# Patient Record
Sex: FEMALE | Race: WHITE | NOT HISPANIC OR LATINO | Employment: OTHER | URBAN - METROPOLITAN AREA
[De-identification: names, ages, dates, MRNs, and addresses within clinical notes are randomized per-mention and may not be internally consistent; named-entity substitution may affect disease eponyms.]

---

## 2017-01-04 ENCOUNTER — GENERIC CONVERSION - ENCOUNTER (OUTPATIENT)
Dept: OTHER | Facility: OTHER | Age: 77
End: 2017-01-04

## 2017-01-12 ENCOUNTER — ALLSCRIPTS OFFICE VISIT (OUTPATIENT)
Dept: OTHER | Facility: OTHER | Age: 77
End: 2017-01-12

## 2017-04-17 ENCOUNTER — ALLSCRIPTS OFFICE VISIT (OUTPATIENT)
Dept: OTHER | Facility: OTHER | Age: 77
End: 2017-04-17

## 2017-04-17 DIAGNOSIS — E11.9 TYPE 2 DIABETES MELLITUS WITHOUT COMPLICATIONS (HCC): ICD-10-CM

## 2017-05-12 ENCOUNTER — GENERIC CONVERSION - ENCOUNTER (OUTPATIENT)
Dept: OTHER | Facility: OTHER | Age: 77
End: 2017-05-12

## 2017-05-19 ENCOUNTER — GENERIC CONVERSION - ENCOUNTER (OUTPATIENT)
Dept: OTHER | Facility: OTHER | Age: 77
End: 2017-05-19

## 2017-05-30 ENCOUNTER — ALLSCRIPTS OFFICE VISIT (OUTPATIENT)
Dept: OTHER | Facility: OTHER | Age: 77
End: 2017-05-30

## 2017-05-30 DIAGNOSIS — Z13.820 ENCOUNTER FOR SCREENING FOR OSTEOPOROSIS: ICD-10-CM

## 2017-07-13 ENCOUNTER — ALLSCRIPTS OFFICE VISIT (OUTPATIENT)
Dept: OTHER | Facility: OTHER | Age: 77
End: 2017-07-13

## 2017-08-18 ENCOUNTER — ALLSCRIPTS OFFICE VISIT (OUTPATIENT)
Dept: OTHER | Facility: OTHER | Age: 77
End: 2017-08-18

## 2017-08-18 ENCOUNTER — HOSPITAL ENCOUNTER (OUTPATIENT)
Dept: RADIOLOGY | Facility: HOSPITAL | Age: 77
Discharge: HOME | End: 2017-08-18
Payer: COMMERCIAL

## 2017-08-18 ENCOUNTER — HOSPITAL ENCOUNTER (OUTPATIENT)
Dept: RADIOLOGY | Facility: HOSPITAL | Age: 77
Discharge: HOME | End: 2017-08-18
Attending: FAMILY MEDICINE
Payer: COMMERCIAL

## 2017-08-18 ENCOUNTER — TRANSCRIBE ORDERS (OUTPATIENT)
Dept: ADMINISTRATIVE | Facility: HOSPITAL | Age: 77
End: 2017-08-18

## 2017-08-18 DIAGNOSIS — S69.92XA UNSPECIFIED INJURY OF LEFT WRIST, HAND AND FINGER(S), INITIAL ENCOUNTER: ICD-10-CM

## 2017-08-18 PROCEDURE — 73130 X-RAY EXAM OF HAND: CPT

## 2017-10-05 ENCOUNTER — ALLSCRIPTS OFFICE VISIT (OUTPATIENT)
Dept: OTHER | Facility: OTHER | Age: 77
End: 2017-10-05

## 2017-10-05 DIAGNOSIS — E11.9 TYPE 2 DIABETES MELLITUS WITHOUT COMPLICATIONS (HCC): ICD-10-CM

## 2017-10-05 DIAGNOSIS — E78.5 HYPERLIPIDEMIA: ICD-10-CM

## 2017-10-05 DIAGNOSIS — M79.10 MYALGIA: ICD-10-CM

## 2017-10-05 DIAGNOSIS — I10 ESSENTIAL (PRIMARY) HYPERTENSION: ICD-10-CM

## 2017-10-10 ENCOUNTER — GENERIC CONVERSION - ENCOUNTER (OUTPATIENT)
Dept: OTHER | Facility: OTHER | Age: 77
End: 2017-10-10

## 2017-10-17 ENCOUNTER — HOSPITAL ENCOUNTER (OUTPATIENT)
Dept: RADIOLOGY | Facility: HOSPITAL | Age: 77
Discharge: HOME | End: 2017-10-17
Attending: FAMILY MEDICINE
Payer: MEDICARE

## 2017-10-17 DIAGNOSIS — Z13.820 ENCOUNTER FOR SCREENING FOR OSTEOPOROSIS: ICD-10-CM

## 2017-10-17 PROCEDURE — 77080 DXA BONE DENSITY AXIAL: CPT

## 2017-11-13 ENCOUNTER — APPOINTMENT (OUTPATIENT)
Dept: LAB | Facility: HOSPITAL | Age: 77
End: 2017-11-13
Attending: FAMILY MEDICINE
Payer: MEDICARE

## 2017-11-13 ENCOUNTER — TRANSCRIBE ORDERS (OUTPATIENT)
Dept: ADMINISTRATIVE | Facility: HOSPITAL | Age: 77
End: 2017-11-13

## 2017-11-13 DIAGNOSIS — E11.9 TYPE 2 DIABETES MELLITUS WITHOUT COMPLICATIONS (HCC): ICD-10-CM

## 2017-11-13 DIAGNOSIS — I10 ESSENTIAL (PRIMARY) HYPERTENSION: ICD-10-CM

## 2017-11-13 DIAGNOSIS — E78.5 HYPERLIPIDEMIA: ICD-10-CM

## 2017-11-13 DIAGNOSIS — M79.10 MYALGIA: ICD-10-CM

## 2017-11-13 LAB
ALBUMIN SERPL BCP-MCNC: 3.9 G/DL (ref 3.5–5)
ALP SERPL-CCNC: 85 U/L (ref 46–116)
ALT SERPL W P-5'-P-CCNC: 9 U/L (ref 12–78)
ANION GAP SERPL CALCULATED.3IONS-SCNC: 8 MMOL/L (ref 4–13)
AST SERPL W P-5'-P-CCNC: 12 U/L (ref 5–45)
BILIRUB SERPL-MCNC: 0.7 MG/DL (ref 0.2–1)
BUN SERPL-MCNC: 18 MG/DL (ref 5–25)
CALCIUM SERPL-MCNC: 8.8 MG/DL (ref 8.3–10.1)
CHLORIDE SERPL-SCNC: 101 MMOL/L (ref 100–108)
CHOLEST SERPL-MCNC: 191 MG/DL (ref 50–200)
CK SERPL-CCNC: 71 U/L (ref 26–192)
CO2 SERPL-SCNC: 30 MMOL/L (ref 21–32)
CREAT SERPL-MCNC: 0.8 MG/DL (ref 0.6–1.3)
EST. AVERAGE GLUCOSE BLD GHB EST-MCNC: 151 MG/DL
GFR SERPL CREATININE-BSD FRML MDRD: 72 ML/MIN/1.73SQ M
GLUCOSE P FAST SERPL-MCNC: 121 MG/DL (ref 65–99)
HBA1C MFR BLD: 6.9 % (ref 4.2–6.3)
HDLC SERPL-MCNC: 77 MG/DL (ref 40–60)
HGB BLD-MCNC: 14 G/DL (ref 12–16)
LDLC SERPL CALC-MCNC: 93 MG/DL (ref 0–100)
POTASSIUM SERPL-SCNC: 3.7 MMOL/L (ref 3.5–5.3)
PROT SERPL-MCNC: 7.4 G/DL (ref 6.4–8.2)
SODIUM SERPL-SCNC: 139 MMOL/L (ref 136–145)
TRIGL SERPL-MCNC: 107 MG/DL

## 2017-11-13 PROCEDURE — 82550 ASSAY OF CK (CPK): CPT

## 2017-11-13 PROCEDURE — 83036 HEMOGLOBIN GLYCOSYLATED A1C: CPT

## 2017-11-13 PROCEDURE — 80053 COMPREHEN METABOLIC PANEL: CPT

## 2017-11-13 PROCEDURE — 36415 COLL VENOUS BLD VENIPUNCTURE: CPT

## 2017-11-13 PROCEDURE — 80061 LIPID PANEL: CPT

## 2017-11-13 PROCEDURE — 85018 HEMOGLOBIN: CPT

## 2017-11-21 ENCOUNTER — GENERIC CONVERSION - ENCOUNTER (OUTPATIENT)
Dept: OTHER | Facility: OTHER | Age: 77
End: 2017-11-21

## 2017-11-21 DIAGNOSIS — M79.10 MYALGIA: ICD-10-CM

## 2017-11-22 ENCOUNTER — APPOINTMENT (OUTPATIENT)
Dept: LAB | Facility: HOSPITAL | Age: 77
End: 2017-11-22
Attending: FAMILY MEDICINE
Payer: MEDICARE

## 2017-11-22 DIAGNOSIS — M79.10 MYALGIA: ICD-10-CM

## 2017-11-22 LAB — CRP SERPL QL: <3 MG/L

## 2017-11-22 PROCEDURE — 86140 C-REACTIVE PROTEIN: CPT

## 2017-11-22 PROCEDURE — 36415 COLL VENOUS BLD VENIPUNCTURE: CPT

## 2017-11-22 PROCEDURE — 82085 ASSAY OF ALDOLASE: CPT

## 2017-11-27 LAB — ALDOLASE SERPL-CCNC: 7.7 U/L (ref 3.3–10.3)

## 2017-12-04 ENCOUNTER — GENERIC CONVERSION - ENCOUNTER (OUTPATIENT)
Dept: OTHER | Facility: OTHER | Age: 77
End: 2017-12-04

## 2017-12-15 ENCOUNTER — GENERIC CONVERSION - ENCOUNTER (OUTPATIENT)
Dept: OTHER | Facility: OTHER | Age: 77
End: 2017-12-15

## 2017-12-21 ENCOUNTER — GENERIC CONVERSION - ENCOUNTER (OUTPATIENT)
Dept: OTHER | Facility: OTHER | Age: 77
End: 2017-12-21

## 2017-12-21 ENCOUNTER — TRANSCRIBE ORDERS (OUTPATIENT)
Dept: ADMINISTRATIVE | Facility: HOSPITAL | Age: 77
End: 2017-12-21

## 2017-12-21 ENCOUNTER — HOSPITAL ENCOUNTER (OUTPATIENT)
Dept: RADIOLOGY | Facility: HOSPITAL | Age: 77
Discharge: HOME | End: 2017-12-21
Payer: MEDICARE

## 2017-12-21 DIAGNOSIS — M17.0 PRIMARY OSTEOARTHRITIS OF BOTH KNEES: Primary | ICD-10-CM

## 2017-12-21 DIAGNOSIS — M17.0 PRIMARY OSTEOARTHRITIS OF BOTH KNEES: ICD-10-CM

## 2017-12-21 PROCEDURE — 73562 X-RAY EXAM OF KNEE 3: CPT

## 2018-01-11 NOTE — MISCELLANEOUS
Message  hga1c 6 5 patient contacted wt stable cont metformin for now      Signatures   Electronically signed by : WELLINGTON Walters ; May 13 2016  4:43PM EST                       (Author)

## 2018-01-13 VITALS
OXYGEN SATURATION: 95 % | DIASTOLIC BLOOD PRESSURE: 60 MMHG | RESPIRATION RATE: 16 BRPM | TEMPERATURE: 97.4 F | BODY MASS INDEX: 21.37 KG/M2 | HEIGHT: 59 IN | SYSTOLIC BLOOD PRESSURE: 90 MMHG | HEART RATE: 82 BPM | WEIGHT: 106 LBS

## 2018-01-13 VITALS
DIASTOLIC BLOOD PRESSURE: 52 MMHG | TEMPERATURE: 97.1 F | HEIGHT: 59 IN | WEIGHT: 108 LBS | HEART RATE: 74 BPM | SYSTOLIC BLOOD PRESSURE: 116 MMHG | OXYGEN SATURATION: 98 % | RESPIRATION RATE: 18 BRPM | BODY MASS INDEX: 21.77 KG/M2

## 2018-01-13 VITALS
OXYGEN SATURATION: 98 % | BODY MASS INDEX: 21.17 KG/M2 | HEART RATE: 90 BPM | SYSTOLIC BLOOD PRESSURE: 116 MMHG | TEMPERATURE: 97.6 F | WEIGHT: 105 LBS | RESPIRATION RATE: 18 BRPM | HEIGHT: 59 IN | DIASTOLIC BLOOD PRESSURE: 60 MMHG

## 2018-01-13 VITALS
OXYGEN SATURATION: 97 % | BODY MASS INDEX: 20.61 KG/M2 | HEART RATE: 80 BPM | WEIGHT: 102.25 LBS | DIASTOLIC BLOOD PRESSURE: 60 MMHG | HEIGHT: 59 IN | RESPIRATION RATE: 18 BRPM | SYSTOLIC BLOOD PRESSURE: 110 MMHG

## 2018-01-13 NOTE — MISCELLANEOUS
Message  I contacted  Patient doing well post procedure and last hga1c 6 5      Signatures   Electronically signed by : WELLINGTON Manuel ; May 19 2017 10:22AM EST                       (Author)

## 2018-01-13 NOTE — MISCELLANEOUS
Message  I will talk to patient on friday re skin bx result which was squamous cell with clear margins      Signatures   Electronically signed by : WELLINGTON Camejo ; Nov 21 2016 10:29AM EST                       (Author)

## 2018-01-14 VITALS
DIASTOLIC BLOOD PRESSURE: 46 MMHG | HEIGHT: 59 IN | RESPIRATION RATE: 18 BRPM | TEMPERATURE: 95.1 F | BODY MASS INDEX: 20.96 KG/M2 | OXYGEN SATURATION: 97 % | WEIGHT: 104 LBS | SYSTOLIC BLOOD PRESSURE: 102 MMHG | HEART RATE: 70 BPM

## 2018-01-14 VITALS
OXYGEN SATURATION: 96 % | DIASTOLIC BLOOD PRESSURE: 66 MMHG | SYSTOLIC BLOOD PRESSURE: 102 MMHG | HEART RATE: 78 BPM | HEIGHT: 59 IN | BODY MASS INDEX: 20.56 KG/M2 | WEIGHT: 102 LBS | TEMPERATURE: 97.2 F | RESPIRATION RATE: 16 BRPM

## 2018-01-16 NOTE — MISCELLANEOUS
Message   Recorded as Task   Date: 11/21/2017 02:59 PM, Created By: Denise Kenyon   Task Name: Follow Up   Assigned To: Santy Tucker   Regarding Patient: Tatyana Kaur, Status: Active   Comment:    Lilli Kendrick - 21 Nov 2017 2:59 PM     TASK CREATED  PT WOULD LIKE A CALL ABOUT TEST RESULTS AND HAS ANOTHER QUESTION   I spoke to patient dexa is stable cont pres rx labs are good still having muscle pain will stay off pravastatin and corina will ck aldolase and crp      Plan  Muscle pain    · (1) ALDOLASE; Status:Active; Requested for:21Nov2017;    · (1) C-REACTIVE PROTEIN; Status:Active;  Requested CSA:[id number];     Signatures   Electronically signed by : WELLINGTON Montero ; Nov 21 2017  5:06PM EST                       (Author)

## 2018-01-16 NOTE — MISCELLANEOUS
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

## 2018-01-23 NOTE — MISCELLANEOUS
Provider Comments  Provider Comments:   l/m about missed appt      Signatures   Electronically signed by : WELLINGTON Hinkle ; Dec 15 2017  2:43PM EST                       (Author)

## 2018-01-24 VITALS
HEIGHT: 59 IN | BODY MASS INDEX: 21.97 KG/M2 | DIASTOLIC BLOOD PRESSURE: 70 MMHG | OXYGEN SATURATION: 99 % | HEART RATE: 84 BPM | RESPIRATION RATE: 18 BRPM | SYSTOLIC BLOOD PRESSURE: 116 MMHG | TEMPERATURE: 97.8 F | WEIGHT: 109 LBS

## 2018-01-26 ENCOUNTER — APPOINTMENT (EMERGENCY)
Dept: RADIOLOGY | Facility: HOSPITAL | Age: 78
End: 2018-01-26
Payer: MEDICARE

## 2018-01-26 ENCOUNTER — TELEPHONE (OUTPATIENT)
Dept: FAMILY MEDICINE CLINIC | Facility: CLINIC | Age: 78
End: 2018-01-26

## 2018-01-26 ENCOUNTER — HOSPITAL ENCOUNTER (EMERGENCY)
Facility: HOSPITAL | Age: 78
Discharge: HOME | End: 2018-01-26
Attending: EMERGENCY MEDICINE | Admitting: EMERGENCY MEDICINE
Payer: MEDICARE

## 2018-01-26 VITALS
BODY MASS INDEX: 21.17 KG/M2 | HEART RATE: 92 BPM | SYSTOLIC BLOOD PRESSURE: 138 MMHG | TEMPERATURE: 98.3 F | WEIGHT: 105 LBS | HEIGHT: 59 IN | OXYGEN SATURATION: 94 % | DIASTOLIC BLOOD PRESSURE: 80 MMHG | RESPIRATION RATE: 20 BRPM

## 2018-01-26 DIAGNOSIS — S22.39XA RIB FRACTURE: Primary | ICD-10-CM

## 2018-01-26 PROCEDURE — 99283 EMERGENCY DEPT VISIT LOW MDM: CPT

## 2018-01-26 PROCEDURE — 71100 X-RAY EXAM RIBS UNI 2 VIEWS: CPT

## 2018-01-26 PROCEDURE — 72100 X-RAY EXAM L-S SPINE 2/3 VWS: CPT

## 2018-01-26 PROCEDURE — 72070 X-RAY EXAM THORAC SPINE 2VWS: CPT

## 2018-01-26 RX ORDER — LANCETS
EACH MISCELLANEOUS
Refills: 0 | COMMUNITY
Start: 2017-12-15 | End: 2019-02-08 | Stop reason: SDUPTHER

## 2018-01-26 RX ORDER — AMANTADINE HYDROCHLORIDE 100 MG/1
TABLET ORAL
COMMUNITY
Start: 2017-06-22 | End: 2018-11-28

## 2018-01-26 RX ORDER — LEVODOPA AND CARBIDOPA 195; 48.75 MG/1; MG/1
CAPSULE, EXTENDED RELEASE ORAL
Refills: 0 | COMMUNITY
Start: 2018-01-09 | End: 2019-03-08

## 2018-01-26 RX ORDER — AMLODIPINE BESYLATE 10 MG/1
10 TABLET ORAL EVERY MORNING
COMMUNITY
Start: 2014-12-24 | End: 2018-06-26 | Stop reason: SDUPTHER

## 2018-01-26 RX ORDER — RASAGILINE 1 MG/1
TABLET ORAL
COMMUNITY
Start: 2018-01-17 | End: 2019-03-08

## 2018-01-26 RX ORDER — ACETAMINOPHEN 325 MG/1
650 TABLET ORAL ONCE
Status: DISCONTINUED | OUTPATIENT
Start: 2018-01-26 | End: 2018-01-26 | Stop reason: HOSPADM

## 2018-01-26 RX ORDER — ROPINIROLE 8 MG/1
8 TABLET, FILM COATED, EXTENDED RELEASE ORAL
COMMUNITY
Start: 2017-06-22 | End: 2019-03-08

## 2018-01-26 RX ORDER — MIRTAZAPINE 7.5 MG/1
7.5 TABLET, FILM COATED ORAL
COMMUNITY
Start: 2017-12-14 | End: 2018-03-19

## 2018-01-26 RX ORDER — PRAVASTATIN SODIUM 10 MG
1 TABLET ORAL DAILY
Status: ON HOLD | COMMUNITY
Start: 2014-05-07 | End: 2018-03-20

## 2018-01-26 RX ORDER — ROPINIROLE 2 MG/1
2 TABLET, FILM COATED, EXTENDED RELEASE ORAL
COMMUNITY
Start: 2017-03-15 | End: 2018-07-31 | Stop reason: ALTCHOICE

## 2018-01-26 NOTE — TELEPHONE ENCOUNTER
Spoke to patient who reported that she fell at home the other day onto her left side and is having a lot of pain on the left side of her abd  She is unable to roll onto that side or turn, it hurts when she is laying down and trying to sit up; She denied that there is any bruising  She tried over the counter pain relief cream but it did not help  She is driving home from [facility] at this time as they went for the night  Patient wants to see Dr Leydi Gonzalez this afternoon  Spoke to Dr Leydi Gonzalez who advised that he will not be in the office this afternoon and if she is having that much pain, she will need to go to the ER to be evaluated  Phone call back to the patient and advised her of this  Patient will go to the ER to be evaluated  Advised her that Dr Leydi Gonzalez will be here Monday and she should call if she needs an appointment  Patient verbalized understanding

## 2018-01-26 NOTE — DISCHARGE INSTRUCTIONS
Rib Fracture   WHAT YOU NEED TO KNOW:   A rib fracture is a crack or break in a rib bone  Rib fractures usually heal within 6 weeks  You should be able to return to normal activities before that time  Do not wrap anything around your body to try to splint your ribs  This can prevent you from taking deep breaths and increases your risk for pneumonia  DISCHARGE INSTRUCTIONS:   Call 911 for any of the following:   · You have trouble breathing  · You have new or increased pain  Return to the emergency department if:   · Your pain does not get better, even after treatment  · You have a fever or a cough  Contact your healthcare provider if:   · You have questions or concerns about your condition or care  Medicines:   · NSAIDs  help decrease swelling and pain  NSAIDs are available without a doctor's order  Ask your healthcare provider which medicine is right for you  Ask how much to take and when to take it  Take as directed  NSAIDs can cause stomach bleeding and kidney problems if not taken correctly  · Prescription pain medicine  may be given  Ask your healthcare provider how to take this medicine safely  Some prescription pain medicines contain acetaminophen  Do not take other medicines that contain acetaminophen without talking to your healthcare provider  Too much acetaminophen may cause liver damage  Prescription pain medicine may cause constipation  Ask your healthcare provider how to prevent or treat constipation  · Take your medicine as directed  Contact your healthcare provider if you think your medicine is not helping or if you have side effects  Tell him or her if you are allergic to any medicine  Keep a list of the medicines, vitamins, and herbs you take  Include the amounts, and when and why you take them  Bring the list or the pill bottles to follow-up visits  Carry your medicine list with you in case of an emergency    Follow up with your healthcare provider as directed:  Write down your questions so you remember to ask them during your visits  Deep breathing:  Deep breathing will decrease your risk for pneumonia  Hug a pillow on the injured side while doing this exercise, to decrease pain  Take a deep breath and hold it for as long as possible  You should let the air out and then cough strongly  Deep breaths help open your airway  You may be given an incentive spirometer to help take deep breaths  Put the plastic piece in your mouth  Take a slow, deep breath  You should then let the air out and cough  Repeat these steps 10 times every hour  Rest:  Rest and limit activity to decrease swelling and pain, and allow your injury to heal  Avoid activities that may cause more pain or damage to your ribs such as, pulling, pushing, and lifting  As your pain decreases, begin movements slowly  Take short walks between rest periods  Ice:  Apply ice on the fractured area for 15 to 20 minutes every hour or as directed  Use an ice pack or put crushed ice in a plastic bag  Cover it with a towel  Ice helps prevent tissue damage and decreases swelling and pain  © 2017 [facility] Information is for End User's use only and may not be sold, redistributed or otherwise used for commercial purposes  All illustrations and images included in CareNotes® are the copyrighted property of A D A M , Inc  or George Amador  The above information is an  only  It is not intended as medical advice for individual conditions or treatments  Talk to your doctor, nurse or pharmacist before following any medical regimen to see if it is safe and effective for you

## 2018-01-26 NOTE — ED PROVIDER NOTES
History  Chief Complaint   Patient presents with    Fall     Patient states she fell on wednesday,falling on her left side,now is having pain on left side     41-year-old female presents with the left sided lower rib/lower back pain after a fall  The incident happened 2 days ago  She tripped and fell over a flashlight  She denies any head injury no loss of consciousness no headache head injury denies any neck pain or any other complaints  She has a history of Parkinson's disease  History provided by:  Patient   used: No    Fall       Prior to Admission Medications   Prescriptions Last Dose Informant Patient Reported? Taking? ACCU-CHEK SOFTCLIX LANCETS lancets   Yes Yes   Sig: TEST once daily   Amantadine HCl 100 MG tablet   Yes Yes   Sig: Take one cap twice a day   Glucose Blood (COOL BLOOD GLUCOSE TEST STRIPS VI)   Yes Yes   Sig: As directed    Omega-3 Fatty Acids (FISH OIL PO)   Yes Yes   Sig: Take 1 g by mouth   RYTARY 48  MG CPCR   Yes Yes   amLODIPine (NORVASC) 10 mg tablet   Yes Yes   Sig: Take 10 mg by mouth   metFORMIN (GLUCOPHAGE) 500 mg tablet   Yes Yes   Sig: Take 1 tablet by mouth daily   mirtazapine (REMERON) 7 5 MG tablet   Yes Yes   Sig: Take 7 5 mg by mouth   pravastatin (PRAVACHOL) 10 mg tablet   Yes Yes   Sig: Take 1 tablet by mouth daily   rOPINIRole (REQUIP XL) 2 MG 24 hr tablet   Yes Yes   Sig: Take 2 mg by mouth   rOPINIRole (REQUIP XL) 8 MG 24 hr tablet   Yes Yes   Sig: Take 8 mg by mouth   rasagiline (AZILECT) 1 MG   Yes Yes   Sig: TAKE 1 TABLET BY MOUTH  DAILY      Facility-Administered Medications: None       Past Medical History:   Diagnosis Date    Diabetes mellitus ([facility] [address]     Hypertension     [facility]        Past Surgical History:   Procedure Laterality Date    APPENDECTOMY      CHOLECYSTECTOMY      OOPHORECTOMY      TONSILLECTOMY         History reviewed  No pertinent family history    I have reviewed and agree with the history as documented  Social History   Substance Use Topics    Smoking status: Never Smoker    Smokeless tobacco: Never Used    Alcohol use No        Review of Systems   All other systems reviewed and are negative  Physical Exam  ED Triage Vitals [01/26/18 1525]   Temperature Pulse Respirations Blood Pressure SpO2   98 3 °F (36 8 °C) 92 20 138/80 94 %      Temp Source Heart Rate Source Patient Position - Orthostatic VS BP Location FiO2 (%)   Tympanic Monitor Sitting Right arm --      Pain Score       --           Orthostatic Vital Signs  Vitals:    01/26/18 1525   BP: 138/80   Pulse: 92   Patient Position - Orthostatic VS: Sitting       Physical Exam   Constitutional: She is oriented to person, place, and time  She appears well-developed and well-nourished  HENT:   Head: Normocephalic and atraumatic  Eyes: EOM are normal  Pupils are equal, round, and reactive to light  Neck: Normal range of motion  Neck supple  Cardiovascular: Normal rate and regular rhythm  Pulmonary/Chest: Effort normal and breath sounds normal    Abdominal: Soft  Bowel sounds are normal    Musculoskeletal: Normal range of motion  Left-sided inferior anterior ribs tenderness along with left-sided paravertebral tenderness in the lumbar spine  No midline tenderness no step-offs noted  Pelvis is stable  Patient ambulating without difficulty  Neurological: She is alert and oriented to person, place, and time  Skin: Skin is warm and dry  Psychiatric: She has a normal mood and affect  Nursing note and vitals reviewed  ED Medications  Medications   acetaminophen (TYLENOL) tablet 650 mg (650 mg Oral Not Given 1/26/18 1606)       Diagnostic Studies  Results Reviewed     None                 XR ribs 2 views LEFT   Final Result by Amando Viveros MD (01/26 1700)      1   Acute fracture of the left 8th rib without significant displacement and without pneumothorax         Workstation performed: [id number]         XR lumbar spine 2 or 3 views   Final Result by Elio Montenegro MD (01/26 1655)      No acute fracture or malalignment seen         Workstation performed: [id number]         XR thoracic spine 2 views   Final Result by Elio Montenegro MD (01/26 1654)      Complex scoliotic curvature  No fracture or malalignment seen  Multilevel degenerative changes         Workstation performed: [id number]                    Procedures  Procedures       Phone Contacts  ED Phone Contact    ED Course  ED Course                                MDM  Number of Diagnoses or Management Options  Diagnosis management comments: Patient evaluated with x-rays of the lumbar spine ribs and thoracic spine  She had a nondisplaced 8th rib fracture  Conveyed the results to the patient encouraged her to use incentive spirometer  She declined any prescription pain medications at this time  She and her  was at bedside verbalized understanding of instructions had no further questions at the time of discharge  Amount and/or Complexity of Data Reviewed  Tests in the radiology section of CPT®: ordered and reviewed  Tests in the medicine section of CPT®: ordered and reviewed    Patient Progress  Patient progress: stable    CritCare Time    Disposition  Final diagnoses:   Rib fracture     Time reflects when diagnosis was documented in both MDM as applicable and the Disposition within this note     Time User Action Codes Description Comment    1/26/2018  5:08 PM Oniel Blackmon Add [S22 39XA] Rib fracture       ED Disposition     ED Disposition Condition Comment    Discharge  Lillie Patrick discharge to home/self care      Condition at discharge: Stable        Follow-up Information     Follow up With Specialties Details Why Contact Info Additional Information    Heather Stanley MD Family Medicine Schedule an appointment as soon as possible for a visit  [address]  [address] [facility] Emergency Department Emergency Medicine  If symptoms worsen [address] [facility] [address]  [phone] [facility], [place], [place], [address]        Patient's Medications   Discharge Prescriptions    No medications on file     No discharge procedures on file      ED Provider  Electronically Signed by           Keila Lechuga DO  01/26/18 0803

## 2018-01-26 NOTE — TELEPHONE ENCOUNTER
PT FELL AND HURT SHOULDER 2 DAYS AGO  HURT RIGHT SIDE    SHE WOULD LIKE TO SPEAK TO SOMEONE   CALL [phone]

## 2018-01-29 ENCOUNTER — OFFICE VISIT (OUTPATIENT)
Dept: FAMILY MEDICINE CLINIC | Facility: CLINIC | Age: 78
End: 2018-01-29
Payer: MEDICARE

## 2018-01-29 VITALS
HEIGHT: 59 IN | WEIGHT: 104.94 LBS | OXYGEN SATURATION: 100 % | SYSTOLIC BLOOD PRESSURE: 140 MMHG | TEMPERATURE: 98.3 F | DIASTOLIC BLOOD PRESSURE: 80 MMHG | BODY MASS INDEX: 21.16 KG/M2 | HEART RATE: 104 BPM | RESPIRATION RATE: 16 BRPM

## 2018-01-29 DIAGNOSIS — K62.5 ANAL BLEEDING: ICD-10-CM

## 2018-01-29 DIAGNOSIS — S22.32XA CLOSED FRACTURE OF ONE RIB OF LEFT SIDE, INITIAL ENCOUNTER: Primary | ICD-10-CM

## 2018-01-29 PROCEDURE — 99213 OFFICE O/P EST LOW 20 MIN: CPT | Performed by: FAMILY MEDICINE

## 2018-01-29 NOTE — PROGRESS NOTES
Assessment/Plan:    Will use advil hs  Monitor sx will cont pres r  Call gi f/u appmt for abd issues     Diagnoses and all orders for this visit:    Closed fracture of one rib of left side, initial encounter    Anal bleeding          Subjective:      Patient ID: Demond Baca is a 69 y o  female  Patient seen after having falling  off couch  Last weds and injuring left ribs   Was seen in er on Friday  Had xray  And found  8th rib fx  Was told to take tylenol and advill having some difficulty sleeping  Due to discomfort    Does note cont constipation  And occ blood on wiping        The following portions of the patient's history were reviewed and updated as appropriate: past family history, past medical history, past social history and past surgical history  Review of Systems   Constitutional: Negative  HENT: Negative  Eyes: Negative  Respiratory: Negative  Cardiovascular: Negative  Gastrointestinal: Positive for abdominal distention, anal bleeding and constipation  Musculoskeletal:        Pain on inspiration tenderness left rib   Skin: Negative  Neurological: Negative  Psychiatric/Behavioral: Negative  Objective:     Physical Exam   Constitutional: She appears well-developed and well-nourished  HENT:   Head: Normocephalic and atraumatic  Eyes: Pupils are equal, round, and reactive to light  Neck: Normal range of motion  Cardiovascular: Normal rate, regular rhythm and normal heart sounds  Pulmonary/Chest: Effort normal and breath sounds normal    Abdominal: Soft  She exhibits distension  She exhibits no mass  Musculoskeletal: She exhibits tenderness  tenderness over left ant 8th rib  No deformity or rubs or clicks   Neurological: She is alert  Skin: Skin is warm  Psychiatric: She has a normal mood and affect   Her behavior is normal

## 2018-03-12 ENCOUNTER — OFFICE VISIT (OUTPATIENT)
Dept: FAMILY MEDICINE CLINIC | Facility: CLINIC | Age: 78
End: 2018-03-12
Payer: MEDICARE

## 2018-03-12 VITALS
DIASTOLIC BLOOD PRESSURE: 62 MMHG | WEIGHT: 112 LBS | HEIGHT: 59 IN | OXYGEN SATURATION: 99 % | BODY MASS INDEX: 22.58 KG/M2 | RESPIRATION RATE: 18 BRPM | SYSTOLIC BLOOD PRESSURE: 110 MMHG | HEART RATE: 82 BPM

## 2018-03-12 DIAGNOSIS — G20 PARKINSON DISEASE (HCC): ICD-10-CM

## 2018-03-12 DIAGNOSIS — I10 ESSENTIAL HYPERTENSION: ICD-10-CM

## 2018-03-12 DIAGNOSIS — E11.9 CONTROLLED TYPE 2 DIABETES MELLITUS WITHOUT COMPLICATION, WITHOUT LONG-TERM CURRENT USE OF INSULIN (HCC): Primary | ICD-10-CM

## 2018-03-12 PROBLEM — F02.80: Status: ACTIVE | Noted: 2017-02-09

## 2018-03-12 PROCEDURE — 99214 OFFICE O/P EST MOD 30 MIN: CPT | Performed by: FAMILY MEDICINE

## 2018-03-12 RX ORDER — CARBIDOPA AND LEVODOPA 50; 200 MG/1; MG/1
TABLET, EXTENDED RELEASE ORAL 4 TIMES DAILY
COMMUNITY
Start: 2018-02-13 | End: 2018-11-28

## 2018-03-12 RX ORDER — POLYETHYLENE GLYCOL 3350, SODIUM CHLORIDE, SODIUM BICARBONATE, POTASSIUM CHLORIDE 420; 11.2; 5.72; 1.48 G/4L; G/4L; G/4L; G/4L
POWDER, FOR SOLUTION ORAL
Refills: 0 | COMMUNITY
Start: 2018-03-01 | End: 2018-11-28

## 2018-03-12 RX ORDER — BLOOD SUGAR DIAGNOSTIC, DRUM
STRIP MISCELLANEOUS
Refills: 0 | COMMUNITY
Start: 2018-02-01 | End: 2019-02-08 | Stop reason: SDUPTHER

## 2018-03-12 NOTE — PROGRESS NOTES
Assessment/Plan:    will cont pres rx for dm  htn   And parkinsons  F/u labs  Next visit   F/u with neuro and gi Cont supplement      Diagnoses and all orders for this visit:    Controlled type 2 diabetes mellitus without complication, without long-term current use of insulin (HCC)    Essential hypertension    Parkinson disease ([facility] [address] )    Other orders  -     carbidopa-levodopa (SINEMET CR)  mg per tablet;   -     ACCU-CHEK COMPACT PLUS test strip;   -     polyethylene glycol-electrolytes (NULYTELY) 4000 mL solution; take by mouth as directed  BY MD          Subjective:      Patient ID: Leonor Stone is a 69 y o  female  Patient seen in f/u  Had been bowling today  No other new issues  Taking new med feels it is helpful  Has f/u in April   Will be getting colonoscopy soon        The following portions of the patient's history were reviewed and updated as appropriate: past family history, past medical history, past social history and past surgical history  Review of Systems   Constitutional: Negative  HENT: Negative  Eyes: Negative  Respiratory: Negative  Cardiovascular: Negative  Gastrointestinal:        See hpi   Musculoskeletal: Negative  Neurological:        See hpi   Psychiatric/Behavioral: Negative  Objective:      /62   Pulse 82   Resp 18   Ht 4' 11" (1 499 m)   Wt 50 8 kg (112 lb)   SpO2 99%   BMI 22 62 kg/m²          Physical Exam   Constitutional: She is oriented to person, place, and time  She appears well-developed and well-nourished  HENT:   Head: Normocephalic and atraumatic  Right Ear: External ear normal    Left Ear: External ear normal    Eyes: Conjunctivae are normal  Pupils are equal, round, and reactive to light  Neck: Normal range of motion  Cardiovascular: Normal rate, regular rhythm and normal heart sounds  Pulmonary/Chest: Effort normal and breath sounds normal    Abdominal: Soft  Musculoskeletal: Normal range of motion  Neurological: She is alert and oriented to person, place, and time  Skin: Skin is warm and dry  Psychiatric: She has a normal mood and affect   Her behavior is normal

## 2018-03-19 ENCOUNTER — ANESTHESIA EVENT (OUTPATIENT)
Dept: GASTROENTEROLOGY | Facility: AMBULARY SURGERY CENTER | Age: 78
End: 2018-03-19
Payer: MEDICARE

## 2018-03-19 RX ORDER — MIRTAZAPINE 7.5 MG/1
15 TABLET, FILM COATED ORAL
COMMUNITY
End: 2019-01-10

## 2018-03-19 NOTE — PRE-PROCEDURE INSTRUCTIONS
Pre-Surgery Instructions:   Medication Instructions    ACCU-CHEK COMPACT PLUS test strip Patient was instructed by Physician and understands   ACCU-CHEK SOFTCLIX LANCETS lancets Patient was instructed by Physician and understands   Amantadine HCl 100 MG tablet Patient was instructed by Physician and understands   amLODIPine (NORVASC) 10 mg tablet Patient was instructed by Physician and understands   carbidopa-levodopa (SINEMET CR)  mg per tablet Patient was instructed by Physician and understands   Glucose Blood (COOL BLOOD GLUCOSE TEST STRIPS VI) Patient was instructed by Physician and understands   metFORMIN (GLUCOPHAGE) 500 mg tablet Patient was instructed by Physician and understands   mirtazapine (REMERON) 7 5 MG tablet Patient was instructed by Physician and understands   Omega-3 Fatty Acids (FISH OIL PO) Patient was instructed by Physician and understands   polyethylene glycol-electrolytes (NULYTELY) 4000 mL solution Patient was instructed by Physician and understands   pravastatin (PRAVACHOL) 10 mg tablet Patient was instructed by Physician and understands   rasagiline (AZILECT) 1 MG Patient was instructed by Physician and understands   rOPINIRole (REQUIP XL) 2 MG 24 hr tablet Patient was instructed by Physician and understands   rOPINIRole (REQUIP XL) 8 MG 24 hr tablet Patient was instructed by Physician and understands   RYTARY 45  MG CPCR Patient was instructed by Physician and understands

## 2018-03-19 NOTE — ANESTHESIA PREPROCEDURE EVALUATION
Review of Systems/Medical History  Patient summary reviewed  Chart reviewed  No history of anesthetic complications     Cardiovascular  Hyperlipidemia, Hypertension ,    Pulmonary       GI/Hepatic            Endo/Other  Diabetes type 2 ,      GYN       Hematology   Musculoskeletal       Neurology    Neuromuscular disease (parkinson's disease) ,    Psychology           Physical Exam    Airway    Mallampati score: II  TM Distance: >3 FB  Neck ROM: full     Dental       Cardiovascular  Rhythm: regular, Rate: normal,     Pulmonary  Breath sounds clear to auscultation,     Other Findings        Anesthesia Plan  ASA Score- 3     Anesthesia Type- IV sedation with anesthesia with ASA Monitors  Additional Monitors:   Airway Plan:         Plan Factors-    Induction- intravenous  Postoperative Plan-     Informed Consent- Anesthetic plan and risks discussed with patient

## 2018-03-20 ENCOUNTER — ANESTHESIA (OUTPATIENT)
Dept: GASTROENTEROLOGY | Facility: AMBULARY SURGERY CENTER | Age: 78
End: 2018-03-20
Payer: MEDICARE

## 2018-03-20 ENCOUNTER — HOSPITAL ENCOUNTER (OUTPATIENT)
Facility: AMBULARY SURGERY CENTER | Age: 78
Setting detail: OUTPATIENT SURGERY
Discharge: HOME | End: 2018-03-20
Attending: INTERNAL MEDICINE | Admitting: INTERNAL MEDICINE
Payer: MEDICARE

## 2018-03-20 VITALS
SYSTOLIC BLOOD PRESSURE: 131 MMHG | HEART RATE: 73 BPM | TEMPERATURE: 97.8 F | OXYGEN SATURATION: 93 % | DIASTOLIC BLOOD PRESSURE: 77 MMHG | RESPIRATION RATE: 18 BRPM

## 2018-03-20 LAB — GLUCOSE SERPL-MCNC: 109 MG/DL (ref 65–140)

## 2018-03-20 PROCEDURE — 82948 REAGENT STRIP/BLOOD GLUCOSE: CPT

## 2018-03-20 RX ORDER — PROPOFOL 10 MG/ML
INJECTION, EMULSION INTRAVENOUS AS NEEDED
Status: DISCONTINUED | OUTPATIENT
Start: 2018-03-20 | End: 2018-03-20 | Stop reason: SURG

## 2018-03-20 RX ORDER — SODIUM CHLORIDE 9 MG/ML
75 INJECTION, SOLUTION INTRAVENOUS CONTINUOUS
Status: DISCONTINUED | OUTPATIENT
Start: 2018-03-20 | End: 2018-03-20 | Stop reason: HOSPADM

## 2018-03-20 RX ADMIN — PROPOFOL 30 MG: 10 INJECTION, EMULSION INTRAVENOUS at 10:18

## 2018-03-20 RX ADMIN — PROPOFOL 40 MG: 10 INJECTION, EMULSION INTRAVENOUS at 10:24

## 2018-03-20 RX ADMIN — SODIUM CHLORIDE 75 ML/HR: 0.9 INJECTION, SOLUTION INTRAVENOUS at 09:56

## 2018-03-20 RX ADMIN — PROPOFOL 80 MG: 10 INJECTION, EMULSION INTRAVENOUS at 10:13

## 2018-03-20 RX ADMIN — PROPOFOL 30 MG: 10 INJECTION, EMULSION INTRAVENOUS at 10:27

## 2018-03-20 RX ADMIN — PROPOFOL 30 MG: 10 INJECTION, EMULSION INTRAVENOUS at 10:20

## 2018-03-20 RX ADMIN — PROPOFOL 20 MG: 10 INJECTION, EMULSION INTRAVENOUS at 10:17

## 2018-03-20 NOTE — ANESTHESIA POSTPROCEDURE EVALUATION
Post-Op Assessment Note      CV Status:  Stable    Mental Status:  Awake    Hydration Status:  Stable    PONV Controlled:  None    Airway Patency:  Patent    Post Op Vitals Reviewed: Yes          Staff: Anesthesiologist           BP      Temp      Pulse     Resp      SpO2

## 2018-03-20 NOTE — DISCHARGE INSTRUCTIONS
You did well  I did see any obstruction or blockage  You have diverticulosis  Continue eating high-fiber diet  Continue taking your pills for constipation as needed  See me in office in 3-4 months

## 2018-03-20 NOTE — OP NOTE
Procedure: Colonoscopy   Indications: Constipation and rectal bleeding  Abdominal pain  Providers: Ismael Posey    Referring MD: No ref  provider found   Medications:  See list      Consent: Patient was explained the procedure of colonoscopy  Indication of the procedure and associated complications including bleeding, perforation and medication side effect was explained to the patient  Less than 100% sensitivity was also explained  Patient admitted understanding above  Vital signs stable  HEENT examination is unremarkable  Chest is clear bilaterally  Cardiovascular system examination reveals normal S1 and S2  There is no murmur or  Gallop  Abdomen is soft  There is no mass or guarding  Abdomen is nontender  Preprocedure diagnosis:  Rectal bleeding and constipation and abdominal pain    Patient was put on left lateral position  After IV anesthesia was administered the procedure was started with insertion of the scope into the rectum  The scope was gradually progressed via sigmoid colon, descending colon, splenic flexure, transverse colon, hepatic flexure, ascending colon into the cecum  All the landmarks were ascertained  Scope was then gradually pulled out and entire colon was examined appropriately  Postprocedure diagnosis:  Diverticulosis, hemorrhoid and colon polyp    Prep was suboptimal     Biopsies taken:  None    Blood loss:  No      Findings    1  Extensive left-sided diverticulosis noted  2   Internal hemorrhoid  3   Suboptimal prep in descending and sigmoid colon area  Impression/Recommendations;    1   High-fiber diet  2   Treatment for constipation as needed  3   Hemorrhoidal cream  4  Observe clinical course    5   Office follow-up          Ismael Posey MD

## 2018-03-20 NOTE — H&P
History and Physical   Gastroenterology  Ravinder Jarquin 69 y o  female MRN: [id number]  Unit/Bed#: [facility] Encounter: [id number]  03/20/18   10:02 AM      No chief complaint on file  History of Present Illness   HPI:  Ravinder Jarquin is a 69 y o  female who presents with history of severe constipation and rectal bleeding  Patient has history of chronic constipation and parkinsonism        Historical Information   Past Medical History:   Diagnosis Date    Diabetes mellitus ([place] [address] )     TYPE 2    Gallbladder disease     Hematuria     last assessed 10/29/15    Hypertension     Ovarian cyst     LAST ASSESSED: 12/10/13    Parkinson's disease ([place] [address] )     Skin disorder     last assessed 12/10/13     Past Surgical History:   Procedure Laterality Date    APPENDECTOMY      1970'S    CHOLECYSTECTOMY      1970'S    NEUROPLASTY / TRANSPOSITION MEDIAN NERVE AT CARPAL TUNNEL Right     OOPHORECTOMY Bilateral     REMOVAL OF BOTH OVARIES LAPAROSCOPIC    TONSILLECTOMY      VENTRAL HERNIA REPAIR      WITH IMPLANT OF MESH       Meds/Allergies     Prescriptions Prior to Admission   Medication    ACCU-CHEK COMPACT PLUS test strip    ACCU-CHEK SOFTCLIX LANCETS lancets    Amantadine HCl 100 MG tablet    amLODIPine (NORVASC) 10 mg tablet    carbidopa-levodopa (SINEMET CR)  mg per tablet    Glucose Blood (COOL BLOOD GLUCOSE TEST STRIPS VI)    metFORMIN (GLUCOPHAGE) 500 mg tablet    mirtazapine (REMERON) 7 5 MG tablet    Omega-3 Fatty Acids (FISH OIL PO)    polyethylene glycol-electrolytes (NULYTELY) 4000 mL solution    rasagiline (AZILECT) 1 MG    rOPINIRole (REQUIP XL) 2 MG 24 hr tablet    rOPINIRole (REQUIP XL) 8 MG 24 hr tablet    RYTARY 48  MG CPCR         Current Facility-Administered Medications:     sodium chloride 0 9 % infusion, 75 mL/hr, Intravenous, Continuous, Min Dee MD, Last Rate: 75 mL/hr at 03/20/18 0956, 75 mL/hr at 03/20/18 0956    No Known Allergies    Social History   History   Alcohol Use No     History   Drug Use No     History   Smoking Status    Never Smoker   Smokeless Tobacco    Never Used       Family History:   Family History   Problem Relation Age of Onset    Alzheimer's disease Mother     Stroke Father     No Known Problems Sister     No Known Problems Brother        Objective     Current Vitals:   Blood Pressure: 151/77 (03/20/18 0945)  Pulse: 81 (03/20/18 0945)  Temperature: 97 8 °F (36 6 °C) (03/20/18 0945)  Temp Source: Tympanic (03/20/18 0945)  Respirations: 18 (03/20/18 0945)  SpO2: 97 % (03/20/18 0945)  No intake or output data in the 24 hours ending 03/20/18 1002    Physical Exam:  General:  Alert and oriented  Eyes:  There is no pallor or jaundice  Pulm:  Clear to auscultation and percussion  CV:  Normal S1-S2  There is no murmur or gallop  Abdomen:  Soft and nontender  There is no mass or guarding  Extremities:      ASSESSMENT:  Khris Helms is a 69 y o  female who presents with abdominal pain, severe constipation and rectal bleeding  Patient has history of diverticulosis        PLAN:  Colonoscopy      Ismael Posey MD

## 2018-03-26 ENCOUNTER — TELEPHONE (OUTPATIENT)
Dept: FAMILY MEDICINE CLINIC | Facility: CLINIC | Age: 78
End: 2018-03-26

## 2018-03-26 DIAGNOSIS — R05.9 COUGH: Primary | ICD-10-CM

## 2018-03-26 RX ORDER — BENZONATATE 100 MG/1
100 CAPSULE ORAL 3 TIMES DAILY PRN
Qty: 20 CAPSULE | Refills: 0 | Status: SHIPPED | OUTPATIENT
Start: 2018-03-26 | End: 2018-07-31 | Stop reason: ALTCHOICE

## 2018-03-27 ENCOUNTER — OFFICE VISIT (OUTPATIENT)
Dept: FAMILY MEDICINE CLINIC | Facility: CLINIC | Age: 78
End: 2018-03-27
Payer: MEDICARE

## 2018-03-27 VITALS
TEMPERATURE: 97.7 F | WEIGHT: 107.25 LBS | BODY MASS INDEX: 21.66 KG/M2 | HEART RATE: 100 BPM | OXYGEN SATURATION: 93 % | RESPIRATION RATE: 20 BRPM | DIASTOLIC BLOOD PRESSURE: 80 MMHG | SYSTOLIC BLOOD PRESSURE: 132 MMHG

## 2018-03-27 DIAGNOSIS — G20 PARKINSON DISEASE (HCC): ICD-10-CM

## 2018-03-27 DIAGNOSIS — J18.9 PNEUMONIA OF BOTH LOWER LOBES DUE TO INFECTIOUS ORGANISM: Primary | ICD-10-CM

## 2018-03-27 DIAGNOSIS — E11.9 TYPE 2 DIABETES MELLITUS WITHOUT COMPLICATION, WITHOUT LONG-TERM CURRENT USE OF INSULIN (HCC): Primary | ICD-10-CM

## 2018-03-27 PROCEDURE — 99213 OFFICE O/P EST LOW 20 MIN: CPT | Performed by: FAMILY MEDICINE

## 2018-03-27 RX ORDER — AZITHROMYCIN 500 MG/1
500 TABLET, FILM COATED ORAL DAILY
Qty: 3 TABLET | Refills: 0 | Status: SHIPPED | OUTPATIENT
Start: 2018-03-27 | End: 2018-03-30

## 2018-03-27 NOTE — PROGRESS NOTES
Assessment/Plan:    Will rx with azithro and robitussin will cont fluids  Monitor sx call with f/u  Stop tessalon     Diagnoses and all orders for this visit:    Pneumonia of both lower lobes due to infectious organism  -     azithromycin (ZITHROMAX) 500 MG tablet; Take 1 tablet (500 mg total) by mouth daily for 3 days          Subjective:      Patient ID: Cristino Thomas is a 69 y o  female  Patient seen with several days of progressive congestion and cough tried tessalon but felt she had a reaction  No fever no wheezing but bringing up phlegm has appmt coming up for parkinsons        The following portions of the patient's history were reviewed and updated as appropriate: past family history, past medical history, past social history and past surgical history  Review of Systems   Constitutional: Positive for activity change and fatigue  HENT: Positive for congestion and rhinorrhea  Eyes: Negative  Respiratory: Positive for cough and shortness of breath  Cardiovascular: Negative  Gastrointestinal: Negative  Genitourinary: Negative  Musculoskeletal: Negative  Skin: Negative  Neurological:        See hpi   Psychiatric/Behavioral: Negative  Objective:      /80   Pulse 100   Temp 97 7 °F (36 5 °C) (Tympanic)   Resp 20   Wt 48 6 kg (107 lb 4 oz)   SpO2 93%   BMI 21 66 kg/m²          Physical Exam   Constitutional: She is oriented to person, place, and time  She appears well-developed  HENT:   Head: Normocephalic and atraumatic  Left Ear: External ear normal    Sinus congestion   Eyes: Pupils are equal, round, and reactive to light  Neck: Normal range of motion  Cardiovascular: Normal rate, regular rhythm and normal heart sounds  Pulmonary/Chest:   chapin rhonchi   Abdominal: Soft  Neurological: She is alert and oriented to person, place, and time  Skin: Skin is warm  Psychiatric: She has a normal mood and affect   Her behavior is normal

## 2018-03-28 ENCOUNTER — TELEPHONE (OUTPATIENT)
Dept: FAMILY MEDICINE CLINIC | Facility: CLINIC | Age: 78
End: 2018-03-28

## 2018-03-29 ENCOUNTER — TELEPHONE (OUTPATIENT)
Dept: FAMILY MEDICINE CLINIC | Facility: CLINIC | Age: 78
End: 2018-03-29

## 2018-06-12 ENCOUNTER — OFFICE VISIT (OUTPATIENT)
Dept: FAMILY MEDICINE CLINIC | Facility: CLINIC | Age: 78
End: 2018-06-12
Payer: MEDICARE

## 2018-06-12 VITALS
HEART RATE: 86 BPM | BODY MASS INDEX: 22.38 KG/M2 | OXYGEN SATURATION: 98 % | HEIGHT: 59 IN | WEIGHT: 111 LBS | TEMPERATURE: 97.7 F | DIASTOLIC BLOOD PRESSURE: 64 MMHG | SYSTOLIC BLOOD PRESSURE: 112 MMHG

## 2018-06-12 DIAGNOSIS — I10 ESSENTIAL HYPERTENSION: ICD-10-CM

## 2018-06-12 DIAGNOSIS — G20 PARKINSON DISEASE (HCC): ICD-10-CM

## 2018-06-12 DIAGNOSIS — E11.9 TYPE 2 DIABETES MELLITUS WITHOUT COMPLICATION, WITHOUT LONG-TERM CURRENT USE OF INSULIN (HCC): Primary | ICD-10-CM

## 2018-06-12 PROCEDURE — 99214 OFFICE O/P EST MOD 30 MIN: CPT | Performed by: FAMILY MEDICINE

## 2018-06-12 NOTE — PROGRESS NOTES
Assessment/Plan:    Cont present mnmt for dm and parkinsons and f/u with neuro  Filled out form  Ck hga1c     Diagnoses and all orders for this visit:    Type 2 diabetes mellitus without complication, without long-term current use of insulin ([address] )  -     Hemoglobin A1C; Future          Subjective:      Patient ID: Jie Richardson is a 69 y o  female  Patient seen in f/u  Has fallen  Walks too fast  Did injure left sec toe  Was bruised  No pain   On a new med  which has helped   Still has some sleep issues  No other new issues  Will be seeing neurology in august  Due for hga1c   Needs handicapped parking renewed        The following portions of the patient's history were reviewed and updated as appropriate: past family history, past medical history, past social history and past surgical history  Review of Systems   Constitutional: Negative  HENT: Negative  Eyes: Negative  Respiratory: Negative  Cardiovascular: Negative  Gastrointestinal: Negative  Endocrine:        See hpi   Genitourinary: Negative  Musculoskeletal: Negative  Neurological:        See hpi   Psychiatric/Behavioral: Negative  Objective:      /64   Pulse 86   Temp 97 7 °F (36 5 °C)   Ht 4' 11" (1 499 m)   Wt 50 3 kg (111 lb)   SpO2 98%   BMI 22 42 kg/m²          Physical Exam   Constitutional: She is oriented to person, place, and time  She appears well-developed  thin   HENT:   Head: Normocephalic and atraumatic  Right Ear: External ear normal    Left Ear: External ear normal    Eyes: Conjunctivae and EOM are normal  Pupils are equal, round, and reactive to light  Neck: Normal range of motion  Neck supple  Cardiovascular: Normal rate, regular rhythm and normal heart sounds  Pulmonary/Chest: Effort normal and breath sounds normal    Abdominal: Soft  Bowel sounds are normal    Musculoskeletal: Normal range of motion  Neurological: She is alert and oriented to person, place, and time     Stacia Belle extrapyramidal sx    Skin: Skin is warm  Psychiatric: She has a normal mood and affect   Her behavior is normal

## 2018-06-15 ENCOUNTER — APPOINTMENT (OUTPATIENT)
Dept: LAB | Facility: HOSPITAL | Age: 78
End: 2018-06-15
Attending: FAMILY MEDICINE
Payer: MEDICARE

## 2018-06-15 ENCOUNTER — TRANSCRIBE ORDERS (OUTPATIENT)
Dept: ADMINISTRATIVE | Facility: HOSPITAL | Age: 78
End: 2018-06-15

## 2018-06-15 DIAGNOSIS — E11.9 TYPE 2 DIABETES MELLITUS WITHOUT COMPLICATION, WITHOUT LONG-TERM CURRENT USE OF INSULIN (HCC): ICD-10-CM

## 2018-06-15 LAB
EST. AVERAGE GLUCOSE BLD GHB EST-MCNC: 148 MG/DL
HBA1C MFR BLD: 6.8 % (ref 4.2–6.3)

## 2018-06-15 PROCEDURE — 83036 HEMOGLOBIN GLYCOSYLATED A1C: CPT

## 2018-06-15 PROCEDURE — 36415 COLL VENOUS BLD VENIPUNCTURE: CPT

## 2018-06-21 ENCOUNTER — TELEPHONE (OUTPATIENT)
Dept: FAMILY MEDICINE CLINIC | Facility: CLINIC | Age: 78
End: 2018-06-21

## 2018-06-26 DIAGNOSIS — I10 ESSENTIAL HYPERTENSION: Primary | ICD-10-CM

## 2018-06-28 RX ORDER — AMLODIPINE BESYLATE 10 MG/1
TABLET ORAL
Qty: 90 TABLET | Refills: 3 | Status: SHIPPED | OUTPATIENT
Start: 2018-06-28 | End: 2019-05-26 | Stop reason: SDUPTHER

## 2018-07-23 ENCOUNTER — TELEPHONE (OUTPATIENT)
Dept: FAMILY MEDICINE CLINIC | Facility: CLINIC | Age: 78
End: 2018-07-23

## 2018-07-23 NOTE — TELEPHONE ENCOUNTER
Pt called c/o of leg pain, doesn't think its her parkinson's, would like you to call he when you get a chance today

## 2018-07-31 ENCOUNTER — OFFICE VISIT (OUTPATIENT)
Dept: FAMILY MEDICINE CLINIC | Facility: CLINIC | Age: 78
End: 2018-07-31
Payer: MEDICARE

## 2018-07-31 VITALS
DIASTOLIC BLOOD PRESSURE: 64 MMHG | TEMPERATURE: 98 F | RESPIRATION RATE: 16 BRPM | WEIGHT: 112 LBS | BODY MASS INDEX: 22.62 KG/M2 | HEART RATE: 88 BPM | SYSTOLIC BLOOD PRESSURE: 138 MMHG

## 2018-07-31 DIAGNOSIS — M79.605 PAIN IN BOTH LOWER EXTREMITIES: Primary | ICD-10-CM

## 2018-07-31 DIAGNOSIS — M79.604 PAIN IN BOTH LOWER EXTREMITIES: Primary | ICD-10-CM

## 2018-07-31 PROCEDURE — 99213 OFFICE O/P EST LOW 20 MIN: CPT | Performed by: FAMILY MEDICINE

## 2018-07-31 NOTE — PROGRESS NOTES
Assessment/Plan:    prob arthritis Will cont with advil if no relief  Refer to ortho for eval and poss injections instructions   Cont f/u with neuro     Diagnoses and all orders for this visit:    Pain in both lower extremities          Subjective:      Patient ID: Laxmi Robles is a 69 y o  female  Patient seen with 2 weeks of chapin leg pain  Starts in knees then has extends up the thighs  Soreness is dull not sharp  Walking irritates it  Tried advil  Helps alittle         The following portions of the patient's history were reviewed and updated as appropriate: past family history, past medical history, past social history and past surgical history  Review of Systems   Constitutional: Negative  HENT: Negative  Respiratory: Negative  Cardiovascular: Negative  Gastrointestinal: Negative  Musculoskeletal:        See hpi   Neurological:        See hpi   Psychiatric/Behavioral: Negative  Objective:      /64   Pulse 88   Temp 98 °F (36 7 °C)   Resp 16   Wt 50 8 kg (112 lb)   BMI 22 62 kg/m²          Physical Exam   Constitutional: She is oriented to person, place, and time  She appears well-developed and well-nourished  HENT:   Head: Normocephalic and atraumatic  Right Ear: External ear normal    Left Ear: External ear normal    Eyes: Conjunctivae are normal  Pupils are equal, round, and reactive to light  Neck: Normal range of motion  Cardiovascular: Normal rate, regular rhythm and normal heart sounds  Pulmonary/Chest: Effort normal and breath sounds normal    Abdominal: Soft  Musculoskeletal:   chapin knee swelling and some tenderness on palpation   Neurological: She is alert and oriented to person, place, and time

## 2018-08-07 ENCOUNTER — TELEPHONE (OUTPATIENT)
Dept: FAMILY MEDICINE CLINIC | Facility: CLINIC | Age: 78
End: 2018-08-07

## 2018-08-07 NOTE — TELEPHONE ENCOUNTER
Pt called to request an order for mammogram,pt goes to [place] for this,the [facility]  also wanted to see if you received the xray result of her knees  saw dr Olivier Gaona

## 2018-10-04 ENCOUNTER — OFFICE VISIT (OUTPATIENT)
Dept: FAMILY MEDICINE CLINIC | Facility: CLINIC | Age: 78
End: 2018-10-04
Payer: MEDICARE

## 2018-10-04 VITALS
SYSTOLIC BLOOD PRESSURE: 122 MMHG | DIASTOLIC BLOOD PRESSURE: 60 MMHG | HEART RATE: 107 BPM | OXYGEN SATURATION: 97 % | WEIGHT: 109.2 LBS | TEMPERATURE: 97.7 F | BODY MASS INDEX: 22.06 KG/M2

## 2018-10-04 DIAGNOSIS — M79.605 PAIN IN BOTH LOWER EXTREMITIES: Primary | ICD-10-CM

## 2018-10-04 DIAGNOSIS — E11.9 TYPE 2 DIABETES MELLITUS WITHOUT COMPLICATION, WITHOUT LONG-TERM CURRENT USE OF INSULIN (HCC): ICD-10-CM
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
## 2018-10-04 DIAGNOSIS — G20 PARKINSON DISEASE (HCC): ICD-10-CM

## 2018-10-04 DIAGNOSIS — Z23 NEED FOR INFLUENZA VACCINATION: ICD-10-CM

## 2018-10-04 DIAGNOSIS — E11.9 CONTROLLED TYPE 2 DIABETES MELLITUS WITHOUT COMPLICATION, WITHOUT LONG-TERM CURRENT USE OF INSULIN (HCC): ICD-10-CM

## 2018-10-04 DIAGNOSIS — M79.604 PAIN IN BOTH LOWER EXTREMITIES: Primary | ICD-10-CM

## 2018-10-04 DIAGNOSIS — M19.90 ARTHRITIS: ICD-10-CM

## 2018-10-04 PROCEDURE — 99214 OFFICE O/P EST MOD 30 MIN: CPT | Performed by: FAMILY MEDICINE

## 2018-10-04 PROCEDURE — G0008 ADMIN INFLUENZA VIRUS VAC: HCPCS | Performed by: FAMILY MEDICINE

## 2018-10-04 PROCEDURE — 90662 IIV NO PRSV INCREASED AG IM: CPT | Performed by: FAMILY MEDICINE

## 2018-10-04 RX ORDER — MELOXICAM 7.5 MG/1
7.5 TABLET ORAL DAILY
Qty: 30 TABLET | Refills: 3 | Status: SHIPPED | OUTPATIENT
Start: 2018-10-04 | End: 2018-11-28

## 2018-10-04 NOTE — PROGRESS NOTES
Assessment/Plan:    Will try meloxicam od and monitor  Will try cortisone cr to insect bites  Cont other meds  And f/u with neuro and ortho instructions   Flu vacc     Diagnoses and all orders for this visit:    Pain in both lower extremities    Arthritis  -     meloxicam (MOBIC) 7 5 mg tablet; Take 1 tablet (7 5 mg total) by mouth daily    Type 2 diabetes mellitus without complication, without long-term current use of insulin (HCC)  -     Comprehensive metabolic panel; Future  -     Hemoglobin; Future  -     Hemoglobin A1C; Future  -     Lipid Panel with Direct LDL reflex; Future  -     Microalbumin / creatinine urine ratio    Need for influenza vaccination  -     influenza vaccine, 7948-6111, high-dose, PF 0 5 mL, for patients 65 yr+ (FLUZONE HIGH-DOSE)    Controlled type 2 diabetes mellitus without complication, without long-term current use of insulin ([facility])    Parkinson disease ([facility] [address] )    Insect bite, initial encounter          Subjective:      Patient ID: Alek Wesley is a 69 y o  female  Patient seen in f/u  Had been in [facility]  And had some bug bites on legs  Still red and itchy  Sugars high  Today 150 due for yearly labs okay on meds but asking to try meloxicam for knee pain  Patients aster sx  Worse today        The following portions of the patient's history were reviewed and updated as appropriate: past medical history, past social history and past surgical history  Review of Systems   Constitutional: Negative  HENT: Negative  Eyes: Negative  Respiratory: Negative  Cardiovascular: Negative  Gastrointestinal: Negative  Endocrine:        See hpi   Genitourinary: Negative  Musculoskeletal:        See hpi   Neurological:        See hpi   Psychiatric/Behavioral: Negative            Objective:      /60 (BP Location: Left arm, Patient Position: Sitting, Cuff Size: Adult)   Pulse (!) 107   Temp 97 7 °F (36 5 °C) (Tympanic)   Wt 49 5 kg (109 lb 3 2 oz)   SpO2 97%   BMI 22 06 kg/m² Physical Exam   Constitutional: She is oriented to person, place, and time  She appears well-developed and well-nourished  HENT:   Head: Normocephalic and atraumatic  Right Ear: External ear normal    Left Ear: External ear normal    Eyes: Pupils are equal, round, and reactive to light  Conjunctivae are normal    Neck: Normal range of motion  Cardiovascular: Normal rate, regular rhythm and normal heart sounds  Pulmonary/Chest: Effort normal and breath sounds normal    Musculoskeletal: Normal range of motion  Neurological: She is alert and oriented to person, place, and time  Movements inc   Skin:   Few insect bites lower ext   Psychiatric: She has a normal mood and affect   Her behavior is normal

## 2018-10-11 LAB
LEFT EYE DIABETIC RETINOPATHY: NORMAL
RIGHT EYE DIABETIC RETINOPATHY: NORMAL

## 2018-11-14 ENCOUNTER — TELEPHONE (OUTPATIENT)
Dept: FAMILY MEDICINE CLINIC | Facility: CLINIC | Age: 78
End: 2018-11-14

## 2018-11-21 ENCOUNTER — TRANSCRIBE ORDERS (OUTPATIENT)
Dept: ADMINISTRATIVE | Facility: HOSPITAL | Age: 78
End: 2018-11-21

## 2018-11-21 ENCOUNTER — APPOINTMENT (OUTPATIENT)
Dept: LAB | Facility: HOSPITAL | Age: 78
End: 2018-11-21
Attending: FAMILY MEDICINE
Payer: MEDICARE

## 2018-11-21 DIAGNOSIS — E11.9 TYPE 2 DIABETES MELLITUS WITHOUT COMPLICATION, WITHOUT LONG-TERM CURRENT USE OF INSULIN (HCC): ICD-10-CM

## 2018-11-21 DIAGNOSIS — E11.9 DIABETES MELLITUS WITHOUT COMPLICATION (HCC): Primary | ICD-10-CM

## 2018-11-21 LAB
ALBUMIN SERPL BCP-MCNC: 3.9 G/DL (ref 3.5–5)
ALP SERPL-CCNC: 82 U/L (ref 46–116)
ALT SERPL W P-5'-P-CCNC: 9 U/L (ref 12–78)
ANION GAP SERPL CALCULATED.3IONS-SCNC: 8 MMOL/L (ref 4–13)
AST SERPL W P-5'-P-CCNC: 20 U/L (ref 5–45)
BILIRUB SERPL-MCNC: 0.7 MG/DL (ref 0.2–1)
BUN SERPL-MCNC: 22 MG/DL (ref 5–25)
CALCIUM SERPL-MCNC: 9.2 MG/DL (ref 8.3–10.1)
CHLORIDE SERPL-SCNC: 102 MMOL/L (ref 100–108)
CHOLEST SERPL-MCNC: 198 MG/DL (ref 50–200)
CO2 SERPL-SCNC: 28 MMOL/L (ref 21–32)
CREAT SERPL-MCNC: 1 MG/DL (ref 0.6–1.3)
EST. AVERAGE GLUCOSE BLD GHB EST-MCNC: 157 MG/DL
GFR SERPL CREATININE-BSD FRML MDRD: 54 ML/MIN/1.73SQ M
GLUCOSE P FAST SERPL-MCNC: 158 MG/DL (ref 65–99)
HBA1C MFR BLD: 7.1 % (ref 4.2–6.3)
HDLC SERPL-MCNC: 87 MG/DL (ref 40–60)
HGB BLD-MCNC: 13.4 G/DL (ref 11.5–15.4)
LDLC SERPL CALC-MCNC: 93 MG/DL (ref 0–100)
POTASSIUM SERPL-SCNC: 4.2 MMOL/L (ref 3.5–5.3)
PROT SERPL-MCNC: 7.2 G/DL (ref 6.4–8.2)
SODIUM SERPL-SCNC: 138 MMOL/L (ref 136–145)
TRIGL SERPL-MCNC: 91 MG/DL

## 2018-11-21 PROCEDURE — 80061 LIPID PANEL: CPT

## 2018-11-21 PROCEDURE — 83036 HEMOGLOBIN GLYCOSYLATED A1C: CPT

## 2018-11-21 PROCEDURE — 36415 COLL VENOUS BLD VENIPUNCTURE: CPT

## 2018-11-21 PROCEDURE — 80053 COMPREHEN METABOLIC PANEL: CPT

## 2018-11-21 PROCEDURE — 85018 HEMOGLOBIN: CPT

## 2018-11-23 ENCOUNTER — APPOINTMENT (OUTPATIENT)
Dept: LAB | Facility: HOSPITAL | Age: 78
End: 2018-11-23
Attending: FAMILY MEDICINE
Payer: MEDICARE

## 2018-11-23 LAB
CREAT UR-MCNC: 217 MG/DL
MICROALBUMIN UR-MCNC: 143 MG/L (ref 0–20)
MICROALBUMIN/CREAT 24H UR: 66 MG/G CREATININE (ref 0–30)

## 2018-11-23 PROCEDURE — 82043 UR ALBUMIN QUANTITATIVE: CPT | Performed by: FAMILY MEDICINE

## 2018-11-23 PROCEDURE — 82570 ASSAY OF URINE CREATININE: CPT | Performed by: FAMILY MEDICINE

## 2018-11-27 ENCOUNTER — OFFICE VISIT (OUTPATIENT)
Dept: FAMILY MEDICINE CLINIC | Facility: CLINIC | Age: 78
End: 2018-11-27
Payer: MEDICARE

## 2018-11-27 VITALS
TEMPERATURE: 98.6 F | RESPIRATION RATE: 18 BRPM | BODY MASS INDEX: 21.57 KG/M2 | SYSTOLIC BLOOD PRESSURE: 126 MMHG | HEART RATE: 91 BPM | WEIGHT: 107 LBS | OXYGEN SATURATION: 97 % | DIASTOLIC BLOOD PRESSURE: 74 MMHG | HEIGHT: 59 IN

## 2018-11-27 DIAGNOSIS — G20 PARKINSON DISEASE (HCC): ICD-10-CM

## 2018-11-27 DIAGNOSIS — H25.13 AGE-RELATED NUCLEAR CATARACT OF BOTH EYES: ICD-10-CM

## 2018-11-27 DIAGNOSIS — Z01.818 PREOPERATIVE CLEARANCE: Primary | ICD-10-CM

## 2018-11-27 DIAGNOSIS — E11.9 CONTROLLED TYPE 2 DIABETES MELLITUS WITHOUT COMPLICATION, WITHOUT LONG-TERM CURRENT USE OF INSULIN (HCC): ICD-10-CM

## 2018-11-27 PROCEDURE — 99214 OFFICE O/P EST MOD 30 MIN: CPT | Performed by: FAMILY MEDICINE

## 2018-11-27 NOTE — PROGRESS NOTES
Assessment/Plan:    Clearance form filled out  Will recheck hga1c in 3 mos  ifsignificant inc will need to inc metformin  Cont other meds and f/u instructions     Diagnoses and all orders for this visit:    Preoperative clearance    Age-related nuclear cataract of both eyes    Parkinson disease ([facility] [address] )    Controlled type 2 diabetes mellitus without complication, without long-term current use of insulin ([address] )          Subjective:      Patient ID: Valentina Heredia is a 69 y o  female  Patient seen for clearance for catarct surgery  Doing well otherwise   Had labs which I reviewed  hga1c 7 1  Is f/u with neuro  Okay on meds still bowling        The following portions of the patient's history were reviewed and updated as appropriate: past family history, past surgical history and problem list     Review of Systems   Constitutional: Negative  HENT: Negative  Eyes:        See hpi   Respiratory: Negative  Cardiovascular: Negative  Gastrointestinal: Negative  Endocrine:        See hpi   Genitourinary: Negative  Musculoskeletal: Negative  Skin: Negative  Neurological:        See hpi   Psychiatric/Behavioral: Negative  Objective:      /74 (BP Location: Left arm, Patient Position: Sitting, Cuff Size: Standard)   Pulse 91   Temp 98 6 °F (37 °C) (Tympanic)   Resp 18   Ht 4' 11" (1 499 m)   Wt 48 5 kg (107 lb)   SpO2 97%   BMI 21 61 kg/m²          Physical Exam   Constitutional: She is oriented to person, place, and time  She appears well-developed and well-nourished  HENT:   Head: Normocephalic and atraumatic  Right Ear: External ear normal    Left Ear: External ear normal    Eyes: Conjunctivae and EOM are normal    cataracts chapin   Neck: Normal range of motion  Cardiovascular: Normal rate, regular rhythm and normal heart sounds  Pulmonary/Chest: Effort normal and breath sounds normal    Abdominal: Soft  Bowel sounds are normal    Musculoskeletal: Normal range of motion  Neurological: She is alert and oriented to person, place, and time  Min symptoms today   Skin: Skin is warm  Psychiatric: She has a normal mood and affect   Her behavior is normal

## 2018-11-28 RX ORDER — AMANTADINE HYDROCHLORIDE 100 MG/1
100 CAPSULE, GELATIN COATED ORAL 2 TIMES DAILY
COMMUNITY
End: 2019-02-08 | Stop reason: SDUPTHER

## 2018-11-28 NOTE — PRE-PROCEDURE INSTRUCTIONS
Pre-Surgery Instructions:   Medication Instructions    ACCU-CHEK COMPACT PLUS test strip Instructed patient per Anesthesia Guidelines   ACCU-CHEK SOFTCLIX LANCETS lancets Instructed patient per Anesthesia Guidelines   amantadine (SYMMETREL) 100 mg capsule Instructed patient per Anesthesia Guidelines   amLODIPine (NORVASC) 10 mg tablet Instructed patient per Anesthesia Guidelines   Glucose Blood (COOL BLOOD GLUCOSE TEST STRIPS VI) Instructed patient per Anesthesia Guidelines   metFORMIN (GLUCOPHAGE) 500 mg tablet Instructed patient per Anesthesia Guidelines   mirtazapine (REMERON) 7 5 MG tablet Instructed patient per Anesthesia Guidelines   Omega-3 Fatty Acids (FISH OIL PO) Instructed patient per Anesthesia Guidelines   rasagiline (AZILECT) 1 MG Instructed patient per Anesthesia Guidelines   rOPINIRole (REQUIP XL) 8 MG 24 hr tablet Instructed patient per Anesthesia Guidelines   RYTARY 48  MG CPCR Instructed patient per Anesthesia Guidelines  Pre op instructions reviewed with pt via phone  Instructed to take rytary and amlodipine a m of surgery  LD fish oil 11/29/18    Errol Jerome [phone]  My Surgical Experience    The following information was developed to assist you to prepare for your operation  What do I need to do before coming to the hospital?   Arrange for a responsible person to drive you to and from the hospital    Arrange care for your children at home  Children are not allowed in the recovery areas of the hospital   Plan to wear clothing that is easy to put on and take off  If you are having shoulder surgery, wear a shirt that buttons or zippers in the front  Bathing  o Shower the evening before and the morning of your surgery with an antibacterial soap   Please refer to the Pre Op Showering Instructions for Surgery Patients Sheet   o Remove nail polish and all body piercing jewelry  o Do not shave any body part for at least 24 hours before surgery-this includes face, arms, legs and upper body  Food  o Nothing to eat or drink after midnight the night before your surgery  This includes candy and chewing gum  o Exception: If your surgery is after 12:00pm (noon), you may have clear liquids such as 7-Up®, ginger ale, apple or cranberry juice, Jell-O®, water, or clear broth until 8:00 am  o Do not drink milk or juice with pulp on the morning before surgery  o Do not drink alcohol 24 hours before surgery  Medicine  o Follow instructions you received from your surgeon about which medicines you may take on the day of surgery  o If instructed to take medicine on the morning of surgery, take pills with just a small sip of water  Call your prescribing doctor for specific information on what to do if you take insulin    What should I bring to the hospital?    Bring:  Patience Saroj or a walker, if you have them, for foot or knee surgery   A list of the daily medicines, vitamins, minerals, herbals and nutritional supplements you take  Include the dosages of medicines and the time you take them each day   Glasses, dentures or hearing aids   Minimal clothing; you will be wearing hospital sleepwear   Photo ID; required to verify your identity   If you have a Living Will or Power of , bring a copy of the documents   If you have an ostomy, bring an extra pouch and any supplies you use    Do not bring   Medicines or inhalers   Money, valuables or jewelry    What other information should I know about the day of surgery?  Notify your surgeons if you develop a cold, sore throat, cough, fever, rash or any other illness   Report to the Ambulatory Surgical/Same Day Surgery Unit   You will be instructed to stop at Registration only if you have not been pre-registered   Inform your  fi they do not stay that they will be asked by the staff to leave a phone number where they can be reached   Be available to be reached before surgery   In the event the operating room schedule changes, you may be asked to come in earlier or later than expected    *It is important to tell your doctor and others involved in your health care if you are taking or have been taking any non-prescription drugs, vitamins, minerals, herbals or other nutritional supplements   Any of these may interact with some food or medicines and cause a reaction

## 2018-12-01 PROBLEM — H25.811 COMBINED FORM OF AGE-RELATED CATARACT, RIGHT EYE: Status: ACTIVE | Noted: 2018-12-01

## 2018-12-01 NOTE — H&P
Admit Note     Margarita Tenorio    The above female patient   69y o   years old has been followed for cataract development in their right  eye  Due to the decrease in vision and the affect on their lifestyle, they have elected to have cataract surgery  The risks and benefits were discussed with the patient using verbal discussion and education material  The IOL option were also discussed  The patient was cleared by  their medical doctor and had an interview with the anesthesia department  No contraindications to the surgery were found  Informed consent was obtained for cataract surgery and possible intraocular lens implantation  Explained that IOL measurements and case more difficultdue to head movement and parkinson's  Ophthalmic Examination:     A complete ophthalmic exam was performed  The best corrected visual acuity in each eye was  OD 20/70, glare out     and OS 20/na      The Snellen chart was used as well as glare testing if indicated to determine the level of vision function  Slit lamp was used to examine the cornea and anterior structures of the eye  No significant pathology was found as a contraindication to surgery  Intraocular pressures were checked and found to be within normal limits  Dilated fundus exam was performed and no significant pathology was found that would attribute to the decreased vision  Examination of the crystalline lens revealed significant cataract formation and the cataract was a significant cause of the patient's decrease in vision  The potential benefits of the cataract surgery out weighed the risks of the procedure and were reviewed with the patient during the pre-operative visit  In summary, it was determined that the patient's decrease in visual acuity was mainly attributable to the cataract formation  Cataract surgery was recommended to for visual rehabilitation and improvement in the patient's  lifestyle   The decision included the patient's ability to safely drive a motor vehicle as well as live independently  The patient had an A-scan to determine the power of the lens to be placed into the eye at the time of cataract surgery  The risks and benefits were also review again at this visit including total loss of the eye on rare occassions  The IOL options were also reviewed based on the patients lifestyle and ocular findings  The above patient was informed of the need to be cleared by their medical doctor prior to surgery  Any pre-operative labs would be determined by their primary care doctor and/or cardiologist      A potential Vision was checked and found to be  20/50 in the operative eye  The post operative plan and visits were reviewed with the patient and scheduled  Admitting Diagnosis:    Cataract right Eye  Procedure Planned:  Cataract Extraction right Eye with possible Intraocular lens Implant      Anesthesia: Local MAC    Surgeon: Wandalee Bence, MD

## 2018-12-03 ENCOUNTER — ANESTHESIA EVENT (OUTPATIENT)
Dept: PERIOP | Facility: AMBULARY SURGERY CENTER | Age: 78
End: 2018-12-03
Payer: MEDICARE

## 2018-12-03 RX ORDER — SODIUM CHLORIDE, SODIUM LACTATE, POTASSIUM CHLORIDE, CALCIUM CHLORIDE 600; 310; 30; 20 MG/100ML; MG/100ML; MG/100ML; MG/100ML
125 INJECTION, SOLUTION INTRAVENOUS CONTINUOUS
Status: CANCELLED | OUTPATIENT
Start: 2018-12-03

## 2018-12-03 NOTE — ANESTHESIA PREPROCEDURE EVALUATION
Review of Systems/Medical History  Patient summary reviewed  Chart reviewed      Cardiovascular  Hyperlipidemia, Hypertension ,   PE (h/o),  Pulmonary  Asthma ,        GI/Hepatic    GI bleeding , history of, GERD ,             Endo/Other  Diabetes (FSBG run ~150) well controlled type 2 ,   Comment: Psoriasis    GYN       Hematology   Musculoskeletal  Back pain , lumbar pain and spinal stenosis,   Arthritis     Neurology      Comment: Parkinson Disease Psychology           Physical Exam    Airway    Mallampati score: II  TM Distance: >3 FB  Neck ROM: full     Dental   No notable dental hx     Cardiovascular  Rhythm: regular, Rate: normal, Cardiovascular exam normal    Pulmonary  Pulmonary exam normal Breath sounds clear to auscultation,     Other Findings        Anesthesia Plan  ASA Score- 3     Anesthesia Type- IV sedation with anesthesia with ASA Monitors  Additional Monitors:   Airway Plan:         Plan Factors-    Induction- intravenous  Postoperative Plan-     Informed Consent- Anesthetic plan and risks discussed with patient  I personally reviewed this patient with the CRNA  Discussed and agreed on the Anesthesia Plan with the CRNA  Jay España

## 2018-12-04 ENCOUNTER — ANESTHESIA (OUTPATIENT)
Dept: PERIOP | Facility: AMBULARY SURGERY CENTER | Age: 78
End: 2018-12-04
Payer: MEDICARE

## 2018-12-04 ENCOUNTER — HOSPITAL ENCOUNTER (OUTPATIENT)
Facility: AMBULARY SURGERY CENTER | Age: 78
Setting detail: OUTPATIENT SURGERY
Discharge: HOME | End: 2018-12-04
Attending: OPHTHALMOLOGY | Admitting: OPHTHALMOLOGY
Payer: MEDICARE

## 2018-12-04 VITALS
SYSTOLIC BLOOD PRESSURE: 170 MMHG | DIASTOLIC BLOOD PRESSURE: 88 MMHG | WEIGHT: 107 LBS | HEART RATE: 88 BPM | HEIGHT: 59 IN | TEMPERATURE: 97 F | RESPIRATION RATE: 16 BRPM | OXYGEN SATURATION: 98 % | BODY MASS INDEX: 21.57 KG/M2

## 2018-12-04 PROBLEM — H25.811 COMBINED FORM OF AGE-RELATED CATARACT, RIGHT EYE: Status: RESOLVED | Noted: 2018-12-01 | Resolved: 2018-12-04

## 2018-12-04 LAB — GLUCOSE SERPL-MCNC: 130 MG/DL (ref 65–140)

## 2018-12-04 PROCEDURE — V2632 POST CHMBR INTRAOCULAR LENS: HCPCS | Performed by: OPHTHALMOLOGY

## 2018-12-04 PROCEDURE — 82948 REAGENT STRIP/BLOOD GLUCOSE: CPT

## 2018-12-04 DEVICE — IOL SN60WF 21.5: Type: IMPLANTABLE DEVICE | Site: EYE | Status: FUNCTIONAL

## 2018-12-04 RX ORDER — OFLOXACIN 3 MG/ML
SOLUTION/ DROPS OPHTHALMIC AS NEEDED
Status: DISCONTINUED | OUTPATIENT
Start: 2018-12-04 | End: 2018-12-04 | Stop reason: HOSPADM

## 2018-12-04 RX ORDER — BALANCED SALT SOLUTION 6.4; .75; .48; .3; 3.9; 1.7 MG/ML; MG/ML; MG/ML; MG/ML; MG/ML; MG/ML
SOLUTION OPHTHALMIC AS NEEDED
Status: DISCONTINUED | OUTPATIENT
Start: 2018-12-04 | End: 2018-12-04 | Stop reason: HOSPADM

## 2018-12-04 RX ORDER — SODIUM CHLORIDE, SODIUM LACTATE, POTASSIUM CHLORIDE, CALCIUM CHLORIDE 600; 310; 30; 20 MG/100ML; MG/100ML; MG/100ML; MG/100ML
125 INJECTION, SOLUTION INTRAVENOUS CONTINUOUS
Status: DISCONTINUED | OUTPATIENT
Start: 2018-12-04 | End: 2018-12-04 | Stop reason: HOSPADM

## 2018-12-04 RX ORDER — OFLOXACIN 3 MG/ML
1 SOLUTION/ DROPS OPHTHALMIC
Status: COMPLETED | OUTPATIENT
Start: 2018-12-04 | End: 2018-12-04

## 2018-12-04 RX ORDER — TETRACAINE HYDROCHLORIDE 5 MG/ML
SOLUTION OPHTHALMIC AS NEEDED
Status: DISCONTINUED | OUTPATIENT
Start: 2018-12-04 | End: 2018-12-04 | Stop reason: HOSPADM

## 2018-12-04 RX ORDER — TROPICAMIDE 10 MG/ML
1 SOLUTION/ DROPS OPHTHALMIC
Status: COMPLETED | OUTPATIENT
Start: 2018-12-04 | End: 2018-12-04

## 2018-12-04 RX ORDER — PHENYLEPHRINE HCL 2.5 %
1 DROPS OPHTHALMIC (EYE)
Status: COMPLETED | OUTPATIENT
Start: 2018-12-04 | End: 2018-12-04

## 2018-12-04 RX ORDER — PROPOFOL 10 MG/ML
INJECTION, EMULSION INTRAVENOUS AS NEEDED
Status: DISCONTINUED | OUTPATIENT
Start: 2018-12-04 | End: 2018-12-04 | Stop reason: SURG

## 2018-12-04 RX ORDER — ACETAMINOPHEN 325 MG/1
1000 TABLET ORAL EVERY 6 HOURS PRN
Status: DISCONTINUED | OUTPATIENT
Start: 2018-12-04 | End: 2018-12-04 | Stop reason: HOSPADM

## 2018-12-04 RX ORDER — LIDOCAINE HYDROCHLORIDE 10 MG/ML
INJECTION, SOLUTION INFILTRATION; PERINEURAL AS NEEDED
Status: DISCONTINUED | OUTPATIENT
Start: 2018-12-04 | End: 2018-12-04 | Stop reason: SURG

## 2018-12-04 RX ORDER — LIDOCAINE HYDROCHLORIDE 20 MG/ML
INJECTION, SOLUTION EPIDURAL; INFILTRATION; INTRACAUDAL; PERINEURAL AS NEEDED
Status: DISCONTINUED | OUTPATIENT
Start: 2018-12-04 | End: 2018-12-04 | Stop reason: HOSPADM

## 2018-12-04 RX ORDER — CYCLOPENTOLATE HYDROCHLORIDE 10 MG/ML
1 SOLUTION/ DROPS OPHTHALMIC
Status: COMPLETED | OUTPATIENT
Start: 2018-12-04 | End: 2018-12-04

## 2018-12-04 RX ORDER — LIDOCAINE HYDROCHLORIDE 20 MG/ML
1 JELLY TOPICAL
Status: COMPLETED | OUTPATIENT
Start: 2018-12-04 | End: 2018-12-04

## 2018-12-04 RX ADMIN — OFLOXACIN 1 DROP: 3 SOLUTION OPHTHALMIC at 08:40

## 2018-12-04 RX ADMIN — TROPICAMIDE 1 DROP: 10 SOLUTION/ DROPS OPHTHALMIC at 08:50

## 2018-12-04 RX ADMIN — OFLOXACIN 1 DROP: 3 SOLUTION OPHTHALMIC at 08:50

## 2018-12-04 RX ADMIN — CYCLOPENTOLATE HYDROCHLORIDE 1 DROP: 10 SOLUTION/ DROPS OPHTHALMIC at 08:50

## 2018-12-04 RX ADMIN — LIDOCAINE HYDROCHLORIDE 1 APPLICATION: 20 JELLY TOPICAL at 09:00

## 2018-12-04 RX ADMIN — PROPOFOL 80 MG: 10 INJECTION, EMULSION INTRAVENOUS at 09:20

## 2018-12-04 RX ADMIN — LIDOCAINE HYDROCHLORIDE 1 APPLICATION: 20 JELLY TOPICAL at 08:40

## 2018-12-04 RX ADMIN — CYCLOPENTOLATE HYDROCHLORIDE 1 DROP: 10 SOLUTION/ DROPS OPHTHALMIC at 08:40

## 2018-12-04 RX ADMIN — LIDOCAINE HYDROCHLORIDE 20 MG: 10 INJECTION, SOLUTION INFILTRATION; PERINEURAL at 09:20

## 2018-12-04 RX ADMIN — PHENYLEPHRINE HYDROCHLORIDE 1 DROP: 25 SOLUTION/ DROPS OPHTHALMIC at 08:50

## 2018-12-04 RX ADMIN — OFLOXACIN 1 DROP: 3 SOLUTION OPHTHALMIC at 09:00

## 2018-12-04 RX ADMIN — LIDOCAINE HYDROCHLORIDE 1 APPLICATION: 20 JELLY TOPICAL at 08:50

## 2018-12-04 RX ADMIN — PHENYLEPHRINE HYDROCHLORIDE 1 DROP: 25 SOLUTION/ DROPS OPHTHALMIC at 09:00

## 2018-12-04 RX ADMIN — SODIUM CHLORIDE, SODIUM LACTATE, POTASSIUM CHLORIDE, AND CALCIUM CHLORIDE: .6; .31; .03; .02 INJECTION, SOLUTION INTRAVENOUS at 09:17

## 2018-12-04 RX ADMIN — TROPICAMIDE 1 DROP: 10 SOLUTION/ DROPS OPHTHALMIC at 08:40

## 2018-12-04 RX ADMIN — PHENYLEPHRINE HYDROCHLORIDE 1 DROP: 25 SOLUTION/ DROPS OPHTHALMIC at 08:40

## 2018-12-04 RX ADMIN — TROPICAMIDE 1 DROP: 10 SOLUTION/ DROPS OPHTHALMIC at 09:00

## 2018-12-04 NOTE — DISCHARGE SUMMARY
Bryan Delgado was discharged in satisfactory condition  Discharge instructions were reviewed and then given to the patient   Arrangements were made for follow up the next day with Ren Fowler MD

## 2018-12-04 NOTE — DISCHARGE INSTRUCTIONS
[facility]  Discharge Instructions    Dr Michelle Georges and Dr Sonya Sanders discharged in satisfactory condition  Post operative instructions were given  Follow-up Appointment was given for 8:30 AM at [facility]  Normal Symptoms: Foreign body sensation, scratchy, picky feeling  Try Extra Strength Tylenol for headache  Call Office if severe pain or discomfort  Keep patch on operative eye until 4 pm today  Bring sunglasses to office in the morning  Do not bring the 3 eye drops  NEW Instructions on how to use the 3 drops will be given in AM     Activity: Avoid any heavy,  bending, lifting or excessive activity until instructed  May walk around home with assistance  OK to watch TV  Avoid any excessive reading  No driving until told (Normally 2-4 days after surgery)  Avoid sleeping on operative eye  No water in the eye    Diet: Resume normal diet as tolerated  If experiencing nausea, try bland foods or liquid diet first      Resume normal medications unless otherwise instructed     If any Questions or Problems Please Call: Miles take off [facility] and patch and start drops , Do not put shield back on during the day  Blurry Vision normal today  Pink/White Top                     [employer] Top               4:00PM                         4:05 PM               4:10 PM               8:00PM                         8: 05PM                8:10PM    BEDTIME:    Take Tan Top ONLY and then replace the plastic shield over eye  No gauze pad needed at bedtime    TOMORROW MORNING , before coming to office, take all THREE drops, then put on glasses  Example of times below  7:00AM                      7: 05AM                  7:10 AM            YOU MAY EXPERIENCE "DOUBLE VISION" - "Blurry Vision"  ONCE YOU TAKE OFF EYE PATCH/SHIELD THIS IS NORMAL

## 2019-01-10 RX ORDER — ASPIRIN 81 MG/1
81 TABLET ORAL EVERY MORNING
COMMUNITY

## 2019-01-10 NOTE — PRE-PROCEDURE INSTRUCTIONS
Pre-Surgery Instructions:   Medication Instructions    ACCU-CHEK COMPACT PLUS test strip Instructed patient per Anesthesia Guidelines   ACCU-CHEK SOFTCLIX LANCETS lancets Instructed patient per Anesthesia Guidelines   amantadine (SYMMETREL) 100 mg capsule Instructed patient per Anesthesia Guidelines   amLODIPine (NORVASC) 10 mg tablet Instructed patient per Anesthesia Guidelines   aspirin (ECOTRIN LOW STRENGTH) 81 mg EC tablet Patient was instructed by Physician and understands   Glucose Blood (COOL BLOOD GLUCOSE TEST STRIPS VI) Instructed patient per Anesthesia Guidelines   metFORMIN (GLUCOPHAGE) 500 mg tablet Instructed patient per Anesthesia Guidelines   Omega-3 Fatty Acids (FISH OIL PO) Patient was instructed by Physician and understands   rasagiline (AZILECT) 1 MG Instructed patient per Anesthesia Guidelines   rOPINIRole (REQUIP XL) 8 MG 24 hr tablet Instructed patient per Anesthesia Guidelines   RYTARY 48  MG CPCR Instructed patient per Anesthesia Guidelines  Pre op instructions reviewed with pt via phone  Instructed to take rytary and amlodipine a m of surgery  LD fish oil and asa 01/03/19    Heather Russell [phone]  My Surgical Experience    The following information was developed to assist you to prepare for your operation  What do I need to do before coming to the hospital?   Arrange for a responsible person to drive you to and from the hospital    Arrange care for your children at home  Children are not allowed in the recovery areas of the hospital   Plan to wear clothing that is easy to put on and take off  If you are having shoulder surgery, wear a shirt that buttons or zippers in the front  Bathing  o Shower the evening before and the morning of your surgery with an antibacterial soap   Please refer to the Pre Op Showering Instructions for Surgery Patients Sheet   o Remove nail polish and all body piercing jewelry  o Do not shave any body part for at least 24 hours before surgery-this includes face, arms, legs and upper body  Food  o Nothing to eat or drink after midnight the night before your surgery  This includes candy and chewing gum  o Exception: If your surgery is after 12:00pm (noon), you may have clear liquids such as 7-Up®, ginger ale, apple or cranberry juice, Jell-O®, water, or clear broth until 8:00 am  o Do not drink milk or juice with pulp on the morning before surgery  o Do not drink alcohol 24 hours before surgery  Medicine  o Follow instructions you received from your surgeon about which medicines you may take on the day of surgery  o If instructed to take medicine on the morning of surgery, take pills with just a small sip of water  Call your prescribing doctor for specific information on what to do if you take insulin    What should I bring to the hospital?    Bring:  Jeremiah Collier or a walker, if you have them, for foot or knee surgery   A list of the daily medicines, vitamins, minerals, herbals and nutritional supplements you take  Include the dosages of medicines and the time you take them each day   Glasses, dentures or hearing aids   Minimal clothing; you will be wearing hospital sleepwear   Photo ID; required to verify your identity   If you have a Living Will or Power of , bring a copy of the documents   If you have an ostomy, bring an extra pouch and any supplies you use    Do not bring   Medicines or inhalers   Money, valuables or jewelry    What other information should I know about the day of surgery?  Notify your surgeons if you develop a cold, sore throat, cough, fever, rash or any other illness     Report to the Ambulatory Surgical/Same Day Surgery Unit   You will be instructed to stop at Registration only if you have not been pre-registered   Inform your  fi they do not stay that they will be asked by the staff to leave a phone number where they can be reached   Be available to be reached before surgery  In the event the operating room schedule changes, you may be asked to come in earlier or later than expected    *It is important to tell your doctor and others involved in your health care if you are taking or have been taking any non-prescription drugs, vitamins, minerals, herbals or other nutritional supplements   Any of these may interact with some food or medicines and cause a reaction

## 2019-01-13 PROBLEM — H25.812 COMBINED FORMS OF AGE-RELATED CATARACT OF LEFT EYE: Status: ACTIVE | Noted: 2019-01-13

## 2019-01-13 NOTE — H&P
Admit Note     Jaison Townsend    The above female patient   69y o   years old has been followed for cataract development in their left eye  Due to the decrease in vision and the affect on their lifestyle, they have elected to have cataract surgery  The risks and benefits were discussed with the patient using verbal discussion and education material  The IOL option were also discussed  The patient was cleared by  their medical doctor and had an interview with the anesthesia department  No contraindications to the surgery were found  Informed consent was obtained for cataract surgery and possible intraocular lens implantation  Ophthalmic Examination:     A complete ophthalmic exam was performed  The best corrected visual acuity in each eye was  OD 20/po     and OS 20/50,glare 20/100      The Snellen chart was used as well as glare testing if indicated to determine the level of vision function  Slit lamp was used to examine the cornea and anterior structures of the eye  No significant pathology was found as a contraindication to surgery  Intraocular pressures were checked and found to be within normal limits  Dilated fundus exam was performed and no significant pathology was found that would attribute to the decreased vision  Examination of the crystalline lens revealed significant cataract formation and the cataract was a significant cause of the patient's decrease in vision  The potential benefits of the cataract surgery out weighed the risks of the procedure and were reviewed with the patient during the pre-operative visit  In summary, it was determined that the patient's decrease in visual acuity was mainly attributable to the cataract formation  Cataract surgery was recommended to for visual rehabilitation and improvement in the patient's  lifestyle  The decision included the patient's ability to safely drive a motor vehicle as well as live independently   The patient had an A-scan to determine the power of the lens to be placed into the eye at the time of cataract surgery  The risks and benefits were also review again at this visit including total loss of the eye on rare occassions  The IOL options were also reviewed based on the patients lifestyle and ocular findings  The above patient was informed of the need to be cleared by their medical doctor prior to surgery  Any pre-operative labs would be determined by their primary care doctor and/or cardiologist      A potential Vision was checked and found to be  20/40 in the operative eye  The post operative plan and visits were reviewed with the patient and scheduled  Admitting Diagnosis:    Cataract left Eye  Procedure Planned:  Cataract Extraction left Eye with possible Intraocular lens Implant      Anesthesia: Local MAC    Surgeon: Ewa Jeffrey MD

## 2019-01-14 ENCOUNTER — ANESTHESIA EVENT (OUTPATIENT)
Dept: PERIOP | Facility: AMBULARY SURGERY CENTER | Age: 79
End: 2019-01-14
Payer: MEDICARE

## 2019-01-14 NOTE — ANESTHESIA PREPROCEDURE EVALUATION
Review of Systems/Medical History  Patient summary reviewed  Chart reviewed  No history of anesthetic complications     Cardiovascular  Hyperlipidemia, Hypertension ,   PE (h/o),  Pulmonary  Asthma , well controlled/ stable ,        GI/Hepatic    GI bleeding , history of, GERD ,             Endo/Other  Diabetes (FSBG run ~150) well controlled type 2 ,   Comment: Psoriasis    GYN       Hematology   Musculoskeletal  Back pain , lumbar pain and spinal stenosis,   Arthritis     Neurology      Comment: Parkinson Disease s/p DBS Psychology           Physical Exam    Airway    Mallampati score: II  TM Distance: >3 FB  Neck ROM: full     Dental   No notable dental hx     Cardiovascular  Rhythm: regular, Rate: normal, Cardiovascular exam normal    Pulmonary  Pulmonary exam normal Breath sounds clear to auscultation,     Other Findings        Anesthesia Plan  ASA Score- 3     Anesthesia Type- IV sedation with anesthesia with ASA Monitors  Additional Monitors:   Airway Plan:         Plan Factors-    Induction- intravenous  Postoperative Plan-     Informed Consent- Anesthetic plan and risks discussed with patient  I personally reviewed this patient with the CRNA  Discussed and agreed on the Anesthesia Plan with the CRNA  Torres Ojeda

## 2019-01-15 ENCOUNTER — HOSPITAL ENCOUNTER (OUTPATIENT)
Facility: AMBULARY SURGERY CENTER | Age: 79
Setting detail: OUTPATIENT SURGERY
Discharge: HOME | End: 2019-01-15
Attending: OPHTHALMOLOGY | Admitting: OPHTHALMOLOGY
Payer: MEDICARE

## 2019-01-15 ENCOUNTER — ANESTHESIA (OUTPATIENT)
Dept: PERIOP | Facility: AMBULARY SURGERY CENTER | Age: 79
End: 2019-01-15
Payer: MEDICARE

## 2019-01-15 VITALS
WEIGHT: 107 LBS | SYSTOLIC BLOOD PRESSURE: 166 MMHG | HEART RATE: 75 BPM | HEIGHT: 59 IN | DIASTOLIC BLOOD PRESSURE: 83 MMHG | TEMPERATURE: 97.3 F | OXYGEN SATURATION: 97 % | RESPIRATION RATE: 18 BRPM | BODY MASS INDEX: 21.57 KG/M2

## 2019-01-15 PROBLEM — H25.812 COMBINED FORMS OF AGE-RELATED CATARACT OF LEFT EYE: Status: RESOLVED | Noted: 2019-01-13 | Resolved: 2019-01-15

## 2019-01-15 LAB — GLUCOSE SERPL-MCNC: 127 MG/DL (ref 65–140)

## 2019-01-15 PROCEDURE — 82948 REAGENT STRIP/BLOOD GLUCOSE: CPT

## 2019-01-15 PROCEDURE — V2632 POST CHMBR INTRAOCULAR LENS: HCPCS | Performed by: OPHTHALMOLOGY

## 2019-01-15 DEVICE — IOL SN60WF 21.0: Type: IMPLANTABLE DEVICE | Site: EYE | Status: FUNCTIONAL

## 2019-01-15 RX ORDER — TETRACAINE HYDROCHLORIDE 5 MG/ML
SOLUTION OPHTHALMIC AS NEEDED
Status: DISCONTINUED | OUTPATIENT
Start: 2019-01-15 | End: 2019-01-15 | Stop reason: HOSPADM

## 2019-01-15 RX ORDER — BALANCED SALT SOLUTION 6.4; .75; .48; .3; 3.9; 1.7 MG/ML; MG/ML; MG/ML; MG/ML; MG/ML; MG/ML
SOLUTION OPHTHALMIC AS NEEDED
Status: DISCONTINUED | OUTPATIENT
Start: 2019-01-15 | End: 2019-01-15 | Stop reason: HOSPADM

## 2019-01-15 RX ORDER — CYCLOPENTOLATE HYDROCHLORIDE 10 MG/ML
1 SOLUTION/ DROPS OPHTHALMIC
Status: COMPLETED | OUTPATIENT
Start: 2019-01-15 | End: 2019-01-15

## 2019-01-15 RX ORDER — TROPICAMIDE 10 MG/ML
1 SOLUTION/ DROPS OPHTHALMIC
Status: COMPLETED | OUTPATIENT
Start: 2019-01-15 | End: 2019-01-15

## 2019-01-15 RX ORDER — LIDOCAINE HYDROCHLORIDE 20 MG/ML
INJECTION, SOLUTION EPIDURAL; INFILTRATION; INTRACAUDAL; PERINEURAL AS NEEDED
Status: DISCONTINUED | OUTPATIENT
Start: 2019-01-15 | End: 2019-01-15 | Stop reason: HOSPADM

## 2019-01-15 RX ORDER — LIDOCAINE HYDROCHLORIDE 20 MG/ML
1 JELLY TOPICAL
Status: COMPLETED | OUTPATIENT
Start: 2019-01-15 | End: 2019-01-15

## 2019-01-15 RX ORDER — OFLOXACIN 3 MG/ML
1 SOLUTION/ DROPS OPHTHALMIC
Status: COMPLETED | OUTPATIENT
Start: 2019-01-15 | End: 2019-01-15

## 2019-01-15 RX ORDER — LIDOCAINE HYDROCHLORIDE 10 MG/ML
INJECTION, SOLUTION INFILTRATION; PERINEURAL AS NEEDED
Status: DISCONTINUED | OUTPATIENT
Start: 2019-01-15 | End: 2019-01-15 | Stop reason: SURG

## 2019-01-15 RX ORDER — PROPOFOL 10 MG/ML
INJECTION, EMULSION INTRAVENOUS AS NEEDED
Status: DISCONTINUED | OUTPATIENT
Start: 2019-01-15 | End: 2019-01-15 | Stop reason: SURG

## 2019-01-15 RX ORDER — SODIUM CHLORIDE 9 MG/ML
75 INJECTION, SOLUTION INTRAVENOUS CONTINUOUS
Status: DISCONTINUED | OUTPATIENT
Start: 2019-01-15 | End: 2019-01-15 | Stop reason: HOSPADM

## 2019-01-15 RX ORDER — PHENYLEPHRINE HCL 2.5 %
1 DROPS OPHTHALMIC (EYE)
Status: COMPLETED | OUTPATIENT
Start: 2019-01-15 | End: 2019-01-15

## 2019-01-15 RX ORDER — OFLOXACIN 3 MG/ML
SOLUTION/ DROPS OPHTHALMIC AS NEEDED
Status: DISCONTINUED | OUTPATIENT
Start: 2019-01-15 | End: 2019-01-15 | Stop reason: HOSPADM

## 2019-01-15 RX ADMIN — TROPICAMIDE 1 DROP: 10 SOLUTION/ DROPS OPHTHALMIC at 06:55

## 2019-01-15 RX ADMIN — OFLOXACIN 1 DROP: 3 SOLUTION/ DROPS OPHTHALMIC at 07:05

## 2019-01-15 RX ADMIN — PHENYLEPHRINE HYDROCHLORIDE 1 DROP: 25 SOLUTION/ DROPS OPHTHALMIC at 06:45

## 2019-01-15 RX ADMIN — LIDOCAINE HYDROCHLORIDE 1 APPLICATION: 20 JELLY TOPICAL at 06:55

## 2019-01-15 RX ADMIN — OFLOXACIN 1 DROP: 3 SOLUTION/ DROPS OPHTHALMIC at 06:55

## 2019-01-15 RX ADMIN — PHENYLEPHRINE HYDROCHLORIDE 1 DROP: 25 SOLUTION/ DROPS OPHTHALMIC at 06:55

## 2019-01-15 RX ADMIN — SODIUM CHLORIDE: 0.9 INJECTION, SOLUTION INTRAVENOUS at 06:53

## 2019-01-15 RX ADMIN — PHENYLEPHRINE HYDROCHLORIDE 1 DROP: 25 SOLUTION/ DROPS OPHTHALMIC at 07:05

## 2019-01-15 RX ADMIN — TROPICAMIDE 1 DROP: 10 SOLUTION/ DROPS OPHTHALMIC at 07:05

## 2019-01-15 RX ADMIN — PROPOFOL 30 MG: 10 INJECTION, EMULSION INTRAVENOUS at 07:33

## 2019-01-15 RX ADMIN — CYCLOPENTOLATE HYDROCHLORIDE 1 DROP: 10 SOLUTION/ DROPS OPHTHALMIC at 06:55

## 2019-01-15 RX ADMIN — OFLOXACIN 1 DROP: 3 SOLUTION/ DROPS OPHTHALMIC at 06:45

## 2019-01-15 RX ADMIN — LIDOCAINE HYDROCHLORIDE 1 APPLICATION: 20 JELLY TOPICAL at 06:58

## 2019-01-15 RX ADMIN — CYCLOPENTOLATE HYDROCHLORIDE 1 DROP: 10 SOLUTION/ DROPS OPHTHALMIC at 06:45

## 2019-01-15 RX ADMIN — LIDOCAINE HYDROCHLORIDE 1 APPLICATION: 20 JELLY TOPICAL at 07:05

## 2019-01-15 RX ADMIN — TROPICAMIDE 1 DROP: 10 SOLUTION/ DROPS OPHTHALMIC at 06:45

## 2019-01-15 RX ADMIN — LIDOCAINE HYDROCHLORIDE 20 MG: 10 INJECTION, SOLUTION INFILTRATION; PERINEURAL at 07:33

## 2019-01-15 RX ADMIN — SODIUM CHLORIDE 75 ML/HR: 0.9 INJECTION, SOLUTION INTRAVENOUS at 07:14

## 2019-01-15 NOTE — OP NOTE
Postoperative Note  PATIENT NAME: Flakito Lynn  : 1940  MRN: [id number]  [facility] OR ROOM 01    Surgery Date: 1/15/2019    Combined forms of age-related cataract of left eye [H25 812]    Post-Op Diagnosis Codes:     * Combined forms of age-related cataract of left eye [H25 812]    Procedure(s):  EXTRACTION EXTRACAPSULAR CATARACT PHACO INTRAOCULAR LENS (IOL) LEFT EYE    Surgeon(s) and Role:     * Joanna Ryan MD - Primary    Assistants:  Circulator: Niurka Roman RN; Venecia Bui RN  Scrub Person: Sam Saravia RN    Anesthesia Type:   IV Sedation with Anesthesia     Anesthesiologist: Glennis Kehr, MD  CRNA: Jerrald Simmonds, CRNA    Operative Indication:  Vision reduced to 20/50, glare 20/100 secondary to progressive cataract formation  The cataract was interfering with the patient's daily activities  All risks and benefits to the surgical procedure were explained to the patient and family members that were present during the pre-operative visit  The risks included but were not limited to blindness, infection, choroidal hemorrhage, loss of the eye, glaucoma, corneal edema, macular edema, retinal detachment, the need for a corneal transplant and the need to wear glasses postoperatively  The rare possibility of having to remove/excahnage the IOL due to incorrect power or off axis (toric) was explained pre-oper to the patient  Educational materials, eye model and a video were all used to explain cataract surgery  The option of speciality IOL-lenses (Multifocal, toric IOL) were discussed pre-operatively as well as the risks and benefit of using the speciality IOL if indicated in this particular patient  The patient understood and signed the appropriate consent form  Operative Technique:  The patient was brought back to the operating suite and placed in the supine position  The patient was identified in from of the surgeon as well as the OR staff   The operative eye was confirmed and marked  The patient  was given a peribulbar block using  2% Lidocaine  The pressure of the eye was checked by digital massage  The operative eye was prepped and draped in the usual sterile fashion  A wire lid speculum was inserted  A clear corneal, temporal approach was used  A 2 75 Phaco blade was used to tunnel and enter the  cornea from the temporal side  The anterior chamber was entered and visco-elastic was used to deepen the anterior chamber  Side port paracentesis tracts were performed using a 1 0 mm paracentesis blade at approximately 12 and 6 position  A circular tear capsulotomy was performed using a 25 gauge bent needle and Utrata capsulotomy forceps  [place] dissection was carried out with balanced salt solution using a 27 gauge flat irrigating cannula  The nucleus was freely rotatable with in the capsular bag  The Nucleus was phacoemulsified   Using the [employer] machine and the phaco chop method  The cortical shell was removed using the bimanual I/A  The posterior capsule was polished as indicated  The posterior capsular bag was inflated using Provisc  The posterior chamber intraocular lens power21 00 was taken from the package an  inspected and the power confirmed  The lens was then inserted  into the posterior capsular bag using the appropriate IOL folder and   The lens was well centered using the Sinsky hook  The Provisc was removed using the I/A unit  The side ports and the temporal incision were stromal hydrated until they were water tight  A 10-vicryl suture was placed to further secure the temporal incision if indicated after testing the incision  The pressure of the eye was adjusted accordingly using balance salt solution through the side ports  At the end of the case the patient was given a drop of Iopidine to prevent a pressure spike  Also, the patient was given a drop of antibiotic drop and antibiotic ointment to prevent infection   The patient's eye was then patched with an eye pad and covered with a plastic shield  The Patient was then taken to the recovery room  After vital signs were stable the patient was discharged with family/friend in satisfactory condition  Addendum:Due to poor dilation after several sets of dilating drops, Sugarcaine (50:50 mixture of 4% Lidocaine and 1,1000 units of epinephrine) was injected into the anterior chamber of the eye to promote dilation and fascillate the capsulotomy as well as phacoemulsification of the cataract      Ren Fowler MD  OPERATIVE REPORT  PATIENT NAME: Bryan Delgado    :  1940  MRN: [id number]  Pt Location: [facility] OR ROOM 01     Complications:   None

## 2019-01-15 NOTE — DISCHARGE INSTRUCTIONS
[facility]  Discharge Instructions    Dr Jorge Georges and Dr Noreen Fuentes discharged in satisfactory condition  Post operative instructions were given  Follow-up Appointment was given for 8:10 AM at [facility]  Normal Symptoms: Foreign body sensation, scratchy, picky feeling  Try Extra Strength Tylenol for headache  Call Office if severe pain or discomfort  Keep patch on operative eye until 4 pm today  Bring sunglasses to office in the morning  Do not bring the 3 eye drops  NEW Instructions on how to use the 3 drops will be given in AM     Activity: Avoid any heavy,  bending, lifting or excessive activity until instructed  May walk around home with assistance  OK to watch TV  Avoid any excessive reading  No driving until told (Normally 2-4 days after surgery)  Avoid sleeping on operative eye  No water in the eye    Diet: Resume normal diet as tolerated  If experiencing nausea, try bland foods or liquid diet first      Resume normal medications unless otherwise instructed     If any Questions or Problems Please Call: Miles take off [facility] and patch and start drops , Do not put shield back on during the day  Blurry Vision normal today  Pink/White Top                     [employer] Top               4:00PM                         4:05 PM               4:10 PM               8:00PM                         8: 05PM                8:10PM    BEDTIME:    Take Tan Top ONLY and then replace the plastic shield over eye  No gauze pad needed at bedtime    TOMORROW MORNING , before coming to office, take all THREE drops, then put on glasses  Example of times below  7:00AM                      7: 05AM                  7:10 AM            YOU MAY EXPERIENCE "DOUBLE VISION" - "Blurry Vision"  ONCE YOU TAKE OFF EYE PATCH/SHIELD THIS IS NORMAL

## 2019-01-15 NOTE — OP NOTE
Postoperative Note  PATIENT NAME: Bryan Delgado  : 1940  MRN: [id number]  [facility] [address] OR ROOM 01    Surgery Date: 1/15/2019    Combined forms of age-related cataract of left eye [H25 812]    Post-Op Diagnosis Codes:     * Combined forms of age-related cataract of left eye [H25 812]    Procedure(s):  EXTRACTION EXTRACAPSULAR CATARACT PHACO INTRAOCULAR LENS (IOL) LEFT EYE    Surgeon(s) and Role:     * Ren Fowler MD - Primary    Assistants:  Circulator: Chavez Menchaca RN; Esperanza Duarte RN  Scrub Person: Soumya Penny RN    Anesthesia Type:   IV Sedation with Anesthesia     Anesthesiologist: Eleuterio Cameron MD  CRNA: Aleah Bundy CRNA    Operative Indication:  Vision reduced to 20/50, CF glare secondary to progressive cataract formation  The cataract was interfering with the patient's daily activities  All risks and benefits to the surgical procedure were explained to the patient and family members that were present during the pre-operative visit  The risks included but were not limited to blindness, infection, choroidal hemorrhage, loss of the eye, glaucoma, corneal edema, macular edema, retinal detachment, the need for a corneal transplant and the need to wear glasses postoperatively  The rare possibility of having to remove/excahnage the IOL due to incorrect power or off axis (toric) was explained pre-oper to the patient  Educational materials, eye model and a video were all used to explain cataract surgery  The option of speciality IOL-lenses (Multifocal, toric IOL) were discussed pre-operatively as well as the risks and benefit of using the speciality IOL if indicated in this particular patient  The patient understood and signed the appropriate consent form  Operative Technique:  The patient was brought back to the operating suite and placed in the supine position  The patient was identified in from of the surgeon as well as the OR staff   The operative eye was confirmed and marked  The patient  was given a peribulbar block using  2% Lidocaine  The pressure of the eye was checked by digital massage  The operative eye was prepped and draped in the usual sterile fashion  A wire lid speculum was inserted  A clear corneal, temporal approach was used  A 2 75 Phaco blade was used to tunnel and enter the  cornea from the temporal side  The anterior chamber was entered and visco-elastic was used to deepen the anterior chamber  Side port paracentesis tracts were performed using a 1 0 mm paracentesis blade at approximately 12 and 6 position  A circular tear capsulotomy was performed using a 25 gauge bent needle and Utrata capsulotomy forceps  [place] dissection was carried out with balanced salt solution using a 27 gauge flat irrigating cannula  The nucleus was freely rotatable with in the capsular bag  The Nucleus was phacoemulsified   Using the [employer] machine and the phaco chop method  The cortical shell was removed using the bimanual I/A  The posterior capsule was polished as indicated  The posterior capsular bag was inflated using Provisc  The posterior chamber intraocular lens power21 00 was taken from the package an  inspected and the power confirmed  The lens was then inserted  into the posterior capsular bag using the appropriate IOL folder and   The lens was well centered using the Sinsky hook  The Provisc was removed using the I/A unit  The side ports and the temporal incision were stromal hydrated until they were water tight  A 10-vicryl suture was placed to further secure the temporal incision if indicated after testing the incision  The pressure of the eye was adjusted accordingly using balance salt solution through the side ports  At the end of the case the patient was given a drop of Iopidine to prevent a pressure spike  Also, the patient was given a drop of antibiotic drop and antibiotic ointment to prevent infection   The patient's eye was then patched with an eye pad and covered with a plastic shield  The Patient was then taken to the recovery room  After vital signs were stable the patient was discharged with family/friend in satisfactory condition  Addendum:Due to poor dilation after several sets of dilating drops, Sugarcaine (50:50 mixture of 4% Lidocaine and 1,1000 units of epinephrine) was injected into the anterior chamber of the eye to promote dilation and fascillate the capsulotomy as well as phacoemulsification of the cataract      Iona Corona MD

## 2019-01-15 NOTE — DISCHARGE SUMMARY
Cristino Thomas was discharged in satisfactory condition  Discharge instructions were reviewed and then given to the patient   Arrangements were made for follow up the next day with Ericka Harris MD

## 2019-01-23 ENCOUNTER — APPOINTMENT (EMERGENCY)
Dept: RADIOLOGY | Facility: HOSPITAL | Age: 79
End: 2019-01-23
Payer: MEDICARE

## 2019-01-23 ENCOUNTER — HOSPITAL ENCOUNTER (EMERGENCY)
Facility: HOSPITAL | Age: 79
Discharge: HOME | End: 2019-01-23
Attending: EMERGENCY MEDICINE
Payer: MEDICARE

## 2019-01-23 VITALS — HEART RATE: 80 BPM | OXYGEN SATURATION: 95 % | RESPIRATION RATE: 20 BRPM | TEMPERATURE: 98.8 F

## 2019-01-23 DIAGNOSIS — S01.01XA LACERATION OF SCALP, INITIAL ENCOUNTER: ICD-10-CM
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
## 2019-01-23 PROCEDURE — 90715 TDAP VACCINE 7 YRS/> IM: CPT | Performed by: PHYSICIAN ASSISTANT

## 2019-01-23 PROCEDURE — 70450 CT HEAD/BRAIN W/O DYE: CPT

## 2019-01-23 PROCEDURE — 90471 IMMUNIZATION ADMIN: CPT

## 2019-01-23 PROCEDURE — 99283 EMERGENCY DEPT VISIT LOW MDM: CPT

## 2019-01-23 PROCEDURE — 72125 CT NECK SPINE W/O DYE: CPT

## 2019-01-23 RX ORDER — LIDOCAINE HYDROCHLORIDE AND EPINEPHRINE 10; 10 MG/ML; UG/ML
10 INJECTION, SOLUTION INFILTRATION; PERINEURAL ONCE
Status: COMPLETED | OUTPATIENT
Start: 2019-01-23 | End: 2019-01-23

## 2019-01-23 RX ORDER — GINSENG 100 MG
1 CAPSULE ORAL ONCE
Status: COMPLETED | OUTPATIENT
Start: 2019-01-23 | End: 2019-01-23

## 2019-01-23 RX ADMIN — TETANUS TOXOID, REDUCED DIPHTHERIA TOXOID AND ACELLULAR PERTUSSIS VACCINE, ADSORBED 0.5 ML: 5; 2.5; 8; 8; 2.5 SUSPENSION INTRAMUSCULAR at 13:32

## 2019-01-23 RX ADMIN — LIDOCAINE HYDROCHLORIDE,EPINEPHRINE BITARTRATE 10 ML: 10; .01 INJECTION, SOLUTION INFILTRATION; PERINEURAL at 13:32

## 2019-01-23 RX ADMIN — BACITRACIN ZINC 1 SMALL APPLICATION: 500 OINTMENT TOPICAL at 13:32

## 2019-01-23 NOTE — ED PROVIDER NOTES
History  Chief Complaint   Patient presents with    Fall     patient states she fell this morning - states she is unsure how but denies passing out   + lac to back of head  takes asa daily     71-year-old female, h/o Parkinson's Disease, presenting today after a mechanical fall that occurred about an hour ago  While she was in the kitchen and tripped over a chair falling backwards striking the back of her head  Did not lose consciousness  She is on no blood thinners however aspirin  Feeling well without any complaints at this point in time  She did take Tylenol earlier as she had a mild headache however this has subsided  Was able to get up afterwards and ambulate without difficulty  States that she usually uses a cane or walker however does not using this this morning  After patient fell has been heard immediately came into the room  She did not lose consciousness the and was not on the floor for an extended period of time  Denies nausea, vomiting, shortness of breath wheezing, chest pain, numbness, paresthesias, changes in vision, abdominal pain  Prior to Admission Medications   Prescriptions Last Dose Informant Patient Reported? Taking?    ACCU-CHEK COMPACT PLUS test strip   Yes Yes   ACCU-CHEK SOFTCLIX LANCETS lancets   Yes Yes   Sig: TEST once daily   Glucose Blood (COOL BLOOD GLUCOSE TEST STRIPS VI)   Yes Yes   Sig: As directed    Omega-3 Fatty Acids (FISH OIL PO)   Yes Yes   Sig: Take 1 g by mouth   RYTARY 48  MG CPCR   Yes Yes   Si (five) times a day     amLODIPine (NORVASC) 10 mg tablet   No Yes   Sig: TAKE 1 TABLET DAILY   amantadine (SYMMETREL) 100 mg capsule   Yes Yes   Sig: Take 100 mg by mouth 2 (two) times a day   aspirin (ECOTRIN LOW STRENGTH) 81 mg EC tablet   Yes Yes   Sig: Take 81 mg by mouth daily   metFORMIN (GLUCOPHAGE) 500 mg tablet   No Yes   Sig: Take 1 tablet (500 mg total) by mouth daily with breakfast   rOPINIRole (REQUIP XL) 8 MG 24 hr tablet   Yes Yes   Sig: Take 8 mg by mouth daily at bedtime     rasagiline (AZILECT) 1 MG   Yes Yes   Sig: TAKE 1 TABLET BY MOUTH  DAILY      Facility-Administered Medications: None       Past Medical History:   Diagnosis Date    Arthritis     knee    [facility]     TYPE 2    Gallbladder disease     Hematuria     last assessed 10/29/15    Hypertension     Ovarian cyst     LAST ASSESSED: 12/10/13    [facility]'s disease [facility]     Skin disorder     last assessed 12/10/13       Past Surgical History:   Procedure Laterality Date    APPENDECTOMY      1970'S    CHOLECYSTECTOMY      1970'S    NEUROPLASTY / TRANSPOSITION MEDIAN NERVE AT CARPAL TUNNEL Right     OOPHORECTOMY Bilateral     REMOVAL OF BOTH OVARIES LAPAROSCOPIC    NV COLSC FLX W/RMVL OF TUMOR POLYP LESION SNARE TQ N/A 3/20/2018    Procedure: COLONOSCOPY;  Surgeon: Jaden Martinez MD;  Location: [facility] GI LAB; Service: Gastroenterology    [address] CATARACT EXTRACAP,INSERT LENS Right 12/4/2018    Procedure: EXTRACTION EXTRACAPSULAR CATARACT PHACO INTRAOCULAR LENS (IOL); Surgeon: Ericka Harris MD;  Location: [facility] OR;  Service: Ophthalmology    [address] CATARACT EXTRACAP,INSERT LENS  1/15/2019    Procedure: EXTRACTION EXTRACAPSULAR CATARACT PHACO INTRAOCULAR LENS (IOL); Surgeon: Ericka Harris MD;  Location: [facility] OR;  Service: Ophthalmology    TONSILLECTOMY      VENTRAL HERNIA REPAIR      WITH IMPLANT OF MESH       Family History   Problem Relation Age of Onset    Alzheimer's disease Mother     Stroke Father     No Known Problems Sister     No Known Problems Brother      I have reviewed and agree with the history as documented  Social History   Substance Use Topics    Smoking status: Never Smoker    Smokeless tobacco: Never Used    Alcohol use No        Review of Systems   Constitutional: Negative  HENT: Negative  Eyes: Negative  Respiratory: Negative  Cardiovascular: Negative  Gastrointestinal: Negative  Genitourinary: Negative  Musculoskeletal: Negative  Skin: Positive for wound  Negative for color change, pallor and rash  Neurological: Positive for tremors and headaches  Negative for dizziness, seizures, syncope, facial asymmetry, speech difficulty, weakness, light-headedness and numbness  All other systems reviewed and are negative  Physical Exam  Physical Exam   Constitutional: She is oriented to person, place, and time  She appears well-developed and well-nourished  HENT:   Head: Normocephalic  Right Ear: External ear normal    Left Ear: External ear normal    Nose: Nose normal    Mouth/Throat: Oropharynx is clear and moist    Eyes: Pupils are equal, round, and reactive to light  Conjunctivae and EOM are normal    Neck: Normal range of motion  Neck supple  Cardiovascular: Normal rate, regular rhythm, normal heart sounds and intact distal pulses  Exam reveals no gallop and no friction rub  No murmur heard  Pulmonary/Chest: Effort normal and breath sounds normal  No respiratory distress  She has no wheezes  She has no rales  She exhibits no tenderness  spo2 is 95% indicating adequate oxygenation    Abdominal: Soft  Bowel sounds are normal  She exhibits no distension and no mass  There is no tenderness  There is no rebound and no guarding  No hernia  Musculoskeletal:   No reproducible bony tenderness    Neurological: She is alert and oriented to person, place, and time  Coarse tremors noted, per patient this is her normal and has not worsened  Skin: Skin is warm and dry  Capillary refill takes less than 2 seconds  Nursing note and vitals reviewed        Vital Signs  ED Triage Vitals [01/23/19 1235]   Temperature Pulse Respirations BP SpO2   98 8 °F (37 1 °C) 80 20 -- 95 %      Temp Source Heart Rate Source Patient Position - Orthostatic VS BP Location FiO2 (%)   Tympanic Monitor -- -- --      Pain Score       5           Vitals:    01/23/19 1235   Pulse: 80       Visual Acuity  Visual Acuity      Most Recent Value   L Pupil Size (mm)  3   R Pupil Size (mm)  3          ED Medications  Medications   lidocaine-epinephrine (XYLOCAINE/EPINEPHRINE) 1 %-1:100,000 injection 10 mL (10 mL Infiltration Given 1/23/19 1332)   bacitracin topical ointment 1 small application (1 small application Topical Given 1/23/19 1332)   tetanus-diphtheria-acellular pertussis (BOOSTRIX) IM injection 0 5 mL (0 5 mL Intramuscular Given 1/23/19 1332)       Diagnostic Studies  Results Reviewed     None                 CT cervical spine without contrast   Final Result by Lo Meza MD (01/23 1341)      No cervical spine fracture or traumatic malalignment  Workstation performed: [id number]         CT head without contrast   Final Result by Michael Crawford MD (01/23 1322)      No acute intracranial abnormality  Microangiopathic changes  Workstation performed: [id number]                    Procedures  Lac Repair  Date/Time: 1/23/2019 1:55 PM  Performed by: Kamlesh De La Torre by: Tio Uribe   Consent: Verbal consent obtained  Risks and benefits: risks, benefits and alternatives were discussed  Consent given by: patient  Patient understanding: patient states understanding of the procedure being performed  Patient consent: the patient's understanding of the procedure matches consent given  Procedure consent: procedure consent matches procedure scheduled  Relevant documents: relevant documents present and verified  Test results: test results available and properly labeled  Site marked: the operative site was marked  Radiology Images displayed and confirmed   If images not available, report reviewed: imaging studies available  Required items: required blood products, implants, devices, and special equipment available  Patient identity confirmed: verbally with patient  Time out: Immediately prior to procedure a "time out" was called to verify the correct patient, procedure, equipment, support staff and site/side marked as required  Body area: head/neck  Location details: scalp  Laceration length: 4 cm  Foreign bodies: no foreign bodies  Tendon involvement: none  Nerve involvement: none    Anesthesia:  Local Anesthetic: lidocaine 1% with epinephrine    Sedation:  Patient sedated: no    Wound Dehiscence:  Superficial Wound Dehiscence: simple closure      Procedure Details:  Irrigation solution: tap water  Amount of cleaning: standard  Debridement: none  Degree of undermining: none  Skin closure: staples  Number of sutures: 6 staples placed  Technique: simple  Approximation: close  Approximation difficulty: simple  Dressing: antibiotic ointment  Patient tolerance: Patient tolerated the procedure well with no immediate complications             Phone Contacts  ED Phone Contact    ED Course                               MDM  Number of Diagnoses or Management Options  Fall, initial encounter:   Laceration of scalp, initial encounter:   Diagnosis management comments: Discussed CT scans  Staples placed without difficulty  No other injuries noted  Feeling well without any other complaints  Will have patient return in 10 days for suture removal or sooner for any signs of infection  Fall is mechanical  Patient feels safe going home  Patient is informed to return to the emergency department for worsening of symptoms and was given proper education regarding their diagnosis and symptoms  Otherwise the patient is informed to follow up with their primary care doctor for re-evaluation  The patient and  verbalizes understanding and agrees with above assessment and plan  All questions were answered  Please Note: Fluency Direct voice recognition software may have been used in the creation of this document  Wrong words or sound a like substitutions may have occurred due to the inherent limitations of the voice software             Amount and/or Complexity of Data Reviewed  Tests in the radiology section of CPT®: reviewed and ordered  Review and summarize past medical records: yes  Independent visualization of images, tracings, or specimens: yes      CritCare Time    Disposition  Final diagnoses:   Fall, initial encounter   Laceration of scalp, initial encounter     Time reflects when diagnosis was documented in both MDM as applicable and the Disposition within this note     Time User Action Codes Description Comment    1/23/2019  1:48 PM Sam Huertas Add [L22  HSDZ] Fall, initial encounter     1/23/2019  1:48 PM Sam Huertas Add [S01 01XA] Laceration of scalp, initial encounter       ED Disposition     ED Disposition Condition Comment    Discharge  Shabbir Szymanski discharge to home/self care  Condition at discharge: Good        Follow-up Information     Follow up With Specialties Details Why Contact Info Additional [phone] Emergency Department Emergency Medicine Go in 10 days For suture removal or sooner for any signs of infection  [address] [place] [address]  [phone] [facility] ED, [facility], [address] [place] [address] Hafsa Valverde MD Family Medicine Schedule an appointment as soon as possible for a visit As needed [place] [phone]             Patient's Medications   Discharge Prescriptions    No medications on file     No discharge procedures on file      ED Provider  Electronically Signed by           Lebron Reyes PA-C  01/23/19 1641

## 2019-01-23 NOTE — DISCHARGE INSTRUCTIONS
Laceration   WHAT YOU NEED TO KNOW:   A laceration is an injury to the skin and the soft tissue underneath it  Lacerations happen when you are cut or hit by something  They can happen anywhere on the body  DISCHARGE INSTRUCTIONS:   Seek care immediately if:   · You have heavy bleeding or bleeding that does not stop after 10 minutes of holding firm, direct pressure over the wound  · Your wound opens up  Contact your healthcare provider if:   · You have a fever or chills  · Your laceration is red, warm, or swollen  · You have red streaks on your skin coming from your wound  · You have white or yellow drainage from the wound that smells bad  · You have pain that gets worse, even after treatment  · You have questions or concerns about your condition or care  Medicines:   · Prescription pain medicine  may be given  Ask how to take this medicine safely  · Antibiotics  help treat or prevent a bacterial infection  · Take your medicine as directed  Contact your healthcare provider if you think your medicine is not helping or if you have side effects  Tell him or her if you are allergic to any medicine  Keep a list of the medicines, vitamins, and herbs you take  Include the amounts, and when and why you take them  Bring the list or the pill bottles to follow-up visits  Carry your medicine list with you in case of an emergency  Care for your wound as directed:   · Do not get your wound wet  until your healthcare provider says it is okay  Do not soak your wound in water  Do not go swimming until your healthcare provider says it is okay  Carefully wash the wound with soap and water  Gently pat the area dry or allow it to air dry  · Change your bandages  when they get wet, dirty, or after washing  Apply new, clean bandages as directed  Do not apply elastic bandages or tape too tight  Do not put powders or lotions over your incision  · Apply antibiotic ointment as directed    Your healthcare provider may give you antibiotic ointment to put over your wound if you have stitches  If you have strips of tape over your incision, let them dry up and fall off on their own  If they do not fall off within 14 days, gently remove them  If you have glue over your wound, do not remove or pick at it  If your glue comes off, do not replace it with glue that you have at home  · Check your wound every day for signs of infection such as swelling, redness, or pus  Self-care:   · Apply ice  on your wound for 15 to 20 minutes every hour or as directed  Use an ice pack, or put crushed ice in a plastic bag  Cover it with a towel  Ice helps prevent tissue damage and decreases swelling and pain  · Use a splint as directed  A splint will decrease movement and stress on your wound  It may help it heal faster  A splint may be used for lacerations over joints or areas of your body that bend  Ask your healthcare provider how to apply and remove a splint  · Decrease scarring of your wound  by applying ointments as directed  Do not apply ointments until your healthcare provider says it is okay  You may need to wait until your wound is healed  Ask which ointment to buy and how often to use it  After your wound is healed, use sunscreen over the area when you are out in the sun  You should do this for at least 6 months to 1 year after your injury  Follow up with your healthcare provider as directed: You will need to return in 3 to 14 days to have stitches or staples removed  Write down your questions so you remember to ask them during your visits  © 2017 [employer] Information is for End User's use only and may not be sold, redistributed or otherwise used for commercial purposes  All illustrations and images included in CareNotes® are the copyrighted property of A D A M [employer] Inc  or George Amador  The above information is an  only   It is not intended as medical advice for individual conditions or treatments  Talk to your doctor, nurse or pharmacist before following any medical regimen to see if it is safe and effective for you

## 2019-02-01 RX ORDER — MIRTAZAPINE 15 MG/1
30 TABLET, FILM COATED ORAL
COMMUNITY
Start: 2019-01-11 | End: 2019-10-07

## 2019-02-04 ENCOUNTER — OFFICE VISIT (OUTPATIENT)
Dept: FAMILY MEDICINE CLINIC | Facility: CLINIC | Age: 79
End: 2019-02-04
Payer: MEDICARE

## 2019-02-04 VITALS
DIASTOLIC BLOOD PRESSURE: 60 MMHG | WEIGHT: 98 LBS | TEMPERATURE: 97.8 F | SYSTOLIC BLOOD PRESSURE: 102 MMHG | HEIGHT: 59 IN | HEART RATE: 96 BPM | RESPIRATION RATE: 18 BRPM | OXYGEN SATURATION: 98 % | BODY MASS INDEX: 19.76 KG/M2

## 2019-02-04 DIAGNOSIS — Z48.02 ENCOUNTER FOR STAPLE REMOVAL: Primary | ICD-10-CM

## 2019-02-04 PROCEDURE — 99213 OFFICE O/P EST LOW 20 MIN: CPT | Performed by: FAMILY MEDICINE

## 2019-02-04 NOTE — PROGRESS NOTES
Assessment/Plan:    No problem-specific Assessment & Plan notes found for this encounter  Diagnoses and all orders for this visit:    Encounter for staple removal  Comments:  5 staples removed from head with no complication          Discussed with the patient and all questioned fully answered  She will call me if any problems arise  Subjective:      Patient ID: Soniya Long is a 67 y o  female  Chief Complaint   Patient presents with    Suture / Staple Removal     Zeus Lacy and hit head at home       67year old female comes in for suture removal, pt stated she had pakison, had an episode of fall, seen in the ER, laceration of the head repared by ER physician, she stated it's been 1 week, today she was told to remove suture in 1 week, no headache, no sign of infection        The following portions of the patient's history were reviewed and updated as appropriate: allergies, current medications, past family history, past medical history, past social history, past surgical history and problem list       Review of Systems   Constitutional: Negative for activity change, appetite change, fatigue and unexpected weight change  HENT: Negative for congestion, dental problem, drooling, ear discharge and ear pain  Respiratory: Negative for apnea, cough, choking, chest tightness and shortness of breath  Cardiovascular: Negative for chest pain, palpitations and leg swelling  Neurological: Negative for dizziness and facial asymmetry  No headache  Psychiatric/Behavioral: Negative for agitation, behavioral problems, confusion and decreased concentration  Objective:    /60 (BP Location: Left arm, Patient Position: Sitting, Cuff Size: Adult)   Pulse 96   Temp 97 8 °F (36 6 °C) (Tympanic)   Resp 18   Ht 4' 11" (1 499 m)   Wt 44 5 kg (98 lb)   SpO2 98%   BMI 19 79 kg/m²       Physical Exam   Constitutional:  oriented to person, place, and time  well-developed and well-nourished  No distress  HENT:  Normocephalic and atraumatic  Conjunctivae and EOM are normal  Pupils are equal, round, and reactive to light  Neurological:  alert and oriented to person, place, and time  normal reflexes  Head: Staple clean and intact  Psychiatric: normal mood and affect  behavior is normal  Judgment and thought content normal

## 2019-02-08 ENCOUNTER — OFFICE VISIT (OUTPATIENT)
Dept: FAMILY MEDICINE CLINIC | Facility: CLINIC | Age: 79
End: 2019-02-08
Payer: MEDICARE

## 2019-02-08 ENCOUNTER — TELEPHONE (OUTPATIENT)
Dept: FAMILY MEDICINE CLINIC | Facility: CLINIC | Age: 79
End: 2019-02-08

## 2019-02-08 VITALS
BODY MASS INDEX: 20.2 KG/M2 | WEIGHT: 100 LBS | HEART RATE: 81 BPM | DIASTOLIC BLOOD PRESSURE: 62 MMHG | TEMPERATURE: 97.2 F | RESPIRATION RATE: 18 BRPM | OXYGEN SATURATION: 98 % | SYSTOLIC BLOOD PRESSURE: 100 MMHG

## 2019-02-08 DIAGNOSIS — R26.2 AMBULATORY DYSFUNCTION: ICD-10-CM

## 2019-02-08 DIAGNOSIS — E11.9 TYPE 2 DIABETES MELLITUS WITHOUT COMPLICATION, WITHOUT LONG-TERM CURRENT USE OF INSULIN (HCC): Primary | ICD-10-CM

## 2019-02-08 DIAGNOSIS — G20 MAJOR NEUROCOGNITIVE DISORDER, DUE TO PARKINSON'S DISEASE, WITHOUT BEHAVIORAL DISTURBANCE, MILD (HCC): ICD-10-CM

## 2019-02-08 DIAGNOSIS — I10 ESSENTIAL HYPERTENSION: ICD-10-CM

## 2019-02-08 DIAGNOSIS — F02.80 MAJOR NEUROCOGNITIVE DISORDER, DUE TO PARKINSON'S DISEASE, WITHOUT BEHAVIORAL DISTURBANCE, MILD (HCC): ICD-10-CM

## 2019-02-08 PROCEDURE — 99214 OFFICE O/P EST MOD 30 MIN: CPT | Performed by: FAMILY MEDICINE

## 2019-02-08 RX ORDER — LANCETS
EACH MISCELLANEOUS
Qty: 100 EACH | Refills: 99 | Status: SHIPPED | OUTPATIENT
Start: 2019-02-08 | End: 2019-07-31 | Stop reason: SDUPTHER

## 2019-02-08 RX ORDER — BLOOD-GLUCOSE METER
1 KIT MISCELLANEOUS AS NEEDED
Qty: 1 EACH | Refills: 1 | Status: SHIPPED | OUTPATIENT
Start: 2019-02-08 | End: 2020-06-15

## 2019-02-08 RX ORDER — AMANTADINE HYDROCHLORIDE 100 MG/1
TABLET ORAL
COMMUNITY
Start: 2018-12-16 | End: 2019-06-21 | Stop reason: DRUGHIGH

## 2019-02-08 RX ORDER — BLOOD SUGAR DIAGNOSTIC, DRUM
STRIP MISCELLANEOUS
Qty: 100 EACH | Refills: 99 | Status: SHIPPED | OUTPATIENT
Start: 2019-02-08 | End: 2019-07-31 | Stop reason: SDUPTHER

## 2019-02-08 NOTE — PROGRESS NOTES
Diabetic Foot Exam    Patient's shoes and socks removed  Right Foot/Ankle   Right Foot Inspection      Sensory       Monofilament testing: intact      Left Foot/Ankle  Left Foot Inspection                                           Sensory       Monofilament: intact

## 2019-02-08 NOTE — PROGRESS NOTES
SUBJECTIVE:  2019 Bishnu Caicedo MD  I have identified this patient to be Valentina Heredia,  -1940, MR# [id number]    Chief complaint: [place] Meryl if has lump in stomach  Overall feels about same  Concerned about balance  Bowls twice a week  About a month ago fell at home, was here Monday to get staples out    Does get dry mouth and skin  Balance has been worse for a month  History of Present Illness:    Review of systems:  No fever/chills/sweats/unexplained weight loss  No chest pain/shortness of breatah  No change in digestion or bowel movenets  No change in urination  No new musculoskeletal or neurological symptoms      Chart reviewed for relevant medical, surgical and psychosocial history, medications and allergies  Patient Active Problem List   Diagnosis    Closed fracture of one rib of left side    Anal bleeding    Asthma    BPPV (benign paroxysmal positional vertigo)    Bulging lumbar disc    Carpal tunnel syndrome    Colon, diverticulosis    Diabetes mellitus type II, controlled ([address]    Essential hypertension    GERD (gastroesophageal reflux disease)    Hyperlipidemia    Insomnia    Leg pain    Lumbosacral spinal stenosis    Major neurocognitive disorder, due to parkinson's disease, without behavioral disturbance, mild (HCC)    Orthostatic hypotension    Parkinson disease ([address]    Psoriasis    Pulmonary embolism with infarction ([address] [facility]    Restless legs syndrome    Sciatica    Squamous cell carcinoma of skin    Weight loss, unintentional    Pneumonia of both lower lobes due to infectious organism ([address]    Need for influenza vaccination    Arthritis    Bite, insect    Preoperative clearance    Age-related nuclear cataract of both eyes       EXAM:  /62   Pulse 81   Temp (!) 97 2 °F (36 2 °C)   Resp 18   Wt 45 4 kg (100 lb)   SpO2 98%   BMI 20 20 kg/m²   The patient appears well, in no apparent distress    Alert and oriented times three, pleasant and cooperative  Vital signs are as noted by the nurse      Eyes: well perfused conjunctiva, not clinically pale, not jaundiced  R scalp lesion is healing well  Holds head to R  Walks with shuffling gait  Neck: supple, trachea in the midline and no cervical lymphadenopathy  Has two implants in bilateral upper chest for Parkinsons  Lungs: clear to auscultation and percussion  Heart: Regular rate and rhythm, no gallop, + 2/6 systolic murmur LSB  Abdomen: soft, non-tender or distended, no guarding, rebound, normal bowel sound  Soft fullness L side is consistent with known constipation  Skin: good capillary refill,no rashes noted  Extremities:   No pedal edema  + tremor          ASSESSMENT/PLAN:  1  Type 2 diabetes mellitus without complication, without long-term current use of insulin (HCC)  Last A1C Nov  7 1    2  Essential hypertension  Good control    3  Major neurocognitive disorder, due to parkinson's disease, without behavioral disturbance, mild (HCC)  PT for balance    Monitor constipation  Use Miralax more regularly  Healthy diet      F/u 1 month

## 2019-02-08 NOTE — TELEPHONE ENCOUNTER
DR Elis Chaudhari - PT SEEN THIS MORNING  SHE FORGOT TO ASK YOU FOR A SCRIPT FOR ACCUCHEK METER COMPACT PLUS  SHE WOULD LIKE IT SENT TO PHARM  TODAY

## 2019-02-08 NOTE — PATIENT INSTRUCTIONS
Please find attached my 'Basic Healthy eating' handout  This is meant as an succinct reference overview of  healthy eating, rather than to be used to change your eating pattern all at once  Many people find it most useful if they first skim it to get an overview, then refer to it from time to time (a lot of my patients hang it on their refrigerator with a magnet for this purpose), making a few small doable changes each week  One way to do this is to pick one main meal a week (e g  Thursday's dinner)  to try a new recipe out of one of the cookbooks listed at the end of the handout (such as Vegetarian cooking for everyone" by [employer])  Save the recipes you enjoy and discard the one's you don't  After a few months you will have a collection of wonderful healthy meals you like! This can be an enjoyable way to gradually transform your eating to support your well being  This transformation towards healthy eating is ideally guided by a professional with expertise in whole foods nutrition  You can always bring in a several day log of what you are eating if you wish guidance  See you next visit  Regards, Dr Marisol Owens      This way of eating, though near optimal for most persons, is best individualized to fit the needs of each persons situation  It is based on years of clinical experience and study of the nutritional and medical literature  If you find that this way of eating is far from how you are eating now (an experience shared by many persons!), dont be discouraged  Pace yourself as you learn to change your relationship to food, allowing at least several months to accomplish the necessary changes  You will be learning how to change your eating culture  Because none of us are perfect, and our life situations are not perfect, if you are like most persons your eating will not always be perfect  Thats ok - theres no need for guilt or forcing    Rather, if and when you are ready, start making small steps towards a healthier lifestyle, allowing yourself enough time to accomplish each small goal  Most people find this is best done with the supervision of a professional with expertise in clinical nutrition and a health   If you take small steps regularly, over time you will finally reach the summit of good eating! HEALTHY EATING:  ? Eat a variety of foods that you both enjoy and are healthy for you  Most foods should be from local sources so that they are fresh, minimally processed and safe  Lindi Claude is crucial especially for foods containing perishable oils such as whole grain flour products and highly unsaturated vegetable and fish oils  Food grown in third world countries may have unrestricted pesticide applications  Though not mandatory, ideally try to obtain Organic produce, or at least that grown using Integrated Pest Management (IPM) methods, which minimizes pesticide use  ? Give the selection, preparation and eating of food the value and care it deserves  Rediscover the karmen of the simple act of preparing and eating wholesome food  Include children and family members in the preparation of food to build a healthy food culture  We all rely on Convenience foods occasionally when our schedule is hectic, but over reliance carries a heavy price not only in lack of balanced nutrients but in lack of the meaning of food  Dont eat on the run  Brown bag a lunch if there are no satisfactory choices where you work  ? Do NOT skip meals  Spread calories out more or less equally throughout all the meals of the day  ? Do NOT go on a diet  Diets do not work! Changing your awareness and relationship with food does  ? Be clear if you are eating Callaway food or daily healthy food  [place] foods are a major pleasure in life, and are consistent with health if they are occasional (e g  once or twice a month)   A quota of a small serving of a sweet (e g  two small cookies or a small chocolate bar) a few times/week is ok, though  [place] food includes anything deep fried (e g  French fries, chips), pastries, sodas, sweets  Except during celebrations, eat enough to satisfy hunger but not more  While this may seem self-evident, few of us do  This requires taking at least 20 minutes to eat to allow time for the bodys satiety (fullness) feedback and listening to this feedback  ? Avoid processed, prepackaged and convenience foods in general - these tend to be of poor quality and deceptively flavored to mask their lack of nutrients  Fat, salt and sugar are strategically manipulated in processed food to maximize craving (potential for becoming habit forming &addiction)  ? Any food that has Partially hydrogenated vegetable oils (a source of unhealthy trans fatty acids that have been linked with many diseases) is usually of inferior quality and should be avoided  Develop the habit of reading the labels of anything you buy  Avoid fats that contain these such as margarines or commercial shortening  Added mono- or diglycerides should also be avoided, as they are usually composed of trans fatty acids  ? Sugar or salt in modest amounts are generally O K  However if sugar or salt (or one of their equivalents) is within the top few ingredients, generally avoid the food  They are both used frequently in processed foods to compensate for poor quality and create cravings  Sugar equivalents include sucrose, fructose, glucose, mono- and disaccharides, natural cane sweetener, cane juice, honey, molasses and high fructose corn syrup  Even so called Health food, such as some forms of Granola or granola bars, and various nutrition bars may have excess sugar & calories  Buzzwords such as Natural are often used to entice consumers to purchase poor quality food that is loaded with empty calories  ?  Avoid Industrially produced red meat - (the meat one encounters in most supermarkets)  This usually contains excessive and poor quality fat  If you do eat meat, do so sparingly (1 - 3X/wk ) and use free-range beef (e g  Perez brand), buffalo or other livestock instead  [employer] such as riya is excellent if you have access to it  If you must use industrially produced meats, at least choose Select grade cuts with loin in them (e g  sirloin) - the leanest variety and trim away visible fat  Even better is plant sources of protein:  ? Dark leafy greens have an excellent nutrient/calorie ratio and are an underutilized source of protein  Try to eat at least ½ - 1 cup a day  ? Soy products such as tofu/soy milk/cheese/burgers (fortified with calcium best), preferably from organically grown soybeans and traditionally made  ? Legumes (use Beano on these once cool enough to eat if gas is a problem)  ? Whole grains + legumes = high quality protein  These dont need to be eaten at the same meal, just within 24 hours of each other  Examples: [brown rice or whole grain tortillas/bread/pasta] + [beans or lentils or peas]  ? Wild cold-water fish from unpolluted [place] - this is an excellent source of omega 3 fatty acids (which many peoples have a relative insufficiency of)  Be sure is fresh - if it smells or tastes fishy, its not!    ? White meat of poultry without the skin, preferably free-range can be eaten up to 2 or 3 times per week  ? Eggs in moderation are O K , preferably from free-range hens  ? Low fat (e g  skim or 1% milk or yogurt) dairy, preferably from cows raised on organic farms allowed to graze on grass  Use Lactaid© drops or pre-treated milk if you have trouble digesting it  Total amount of high protein foods per day is generally 6 to 7 exchanges per day for most people, and should be mainly from plant sources    (Remember, an exchange is counted as only 1 oz  of lean meat, so 3 exchanges = 3 oz  of meat = the size of a deck of cards = 1 small hamburger = 1/2 of a whole chicken breast = 1 fish fillet = 1 1/2 cup tofu or beans ) View animal products more as garnishes rather than the centerpiece of a meal     ? Be sure to get 2 to 3 exchanges of a high calcium food  Low fat dairy has the highest amount of calcium  Fish with small bones such as salmon & sardines are a good source  Vegetarian sources include dark leafy greens of the Brassica family (e g  kale, collards, bok linda & broccoli), calcium-fortified soy products, corn tortillas processed with lime and dried beans, nuts and seeds (almonds, brazil nuts, sesame seeds)  Note: certain vegetables high in oxalic acid such as spinach actually impair calcium absorption  Teens, young adults and pregnant or lactating women need 3 exchanges per day  ? Include cultured foods in your diet  These contribute probiotics (friendly bacteria) to the gut, which appear essential for health  Examples: Live culture yoghurt, kefir, tempeh, miso, etc     ? Try to eat a minimum of 3 but better 4 to 7 exchanges of vegetables per day  (This is in addition to fruit!)  Choose vegetables that are different colors - this will help ensure you receive the range of nutrients they offer  Include at least 1 exchange of a dark leafy green vegetable each day  These include Kale, Collards, Chard, Spinach, and Broccoli  ? Fruit is best eaten whole (better than juice or juice drinks)  Minimum is 2, but 3 - 4 whole fruit exchanges/day are optimal for most people  ? Carbohydrate (starch) sources should be unrefined and complex with low to medium glycemic index, so they are absorbed gradually into your system and dont lead to insulin surges  Examples of these include:  ? Legumes such as beans & lentils (also double as protein source)  ? Whole grains such as brown rice (basmati rice is good), wheat or oat berries,   ?  Whole grain sprouted or 100% stone ground flour products (avoid finely milled white or enriched or even whole wheat flour products in general, as these have a high glycemic index)  ? Starchy tubers - e g  yams & squash  Potatoes are less nutritious - eat less often (<3 X/week)  Starchy vegetables are best if eaten whole, rather than in their more processed forms such as commercial [place] fries or chips  ? Have 1 - 3 carbohydrate exchanges/meal  You can be more liberal with carbs if you exercise after eating them, or within 2 hours post-vigorous exercise  Vigorous exercisers who need to maintain or gain weight may need even more  ? 'Healthy fats include:   ? For any use involving heating, use Olive, Canola or Sesame oil (Extra virgin, expeller- expressed best for all oils)  Never overheatany oil (i e  until it smokes)! A small amount of butter, lard or coconut or palm oil can occasionally be used, but do not overuse these saturated fats  ? For unheated purposes such as salad dressings, use Polyunsaturated oils such as Sunflower, soy or nut oils  Keep in refrigerator and use within a few weeks to ensure freshness  Avoid cottonseed and peanut oil  ? Flaxseed oil or freshly ground flaxseeds are a source of the essential omega 3 fatty acid linolenic acid  Many persons have a relative insufficiency in omega 3s  Use in salad dressings or for any unheated use  Buy this very perishable oil in small, opaque bottles that are ideally nitrogen packed and stabilized with antioxidants such as Vitamin E  Try to use it up within a few weeks  Keep flaxseed oil in the refrigerator, not in a cabinet  Keep flaxseeds in a small container in the freezer door and get in the habit of freshly grinding them and sprinkling them on salads, cereals, etc   ? Fish oils as noted above  These also must be very fresh and protected from oxidation like all highly unsaturated oils  ? Spectrum spread or similar brands can be used as a fat spread instead of butter or margarine  This is found in health food stores    ? Nuts & seeds or butters made from these in moderation are a source of both protein and fats  ? Drink plenty of fluid -8-glasses/day- water or flavored castro, or teas are better than fruit juice, juice drinks or sodas  ? Observe the following maximum limits per day:  1  Coffee or strong tea:  Two cups (8 oz  each - not large mug size!) total  2  Sodas:  One 12 oz  can (best to avoid completely)  3  Sweets:  1 serving (e g  slice of pie or cake or 2 small cookies)  Eat sweets slowly and mindfully so you can savor and really enjoy them  Do not eat sweets while distracted with something else such as watching TV, as this often leads to eating a much greater quantity  Do not deprive yourself of sweets/ deserts - doing so often triggers a reactive eating binge later  4  If you do drink, do so in moderation: Maximum of 2 drinks/day for men and 1 drink/day for women  Pregnant & lactating women should not drink any alcohol   1 drink=12 oz  can of beer, 4 oz  of wine or 1 oz  (1 shot) of distilled spirits  ? Avoid going to fast food/ cheap restaurants such as [employer], [employer], [employer], [employer]©, [employer], Taco [employer]©, etc , since the food is generally low quality and is designed to create cravings that are habit forming  COOKING METHODS:  1   Vegetables:  Best is steaming or light sautéing  Microwave ok  Avoid boiling (loses a lot of the water-soluble vitamins)  Try to eat both raw and cooked veggies  2  Meats:  Boiling, baking or light sautéing ok  3  Generally avoid grilling, charbroiling, or deep fat frying  Marinating meats in a vinegar or lemon juice - based marinade and avoiding flare-ups or high temperatures will minimize cancer-causing substances being formed if you want to grill  STORAGE METHODS:  ? Fresh is always best, but freezing is next best   Be careful of excess sodium in canned foods  1 EXCHANGE =  ?  Carbohydrates: 1 slice of bread, or 3/4 cup dried prepared cereal or 1/2 cup cooked cereal, pasta or grain dish, or 1 small cooked potato or corn on the cob or 1 small fruit (fruit counts as both a carbohydrate and a fruit exchange)  ? Vegetables:   1/2 cup cooked or 1 cup raw (e g  salad)  ? Fruit:  1 medium apple or similar fruit, or 1/2-cup fruit juice  ? High protein foods: 1/2 cup of cooked beans, peas or lentils or tofu or 1 oz (~28 grams) of fish, poultry, red meats, cheese or 1 egg  ? Soy/ Dairy: 1-cup soy/ rice milk or dairy milk or yogurt or 1 oz  cheese  ? Sweets/ treats: 1/2-cup ice cream or 2 medium (2 - 3 diameter) cookies or small wedge of pie/ cake  ? While supplements have their place, they should always be a far second to eating real whole foods  Very few supplements have been shown in scientific studies to improve health  Some exceptions in certain situations include multivitamins, Vitamin D, Fish oil (omega 3 fatty acids), Calcium, Magnesium and Iron (especially in women)  Even these are better obtained naturally if possible  Relying on supplements is a form of reductionism - the opposite of holism  Seek competent medical advice before using supplements  ? Nutrients in foods work together, analogous to musicians in a symphony working together to [employer]  When you take a single nutrient, or even a collection of similar nutrients, especially if you dorothy dose, the result may be analogous to the oboe in the symphony playing 10 times louder - it does not necessarily help the music! Even a mixture of supplements cannot possibly hope to simulate the vastly complex interactions inherent in whole foods  ? Avoid any health care practitioner or health advice resource that makes promoting or selling supplements (or drugs/ procedures for that matter) a centerpiece of their advice instead of prioritizing whole foods and a balanced lifestyle  FURTHER READING:  ? American Wholefoods Cuisine ([employer]) by [employer]  Excellent basic guide on how to create appetizing whole plant based meals  ? The [employer] ([address]) by Rosalio Arrieta, [address] [facility] vegetarian approach, but helpful for everyone who wants to learn how to prepare these foods in a delicious and healthy manner  The introduction has a nice primer on basic good nutrition that can be useful for even non-vegetarians  ? Picture Perfect Weight Loss  [employer]) &Picture Perfect Weight Loss Cookbook ([employer]) by Dr Pippa Doty  Excellent, scientifically sound Food Awareness program and accompanying cookbook useful for all, not just those who want to lose weight  ? Amazing Soy ([employer]) by Lavon Lanes  If you want to include healthier and delicious soy based foods in your meals, this book will show you how  ? Vegetarian Cooking for Everyone by [employer]  Publisher: [employer], 0  An excellent guide to cooking vegetables in interesting and tasty ways that can be used by vegetarians and non-vegetarians alike  ? The Omnivores dilemma, In Defense of Food &Food Rules by Richarda Pepe  [employer]  These books explain where our food comes from and the strengths, weaknesses and toxicities of American food culture  ? Eat to Live by MD Car Rinaldi  2003  Focuses on Nutrient/Calorie ratio as a key to successful weight loss  Useful to all to further understand a healthy diet  ? Mindless Eating by Rin Dejesus, PhD  [facility], 2006  Explains how we all eat for reasons other than to satisfy hunger and what to do about it  ? Salt, sugar, fat: how the food giants hooked us by Liudmila Buckley  [address] [place], [place]  Primer on processed food defense          GOALS TO WORK ON:  1  __________________1/2 plate veggies of different colors__________________________________________________________________________________________________________  2  ____________________________________________________________________________________________________________________________  3  ____________________________________________________________________________________________________________________________    C 2003 Revised 1/2014 Bretta Pillion MD, FABFM  Board certified, Integrative Medicine

## 2019-02-15 ENCOUNTER — EVALUATION (OUTPATIENT)
Dept: PHYSICAL THERAPY | Facility: CLINIC | Age: 79
End: 2019-02-15
Payer: MEDICARE

## 2019-02-15 DIAGNOSIS — R26.2 AMBULATORY DYSFUNCTION: ICD-10-CM

## 2019-02-15 DIAGNOSIS — F02.80 MAJOR NEUROCOGNITIVE DISORDER, DUE TO PARKINSON'S DISEASE, WITHOUT BEHAVIORAL DISTURBANCE, MILD (HCC): Primary | ICD-10-CM

## 2019-02-15 DIAGNOSIS — G20 MAJOR NEUROCOGNITIVE DISORDER, DUE TO PARKINSON'S DISEASE, WITHOUT BEHAVIORAL DISTURBANCE, MILD (HCC): Primary | ICD-10-CM

## 2019-02-15 PROCEDURE — G8978 MOBILITY CURRENT STATUS: HCPCS

## 2019-02-15 PROCEDURE — 97163 PT EVAL HIGH COMPLEX 45 MIN: CPT

## 2019-02-15 PROCEDURE — G8979 MOBILITY GOAL STATUS: HCPCS

## 2019-02-15 NOTE — PROGRESS NOTES
PT Evaluation     Today's date: 2/15/2019  Patient name: Gio Cárdenas  : 1940  MRN: [id number]  Referring provider: Annie Carroll MD  Dx:   Encounter Diagnosis     ICD-10-CM    1  Major neurocognitive disorder, due to parkinson's disease, without behavioral disturbance, mild ([facility] [address] ) G20 Ambulatory referral to Physical Therapy    F02 80 PT plan of care cert/re-cert   2  Ambulatory dysfunction R26 2 Ambulatory referral to Physical Therapy     PT plan of care cert/re-cert       Start Time: 1230  Stop Time: 1345  Total time in clinic (min): 75 minutes    Assessment  Assessment details: Patient is a 67 y o  female who presents to skilled PT for balance and reactive postural control deficits secondary to Parkinson's Disease  Patient's manual muscle tests are WFL, slight weakness noted  Patient presents as a fall risk via gait speed, LUNDBERG balance test, 5x sit to stand, mCTSIB, and TUG test, with overall results noting risk for falls and diminished reactive postural control  Patient endurance scores are below age related norms via 2 minute walk test as well as 6 minute walk test, including 30 second sit to stand scores, noting decrease functional endurance and cardio capacity  Patient displays overall reduction in somatosensory/ proprioception awareness secondary to increased balance and postural corrections associated with mCTSIB  Patient may benefit from a rollator or Ustep walker, plan to trial with Patient will benefit from skilled PT to address noted impairments and functional limitations they are causing with overall goal to return patient to highest level possible with reduced risk for falls     Impairments: abnormal coordination, abnormal gait, abnormal muscle tone, abnormal or restricted ROM, abnormal movement, activity intolerance, difficulty understanding, impaired balance, impaired physical strength, pain with function, safety issue, poor posture  and poor body mechanics  Other impairment: Parkinson's Disease  Barriers to therapy: Parkinson's disease  Understanding of Dx/Px/POC: excellent   Prognosis: fair    Goals  STG: To be completed in 4 weeks  1  Patient will improve her sequencing with AD in 4 weeks in order to improve her community ambulation safety  2  Patient will improve her gait speed score by  10 meters/second or more in order to improve her ability to cross a street safely  3  Patient will improve her TUG test score by 3 seconds or more in 4 weeks in order to improve her transfer to ambulation transition  4  Patient will improve her 5x sit to stand test by 5 seconds or more in 4 weeks to improve her transfer safety  LTG: To be completed in 8 weeks  1  Patient will complete a 6 minute walk test completely in 8 weeks to improve her cardiovascular endurance  2  Patient will complete a DGI in 8 weeks to formally assess her dynamic balance  3  Patient will complete a MiniBESTest in 8 weeks to formally assess her dual tasking abilities as related to her primary dx         Plan  Patient would benefit from: PT eval  Planned modality interventions: biofeedback, cryotherapy, TENS and thermotherapy: hydrocollator packs  Planned therapy interventions: abdominal trunk stabilization, IADL retraining, activity modification, joint mobilization, manual therapy, ADL retraining, ADL training, massage, balance, motor coordination training, muscle pump exercises, balance/weight bearing training, body mechanics training, breathing training, canalith repositioning, neuromuscular re-education, postural training, patient education, coordination, strengthening, stretching, fine motor coordination training, functional ROM exercises, flexibility, therapeutic activities, therapeutic exercise, therapeutic training, transfer training, gait training, graded activity, graded exercise, graded motor and home exercise program  Frequency: 2x week  Duration in weeks: 16  Plan of Care beginning date: 2/15/2019  Plan of Care expiration date: 2019  Treatment plan discussed with: patient        Subjective Evaluation    History of Present Illness  Date of onset: 2/15/2019  Mechanism of injury: Patient is a 67year old female reporting to skilled PT with a diagnosis of Parkinson's Disease  Patient reports that she has been diagnosed with parkinson's for 10 years, has a DBS system  Patient has had the DBS system for 2 years  Patient presents with a R SPC today  Patient states that at home she does not use the cane at home or in the community  Patient states that her falls have increased over the past 2 months or so  Patient states that she fell bowling the other day  Patient is open to idea of utilizing a walker potentially             Recurrent probem    Pain  Current pain rating: 3  At best pain ratin  At worst pain ratin  Location: Neck Pain  Quality: dull ache  Relieving factors: change in position  Aggravating factors: standing and sitting    Social Support  Steps to enter house: no  0  Stairs in house: yes   10  Lives with: spouse    Employment status: not working    Diagnostic Tests  No diagnostic tests performed  Treatments  No previous or current treatments  Patient Goals  Patient goals for therapy: independence with ADLs/IADLs, improved balance and return to sport/leisure activities  Patient goal: Patient wants to improve her balance to bowl         Objective     Static Posture     Comments  MCTSIB  -FTEO on firm- 30 seconds  -FTEC on firm- 5 seconds  -FTEO on foam- 0 seconds  -FTEC on Foam- 0 seconds    Bhat/56    Anticipatory Balance  -Anterior, posterior, medial, lateral- no balance reactions, fall would occur without PT intervention    Strength/Myotome Testing     Left Hip   Planes of Motion   Flexion: 4  Extension: 4+  Abduction: 4  Adduction: 4+    Right Hip   Planes of Motion   Flexion: 4  Extension: 4+  Abduction: 4  Adduction: 4+    Left Knee   Flexion: 4  Extension: 4    Right Knee   Flexion: 4  Extension: 4+    Left Ankle/Foot   Dorsiflexion: 4  Plantar flexion: 4+    Right Ankle/Foot   Dorsiflexion: 4+  Plantar flexion: 4+    Ambulation     Comments   Gait speed- 10 meters/ 15 01 seconds-   TUG Test- 21 02 Seconds  2 minute walk test- 200 feet  6 minute walk test- completed 4:40 seconds, 450 feet    Functional Assessment        Comments  5x sit to stand- 12 50 seconds  30 second sit to stand- 10 reps (self terminated due to fatigue)       Flowsheet Rows      Most Recent Value   PT/OT G-Codes   Current Score  24   Projected Score  0   FOTO information reviewed  Yes   Assessment Type  Evaluation   G code set  Mobility: Walking & Moving Around   Mobility: Walking and Moving Around Current Status ()  CL   Mobility: Walking and Moving Around Goal Status ()  CL          Precautions: Parkinson's disease, fall risk     Daily Treatment Diary     Manual                                                                                   Exercise Diary                                                                                                                                                                                                                                                                                      Modalities

## 2019-02-20 ENCOUNTER — OFFICE VISIT (OUTPATIENT)
Dept: PHYSICAL THERAPY | Facility: CLINIC | Age: 79
End: 2019-02-20
Payer: MEDICARE

## 2019-02-20 DIAGNOSIS — G20 MAJOR NEUROCOGNITIVE DISORDER, DUE TO PARKINSON'S DISEASE, WITHOUT BEHAVIORAL DISTURBANCE, MILD (HCC): Primary | ICD-10-CM

## 2019-02-20 DIAGNOSIS — R26.2 AMBULATORY DYSFUNCTION: ICD-10-CM

## 2019-02-20 DIAGNOSIS — F02.80 MAJOR NEUROCOGNITIVE DISORDER, DUE TO PARKINSON'S DISEASE, WITHOUT BEHAVIORAL DISTURBANCE, MILD (HCC): Primary | ICD-10-CM

## 2019-02-20 PROCEDURE — 97116 GAIT TRAINING THERAPY: CPT

## 2019-02-20 PROCEDURE — 97530 THERAPEUTIC ACTIVITIES: CPT

## 2019-02-20 NOTE — PROGRESS NOTES
Daily Note     Today's date: 2019  Patient name: Alek Wesley  : 1940  MRN: [id number]  Referring provider: Cehly Baumann MD  Dx:   Encounter Diagnosis     ICD-10-CM    1  Major neurocognitive disorder, due to parkinson's disease, without behavioral disturbance, mild ([facility] [address] ) G20     F02 80    2  Ambulatory dysfunction R26 2        Start Time: 0845  Stop Time: 930  Total time in clinic (min): 45 minutes    Subjective: Patient presented with [facility] today, patient stated that she had a few falls over the weekend due to tripping over her cane  No Injuries reported  Objective: See treatment diary below  -Ambulation trials with U-Step walker- 10 minutes, 500 feet with close supervision  -Ambulation trials with Rollator Walker- 10 minutes, 500 feet, with close supervision  -Sit to stand and stand to sit transfers with rollator walker practice- 15 reps  -Sit to stand safety instruction- 15 reps       Assessment: Patient tolerated session well today, able to improve her ability to ambulate with assistive device safely  Patient is challenged with [facility] usage due to safety concerns with ability to negotiate environment with posterior and lateral LOB  Patient is improving her safety with sit to stand transfers with hand placement, requires improvements in verbal and tactile cuing today to improve her safety, patient demonstrated safety after repeated attempts  Patient ambulated with rollator and U step walker for trials, patient demonstrated safety with both devices, reported that she "liked the rollator better"  Patient challenged with locking without verbal and tactile cuing with treating therapist  Patient requires skilled PT in order to improve her balance, decrease fall risk, and maximize function  Plan: Continue per plan of care  Progress treatment as tolerated

## 2019-02-22 ENCOUNTER — OFFICE VISIT (OUTPATIENT)
Dept: PHYSICAL THERAPY | Facility: CLINIC | Age: 79
End: 2019-02-22
Payer: MEDICARE

## 2019-02-22 DIAGNOSIS — F02.80 MAJOR NEUROCOGNITIVE DISORDER, DUE TO PARKINSON'S DISEASE, WITHOUT BEHAVIORAL DISTURBANCE, MILD (HCC): Primary | ICD-10-CM

## 2019-02-22 DIAGNOSIS — R26.2 AMBULATORY DYSFUNCTION: ICD-10-CM

## 2019-02-22 DIAGNOSIS — G20 MAJOR NEUROCOGNITIVE DISORDER, DUE TO PARKINSON'S DISEASE, WITHOUT BEHAVIORAL DISTURBANCE, MILD (HCC): Primary | ICD-10-CM

## 2019-02-22 PROCEDURE — 97116 GAIT TRAINING THERAPY: CPT

## 2019-02-22 PROCEDURE — 97530 THERAPEUTIC ACTIVITIES: CPT

## 2019-02-22 PROCEDURE — 97110 THERAPEUTIC EXERCISES: CPT

## 2019-02-22 NOTE — PROGRESS NOTES
Daily Note     Today's date: 2019  Patient name: Shabbir Szymanski  : 1940  MRN: [id number]  Referring provider: Paola Gilliland MD  Dx:   Encounter Diagnosis     ICD-10-CM    1  Major neurocognitive disorder, due to parkinson's disease, without behavioral disturbance, mild ([facility] [address] ) G20     F02 80    2  Ambulatory dysfunction R26 2        Start Time: 1230  Stop Time: 1315  Total time in clinic (min): 45 minutes    Subjective: Patient presented with [facility] today and , reported that her "legs were tired"  Objective: See treatment diary below  -Ambulation without Cane- 10 minutes, 500 feet with close supervision  -Ambulation trials with Rollator Walker- 10 minutes, 500 feet, with close supervision  -Stair training with reciprocal stepping today- 2 steps, 4" and 8" steps, 2 each, 2 UE support, 20 cycles over and back   -Sit to stand safety instruction- 10 reps with 2 UE, 10 reps without UE with foam on seat  -Hip Flexion, Hip Extension, Hip Abduction, 2 UE support, 20 reps, 3 second holds bilaterally   -Ambulation with cones to widen ZEYAD- 10 feet, 10 cycles         Assessment: Patient tolerated session well today, able to progress ambulation capabilities to without SPC, although patient presents with festinating gait pattern today as well as shuffling steps with decreased step height  Improving with safety cues with sit to stand  Patient demonstrates reciprocal stepping on steps  Patient required minor rest breaks due to decreases in endurance in the bilateral LEs  Patient requires skilled PT in order to improve her balance, decrease fall risk, and maximize function  Plan: Continue per plan of care  Progress treatment as tolerated

## 2019-02-27 ENCOUNTER — OFFICE VISIT (OUTPATIENT)
Dept: PHYSICAL THERAPY | Facility: CLINIC | Age: 79
End: 2019-02-27
Payer: MEDICARE

## 2019-02-27 DIAGNOSIS — G20 MAJOR NEUROCOGNITIVE DISORDER, DUE TO PARKINSON'S DISEASE, WITHOUT BEHAVIORAL DISTURBANCE, MILD (HCC): Primary | ICD-10-CM

## 2019-02-27 DIAGNOSIS — R26.2 AMBULATORY DYSFUNCTION: ICD-10-CM

## 2019-02-27 DIAGNOSIS — F02.80 MAJOR NEUROCOGNITIVE DISORDER, DUE TO PARKINSON'S DISEASE, WITHOUT BEHAVIORAL DISTURBANCE, MILD (HCC): Primary | ICD-10-CM

## 2019-02-27 PROCEDURE — 97110 THERAPEUTIC EXERCISES: CPT

## 2019-02-27 PROCEDURE — 97530 THERAPEUTIC ACTIVITIES: CPT

## 2019-02-27 PROCEDURE — 97116 GAIT TRAINING THERAPY: CPT

## 2019-02-27 NOTE — PROGRESS NOTES
Daily Note     Today's date: 2019  Patient name: Jose Arvizu  : 1940  MRN: [id number]  Referring provider: Pepito Kaur MD  Dx:   Encounter Diagnosis     ICD-10-CM    1  Major neurocognitive disorder, due to parkinson's disease, without behavioral disturbance, mild ([facility] [address] ) G20     F02 80    2  Ambulatory dysfunction R26 2        Start Time: 1117  Stop Time: 1159  Total time in clinic (min): 42 minutes    Subjective: Pt reports that she did not bowl last week due to fatigue, possibly after PT  Objective: See treatment diary below  -Ambulation without Cane- 10 minutes, 500 feet with close supervision, encouragement to lift head, look ahead     -Ambulation trials with Rollator Walker- 10 minutes, 500 feet, with close supervision NOT PERFORMED THIS VISIT  -Stair training with reciprocal stepping today- 3 steps, 4" and 8" than a 6" step, 1 each, 1 UE support, 10 cycles over and back  3 reps with no UE contact guard, 1 LOB  -Sit to stand safety instruction-     10 reps without UE with NO foam on seat   10 reps with foam on seat, foam under feet, no UE  LOB =2, pt moves too rapidly to sitting  10 reps 1/2 squat over chair, no UE and return to stand (encourage hip hinging )   -Hip Flexion, Hip Extension, Hip Abduction, 2 UE support, 20 reps, 3 second holds bilaterally NOT PERFORMED THIS VISIT    -Ambulation with cones to widen ZEYAD- 10 feet, 10 cycles , between two chairs, cones 4" apart  VC's to sit slowly, correctly aiming buttocks to the back of the seat  Assessment: Patient tolerated session well today, able to improve safety with stand to sit training, focusing on hip hinging  Patient presents with festinating gait pattern today as well as shuffling steps with decreased step height  Improved with VC's to increase step height  Patient demonstrates reciprocal stepping on steps  Patient required almost no rest  breaks    Patient requires skilled PT in order to improve her balance, decrease fall risk, and maximize function  Plan: Continue per plan of care  Progress treatment as tolerated

## 2019-03-01 ENCOUNTER — OFFICE VISIT (OUTPATIENT)
Dept: PHYSICAL THERAPY | Facility: CLINIC | Age: 79
End: 2019-03-01
Payer: MEDICARE

## 2019-03-01 DIAGNOSIS — F02.80 MAJOR NEUROCOGNITIVE DISORDER, DUE TO PARKINSON'S DISEASE, WITHOUT BEHAVIORAL DISTURBANCE, MILD (HCC): Primary | ICD-10-CM

## 2019-03-01 DIAGNOSIS — G20 MAJOR NEUROCOGNITIVE DISORDER, DUE TO PARKINSON'S DISEASE, WITHOUT BEHAVIORAL DISTURBANCE, MILD (HCC): Primary | ICD-10-CM

## 2019-03-01 DIAGNOSIS — R26.2 AMBULATORY DYSFUNCTION: ICD-10-CM

## 2019-03-01 PROCEDURE — 97110 THERAPEUTIC EXERCISES: CPT | Performed by: PHYSICAL THERAPIST

## 2019-03-01 PROCEDURE — 97530 THERAPEUTIC ACTIVITIES: CPT | Performed by: PHYSICAL THERAPIST

## 2019-03-01 NOTE — PROGRESS NOTES
Daily Note     Today's date: 3/1/2019  Patient name: Margarita Tenorio  : 1940  MRN: [id number]  Referring provider: Maribeth Brian MD  Dx:   Encounter Diagnosis     ICD-10-CM    1  Major neurocognitive disorder, due to parkinson's disease, without behavioral disturbance, mild ([facility] [address] ) G20     F02 80    2  Ambulatory dysfunction R26 2                   Subjective: Pt reports that she did not bowl last week due to fatigue, possibly after PT  Objective: See treatment diary below  -Sit to stand safety instruction-     2 sets, 5 reps foam pad    10 reps with foam on seat, foam under feet, no UE  LOB =2, pt moves too rapidly to sitting    -Hip Flexion, Hip Extension, Hip Abduction, 2 UE support, 20 reps, 3 second holds bilaterally   -Ambulation with cones to widen ZEYAD- 10 feet, 10 cycles , between two chairs, cones 4" apart  VC's to sit slowly, correctly aiming buttocks to the back of the seat  Assessment: Patient tolerated session fair today with noted soreness from previous session per patient report  Focus of session on gait training with rollator walker  Patient requires skilled PT in order to improve her balance, decrease fall risk, and maximize function  Plan: Continue per plan of care  Progress treatment as tolerated

## 2019-03-06 ENCOUNTER — OFFICE VISIT (OUTPATIENT)
Dept: PHYSICAL THERAPY | Facility: CLINIC | Age: 79
End: 2019-03-06
Payer: MEDICARE

## 2019-03-06 DIAGNOSIS — G20 MAJOR NEUROCOGNITIVE DISORDER, DUE TO PARKINSON'S DISEASE, WITHOUT BEHAVIORAL DISTURBANCE, MILD (HCC): ICD-10-CM

## 2019-03-06 DIAGNOSIS — F02.80 MAJOR NEUROCOGNITIVE DISORDER, DUE TO PARKINSON'S DISEASE, WITHOUT BEHAVIORAL DISTURBANCE, MILD (HCC): ICD-10-CM

## 2019-03-06 DIAGNOSIS — R26.2 AMBULATORY DYSFUNCTION: Primary | ICD-10-CM

## 2019-03-06 PROCEDURE — 97116 GAIT TRAINING THERAPY: CPT | Performed by: PHYSICAL THERAPIST

## 2019-03-06 PROCEDURE — 97110 THERAPEUTIC EXERCISES: CPT | Performed by: PHYSICAL THERAPIST

## 2019-03-06 PROCEDURE — 97530 THERAPEUTIC ACTIVITIES: CPT | Performed by: PHYSICAL THERAPIST

## 2019-03-06 NOTE — PROGRESS NOTES
Daily Note     Today's date: 3/6/2019  Patient name: Gio Cárdenas  : 1940  MRN: [id number]  Referring provider: Annie Carroll MD  Dx:   Encounter Diagnosis     ICD-10-CM    1  Ambulatory dysfunction R26 2    2  Major neurocognitive disorder, due to parkinson's disease, without behavioral disturbance, mild ([facility] [address] ) G20     F02 80                   Subjective: Pt reports that she is feeling "lowsy" today due to her mouth being very dry  Patient reported that she fell yesterday (3/5/19) 2x in home  She reports she took a step backwards and fell on bottom and then LOB while walking and fell on knees  Patient denies injury and reports no pain upon arrival to therapy  Patient stated she was able to get up from floor both times without husbands assistance  Objective: See treatment diary below  -Sit to stand safety instruction-     -2 sets, 5 reps with foam pad on floor  No UE support  LOB 3 out 5 reps with CG by  therapist needed for recovery  Patient demonstrated improved eccentric control with sitting      Seated  -Hip Flexion with 1 lb    Standing   Hip Extension, Hip Abduction, 1lb, 2 UE support, 20 reps, 3 second holds bilaterally     Ambulation  -Ambulation with cones to widen ZEYAD- 10 feet, 2x5 cycles , between two chairs, cones 4" apart  VC's to sit slowly, correctly aiming buttocks to the back of the seat on first rep  Patient demonstrated carryover with proper sitting technique and slower/controlled lowering  Assessment: Patient tolerated session fair today with noted fatigue throughout exercises  Patient performed sit to stands with better eccentric control and less verbal cues required for proper technique  Focus next session on gait training with rollator walker  Patient requires skilled PT in order to improve her balance, decrease fall risk, and maximize function  Plan: Continue per plan of care  Progress treatment as tolerated

## 2019-03-07 DIAGNOSIS — E11.9 TYPE 2 DIABETES MELLITUS WITHOUT COMPLICATION, WITHOUT LONG-TERM CURRENT USE OF INSULIN (HCC): ICD-10-CM

## 2019-03-08 ENCOUNTER — OFFICE VISIT (OUTPATIENT)
Dept: PHYSICAL THERAPY | Facility: CLINIC | Age: 79
End: 2019-03-08
Payer: MEDICARE

## 2019-03-08 ENCOUNTER — OFFICE VISIT (OUTPATIENT)
Dept: FAMILY MEDICINE CLINIC | Facility: CLINIC | Age: 79
End: 2019-03-08
Payer: MEDICARE

## 2019-03-08 ENCOUNTER — APPOINTMENT (OUTPATIENT)
Dept: PHYSICAL THERAPY | Facility: CLINIC | Age: 79
End: 2019-03-08
Payer: MEDICARE

## 2019-03-08 VITALS
SYSTOLIC BLOOD PRESSURE: 150 MMHG | WEIGHT: 98.3 LBS | DIASTOLIC BLOOD PRESSURE: 60 MMHG | RESPIRATION RATE: 20 BRPM | HEART RATE: 96 BPM | TEMPERATURE: 98.2 F | BODY MASS INDEX: 19.85 KG/M2

## 2019-03-08 DIAGNOSIS — G20 MAJOR NEUROCOGNITIVE DISORDER, DUE TO PARKINSON'S DISEASE, WITHOUT BEHAVIORAL DISTURBANCE, MILD (HCC): ICD-10-CM

## 2019-03-08 DIAGNOSIS — I10 ESSENTIAL HYPERTENSION: ICD-10-CM

## 2019-03-08 DIAGNOSIS — E78.2 MIXED HYPERLIPIDEMIA: ICD-10-CM

## 2019-03-08 DIAGNOSIS — E11.9 TYPE 2 DIABETES MELLITUS WITHOUT COMPLICATION, WITHOUT LONG-TERM CURRENT USE OF INSULIN (HCC): Primary | ICD-10-CM

## 2019-03-08 DIAGNOSIS — F02.80 MAJOR NEUROCOGNITIVE DISORDER, DUE TO PARKINSON'S DISEASE, WITHOUT BEHAVIORAL DISTURBANCE, MILD (HCC): ICD-10-CM

## 2019-03-08 DIAGNOSIS — Z23 ENCOUNTER FOR VACCINATION: ICD-10-CM

## 2019-03-08 DIAGNOSIS — R26.2 AMBULATORY DYSFUNCTION: Primary | ICD-10-CM

## 2019-03-08 PROBLEM — J18.9 PNEUMONIA OF BOTH LOWER LOBES DUE TO INFECTIOUS ORGANISM: Status: RESOLVED | Noted: 2018-03-27 | Resolved: 2019-03-08

## 2019-03-08 PROBLEM — K62.5 ANAL BLEEDING: Status: RESOLVED | Noted: 2018-01-29 | Resolved: 2019-03-08

## 2019-03-08 PROBLEM — S22.32XA CLOSED FRACTURE OF ONE RIB OF LEFT SIDE: Status: RESOLVED | Noted: 2018-01-29 | Resolved: 2019-03-08

## 2019-03-08 LAB
CREAT UR-MCNC: 147 MG/DL
MICROALBUMIN UR-MCNC: 128 MG/L (ref 0–20)
MICROALBUMIN/CREAT 24H UR: 87 MG/G CREATININE (ref 0–30)
SL AMB POCT HEMOGLOBIN AIC: 6.4 (ref ?–6.5)

## 2019-03-08 PROCEDURE — 97110 THERAPEUTIC EXERCISES: CPT

## 2019-03-08 PROCEDURE — 97112 NEUROMUSCULAR REEDUCATION: CPT

## 2019-03-08 PROCEDURE — 90670 PCV13 VACCINE IM: CPT | Performed by: FAMILY MEDICINE

## 2019-03-08 PROCEDURE — 97530 THERAPEUTIC ACTIVITIES: CPT

## 2019-03-08 PROCEDURE — G0009 ADMIN PNEUMOCOCCAL VACCINE: HCPCS | Performed by: FAMILY MEDICINE

## 2019-03-08 PROCEDURE — 99214 OFFICE O/P EST MOD 30 MIN: CPT | Performed by: FAMILY MEDICINE

## 2019-03-08 PROCEDURE — 83036 HEMOGLOBIN GLYCOSYLATED A1C: CPT | Performed by: FAMILY MEDICINE

## 2019-03-08 PROCEDURE — 82043 UR ALBUMIN QUANTITATIVE: CPT | Performed by: FAMILY MEDICINE

## 2019-03-08 PROCEDURE — G0010 ADMIN HEPATITIS B VACCINE: HCPCS | Performed by: FAMILY MEDICINE

## 2019-03-08 PROCEDURE — 82570 ASSAY OF URINE CREATININE: CPT | Performed by: FAMILY MEDICINE

## 2019-03-08 PROCEDURE — 90746 HEPB VACCINE 3 DOSE ADULT IM: CPT | Performed by: FAMILY MEDICINE

## 2019-03-08 RX ORDER — MULTIVITAMIN
1 TABLET ORAL EVERY MORNING
COMMUNITY

## 2019-03-08 RX ORDER — ROPINIROLE 8 MG/1
8 TABLET, FILM COATED, EXTENDED RELEASE ORAL
COMMUNITY
Start: 2018-04-06 | End: 2019-06-21 | Stop reason: DRUGHIGH

## 2019-03-08 RX ORDER — RASAGILINE 1 MG/1
1 TABLET ORAL DAILY
COMMUNITY
Start: 2019-02-12 | End: 2019-06-21 | Stop reason: DRUGHIGH

## 2019-03-08 NOTE — PROGRESS NOTES
Daily Note     Today's date: 3/8/2019  Patient name: Lillie Patrick  : 1940  MRN: [id number]  Referring provider: Philly Hinds MD  Dx:   Encounter Diagnosis     ICD-10-CM    1  Ambulatory dysfunction R26 2    2  Major neurocognitive disorder, due to parkinson's disease, without behavioral disturbance, mild ([facility] [address] ) G20     F02 80        Start Time: 1230  Stop Time: 1315  Total time in clinic (min): 45 minutes    Subjective: Pt reports that she is very tired today due to having a doctors appointment this morning  State that she fell in her home the other day due to slipping on her carpet  Stated that she was not using her cane  Patient reported that she continually walks backwards at home and "forgets"  Objective: See treatment diary below  -Scapular Squeezes with Towel Roll Biofeedback- 20 reps, 3 second holds   -Sit to stand safety instruction-  30 reps, 10 reps, 3 sets, instruction with weight shift forward  Seated  -Hip Flexion with 1 lb- 20 reps bilaterally   -Hip Abduction with orange theraband- 20 reps    Standing   Hip Extension, Hip Abduction, 2 UE support, 20 reps, 3 second holds bilaterally, performed on foam    Ambulation  -Ambulation with cones to widen ZEYAD- 10 feet, 2x5 cycles , between two chairs, cones 4" apart  VC's to sit slowly, correctly aiming buttocks to the back of the seat on first rep  Patient demonstrated carryover with proper sitting technique and slower/controlled lowering again today      Assessment: Patient tolerated session fair today with noted fatigue throughout exercises requiring rest breaks throughout session  Patient demonstrated improvements with postural exercises today in sitting, patient struggles with maintaining adequate posture due to her neck positioning and muscular imbalances, patient needs constant cuing to improve her posture  Posterior LOB continues to remain, attempted to perform sit to stands with arms  Extended in front to improve posterior LOB   Patient requires skilled PT in order to improve her balance, decrease fall risk, and maximize function  Plan: Continue per plan of care  Progress treatment as tolerated

## 2019-03-08 NOTE — PATIENT INSTRUCTIONS
Have a salad every day  Breakfast: add a handful of nuts for essential oils and minerals  Have whole grain bagel instead of white flour products  2 - 3 whole fruit a day: apples, oranges, etc      For dry mouth  Drinking a small amount of lemon juice diluted in water (or even imagining sucking on a lemon) can help saliva production  Can use a sour sugarless candy also

## 2019-03-08 NOTE — PROGRESS NOTES
Chief concern and HPI: Harjit Ruano is a 67 y o  female  F/u visit  Seeing her Neurologist next month  Going to PT for balance, 4 sessions so far  DM BSs 100's  Diet: states does not eat properly  Habit of not eating veggies  Does eat some fruit  Mood: overall ok  Plays binkelly  Son lives an hour away, daughter in [place]  Previous relevant clinical information reviewed from medical, surgical and psychosocial history, medication and allergies and labs/studies  Patient Active Problem List   Diagnosis    Asthma    Carpal tunnel syndrome    Colon, diverticulosis    Type 2 diabetes mellitus without complication, without long-term current use of insulin ([facility] [address] )    Essential hypertension    GERD (gastroesophageal reflux disease)    Mixed hyperlipidemia    Insomnia    Spinal stenosis    Major neurocognitive disorder, due to parkinson's disease, without behavioral disturbance, mild (HCC)    Psoriasis    Restless legs syndrome    Weight loss, unintentional    Need for influenza vaccination    Arthritis    Age-related nuclear cataract of both eyes           Review of systems: No new or worsening:   Unexplained fever/chills/sweats/weight loss  HEENT issues such as new ear, mouth, nose or eye problems  Mouth is a little dry  Respiratory issues such as new shortness of breath, cough  Heart issues such as new chest pain or pressure, palpitations  Abdominal or digestive issues such as diarrhea,  or blood in stool  BM's are sometimes with constipation  Miralax helps  Genitourinary issues  Neurological issues such as new numbness or motor weakness  Psychological issues such as depression or anxiety  Skin issues such as new rashes   Some dry skin      Exam:  Alert, no acute distress /60   Pulse 96   Temp 98 2 °F (36 8 °C) (Tympanic)   Resp 20   Wt 44 6 kg (98 lb 4 8 oz)   BMI 19 85 kg/m²   HEENT: Head normocephalic and atraumatic  Lungs: clear to auscultation  Cardiac: Regular rate and rhythm  2/6 systolic murmur noted  Abdomen: Benign  Normal active bowel sounds  Soft and non-tender with no organomegaly  Multiple lumps in abdominal wall - this appears to be chronic  Extremities: No cyanosis, clubbing or edema  Neuro screen: Has severe tremors and body movements, worse with intention  Summary Impression:    1  Type 2 diabetes mellitus without complication, without long-term current use of insulin (HCC)  We discussed healthier diet    2  Essential hypertension  We discussed healthier diet  Same Amlodipine    3  Major neurocognitive disorder, due to parkinson's disease, without behavioral disturbance, mild ([address] )  F/u neuro    4   Mixed hyperlipidemia  We discussed healthier diet            Follow up plan: 6 months, earlier prn

## 2019-03-13 ENCOUNTER — OFFICE VISIT (OUTPATIENT)
Dept: PHYSICAL THERAPY | Facility: CLINIC | Age: 79
End: 2019-03-13
Payer: MEDICARE

## 2019-03-13 DIAGNOSIS — F02.80 MAJOR NEUROCOGNITIVE DISORDER, DUE TO PARKINSON'S DISEASE, WITHOUT BEHAVIORAL DISTURBANCE, MILD (HCC): ICD-10-CM

## 2019-03-13 DIAGNOSIS — G20 MAJOR NEUROCOGNITIVE DISORDER, DUE TO PARKINSON'S DISEASE, WITHOUT BEHAVIORAL DISTURBANCE, MILD (HCC): ICD-10-CM

## 2019-03-13 DIAGNOSIS — R26.2 AMBULATORY DYSFUNCTION: Primary | ICD-10-CM

## 2019-03-13 PROCEDURE — 97110 THERAPEUTIC EXERCISES: CPT

## 2019-03-13 PROCEDURE — 97530 THERAPEUTIC ACTIVITIES: CPT

## 2019-03-13 PROCEDURE — 97112 NEUROMUSCULAR REEDUCATION: CPT

## 2019-03-13 NOTE — PROGRESS NOTES
Daily Note     Today's date: 3/13/2019  Patient name: Leonor Stone  : 1940  MRN: [id number]  Referring provider: Chaitanya Johansen MD  Dx:   Encounter Diagnosis     ICD-10-CM    1  Ambulatory dysfunction R26 2    2  Major neurocognitive disorder, due to parkinson's disease, without behavioral disturbance, mild ([facility] [address] ) G20     F02 80        Start Time: 1030  Stop Time: 1115  Total time in clinic (min): 45 minutes    Subjective: Pt reports that she is "doing a little better at home", states that       Objective: See treatment diary below  -Scapular Squeezes with Towel Roll Biofeedback- 20 reps, 3 second holds   -Sit to stand safety instruction-  30 reps, 10 reps, 3 sets, instruction with weight shift forward  Seated, UEs anteriorly to promote forward weight shift  -Hip Flexion with 1 lb- 20 reps bilaterally  -Hip Abduction with orange theraband- 20 reps    Standing   -Hip Extension, Hip Abduction, 2 UE support, 20 reps, 3 second holds bilaterally, performed on foam    -Alicia Negotiation- Forward, Laterally, Backwards- 6" hurdles, 2 UE support, 10 cycles each direction  -Sit to stand with turning commands- 10 reps with L, R, Forward, backwards commands      Assessment: Patient tolerated session well today, progressing to hurdles today with 2 UE support, progressing to increasing step height as well as improving clearance of obstacles  Patient is challenged by maintaining her tasks during therapy, wanting to progress speed as well as pace of activity, patient challenged with slowing down her helen, decrease in quality of performance noted when this occurs due to increase in dyskinetic and tremoring movement  Patient continues to require verbal and tactile cuing to improve her sit to stand safety, noting an improvement with forward weight shift  Patient requires skilled PT in order to improve her balance, decrease fall risk, and maximize function  Plan: Continue per plan of care    Progress treatment as tolerated

## 2019-03-15 ENCOUNTER — OFFICE VISIT (OUTPATIENT)
Dept: PHYSICAL THERAPY | Facility: CLINIC | Age: 79
End: 2019-03-15
Payer: MEDICARE

## 2019-03-15 DIAGNOSIS — F02.80 MAJOR NEUROCOGNITIVE DISORDER, DUE TO PARKINSON'S DISEASE, WITHOUT BEHAVIORAL DISTURBANCE, MILD (HCC): ICD-10-CM

## 2019-03-15 DIAGNOSIS — R26.2 AMBULATORY DYSFUNCTION: Primary | ICD-10-CM

## 2019-03-15 DIAGNOSIS — G20 MAJOR NEUROCOGNITIVE DISORDER, DUE TO PARKINSON'S DISEASE, WITHOUT BEHAVIORAL DISTURBANCE, MILD (HCC): ICD-10-CM

## 2019-03-15 PROCEDURE — 97112 NEUROMUSCULAR REEDUCATION: CPT

## 2019-03-15 NOTE — PROGRESS NOTES
Daily Note     Today's date: 3/15/2019  Patient name: Chase Hopkins  : 1940  MRN: [id number]  Referring provider: Pamella Selby MD  Dx:   Encounter Diagnosis     ICD-10-CM    1  Ambulatory dysfunction R26 2    2  Major neurocognitive disorder, due to parkinson's disease, without behavioral disturbance, mild ([facility] [address] ) G20     F02 80        Start Time: 0845  Stop Time: 09  Total time in clinic (min): 45 minutes    Subjective: Pt reports that she is "doing a little better at home", states that she was very tired after her last session due to the exercises and her going bowling in the evening  Objective: See treatment diary below    -Floor To Ceiling- 10 reps, 10 second hold bilaterally   -Side to Side- 10 reps, 10 second hold bilaterally   -Forward Step- 10 reps, 10 second hold bilaterally, 1 UE on chair  -Sideways Step- 10 reps, 10 second hold bilaterally, 1 UE on chair  -Backwards step- 10 reps, 10 second hold bilaterally, 1 UE on chair  -Rock and [employer]- 10 reps, 10 second hold bilaterally, 1 UE on chair  -Rock and Reach Twist- 10 reps, 10 second hold bilaterally, 1 UE on chair  -Sit to stands- 20 reps 1 airex pad on seat    -Alicia Negotiation- Forward, Laterally, Backwards- 6" hurdles, 2 UE support, 10 cycles each direction  -Sit to stand with turning commands- 10 reps with L, R, Forward, backwards commands      Assessment: Patient tolerated session well today, attempting LSVT interventions today to assist with improving her abilities with controlling movements  Patient challenged with her abilities to maintain proximal joint stability today during interventions, but was able to perform interventions well today with 50% verbal and tactile cuing from treating physical therapist  Patient improving in her abilities and confidence, LSVT exercises may have assisted with proximal stability at joints, dyskinetic movements continually present   Patient requires skilled PT in order to improve her balance, decrease fall risk, and maximize function  Plan: Continue per plan of care  Progress treatment as tolerated  Re-Eval next session

## 2019-03-20 ENCOUNTER — OFFICE VISIT (OUTPATIENT)
Dept: PHYSICAL THERAPY | Facility: CLINIC | Age: 79
End: 2019-03-20
Payer: MEDICARE

## 2019-03-20 DIAGNOSIS — R26.2 AMBULATORY DYSFUNCTION: Primary | ICD-10-CM

## 2019-03-20 DIAGNOSIS — G20 MAJOR NEUROCOGNITIVE DISORDER, DUE TO PARKINSON'S DISEASE, WITHOUT BEHAVIORAL DISTURBANCE, MILD (HCC): ICD-10-CM

## 2019-03-20 DIAGNOSIS — F02.80 MAJOR NEUROCOGNITIVE DISORDER, DUE TO PARKINSON'S DISEASE, WITHOUT BEHAVIORAL DISTURBANCE, MILD (HCC): ICD-10-CM

## 2019-03-20 PROCEDURE — 97112 NEUROMUSCULAR REEDUCATION: CPT

## 2019-03-20 PROCEDURE — G8978 MOBILITY CURRENT STATUS: HCPCS

## 2019-03-20 PROCEDURE — G8979 MOBILITY GOAL STATUS: HCPCS

## 2019-03-20 NOTE — PROGRESS NOTES
Progress Note    Today's date: 3/20/2019  Patient name: Ravinder Jarquin  : 1940  MRN: [id number]  Referring provider: Michael Lindsey MD  Dx:   Encounter Diagnosis     ICD-10-CM    1  Ambulatory dysfunction R26 2    2  Major neurocognitive disorder, due to parkinson's disease, without behavioral disturbance, mild ([facility] [address] ) G20     F02 80        Start Time: 1015  Stop Time: 1100  Total time in clinic (min): 45 minutes    Assessment  Assessment details: Patient is a 67 y o  female who presents to skilled PT for balance and reactive postural control deficits secondary to Parkinson's Disease, slight improvements per subjective reports but upon testing continues to demonstrate decreases in postural mechanics today  Patient's manual muscle tests are WFL, slight weakness noted  Patient presents as a fall risk via gait speed, LUNDBERG balance test, 5x sit to stand, mCTSIB, and TUG test, with overall results noting risk for falls and diminished reactive postural control, slight improvements per objective in ambulation activities with and without SPC  Patient endurance scores are below age related norms via 2 minute walk test as well as 6 minute walk test (not completed today at patient's request due to fatigue), including 30 second sit to stand scores, noting decrease functional endurance and cardio capacity, improvement by 1 rep in 30 second sit to stand test  Patient displays overall reduction in somatosensory/ proprioception awareness secondary to increased balance and postural corrections associated with mCTSIB, dyskinetic movements continually prevalent  Patient may benefit from a rollator or Ustep walker, patient given walker at home and is showing overall improvements with stability as well as subjective reports of decrease falls  Patient to be initiated with LSVT exercises   Patient will benefit from skilled PT to address noted impairments and functional limitations they are causing with overall goal to return patient to highest level possible with reduced risk for falls  Impairments: abnormal coordination, abnormal gait, abnormal muscle tone, abnormal or restricted ROM, abnormal movement, activity intolerance, difficulty understanding, impaired balance, impaired physical strength, pain with function, safety issue, poor posture  and poor body mechanics  Other impairment: Parkinson's Disease  Barriers to therapy: Parkinson's disease  Understanding of Dx/Px/POC: excellent   Prognosis: fair    Goals  STG: To be completed in 4 weeks  1  Patient will improve her sequencing with AD in 4 weeks in order to improve her community ambulation safety - Partially MET  2  Patient will improve her gait speed score by  10 meters/second or more in order to improve her ability to cross a street safely - MET  3  Patient will improve her TUG test score by 3 seconds or more in 4 weeks in order to improve her transfer to ambulation transition - MET  4  Patient will improve her 5x sit to stand test by 5 seconds or more in 4 weeks to improve her transfer safety - NOT MET    LTG: To be completed in 8 weeks  1  Patient will complete a 6 minute walk test completely in 8 weeks to improve her cardiovascular endurance- NOT MET  2  Patient will complete a DGI in 8 weeks to formally assess her dynamic balance - NOT MET  3   Patient will complete a MiniBESTest in 8 weeks to formally assess her dual tasking abilities as related to her primary dx - NOT MET      Plan  Patient would benefit from: PT eval  Planned modality interventions: biofeedback, cryotherapy, TENS and thermotherapy: hydrocollator packs  Planned therapy interventions: abdominal trunk stabilization, IADL retraining, activity modification, joint mobilization, manual therapy, ADL retraining, ADL training, massage, balance, motor coordination training, muscle pump exercises, balance/weight bearing training, body mechanics training, breathing training, canalith repositioning, neuromuscular re-education, postural training, patient education, coordination, strengthening, stretching, fine motor coordination training, functional ROM exercises, flexibility, therapeutic activities, therapeutic exercise, therapeutic training, transfer training, gait training, graded activity, graded exercise, graded motor and home exercise program  Frequency: 2x week  Duration in weeks: 16  Plan of Care beginning date: 2/15/2019  Plan of Care expiration date: 2019  Treatment plan discussed with: patient        Subjective Evaluation    History of Present Illness  Date of onset: 2/15/2019  Mechanism of injury: Patient is a 67year old female reporting to skilled PT with a diagnosis of Parkinson's Disease  Patient reports that she has been improving with her function outside of the clinic, notes that she is continually utilizing the cane as well as gaining a walker from a friend  Patient notes no falls in 1 week  Patient is continually noting loss of balance and falls previous to this week             Recurrent probem    Pain  Current pain rating: 3  At best pain ratin  At worst pain ratin  Location: Neck Pain  Quality: dull ache  Relieving factors: change in position  Aggravating factors: standing and sitting    Social Support  Steps to enter house: no  0  Stairs in house: yes   10  Lives with: spouse    Employment status: not working    Diagnostic Tests  No diagnostic tests performed  Treatments  No previous or current treatments  Patient Goals  Patient goals for therapy: independence with ADLs/IADLs, improved balance and return to sport/leisure activities  Patient goal: Patient wants to improve her balance to bowl         Objective     Static Posture     Comments  MCTSIB  -FTEO on firm- 30 seconds  -FTEC on firm- 5 seconds  -FTEO on foam- 0 seconds  -FTEC on Foam- 0 seconds    Bhat/56    Anticipatory Balance  -Anterior, posterior, medial, lateral- no balance reactions, fall would occur without PT intervention    Progress Note: 3/20/2019    mCTSIB  -FTEO on firm- 30 seconds  -FTEC on firm- 10 seconds  -FTEO on foam- 5 seconds  -FTEC on Foam- 0 seconds    Bhat/56    Anticipatory Balance  -Anterior, posterior, medial, lateral- no balance reactions, fall would occur without PT intervention    Strength/Myotome Testing     Left Hip   Planes of Motion   Flexion: 4  Extension: 4+  Abduction: 4  Adduction: 4+    Right Hip   Planes of Motion   Flexion: 4  Extension: 4+  Abduction: 4  Adduction: 4+    Left Knee   Flexion: 4  Extension: 4+    Right Knee   Flexion: 4  Extension: 4+    Left Ankle/Foot   Dorsiflexion: 4+  Plantar flexion: 4+    Right Ankle/Foot   Dorsiflexion: 4+  Plantar flexion: 4+    Ambulation     Comments   Gait speed- 10 meters/ 15 01 seconds-   TUG Test- 21 02 Seconds  2 minute walk test- 200 feet  6 minute walk test- completed 4:40 seconds, 450 feet    Gait speed- 10 meters/seconds-  With SPC, meters/second without SPC  TUG Test- 19 32 with SPC, 12 85 without SPC    Not performed today due to fatigue, perform next session  2 minute walk test- 200 feet  6 minute walk test- completed 4:40 seconds, 450 feet    Functional Assessment        Comments  5x sit to stand- 12 50 seconds  30 second sit to stand- 10 reps (self terminated due to fatigue)     Progress Note: 3/20/2019  5x sit to stand- 11 06 seconds  30 second sit to stand- 11 reps      Flowsheet Rows      Most Recent Value   PT/OT G-Codes   Current Score  53   Projected Score  62   FOTO information reviewed  Yes   Assessment Type  Re-evaluation   G code set  Mobility: Walking & Moving Around   Mobility: Walking and Moving Around Current Status ()  CK   Mobility: Walking and Moving Around Goal Status ()  CK          Precautions: Parkinson's disease, fall risk     Daily Treatment Diary     Manual                                                                                   Exercise Diary Modalities

## 2019-03-22 ENCOUNTER — OFFICE VISIT (OUTPATIENT)
Dept: PHYSICAL THERAPY | Facility: CLINIC | Age: 79
End: 2019-03-22
Payer: MEDICARE

## 2019-03-22 DIAGNOSIS — G20 MAJOR NEUROCOGNITIVE DISORDER, DUE TO PARKINSON'S DISEASE, WITHOUT BEHAVIORAL DISTURBANCE, MILD (HCC): ICD-10-CM

## 2019-03-22 DIAGNOSIS — F02.80 MAJOR NEUROCOGNITIVE DISORDER, DUE TO PARKINSON'S DISEASE, WITHOUT BEHAVIORAL DISTURBANCE, MILD (HCC): ICD-10-CM

## 2019-03-22 DIAGNOSIS — R26.2 AMBULATORY DYSFUNCTION: Primary | ICD-10-CM

## 2019-03-22 PROCEDURE — 97112 NEUROMUSCULAR REEDUCATION: CPT

## 2019-03-22 NOTE — PROGRESS NOTES
Daily Note     Today's date: 3/22/2019  Patient name: Leonor Stone  : 1940  MRN: [id number]  Referring provider: Chaitanya Johansen MD  Dx:   Encounter Diagnosis     ICD-10-CM    1  Ambulatory dysfunction R26 2    2  Major neurocognitive disorder, due to parkinson's disease, without behavioral disturbance, mild ([facility] [address] ) G20     F02 80        Start Time: 957  Stop Time: 1030  Total time in clinic (min): 33 minutes    Subjective: Pt reports that she is "very tired and off balance today"  Objective: See treatment diary below    -Floor To Ceiling- 10 reps, 10 second hold bilaterally   -Side to Side- 10 reps, 10 second hold bilaterally   -Forward Step- 10 reps, 10 second hold bilaterally, close supervision  -Sideways Step- 10 reps, 10 second hold bilaterally, close supervision  -Backwards step- 10 reps, 10 second hold bilaterally, close supervision  -Rock and [employer]- 10 reps, 10 second hold bilaterally, close supervision  -Rock and Reach Twist- 10 reps, 10 second hold bilaterally, close supervision  -Sit to stands- 20 reps 0 airex pad on seat    -Ambulation with SPC with commands, turn L or R- 15 minutes        Assessment: Patient tolerated session well today, progressing LSVT exercises today with more memory of the interventions, demonstrating and increase in carryover this session  Patient continues to require an increase in cuing verbally and tactile to improve her performance and slow down the motion of the activity due to her dyskinetic movements  Patient challenged with turning during ambulation today, required cuing to improve amplitude of step height today, was able to correct 50% of the time with [facility]  Patient requires skilled PT in order to improve her balance, decrease fall risk, and maximize function  Plan: Continue per plan of care  Progress treatment as tolerated  Re-Eval next session

## 2019-03-27 ENCOUNTER — OFFICE VISIT (OUTPATIENT)
Dept: PHYSICAL THERAPY | Facility: CLINIC | Age: 79
End: 2019-03-27
Payer: MEDICARE

## 2019-03-27 DIAGNOSIS — R26.2 AMBULATORY DYSFUNCTION: Primary | ICD-10-CM

## 2019-03-27 PROCEDURE — 97112 NEUROMUSCULAR REEDUCATION: CPT | Performed by: PHYSICAL THERAPIST

## 2019-03-27 PROCEDURE — 97110 THERAPEUTIC EXERCISES: CPT | Performed by: PHYSICAL THERAPIST

## 2019-03-27 NOTE — PROGRESS NOTES
Daily Note     Today's date: 3/27/2019  Patient name: Ceci Toscano  : 1940  MRN: [id number]  Referring provider: El Claudio MD  Dx:   Encounter Diagnosis     ICD-10-CM    1  Ambulatory dysfunction R26 2                   Subjective: Pt reports she is bowling tonight  Objective: See treatment diary below    Seated:  -Floor To Ceiling stretch- 10 reps, 10 second hold bilaterally   -Side to Side rotation stretch 10 reps, 10 second hold bilaterally   -Side to side trunk rotation clapping hands - 10 reps    Mat:  -Supine with trunk rotation clapping hands side to side - 10 reps  -Prone press-ups - 10 reps  -Prone press-ups with reaching across midline - 10 reaches each arm  -Quadruped rocking back and forth- 10 reps  -Quadruped transition into high kneel - 8 reps  -Quadruped with trunk rotation- 6 reps each way    Standing:  -Forward Stepping- 10 reps, 10 second hold bilaterally, close supervision  -Sideways Stepping- 10 reps, 10 second hold bilaterally, close supervision  -Backwards stepping- 10 reps, 10 second hold bilaterally, close supervision  -Stepping fwd and lateral over 6" gena - requires intermittent UE support   -Sit to stands- 20 reps 0 airex pad on seat  -Reaching across midline for targets on the wall  Assessment:   Patient tolerated session well today with addition of more neurodevelopmental positions listed above  She was able to transition into prone, quadruped, and kneeling independently  Pt fatigued quickly with prone and quadruped activities  Focused mainly on trunk rotation activities today  She requires intermittent UE support during stepping activities  Able to perform reaching activity without external support and no LOB  Patient requires skilled PT in order to improve her balance, decrease fall risk, and maximize function  Plan: Continue per plan of care  Progress treatment as tolerated

## 2019-03-29 ENCOUNTER — OFFICE VISIT (OUTPATIENT)
Dept: PHYSICAL THERAPY | Facility: CLINIC | Age: 79
End: 2019-03-29
Payer: MEDICARE

## 2019-03-29 DIAGNOSIS — G20 MAJOR NEUROCOGNITIVE DISORDER, DUE TO PARKINSON'S DISEASE, WITHOUT BEHAVIORAL DISTURBANCE, MILD (HCC): ICD-10-CM

## 2019-03-29 DIAGNOSIS — F02.80 MAJOR NEUROCOGNITIVE DISORDER, DUE TO PARKINSON'S DISEASE, WITHOUT BEHAVIORAL DISTURBANCE, MILD (HCC): ICD-10-CM

## 2019-03-29 DIAGNOSIS — R26.2 AMBULATORY DYSFUNCTION: Primary | ICD-10-CM

## 2019-03-29 PROCEDURE — 97112 NEUROMUSCULAR REEDUCATION: CPT

## 2019-03-29 PROCEDURE — 97530 THERAPEUTIC ACTIVITIES: CPT

## 2019-03-29 NOTE — PROGRESS NOTES
Daily Note     Today's date: 3/29/2019  Patient name: Cristino Thomas  : 1940  MRN: [id number]  Referring provider: Allyson Gonzalez MD  Dx:   Encounter Diagnosis     ICD-10-CM    1  Ambulatory dysfunction R26 2    2  Major neurocognitive disorder, due to parkinson's disease, without behavioral disturbance, mild ([facility] [address] ) G20     F02 80        Start Time: 0930  Stop Time: 1015  Total time in clinic (min): 45 minutes    Subjective: Patient reported that she is "very excited to go to [place]", reports that she was       Objective: See treatment diary below    Seated:  -Floor To Ceiling stretch- 10 reps, 10 second hold bilaterally   -Side to Side rotation stretch 10 reps, 10 second hold bilaterally   -Side to side trunk rotation clapping hands - 10 reps    Mat:  -Supine with trunk rotation clapping hands side to side - 10 reps  -Prone press-ups - 10 reps  -Prone press-ups with reaching across midline - 10 reaches each arm  -Quadruped rocking back and forth- 10 reps    Standing:  -Forward Stepping- 10 reps, 10 second hold bilaterally, close supervision  -Sideways Stepping- 10 reps, 10 second hold bilaterally, close supervision  -Backwards stepping- 10 reps, 10 second hold bilaterally, close supervision  -Sit to stands- 20 reps 0 airex pad on seat  -Reaching across midline for targets on the wall- Utilizing cones, 20 reps hitting wall     Seated  -High Stepping- 10 reps bilaterally   -Reaching laterally each side as well as forward with cone taps     Assessment:   Patient tolerated session well today, able to re-demonstrate PWR and LSVT movements well today  Patient is showing an increase in dyskinetic and choreic movements, therapist instructed patient to discuss adjusting the DBS unit to assist with controlling movements  Patient reports an increase in back pain today with quadruped activities, which may be attributed to her scoliosis as well as 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

## 2019-04-03 ENCOUNTER — OFFICE VISIT (OUTPATIENT)
Dept: PHYSICAL THERAPY | Facility: CLINIC | Age: 79
End: 2019-04-03
Payer: MEDICARE

## 2019-04-03 DIAGNOSIS — R26.2 AMBULATORY DYSFUNCTION: Primary | ICD-10-CM

## 2019-04-03 PROCEDURE — 97110 THERAPEUTIC EXERCISES: CPT | Performed by: PHYSICAL THERAPIST

## 2019-04-03 PROCEDURE — 97112 NEUROMUSCULAR REEDUCATION: CPT | Performed by: PHYSICAL THERAPIST

## 2019-04-05 ENCOUNTER — OFFICE VISIT (OUTPATIENT)
Dept: PHYSICAL THERAPY | Facility: CLINIC | Age: 79
End: 2019-04-05
Payer: MEDICARE

## 2019-04-05 DIAGNOSIS — F02.80 MAJOR NEUROCOGNITIVE DISORDER, DUE TO PARKINSON'S DISEASE, WITHOUT BEHAVIORAL DISTURBANCE, MILD (HCC): ICD-10-CM

## 2019-04-05 DIAGNOSIS — G20 MAJOR NEUROCOGNITIVE DISORDER, DUE TO PARKINSON'S DISEASE, WITHOUT BEHAVIORAL DISTURBANCE, MILD (HCC): ICD-10-CM

## 2019-04-05 DIAGNOSIS — R26.2 AMBULATORY DYSFUNCTION: Primary | ICD-10-CM

## 2019-04-05 PROCEDURE — 97530 THERAPEUTIC ACTIVITIES: CPT

## 2019-04-05 PROCEDURE — 97112 NEUROMUSCULAR REEDUCATION: CPT

## 2019-04-05 PROCEDURE — 97116 GAIT TRAINING THERAPY: CPT

## 2019-04-10 ENCOUNTER — APPOINTMENT (OUTPATIENT)
Dept: PHYSICAL THERAPY | Facility: CLINIC | Age: 79
End: 2019-04-10
Payer: MEDICARE

## 2019-04-12 ENCOUNTER — APPOINTMENT (OUTPATIENT)
Dept: PHYSICAL THERAPY | Facility: CLINIC | Age: 79
End: 2019-04-12
Payer: MEDICARE

## 2019-04-17 ENCOUNTER — OFFICE VISIT (OUTPATIENT)
Dept: PHYSICAL THERAPY | Facility: CLINIC | Age: 79
End: 2019-04-17
Payer: MEDICARE

## 2019-04-17 DIAGNOSIS — G20 MAJOR NEUROCOGNITIVE DISORDER, DUE TO PARKINSON'S DISEASE, WITHOUT BEHAVIORAL DISTURBANCE, MILD (HCC): ICD-10-CM

## 2019-04-17 DIAGNOSIS — R26.2 AMBULATORY DYSFUNCTION: Primary | ICD-10-CM

## 2019-04-17 DIAGNOSIS — F02.80 MAJOR NEUROCOGNITIVE DISORDER, DUE TO PARKINSON'S DISEASE, WITHOUT BEHAVIORAL DISTURBANCE, MILD (HCC): ICD-10-CM

## 2019-04-17 PROCEDURE — G8979 MOBILITY GOAL STATUS: HCPCS

## 2019-04-17 PROCEDURE — G8978 MOBILITY CURRENT STATUS: HCPCS

## 2019-04-17 PROCEDURE — 97112 NEUROMUSCULAR REEDUCATION: CPT

## 2019-04-19 ENCOUNTER — OFFICE VISIT (OUTPATIENT)
Dept: PHYSICAL THERAPY | Facility: CLINIC | Age: 79
End: 2019-04-19
Payer: MEDICARE

## 2019-04-19 DIAGNOSIS — F02.80 MAJOR NEUROCOGNITIVE DISORDER, DUE TO PARKINSON'S DISEASE, WITHOUT BEHAVIORAL DISTURBANCE, MILD (HCC): ICD-10-CM

## 2019-04-19 DIAGNOSIS — R26.2 AMBULATORY DYSFUNCTION: Primary | ICD-10-CM

## 2019-04-19 DIAGNOSIS — G20 MAJOR NEUROCOGNITIVE DISORDER, DUE TO PARKINSON'S DISEASE, WITHOUT BEHAVIORAL DISTURBANCE, MILD (HCC): ICD-10-CM

## 2019-04-19 PROCEDURE — 97112 NEUROMUSCULAR REEDUCATION: CPT | Performed by: PHYSICAL THERAPIST

## 2019-04-24 ENCOUNTER — OFFICE VISIT (OUTPATIENT)
Dept: PHYSICAL THERAPY | Facility: CLINIC | Age: 79
End: 2019-04-24
Payer: MEDICARE

## 2019-04-24 DIAGNOSIS — F02.80 MAJOR NEUROCOGNITIVE DISORDER, DUE TO PARKINSON'S DISEASE, WITHOUT BEHAVIORAL DISTURBANCE, MILD (HCC): ICD-10-CM

## 2019-04-24 DIAGNOSIS — R26.2 AMBULATORY DYSFUNCTION: Primary | ICD-10-CM

## 2019-04-24 DIAGNOSIS — G20 MAJOR NEUROCOGNITIVE DISORDER, DUE TO PARKINSON'S DISEASE, WITHOUT BEHAVIORAL DISTURBANCE, MILD (HCC): ICD-10-CM

## 2019-04-24 PROCEDURE — 97530 THERAPEUTIC ACTIVITIES: CPT

## 2019-04-24 PROCEDURE — 97112 NEUROMUSCULAR REEDUCATION: CPT

## 2019-04-26 ENCOUNTER — OFFICE VISIT (OUTPATIENT)
Dept: PHYSICAL THERAPY | Facility: CLINIC | Age: 79
End: 2019-04-26
Payer: MEDICARE

## 2019-04-26 DIAGNOSIS — R26.2 AMBULATORY DYSFUNCTION: Primary | ICD-10-CM

## 2019-04-26 DIAGNOSIS — F02.80 MAJOR NEUROCOGNITIVE DISORDER, DUE TO PARKINSON'S DISEASE, WITHOUT BEHAVIORAL DISTURBANCE, MILD (HCC): ICD-10-CM

## 2019-04-26 DIAGNOSIS — G20 MAJOR NEUROCOGNITIVE DISORDER, DUE TO PARKINSON'S DISEASE, WITHOUT BEHAVIORAL DISTURBANCE, MILD (HCC): ICD-10-CM

## 2019-04-26 PROCEDURE — 97112 NEUROMUSCULAR REEDUCATION: CPT

## 2019-04-26 PROCEDURE — 97116 GAIT TRAINING THERAPY: CPT

## 2019-05-01 ENCOUNTER — OFFICE VISIT (OUTPATIENT)
Dept: PHYSICAL THERAPY | Facility: CLINIC | Age: 79
End: 2019-05-01
Payer: MEDICARE

## 2019-05-01 DIAGNOSIS — F02.80 MAJOR NEUROCOGNITIVE DISORDER, DUE TO PARKINSON'S DISEASE, WITHOUT BEHAVIORAL DISTURBANCE, MILD (HCC): ICD-10-CM

## 2019-05-01 DIAGNOSIS — R26.2 AMBULATORY DYSFUNCTION: Primary | ICD-10-CM

## 2019-05-01 DIAGNOSIS — G20 MAJOR NEUROCOGNITIVE DISORDER, DUE TO PARKINSON'S DISEASE, WITHOUT BEHAVIORAL DISTURBANCE, MILD (HCC): ICD-10-CM

## 2019-05-01 PROCEDURE — 97530 THERAPEUTIC ACTIVITIES: CPT

## 2019-05-01 PROCEDURE — 97112 NEUROMUSCULAR REEDUCATION: CPT

## 2019-05-01 PROCEDURE — 97116 GAIT TRAINING THERAPY: CPT

## 2019-05-03 ENCOUNTER — OFFICE VISIT (OUTPATIENT)
Dept: PHYSICAL THERAPY | Facility: CLINIC | Age: 79
End: 2019-05-03
Payer: MEDICARE

## 2019-05-03 DIAGNOSIS — F02.80 MAJOR NEUROCOGNITIVE DISORDER, DUE TO PARKINSON'S DISEASE, WITHOUT BEHAVIORAL DISTURBANCE, MILD (HCC): ICD-10-CM

## 2019-05-03 DIAGNOSIS — G20 MAJOR NEUROCOGNITIVE DISORDER, DUE TO PARKINSON'S DISEASE, WITHOUT BEHAVIORAL DISTURBANCE, MILD (HCC): ICD-10-CM

## 2019-05-03 DIAGNOSIS — R26.2 AMBULATORY DYSFUNCTION: Primary | ICD-10-CM

## 2019-05-03 PROCEDURE — 97530 THERAPEUTIC ACTIVITIES: CPT | Performed by: PHYSICAL THERAPIST

## 2019-05-03 PROCEDURE — 97112 NEUROMUSCULAR REEDUCATION: CPT | Performed by: PHYSICAL THERAPIST

## 2019-05-03 PROCEDURE — 97116 GAIT TRAINING THERAPY: CPT | Performed by: PHYSICAL THERAPIST

## 2019-05-15 ENCOUNTER — APPOINTMENT (OUTPATIENT)
Dept: PHYSICAL THERAPY | Facility: CLINIC | Age: 79
End: 2019-05-15
Payer: MEDICARE

## 2019-05-22 ENCOUNTER — OFFICE VISIT (OUTPATIENT)
Dept: PHYSICAL THERAPY | Facility: CLINIC | Age: 79
End: 2019-05-22
Payer: MEDICARE

## 2019-05-22 DIAGNOSIS — F02.80 MAJOR NEUROCOGNITIVE DISORDER, DUE TO PARKINSON'S DISEASE, WITHOUT BEHAVIORAL DISTURBANCE, MILD (HCC): ICD-10-CM

## 2019-05-22 DIAGNOSIS — R26.2 AMBULATORY DYSFUNCTION: Primary | ICD-10-CM

## 2019-05-22 DIAGNOSIS — G20 MAJOR NEUROCOGNITIVE DISORDER, DUE TO PARKINSON'S DISEASE, WITHOUT BEHAVIORAL DISTURBANCE, MILD (HCC): ICD-10-CM

## 2019-05-22 PROCEDURE — G8978 MOBILITY CURRENT STATUS: HCPCS

## 2019-05-22 PROCEDURE — 97530 THERAPEUTIC ACTIVITIES: CPT

## 2019-05-22 PROCEDURE — G8979 MOBILITY GOAL STATUS: HCPCS

## 2019-05-24 ENCOUNTER — OFFICE VISIT (OUTPATIENT)
Dept: PHYSICAL THERAPY | Facility: CLINIC | Age: 79
End: 2019-05-24
Payer: MEDICARE

## 2019-05-24 DIAGNOSIS — R26.2 AMBULATORY DYSFUNCTION: Primary | ICD-10-CM

## 2019-05-24 DIAGNOSIS — G20 MAJOR NEUROCOGNITIVE DISORDER, DUE TO PARKINSON'S DISEASE, WITHOUT BEHAVIORAL DISTURBANCE, MILD (HCC): ICD-10-CM

## 2019-05-24 DIAGNOSIS — F02.80 MAJOR NEUROCOGNITIVE DISORDER, DUE TO PARKINSON'S DISEASE, WITHOUT BEHAVIORAL DISTURBANCE, MILD (HCC): ICD-10-CM

## 2019-05-24 PROCEDURE — 97112 NEUROMUSCULAR REEDUCATION: CPT

## 2019-05-24 PROCEDURE — 97110 THERAPEUTIC EXERCISES: CPT

## 2019-05-24 PROCEDURE — 97530 THERAPEUTIC ACTIVITIES: CPT

## 2019-05-26 DIAGNOSIS — I10 ESSENTIAL HYPERTENSION: ICD-10-CM

## 2019-05-28 RX ORDER — AMLODIPINE BESYLATE 10 MG/1
TABLET ORAL
Qty: 90 TABLET | Refills: 3 | Status: SHIPPED | OUTPATIENT
Start: 2019-05-28 | End: 2019-06-21

## 2019-05-29 ENCOUNTER — APPOINTMENT (OUTPATIENT)
Dept: PHYSICAL THERAPY | Facility: CLINIC | Age: 79
End: 2019-05-29
Payer: MEDICARE

## 2019-05-31 ENCOUNTER — OFFICE VISIT (OUTPATIENT)
Dept: PHYSICAL THERAPY | Facility: CLINIC | Age: 79
End: 2019-05-31
Payer: MEDICARE

## 2019-05-31 DIAGNOSIS — R26.2 AMBULATORY DYSFUNCTION: Primary | ICD-10-CM

## 2019-05-31 DIAGNOSIS — G20 MAJOR NEUROCOGNITIVE DISORDER, DUE TO PARKINSON'S DISEASE, WITHOUT BEHAVIORAL DISTURBANCE, MILD (HCC): ICD-10-CM

## 2019-05-31 DIAGNOSIS — F02.80 MAJOR NEUROCOGNITIVE DISORDER, DUE TO PARKINSON'S DISEASE, WITHOUT BEHAVIORAL DISTURBANCE, MILD (HCC): ICD-10-CM

## 2019-05-31 PROCEDURE — 97112 NEUROMUSCULAR REEDUCATION: CPT

## 2019-05-31 PROCEDURE — 97140 MANUAL THERAPY 1/> REGIONS: CPT

## 2019-06-05 ENCOUNTER — OFFICE VISIT (OUTPATIENT)
Dept: PHYSICAL THERAPY | Facility: CLINIC | Age: 79
End: 2019-06-05
Payer: MEDICARE

## 2019-06-05 DIAGNOSIS — R26.2 AMBULATORY DYSFUNCTION: Primary | ICD-10-CM

## 2019-06-05 PROCEDURE — 97112 NEUROMUSCULAR REEDUCATION: CPT | Performed by: PHYSICAL THERAPIST

## 2019-06-05 PROCEDURE — 97116 GAIT TRAINING THERAPY: CPT | Performed by: PHYSICAL THERAPIST

## 2019-06-07 ENCOUNTER — OFFICE VISIT (OUTPATIENT)
Dept: PHYSICAL THERAPY | Facility: CLINIC | Age: 79
End: 2019-06-07
Payer: MEDICARE

## 2019-06-07 DIAGNOSIS — R26.2 AMBULATORY DYSFUNCTION: Primary | ICD-10-CM

## 2019-06-07 PROCEDURE — 97112 NEUROMUSCULAR REEDUCATION: CPT | Performed by: PHYSICAL THERAPIST

## 2019-06-07 PROCEDURE — 97116 GAIT TRAINING THERAPY: CPT | Performed by: PHYSICAL THERAPIST

## 2019-06-07 PROCEDURE — 97110 THERAPEUTIC EXERCISES: CPT | Performed by: PHYSICAL THERAPIST

## 2019-06-12 ENCOUNTER — OFFICE VISIT (OUTPATIENT)
Dept: PHYSICAL THERAPY | Facility: CLINIC | Age: 79
End: 2019-06-12
Payer: MEDICARE

## 2019-06-12 DIAGNOSIS — G20 MAJOR NEUROCOGNITIVE DISORDER, DUE TO PARKINSON'S DISEASE, WITHOUT BEHAVIORAL DISTURBANCE, MILD (HCC): Primary | ICD-10-CM

## 2019-06-12 DIAGNOSIS — R26.2 AMBULATORY DYSFUNCTION: ICD-10-CM

## 2019-06-12 DIAGNOSIS — F02.80 MAJOR NEUROCOGNITIVE DISORDER, DUE TO PARKINSON'S DISEASE, WITHOUT BEHAVIORAL DISTURBANCE, MILD (HCC): Primary | ICD-10-CM

## 2019-06-12 PROCEDURE — 97112 NEUROMUSCULAR REEDUCATION: CPT

## 2019-06-12 PROCEDURE — 97530 THERAPEUTIC ACTIVITIES: CPT

## 2019-06-14 ENCOUNTER — OFFICE VISIT (OUTPATIENT)
Dept: PHYSICAL THERAPY | Facility: CLINIC | Age: 79
End: 2019-06-14
Payer: MEDICARE

## 2019-06-14 DIAGNOSIS — G20 MAJOR NEUROCOGNITIVE DISORDER, DUE TO PARKINSON'S DISEASE, WITHOUT BEHAVIORAL DISTURBANCE, MILD (HCC): Primary | ICD-10-CM

## 2019-06-14 DIAGNOSIS — R26.2 AMBULATORY DYSFUNCTION: ICD-10-CM

## 2019-06-14 DIAGNOSIS — F02.80 MAJOR NEUROCOGNITIVE DISORDER, DUE TO PARKINSON'S DISEASE, WITHOUT BEHAVIORAL DISTURBANCE, MILD (HCC): Primary | ICD-10-CM

## 2019-06-14 PROCEDURE — 97530 THERAPEUTIC ACTIVITIES: CPT

## 2019-06-14 PROCEDURE — 97112 NEUROMUSCULAR REEDUCATION: CPT

## 2019-06-14 PROCEDURE — 97110 THERAPEUTIC EXERCISES: CPT

## 2019-06-17 ENCOUNTER — OFFICE VISIT (OUTPATIENT)
Dept: PHYSICAL THERAPY | Facility: CLINIC | Age: 79
End: 2019-06-17
Payer: MEDICARE

## 2019-06-17 ENCOUNTER — TRANSCRIBE ORDERS (OUTPATIENT)
Dept: PHYSICAL THERAPY | Facility: CLINIC | Age: 79
End: 2019-06-17

## 2019-06-17 DIAGNOSIS — R26.2 AMBULATORY DYSFUNCTION: ICD-10-CM

## 2019-06-17 DIAGNOSIS — G20 MAJOR NEUROCOGNITIVE DISORDER, DUE TO PARKINSON'S DISEASE, WITHOUT BEHAVIORAL DISTURBANCE, MILD (HCC): Primary | ICD-10-CM

## 2019-06-17 DIAGNOSIS — F02.80 MAJOR NEUROCOGNITIVE DISORDER, DUE TO PARKINSON'S DISEASE, WITHOUT BEHAVIORAL DISTURBANCE, MILD (HCC): Primary | ICD-10-CM

## 2019-06-17 PROCEDURE — G8979 MOBILITY GOAL STATUS: HCPCS

## 2019-06-17 PROCEDURE — G8978 MOBILITY CURRENT STATUS: HCPCS

## 2019-06-17 PROCEDURE — 97164 PT RE-EVAL EST PLAN CARE: CPT

## 2019-06-21 ENCOUNTER — OFFICE VISIT (OUTPATIENT)
Dept: PHYSICAL THERAPY | Facility: CLINIC | Age: 79
End: 2019-06-21
Payer: MEDICARE

## 2019-06-21 ENCOUNTER — OFFICE VISIT (OUTPATIENT)
Dept: FAMILY MEDICINE CLINIC | Facility: CLINIC | Age: 79
End: 2019-06-21
Payer: MEDICARE

## 2019-06-21 VITALS
HEART RATE: 78 BPM | TEMPERATURE: 98 F | RESPIRATION RATE: 16 BRPM | SYSTOLIC BLOOD PRESSURE: 102 MMHG | DIASTOLIC BLOOD PRESSURE: 52 MMHG | OXYGEN SATURATION: 100 % | WEIGHT: 89.2 LBS | BODY MASS INDEX: 18.02 KG/M2

## 2019-06-21 DIAGNOSIS — F02.80 MAJOR NEUROCOGNITIVE DISORDER, DUE TO PARKINSON'S DISEASE, WITHOUT BEHAVIORAL DISTURBANCE, MILD (HCC): Primary | ICD-10-CM

## 2019-06-21 DIAGNOSIS — R63.4 WEIGHT LOSS, UNINTENTIONAL: ICD-10-CM

## 2019-06-21 DIAGNOSIS — G20 MAJOR NEUROCOGNITIVE DISORDER, DUE TO PARKINSON'S DISEASE, WITHOUT BEHAVIORAL DISTURBANCE, MILD (HCC): ICD-10-CM

## 2019-06-21 DIAGNOSIS — F02.80 MAJOR NEUROCOGNITIVE DISORDER, DUE TO PARKINSON'S DISEASE, WITHOUT BEHAVIORAL DISTURBANCE, MILD (HCC): ICD-10-CM

## 2019-06-21 DIAGNOSIS — E78.2 MIXED HYPERLIPIDEMIA: ICD-10-CM

## 2019-06-21 DIAGNOSIS — J45.909 UNCOMPLICATED ASTHMA, UNSPECIFIED ASTHMA SEVERITY, UNSPECIFIED WHETHER PERSISTENT: ICD-10-CM

## 2019-06-21 DIAGNOSIS — R26.2 AMBULATORY DYSFUNCTION: ICD-10-CM

## 2019-06-21 DIAGNOSIS — G20 PARKINSON'S DISEASE (HCC): ICD-10-CM

## 2019-06-21 DIAGNOSIS — E11.9 TYPE 2 DIABETES MELLITUS WITHOUT COMPLICATION, WITHOUT LONG-TERM CURRENT USE OF INSULIN (HCC): ICD-10-CM

## 2019-06-21 DIAGNOSIS — G20 MAJOR NEUROCOGNITIVE DISORDER, DUE TO PARKINSON'S DISEASE, WITHOUT BEHAVIORAL DISTURBANCE, MILD (HCC): Primary | ICD-10-CM

## 2019-06-21 DIAGNOSIS — D68.52 PROTHROMBIN G20210A MUTATION (HCC): ICD-10-CM

## 2019-06-21 DIAGNOSIS — I10 ESSENTIAL HYPERTENSION: Primary | ICD-10-CM

## 2019-06-21 DIAGNOSIS — G24.3 CERVICAL DYSTONIA: ICD-10-CM

## 2019-06-21 PROBLEM — R41.0 CONFUSION: Status: ACTIVE | Noted: 2019-05-17

## 2019-06-21 PROCEDURE — 97116 GAIT TRAINING THERAPY: CPT

## 2019-06-21 PROCEDURE — 99214 OFFICE O/P EST MOD 30 MIN: CPT | Performed by: FAMILY MEDICINE

## 2019-06-21 RX ORDER — AMANTADINE HYDROCHLORIDE 100 MG/1
100 TABLET ORAL 2 TIMES DAILY
COMMUNITY
Start: 2019-05-20 | End: 2019-10-21

## 2019-06-21 RX ORDER — AMLODIPINE BESYLATE 10 MG/1
5 TABLET ORAL DAILY
Qty: 45 TABLET | Refills: 3 | Status: SHIPPED | OUTPATIENT
Start: 2019-06-21 | End: 2019-08-19 | Stop reason: SDUPTHER

## 2019-06-21 RX ORDER — RASAGILINE 1 MG/1
1 TABLET ORAL EVERY MORNING
COMMUNITY
Start: 2019-02-12 | End: 2021-03-16 | Stop reason: SDUPTHER

## 2019-06-21 RX ORDER — ROPINIROLE 2 MG/1
6 TABLET, FILM COATED ORAL
COMMUNITY
Start: 2019-06-18 | End: 2019-07-18

## 2019-06-21 RX ORDER — CHLORAL HYDRATE 500 MG
1 CAPSULE ORAL 2 TIMES DAILY
COMMUNITY
End: 2019-06-21 | Stop reason: SDUPTHER

## 2019-06-26 ENCOUNTER — OFFICE VISIT (OUTPATIENT)
Dept: PHYSICAL THERAPY | Facility: CLINIC | Age: 79
End: 2019-06-26
Payer: MEDICARE

## 2019-06-26 DIAGNOSIS — F02.80 MAJOR NEUROCOGNITIVE DISORDER, DUE TO PARKINSON'S DISEASE, WITHOUT BEHAVIORAL DISTURBANCE, MILD (HCC): Primary | ICD-10-CM

## 2019-06-26 DIAGNOSIS — R26.2 AMBULATORY DYSFUNCTION: ICD-10-CM

## 2019-06-26 DIAGNOSIS — G20 MAJOR NEUROCOGNITIVE DISORDER, DUE TO PARKINSON'S DISEASE, WITHOUT BEHAVIORAL DISTURBANCE, MILD (HCC): Primary | ICD-10-CM

## 2019-06-26 PROCEDURE — 97116 GAIT TRAINING THERAPY: CPT

## 2019-06-28 ENCOUNTER — OFFICE VISIT (OUTPATIENT)
Dept: PHYSICAL THERAPY | Facility: CLINIC | Age: 79
End: 2019-06-28
Payer: MEDICARE

## 2019-06-28 DIAGNOSIS — G20 MAJOR NEUROCOGNITIVE DISORDER, DUE TO PARKINSON'S DISEASE, WITHOUT BEHAVIORAL DISTURBANCE, MILD (HCC): Primary | ICD-10-CM

## 2019-06-28 DIAGNOSIS — R26.2 AMBULATORY DYSFUNCTION: ICD-10-CM

## 2019-06-28 DIAGNOSIS — F02.80 MAJOR NEUROCOGNITIVE DISORDER, DUE TO PARKINSON'S DISEASE, WITHOUT BEHAVIORAL DISTURBANCE, MILD (HCC): Primary | ICD-10-CM

## 2019-06-28 PROCEDURE — 97116 GAIT TRAINING THERAPY: CPT

## 2019-06-28 PROCEDURE — 97530 THERAPEUTIC ACTIVITIES: CPT

## 2019-07-03 ENCOUNTER — APPOINTMENT (OUTPATIENT)
Dept: PHYSICAL THERAPY | Facility: CLINIC | Age: 79
End: 2019-07-03
Payer: MEDICARE

## 2019-07-05 ENCOUNTER — OFFICE VISIT (OUTPATIENT)
Dept: PHYSICAL THERAPY | Facility: CLINIC | Age: 79
End: 2019-07-05
Payer: MEDICARE

## 2019-07-05 DIAGNOSIS — F02.80 MAJOR NEUROCOGNITIVE DISORDER, DUE TO PARKINSON'S DISEASE, WITHOUT BEHAVIORAL DISTURBANCE, MILD (HCC): Primary | ICD-10-CM

## 2019-07-05 DIAGNOSIS — R26.2 AMBULATORY DYSFUNCTION: ICD-10-CM

## 2019-07-05 DIAGNOSIS — G20 MAJOR NEUROCOGNITIVE DISORDER, DUE TO PARKINSON'S DISEASE, WITHOUT BEHAVIORAL DISTURBANCE, MILD (HCC): Primary | ICD-10-CM

## 2019-07-05 PROCEDURE — 97110 THERAPEUTIC EXERCISES: CPT | Performed by: PHYSICAL THERAPIST

## 2019-07-05 PROCEDURE — 97530 THERAPEUTIC ACTIVITIES: CPT | Performed by: PHYSICAL THERAPIST

## 2019-07-05 NOTE — PROGRESS NOTES
Daily Note    Today's date: 2019  Patient name: Alona Vela  : 1940  MRN: [id number]  Referring provider: Quincy Jones MD  Dx:   Encounter Diagnosis     ICD-10-CM    1  Major neurocognitive disorder, due to parkinson's disease, without behavioral disturbance, mild ([facility] [address] ) G20     F02 80    2  Ambulatory dysfunction R26 2                    Subjective: Patient reports she is falling a lot  Fell twice this morning  Objective: See treatment diary below    Ambulation Activities and TAs  2# wrist weights, 3# ankle weights:  -Stepping over hurdles- 6" hurdles, forward and laterally, 10 cycles with SPC and without SPC   -Step Ups- Forward, Laterally- 4" with SPC, 20 reps, 20 reps forward and laterally without SPC  -Sit to stand- Step forward, step back, and sit- 20 reps   -Sit to stand without UE today- 20 reps airex pad on seat  -Floor to ceiling stretch 10 reps, 10 sec hold      Assessment:   Patient tolerated session fair today  Reports increase in falls over past week  Increased LOB and instability noted today , used gait belt for entire session for safety  Posterior LOB w/ STS as well as fwd step ups  She did well w/ gena negotiation fwd  Encouraged pt to drink enouh water to stay hydrated and if she continues to notice a decline to call her doctor  Advised pt (and pt  present) to use SPC or RW at all times to maximize safety and reduce fall risk  Patient requires skilled PT in order to improve her balance, decrease fall risk, and maximize function  Plan: Continue per plan of care  Progress treatment as tolerated

## 2019-07-10 ENCOUNTER — OFFICE VISIT (OUTPATIENT)
Dept: PHYSICAL THERAPY | Facility: CLINIC | Age: 79
End: 2019-07-10
Payer: MEDICARE

## 2019-07-10 DIAGNOSIS — R26.2 AMBULATORY DYSFUNCTION: ICD-10-CM

## 2019-07-10 DIAGNOSIS — G20 MAJOR NEUROCOGNITIVE DISORDER, DUE TO PARKINSON'S DISEASE, WITHOUT BEHAVIORAL DISTURBANCE, MILD (HCC): Primary | ICD-10-CM

## 2019-07-10 DIAGNOSIS — F02.80 MAJOR NEUROCOGNITIVE DISORDER, DUE TO PARKINSON'S DISEASE, WITHOUT BEHAVIORAL DISTURBANCE, MILD (HCC): Primary | ICD-10-CM

## 2019-07-10 PROCEDURE — 97112 NEUROMUSCULAR REEDUCATION: CPT

## 2019-07-10 NOTE — PROGRESS NOTES
Daily Note    Today's date: 7/10/2019  Patient name: Luli uHrtado  : 1940  MRN: [id number]  Referring provider: Ace Gregg MD  Dx:   Encounter Diagnosis     ICD-10-CM    1  Major neurocognitive disorder, due to parkinson's disease, without behavioral disturbance, mild ([facility] [address] ) G20     F02 80    2  Ambulatory dysfunction R26 2        Start Time: 1015  Stop Time: 1100  Total time in clinic (min): 45 minutes     Subjective: Patient reports that she fell this morning, states that she is feeling under the weather today  Notes challenges with her movement today  Objective: See treatment diary below  -Alicia negotiation Forward, laterally, backwards- 6" hurdles, 2 UE support, 10 cycles in parallel bars  -Stepping up onto foam pad- forward, laterally, backwards- 25 reps each direction  -Step ups- forward, laterally, backwards- 8" step, 20 reps, 2 UE support   -Hip Flexion, Hip Extension, Hip Abduction- 20 reps, 3 second holds  -Wobble board- forward to backward, medial to lateral- 20 reps each direction 2 UE support    NP TODAY DUE TO SICKNESS  Ambulation Activities and TAs  2# wrist weights, 3# ankle weights:  -Stepping over hurdles- 6" hurdles, forward and laterally, 10 cycles with SPC and without SPC   -Step Ups- Forward, Laterally- 4" with SPC, 20 reps, 20 reps forward and laterally without SPC  -Sit to stand- Step forward, step back, and sit- 20 reps   -Sit to stand without UE today- 20 reps airex pad on seat  -Floor to ceiling stretch 10 reps, 10 sec hold      Assessment:   Patient tolerated session fair today, patient was challenged with weight shifting anterior to posterior, patient challenged with her endurance today as well which may be attributed due to her feeling under the weather  Patient notes challenges with her dyskinetic and choreic movements today, which may be attributing to her increases in falls   Patient requires skilled PT in order to improve her balance, decrease fall risk, and maximize function  Plan: Continue per plan of care  Progress treatment as tolerated

## 2019-07-12 ENCOUNTER — OFFICE VISIT (OUTPATIENT)
Dept: PHYSICAL THERAPY | Facility: CLINIC | Age: 79
End: 2019-07-12
Payer: MEDICARE

## 2019-07-12 DIAGNOSIS — G20 MAJOR NEUROCOGNITIVE DISORDER, DUE TO PARKINSON'S DISEASE, WITHOUT BEHAVIORAL DISTURBANCE, MILD (HCC): Primary | ICD-10-CM

## 2019-07-12 DIAGNOSIS — F02.80 MAJOR NEUROCOGNITIVE DISORDER, DUE TO PARKINSON'S DISEASE, WITHOUT BEHAVIORAL DISTURBANCE, MILD (HCC): Primary | ICD-10-CM

## 2019-07-12 DIAGNOSIS — R26.2 AMBULATORY DYSFUNCTION: ICD-10-CM

## 2019-07-12 PROCEDURE — 97112 NEUROMUSCULAR REEDUCATION: CPT

## 2019-07-17 ENCOUNTER — APPOINTMENT (OUTPATIENT)
Dept: PHYSICAL THERAPY | Facility: CLINIC | Age: 79
End: 2019-07-17
Payer: MEDICARE

## 2019-07-18 ENCOUNTER — HOSPITAL ENCOUNTER (INPATIENT)
Facility: HOSPITAL | Age: 79
LOS: 5 days | Discharge: HOME HEALTH | DRG: 329 | End: 2019-07-23
Attending: EMERGENCY MEDICINE | Admitting: SURGERY
Payer: MEDICARE

## 2019-07-18 ENCOUNTER — TELEPHONE (OUTPATIENT)
Dept: FAMILY MEDICINE CLINIC | Facility: CLINIC | Age: 79
End: 2019-07-18

## 2019-07-18 ENCOUNTER — TRANSCRIBE ORDERS (OUTPATIENT)
Dept: ADMINISTRATIVE | Facility: HOSPITAL | Age: 79
End: 2019-07-18

## 2019-07-18 ENCOUNTER — ANESTHESIA EVENT (INPATIENT)
Dept: PERIOP | Facility: HOSPITAL | Age: 79
DRG: 329 | End: 2019-07-18
Payer: MEDICARE

## 2019-07-18 ENCOUNTER — HOSPITAL ENCOUNTER (OUTPATIENT)
Dept: RADIOLOGY | Facility: HOSPITAL | Age: 79
Discharge: HOME | DRG: 329 | End: 2019-07-18
Payer: MEDICARE

## 2019-07-18 ENCOUNTER — ANESTHESIA (INPATIENT)
Dept: PERIOP | Facility: HOSPITAL | Age: 79
DRG: 329 | End: 2019-07-18
Payer: MEDICARE

## 2019-07-18 ENCOUNTER — APPOINTMENT (INPATIENT)
Dept: RADIOLOGY | Facility: HOSPITAL | Age: 79
DRG: 329 | End: 2019-07-18
Payer: MEDICARE

## 2019-07-18 ENCOUNTER — APPOINTMENT (OUTPATIENT)
Dept: LAB | Facility: HOSPITAL | Age: 79
DRG: 329 | End: 2019-07-18
Payer: MEDICARE

## 2019-07-18 ENCOUNTER — OFFICE VISIT (OUTPATIENT)
Dept: FAMILY MEDICINE CLINIC | Facility: CLINIC | Age: 79
End: 2019-07-18
Payer: MEDICARE

## 2019-07-18 VITALS
SYSTOLIC BLOOD PRESSURE: 92 MMHG | WEIGHT: 87 LBS | DIASTOLIC BLOOD PRESSURE: 40 MMHG | BODY MASS INDEX: 17.57 KG/M2 | TEMPERATURE: 97.5 F | RESPIRATION RATE: 18 BRPM | HEART RATE: 72 BPM | OXYGEN SATURATION: 98 %

## 2019-07-18 DIAGNOSIS — R10.33 PERIUMBILICAL ABDOMINAL PAIN: Primary | ICD-10-CM

## 2019-07-18 DIAGNOSIS — R19.8 ABDOMINAL GUARDING: ICD-10-CM

## 2019-07-18 DIAGNOSIS — K66.8 FREE INTRAPERITONEAL AIR: Primary | ICD-10-CM

## 2019-07-18 DIAGNOSIS — Z90.49 HISTORY OF CHOLECYSTECTOMY: ICD-10-CM

## 2019-07-18 DIAGNOSIS — R41.0 CONFUSION: ICD-10-CM

## 2019-07-18 DIAGNOSIS — Z90.722 HISTORY OF BILATERAL OOPHORECTOMY: ICD-10-CM

## 2019-07-18 DIAGNOSIS — G20 DEMENTIA DUE TO PARKINSON'S DISEASE WITH BEHAVIORAL DISTURBANCE (HCC): ICD-10-CM

## 2019-07-18 DIAGNOSIS — R10.33 PERIUMBILICAL ABDOMINAL PAIN: ICD-10-CM

## 2019-07-18 DIAGNOSIS — Z90.49 HISTORY OF APPENDECTOMY: ICD-10-CM

## 2019-07-18 DIAGNOSIS — K26.9 DUODENAL ULCER: ICD-10-CM

## 2019-07-18 DIAGNOSIS — Z87.19 HISTORY OF VENTRAL HERNIA: ICD-10-CM

## 2019-07-18 DIAGNOSIS — F02.81 DEMENTIA DUE TO PARKINSON'S DISEASE WITH BEHAVIORAL DISTURBANCE (HCC): ICD-10-CM

## 2019-07-18 DIAGNOSIS — R10.84 GENERALIZED ABDOMINAL PAIN: ICD-10-CM

## 2019-07-18 DIAGNOSIS — G20 PARKINSON'S DISEASE (HCC): ICD-10-CM

## 2019-07-18 DIAGNOSIS — R26.2 AMBULATORY DYSFUNCTION: ICD-10-CM

## 2019-07-18 LAB
ALBUMIN SERPL BCP-MCNC: 3.9 G/DL (ref 3.5–5)
ALBUMIN SERPL BCP-MCNC: 4.2 G/DL (ref 3.5–5)
ALP SERPL-CCNC: 71 U/L (ref 46–116)
ALP SERPL-CCNC: 79 U/L (ref 46–116)
ALT SERPL W P-5'-P-CCNC: 10 U/L (ref 12–78)
ALT SERPL W P-5'-P-CCNC: 9 U/L (ref 12–78)
ANION GAP SERPL CALCULATED.3IONS-SCNC: 10 MMOL/L (ref 4–13)
ANION GAP SERPL CALCULATED.3IONS-SCNC: 8 MMOL/L (ref 4–13)
APTT PPP: 25 SECONDS (ref 23–37)
AST SERPL W P-5'-P-CCNC: 16 U/L (ref 5–45)
AST SERPL W P-5'-P-CCNC: 19 U/L (ref 5–45)
ATRIAL RATE: 76 BPM
ATRIAL RATE: 79 BPM
BACTERIA UR QL AUTO: ABNORMAL /HPF
BASOPHILS # BLD MANUAL: 0 THOUSAND/UL (ref 0–0.1)
BASOPHILS # BLD MANUAL: 0 THOUSAND/UL (ref 0–0.1)
BASOPHILS NFR MAR MANUAL: 0 % (ref 0–1)
BASOPHILS NFR MAR MANUAL: 0 % (ref 0–1)
BILIRUB SERPL-MCNC: 1.1 MG/DL (ref 0.2–1)
BILIRUB SERPL-MCNC: 1.2 MG/DL (ref 0.2–1)
BILIRUB UR QL STRIP: NEGATIVE
BUN SERPL-MCNC: 23 MG/DL (ref 5–25)
BUN SERPL-MCNC: 24 MG/DL (ref 5–25)
CALCIUM SERPL-MCNC: 9.1 MG/DL (ref 8.3–10.1)
CALCIUM SERPL-MCNC: 9.4 MG/DL (ref 8.3–10.1)
CHLORIDE SERPL-SCNC: 97 MMOL/L (ref 100–108)
CHLORIDE SERPL-SCNC: 99 MMOL/L (ref 100–108)
CLARITY UR: ABNORMAL
CO2 SERPL-SCNC: 27 MMOL/L (ref 21–32)
CO2 SERPL-SCNC: 28 MMOL/L (ref 21–32)
COLOR UR: ABNORMAL
CREAT SERPL-MCNC: 1.56 MG/DL (ref 0.6–1.3)
CREAT SERPL-MCNC: 1.58 MG/DL (ref 0.6–1.3)
EOSINOPHIL # BLD MANUAL: 0.17 THOUSAND/UL (ref 0–0.4)
EOSINOPHIL # BLD MANUAL: 0.22 THOUSAND/UL (ref 0–0.4)
EOSINOPHIL NFR BLD MANUAL: 1 % (ref 0–6)
EOSINOPHIL NFR BLD MANUAL: 2 % (ref 0–6)
ERYTHROCYTE [DISTWIDTH] IN BLOOD BY AUTOMATED COUNT: 13.1 % (ref 11.6–15.1)
ERYTHROCYTE [DISTWIDTH] IN BLOOD BY AUTOMATED COUNT: 13.2 % (ref 11.6–15.1)
ERYTHROCYTE [SEDIMENTATION RATE] IN BLOOD: 16 MM/HOUR (ref 2–25)
GFR SERPL CREATININE-BSD FRML MDRD: 31 ML/MIN/1.73SQ M
GFR SERPL CREATININE-BSD FRML MDRD: 32 ML/MIN/1.73SQ M
GLUCOSE P FAST SERPL-MCNC: 123 MG/DL (ref 65–99)
GLUCOSE SERPL-MCNC: 146 MG/DL (ref 65–140)
GLUCOSE SERPL-MCNC: 170 MG/DL (ref 65–140)
GLUCOSE SERPL-MCNC: 182 MG/DL (ref 65–140)
GLUCOSE UR STRIP-MCNC: NEGATIVE MG/DL
HCT VFR BLD AUTO: 34.8 % (ref 34.8–46.1)
HCT VFR BLD AUTO: 38.3 % (ref 34.8–46.1)
HGB BLD-MCNC: 10.5 G/DL (ref 11.5–15.4)
HGB BLD-MCNC: 11.9 G/DL (ref 11.5–15.4)
HGB UR QL STRIP.AUTO: ABNORMAL
INR PPP: 1.05 (ref 0.86–1.16)
KETONES UR STRIP-MCNC: ABNORMAL MG/DL
LACTATE SERPL-SCNC: 1.2 MMOL/L (ref 0.5–2)
LEUKOCYTE ESTERASE UR QL STRIP: ABNORMAL
LYMPHOCYTES # BLD AUTO: 0.22 THOUSAND/UL (ref 0.6–4.47)
LYMPHOCYTES # BLD AUTO: 0.5 THOUSAND/UL (ref 0.6–4.47)
LYMPHOCYTES # BLD AUTO: 2 % (ref 14–44)
LYMPHOCYTES # BLD AUTO: 3 % (ref 14–44)
MCH RBC QN AUTO: 29.3 PG (ref 26.8–34.3)
MCH RBC QN AUTO: 29.5 PG (ref 26.8–34.3)
MCHC RBC AUTO-ENTMCNC: 30.2 G/DL (ref 31.4–37.4)
MCHC RBC AUTO-ENTMCNC: 31.1 G/DL (ref 31.4–37.4)
MCV RBC AUTO: 95 FL (ref 82–98)
MCV RBC AUTO: 97 FL (ref 82–98)
METAMYELOCYTES NFR BLD MANUAL: 1 % (ref 0–1)
MONOCYTES # BLD AUTO: 0 THOUSAND/UL (ref 0–1.22)
MONOCYTES # BLD AUTO: 0.17 THOUSAND/UL (ref 0–1.22)
MONOCYTES NFR BLD: 0 % (ref 4–12)
MONOCYTES NFR BLD: 1 % (ref 4–12)
NEUTROPHILS # BLD MANUAL: 10.57 THOUSAND/UL (ref 1.85–7.62)
NEUTROPHILS # BLD MANUAL: 15.75 THOUSAND/UL (ref 1.85–7.62)
NEUTS BAND NFR BLD MANUAL: 18 % (ref 0–8)
NEUTS BAND NFR BLD MANUAL: 22 % (ref 0–8)
NEUTS SEG NFR BLD AUTO: 74 % (ref 43–75)
NEUTS SEG NFR BLD AUTO: 76 % (ref 43–75)
NITRITE UR QL STRIP: NEGATIVE
NON-SQ EPI CELLS URNS QL MICRO: ABNORMAL /HPF
NRBC BLD AUTO-RTO: 0 /100 WBCS
NRBC BLD AUTO-RTO: 0 /100 WBCS
P AXIS: 17 DEGREES
PH UR STRIP.AUTO: 5.5 [PH]
PLATELET # BLD AUTO: 310 THOUSANDS/UL (ref 149–390)
PLATELET # BLD AUTO: 313 THOUSANDS/UL (ref 149–390)
PLATELET BLD QL SMEAR: ADEQUATE
PLATELET BLD QL SMEAR: ADEQUATE
PLATELET CLUMP BLD QL SMEAR: PRESENT
PMV BLD AUTO: 10.3 FL (ref 8.9–12.7)
PMV BLD AUTO: 9.9 FL (ref 8.9–12.7)
POTASSIUM SERPL-SCNC: 3.8 MMOL/L (ref 3.5–5.3)
POTASSIUM SERPL-SCNC: 3.9 MMOL/L (ref 3.5–5.3)
PR INTERVAL: 176 MS
PR INTERVAL: 208 MS
PROT SERPL-MCNC: 7.1 G/DL (ref 6.4–8.2)
PROT SERPL-MCNC: 7.8 G/DL (ref 6.4–8.2)
PROT UR STRIP-MCNC: ABNORMAL MG/DL
PROTHROMBIN TIME: 11 SECONDS (ref 9.4–11.7)
QRS AXIS: -18 DEGREES
QRS AXIS: -18 DEGREES
QRSD INTERVAL: 70 MS
QRSD INTERVAL: 74 MS
QT INTERVAL: 412 MS
QT INTERVAL: 426 MS
QTC INTERVAL: 463 MS
QTC INTERVAL: 488 MS
RBC # BLD AUTO: 3.58 MILLION/UL (ref 3.81–5.12)
RBC # BLD AUTO: 4.04 MILLION/UL (ref 3.81–5.12)
RBC #/AREA URNS AUTO: ABNORMAL /HPF
RBC MORPH BLD: NORMAL
RBC MORPH BLD: NORMAL
SODIUM SERPL-SCNC: 134 MMOL/L (ref 136–145)
SODIUM SERPL-SCNC: 135 MMOL/L (ref 136–145)
SP GR UR STRIP.AUTO: 1.02 (ref 1–1.03)
T WAVE AXIS: 17 DEGREES
T WAVE AXIS: 57 DEGREES
TOTAL CELLS COUNTED SPEC: 100
TOTAL CELLS COUNTED SPEC: 100
TOXIC GRANULES BLD QL SMEAR: PRESENT
TROPONIN I SERPL-MCNC: <0.02 NG/ML
UROBILINOGEN UR QL STRIP.AUTO: 0.2 E.U./DL
VENTRICULAR RATE: 76 BPM
VENTRICULAR RATE: 79 BPM
WBC # BLD AUTO: 11.01 THOUSAND/UL (ref 4.31–10.16)
WBC # BLD AUTO: 16.76 THOUSAND/UL (ref 4.31–10.16)
WBC #/AREA URNS AUTO: ABNORMAL /HPF

## 2019-07-18 PROCEDURE — 87086 URINE CULTURE/COLONY COUNT: CPT | Performed by: STUDENT IN AN ORGANIZED HEALTH CARE EDUCATION/TRAINING PROGRAM

## 2019-07-18 PROCEDURE — 99285 EMERGENCY DEPT VISIT HI MDM: CPT

## 2019-07-18 PROCEDURE — 85007 BL SMEAR W/DIFF WBC COUNT: CPT | Performed by: EMERGENCY MEDICINE

## 2019-07-18 PROCEDURE — 80053 COMPREHEN METABOLIC PANEL: CPT | Performed by: EMERGENCY MEDICINE

## 2019-07-18 PROCEDURE — 82948 REAGENT STRIP/BLOOD GLUCOSE: CPT

## 2019-07-18 PROCEDURE — 99222 1ST HOSP IP/OBS MODERATE 55: CPT | Performed by: SURGERY

## 2019-07-18 PROCEDURE — 49905 OMENTAL FLAP INTRA-ABDOM: CPT | Performed by: SURGERY

## 2019-07-18 PROCEDURE — 85027 COMPLETE CBC AUTOMATED: CPT | Performed by: EMERGENCY MEDICINE

## 2019-07-18 PROCEDURE — 80053 COMPREHEN METABOLIC PANEL: CPT | Performed by: STUDENT IN AN ORGANIZED HEALTH CARE EDUCATION/TRAINING PROGRAM

## 2019-07-18 PROCEDURE — 85730 THROMBOPLASTIN TIME PARTIAL: CPT | Performed by: EMERGENCY MEDICINE

## 2019-07-18 PROCEDURE — 87186 SC STD MICRODIL/AGAR DIL: CPT | Performed by: STUDENT IN AN ORGANIZED HEALTH CARE EDUCATION/TRAINING PROGRAM

## 2019-07-18 PROCEDURE — 81001 URINALYSIS AUTO W/SCOPE: CPT | Performed by: STUDENT IN AN ORGANIZED HEALTH CARE EDUCATION/TRAINING PROGRAM

## 2019-07-18 PROCEDURE — 85027 COMPLETE CBC AUTOMATED: CPT

## 2019-07-18 PROCEDURE — 93005 ELECTROCARDIOGRAM TRACING: CPT

## 2019-07-18 PROCEDURE — 85652 RBC SED RATE AUTOMATED: CPT

## 2019-07-18 PROCEDURE — 71045 X-RAY EXAM CHEST 1 VIEW: CPT

## 2019-07-18 PROCEDURE — 87077 CULTURE AEROBIC IDENTIFY: CPT | Performed by: STUDENT IN AN ORGANIZED HEALTH CARE EDUCATION/TRAINING PROGRAM

## 2019-07-18 PROCEDURE — 85007 BL SMEAR W/DIFF WBC COUNT: CPT

## 2019-07-18 PROCEDURE — 36415 COLL VENOUS BLD VENIPUNCTURE: CPT

## 2019-07-18 PROCEDURE — 87186 SC STD MICRODIL/AGAR DIL: CPT | Performed by: SURGERY

## 2019-07-18 PROCEDURE — 43840 GSTRRPHY SUTR DUOL/GSTR ULCR: CPT | Performed by: PHYSICIAN ASSISTANT

## 2019-07-18 PROCEDURE — 49905 OMENTAL FLAP INTRA-ABDOM: CPT | Performed by: PHYSICIAN ASSISTANT

## 2019-07-18 PROCEDURE — 43840 GSTRRPHY SUTR DUOL/GSTR ULCR: CPT | Performed by: SURGERY

## 2019-07-18 PROCEDURE — 96365 THER/PROPH/DIAG IV INF INIT: CPT

## 2019-07-18 PROCEDURE — 74176 CT ABD & PELVIS W/O CONTRAST: CPT

## 2019-07-18 PROCEDURE — 93010 ELECTROCARDIOGRAM REPORT: CPT | Performed by: INTERNAL MEDICINE

## 2019-07-18 PROCEDURE — 85610 PROTHROMBIN TIME: CPT | Performed by: EMERGENCY MEDICINE

## 2019-07-18 PROCEDURE — 87075 CULTR BACTERIA EXCEPT BLOOD: CPT | Performed by: SURGERY

## 2019-07-18 PROCEDURE — 84484 ASSAY OF TROPONIN QUANT: CPT | Performed by: EMERGENCY MEDICINE

## 2019-07-18 PROCEDURE — 87070 CULTURE OTHR SPECIMN AEROBIC: CPT | Performed by: SURGERY

## 2019-07-18 PROCEDURE — 0DU907Z SUPPLEMENT DUODENUM WITH AUTOLOGOUS TISSUE SUBSTITUTE, OPEN APPROACH: ICD-10-PCS | Performed by: SURGERY

## 2019-07-18 PROCEDURE — 87205 SMEAR GRAM STAIN: CPT | Performed by: SURGERY

## 2019-07-18 PROCEDURE — 83605 ASSAY OF LACTIC ACID: CPT | Performed by: EMERGENCY MEDICINE

## 2019-07-18 PROCEDURE — 96375 TX/PRO/DX INJ NEW DRUG ADDON: CPT

## 2019-07-18 PROCEDURE — 99214 OFFICE O/P EST MOD 30 MIN: CPT | Performed by: FAMILY MEDICINE

## 2019-07-18 RX ORDER — FENTANYL CITRATE 50 UG/ML
INJECTION, SOLUTION INTRAMUSCULAR; INTRAVENOUS AS NEEDED
Status: DISCONTINUED | OUTPATIENT
Start: 2019-07-18 | End: 2019-07-18 | Stop reason: SURG

## 2019-07-18 RX ORDER — OXYCODONE HYDROCHLORIDE 5 MG/1
5 TABLET ORAL EVERY 4 HOURS PRN
Status: DISCONTINUED | OUTPATIENT
Start: 2019-07-18 | End: 2019-07-23 | Stop reason: HOSPADM

## 2019-07-18 RX ORDER — ROPINIROLE 2 MG/1
6 TABLET, FILM COATED ORAL
COMMUNITY
Start: 2019-07-15 | End: 2021-01-06 | Stop reason: ALTCHOICE

## 2019-07-18 RX ORDER — MAGNESIUM HYDROXIDE/ALUMINUM HYDROXICE/SIMETHICONE 120; 1200; 1200 MG/30ML; MG/30ML; MG/30ML
30 SUSPENSION ORAL EVERY 6 HOURS PRN
Status: DISCONTINUED | OUTPATIENT
Start: 2019-07-18 | End: 2019-07-21

## 2019-07-18 RX ORDER — SODIUM CHLORIDE, SODIUM LACTATE, POTASSIUM CHLORIDE, CALCIUM CHLORIDE 600; 310; 30; 20 MG/100ML; MG/100ML; MG/100ML; MG/100ML
75 INJECTION, SOLUTION INTRAVENOUS CONTINUOUS
Status: DISCONTINUED | OUTPATIENT
Start: 2019-07-18 | End: 2019-07-20

## 2019-07-18 RX ORDER — ONDANSETRON 2 MG/ML
4 INJECTION INTRAMUSCULAR; INTRAVENOUS EVERY 6 HOURS PRN
Status: DISCONTINUED | OUTPATIENT
Start: 2019-07-18 | End: 2019-07-23 | Stop reason: HOSPADM

## 2019-07-18 RX ORDER — SODIUM CHLORIDE 9 MG/ML
INJECTION, SOLUTION INTRAVENOUS CONTINUOUS PRN
Status: DISCONTINUED | OUTPATIENT
Start: 2019-07-18 | End: 2019-07-18 | Stop reason: SURG

## 2019-07-18 RX ORDER — DEXAMETHASONE SODIUM PHOSPHATE 10 MG/ML
INJECTION, SOLUTION INTRAMUSCULAR; INTRAVENOUS AS NEEDED
Status: DISCONTINUED | OUTPATIENT
Start: 2019-07-18 | End: 2019-07-18 | Stop reason: SURG

## 2019-07-18 RX ORDER — OXYCODONE HYDROCHLORIDE 5 MG/1
2.5 TABLET ORAL EVERY 4 HOURS PRN
Status: DISCONTINUED | OUTPATIENT
Start: 2019-07-18 | End: 2019-07-23 | Stop reason: HOSPADM

## 2019-07-18 RX ORDER — FENTANYL CITRATE/PF 50 MCG/ML
50 SYRINGE (ML) INJECTION
Status: DISCONTINUED | OUTPATIENT
Start: 2019-07-18 | End: 2019-07-18 | Stop reason: HOSPADM

## 2019-07-18 RX ORDER — RASAGILINE 1 MG/1
1 TABLET ORAL DAILY
Status: DISCONTINUED | OUTPATIENT
Start: 2019-07-19 | End: 2019-07-18

## 2019-07-18 RX ORDER — ROPINIROLE 1 MG/1
2 TABLET, FILM COATED ORAL 3 TIMES DAILY
Status: DISCONTINUED | OUTPATIENT
Start: 2019-07-18 | End: 2019-07-19

## 2019-07-18 RX ORDER — PROMETHAZINE HYDROCHLORIDE 25 MG/ML
12.5 INJECTION, SOLUTION INTRAMUSCULAR; INTRAVENOUS ONCE AS NEEDED
Status: DISCONTINUED | OUTPATIENT
Start: 2019-07-18 | End: 2019-07-18 | Stop reason: HOSPADM

## 2019-07-18 RX ORDER — ONDANSETRON 2 MG/ML
4 INJECTION INTRAMUSCULAR; INTRAVENOUS ONCE AS NEEDED
Status: COMPLETED | OUTPATIENT
Start: 2019-07-18 | End: 2019-07-18

## 2019-07-18 RX ORDER — ROCURONIUM BROMIDE 10 MG/ML
INJECTION, SOLUTION INTRAVENOUS AS NEEDED
Status: DISCONTINUED | OUTPATIENT
Start: 2019-07-18 | End: 2019-07-18 | Stop reason: SURG

## 2019-07-18 RX ORDER — HYDROMORPHONE HCL/PF 1 MG/ML
0.5 SYRINGE (ML) INJECTION
Status: DISCONTINUED | OUTPATIENT
Start: 2019-07-18 | End: 2019-07-18 | Stop reason: HOSPADM

## 2019-07-18 RX ORDER — MEPERIDINE HYDROCHLORIDE 25 MG/ML
12.5 INJECTION INTRAMUSCULAR; INTRAVENOUS; SUBCUTANEOUS
Status: DISCONTINUED | OUTPATIENT
Start: 2019-07-18 | End: 2019-07-18 | Stop reason: HOSPADM

## 2019-07-18 RX ORDER — HEPARIN SODIUM 5000 [USP'U]/ML
5000 INJECTION, SOLUTION INTRAVENOUS; SUBCUTANEOUS EVERY 8 HOURS SCHEDULED
Status: DISCONTINUED | OUTPATIENT
Start: 2019-07-18 | End: 2019-07-23 | Stop reason: HOSPADM

## 2019-07-18 RX ORDER — BUPIVACAINE HYDROCHLORIDE AND EPINEPHRINE 5; 5 MG/ML; UG/ML
INJECTION, SOLUTION PERINEURAL AS NEEDED
Status: DISCONTINUED | OUTPATIENT
Start: 2019-07-18 | End: 2019-07-18 | Stop reason: HOSPADM

## 2019-07-18 RX ORDER — ONDANSETRON 2 MG/ML
INJECTION INTRAMUSCULAR; INTRAVENOUS AS NEEDED
Status: DISCONTINUED | OUTPATIENT
Start: 2019-07-18 | End: 2019-07-18 | Stop reason: SURG

## 2019-07-18 RX ORDER — SUCCINYLCHOLINE/SOD CL,ISO/PF 100 MG/5ML
SYRINGE (ML) INTRAVENOUS AS NEEDED
Status: DISCONTINUED | OUTPATIENT
Start: 2019-07-18 | End: 2019-07-18 | Stop reason: SURG

## 2019-07-18 RX ORDER — MIRTAZAPINE 15 MG/1
30 TABLET, FILM COATED ORAL
Status: DISCONTINUED | OUTPATIENT
Start: 2019-07-18 | End: 2019-07-23 | Stop reason: HOSPADM

## 2019-07-18 RX ORDER — ONDANSETRON 2 MG/ML
4 INJECTION INTRAMUSCULAR; INTRAVENOUS ONCE
Status: COMPLETED | OUTPATIENT
Start: 2019-07-18 | End: 2019-07-18

## 2019-07-18 RX ORDER — AMLODIPINE BESYLATE 5 MG/1
5 TABLET ORAL DAILY
Status: DISCONTINUED | OUTPATIENT
Start: 2019-07-19 | End: 2019-07-23 | Stop reason: HOSPADM

## 2019-07-18 RX ORDER — ASPIRIN 81 MG/1
81 TABLET ORAL DAILY
Status: DISCONTINUED | OUTPATIENT
Start: 2019-07-19 | End: 2019-07-23 | Stop reason: HOSPADM

## 2019-07-18 RX ORDER — LIDOCAINE HYDROCHLORIDE 10 MG/ML
INJECTION, SOLUTION INFILTRATION; PERINEURAL AS NEEDED
Status: DISCONTINUED | OUTPATIENT
Start: 2019-07-18 | End: 2019-07-18 | Stop reason: SURG

## 2019-07-18 RX ORDER — MAGNESIUM HYDROXIDE 1200 MG/15ML
LIQUID ORAL AS NEEDED
Status: DISCONTINUED | OUTPATIENT
Start: 2019-07-18 | End: 2019-07-18 | Stop reason: HOSPADM

## 2019-07-18 RX ORDER — PANTOPRAZOLE SODIUM 40 MG/1
40 INJECTION, POWDER, FOR SOLUTION INTRAVENOUS
Status: DISCONTINUED | OUTPATIENT
Start: 2019-07-19 | End: 2019-07-20

## 2019-07-18 RX ORDER — PROPOFOL 10 MG/ML
INJECTION, EMULSION INTRAVENOUS AS NEEDED
Status: DISCONTINUED | OUTPATIENT
Start: 2019-07-18 | End: 2019-07-18 | Stop reason: SURG

## 2019-07-18 RX ORDER — ACETAMINOPHEN 325 MG/1
650 TABLET ORAL EVERY 6 HOURS SCHEDULED
Status: DISCONTINUED | OUTPATIENT
Start: 2019-07-18 | End: 2019-07-23 | Stop reason: HOSPADM

## 2019-07-18 RX ADMIN — ONDANSETRON 4 MG: 2 INJECTION INTRAMUSCULAR; INTRAVENOUS at 19:29

## 2019-07-18 RX ADMIN — ONDANSETRON 4 MG: 2 INJECTION INTRAMUSCULAR; INTRAVENOUS at 17:17

## 2019-07-18 RX ADMIN — ACETAMINOPHEN 650 MG: 325 TABLET, FILM COATED ORAL at 22:43

## 2019-07-18 RX ADMIN — SUGAMMADEX 100 MG: 100 INJECTION, SOLUTION INTRAVENOUS at 19:49

## 2019-07-18 RX ADMIN — PIPERACILLIN SODIUM,TAZOBACTAM SODIUM 2.25 G: 2; .25 INJECTION, POWDER, FOR SOLUTION INTRAVENOUS at 22:43

## 2019-07-18 RX ADMIN — FENTANYL CITRATE 50 MCG: 50 INJECTION, SOLUTION INTRAMUSCULAR; INTRAVENOUS at 19:04

## 2019-07-18 RX ADMIN — INSULIN LISPRO 1 UNITS: 100 INJECTION, SOLUTION INTRAVENOUS; SUBCUTANEOUS at 22:43

## 2019-07-18 RX ADMIN — PROPOFOL 90 MG: 10 INJECTION, EMULSION INTRAVENOUS at 19:05

## 2019-07-18 RX ADMIN — ROCURONIUM BROMIDE 25 MG: 10 INJECTION, SOLUTION INTRAVENOUS at 19:17

## 2019-07-18 RX ADMIN — MIRTAZAPINE 30 MG: 15 TABLET, FILM COATED ORAL at 22:43

## 2019-07-18 RX ADMIN — Medication 40 MG: at 19:05

## 2019-07-18 RX ADMIN — LIDOCAINE HYDROCHLORIDE 50 MG: 10 INJECTION, SOLUTION INFILTRATION; PERINEURAL at 19:05

## 2019-07-18 RX ADMIN — MORPHINE SULFATE 2 MG: 2 INJECTION, SOLUTION INTRAMUSCULAR; INTRAVENOUS at 17:38

## 2019-07-18 RX ADMIN — PIPERACILLIN SODIUM,TAZOBACTAM SODIUM 3.38 G: 3; .375 INJECTION, POWDER, FOR SOLUTION INTRAVENOUS at 17:19

## 2019-07-18 RX ADMIN — ROPINIROLE HYDROCHLORIDE 2 MG: 1 TABLET, FILM COATED ORAL at 22:43

## 2019-07-18 RX ADMIN — FENTANYL CITRATE 50 MCG: 50 INJECTION, SOLUTION INTRAMUSCULAR; INTRAVENOUS at 19:05

## 2019-07-18 RX ADMIN — SODIUM CHLORIDE: 0.9 INJECTION, SOLUTION INTRAVENOUS at 19:05

## 2019-07-18 RX ADMIN — HEPARIN SODIUM 5000 UNITS: 5000 INJECTION INTRAVENOUS; SUBCUTANEOUS at 22:43

## 2019-07-18 RX ADMIN — SODIUM CHLORIDE 500 ML: 0.9 INJECTION, SOLUTION INTRAVENOUS at 17:13

## 2019-07-18 RX ADMIN — DEXAMETHASONE SODIUM PHOSPHATE 4 MG: 10 INJECTION, SOLUTION INTRAMUSCULAR; INTRAVENOUS at 19:29

## 2019-07-18 RX ADMIN — ONDANSETRON 4 MG: 2 INJECTION INTRAMUSCULAR; INTRAVENOUS at 20:23

## 2019-07-18 RX ADMIN — SODIUM CHLORIDE, SODIUM LACTATE, POTASSIUM CHLORIDE, AND CALCIUM CHLORIDE 100 ML/HR: .6; .31; .03; .02 INJECTION, SOLUTION INTRAVENOUS at 22:42

## 2019-07-18 RX ADMIN — IOHEXOL 50 ML: 240 INJECTION, SOLUTION INTRATHECAL; INTRAVASCULAR; INTRAVENOUS; ORAL at 15:54

## 2019-07-18 NOTE — PROGRESS NOTES
Assessment/Plan:       Problem List Items Addressed This Visit        Other    Periumbilical abdominal pain - Primary     · Follow-up CT abdomen to rule out any acute process  · Follow-up CBC and ESR to consider if any possible infectious/inflammatory etiology contributing to current presentation  · Follow-up UA with reflex urine culture, rule out UTI  · Patient with extensive abdominal surgical history as mentioned  Patient unable to offer much about dates and details of aforementioned surgeries  May be component of adhesions however, will proceed with imaging to rule out any acute process  Relevant Orders    CBC and differential    Sedimentation rate, automated    CT abdomen pelvis w wo contrast    UA w Reflex to Microscopic w Reflex to Culture -Lab Collect      Other Visit Diagnoses     History of ventral hernia        Relevant Orders    CT abdomen pelvis w wo contrast    Abdominal guarding        Relevant Orders    CT abdomen pelvis w wo contrast    History of cholecystectomy        Relevant Orders    CT abdomen pelvis w wo contrast    History of appendectomy        Relevant Orders    CT abdomen pelvis w wo contrast    History of bilateral oophorectomy        Relevant Orders    CT abdomen pelvis w wo contrast          Subjective:      Patient ID: Merle Acosta is a 67 y o  female  HPI    The following portions of the patient's history were reviewed and updated as appropriate: allergies, current medications, past family history, past medical history, past social history, past surgical history and problem list     Patient complains of abdominal pain  The pain is described as aching, and is 6/10 in intensity  The patient is experiencing periumbilical pain with radiation to RLQ and LLQ  Onset was a few hours ago  Symptoms have been unchanged  Aggravating factors: none  Alleviating factors: none  Associated symptoms: "hot sweat this morning, clothes drenched"   The patient denies belching, chills, constipation, diarrhea, dysuria, flatus, headache, hematochezia, hematuria, nausea and vomiting  Extensive past medical history, in regards to current complaints past medical history notable for type 2 diabetes mellitus, appendectomy, cholecystectomy, bilateral oophorectomy, polyp removal, and ventral hernia repair with mesh, unsure about date  No smoking history  Weight at last visit in June 89 lb, today 87 lb  Last bowel movement 2 days ago after MiraLax  Review of Systems      As noted in HPI    Objective:      BP (!) 92/40   Pulse 72   Temp 97 5 °F (36 4 °C)   Resp 18   Wt 39 5 kg (87 lb)   SpO2 98%   BMI 17 57 kg/m²     Past Medical History:   Diagnosis Date    Anal bleeding 1/29/2018    Arthritis     knee    Asthma     BPPV (benign paroxysmal positional vertigo) 7/19/2016    Last Assessment & Plan:  Hx consistent with BPPV  Neg [employer], however could not perform it adequately due to dyskinesias  Recommend lozano-daroff exercises at home  IF worsens, vestibular therapy      Bulging lumbar disc 12/10/2013    Closed fracture of one rib of left side 1/29/2018    Dementia     Diabetes mellitus ([address] )     TYPE 2    Gallbladder disease     GERD (gastroesophageal reflux disease)     Hematuria     last assessed 10/29/15    Hypertension     Orthostatic hypotension 7/13/2015    Ovarian cyst     LAST ASSESSED: 12/10/13    Parkinson's disease ([address] )     Pneumonia of both lower lobes due to infectious organism ([address] ) 3/27/2018    Pulmonary embolism with infarction [address] [address] ) 9/2/2014    Sciatica 12/10/2013    Skin disorder     last assessed 12/10/13    Squamous cell carcinoma of skin 11/21/2016     Past Surgical History:   Procedure Laterality Date    APPENDECTOMY      1970'S    CHOLECYSTECTOMY      1970'S    NEUROPLASTY / TRANSPOSITION MEDIAN NERVE AT CARPAL TUNNEL Right     OOPHORECTOMY Bilateral     REMOVAL OF BOTH OVARIES LAPAROSCOPIC    CT COLSC FLX W/RMVL OF TUMOR POLYP LESION SNARE TQ N/A 3/20/2018    Procedure: COLONOSCOPY;  Surgeon: Linnea Bourgeois MD;  Location: [facility] GI LAB; Service: Gastroenterology     [facility] CATARACT EXTRACAP,INSERT LENS Right 12/4/2018    Procedure: EXTRACTION EXTRACAPSULAR CATARACT PHACO INTRAOCULAR LENS (IOL); Surgeon: Keanu Rizzo MD;  Location: [facility] MAIN OR;  Service: Ophthalmology     [facility] CATARACT EXTRACAP,INSERT LENS  1/15/2019    Procedure: EXTRACTION EXTRACAPSULAR CATARACT PHACO INTRAOCULAR LENS (IOL);   Surgeon: Keanu Rizzo MD;  Location: [facility] MAIN OR;  Service: Ophthalmology    TONSILLECTOMY      VENTRAL HERNIA REPAIR      WITH IMPLANT OF MESH       Current Outpatient Medications:     rOPINIRole (REQUIP) 2 mg tablet, Take 6 mg by mouth, Disp: , Rfl:     ACCU-CHEK COMPACT PLUS test strip, Use daily, Disp: 100 each, Rfl: 99    ACCU-CHEK SOFTCLIX LANCETS lancets, Use as instructed, Disp: 100 each, Rfl: 99    Amantadine HCl 100 MG tablet, TAKE 1 TABLET TWICE A DAY, Disp: , Rfl:     amLODIPine (NORVASC) 10 mg tablet, Take 0 5 tablets (5 mg total) by mouth daily, Disp: 45 tablet, Rfl: 3    aspirin (ECOTRIN LOW STRENGTH) 81 mg EC tablet, Take 81 mg by mouth daily, Disp: , Rfl:     Carbidopa-Levodopa ER 48  MG CPCR, Take 1 capsule by mouth, Disp: , Rfl:     Glucose Blood (COOL BLOOD GLUCOSE TEST STRIPS VI), As directed , Disp: , Rfl:     Glucose Blood (COOL BLOOD GLUCOSE TEST STRIPS VI), As directed, Disp: , Rfl:     glucose monitoring kit (FREESTYLE) monitoring kit, 1 each by Does not apply route as needed (DM control) ACCUCHEK METER COMPACT PLUS, Disp: 1 each, Rfl: 1    metFORMIN (GLUCOPHAGE) 500 mg tablet, TAKE 1 TABLET BY MOUTH  DAILY WITH BREAKFAST, Disp: 90 tablet, Rfl: 3    mirtazapine (REMERON) 15 mg tablet, Take 30 mg by mouth, Disp: , Rfl:     Multiple Vitamin (MULTIVITAMIN) tablet, Take 1 tablet by mouth daily, Disp: , Rfl:     Omega-3 Fatty Acids (FISH OIL PO), Take 2 g by mouth daily, Disp: , Rfl:    rasagiline (AZILECT) 1 MG, TAKE 1 TABLET BY MOUTH  DAILY, Disp: , Rfl:        Physical Exam   Constitutional: She is oriented to person, place, and time  She appears well-developed  underweight   HENT:   Head: Normocephalic and atraumatic  Mouth/Throat: Oropharynx is clear and moist    Eyes: Pupils are equal, round, and reactive to light  EOM are normal    Cardiovascular: Normal rate and intact distal pulses  Pulmonary/Chest: Effort normal and breath sounds normal    Abdominal: Soft  Bowel sounds are normal  There is tenderness in the right upper quadrant, right lower quadrant and periumbilical area  There is guarding  There is no rigidity  Neurological: She is alert and oriented to person, place, and time  Skin: Skin is warm and dry  Capillary refill takes less than 2 seconds

## 2019-07-18 NOTE — ASSESSMENT & PLAN NOTE
· Follow-up CT abdomen to rule out any acute process  · Follow-up CBC and ESR to consider if any possible infectious/inflammatory etiology contributing to current presentation  · Follow-up UA with reflex urine culture, rule out UTI  · Patient with extensive abdominal surgical history as mentioned  Patient unable to offer much about dates and details of aforementioned surgeries  May be component of adhesions however, will proceed with imaging to rule out any acute process

## 2019-07-18 NOTE — ED PROVIDER NOTES
History  Chief Complaint   Patient presents with    Abdominal Pain     pt had CT earlier today for abd pain  +for free air  27-year-old female sent here from [facility] after they saw her this morning with abdominal discomfort and ordered a CT scan of the belly  CT scan of the abdomen showed moderate amounts of free air in the anterior superior portion of the abdomen  No extravasation of oral contrast which was administered patient states that her abdominal pain started early this morning and has continued to progress  No recent illnesses that would cause her to have issues with her abdomen according to her  No other complaints      History provided by:  Patient   used: No        Prior to Admission Medications   Prescriptions Last Dose Informant Patient Reported? Taking?    ACCU-CHEK COMPACT PLUS test strip   No No   Sig: Use daily   ACCU-CHEK SOFTCLIX LANCETS lancets   No No   Sig: Use as instructed   Amantadine HCl 100 MG tablet   Yes No   Sig: TAKE 1 TABLET TWICE A DAY   Carbidopa-Levodopa ER 48  MG CPCR   Yes No   Sig: Take 1 capsule by mouth   Glucose Blood (COOL BLOOD GLUCOSE TEST STRIPS VI)   Yes No   Sig: As directed    Glucose Blood (COOL BLOOD GLUCOSE TEST STRIPS VI)   Yes No   Sig: As directed   Multiple Vitamin (MULTIVITAMIN) tablet  Self Yes No   Sig: Take 1 tablet by mouth daily   Omega-3 Fatty Acids (FISH OIL PO)   Yes No   Sig: Take 2 g by mouth daily   amLODIPine (NORVASC) 10 mg tablet   No No   Sig: Take 0 5 tablets (5 mg total) by mouth daily   aspirin (ECOTRIN LOW STRENGTH) 81 mg EC tablet   Yes No   Sig: Take 81 mg by mouth daily   glucose monitoring kit (FREESTYLE) monitoring kit   No No   Si each by Does not apply route as needed (DM control) ACCUCHEK METER COMPACT PLUS   metFORMIN (GLUCOPHAGE) 500 mg tablet   No No   Sig: TAKE 1 TABLET BY MOUTH  DAILY WITH BREAKFAST   mirtazapine (REMERON) 15 mg tablet   Yes No   Sig: Take 30 mg by mouth rOPINIRole (REQUIP) 2 mg tablet   Yes No   Sig: Take 6 mg by mouth   rasagiline (AZILECT) 1 MG   Yes No   Sig: TAKE 1 TABLET BY MOUTH  DAILY      Facility-Administered Medications: None       Past Medical History:   Diagnosis Date    Anal bleeding 1/29/2018    Arthritis     knee    Asthma     BPPV (benign paroxysmal positional vertigo) 7/19/2016    Last Assessment & Plan:  Hx consistent with BPPV  Neg [employer], however could not perform it adequately due to dyskinesias  Recommend lozano-daroff exercises at home  IF worsens, vestibular therapy   Bulging lumbar disc 12/10/2013    Closed fracture of one rib of left side 1/29/2018    Dementia     Diabetes mellitus [facility] )     TYPE 2    Gallbladder disease     GERD (gastroesophageal reflux disease)     Hematuria     last assessed 10/29/15    Hypertension     Orthostatic hypotension 7/13/2015    Ovarian cyst     LAST ASSESSED: 12/10/13    Parkinson's disease ([facility] )     Pneumonia of both lower lobes due to infectious organism ([facility] [address] ) 3/27/2018    Pulmonary embolism with infarction ([facility] [address] ) 9/2/2014    Sciatica 12/10/2013    Skin disorder     last assessed 12/10/13    Squamous cell carcinoma of skin 11/21/2016       Past Surgical History:   Procedure Laterality Date    APPENDECTOMY      1970'S    CHOLECYSTECTOMY      1970'S    NEUROPLASTY / TRANSPOSITION MEDIAN NERVE AT CARPAL TUNNEL Right     OOPHORECTOMY Bilateral     REMOVAL OF BOTH OVARIES LAPAROSCOPIC    [place] COLSC FLX W/RMVL OF TUMOR POLYP LESION SNARE TQ N/A 3/20/2018    Procedure: COLONOSCOPY;  Surgeon: Dominic Aguillon MD;  Location: [place] [address] GI LAB; Service: Gastroenterology     [facility] CATARACT EXTRACAP,INSERT LENS Right 12/4/2018    Procedure: EXTRACTION EXTRACAPSULAR CATARACT PHACO INTRAOCULAR LENS (IOL);   Surgeon: Nancy Valero MD;  Location: [facility] MAIN OR;  Service: Ophthalmology     [facility] CATARACT EXTRACAP,INSERT LENS  1/15/2019    Procedure: EXTRACTION EXTRACAPSULAR CATARACT PHACO INTRAOCULAR LENS (IOL); Surgeon: Rajni Wilson MD;  Location: [facility] MAIN OR;  Service: Ophthalmology    TONSILLECTOMY      VENTRAL HERNIA REPAIR      WITH IMPLANT OF MESH       Family History   Problem Relation Age of Onset    Alzheimer's disease Mother     Stroke Father     No Known Problems Sister     No Known Problems Brother      I have reviewed and agree with the history as documented  Social History     Tobacco Use    Smoking status: Never Smoker    Smokeless tobacco: Never Used   Substance Use Topics    Alcohol use: No    Drug use: No        Review of Systems   Constitutional: Negative for activity change, chills, diaphoresis and fever  HENT: Negative for congestion, ear pain, nosebleeds, sore throat, trouble swallowing and voice change  Eyes: Negative for pain, discharge and redness  Respiratory: Negative for apnea, cough, choking, shortness of breath, wheezing and stridor  Cardiovascular: Negative for chest pain and palpitations  Gastrointestinal: Positive for abdominal pain and nausea  Negative for abdominal distention, constipation, diarrhea and vomiting  Endocrine: Negative for polydipsia  Genitourinary: Negative for difficulty urinating, dysuria, flank pain, frequency, hematuria and urgency  Musculoskeletal: Negative for back pain, gait problem, joint swelling, myalgias, neck pain and neck stiffness  Skin: Negative for pallor and rash  Neurological: Negative for dizziness, tremors, syncope, speech difficulty, weakness, numbness and headaches  Hematological: Negative for adenopathy  Psychiatric/Behavioral: Negative for confusion, hallucinations, self-injury and suicidal ideas  The patient is not nervous/anxious  Physical Exam  Physical Exam   Constitutional: She is oriented to person, place, and time  She appears well-developed and well-nourished  No distress  HENT:   Head: Normocephalic and atraumatic     Right Ear: External ear normal    Left Ear: External ear normal    Nose: Nose normal    Mouth/Throat: Oropharynx is clear and moist    Eyes: Pupils are equal, round, and reactive to light  Conjunctivae are normal    Neck: Normal range of motion  Neck supple  Cardiovascular: Normal rate, regular rhythm, normal heart sounds and intact distal pulses  Pulmonary/Chest: Effort normal and breath sounds normal    Abdominal: Soft  Bowel sounds are normal  There is generalized tenderness  There is guarding  Patient appears to have peritoneal signs on her abdomen with guarding  Unable to get a good exam due to tenderness and pain on palpation  Musculoskeletal: Normal range of motion  Neurological: She is alert and oriented to person, place, and time  She has normal reflexes  Skin: Skin is warm and dry  She is not diaphoretic  Psychiatric: She has a normal mood and affect  Nursing note and vitals reviewed        Vital Signs  ED Triage Vitals   Temperature Pulse Respirations Blood Pressure SpO2   07/18/19 1654 07/18/19 1655 07/18/19 1655 07/18/19 1655 07/18/19 1655   (!) 96 2 °F (35 7 °C) 76 20 156/73 98 %      Temp Source Heart Rate Source Patient Position - Orthostatic VS BP Location FiO2 (%)   07/18/19 1654 07/18/19 1655 07/18/19 1655 07/18/19 1655 --   Tympanic Monitor Lying Left arm       Pain Score       07/18/19 1654       Worst Possible Pain           Vitals:    07/18/19 1655   BP: 156/73   Pulse: 76   Patient Position - Orthostatic VS: Lying         Visual Acuity      ED Medications  Medications   sodium chloride 0 9 % bolus 500 mL (0 mL Intravenous Stopped 7/18/19 1748)   ondansetron (ZOFRAN) injection 4 mg (4 mg Intravenous Given 7/18/19 1717)   piperacillin-tazobactam (ZOSYN) IVPB 3 375 g (0 g Intravenous Stopped 7/18/19 1738)   morphine injection 2 mg (2 mg Intravenous Given 7/18/19 1738)       Diagnostic Studies  Results Reviewed     Procedure Component Value Units Date/Time    CBC and differential [[id number]]  (Abnormal) Collected: 07/18/19 1715    Lab Status:  Final result Specimen:  Blood from Arm, Left Updated:  07/18/19 1745     WBC 16 76 Thousand/uL      RBC 4 04 Million/uL      Hemoglobin 11 9 g/dL      Hematocrit 38 3 %      MCV 95 fL      MCH 29 5 pg      MCHC 31 1 g/dL      RDW 13 1 %      MPV 9 9 fL      Platelets 907 Thousands/uL      nRBC 0 /100 WBCs     Narrative: This is an appended report  These results have been appended to a previously verified report  Lactic acid, plasma [[id number]]  (Normal) Collected:  07/18/19 1715    Lab Status:  Final result Specimen:  Blood from Arm, Left Updated:  07/18/19 1744     LACTIC ACID 1 2 mmol/L     Narrative:       Result may be elevated if tourniquet was used during collection      Troponin I [[id number]]  (Normal) Collected:  07/18/19 1715    Lab Status:  Final result Specimen:  Blood from Arm, Left Updated:  07/18/19 1742     Troponin I <0 02 ng/mL     Protime-INR [[id number]]  (Normal) Collected:  07/18/19 1715    Lab Status:  Final result Specimen:  Blood from Arm, Left Updated:  07/18/19 1739     Protime 11 0 seconds      INR 1 05    APTT [[id number]]  (Normal) Collected:  07/18/19 1715    Lab Status:  Final result Specimen:  Blood from Arm, Left Updated:  07/18/19 1739     PTT 25 seconds     Comprehensive metabolic panel [[id number]]  (Abnormal) Collected:  07/18/19 1715    Lab Status:  Final result Specimen:  Blood from Arm, Left Updated:  07/18/19 1739     Sodium 134 mmol/L      Potassium 3 9 mmol/L      Chloride 97 mmol/L      CO2 27 mmol/L      ANION GAP 10 mmol/L      BUN 24 mg/dL      Creatinine 1 56 mg/dL      Glucose 146 mg/dL      Calcium 9 4 mg/dL      AST 16 U/L      ALT 10 U/L      Alkaline Phosphatase 79 U/L      Total Protein 7 8 g/dL      Albumin 4 2 g/dL      Total Bilirubin 1 20 mg/dL      eGFR 32 ml/min/1 73sq m     Narrative:       [place] guidelines for Chronic Kidney Disease (CKD):     Stage 1 with normal or high GFR (GFR > 90 mL/min/1 73 square meters)    Stage 2 Mild CKD (GFR = 60-89 mL/min/1 73 square meters)    Stage 3A Moderate CKD (GFR = 45-59 mL/min/1 73 square meters)    Stage 3B Moderate CKD (GFR = 30-44 mL/min/1 73 square meters)    Stage 4 Severe CKD (GFR = 15-29 mL/min/1 73 square meters)    Stage 5 End Stage CKD (GFR <15 mL/min/1 73 square meters)  Note: GFR calculation is accurate only with a steady state creatinine    UA w Reflex to Microscopic [[id number]]     Lab Status:  No result Specimen:  Urine                  XR chest 1 view portable    (Results Pending)              Procedures  Procedures       ED Course  ED Course as of Jul 18 1751   Thu Jul 18, 2019   1711 Message left for Dr Armen Spears to call the emergency department                                  MDM    Disposition  Final diagnoses:   Generalized abdominal pain     Time reflects when diagnosis was documented in both MDM as applicable and the Disposition within this note     Time User Action Codes Description Comment    7/18/2019  5:32 PM Susanne Mobley Add [K66 8] Free intraperitoneal air     7/18/2019  5:49 PM Rocky Scheuermann Add [R10 84] Generalized abdominal pain       ED Disposition     ED Disposition Condition Date/Time Comment    Admit Stable Thu Jul 18, 2019  5:49 PM Case was discussed with  Dr Joyce Lou and the patient's admission status was agreed to be Admission Status: inpatient status to the service of Dr Joyce Lou   Follow-up Information    None         Patient's Medications   Discharge Prescriptions    No medications on file     No discharge procedures on file      ED Provider  Electronically Signed by           Ricky Huerta DO  07/18/19 8815

## 2019-07-18 NOTE — ED NOTES
Alert/oriented,ambulates with cane  Lungs clear  Abdomen is semisoft,with hypo and absent bowel sounds upon auscultation  No nausea or vomiting  Patient has trace to +1 edema of both feet,pulses palpable  IV HL inserted to left ac  Call bell in reach  Spouse at bedside       Tamia Griffin RN  07/18/19 1791

## 2019-07-18 NOTE — ED NOTES
Patient washed with chlorhexidine wipes and prepared for OR  IVF's infusing via gravity       Austin Elizondo RN  07/18/19 2312

## 2019-07-18 NOTE — ANESTHESIA PREPROCEDURE EVALUATION
Review of Systems/Medical History  Patient summary reviewed  Chart reviewed  No history of anesthetic complications     Cardiovascular  Hyperlipidemia, Hypertension ,   PE (h/o),  Pulmonary  Asthma , well controlled/ stable ,        GI/Hepatic    GI bleeding , history of, GERD ,   Comment: Free air in belly, abdominal pain          Endo/Other  Diabetes (FSBG run ~150) well controlled type 2 ,   Comment: Psoriasis    GYN       Hematology   Musculoskeletal  Back pain , lumbar pain and spinal stenosis,   Arthritis     Neurology      Comment: Parkinson Disease s/p DBS Psychology           Physical Exam    Airway    Mallampati score: II  TM Distance: >3 FB  Neck ROM: full     Dental   No notable dental hx     Cardiovascular  Rhythm: regular, Rate: normal, Cardiovascular exam normal    Pulmonary  Pulmonary exam normal Breath sounds clear to auscultation,     Other Findings        Anesthesia Plan  ASA Score- 4 Emergent    Anesthesia Type- general with ASA Monitors  Additional Monitors:   Airway Plan: ETT  Plan Factors-    Induction- intravenous  Postoperative Plan- Plan for postoperative opioid use  Planned trial extubation    Informed Consent- Anesthetic plan and risks discussed with patient

## 2019-07-18 NOTE — TELEPHONE ENCOUNTER
Received phone call from Radiologist that Mrs Watson's CT abd pelvis concerning for perforated viscus  Spoke to patient, advised to immediately present to ED  ED attending and FM inpatient team made aware

## 2019-07-18 NOTE — H&P
Consultation - General Surgery   Chuck Chaney 67 y o  female MRN: [id number]  Unit/Bed#: [facility] Encounter: [id number]    Assessment/Plan     Assessment:  Acute abdominal pain with free air and peritonitis  Will rule out perforated viscus  Plan:  Exploratory laparotomy  History of Present Illness       HPI:  Chuck Chaney is a 67 y o  female who presents with multiple medical problems including dementia, GERD, chronic constipation and diabetes who was in her normal state of compensated health until this morning when she experienced acute onset of epigastric pain which was severe, nonradiating, not associated with nausea vomiting and progressive worsening  Patient denies any recent illnesses, nausea vomiting or diarrhea  She reports chronic constipation with a bowel movement yesterday  She denies any abdominal distention or recent contact with sick individuals  Consults    Review of Systems   Constitutional: Negative  HENT: Negative  Eyes: Negative  Respiratory: Negative  Cardiovascular:        See past medical history   Gastrointestinal:        CC HPI   Endocrine: Negative  Genitourinary: Negative  Musculoskeletal: Negative  Skin: Negative  Allergic/Immunologic: Negative  Neurological:        Patient reports diagnosis of Parkinson's   Hematological: Negative  Psychiatric/Behavioral: Negative  Historical Information   Past Medical History:   Diagnosis Date    Anal bleeding 1/29/2018    Arthritis     knee    Asthma     BPPV (benign paroxysmal positional vertigo) 7/19/2016    Last Assessment & Plan:  Hx consistent with BPPV  Neg [place], however could not perform it adequately due to dyskinesias  Recommend lozano-daroff exercises at home  IF worsens, vestibular therapy      Bulging lumbar disc 12/10/2013    Closed fracture of one rib of left side 1/29/2018    Dementia     Diabetes mellitus (HCC)     TYPE 2    Gallbladder disease     GERD (gastroesophageal reflux disease)     Hematuria     last assessed 10/29/15    Hypertension     Orthostatic hypotension 7/13/2015    Ovarian cyst     LAST ASSESSED: 12/10/13    Parkinson's disease ([facility] [address] )     Pneumonia of both lower lobes due to infectious organism ([facility] [address] ) 3/27/2018    Pulmonary embolism with infarction ([facility] [address] ) 9/2/2014    Sciatica 12/10/2013    Skin disorder     last assessed 12/10/13    Squamous cell carcinoma of skin 11/21/2016     Past Surgical History:   Procedure Laterality Date    APPENDECTOMY      1970'S    CHOLECYSTECTOMY      1970'S    NEUROPLASTY / TRANSPOSITION MEDIAN NERVE AT CARPAL TUNNEL Right     OOPHORECTOMY Bilateral     REMOVAL OF BOTH OVARIES LAPAROSCOPIC    MA COLSC FLX W/RMVL OF TUMOR POLYP LESION SNARE TQ N/A 3/20/2018    Procedure: COLONOSCOPY;  Surgeon: Gwendolyn Cheadle, MD;  Location[facility]; Service: Gastroenterology     [facility] CATARACT EXTRACAP,INSERT LENS Right 12/4/2018    Procedure: EXTRACTION EXTRACAPSULAR CATARACT PHACO INTRAOCULAR LENS (IOL); Surgeon: Barbara Kimball MD;  Location: [facility] OR;  Service: Ophthalmology     [facility] CATARACT EXTRACAP,INSERT LENS  1/15/2019    Procedure: EXTRACTION EXTRACAPSULAR CATARACT PHACO INTRAOCULAR LENS (IOL);   Surgeon: Barbara Kimball MD;  Location: [facility] OR;  Service: Ophthalmology   [facility]      WITH IMPLANT OF MESH     Social History   Social History     Substance and Sexual Activity   Alcohol Use No     Social History     Substance and Sexual Activity   Drug Use No     Social History     Tobacco Use   Smoking Status Never Smoker   Smokeless Tobacco Never Used     Family History: non-contributory    Meds/Allergies   all current active meds have been reviewed, current meds:   Current Facility-Administered Medications   Medication Dose Route Frequency    lactated ringers infusion  125 mL/hr Intravenous Continuous    [MAR Hold] morphine injection 2 mg  2 mg Intravenous Once    and PTA meds:   Prior to Admission Medications   Prescriptions Last Dose Informant Patient Reported? Taking?    ACCU-CHEK COMPACT PLUS test strip   No No   Sig: Use daily   ACCU-CHEK SOFTCLIX LANCETS lancets   No No   Sig: Use as instructed   Amantadine HCl 100 MG tablet   Yes No   Sig: TAKE 1 TABLET TWICE A DAY   Carbidopa-Levodopa ER 48  MG CPCR   Yes No   Sig: Take 1 capsule by mouth   Glucose Blood (COOL BLOOD GLUCOSE TEST STRIPS VI)   Yes No   Sig: As directed    Glucose Blood (COOL BLOOD GLUCOSE TEST STRIPS VI)   Yes No   Sig: As directed   Multiple Vitamin (MULTIVITAMIN) tablet  Self Yes No   Sig: Take 1 tablet by mouth daily   Omega-3 Fatty Acids (FISH OIL PO)   Yes No   Sig: Take 2 g by mouth daily   amLODIPine (NORVASC) 10 mg tablet   No No   Sig: Take 0 5 tablets (5 mg total) by mouth daily   aspirin (ECOTRIN LOW STRENGTH) 81 mg EC tablet   Yes No   Sig: Take 81 mg by mouth daily   glucose monitoring kit (FREESTYLE) monitoring kit   No No   Si each by Does not apply route as needed (DM control) ACCUCHEK METER COMPACT PLUS   metFORMIN (GLUCOPHAGE) 500 mg tablet   No No   Sig: TAKE 1 TABLET BY MOUTH  DAILY WITH BREAKFAST   mirtazapine (REMERON) 15 mg tablet   Yes No   Sig: Take 30 mg by mouth   rOPINIRole (REQUIP) 2 mg tablet   Yes No   Sig: Take 6 mg by mouth   rasagiline (AZILECT) 1 MG   Yes No   Sig: TAKE 1 TABLET BY MOUTH  DAILY      Facility-Administered Medications: None     No Known Allergies    Objective   First Vitals:   Blood Pressure: 156/73 (19)  Pulse: 76 (19)  Temperature: (!) 96 2 °F (35 7 °C) (19)  Temp Source: Tympanic (19)  Respirations: 20 (19)  SpO2: 98 % (19)    Current Vitals:   Blood Pressure: 161/70 (19)  Pulse: 64 (19)  Temperature: (!) 96 2 °F (35 7 °C) (19)  Temp Source: Tympanic (19)  Respirations: (!) 25 (19)  SpO2: 100 % (19)      Intake/Output Summary (Last 24 hours) at 7/18/2019 1832  Last data filed at 7/18/2019 1748  Gross per 24 hour   Intake 1050 ml   Output    Net 1050 ml       Invasive Devices     Peripheral Intravenous Line            Peripheral IV 07/18/19 Left Antecubital less than 1 day                Physical Exam   Constitutional: She is oriented to person, place, and time  Thin, ill-appearing elderly female and mild distress due to epigastric pain   HENT:   Head: Normocephalic and atraumatic  Eyes: Pupils are equal, round, and reactive to light  EOM are normal    Neck: Neck supple  Cardiovascular: Normal rate  Pulmonary/Chest: Effort normal and breath sounds normal  No respiratory distress  Abdominal:   Abdomen is soft, nondistended  Acute epigastric tenderness to palpation and percussion  No masses  Lower abdominal exam with mild tenderness and no percussion tenderness   Musculoskeletal: Normal range of motion  Lymphadenopathy:     She has no cervical adenopathy  Neurological: She is alert and oriented to person, place, and time  Skin: Skin is warm and dry  Psychiatric: She has a normal mood and affect  Her behavior is normal        Lab Results:   I have personally reviewed pertinent lab results  , CBC:   Lab Results   Component Value Date    WBC 16 76 (H) 07/18/2019    HGB 11 9 07/18/2019    HCT 38 3 07/18/2019    MCV 95 07/18/2019     07/18/2019    MCH 29 5 07/18/2019    MCHC 31 1 (L) 07/18/2019    RDW 13 1 07/18/2019    MPV 9 9 07/18/2019    NRBC 0 07/18/2019   , CMP:   Lab Results   Component Value Date    SODIUM 134 (L) 07/18/2019    K 3 9 07/18/2019    CL 97 (L) 07/18/2019    CO2 27 07/18/2019    BUN 24 07/18/2019    CREATININE 1 56 (H) 07/18/2019    CALCIUM 9 4 07/18/2019    AST 16 07/18/2019    ALT 10 (L) 07/18/2019    ALKPHOS 79 07/18/2019    EGFR 32 07/18/2019   , Lipase: No results found for: LIPASE  Imaging: I have personally reviewed pertinent reports     and I have personally reviewed pertinent films in PACS  EKG, Pathology, and Other Studies: I have personally reviewed pertinent reports  and I have personally reviewed pertinent films in PACS    Counseling / Coordination of Care  Total floor / unit time spent today 80 minutes  Greater than 50% of total time was spent with the patient and / or family counseling and / or coordination of care  A description of the counseling / coordination of care: I saw the patient in the emergency department and performed a thorough history, a 14 point review systems, physical exam   I reviewed her laboratory data as well as CT scan images  I discussed this case at length with the ED attending, Dr Luba Sommers, as well as anesthesia attending Dr Sandy Nixon

## 2019-07-18 NOTE — OP NOTE
OPERATIVE REPORT  PATIENT NAME: Jie Richardson    :  1940  MRN: [id number]  Pt Location: [facility] OR ROOM 01    SURGERY DATE: 2019    Surgeon(s) and Role:     * Holland Oglesby MD - Primary     * Megan Lucas PA-C - Assisting    Preop Diagnosis:  Free intraperitoneal air [K66 8]    Post-Op Diagnosis Codes:     * Free intraperitoneal air [K66 8]    Procedure(s) (LRB):  LAPAROTOMY EXPLORATORY (N/A)    Specimen(s):  ID Type Source Tests Collected by Time Destination   A : Abdominal Fluid Body Fluid Abdominal ANAEROBIC CULTURE AND GRAM STAIN, BODY FLUID CULTURE AND GRAM STAIN Holland Oglesby MD 2019        Estimated Blood Loss:   Minimal    Drains:  Closed/Suction Drain Midline Abdomen Bulb (Active)   Number of days: 0       Urethral Catheter Latex 16 Fr  (Active)   Number of days: 0       Anesthesia Type:   General    Operative Indications:  Free intraperitoneal air [K66 8]      Operative Findings:  Perforated 3mm duodenal ulcer    Complications:   None    Procedure and Technique:  Primary closer and Kolby's patch of duodenal ulcer    Patient was taken back to main operating room, placed supine on the operating table, general anesthesia was induced, Carreon catheter was placed, the abdomen was prepped and draped in normal fashion  Utilizing a 3 cm upper midline incision, the skin was incised  Fascia was opened in the midline  A well-circumscribed 3 mm anterior perforated duodenal ulcer was noted  Specimens of the fluid was sent for Gram stain and cultures  The area was irrigated and suctioned  The ulcer was primarily closed with a single 3 0 silk suture  A Kolby's patch utilizing omentum was performed and tacked in place with 3 0 silk suture  A 10 mm JB flat drain was placed and exited the right upper quadrant  The area was again irrigated strict to stasis was ensured with Bovie cautery  The fascia was closed with 0 PDS suture    Three 0 Vicryl used to approximate the subcutaneous tissue and 4 0 Monocryl was used to approximate skin edges  Histocryl was used as a dressing  The patient awoke from general anesthesia, was extubated in the operating room, sent to recovery room in stable condition      ROBERT Mueller was required for technical assistance and retraction   I was present for the entire procedure and A qualified resident physician was not available    Patient Disposition:  PACU     SIGNATURE: Ruthy Mccurdy MD  DATE: July 18, 2019  TIME: 7:52 PM

## 2019-07-18 NOTE — PROGRESS NOTES
Received call from Radiology stating that results from stat CT abdomen pelvis ordered earlier today with abnormal findings  Attending precepted call, patient advised to immediately present to the emergency department for appropriate care and further workup    Will inform inpatient and Emergency Medicine team

## 2019-07-19 ENCOUNTER — APPOINTMENT (OUTPATIENT)
Dept: PHYSICAL THERAPY | Facility: CLINIC | Age: 79
End: 2019-07-19
Payer: MEDICARE

## 2019-07-19 PROBLEM — N17.9 AKI (ACUTE KIDNEY INJURY) (HCC): Status: ACTIVE | Noted: 2019-07-19

## 2019-07-19 PROBLEM — F03.90 DEMENTIA (HCC): Status: ACTIVE | Noted: 2019-07-19

## 2019-07-19 LAB
ABO GROUP BLD: NORMAL
ANION GAP SERPL CALCULATED.3IONS-SCNC: 9 MMOL/L (ref 4–13)
BASOPHILS # BLD AUTO: 0.03 THOUSANDS/ΜL (ref 0–0.1)
BASOPHILS NFR BLD AUTO: 0 % (ref 0–1)
BLD GP AB SCN SERPL QL: NEGATIVE
BUN SERPL-MCNC: 22 MG/DL (ref 5–25)
CALCIUM SERPL-MCNC: 8.8 MG/DL (ref 8.3–10.1)
CHLORIDE SERPL-SCNC: 103 MMOL/L (ref 100–108)
CO2 SERPL-SCNC: 26 MMOL/L (ref 21–32)
CREAT SERPL-MCNC: 1.23 MG/DL (ref 0.6–1.3)
EOSINOPHIL # BLD AUTO: 0 THOUSAND/ΜL (ref 0–0.61)
EOSINOPHIL NFR BLD AUTO: 0 % (ref 0–6)
ERYTHROCYTE [DISTWIDTH] IN BLOOD BY AUTOMATED COUNT: 13.2 % (ref 11.6–15.1)
GFR SERPL CREATININE-BSD FRML MDRD: 42 ML/MIN/1.73SQ M
GLUCOSE SERPL-MCNC: 102 MG/DL (ref 65–140)
GLUCOSE SERPL-MCNC: 139 MG/DL (ref 65–140)
GLUCOSE SERPL-MCNC: 149 MG/DL (ref 65–140)
GLUCOSE SERPL-MCNC: 81 MG/DL (ref 65–140)
HCT VFR BLD AUTO: 35.5 % (ref 34.8–46.1)
HGB BLD-MCNC: 11.3 G/DL (ref 11.5–15.4)
IMM GRANULOCYTES # BLD AUTO: 0.14 THOUSAND/UL (ref 0–0.2)
IMM GRANULOCYTES NFR BLD AUTO: 1 % (ref 0–2)
LACTATE SERPL-SCNC: 1 MMOL/L (ref 0.5–2)
LYMPHOCYTES # BLD AUTO: 0.37 THOUSANDS/ΜL (ref 0.6–4.47)
LYMPHOCYTES NFR BLD AUTO: 2 % (ref 14–44)
MAGNESIUM SERPL-MCNC: 2.1 MG/DL (ref 1.6–2.6)
MCH RBC QN AUTO: 29.9 PG (ref 26.8–34.3)
MCHC RBC AUTO-ENTMCNC: 31.8 G/DL (ref 31.4–37.4)
MCV RBC AUTO: 94 FL (ref 82–98)
MONOCYTES # BLD AUTO: 0.79 THOUSAND/ΜL (ref 0.17–1.22)
MONOCYTES NFR BLD AUTO: 4 % (ref 4–12)
NEUTROPHILS # BLD AUTO: 17.14 THOUSANDS/ΜL (ref 1.85–7.62)
NEUTS SEG NFR BLD AUTO: 93 % (ref 43–75)
NRBC BLD AUTO-RTO: 0 /100 WBCS
PHOSPHATE SERPL-MCNC: 3.9 MG/DL (ref 2.3–4.1)
PLATELET # BLD AUTO: 282 THOUSANDS/UL (ref 149–390)
PMV BLD AUTO: 10.2 FL (ref 8.9–12.7)
POTASSIUM SERPL-SCNC: 4.1 MMOL/L (ref 3.5–5.3)
RBC # BLD AUTO: 3.78 MILLION/UL (ref 3.81–5.12)
RH BLD: POSITIVE
SODIUM SERPL-SCNC: 138 MMOL/L (ref 136–145)
SPECIMEN EXPIRATION DATE: NORMAL
WBC # BLD AUTO: 18.47 THOUSAND/UL (ref 4.31–10.16)

## 2019-07-19 PROCEDURE — 84100 ASSAY OF PHOSPHORUS: CPT | Performed by: PHYSICIAN ASSISTANT

## 2019-07-19 PROCEDURE — 86850 RBC ANTIBODY SCREEN: CPT | Performed by: ANESTHESIOLOGY

## 2019-07-19 PROCEDURE — 80048 BASIC METABOLIC PNL TOTAL CA: CPT | Performed by: PHYSICIAN ASSISTANT

## 2019-07-19 PROCEDURE — 99024 POSTOP FOLLOW-UP VISIT: CPT | Performed by: SURGERY

## 2019-07-19 PROCEDURE — C9113 INJ PANTOPRAZOLE SODIUM, VIA: HCPCS | Performed by: PHYSICIAN ASSISTANT

## 2019-07-19 PROCEDURE — 85025 COMPLETE CBC W/AUTO DIFF WBC: CPT | Performed by: PHYSICIAN ASSISTANT

## 2019-07-19 PROCEDURE — 83735 ASSAY OF MAGNESIUM: CPT | Performed by: PHYSICIAN ASSISTANT

## 2019-07-19 PROCEDURE — 99232 SBSQ HOSP IP/OBS MODERATE 35: CPT | Performed by: FAMILY MEDICINE

## 2019-07-19 PROCEDURE — 82948 REAGENT STRIP/BLOOD GLUCOSE: CPT

## 2019-07-19 PROCEDURE — 83605 ASSAY OF LACTIC ACID: CPT | Performed by: PHYSICIAN ASSISTANT

## 2019-07-19 PROCEDURE — 86900 BLOOD TYPING SEROLOGIC ABO: CPT | Performed by: ANESTHESIOLOGY

## 2019-07-19 PROCEDURE — 86901 BLOOD TYPING SEROLOGIC RH(D): CPT | Performed by: ANESTHESIOLOGY

## 2019-07-19 RX ORDER — ROPINIROLE 1 MG/1
2 TABLET, FILM COATED ORAL
Status: DISCONTINUED | OUTPATIENT
Start: 2019-07-19 | End: 2019-07-23 | Stop reason: HOSPADM

## 2019-07-19 RX ORDER — CARBIDOPA AND LEVODOPA 50; 200 MG/1; MG/1
1 TABLET, EXTENDED RELEASE ORAL 4 TIMES DAILY
Status: DISCONTINUED | OUTPATIENT
Start: 2019-07-19 | End: 2019-07-19 | Stop reason: CLARIF

## 2019-07-19 RX ORDER — ROPINIROLE 2 MG/1
6 TABLET, FILM COATED ORAL
Status: DISCONTINUED | OUTPATIENT
Start: 2019-07-19 | End: 2019-07-19 | Stop reason: SDUPTHER

## 2019-07-19 RX ORDER — CARBIDOPA AND LEVODOPA 50; 200 MG/1; MG/1
2 TABLET, EXTENDED RELEASE ORAL
Status: DISCONTINUED | OUTPATIENT
Start: 2019-07-19 | End: 2019-07-19 | Stop reason: CLARIF

## 2019-07-19 RX ORDER — ROPINIROLE 1 MG/1
4 TABLET, FILM COATED ORAL
Status: DISCONTINUED | OUTPATIENT
Start: 2019-07-19 | End: 2019-07-23 | Stop reason: HOSPADM

## 2019-07-19 RX ADMIN — ASPIRIN 81 MG: 81 TABLET, COATED ORAL at 08:45

## 2019-07-19 RX ADMIN — PIPERACILLIN SODIUM,TAZOBACTAM SODIUM 2.25 G: 2; .25 INJECTION, POWDER, FOR SOLUTION INTRAVENOUS at 05:15

## 2019-07-19 RX ADMIN — HEPARIN SODIUM 5000 UNITS: 5000 INJECTION INTRAVENOUS; SUBCUTANEOUS at 21:03

## 2019-07-19 RX ADMIN — MIRTAZAPINE 30 MG: 15 TABLET, FILM COATED ORAL at 21:03

## 2019-07-19 RX ADMIN — HEPARIN SODIUM 5000 UNITS: 5000 INJECTION INTRAVENOUS; SUBCUTANEOUS at 05:32

## 2019-07-19 RX ADMIN — HEPARIN SODIUM 5000 UNITS: 5000 INJECTION INTRAVENOUS; SUBCUTANEOUS at 13:55

## 2019-07-19 RX ADMIN — PIPERACILLIN SODIUM,TAZOBACTAM SODIUM 2.25 G: 2; .25 INJECTION, POWDER, FOR SOLUTION INTRAVENOUS at 11:23

## 2019-07-19 RX ADMIN — AMLODIPINE BESYLATE 5 MG: 5 TABLET ORAL at 08:36

## 2019-07-19 RX ADMIN — ACETAMINOPHEN 650 MG: 325 TABLET, FILM COATED ORAL at 05:32

## 2019-07-19 RX ADMIN — SODIUM CHLORIDE, SODIUM LACTATE, POTASSIUM CHLORIDE, AND CALCIUM CHLORIDE 75 ML/HR: .6; .31; .03; .02 INJECTION, SOLUTION INTRAVENOUS at 16:06

## 2019-07-19 RX ADMIN — ACETAMINOPHEN 650 MG: 325 TABLET, FILM COATED ORAL at 18:11

## 2019-07-19 RX ADMIN — CARBIDOPA AND LEVODOPA 2 TABLET: 25; 100 TABLET ORAL at 08:36

## 2019-07-19 RX ADMIN — PIPERACILLIN SODIUM,TAZOBACTAM SODIUM 2.25 G: 2; .25 INJECTION, POWDER, FOR SOLUTION INTRAVENOUS at 16:08

## 2019-07-19 RX ADMIN — ACETAMINOPHEN 650 MG: 325 TABLET, FILM COATED ORAL at 11:23

## 2019-07-19 RX ADMIN — ROPINIROLE HYDROCHLORIDE 2 MG: 1 TABLET, FILM COATED ORAL at 08:36

## 2019-07-19 RX ADMIN — ROPINIROLE HYDROCHLORIDE 4 MG: 1 TABLET, FILM COATED ORAL at 21:03

## 2019-07-19 RX ADMIN — ROPINIROLE HYDROCHLORIDE 2 MG: 1 TABLET, FILM COATED ORAL at 21:03

## 2019-07-19 RX ADMIN — PANTOPRAZOLE SODIUM 40 MG: 40 INJECTION, POWDER, FOR SOLUTION INTRAVENOUS at 08:36

## 2019-07-19 NOTE — PROGRESS NOTES
Patient became disoriented and combative at [address] while obtaining vital signs  Patient was unable to be reoriented and control team was called  While the residents and control team where at the bedside, we contacted the patients son and  in an attempt to calm the patient  Both family members explained to me they felt the patient was still very confused  I tried to make the patient comfortable after speaking to her family and she replied "leave me alone, I'm fine "  Residents notified and agreed  Will continue to closely monitor the patient

## 2019-07-19 NOTE — UTILIZATION REVIEW
Initial Clinical Review    Admission: Date/Time/Statement: 7/18/19 @ 1750     Orders Placed This Encounter   Procedures    Inpatient Admission     Standing Status:   Standing     Number of Occurrences:   1     Order Specific Question:   Admitting Physician     Answer:   Tamika Mendez     Order Specific Question:   Level of Care     Answer:   Med Surg [16]     Order Specific Question:   Estimated length of stay     Answer:   More than 2 Midnights     Order Specific Question:   Certification     Answer:   I certify that inpatient services are medically necessary for this patient for a duration of greater than two midnights  See H&P and MD Progress Notes for additional information about the patient's course of treatment  ED Arrival Information     Expected Arrival Acuity Means of Arrival Escorted By Service Admission Type    - 7/18/2019 16:46 Urgent Walk-In Family Member Trauma Urgent    Arrival Complaint    -        Chief Complaint   Patient presents with    Abdominal Pain     pt had CT earlier today for abd pain  +for free air  Assessment/Plan:    67 y o  female who presents with multiple medical problems including dementia, GERD, chronic constipation and diabetes who was in her normal state of compensated health until this morning when she experienced acute onset of epigastric pain which was severe, nonradiating, not associated with nausea vomiting and progressive worsening  Patient denies any recent illnesses, tomas  Acute abdominal pain with free air and peritonitis    Will rule out perforated viscus  Plan exploratory laparotomy     Anesthesia Start Date/Time: 07/18/19 1905   Procedure: LAPAROTOMY EXPLORATORY (N/A Abdomen)   Anesthesia type: general   Diagnosis: Free intraperitoneal air [K66 8]   Pre-op diagnosis: Free intraperitoneal air [K66 8]     Operative Indications:  Free intraperitoneal air [K66 8]        Operative Findings:  Perforated 3mm duodenal ulcer     Complications: None     ED Triage Vitals   07/18/19 1654 07/18/19 1655 07/18/19 1655 07/18/19 1655 07/18/19 1655   (!) 96 2 °F (35 7 °C) 76 20 156/73 98 %       Wt Readings from Last 1 Encounters:   07/18/19 40 4 kg (89 lb)     Additional Vital Signs:    Date/Time  Temp  Pulse  Resp  BP  SpO2  O2 Device  Cardiac (WDL)  Patient Position - Orthostatic VS   07/19/19 0825  98 6 °F (37 °C)  69  15  143/74  100 %  None (Room air)    Lying   07/19/19 0546  98 8 °F (37 1 °C)  72  18  143/72  97 %         07/18/19 2102  97 2 °F (36 2 °C)Abnormal   74  18  143/67  100 %         07/18/19 2020    74  20  142/65           07/18/19 2005  97 9 °F (36 6 °C)  74  20  141/65  93 %  None (Room air)  WDL     07/18/19 1800    64  25Abnormal   161/70  100 %         07/18/19 1655    76  20  156/73  98 %  None (Room air)    Lying           Pertinent Labs/Diagnostic Test Results:   Results from last 7 days   Lab Units 07/19/19  0600 07/18/19 1715 07/18/19  1236   WBC Thousand/uL 18 47* 16 76* 11 01*   HEMOGLOBIN g/dL 11 3* 11 9 10 5*   HEMATOCRIT % 35 5 38 3 34 8   PLATELETS Thousands/uL 282 310 313   NEUTROS ABS Thousands/µL 17 14*  --   --    BANDS PCT %  --  18* 22*         Results from last 7 days   Lab Units 07/19/19  0600 07/18/19 1715 07/18/19  1509   SODIUM mmol/L 138 134* 135*   POTASSIUM mmol/L 4 1 3 9 3 8   CHLORIDE mmol/L 103 97* 99*   CO2 mmol/L 26 27 28   ANION GAP mmol/L 9 10 8   BUN mg/dL 22 24 23   CREATININE mg/dL 1 23 1 56* 1 58*   EGFR ml/min/1 73sq m 42 32 31   CALCIUM mg/dL 8 8 9 4 9 1   MAGNESIUM mg/dL 2 1  --   --    PHOSPHORUS mg/dL 3 9  --   --      Results from last 7 days   Lab Units 07/18/19 1715 07/18/19  1509   AST U/L 16 19   ALT U/L 10* 9*   ALK PHOS U/L 79 71   TOTAL PROTEIN g/dL 7 8 7 1   ALBUMIN g/dL 4 2 3 9   TOTAL BILIRUBIN mg/dL 1 20* 1 10*     Results from last 7 days   Lab Units 07/19/19  1123 07/19/19  0541 07/18/19 2059 07/18/19 2014   POC GLUCOSE mg/dl 102 149* 182* 170*     Results from last 7 days   Lab Units 07/19/19  0600 07/18/19  1715   GLUCOSE RANDOM mg/dL 139 146*       Results from last 7 days   Lab Units 07/18/19  1715   TROPONIN I ng/mL <0 02         Results from last 7 days   Lab Units 07/18/19  1715   PROTIME seconds 11 0   INR  1 05   PTT seconds 25         Results from last 7 days   Lab Units 07/19/19  0600 07/18/19  1715   LACTIC ACID mmol/L 1 0 1 2       Results from last 7 days   Lab Units 07/18/19  1236   SED RATE mm/hour 16         Results from last 7 days   Lab Units 07/18/19  1606   CLARITY UA  Cloudy   COLOR UA  Light Yellow   SPEC GRAV UA  1 025   PH UA  5 5   GLUCOSE UA mg/dl Negative   KETONES UA mg/dl Trace*   BLOOD UA  Trace-Intact*   PROTEIN UA mg/dl Trace*   NITRITE UA  Negative   BILIRUBIN UA  Negative   UROBILINOGEN UA E U /dl 0 2   LEUKOCYTES UA  Large*   WBC UA /hpf Innumerable*   RBC UA /hpf 1-2*   BACTERIA UA /hpf Innumerable*   EPITHELIAL CELLS WET PREP /hpf Occasional     Results from last 7 days   Lab Units 07/18/19  1928 07/18/19  1606   GRAM STAIN RESULT  No Polys or Bacteria seen  --    URINE CULTURE   --  >100,000 cfu/ml Klebsiella-Enterobacter  group*   BODY FLUID CULTURE, STERILE  No growth  --      Results from last 7 days   Lab Units 07/18/19  1715 07/18/19  1236   TOTAL COUNTED  100 100           Invalid input(s): [facility]    ED Treatment:   Medication Administration from 07/18/2019 1646 to 07/18/2019 1834       Date/Time Order Dose Route     07/18/2019 1713 sodium chloride 0 9 % bolus 500 mL 500 mL Intravenous     07/18/2019 1717 ondansetron (ZOFRAN) injection 4 mg 4 mg Intravenous     07/18/2019 1719 piperacillin-tazobactam (ZOSYN) IVPB 3 375 g 3 375 g Intravenous     07/18/2019 1738 morphine injection 2 mg 2 mg Intravenous        Past Medical History:   Diagnosis Date    Anal bleeding 1/29/2018    Arthritis     knee    Asthma     BPPV (benign paroxysmal positional vertigo) 7/19/2016    Last Assessment & Plan:  Hx consistent with BPPV    Neg [place], however could not perform it adequately due to dyskinesias  Recommend lozano-daroff exercises at home  IF worsens, vestibular therapy   Bulging lumbar disc 12/10/2013    Closed fracture of one rib of left side 1/29/2018    Dementia     Diabetes mellitus ([facility] [address] )     TYPE 2    Gallbladder disease     GERD (gastroesophageal reflux disease)     Hematuria     last assessed 10/29/15    Hypertension     Orthostatic hypotension 7/13/2015    Ovarian cyst     LAST ASSESSED: 12/10/13    Parkinson's disease ([facility] [address] )     Pneumonia of both lower lobes due to infectious organism ([facility] ) 3/27/2018    Pulmonary embolism with infarction ([facility] ) 9/2/2014    Sciatica 12/10/2013    Skin disorder     last assessed 12/10/13    Squamous cell carcinoma of skin 11/21/2016     Present on Admission:  **None**      Admitting Diagnosis:   Abdominal pain [R10 9]  Generalized abdominal pain [R10 84]  Free intraperitoneal air [K66 8]    Age/Sex: 67 y o  female   No pain     Admission Orders:    Npo  sequential compression device   No mobility restrictions    acetaminophen 650 mg Oral Q6H [address]    aluminum-magnesium hydroxide-simethicone 30 mL Oral Q6H PRN    amLODIPine 5 mg Oral Daily    aspirin 81 mg Oral Daily    carbidopa-levodopa 2 tablet Oral Daily    heparin (porcine) 5,000 Units Subcutaneous Q8H [address]    insulin lispro 1-5 Units Subcutaneous Q6H [address]    lactated ringers 75 mL/hr Intravenous Continuous Last Rate: 75 mL/hr (07/19/19 0835)   mirtazapine 30 mg Oral HS    morphine injection 2 mg Intravenous Q4H PRN    ondansetron 4 mg Intravenous Q6H PRN    oxyCODONE 2 5 mg Oral Q4H PRN    oxyCODONE 5 mg Oral Q4H PRN    pantoprazole 40 mg Intravenous Q24H [address]    piperacillin-tazobactam 2 25 g Intravenous Q6H Last Rate: 2 25 g (07/19/19 1123)   rOPINIRole 2 mg Oral TID        IP CONSULT TO INTERNAL MEDICINE    Network Utilization Review Department  Phone: [phone];  Fax [phone]  [email or web address]  ATTENTION: Please call with any questions or concerns to [phone]  and carefully listen to the prompts so that you are directed to the right person  Send all requests for admission clinical reviews, approved or denied determinations and any other requests to fax [phone]   All voicemails are confidential

## 2019-07-19 NOTE — PLAN OF CARE
Problem: Potential for Falls  Goal: Patient will remain free of falls  Description  INTERVENTIONS:  - Assess patient frequently for physical needs  -  Identify cognitive and physical deficits and behaviors that affect risk of falls    -  [place] fall precautions as indicated by assessment   - Educate patient/family on patient safety including physical limitations  - Instruct patient to call for assistance with activity based on assessment  - Modify environment to reduce risk of injury  - Consider OT/PT consult to assist with strengthening/mobility  Outcome: Progressing     Problem: Prexisting or High Potential for Compromised Skin Integrity  Goal: Skin integrity is maintained or improved  Description  INTERVENTIONS:  - Identify patients at risk for skin breakdown  - Assess and monitor skin integrity  - Assess and monitor nutrition and hydration status  - Monitor labs (i e  albumin)  - Assess for incontinence   - Turn and reposition patient  - Assist with mobility/ambulation  - Relieve pressure over bony prominences  - Avoid friction and shearing  - Provide appropriate hygiene as needed including keeping skin clean and dry  - Evaluate need for skin moisturizer/barrier cream  - Collaborate with interdisciplinary team (i e  Nutrition, Rehabilitation, etc )   - Patient/family teaching  Outcome: Progressing     Problem: GASTROINTESTINAL - ADULT  Goal: Minimal or absence of nausea and/or vomiting  Description  INTERVENTIONS:  - Administer IV fluids as ordered to ensure adequate hydration  - Maintain NPO status until nausea and vomiting are resolved  - Nasogastric tube as ordered  - Administer ordered antiemetic medications as needed  - Provide nonpharmacologic comfort measures as appropriate  - Advance diet as tolerated, if ordered  - Nutrition services referral to assist patient with adequate nutrition and appropriate food choices  Outcome: Progressing     Problem: GENITOURINARY - ADULT  Goal: Urinary catheter remains patent  Description  INTERVENTIONS:  - Assess patency of urinary catheter  - If patient has a chronic allen, consider changing catheter if non-functioning  - Follow guidelines for intermittent irrigation of non-functioning urinary catheter  Outcome: Progressing     Problem: PAIN - ADULT  Goal: Verbalizes/displays adequate comfort level or baseline comfort level  Description  Interventions:  - Encourage patient to monitor pain and request assistance  - Assess pain using appropriate pain scale  - Administer analgesics based on type and severity of pain and evaluate response  - Implement non-pharmacological measures as appropriate and evaluate response  - Consider cultural and social influences on pain and pain management  - Notify physician/advanced practitioner if interventions unsuccessful or patient reports new pain  Outcome: Progressing     Problem: SAFETY ADULT  Goal: Patient will remain free of falls  Description  INTERVENTIONS:  - Assess patient frequently for physical needs  -  Identify cognitive and physical deficits and behaviors that affect risk of falls    -  [place] fall precautions as indicated by assessment   - Educate patient/family on patient safety including physical limitations  - Instruct patient to call for assistance with activity based on assessment  - Modify environment to reduce risk of injury  - Consider OT/PT consult to assist with strengthening/mobility  Outcome: Progressing     Problem: DISCHARGE PLANNING  Goal: Discharge to home or other facility with appropriate resources  Description  INTERVENTIONS:  - Identify barriers to discharge w/patient and caregiver  - Arrange for needed discharge resources and transportation as appropriate  - Identify discharge learning needs (meds, wound care, etc )  - Arrange for interpretive services to assist at discharge as needed  - Refer to Case Management Department for coordinating discharge planning if the patient needs post-hospital services based on physician/advanced practitioner order or complex needs related to functional status, cognitive ability, or social support system  Outcome: Progressing

## 2019-07-19 NOTE — ANESTHESIA POSTPROCEDURE EVALUATION
Post-Op Assessment Note    CV Status:  Stable  Pain Score: 4    Pain management: adequate     Mental Status:  Sleepy   Hydration Status:  Stable   PONV Controlled:  None   Post Op Vitals Reviewed: Yes      Staff: Anesthesiologist           BP      Temp     Pulse     Resp     SpO2

## 2019-07-19 NOTE — CONSULTS
[facility] Practise Consult Note - Alona Vela 67 y o  female MRN: [id number]  Unit/Bed#: [address] [place] [address] Encounter: [id number]      Assessment/Plan:    #1 - Free Intraperitoneal Air: CT Abdomen/Pelvis on admission showing a moderate amount of free intraperitoneal air noted within the anterior upper abdomen raising the concern for a perforated viscus without evidence of large or small bowel obstruction; General Surgery Following; POD #0 s/p Exploratory Laparotomy with operative findings showing a perforated 3mm duodenal ulcer corrected with Kolby's Patch; Continue Pain Control per General Surgery; Advance Diet as Tolerated; Continue Hydration with LR 100cc/hr; Continue Zosyn 2 25g IV q6h x3 doses; Incentive Spirometry to prevent post-op complications including Atelectasis/PNA    #2 - RODRIGO: Baseline Cr: 0 8-1; GFR: 32; Cr on admission: 1 56; Continue LR 100cc/hr; Avoid Nephrotoxic Agents    #3 - Dementia: Stable; continue home medication: Amantadine 100mg PO qd    #4 - Parkinson's Disease: Stable; continue home medication: Carbidopa-Levodopa ER 25-100mg PO bid and Ropinirole 2mg PO tid    #5 - Insomnia: Stable; continue home medication: Mirtazapine 30mg PO qd bedtime    #6 - Hypertension: Stable; Monitor with routine vitals; continue home medication: Norvasc 5mg PO qd    #7 - Type 2 Diabetes Mellitus: HbA1c on 3/8/19: 6 4; Hold home medication Metformin due to RODRIGO; Will place on ISS with tight glycemic control (BS goal <180)    #8 - GERD: Stable; continue home medication    #9 - Hx of SCC of Skin (2016)  #10 - Hx of PE (2014)      Plan discussed and agreed upon with Attending Physician On-Call       Subjective:     67year old female with a PMH of Dementia, Parkinson's Disease, Insomnia, HTN, Type 2 DM, GERD presented to PCP on morning of 7/18 with a chief complaint of abdominal pain  Pain located in the periumbilical area with radiation to the right and left lower quadrant    Pain is sudden and lasted for few hours  Described as aching like  No exacerbating or alleviating factors  Associated symptoms included excessive diaphoresis  Otherwise patient denied any constipation, diarrhea, nausea, vomiting dysuria, change in bowel/urinary habits, blood in stool, or blood in urine  Per chart review, patient has extensive surgical history including appendectomy, cholecystectomy, bilateral oophorectomy, polyp removal and a ventral hernia repair with mesh  Patient was prompted to obtain a CT abdomen/pelvis by PCP which showed concern for free intraperitoneal air  General surgery was consulted and performed a exploratory laparotomy to rule out perforated viscus  Patient is POD #0 s/p exploratory laparotomy  Operative findings showed a 3 mm duodenal ulcer which was corrected with Kolby's Patch  Family Medicine is being consulted to control patient's chronic medical conditions particularly type 2 diabetes and hypertension  Objective:     Vitals: Blood pressure 143/67, pulse 74, temperature (!) 97 2 °F (36 2 °C), temperature source Oral, resp  rate 18, height 4' 11" (1 499 m), weight 40 4 kg (89 lb), SpO2 100 %, not currently breastfeeding  ,Body mass index is 17 98 kg/m²    Wt Readings from Last 3 Encounters:   07/18/19 40 4 kg (89 lb)   07/18/19 39 5 kg (87 lb)   06/21/19 40 5 kg (89 lb 3 2 oz)       Intake/Output Summary (Last 24 hours) at 7/18/2019 2301  Last data filed at 7/18/2019 2041  Gross per 24 hour   Intake 1850 ml   Output 175 ml   Net 1675 ml     Physical Exam:   General appearance: alert and oriented, in no acute distress  Lungs: clear to auscultation bilaterally  Heart: regular rate and rhythm, S1, S2 normal, no murmur, click, rub or gallop  Abdomen: soft, non-tender; bowel sounds normal; no masses,  no organomegaly; JB drain in place  Extremities: extremities normal, warm and well-perfused; no cyanosis, clubbing, or edema  Pulses: 2+ and symmetric     Recent Results (from the past 24 hour(s))   CBC and differential    Collection Time: 07/18/19 12:36 PM   Result Value Ref Range    WBC 11 01 (H) 4 31 - 10 16 Thousand/uL    RBC 3 58 (L) 3 81 - 5 12 Million/uL    Hemoglobin 10 5 (L) 11 5 - 15 4 g/dL    Hematocrit 34 8 34 8 - 46 1 %    MCV 97 82 - 98 fL    MCH 29 3 26 8 - 34 3 pg    MCHC 30 2 (L) 31 4 - 37 4 g/dL    RDW 13 2 11 6 - 15 1 %    MPV 10 3 8 9 - 12 7 fL    Platelets 762 915 - 733 Thousands/uL    nRBC 0 /100 WBCs   Sedimentation rate, automated    Collection Time: 07/18/19 12:36 PM   Result Value Ref Range    Sed Rate 16 2 - 25 mm/hour   Manual Differential(PHLEBS Do Not Order)    Collection Time: 07/18/19 12:36 PM   Result Value Ref Range    Segmented % 74 43 - 75 %    Bands % 22 (H) 0 - 8 %    Lymphocytes % 2 (L) 14 - 44 %    Monocytes % 0 (L) 4 - 12 %    Eosinophils, % 2 0 - 6 %    Basophils % 0 0 - 1 %    Absolute Neutrophils 10 57 (H) 1 85 - 7 62 Thousand/uL    Lymphocytes Absolute 0 22 (L) 0 60 - 4 47 Thousand/uL    Monocytes Absolute 0 00 0 00 - 1 22 Thousand/uL    Eosinophils Absolute 0 22 0 00 - 0 40 Thousand/uL    Basophils Absolute 0 00 0 00 - 0 10 Thousand/uL    Total Counted 100     RBC Morphology Normal     Platelet Estimate Adequate Adequate    Clumped Platelets Present    Comprehensive metabolic panel    Collection Time: 07/18/19  3:09 PM   Result Value Ref Range    Sodium 135 (L) 136 - 145 mmol/L    Potassium 3 8 3 5 - 5 3 mmol/L    Chloride 99 (L) 100 - 108 mmol/L    CO2 28 21 - 32 mmol/L    ANION GAP 8 4 - 13 mmol/L    BUN 23 5 - 25 mg/dL    Creatinine 1 58 (H) 0 60 - 1 30 mg/dL    Glucose, Fasting 123 (H) 65 - 99 mg/dL    Calcium 9 1 8 3 - 10 1 mg/dL    AST 19 5 - 45 U/L    ALT 9 (L) 12 - 78 U/L    Alkaline Phosphatase 71 46 - 116 U/L    Total Protein 7 1 6 4 - 8 2 g/dL    Albumin 3 9 3 5 - 5 0 g/dL    Total Bilirubin 1 10 (H) 0 20 - 1 00 mg/dL    eGFR 31 ml/min/1 73sq m   UA w Reflex to Microscopic w Reflex to Culture -Lab Collect    Collection Time: 07/18/19  4:06 PM   Result Value Ref Range    Color, UA Light Yellow     Clarity, UA Cloudy     Specific [place], UA 1 025 1 000 - 1 030    pH, UA 5 5 5 0, 5 5, 6 0, 6 5, 7 0, 7 5, 8 0, 8 5, 9 0    Leukocytes, UA Large (A) Negative    Nitrite, UA Negative Negative    Protein, UA Trace (A) Negative mg/dl    Glucose, UA Negative Negative mg/dl    Ketones, UA Trace (A) Negative mg/dl    Urobilinogen, UA 0 2 0 2, 1 0 E U /dl E U /dl    Bilirubin, UA Negative Negative    Blood, UA Trace-Intact (A) Negative   Urine Microscopic    Collection Time: 07/18/19  4:06 PM   Result Value Ref Range    RBC, UA 1-2 (A) None Seen, 0-5 /hpf    WBC, UA Innumerable (A) None Seen, 0-5, 5-55, 5-65 /hpf    Epithelial Cells Occasional None Seen, Occasional /hpf    Bacteria, UA Innumerable (A) None Seen, Occasional /hpf   CBC and differential    Collection Time: 07/18/19  5:15 PM   Result Value Ref Range    WBC 16 76 (H) 4 31 - 10 16 Thousand/uL    RBC 4 04 3 81 - 5 12 Million/uL    Hemoglobin 11 9 11 5 - 15 4 g/dL    Hematocrit 38 3 34 8 - 46 1 %    MCV 95 82 - 98 fL    MCH 29 5 26 8 - 34 3 pg    MCHC 31 1 (L) 31 4 - 37 4 g/dL    RDW 13 1 11 6 - 15 1 %    MPV 9 9 8 9 - 12 7 fL    Platelets 343 011 - 219 Thousands/uL    nRBC 0 /100 WBCs   Protime-INR    Collection Time: 07/18/19  5:15 PM   Result Value Ref Range    Protime 11 0 9 4 - 11 7 seconds    INR 1 05 0 86 - 1 16   APTT    Collection Time: 07/18/19  5:15 PM   Result Value Ref Range    PTT 25 23 - 37 seconds   Comprehensive metabolic panel    Collection Time: 07/18/19  5:15 PM   Result Value Ref Range    Sodium 134 (L) 136 - 145 mmol/L    Potassium 3 9 3 5 - 5 3 mmol/L    Chloride 97 (L) 100 - 108 mmol/L    CO2 27 21 - 32 mmol/L    ANION GAP 10 4 - 13 mmol/L    BUN 24 5 - 25 mg/dL    Creatinine 1 56 (H) 0 60 - 1 30 mg/dL    Glucose 146 (H) 65 - 140 mg/dL    Calcium 9 4 8 3 - 10 1 mg/dL    AST 16 5 - 45 U/L    ALT 10 (L) 12 - 78 U/L    Alkaline Phosphatase 79 46 - 116 U/L    Total Protein 7 8 6 4 - 8 2 g/dL    Albumin 4 2 3 5 - 5 0 g/dL    Total Bilirubin 1 20 (H) 0 20 - 1 00 mg/dL    eGFR 32 ml/min/1 73sq m   Lactic acid, plasma    Collection Time: 07/18/19  5:15 PM   Result Value Ref Range    LACTIC ACID 1 2 0 5 - 2 0 mmol/L   Troponin I    Collection Time: 07/18/19  5:15 PM   Result Value Ref Range    Troponin I <0 02 <=0 04 ng/mL   Manual Differential(PHLEBS Do Not Order)    Collection Time: 07/18/19  5:15 PM   Result Value Ref Range    Segmented % 76 (H) 43 - 75 %    Bands % 18 (H) 0 - 8 %    Lymphocytes % 3 (L) 14 - 44 %    Monocytes % 1 (L) 4 - 12 %    Eosinophils, % 1 0 - 6 %    Basophils % 0 0 - 1 %    Metamyelocytes% 1 0 - 1 %    Absolute Neutrophils 15 75 (H) 1 85 - 7 62 Thousand/uL    Lymphocytes Absolute 0 50 (L) 0 60 - 4 47 Thousand/uL    Monocytes Absolute 0 17 0 00 - 1 22 Thousand/uL    Eosinophils Absolute 0 17 0 00 - 0 40 Thousand/uL    Basophils Absolute 0 00 0 00 - 0 10 Thousand/uL    Total Counted 100     Toxic Granulation Present     RBC Morphology Normal     Platelet Estimate Adequate Adequate   ECG 12 lead    Collection Time: 07/18/19  5:25 PM   Result Value Ref Range    Ventricular Rate 79 BPM    Atrial Rate 79 BPM    ME Interval 176 ms    QRSD Interval 74 ms    QT Interval 426 ms    QTC Interval 488 ms    P [place] 17 degrees    QRS Axis -18 degrees    T Wave [place] 57 degrees   ECG 12 lead    Collection Time: 07/18/19  5:26 PM   Result Value Ref Range    Ventricular Rate 76 BPM    Atrial Rate 76 BPM    ME Interval 208 ms    QRSD Interval 70 ms    QT Interval 412 ms    QTC Interval 463 ms    P Axis  degrees    QRS Axis -18 degrees    T Wave Axis 17 degrees   Fingerstick Glucose (POCT)    Collection Time: 07/18/19  8:14 PM   Result Value Ref Range    POC Glucose 170 (H) 65 - 140 mg/dl   Fingerstick Glucose (POCT)    Collection Time: 07/18/19  8:59 PM   Result Value Ref Range    POC Glucose 182 (H) 65 - 140 mg/dl       Current Facility-Administered Medications   Medication Dose Route Frequency Provider Last Rate Last Dose    acetaminophen (TYLENOL) tablet 650 mg  650 mg Oral Q6H [address] Anand Funes PA-C   650 mg at 07/18/19 2243    aluminum-magnesium hydroxide-simethicone (MYLANTA) 200-200-20 mg/5 mL oral suspension 30 mL  30 mL Oral Q6H PRN Roel Moore PA-C        [START ON 7/19/2019] amLODIPine (NORVASC) tablet 5 mg  5 mg Oral Daily Roel Moore PA-C        [START ON 7/19/2019] aspirin (ECOTRIN LOW STRENGTH) EC tablet 81 mg  81 mg Oral Daily Anand Funes PA-C        [START ON 7/19/2019] carbidopa-levodopa (SINEMET)  mg per tablet 2 tablet  2 tablet Oral Daily Roel Moore PA-C        heparin (porcine) subcutaneous injection 5,000 Units  5,000 Units Subcutaneous [facility] Roel Moore PA-C   5,000 Units at 07/18/19 2243    insulin lispro (HumaLOG) 100 units/mL subcutaneous injection 1-5 Units  1-5 Units Subcutaneous Q6H 1405 Constantino Hodgson PA-C   1 Units at 07/18/19 2243    lactated ringers infusion  100 mL/hr Intravenous Continuous Roel Moore PA-C 100 mL/hr at 07/18/19 2242 100 mL/hr at 07/18/19 2242    mirtazapine (REMERON) tablet 30 mg  30 mg Oral HS Roel Moore PA-C   30 mg at 07/18/19 2243    morphine injection 2 mg  2 mg Intravenous Q4H PRN Roel Moore PA-C        ondansetron [facility]) injection 4 mg  4 mg Intravenous Q6H PRN Roel Moore PA-C        oxyCODONE (ROXICODONE) IR tablet 2 5 mg  2 5 mg Oral Q4H PRN Roel Moore PA-C        oxyCODONE (ROXICODONE) IR tablet 5 mg  5 mg Oral Q4H PRN Roel Moore PA-C        [START ON 7/19/2019] pantoprazole (PROTONIX) injection 40 mg  40 mg Intravenous Q24H [address] Anand Funes PA-C        piperacillin-tazobactam (ZOSYN) 2 25 g in sodium chloride 0 9 % 50 mL IVPB  2 25 g Intravenous Q6H Anand Funes PA-C 100 mL/hr at 07/18/19 2243 2 25 g at 07/18/19 2243    rOPINIRole (REQUIP) tablet 2 mg  2 mg Oral TID Roel Moore PA-C   2 mg at 07/18/19 2243       Invasive Devices     Peripheral Intravenous Line            Peripheral IV 07/18/19 Left Antecubital less than 1 day          Drain            Closed/Suction Drain Midline Abdomen Bulb less than 1 day    Urethral Catheter Latex 16 Fr  less than 1 day                Lab, Imaging and other studies: I have personally reviewed pertinent reports      VTE Pharmacologic Prophylaxis: Heparin  VTE Mechanical Prophylaxis: sequential compression device      Becky Collins MD

## 2019-07-19 NOTE — PROGRESS NOTES
Progress Note - General Surgery   Cynthia Rom 67 y o  female MRN: [id number]  Unit/Bed#: [address] [place] [address] Encounter: [id number]    Assessment:  · POD#1 s/p exploratory laparotomy -- repair of 3mm duodenal ulcer with Kolby's patch, leukocytosis not unexpected and likely reactive to surgery,   · Dementia -- per family medicine team, seemed to be hallucinating overnight, becoming combative and difficult to reorient  · Parkinson's disease   · RODRIGO -- creatinine of 1 56 on admission, down to 1 23 today with patients normal baseline being 0 8-1  · Type 2 Diabetes Mellitus -- HbA1c on 3/8/19 was 6 4      Plan:  · Continue home medications  · NPO sips with meds  · Await further bowel function  · Continue protonix 40mg daily  · SQ heparin for DVT ppx  · Continue LRs at 75cc/hr  · continue IV zosyn  · Likely d/c allen today  · Oral pain medication  · Follow up PT/OT recommendations  · Insulin sliding scale  · Appreciate family medicine following        Subjective/Objective     Subjective: Patient asking if she can drink some fluids  She states she has almost no pain currently  She would like to get up and out of bed  Patient denies any nausea, vomiting, abdominal pain  She doesn't believe she's been passing any flatus yet  Objective:  Abdomen soft, non-tender, bowel sounds present    Blood pressure 143/72, pulse 72, temperature 98 8 °F (37 1 °C), resp  rate 18, height 4' 11" (1 499 m), weight 40 4 kg (89 lb), SpO2 97 %, not currently breastfeeding  ,Body mass index is 17 98 kg/m²        Intake/Output Summary (Last 24 hours) at 7/19/2019 0805  Last data filed at 7/19/2019 0532  Gross per 24 hour   Intake 1850 ml   Output 1225 ml   Net 625 ml       Invasive Devices     Peripheral Intravenous Line            Peripheral IV 07/18/19 Left Antecubital less than 1 day          Drain            Closed/Suction Drain Midline Abdomen Bulb less than 1 day    Urethral Catheter Latex 16 Fr  less than 1 day                Physical Exam: BP 143/72   Pulse 72   Temp 98 8 °F (37 1 °C)   Resp 18   Ht 4' 11" (1 499 m)   Wt 40 4 kg (89 lb)   SpO2 97%   BMI 17 98 kg/m²   General appearance: lying in bed, no acute distress, baseline quiet quick speech, answering questions appropriately  Head: Normocephalic, without obvious abnormality, atraumatic, chronic torticollis to right side  Lungs: clear to auscultation bilaterally  Heart: regular rate and rhythm, holosystolic murmur  Abdomen: soft, non-tender; bowel sounds normal; no masses,  no organomegaly, no distention, laparotomy incision intact  Skin: Skin color, texture, turgor normal  No rashes or lesions    Lab, Imaging and other studies:  I have personally reviewed pertinent lab results    , CBC:   Lab Results   Component Value Date    WBC 18 47 (H) 07/19/2019    HGB 11 3 (L) 07/19/2019    HCT 35 5 07/19/2019    MCV 94 07/19/2019     07/19/2019    MCH 29 9 07/19/2019    MCHC 31 8 07/19/2019    RDW 13 2 07/19/2019    MPV 10 2 07/19/2019    NRBC 0 07/19/2019   , CMP:   Lab Results   Component Value Date    SODIUM 138 07/19/2019    K 4 1 07/19/2019     07/19/2019    CO2 26 07/19/2019    BUN 22 07/19/2019    CREATININE 1 23 07/19/2019    CALCIUM 8 8 07/19/2019    AST 16 07/18/2019    ALT 10 (L) 07/18/2019    ALKPHOS 79 07/18/2019    EGFR 42 07/19/2019     VTE Pharmacologic Prophylaxis: Heparin  VTE Mechanical Prophylaxis: sequential compression device

## 2019-07-19 NOTE — MALNUTRITION/BMI
This medical record reflects one or more clinical indicators suggestive of malnutrition  Malnutrition Findings:   Malnutrition type: Chronic illness(Severe protein calorie malnutrition of chronic illness related to inadequate energy intake as evidenced by hollow orbits, depression at the temples, protruding clavicles, 21% weight loss in 12 months  Awaiting nutrition tx)  Degree of Malnutrition: Other severe protein calorie malnutrition  Malnutrition Characteristics: Fat loss, Muscle loss, Weight loss    BMI Findings:  BMI Classifications: Underweight < 18 5     Body mass index is 17 98 kg/m²  See Nutrition note dated 7/19/19 for additional details  Completed nutrition assessment is viewable in the nutrition documentation

## 2019-07-19 NOTE — SOCIAL WORK
LOS - 2 days    SW met with pt to assess needs and discuss plans  Discussed goals of making sure pt's needs are met upon discharge  Pt lives at home with her   Per pt she has been going to outpatient physical therapy twice a week   transports  Pt has cane and walker at home  Pt's PCP is Dr Danilo Montaño MD   Preferred pharmacy is [facility]   Per pt she has prescription coverage and has no difficulty getting her medication as prescribed  Per pt she currently does not have an advanced directive or POA  Offered information which pt accepted  SW will assist with completing paperwork if pt chooses to  Pt's plan at this time is to return home with  and she would like to resume outpatient therapy  PT/OT will be evaluating pt tomorrow  SW will continue to follow to monitor needs and progress  Will assist with planning as needed

## 2019-07-20 LAB
ANION GAP SERPL CALCULATED.3IONS-SCNC: 8 MMOL/L (ref 4–13)
BACTERIA UR CULT: ABNORMAL
BASOPHILS # BLD AUTO: 0.05 THOUSANDS/ΜL (ref 0–0.1)
BASOPHILS NFR BLD AUTO: 1 % (ref 0–1)
BUN SERPL-MCNC: 25 MG/DL (ref 5–25)
CALCIUM SERPL-MCNC: 8.6 MG/DL (ref 8.3–10.1)
CHLORIDE SERPL-SCNC: 102 MMOL/L (ref 100–108)
CO2 SERPL-SCNC: 27 MMOL/L (ref 21–32)
CREAT SERPL-MCNC: 1.12 MG/DL (ref 0.6–1.3)
EOSINOPHIL # BLD AUTO: 0.05 THOUSAND/ΜL (ref 0–0.61)
EOSINOPHIL NFR BLD AUTO: 1 % (ref 0–6)
ERYTHROCYTE [DISTWIDTH] IN BLOOD BY AUTOMATED COUNT: 13.3 % (ref 11.6–15.1)
GFR SERPL CREATININE-BSD FRML MDRD: 47 ML/MIN/1.73SQ M
GLUCOSE SERPL-MCNC: 67 MG/DL (ref 65–140)
GLUCOSE SERPL-MCNC: 69 MG/DL (ref 65–140)
GLUCOSE SERPL-MCNC: 76 MG/DL (ref 65–140)
GLUCOSE SERPL-MCNC: 83 MG/DL (ref 65–140)
GLUCOSE SERPL-MCNC: 84 MG/DL (ref 65–140)
HCT VFR BLD AUTO: 32.4 % (ref 34.8–46.1)
HGB BLD-MCNC: 10.1 G/DL (ref 11.5–15.4)
IMM GRANULOCYTES # BLD AUTO: 0.04 THOUSAND/UL (ref 0–0.2)
IMM GRANULOCYTES NFR BLD AUTO: 0 % (ref 0–2)
LYMPHOCYTES # BLD AUTO: 0.43 THOUSANDS/ΜL (ref 0.6–4.47)
LYMPHOCYTES NFR BLD AUTO: 4 % (ref 14–44)
MCH RBC QN AUTO: 29.9 PG (ref 26.8–34.3)
MCHC RBC AUTO-ENTMCNC: 31.2 G/DL (ref 31.4–37.4)
MCV RBC AUTO: 96 FL (ref 82–98)
MONOCYTES # BLD AUTO: 0.59 THOUSAND/ΜL (ref 0.17–1.22)
MONOCYTES NFR BLD AUTO: 5 % (ref 4–12)
NEUTROPHILS # BLD AUTO: 9.73 THOUSANDS/ΜL (ref 1.85–7.62)
NEUTS SEG NFR BLD AUTO: 89 % (ref 43–75)
NRBC BLD AUTO-RTO: 0 /100 WBCS
PLATELET # BLD AUTO: 241 THOUSANDS/UL (ref 149–390)
PMV BLD AUTO: 10 FL (ref 8.9–12.7)
POTASSIUM SERPL-SCNC: 3.9 MMOL/L (ref 3.5–5.3)
RBC # BLD AUTO: 3.38 MILLION/UL (ref 3.81–5.12)
SODIUM SERPL-SCNC: 137 MMOL/L (ref 136–145)
WBC # BLD AUTO: 10.89 THOUSAND/UL (ref 4.31–10.16)

## 2019-07-20 PROCEDURE — 97530 THERAPEUTIC ACTIVITIES: CPT

## 2019-07-20 PROCEDURE — 99024 POSTOP FOLLOW-UP VISIT: CPT | Performed by: SPECIALIST

## 2019-07-20 PROCEDURE — C9113 INJ PANTOPRAZOLE SODIUM, VIA: HCPCS | Performed by: PHYSICIAN ASSISTANT

## 2019-07-20 PROCEDURE — 97116 GAIT TRAINING THERAPY: CPT

## 2019-07-20 PROCEDURE — G8987 SELF CARE CURRENT STATUS: HCPCS

## 2019-07-20 PROCEDURE — 85025 COMPLETE CBC W/AUTO DIFF WBC: CPT | Performed by: STUDENT IN AN ORGANIZED HEALTH CARE EDUCATION/TRAINING PROGRAM

## 2019-07-20 PROCEDURE — 99232 SBSQ HOSP IP/OBS MODERATE 35: CPT | Performed by: FAMILY MEDICINE

## 2019-07-20 PROCEDURE — G8979 MOBILITY GOAL STATUS: HCPCS

## 2019-07-20 PROCEDURE — G8988 SELF CARE GOAL STATUS: HCPCS

## 2019-07-20 PROCEDURE — 97167 OT EVAL HIGH COMPLEX 60 MIN: CPT

## 2019-07-20 PROCEDURE — 82948 REAGENT STRIP/BLOOD GLUCOSE: CPT

## 2019-07-20 PROCEDURE — G8978 MOBILITY CURRENT STATUS: HCPCS

## 2019-07-20 PROCEDURE — 80048 BASIC METABOLIC PNL TOTAL CA: CPT | Performed by: FAMILY MEDICINE

## 2019-07-20 PROCEDURE — 97163 PT EVAL HIGH COMPLEX 45 MIN: CPT

## 2019-07-20 RX ORDER — DEXTROSE AND SODIUM CHLORIDE 5; .45 G/100ML; G/100ML
75 INJECTION, SOLUTION INTRAVENOUS CONTINUOUS
Status: DISCONTINUED | OUTPATIENT
Start: 2019-07-20 | End: 2019-07-21

## 2019-07-20 RX ORDER — PANTOPRAZOLE SODIUM 40 MG/1
40 INJECTION, POWDER, FOR SOLUTION INTRAVENOUS EVERY 12 HOURS SCHEDULED
Status: DISCONTINUED | OUTPATIENT
Start: 2019-07-20 | End: 2019-07-21

## 2019-07-20 RX ADMIN — PIPERACILLIN SODIUM,TAZOBACTAM SODIUM 2.25 G: 2; .25 INJECTION, POWDER, FOR SOLUTION INTRAVENOUS at 22:57

## 2019-07-20 RX ADMIN — DEXTROSE AND SODIUM CHLORIDE 75 ML/HR: 5; .45 INJECTION, SOLUTION INTRAVENOUS at 22:57

## 2019-07-20 RX ADMIN — HEPARIN SODIUM 5000 UNITS: 5000 INJECTION INTRAVENOUS; SUBCUTANEOUS at 05:17

## 2019-07-20 RX ADMIN — DEXTROSE AND SODIUM CHLORIDE 75 ML/HR: 5; .45 INJECTION, SOLUTION INTRAVENOUS at 07:12

## 2019-07-20 RX ADMIN — ACETAMINOPHEN 650 MG: 325 TABLET, FILM COATED ORAL at 17:19

## 2019-07-20 RX ADMIN — MIRTAZAPINE 30 MG: 15 TABLET, FILM COATED ORAL at 21:24

## 2019-07-20 RX ADMIN — HEPARIN SODIUM 5000 UNITS: 5000 INJECTION INTRAVENOUS; SUBCUTANEOUS at 13:25

## 2019-07-20 RX ADMIN — ACETAMINOPHEN 650 MG: 325 TABLET, FILM COATED ORAL at 13:25

## 2019-07-20 RX ADMIN — ACETAMINOPHEN 650 MG: 325 TABLET, FILM COATED ORAL at 00:08

## 2019-07-20 RX ADMIN — AMLODIPINE BESYLATE 5 MG: 5 TABLET ORAL at 08:40

## 2019-07-20 RX ADMIN — PANTOPRAZOLE SODIUM 40 MG: 40 INJECTION, POWDER, FOR SOLUTION INTRAVENOUS at 08:40

## 2019-07-20 RX ADMIN — HEPARIN SODIUM 5000 UNITS: 5000 INJECTION INTRAVENOUS; SUBCUTANEOUS at 21:23

## 2019-07-20 RX ADMIN — ROPINIROLE HYDROCHLORIDE 4 MG: 1 TABLET, FILM COATED ORAL at 21:29

## 2019-07-20 RX ADMIN — ACETAMINOPHEN 650 MG: 325 TABLET, FILM COATED ORAL at 05:18

## 2019-07-20 RX ADMIN — ACETAMINOPHEN 650 MG: 325 TABLET, FILM COATED ORAL at 23:02

## 2019-07-20 RX ADMIN — PIPERACILLIN SODIUM,TAZOBACTAM SODIUM 2.25 G: 2; .25 INJECTION, POWDER, FOR SOLUTION INTRAVENOUS at 17:17

## 2019-07-20 RX ADMIN — ASPIRIN 81 MG: 81 TABLET, COATED ORAL at 08:40

## 2019-07-20 RX ADMIN — PIPERACILLIN SODIUM,TAZOBACTAM SODIUM 2.25 G: 2; .25 INJECTION, POWDER, FOR SOLUTION INTRAVENOUS at 10:47

## 2019-07-20 RX ADMIN — PIPERACILLIN SODIUM,TAZOBACTAM SODIUM 2.25 G: 2; .25 INJECTION, POWDER, FOR SOLUTION INTRAVENOUS at 00:05

## 2019-07-20 RX ADMIN — PIPERACILLIN SODIUM,TAZOBACTAM SODIUM 2.25 G: 2; .25 INJECTION, POWDER, FOR SOLUTION INTRAVENOUS at 04:53

## 2019-07-20 RX ADMIN — ROPINIROLE HYDROCHLORIDE 2 MG: 1 TABLET, FILM COATED ORAL at 21:29

## 2019-07-20 RX ADMIN — PANTOPRAZOLE SODIUM 40 MG: 40 INJECTION, POWDER, FOR SOLUTION INTRAVENOUS at 21:25

## 2019-07-20 NOTE — PROGRESS NOTES
[facility] Progress Note - Valente Gabriel 67 y o  female MRN: [id number]    Unit/Bed#: [address] [place] [address] Encounter: [id number]      Assessment/Plan:    POD 1 from exploratory laparotomy in setting of free intraperitoneal air on admission as noted by CT ab/pelv  -operative finding showed performed 3mm duodenal ulcer corrected w/ Kolby's patch  -pain control per surgery, on Roxicodone 2 5mg q4hr PRN moderate pain and 5mg q4hr PRN severer pain  -currently NPO, advance diet per surgery  -cont IV hydration w/ D51/2NS @ 75cc/hr  -cont zosyn 2 25G q6hr, day 2/7    RODRIGO  -resolved  -CR 1 23 yesterday  -avoid nephrotoxic agents  -cont IVF as ordered    Hypoglycemia in setting of DM2  -BS 60 this AM  -likely 2/2 NPO status  -fluids changed to D51/2NS from LR  -cont to monitor    Type 2 DM  -last HgA1c on 3/8/19 was 6 4  -hold metformin  -ISS w/ accuchecks q6hr as NPO    Dementia  -stable  -cont amantadine 100mg daily    Parkinson's Disease  -stable  -cont carbidopa-levodopa Er 25/100mg bid and ropinirole 2mg TID    Insomnia  -stable  -cont mirtazapine 30mg qhs    HTN  -stable  -cont amlodipine 5mg daily    FEN  -D51/2NS @ 75cc/hr  -NPO  -replete electrolytes as needed  -heparin for DVT prophylaxis      Subjective:   Pt seen and examined  Feeling hungry, no other complaints  Objective:     Vitals: Blood pressure 126/65, pulse 72, temperature 98 1 °F (36 7 °C), temperature source Oral, resp  rate 17, height 4' 11" (1 499 m), weight 40 4 kg (89 lb), SpO2 95 %, not currently breastfeeding  ,Body mass index is 17 98 kg/m²    Wt Readings from Last 3 Encounters:   07/18/19 40 4 kg (89 lb)   07/18/19 39 5 kg (87 lb)   06/21/19 40 5 kg (89 lb 3 2 oz)       Intake/Output Summary (Last 24 hours) at 7/20/2019 0744  Last data filed at 7/20/2019 0645  Gross per 24 hour   Intake 950 ml   Output 1680 ml   Net -730 ml       Physical Exam: General appearance: alert  Lungs: clear to auscultation bilaterally  Heart: regular rate and rhythm, S1, S2 normal, no murmur, click, rub or gallop  Abdomen: soft, midly distended, incision c/d/i  Extremities: extremities normal, warm and well-perfused; no cyanosis, clubbing, or edema     Recent Results (from the past 24 hour(s))   Fingerstick Glucose (POCT)    Collection Time: 07/19/19 11:23 AM   Result Value Ref Range    POC Glucose 102 65 - 140 mg/dl   Fingerstick Glucose (POCT)    Collection Time: 07/19/19  6:09 PM   Result Value Ref Range    POC Glucose 81 65 - 140 mg/dl   Fingerstick Glucose (POCT)    Collection Time: 07/20/19 12:21 AM   Result Value Ref Range    POC Glucose 69 65 - 140 mg/dl   Fingerstick Glucose (POCT)    Collection Time: 07/20/19  5:58 AM   Result Value Ref Range    POC Glucose 67 65 - 140 mg/dl       Current Facility-Administered Medications   Medication Dose Route Frequency Provider Last Rate Last Dose    acetaminophen (TYLENOL) tablet 650 mg  650 mg Oral Q6H [address] Anand Funes PA-C   650 mg at 07/20/19 0518    aluminum-magnesium hydroxide-simethicone (MYLANTA) 200-200-20 mg/5 mL oral suspension 30 mL  30 mL Oral Q6H PRN Katy Mueller PA-C        amLODIPine (NORVASC) tablet 5 mg  5 mg Oral Daily Katy Mueller PA-C   5 mg at 07/19/19 0836    aspirin (ECOTRIN LOW STRENGTH) EC tablet 81 mg  81 mg Oral Daily Katy Mueller PA-C   81 mg at 07/19/19 0845    Carbidopa-Levodopa ER 48  MG CPCR 1 capsule  1 capsule Oral 4x Daily Katy Mueller PA-C   1 capsule at 07/20/19 0517    Carbidopa-Levodopa ER 48  MG CPCR 2 capsule  2 capsule Oral HS Katy Mueller PA-C   2 capsule at 07/19/19 2105    dextrose 5 % and sodium chloride 0 45 % infusion  75 mL/hr Intravenous Continuous Gerson Krishnamurthy PA-C 75 mL/hr at 07/20/19 0712 75 mL/hr at 07/20/19 0927    heparin (porcine) subcutaneous injection 5,000 Units  5,000 Units Subcutaneous [facility] Katy Mueller PA-C   5,000 Units at 07/20/19 0517    insulin lispro (HumaLOG) 100 units/mL subcutaneous injection 1-5 Units  1-5 Units Subcutaneous Q6H 1405 Constantino Hodgson PA-C   1 Units at 07/18/19 2243    mirtazapine (REMERON) tablet 30 mg  30 mg Oral HS Char Sanderson PA-C   30 mg at 07/19/19 2103    morphine injection 2 mg  2 mg Intravenous Q4H PRN Char ONUR Sanderson        ondansetron [facility]) injection 4 mg  4 mg Intravenous Q6H PRN Char Maria E, ONUR        oxyCODONE (ROXICODONE) IR tablet 2 5 mg  2 5 mg Oral Q4H PRN Char Maria E, ONUR        oxyCODONE (ROXICODONE) IR tablet 5 mg  5 mg Oral Q4H PRN Char Maria E, ONUR        pantoprazole (PROTONIX) injection 40 mg  40 mg Intravenous Q24H [address] Anand Funse PA-C   40 mg at 07/19/19 0836    piperacillin-tazobactam (ZOSYN) 2 25 g in sodium chloride 0 9 % 50 mL IVPB  2 25 g Intravenous Q6H Anand Funes PA-C 100 mL/hr at 07/20/19 0453 2 25 g at 07/20/19 0453    rOPINIRole (REQUIP) tablet 4 mg  4 mg Oral HS Mario Vo MD   4 mg at 07/19/19 2103    And    rOPINIRole (REQUIP) tablet 2 mg  2 mg Oral HS Mario Vo MD   2 mg at 07/19/19 2103       Invasive Devices     Peripheral Intravenous Line            Peripheral IV 07/20/19 Left;Ventral (anterior) Forearm less than 1 day          Drain            Closed/Suction Drain Midline Abdomen Bulb 1 day                Lab, Imaging and other studies: I have personally reviewed pertinent reports      VTE Pharmacologic Prophylaxis: Heparin  VTE Mechanical Prophylaxis: sequential compression device    Merigold MD Tatiana

## 2019-07-20 NOTE — OCCUPATIONAL THERAPY NOTE
OT EVALUATION/TREATMENT       07/20/19 0836   Note Type   Note type Eval/Treat   Restrictions/Precautions   Other Precautions Chair Alarm; Bed Alarm; Fall Risk   Pain Assessment   Pain Assessment No/denies pain   Pain Score No Pain  (Discomfort with movement)   Home Living   Type of Home House   Home Layout Two level;Bed/bath upstairs; Ramped entrance  (Ramped entrance at back of home)   Bathroom Shower/Tub Tub/shower unit   Bathroom Toilet Standard   Bathroom Equipment Grab bars in shower; Shower chair;Grab bars around toilet   Home Equipment Walker;Cane  (RW)   Additional Comments Pt reports ambulating with RW in home and using SPC for community distances  Prior Function   Level of [place] Independent with ADLs and functional mobility; Needs assistance with IADLs   Lives With Spouse   Receives Help From Family   ADL Assistance Independent   IADLs Needs assistance   Falls in the last 6 months   ("Too many")   Vocational Retired   Comments Pt reports that she is independent with ADLs including dressing and bathing   assists with IADLs including transportation to outpatient physical therapy 2x/week  Pt typically goes up/down stairs 1x per day with assistance from    She remains on the first floor of her home throughout the day     Lifestyle   Intrinsic Gratification Bowling   Subjective   Subjective "I take care of myself at home "   ADL   Eating Assistance 3  Moderate Assistance   Grooming Assistance 3  Moderate Assistance  (Seated EOB)   UB Bathing Assistance 4  Minimal Assistance  (Seated on shower chair)   LB Bathing Assistance 3  Moderate Assistance  (Seated on shower chair)   [address] 4  Minimal Assistance   LB Dressing Assistance 3  Moderate Assistance   [address]  3  Moderate Assistance   Bed Mobility   Supine to Sit 4  Minimal assistance   Additional items Assist x 1;Verbal cues;LE management   Transfers   Sit to Stand 4  Minimal assistance   Additional items Assist x 2;Verbal cues  (Hand hold assistance)   Stand to Sit 4  Minimal assistance   Additional items Assist x 2;Verbal cues  (Hand hold assistance)   Stand pivot 4  Minimal assistance   Additional items Assist x 2;Verbal cues; Increased time required  (Hand hold assistance)   Balance   Static Sitting Fair -   Dynamic Sitting Poor +   Static Standing Poor   Dynamic Standing Poor   Activity Tolerance   Activity Tolerance Patient tolerated treatment well   Nurse Made Aware Yes, nurse Sharda Wolff aware pt seated in chair with alarm and lap belt   RUE Assessment   RUE Assessment X  (Grossly 3+/5, difficult to assess due to tremors)   LUE Assessment   LUE Assessment X  (Grossly 3+/5, difficult to assess due to tremors)   Hand Function   Gross Motor Coordination Impaired   Fine Motor Coordination Impaired   Cognition   Arousal/Participation Alert; Responsive; Cooperative   Attention Within functional limits   Orientation Level Oriented X4   Following Commands Follows multistep commands without difficulty   Assessment   Limitation Decreased ADL status; Decreased UE strength;Decreased endurance;Decreased self-care trans;Decreased high-level ADLs   Prognosis Good   Assessment Patient evaluated by Occupational Therapy  Patient admitted with Free intraperitoneal air  The patients occupational profile, medical and therapy history includes a extensive additional review of physical, cognitive, or psychosocial history related to current functional performance  Comorbidities affecting functional mobility and ADLS include: insomnia, Parkinson's disease, dementia, and history of confusion  Prior to admission, patient was independent with ADLS and requiring assist for IADLS    The evaluation identifies the following performance deficits: weakness, impaired balance, decreased endurance, increased fall risk, new onset of impairment of functional mobility, decreased ADLS, decreased IADLS and decreased activity tolerance, that result in activity limitations and/or participation restrictions  This evaluation requires clinical decision making of high complexity, because the patient presents with comorbidites that affect occupational performance and required significant modification of tasks or assistance with consideration of multiple treatment options  The Barthel Index was used as a functional outcome tool presenting with a score of 30, indicating marked limitations of functional mobility and ADLS  Patient will benefit from skilled Occupational Therapy services to address above deficits and facilitate a safe return to prior level of function  Goals   Patient Goals To go home   STG Time Frame   (1-7 days)   Short Term Goal  Goals established to promote patient goal of going home:  Patient will increase standing tolerance to 3 minutes during ADL task to decrease assistance level and decrease fall risk; Patient will increase bed mobility to supervision in preparation for ADLS and transfers; Patient will increase functional mobility to and from bedside commode with rolling walker with min assist to increase performance with ADLS and to use a toilet; Patient will tolerate 10 minutes of UE ROM/strengthening to increase general activity tolerance and performance in ADLS/IADLS; Patient will improve functional activity tolerance to 15 minutes of sustained functional tasks to increase participation in basic self-care and decrease assistance level; Patient will increase static sitting balance to fair and dynamic sitting balance to fair- to improve the ability to sit at edge of bed or on a chair for ADLS;  Patient will increase static/dynamic standing balance to poor+ to improve postural stability and decrease fall risk during standing ADLS and transfers       LTG Time Frame   (8-14 days)   Long Term Goal Goals established to promote patient goal of going home:  Patient will increase standing tolerance to 5 minutes during ADL task to decrease assistance level and decrease fall risk; Patient will increase bed mobility to independent in preparation for ADLS and transfers; Patient will increase functional mobility to and from bedside commode with rolling walker with supervision to increase performance with ADLS and to use a toilet; Patient will tolerate 15 minutes of UE ROM/strengthening to increase general activity tolerance and performance in ADLS/IADLS; Patient will improve functional activity tolerance to 20 minutes of sustained functional tasks to increase participation in basic self-care and decrease assistance level; Patient will increase static sitting balance to fair+ and dynamic sitting balance to fair to improve the ability to sit at edge of bed or on a chair for ADLS;  Patient will increase static/dynamic standing balance to fair- to improve postural stability and decrease fall risk during standing ADLS and transfers  Functional Transfer Goals   Pt Will Perform All Functional Transfers   (STG min assist, LTG supervision with RW)   ADL Goals   Pt Will Perform Eating   (STG min assist, LTG supervision)   Pt Will Perform Grooming   (STG min assist, LTG supervision seated EOB)   Pt Will Perform Bathing   (STG min assist, LTG supervision seated on shower chair)   Pt Will Perform UE Dressing   (STG supervision, LTG independent)   Pt Will Perform LE Dressing   (STG min assist, LTG supervision)   Pt Will Perform Toileting   (STG min assist, LTG supervision)   Plan   Treatment Interventions ADL retraining;Functional transfer training;UE strengthening/ROM; Endurance training;Patient/family training;Equipment evaluation/education; Compensatory technique education; Activityengagement   Goal Expiration Date 08/03/19   OT Frequency 3-5x/wk   Additional Treatment Session   Start Time 0826   End Time 0836   Treatment Assessment Pt willing to participate in therapy and tolerated session well   Pt able to maintain sitting balance EOB for approximately 5 minutes with min a to maintain upright balance  Pt required verbal cues and mod assist to scoot back onto bed  Pt performed sit<>stand transfer with min a x1 hand hold assistance and took several steps from bed to chair with shuffling gait  Pt performed stand>sit transfer with min a x2 and verbal cues for safe transfer technique  Pt will benefit from RW for further distances  Pt will continue to benefit from skilled occupational therapy services to increase independence and safety with ADLs and functional transfers     Recommendation   OT Discharge Recommendation 24 hour supervision/assist   Barthel Index   Feeding 5   Bathing 0   Grooming Score 0   Dressing Score 5   Bladder Score 0   Bowels Score 0   Toilet Use Score 5   Transfers (Bed/Chair) Score 10   Mobility (Level Surface) Score 0   Stairs Score 5   Barthel Index Score 30   Licensure   [place] License Number  Ruth Ann Timmons [address], OTR/L [id number]

## 2019-07-20 NOTE — PROGRESS NOTES
Progress Note - General Surgery   Tori Felix 67 y o  female MRN: [id number]  Unit/Bed#: [address] [address] Encounter: [id number]    Assessment:  -POD# 2 s/p exploratory laparotomy with repair of 3mm duodenal ulcer with von patch  Leukocytosis likely reactive to surgery  Denies BM or passing flatus  Abdominal pain improved since surgery    -Parkinson's disease: dementia, on home meds,  -RODRIGO: Cr 1 23   -DM type 2: HgbA1c: 6 4     Plan:  -Dextrose 5% and 0 45 sodium chloride at 75 cc/hr  -Prn analgesics  -Continue to monitor JB drain  -NPO sips with meds  -Continue IV antibiotics  -Follow up with PT/OT  -Insulin sliding scale    -Am labs CBC, CMP, mag, phos  Subjective/Objective   Chief Complaint: "I am hungry "     Subjective: Patient was seen and examined bedside  Patient reports no acute changes through the night  Patient reports abdominal pain is much improved since the surgery  Denies any nausea, vomiting, diarrhea, chest pain, shortness of breath  Objective: JB drain sanguinous mixed with pus  Blood pressure 126/65, pulse 72, temperature 98 1 °F (36 7 °C), temperature source Oral, resp  rate 17, height 4' 11" (1 499 m), weight 40 4 kg (89 lb), SpO2 95 %, not currently breastfeeding  ,Body mass index is 17 98 kg/m²        Intake/Output Summary (Last 24 hours) at 7/20/2019 0903  Last data filed at 7/20/2019 0645  Gross per 24 hour   Intake 950 ml   Output 1680 ml   Net -730 ml       Invasive Devices     Peripheral Intravenous Line            Peripheral IV 07/20/19 Left;Ventral (anterior) Forearm less than 1 day          Drain            Closed/Suction Drain Midline Abdomen Bulb 1 day                Physical Exam: /65 (BP Location: Left arm)   Pulse 72   Temp 98 1 °F (36 7 °C) (Oral)   Resp 17   Ht 4' 11" (1 499 m)   Wt 40 4 kg (89 lb)   SpO2 95%   BMI 17 98 kg/m²   General appearance: alert   Heart: RRR  Lungs: CTABL  Abdomen: soft, mildly distended, scant bowel sounds, laparotomy incision clean dry intact Mepilex  Lab, Imaging and other studies:I have personally reviewed pertinent lab results    , CBC: No results found for: WBC, HGB, HCT, MCV, PLT, ADJUSTEDWBC, MCH, MCHC, RDW, MPV, NRBC, CMP: No results found for: SODIUM, K, CL, CO2, ANIONGAP, BUN, CREATININE, GLUCOSE, CALCIUM, AST, ALT, ALKPHOS, PROT, BILITOT, EGFR  VTE Pharmacologic Prophylaxis: Heparin  VTE Mechanical Prophylaxis: sequential compression device

## 2019-07-20 NOTE — PHYSICAL THERAPY NOTE
PT EVALUATION       07/20/19 3823   Note Type   Note type Eval/Treat   Pain Assessment   Pain Assessment No/denies pain   Home Living   Type of Home House   Home Layout Two level;Bed/bath upstairs; Ramped entrance   Bathroom Shower/Tub Tub/shower unit   Bathroom Equipment Shower chair;Grab bars in shower;Grab bars around toilet   Home Equipment Walker;Cane   Additional Comments Pt uses rolling walker in the house and the cane when outside  Pt was recently attending OP PT and using a cane to perform dynamic stepping and gait drills  Prior Function   Level of [place]   (ambulatory with walker)   Lives With Spouse   Receives Help From Family   ADL Assistance Independent   Restrictions/Precautions   Other Precautions Bed Alarm; Chair Alarm; Fall Risk   General   Additional Pertinent History Pt is day 2 post op exploratory lap for ulcer repair  Family/Caregiver Present No   Cognition   Arousal/Participation Cooperative   Attention Attends with cues to redirect   Orientation Level Oriented X4   Following Commands Follows one step commands without difficulty   RLE Assessment   RLE Assessment   (ROM WFL, MMT 4-/5)   LLE Assessment   LLE Assessment   (ROM WFL, MMT 4-/5)   Coordination   Movements are Fluid and Coordinated 0   Coordination and Movement Description dyskinesia and chorea worse when pt anxious/talking;  improved with progressive activity     Transfers   Sit to Stand 4  Minimal assistance   Additional items Assist x 1;Assist x 2   Stand to Sit 4  Minimal assistance   Additional items Assist x 1;Assist x 2   Stand pivot 4  Minimal assistance   Additional items Assist x 1;Assist x 2   Ambulation/Elevation   Gait pattern   (significant whole body dyskinesia/chorea)   Gait Assistance 4  Minimal assist   Assistive Device Rolling walker   Distance 100 feet   Balance   Static Sitting Fair -   Dynamic Sitting Poor   Static Standing Fair -   Dynamic Standing Poor   Activity Tolerance   Activity Tolerance Patient tolerated treatment well   Nurse Made Aware pt requires min assist for all activity due to dyskinesia   Assessment   Prognosis Good   Problem List Decreased strength;Decreased endurance; Impaired balance;Decreased mobility; Decreased safety awareness; Impaired judgement;Decreased cognition   Assessment Patient seen for Physical Therapy evaluation  Patient admitted with Free intraperitoneal air  Comorbidities affecting patient's physical performance include: Parkinsons disease, dementia, DM2, spinal stenosis  Personal factors affecting patient at time of initial evaluation include: lives in 2 story house, ambulating with assistive device, inability to navigate level surfaces without external assistance, positive fall history and impulsivity  Prior to admission, patient was independent with functional mobility with walker and attending outpatient PT  Please find objective findings from Physical Therapy assessment regarding body systems outlined above with impairments and limitations including weakness, impaired balance, gait deviations, decreased activity tolerance, decreased functional mobility tolerance, decreased safety awareness, impaired judgement, fall risk and decreased cognition  The Barthel Index was used as a functional outcome tool presenting with a score of 50 today indicating marked limitations of functional mobility and ADLS  Patient's clinical presentation is currently unstable/unpredictable as seen in patient's presentation of varying levels of cognitive performance, increased fall risk, new onset of impairment of functional mobility and new onset of weakness  Pt would benefit from continued Physical Therapy treatment to address deficits as defined above and maximize level of functional mobility  As demonstrated by objective findings, the assigned level of complexity for this evaluation is high      Goals   Patient Goals "go home, get my legs stronger"   [facility] Expiration Date 07/27/19   Short Term Goal #1 improve bed mobility and transfers to supervision, improve ambulation with walker to supervision 100 feet indoor level surfaces   LTG Expiration Date 08/03/19   Long Term Goal #1 no falls, improve bed mobility and transfers to close supervision, improve ambulation to supervision with cane, pt will ambulate with a normal base of support with heel strike 75% of the time  Plan   Treatment/Interventions ADL retraining;Functional transfer training;LE strengthening/ROM; Therapeutic exercise; Endurance training;Gait training;Bed mobility; Equipment eval/education;Patient/family training   PT Frequency 5x/wk   Recommendation   Recommendation 24 hour supervision/assist;Outpatient PT   Equipment Recommended   (pt has a cane and a walker)   Barthel Index   Feeding 5   Bathing 0   Grooming Score 0   Dressing Score 5   Bladder Score 10   Bowels Score 10   Toilet Use Score 5   Transfers (Bed/Chair) Score 5   Mobility (Level Surface) Score 10   Stairs Score 0   Barthel Index Score 48   Licensure   [place] License Number  Cara Erickson PT  [id number]     Time XV:5130  Time TRB:2021  Total Time: 28      S:  "my legs are a little weak"  O:  Min assist to stand and ambulate 2x150 feet with rolling walker in hallway   was present for second walk and reports pt is near baseline level of function  Discussed with  resuming OP PT vs STR and  would like to take pt home and back to STR   educated that pt will need 24 hour supervision/assist at least initially due to high risk of falls  A:  Pt demonstrates dyskinesia and chorea when sitting and walking that decreases some with practice  Pt needed less assist with practice      P:  Continue PT     Yesica Davey, PT

## 2019-07-21 ENCOUNTER — APPOINTMENT (INPATIENT)
Dept: RADIOLOGY | Facility: HOSPITAL | Age: 79
DRG: 329 | End: 2019-07-21
Payer: MEDICARE

## 2019-07-21 LAB
ALBUMIN SERPL BCP-MCNC: 2.8 G/DL (ref 3.5–5)
ALP SERPL-CCNC: 114 U/L (ref 46–116)
ALT SERPL W P-5'-P-CCNC: 25 U/L (ref 12–78)
ANION GAP SERPL CALCULATED.3IONS-SCNC: 9 MMOL/L (ref 4–13)
AST SERPL W P-5'-P-CCNC: 56 U/L (ref 5–45)
BACTERIA SPEC ANAEROBE CULT: NORMAL
BACTERIA SPEC BFLD CULT: ABNORMAL
BACTERIA SPEC BFLD CULT: ABNORMAL
BILIRUB SERPL-MCNC: 0.9 MG/DL (ref 0.2–1)
BUN SERPL-MCNC: 16 MG/DL (ref 5–25)
CALCIUM SERPL-MCNC: 9 MG/DL (ref 8.3–10.1)
CHLORIDE SERPL-SCNC: 103 MMOL/L (ref 100–108)
CO2 SERPL-SCNC: 27 MMOL/L (ref 21–32)
CREAT SERPL-MCNC: 1.02 MG/DL (ref 0.6–1.3)
ERYTHROCYTE [DISTWIDTH] IN BLOOD BY AUTOMATED COUNT: 13.1 % (ref 11.6–15.1)
GFR SERPL CREATININE-BSD FRML MDRD: 53 ML/MIN/1.73SQ M
GLUCOSE SERPL-MCNC: 105 MG/DL (ref 65–140)
GLUCOSE SERPL-MCNC: 119 MG/DL (ref 65–140)
GLUCOSE SERPL-MCNC: 132 MG/DL (ref 65–140)
GLUCOSE SERPL-MCNC: 85 MG/DL (ref 65–140)
GLUCOSE SERPL-MCNC: 94 MG/DL (ref 65–140)
GLUCOSE SERPL-MCNC: 98 MG/DL (ref 65–140)
GRAM STN SPEC: ABNORMAL
HCT VFR BLD AUTO: 32.1 % (ref 34.8–46.1)
HGB BLD-MCNC: 10.3 G/DL (ref 11.5–15.4)
MAGNESIUM SERPL-MCNC: 1.7 MG/DL (ref 1.6–2.6)
MCH RBC QN AUTO: 29.9 PG (ref 26.8–34.3)
MCHC RBC AUTO-ENTMCNC: 32.1 G/DL (ref 31.4–37.4)
MCV RBC AUTO: 93 FL (ref 82–98)
PHOSPHATE SERPL-MCNC: 2.6 MG/DL (ref 2.3–4.1)
PLATELET # BLD AUTO: 271 THOUSANDS/UL (ref 149–390)
PMV BLD AUTO: 10.2 FL (ref 8.9–12.7)
POTASSIUM SERPL-SCNC: 3.3 MMOL/L (ref 3.5–5.3)
PROT SERPL-MCNC: 6.6 G/DL (ref 6.4–8.2)
RBC # BLD AUTO: 3.44 MILLION/UL (ref 3.81–5.12)
SODIUM SERPL-SCNC: 139 MMOL/L (ref 136–145)
WBC # BLD AUTO: 12.61 THOUSAND/UL (ref 4.31–10.16)

## 2019-07-21 PROCEDURE — 74022 RADEX COMPL AQT ABD SERIES: CPT

## 2019-07-21 PROCEDURE — 83735 ASSAY OF MAGNESIUM: CPT | Performed by: PHYSICIAN ASSISTANT

## 2019-07-21 PROCEDURE — 82948 REAGENT STRIP/BLOOD GLUCOSE: CPT

## 2019-07-21 PROCEDURE — 80053 COMPREHEN METABOLIC PANEL: CPT | Performed by: PHYSICIAN ASSISTANT

## 2019-07-21 PROCEDURE — 84100 ASSAY OF PHOSPHORUS: CPT | Performed by: PHYSICIAN ASSISTANT

## 2019-07-21 PROCEDURE — 99232 SBSQ HOSP IP/OBS MODERATE 35: CPT | Performed by: FAMILY MEDICINE

## 2019-07-21 PROCEDURE — C9113 INJ PANTOPRAZOLE SODIUM, VIA: HCPCS | Performed by: PHYSICIAN ASSISTANT

## 2019-07-21 PROCEDURE — 99024 POSTOP FOLLOW-UP VISIT: CPT | Performed by: SPECIALIST

## 2019-07-21 PROCEDURE — 85027 COMPLETE CBC AUTOMATED: CPT | Performed by: PHYSICIAN ASSISTANT

## 2019-07-21 PROCEDURE — 97530 THERAPEUTIC ACTIVITIES: CPT

## 2019-07-21 RX ORDER — LORAZEPAM 2 MG/ML
0.5 INJECTION INTRAMUSCULAR ONCE
Status: COMPLETED | OUTPATIENT
Start: 2019-07-21 | End: 2019-07-21

## 2019-07-21 RX ORDER — HALOPERIDOL 5 MG/ML
1 INJECTION INTRAMUSCULAR ONCE
Status: COMPLETED | OUTPATIENT
Start: 2019-07-21 | End: 2019-07-21

## 2019-07-21 RX ORDER — HALOPERIDOL 5 MG/ML
1 INJECTION INTRAMUSCULAR EVERY 6 HOURS PRN
Status: DISCONTINUED | OUTPATIENT
Start: 2019-07-21 | End: 2019-07-23

## 2019-07-21 RX ORDER — POTASSIUM CHLORIDE 14.9 MG/ML
20 INJECTION INTRAVENOUS ONCE
Status: COMPLETED | OUTPATIENT
Start: 2019-07-21 | End: 2019-07-21

## 2019-07-21 RX ORDER — HYDROXYZINE HYDROCHLORIDE 25 MG/1
25 TABLET, FILM COATED ORAL EVERY 6 HOURS PRN
Status: DISCONTINUED | OUTPATIENT
Start: 2019-07-21 | End: 2019-07-23 | Stop reason: HOSPADM

## 2019-07-21 RX ORDER — RASAGILINE 1 MG/1
1 TABLET ORAL DAILY
Status: DISCONTINUED | OUTPATIENT
Start: 2019-07-22 | End: 2019-07-23 | Stop reason: HOSPADM

## 2019-07-21 RX ORDER — PANTOPRAZOLE SODIUM 40 MG/1
40 INJECTION, POWDER, FOR SOLUTION INTRAVENOUS
Status: DISCONTINUED | OUTPATIENT
Start: 2019-07-22 | End: 2019-07-23 | Stop reason: HOSPADM

## 2019-07-21 RX ORDER — RISPERIDONE 0.25 MG/1
0.25 TABLET, FILM COATED ORAL 2 TIMES DAILY
Status: DISCONTINUED | OUTPATIENT
Start: 2019-07-21 | End: 2019-07-22

## 2019-07-21 RX ORDER — DEXTROSE, SODIUM CHLORIDE, AND POTASSIUM CHLORIDE 5; .45; .15 G/100ML; G/100ML; G/100ML
75 INJECTION INTRAVENOUS CONTINUOUS
Status: DISCONTINUED | OUTPATIENT
Start: 2019-07-21 | End: 2019-07-22

## 2019-07-21 RX ORDER — HYDROXYZINE HYDROCHLORIDE 25 MG/1
25 TABLET, FILM COATED ORAL EVERY 6 HOURS PRN
Status: DISCONTINUED | OUTPATIENT
Start: 2019-07-21 | End: 2019-07-21

## 2019-07-21 RX ADMIN — AMLODIPINE BESYLATE 5 MG: 5 TABLET ORAL at 08:24

## 2019-07-21 RX ADMIN — PANTOPRAZOLE SODIUM 40 MG: 40 INJECTION, POWDER, FOR SOLUTION INTRAVENOUS at 08:21

## 2019-07-21 RX ADMIN — POTASSIUM CHLORIDE 20 MEQ: 200 INJECTION, SOLUTION INTRAVENOUS at 08:34

## 2019-07-21 RX ADMIN — RISPERIDONE 0.25 MG: 0.25 TABLET ORAL at 17:52

## 2019-07-21 RX ADMIN — HEPARIN SODIUM 5000 UNITS: 5000 INJECTION INTRAVENOUS; SUBCUTANEOUS at 06:13

## 2019-07-21 RX ADMIN — PIPERACILLIN SODIUM,TAZOBACTAM SODIUM 2.25 G: 2; .25 INJECTION, POWDER, FOR SOLUTION INTRAVENOUS at 10:52

## 2019-07-21 RX ADMIN — HYDROXYZINE HYDROCHLORIDE 25 MG: 25 TABLET ORAL at 10:47

## 2019-07-21 RX ADMIN — PIPERACILLIN SODIUM,TAZOBACTAM SODIUM 2.25 G: 2; .25 INJECTION, POWDER, FOR SOLUTION INTRAVENOUS at 17:54

## 2019-07-21 RX ADMIN — DEXTROSE, SODIUM CHLORIDE, AND POTASSIUM CHLORIDE 75 ML/HR: 5; .45; .15 INJECTION INTRAVENOUS at 08:20

## 2019-07-21 RX ADMIN — ACETAMINOPHEN 650 MG: 325 TABLET, FILM COATED ORAL at 06:13

## 2019-07-21 RX ADMIN — LORAZEPAM 0.5 MG: 2 INJECTION INTRAMUSCULAR; INTRAVENOUS at 00:34

## 2019-07-21 RX ADMIN — HEPARIN SODIUM 5000 UNITS: 5000 INJECTION INTRAVENOUS; SUBCUTANEOUS at 14:56

## 2019-07-21 RX ADMIN — PIPERACILLIN SODIUM,TAZOBACTAM SODIUM 2.25 G: 2; .25 INJECTION, POWDER, FOR SOLUTION INTRAVENOUS at 23:46

## 2019-07-21 RX ADMIN — DEXTROSE, SODIUM CHLORIDE, AND POTASSIUM CHLORIDE 75 ML/HR: 5; .45; .15 INJECTION INTRAVENOUS at 23:44

## 2019-07-21 RX ADMIN — ACETAMINOPHEN 650 MG: 325 TABLET, FILM COATED ORAL at 17:52

## 2019-07-21 RX ADMIN — PIPERACILLIN SODIUM,TAZOBACTAM SODIUM 2.25 G: 2; .25 INJECTION, POWDER, FOR SOLUTION INTRAVENOUS at 06:00

## 2019-07-21 RX ADMIN — HALOPERIDOL LACTATE 1 MG: 5 INJECTION INTRAMUSCULAR at 11:22

## 2019-07-21 RX ADMIN — HEPARIN SODIUM 5000 UNITS: 5000 INJECTION INTRAVENOUS; SUBCUTANEOUS at 23:45

## 2019-07-21 RX ADMIN — ASPIRIN 81 MG: 81 TABLET, COATED ORAL at 08:25

## 2019-07-21 NOTE — PHYSICAL THERAPY NOTE
PT TREATMENT     07/21/19 1015   Pain Assessment   Pain Assessment Warren-Parikh FACES   Pain Score 1   Restrictions/Precautions   Other Precautions Impulsive;Cognitive; Chair Alarm; Bed Alarm; Fall Risk;O2   General   Chart Reviewed Yes   Cognition   Overall Cognitive Status Impaired   Arousal/Participation Arousable   Attention Difficulty attending to directions   Orientation Level Oriented to person   Following Commands Follows one step commands inconsistently   Subjective   Subjective "I have to go to the bathroom"   Bed Mobility   Supine to Sit 2  Maximal assistance   Sit to Supine 2  Maximal assistance   Transfers   Sit to Stand 2  Maximal assistance   Stand to Sit 2  Maximal assistance   Stand pivot 2  Maximal assistance   Toilet transfer 2  Maximal assistance   Additional items Commode  (dependent to wipe)   Balance   Static Sitting Fair -   Dynamic Sitting Poor   Static Standing Poor   Dynamic Standing Poor   Assessment   Prognosis Good   Problem List Decreased strength;Decreased endurance; Impaired balance;Decreased mobility; Decreased coordination;Decreased cognition; Impaired judgement;Decreased safety awareness   Assessment Nursing reports pt was up all night, she is now in bed with wrist restraints with lots of dyskensias/choreas  Pt is mumbling, talking about "flying a kite"  Pt follows almost no directions, but was able to transfer to the toilet with max assist   Pt's  wants to take pt home but unsure if he would be able to care for her  Plan   Treatment/Interventions ADL retraining;Functional transfer training;LE strengthening/ROM; Elevations; Therapeutic exercise;Cognitive reorientation; Endurance training;Patient/family training;Equipment eval/education; Bed mobility;Gait training   Progress Slow progress, cognitive deficits   PT Frequency   (5-7x/week)   Recommendation   Recommendation 24 hour supervision/assist;Outpatient PT   Equipment Recommended   (pt has a cane and a walker)   Licensure   [place] License Number  Vishal Godinez  [id number]

## 2019-07-21 NOTE — CONSULTS
Consultation - Marley Mandujano 67 y o  female MRN: [id number]    Unit/Bed#: [address] [place] [address] Encounter: [id number]      Identifying Data:  67years old white female is admitted at [facility] on July 18, 2019 with complaining of epigastric pain psychiatric consultation is asked for agitation confusion patient is suffering from dementia and depression patient examined spoke with the resident physician Dr Skyla Mota and nurse information obtained patient was described severely agitated confused hallucinating mumbling and talking to herself kicking and fighting with the staff very difficult to redirect her she needed to give 1 stat dose of Ativan and onedose of Haldol currently patient is resting in the bed and calmer and sleepy patient is arousable but she is not responding properly to simple verbal command she is not capable to have any meaningful communications all the information obtained from talking to the resident physician and the nurse and reviewing the record  Patient has been suffering from dementia with depression and she is taking antidepressant medication Remeron most probably from the family physician  Patient lives at home but details social history is not available  With that she is smoking any history of alcohol drug abuse work history educational history etc is not available and patient is not capable to provide that information at this time    Patient has history of lower GI bleed arthritis asthma herniated lumbar disc diabetes GERD gallbladder disease hypertension ovarian cyst Parkinson's disease history of pneumonia history of pulmonary embolism sciatica squamous cell carcinoma of skin appendectomy cholecystectomy neuro plastic carpal tunnel surgery removal of ovary mental hernia repair no known allergy    Chief Complaints:  Confusion    Family History:  Not known  Family History   Problem Relation Age of Onset    Alzheimer's disease Mother     Stroke Father     No Known Problems Sister  No Known Problems Brother        Legal History:  Not known    Mental Status Exam:  67years old white female is resting in the bed sleepy mumbles to herself and inappropriate response to simple verbal command currently patient is not capable to provide any meaningful information unable to tell me her age date of birth current year current month name of the president she is disoriented to the place does not know why she is here she is not aware of her problems limitations and needs patient is suffering from dementia with depression and she is on Remeron for the same  Memory severely impaired patient is delirious periods of agitation paranoid possibly hallucinating depressed but not suicidal she needs help with ADL care  Poor concentration poor sleep poor appetite she looks mild nourished  Insight and judgments are impaired at this time due to dementia and delirium  History of Present Illness     HPI: Xander Ortiz is a 67y o  year old female who presents with epigastric pain    Consults      Historical Information   Past Psychiatric History:  67years old white female is suffering from dementia with depression she is on Remeron but patient is confused and delirious she is not capable to provide any background meaningful information with the patient ever had seen a psychiatrist for that she ever had psychiatric admissions suicide attempts any history of substance abuse any history of legal problems is not available at this time and patient is not capable to provide that information  I am not sure whether patient is under psychiatric care or not but she is taking Remeron 30 mg HS most probably from the family physician  Past Medical History:   Diagnosis Date    RODRIGO (acute kidney injury) ([facility] [address] ) 7/19/2019    Anal bleeding 1/29/2018    Arthritis     knee    Asthma     BPPV (benign paroxysmal positional vertigo) 7/19/2016    Last Assessment & Plan:  Hx consistent with BPPV    Neg [place]-Halpike, however could not perform it adequately due to dyskinesias  Recommend lozano-daroff exercises at home  IF worsens, vestibular therapy   Bulging lumbar disc 12/10/2013    Closed fracture of one rib of left side 1/29/2018    Dementia     Diabetes mellitus ([facility] [address] )     TYPE 2    Gallbladder disease     GERD (gastroesophageal reflux disease)     Hematuria     last assessed 10/29/15    Hypertension     Orthostatic hypotension 7/13/2015    Ovarian cyst     LAST ASSESSED: 12/10/13    Parkinson's disease ([facility] [address] )     Pneumonia of both lower lobes due to infectious organism ([facility] [address] ) 3/27/2018    Pulmonary embolism with infarction ([facility] [address] ) 9/2/2014    Sciatica 12/10/2013    Skin disorder     last assessed 12/10/13    Squamous cell carcinoma of skin 11/21/2016     Past Surgical History:   Procedure Laterality Date    APPENDECTOMY      1970'S    CHOLECYSTECTOMY      1970'S    LAPAROTOMY N/A 7/18/2019    Procedure: LAPAROTOMY EXPLORATORY;  Surgeon: Aneesh Stearns MD;  Location: [address] [place] [address];  Service: General    NEUROPLASTY / [address] Right     OOPHORECTOMY Bilateral     REMOVAL OF BOTH OVARIES LAPAROSCOPIC     [facility] FLX W/RMVL OF TUMOR POLYP LESION SNARE TQ N/A 3/20/2018    Procedure: COLONOSCOPY;  Surgeon: Dez Hager MD;  Location: [facility] GI LAB; Service: Gastroenterology     [facility] CATARACT EXTRACAP,INSERT LENS Right 12/4/2018    Procedure: EXTRACTION EXTRACAPSULAR CATARACT PHACO INTRAOCULAR LENS (IOL); Surgeon: Jocelyn Britton MD;  Location: [facility] MAIN OR;  Service: Ophthalmology     [facility] CATARACT EXTRACAP,INSERT LENS  1/15/2019    Procedure: EXTRACTION EXTRACAPSULAR CATARACT PHACO INTRAOCULAR LENS (IOL);   Surgeon: Jocelyn Britton MD;  Location: [facility] OR;  Service: Ophthalmology    TONSILLECTOMY      VENTRAL HERNIA REPAIR      WITH IMPLANT OF MESH     Social History   Social History     Substance and Sexual Activity   Alcohol Use Not Currently    Frequency: Patient refused    Drinks per session: Patient refused    Binge frequency: Never     Social History     Substance and Sexual Activity   Drug Use Never     Social History     Tobacco Use   Smoking Status Never Smoker   Smokeless Tobacco Never Used       Meds/Allergies   current meds:   Current Facility-Administered Medications   Medication Dose Route Frequency    acetaminophen (TYLENOL) tablet 650 mg  650 mg Oral Q6H [facility]    amLODIPine (NORVASC) tablet 5 mg  5 mg Oral Daily    aspirin (ECOTRIN LOW STRENGTH) EC tablet 81 mg  81 mg Oral Daily    Carbidopa-Levodopa ER 48  MG CPCR 1 capsule  1 capsule Oral 4x Daily    Carbidopa-Levodopa ER 48  MG CPCR 2 capsule  2 capsule Oral HS    dextrose 5 % and sodium chloride 0 45 % with KCl 20 mEq/L infusion  75 mL/hr Intravenous Continuous    haloperidol lactate (HALDOL) injection 1 mg  1 mg Intramuscular Q6H PRN    heparin (porcine) subcutaneous injection 5,000 Units  5,000 Units Subcutaneous Q8H [facility]    hydrOXYzine HCL (ATARAX) tablet 25 mg  25 mg Oral Q6H PRN    insulin lispro (HumaLOG) 100 units/mL subcutaneous injection 1-5 Units  1-5 Units Subcutaneous Q6H [facility]    mirtazapine (REMERON) tablet 30 mg  30 mg Oral HS    morphine injection 2 mg  2 mg Intravenous Q4H PRN    ondansetron (ZOFRAN) injection 4 mg  4 mg Intravenous Q6H PRN    oxyCODONE (ROXICODONE) IR tablet 2 5 mg  2 5 mg Oral Q4H PRN    oxyCODONE (ROXICODONE) IR tablet 5 mg  5 mg Oral Q4H PRN    [START ON 7/22/2019] pantoprazole (PROTONIX) injection 40 mg  40 mg Intravenous Q24H SARAHI    piperacillin-tazobactam (ZOSYN) 2 25 g in sodium chloride 0 9 % 50 mL IVPB  2 25 g Intravenous Q6H    risperiDONE (RisperDAL) tablet 0 25 mg  0 25 mg Oral BID    rOPINIRole (REQUIP) tablet 4 mg  4 mg Oral HS    And    rOPINIRole (REQUIP) tablet 2 mg  2 mg Oral HS    and PTA meds:    Medications Prior to Admission   Medication    ACCU-CHEK COMPACT PLUS test strip    ACCU-CHEK SOFTCLIX LANCETS lancets  Amantadine HCl 100 MG tablet    amLODIPine (NORVASC) 10 mg tablet    aspirin (ECOTRIN LOW STRENGTH) 81 mg EC tablet    Carbidopa-Levodopa ER 48  MG CPCR    Glucose Blood (COOL BLOOD GLUCOSE TEST STRIPS VI)    Glucose Blood (COOL BLOOD GLUCOSE TEST STRIPS VI)    glucose monitoring kit (FREESTYLE) monitoring kit    metFORMIN (GLUCOPHAGE) 500 mg tablet    mirtazapine (REMERON) 15 mg tablet    Multiple Vitamin (MULTIVITAMIN) tablet    Omega-3 Fatty Acids (FISH OIL PO)    rasagiline (AZILECT) 1 MG    rOPINIRole (REQUIP) 2 mg tablet     No Known Allergies    Objective   Vitals: Blood pressure 149/78, pulse 74, temperature 97 9 °F (36 6 °C), temperature source Oral, resp  rate 18, height 4' 11" (1 499 m), weight 40 4 kg (89 lb), SpO2 100 %, not currently breastfeeding  Routine Lab Results:   Admission on 07/18/2019   Component Date Value Ref Range Status    WBC 07/18/2019 16 76* 4 31 - 10 16 Thousand/uL Final    RBC 07/18/2019 4 04  3 81 - 5 12 Million/uL Final    Hemoglobin 07/18/2019 11 9  11 5 - 15 4 g/dL Final    Hematocrit 07/18/2019 38 3  34 8 - 46 1 % Final    MCV 07/18/2019 95  82 - 98 fL Final    MCH 07/18/2019 29 5  26 8 - 34 3 pg Final    MCHC 07/18/2019 31 1* 31 4 - 37 4 g/dL Final    RDW 07/18/2019 13 1  11 6 - 15 1 % Final    MPV 07/18/2019 9 9  8 9 - 12 7 fL Final    Platelets 90/34/8310 310  149 - 390 Thousands/uL Final    nRBC 07/18/2019 0  /100 WBCs Final    This is an appended report  These results have been appended to a previously preliminary verified report      Protime 07/18/2019 11 0  9 4 - 11 7 seconds Final    INR 07/18/2019 1 05  0 86 - 1 16 Final    PTT 07/18/2019 25  23 - 37 seconds Final    Therapeutic Heparin Range =  60-90 seconds    Sodium 07/18/2019 134* 136 - 145 mmol/L Final    Potassium 07/18/2019 3 9  3 5 - 5 3 mmol/L Final    Chloride 07/18/2019 97* 100 - 108 mmol/L Final    CO2 07/18/2019 27  21 - 32 mmol/L Final    ANION GAP 07/18/2019 10  4 - 13 mmol/L Final    BUN 07/18/2019 24  5 - 25 mg/dL Final    Creatinine 07/18/2019 1 56* 0 60 - 1 30 mg/dL Final    Standardized to IDMS reference method    Glucose 07/18/2019 146* 65 - 140 mg/dL Final      If the patient is fasting, the ADA then defines impaired fasting glucose as > 100 mg/dL and diabetes as > or equal to 123 mg/dL  Specimen collection should occur prior to Sulfasalazine administration due to the potential for falsely depressed results  Specimen collection should occur prior to Sulfapyridine administration due to the potential for falsely elevated results   Calcium 07/18/2019 9 4  8 3 - 10 1 mg/dL Final    AST 07/18/2019 16  5 - 45 U/L Final      Specimen collection should occur prior to Sulfasalazine administration due to the potential for falsely depressed results   ALT 07/18/2019 10* 12 - 78 U/L Final      Specimen collection should occur prior to Sulfasalazine administration due to the potential for falsely depressed results   Alkaline Phosphatase 07/18/2019 79  46 - 116 U/L Final    Total Protein 07/18/2019 7 8  6 4 - 8 2 g/dL Final    Albumin 07/18/2019 4 2  3 5 - 5 0 g/dL Final    Total Bilirubin 07/18/2019 1 20* 0 20 - 1 00 mg/dL Final    eGFR 07/18/2019 32  ml/min/1 73sq m Final    LACTIC ACID 07/18/2019 1 2  0 5 - 2 0 mmol/L Final    Troponin I 07/18/2019 <0 02  <=0 04 ng/mL Final    3Autovalidation override  [employer] Chemistry analyzer 99% cutoff is > 0 04 ng/mL in network labs     o cTnI 99% cutoff is useful only when applied to patients in the clinical setting of myocardial ischemia   o cTnI 99% cutoff should be interpreted in the context of clinical history, ECG findings and possibly cardiac imaging to establish correct diagnosis  o cTnI 99% cutoff may be suggestive but clearly not indicative of a coronary event without the clinical setting of myocardial ischemia        Segmented % 07/18/2019 76* 43 - 75 % Final    Bands % 07/18/2019 18* 0 - 8 % Final  Lymphocytes % 07/18/2019 3* 14 - 44 % Final    Monocytes % 07/18/2019 1* 4 - 12 % Final    Eosinophils, % 07/18/2019 1  0 - 6 % Final    Basophils % 07/18/2019 0  0 - 1 % Final    Metamyelocytes% 07/18/2019 1  0 - 1 % Final    Absolute Neutrophils 07/18/2019 15 75* 1 85 - 7 62 Thousand/uL Final    Lymphocytes Absolute 07/18/2019 0 50* 0 60 - 4 47 Thousand/uL Final    Monocytes Absolute 07/18/2019 0 17  0 00 - 1 22 Thousand/uL Final    Eosinophils Absolute 07/18/2019 0 17  0 00 - 0 40 Thousand/uL Final    Basophils Absolute 07/18/2019 0 00  0 00 - 0 10 Thousand/uL Final    Total Counted 07/18/2019 100   Final    Toxic Granulation 07/18/2019 Present   Final    RBC Morphology 07/18/2019 Normal   Final    Platelet Estimate 98/62/2663 Adequate  Adequate Final    Ventricular Rate 07/18/2019 79  BPM Final    Atrial Rate 07/18/2019 79  BPM Final    TN Interval 07/18/2019 176  ms Final    QRSD Interval 07/18/2019 74  ms Final    QT Interval 07/18/2019 426  ms Final    QTC Interval 07/18/2019 488  ms Final    P Axis 07/18/2019 17  degrees Final    QRS Axis 07/18/2019 -18  degrees Final    T Wave [place]ack 07/18/2019 57  degrees Final    Ventricular Rate 07/18/2019 76  BPM Final    Atrial Rate 07/18/2019 76  BPM Final    TN Interval 07/18/2019 208  ms Final    QRSD Interval 07/18/2019 70  ms Final    QT Interval 07/18/2019 412  ms Final    QTC Interval 07/18/2019 463  ms Final    QRS Axis 07/18/2019 -18  degrees Final    T Wave Axis 07/18/2019 17  degrees Final    Anaerobic Culture 07/18/2019 No anaerobes isolated   Final    Body Fluid Culture, Sterile 07/18/2019 Few Colonies of Staphylococcus aureus*  Final    Body Fluid Culture, Sterile 07/18/2019 Few Colonies of    Final    Mixed Skin Dariela    Gram Stain Result 07/18/2019 No Polys or Bacteria seen   Final    POC Glucose 07/18/2019 170* 65 - 140 mg/dl Final    ABO Grouping 07/19/2019 O   Final    Rh Factor 07/19/2019 Positive   Final    Antibody Screen 07/19/2019 Negative   Final    Specimen Expiration Date 07/19/2019 [id number]   Final    POC Glucose 07/18/2019 182* 65 - 140 mg/dl Final    Sodium 07/19/2019 138  136 - 145 mmol/L Final    Potassium 07/19/2019 4 1  3 5 - 5 3 mmol/L Final    Chloride 07/19/2019 103  100 - 108 mmol/L Final    CO2 07/19/2019 26  21 - 32 mmol/L Final    ANION GAP 07/19/2019 9  4 - 13 mmol/L Final    BUN 07/19/2019 22  5 - 25 mg/dL Final    Creatinine 07/19/2019 1 23  0 60 - 1 30 mg/dL Final    Standardized to IDMS reference method    Glucose 07/19/2019 139  65 - 140 mg/dL Final      If the patient is fasting, the ADA then defines impaired fasting glucose as > 100 mg/dL and diabetes as > or equal to 123 mg/dL  Specimen collection should occur prior to Sulfasalazine administration due to the potential for falsely depressed results  Specimen collection should occur prior to Sulfapyridine administration due to the potential for falsely elevated results   Calcium 07/19/2019 8 8  8 3 - 10 1 mg/dL Final    eGFR 07/19/2019 42  ml/min/1 73sq m Final    Magnesium 07/19/2019 2 1  1 6 - 2 6 mg/dL Final    Phosphorus 07/19/2019 3 9  2 3 - 4 1 mg/dL Final    WBC 07/19/2019 18 47* 4 31 - 10 16 Thousand/uL Final    RBC 07/19/2019 3 78* 3 81 - 5 12 Million/uL Final    Hemoglobin 07/19/2019 11 3* 11 5 - 15 4 g/dL Final    Hematocrit 07/19/2019 35 5  34 8 - 46 1 % Final    MCV 07/19/2019 94  82 - 98 fL Final    MCH 07/19/2019 29 9  26 8 - 34 3 pg Final    MCHC 07/19/2019 31 8  31 4 - 37 4 g/dL Final    RDW 07/19/2019 13 2  11 6 - 15 1 % Final    MPV 07/19/2019 10 2  8 9 - 12 7 fL Final    Platelets 12/47/2991 282  149 - 390 Thousands/uL Final    nRBC 07/19/2019 0  /100 WBCs Final    This is an appended report  These results have been appended to a previously preliminary verified report   Neutrophils Relative 07/19/2019 93* 43 - 75 % Final    Smear reviewed      Immat PETRA % 07/19/2019 1  0 - 2 % Final    Lymphocytes Relative 07/19/2019 2* 14 - 44 % Final    Monocytes Relative 07/19/2019 4  4 - 12 % Final    Eosinophils Relative 07/19/2019 0  0 - 6 % Final    Basophils Relative 07/19/2019 0  0 - 1 % Final    Neutrophils Absolute 07/19/2019 17 14* 1 85 - 7 62 Thousands/µL Final    Immature Grans Absolute 07/19/2019 0 14  0 00 - 0 20 Thousand/uL Final    Lymphocytes Absolute 07/19/2019 0 37* 0 60 - 4 47 Thousands/µL Final    Monocytes Absolute 07/19/2019 0 79  0 17 - 1 22 Thousand/µL Final    Eosinophils Absolute 07/19/2019 0 00  0 00 - 0 61 Thousand/µL Final    Basophils Absolute 07/19/2019 0 03  0 00 - 0 10 Thousands/µL Final    LACTIC ACID 07/19/2019 1 0  0 5 - 2 0 mmol/L Final    POC Glucose 07/19/2019 149* 65 - 140 mg/dl Final    POC Glucose 07/19/2019 102  65 - 140 mg/dl Final    POC Glucose 07/19/2019 81  65 - 140 mg/dl Final    POC Glucose 07/20/2019 69  65 - 140 mg/dl Final    POC Glucose 07/20/2019 67  65 - 140 mg/dl Final    WBC 07/20/2019 10 89* 4 31 - 10 16 Thousand/uL Final    RBC 07/20/2019 3 38* 3 81 - 5 12 Million/uL Final    Hemoglobin 07/20/2019 10 1* 11 5 - 15 4 g/dL Final    Hematocrit 07/20/2019 32 4* 34 8 - 46 1 % Final    MCV 07/20/2019 96  82 - 98 fL Final    MCH 07/20/2019 29 9  26 8 - 34 3 pg Final    MCHC 07/20/2019 31 2* 31 4 - 37 4 g/dL Final    RDW 07/20/2019 13 3  11 6 - 15 1 % Final    MPV 07/20/2019 10 0  8 9 - 12 7 fL Final    Platelets 95/48/8773 241  149 - 390 Thousands/uL Final    nRBC 07/20/2019 0  /100 WBCs Final    Neutrophils Relative 07/20/2019 89* 43 - 75 % Final    Immat GRANS % 07/20/2019 0  0 - 2 % Final    Lymphocytes Relative 07/20/2019 4* 14 - 44 % Final    Monocytes Relative 07/20/2019 5  4 - 12 % Final    Eosinophils Relative 07/20/2019 1  0 - 6 % Final    Basophils Relative 07/20/2019 1  0 - 1 % Final    Neutrophils Absolute 07/20/2019 9 73* 1 85 - 7 62 Thousands/µL Final    Immature Grans Absolute 07/20/2019 0 04  0 00 - 0 20 Thousand/uL Final    Lymphocytes Absolute 07/20/2019 0 43* 0 60 - 4 47 Thousands/µL Final    Monocytes Absolute 07/20/2019 0 59  0 17 - 1 22 Thousand/µL Final    Eosinophils Absolute 07/20/2019 0 05  0 00 - 0 61 Thousand/µL Final    Basophils Absolute 07/20/2019 0 05  0 00 - 0 10 Thousands/µL Final    Sodium 07/20/2019 137  136 - 145 mmol/L Final    Potassium 07/20/2019 3 9  3 5 - 5 3 mmol/L Final    Chloride 07/20/2019 102  100 - 108 mmol/L Final    CO2 07/20/2019 27  21 - 32 mmol/L Final    ANION GAP 07/20/2019 8  4 - 13 mmol/L Final    BUN 07/20/2019 25  5 - 25 mg/dL Final    Creatinine 07/20/2019 1 12  0 60 - 1 30 mg/dL Final    Standardized to IDMS reference method    Glucose 07/20/2019 83  65 - 140 mg/dL Final      If the patient is fasting, the ADA then defines impaired fasting glucose as > 100 mg/dL and diabetes as > or equal to 123 mg/dL  Specimen collection should occur prior to Sulfasalazine administration due to the potential for falsely depressed results  Specimen collection should occur prior to Sulfapyridine administration due to the potential for falsely elevated results   Calcium 07/20/2019 8 6  8 3 - 10 1 mg/dL Final    eGFR 07/20/2019 47  ml/min/1 73sq m Final    POC Glucose 07/20/2019 76  65 - 140 mg/dl Final    POC Glucose 07/20/2019 84  65 - 140 mg/dl Final    POC Glucose 07/21/2019 98  65 - 140 mg/dl Final    Sodium 07/21/2019 139  136 - 145 mmol/L Final    Potassium 07/21/2019 3 3* 3 5 - 5 3 mmol/L Final    Chloride 07/21/2019 103  100 - 108 mmol/L Final    CO2 07/21/2019 27  21 - 32 mmol/L Final    ANION GAP 07/21/2019 9  4 - 13 mmol/L Final    BUN 07/21/2019 16  5 - 25 mg/dL Final    Creatinine 07/21/2019 1 02  0 60 - 1 30 mg/dL Final    Standardized to IDMS reference method    Glucose 07/21/2019 105  65 - 140 mg/dL Final      If the patient is fasting, the ADA then defines impaired fasting glucose as > 100 mg/dL and diabetes as > or equal to 123 mg/dL    Specimen collection should occur prior to Sulfasalazine administration due to the potential for falsely depressed results  Specimen collection should occur prior to Sulfapyridine administration due to the potential for falsely elevated results   Calcium 07/21/2019 9 0  8 3 - 10 1 mg/dL Final    AST 07/21/2019 56* 5 - 45 U/L Final      Specimen collection should occur prior to Sulfasalazine administration due to the potential for falsely depressed results   ALT 07/21/2019 25  12 - 78 U/L Final      Specimen collection should occur prior to Sulfasalazine administration due to the potential for falsely depressed results   Alkaline Phosphatase 07/21/2019 114  46 - 116 U/L Final    Total Protein 07/21/2019 6 6  6 4 - 8 2 g/dL Final    Albumin 07/21/2019 2 8* 3 5 - 5 0 g/dL Final    Total Bilirubin 07/21/2019 0 90  0 20 - 1 00 mg/dL Final    eGFR 07/21/2019 53  ml/min/1 73sq m Final    Magnesium 07/21/2019 1 7  1 6 - 2 6 mg/dL Final    Phosphorus 07/21/2019 2 6  2 3 - 4 1 mg/dL Final    POC Glucose 07/21/2019 94  65 - 140 mg/dl Final    WBC 07/21/2019 12 61* 4 31 - 10 16 Thousand/uL Final    RBC 07/21/2019 3 44* 3 81 - 5 12 Million/uL Final    Hemoglobin 07/21/2019 10 3* 11 5 - 15 4 g/dL Final    Hematocrit 07/21/2019 32 1* 34 8 - 46 1 % Final    MCV 07/21/2019 93  82 - 98 fL Final    MCH 07/21/2019 29 9  26 8 - 34 3 pg Final    MCHC 07/21/2019 32 1  31 4 - 37 4 g/dL Final    RDW 07/21/2019 13 1  11 6 - 15 1 % Final    Platelets 15/49/0065 271  149 - 390 Thousands/uL Final    MPV 07/21/2019 10 2  8 9 - 12 7 fL Final         Diagnosis:  Severe dementia with depression  Anxiety  Delirium with psychosis and behavior problem    Plan:  Patient may benefit from supervised living  Continue Remeron 30 mg HS  Risperdal 0 25 mg p o  B i d  For short period of time  Haldol 1 mg IM q 6 hours p r n  For agitation psychosis for short period of time  Discussed with the resident physician and the nurse    I will follow up during the hospital stay      Tushar Pitt MD

## 2019-07-21 NOTE — PLAN OF CARE
Problem: Potential for Falls  Goal: Patient will remain free of falls  Description  INTERVENTIONS:  - Assess patient frequently for physical needs  -  Identify cognitive and physical deficits and behaviors that affect risk of falls    -  [place] fall precautions as indicated by assessment   - Educate patient/family on patient safety including physical limitations  - Instruct patient to call for assistance with activity based on assessment  - Modify environment to reduce risk of injury  - Consider OT/PT consult to assist with strengthening/mobility  7/21/2019 1122 by Jessica Arciniega RN  Outcome: Progressing  7/21/2019 1122 by Jessica Arciniega RN  Outcome: Progressing     Problem: Prexisting or High Potential for Compromised Skin Integrity  Goal: Skin integrity is maintained or improved  Description  INTERVENTIONS:  - Identify patients at risk for skin breakdown  - Assess and monitor skin integrity  - Assess and monitor nutrition and hydration status  - Monitor labs (i e  albumin)  - Assess for incontinence   - Turn and reposition patient  - Assist with mobility/ambulation  - Relieve pressure over bony prominences  - Avoid friction and shearing  - Provide appropriate hygiene as needed including keeping skin clean and dry  - Evaluate need for skin moisturizer/barrier cream  - Collaborate with interdisciplinary team (i e  Nutrition, Rehabilitation, etc )   - Patient/family teaching  Outcome: Progressing     Problem: GASTROINTESTINAL - ADULT  Goal: Minimal or absence of nausea and/or vomiting  Description  INTERVENTIONS:  - Administer IV fluids as ordered to ensure adequate hydration  - Maintain NPO status until nausea and vomiting are resolved  - Nasogastric tube as ordered  - Administer ordered antiemetic medications as needed  - Provide nonpharmacologic comfort measures as appropriate  - Advance diet as tolerated, if ordered  - Nutrition services referral to assist patient with adequate nutrition and appropriate food choices  Outcome: Progressing     Problem: GENITOURINARY - ADULT  Goal: Urinary catheter remains patent  Description  INTERVENTIONS:  - Assess patency of urinary catheter  - If patient has a chronic allen, consider changing catheter if non-functioning  - Follow guidelines for intermittent irrigation of non-functioning urinary catheter  Outcome: Progressing     Problem: PAIN - ADULT  Goal: Verbalizes/displays adequate comfort level or baseline comfort level  Description  Interventions:  - Encourage patient to monitor pain and request assistance  - Assess pain using appropriate pain scale  - Administer analgesics based on type and severity of pain and evaluate response  - Implement non-pharmacological measures as appropriate and evaluate response  - Consider cultural and social influences on pain and pain management  - Notify physician/advanced practitioner if interventions unsuccessful or patient reports new pain  Outcome: Progressing     Problem: SAFETY ADULT  Goal: Patient will remain free of falls  Description  INTERVENTIONS:  - Assess patient frequently for physical needs  -  Identify cognitive and physical deficits and behaviors that affect risk of falls    -  [place] fall precautions as indicated by assessment   - Educate patient/family on patient safety including physical limitations  - Instruct patient to call for assistance with activity based on assessment  - Modify environment to reduce risk of injury  - Consider OT/PT consult to assist with strengthening/mobility  7/21/2019 1122 by Devika De Souza RN  Outcome: Progressing  7/21/2019 1122 by Devika De Souza RN  Outcome: Progressing     Problem: DISCHARGE PLANNING  Goal: Discharge to home or other facility with appropriate resources  Description  INTERVENTIONS:  - Identify barriers to discharge w/patient and caregiver  - Arrange for needed discharge resources and transportation as appropriate  - Identify discharge learning needs (meds, wound care, etc )  - Arrange for interpretive services to assist at discharge as needed  - Refer to Case Management Department for coordinating discharge planning if the patient needs post-hospital services based on physician/advanced practitioner order or complex needs related to functional status, cognitive ability, or social support system  7/21/2019 1122 by Naila Lancaster RN  Outcome: Progressing  7/21/2019 1122 by Naila Lancaster RN  Outcome: Progressing     Problem: Nutrition/Hydration-ADULT  Goal: Nutrient/Hydration intake appropriate for improving, restoring or maintaining nutritional needs  Description  Monitor and assess patient's nutrition/hydration status for malnutrition (ex- brittle hair, bruises, dry skin, pale skin and conjunctiva, muscle wasting, smooth red tongue, and disorientation)  Collaborate with interdisciplinary team and initiate plan and interventions as ordered  Monitor patient's weight and dietary intake as ordered or per policy  Utilize nutrition screening tool and intervene per policy  Determine patient's food preferences and provide high-protein, high-caloric foods as appropriate       INTERVENTIONS:  - Monitor oral intake, urinary output, labs, and treatment plans  - Assess nutrition and hydration status and recommend course of action  - Evaluate amount of meals eaten  - Assist patient with eating if necessary   - Allow adequate time for meals  - Recommend/ encourage appropriate diets, oral nutritional supplements, and vitamin/mineral supplements  - Order, calculate, and assess calorie counts as needed  - Recommend, monitor, and adjust tube feedings and TPN/PPN based on assessed needs  - Assess need for intravenous fluids  - Provide specific nutrition/hydration education as appropriate  - Include patient/family/caregiver in decisions related to nutrition  7/21/2019 1122 by Naila Lancaster RN  Outcome: Progressing  7/21/2019 1122 by Naila Lancaster RN  Outcome: Progressing

## 2019-07-21 NOTE — PROGRESS NOTES
Progress Note - General Surgery   Cristian Otero 67 y o  female MRN: [id number]  Unit/Bed#: 2 [place] [address] Encounter: [id number]    Assessment:  · POD#  s/p exploratory laparotomy with repair of 3mm duodenal ulcer with von patch  Leukocytosis likely reactive to surgery  Denies BM or passing flatus  Abdominal pain improved since surgery  Hypokalemia: 3 3 WBC: 10 89 > 12 61  · Parkinson's disease: dementia, on home meds,  · RODRIGO: Cr: 1 58 (On admission) > 1 02  · DM type 2: HgbA1c: 6 4     Plan:   IV fluids: 5% dextrose sodium chloride 0 45% with KCI 20 mEq/L infusion at 75 cc/hr  Potassium chloride 20 mEq once  Plan to advance diet to clear liquids  AM labs CBC, CMP, mag, phos  PRN analgesics  Incentive spirometry  Subjective/Objective   Chief Complaint: Patient has history of parkinson's disease  Patient is mumbling words to herself  Responds when prompted  Subjective: Patient was seen and examined bedside  Patient reports no acute changes through the night  Patient is poor historian secondary to dementia  Patient reports being very hungry reports minimal abdominal pain  Denies any nausea, vomiting, chest pain, shortness of breath  + passing flatus  Objective:    Blood pressure 144/78, pulse 65, temperature 97 9 °F (36 6 °C), temperature source Oral, resp  rate 18, height 4' 11" (1 499 m), weight 40 4 kg (89 lb), SpO2 97 %, not currently breastfeeding  ,Body mass index is 17 98 kg/m²        Intake/Output Summary (Last 24 hours) at 7/21/2019 0742  Last data filed at 7/21/2019 0641  Gross per 24 hour   Intake 1580 ml   Output 670 ml   Net 910 ml       Invasive Devices     Peripheral Intravenous Line            Peripheral IV 07/20/19 Left;Ventral (anterior) Forearm 1 day          Drain            Closed/Suction Drain Midline Abdomen Bulb 2 days                Physical Exam: /78 (BP Location: Left arm)   Pulse 74   Temp 97 9 °F (36 6 °C) (Oral)   Resp 18   Ht 4' 11" (1 499 m)   Wt 40 4 kg (89 lb)   SpO2 100%   BMI 17 98 kg/m²   General appearance: alert and oriented, in no acute distress and alert  Lungs: clear to auscultation bilaterally  Heart: regular rate and rhythm, S1, S2 normal, no murmur, click, rub or gallop  Abdomen: soft, non-tender; bowel sounds normal; no masses,  no organomegaly and laparotomy incision clean dry and intact  JB drain seriousangious  Lab, Imaging and other studies:  I have personally reviewed pertinent lab results    , CBC:   Lab Results   Component Value Date    WBC 12 61 (H) 07/21/2019    HGB 10 3 (L) 07/21/2019    HCT 32 1 (L) 07/21/2019    MCV 93 07/21/2019     07/21/2019    MCH 29 9 07/21/2019    MCHC 32 1 07/21/2019    RDW 13 1 07/21/2019    MPV 10 2 07/21/2019    NRBC 0 07/20/2019   , CMP:   Lab Results   Component Value Date    SODIUM 139 07/21/2019    K 3 3 (L) 07/21/2019     07/21/2019    CO2 27 07/21/2019    BUN 16 07/21/2019    CREATININE 1 02 07/21/2019    CALCIUM 9 0 07/21/2019    AST 56 (H) 07/21/2019    ALT 25 07/21/2019    ALKPHOS 114 07/21/2019    EGFR 53 07/21/2019     VTE Pharmacologic Prophylaxis: Heparin  VTE Mechanical Prophylaxis: sequential compression device

## 2019-07-21 NOTE — PROGRESS NOTES
Progress Note - Chuck Chaney 67 y o  female MRN: [id number]    Unit/Bed#: 2 [place] [address] Encounter: [id number]      Assessment & Plan:  67year old female with PMH as previously noted, initially admitted for exploratory laparotomy under the surgical service after CT scan showed intraperitoneal air  She is POD #3 s/p ex lap  She is doing well and denies any pain  Other medical conditions are stabilized with home medications, however we are continuing to hold metformin due to POA RODRIGO, which has resolved  Surgical team is managing pain control and advancement of diet  Patient's parkinsonian symptoms and dementia appear to be very uncontrolled  This morning on pre-rounds she was lucid, however is oriented only to herself on attending rounds  Consulting neurology & psychiatry  Will continue to follow  Duodenal Ulcer Perforation - POD #3 S/P Exploratory Laparotomy with Kolby's Patch Placement  · Initially identified via CT scan with free intraperitoneal air POA  · Pain control per surgical team   · NPO, anticipate advancing diet to CLD today per surgical team recommendations  · Wean off IVF as diet advances  · Continue protonix 40 mg IV qd  · Continue zosyn 2 25 g IV q6h, to end today  · Monitor vitals per protocol  · Trend WBC, fevers  NIDDM2  · Holding home metformin in the setting of prior RODRIGO  · ISS with accuchecks    Parkinson's Disease  · Appears uncontrolled and quite advanced  Patient is observed with waxing/waning altered mentation and near-constant twitching and tremors  At times she is lucid, other times she is disoriented and agitated  · For now, continue Carbidopa-levodopa 48  mg per home regimen  · Continue requip 6 mg po qhs  · Consulting neurology & psychiatry  Recommendations appreciated  Dementia  · Likely secondary to Parkinson's or multifactorial   · As above, patient is displaying waxing and waning alterations in her consciousness     · Continuing parkinson medications as above   · Appreciate neurology and psychiatry consults  · Continue amantadine 100 mg po qd    Insomnia  · Stable  Continue remeron 30 mg po qhs  Essential HTN  · Stable  Continue norvasc 5 mg po qd ]    RODRIGO  · Resolved  · POA Cr 1 58, currently back to baseline    Global  · IVF D5 1/2NS @ 75 cc/h  · NPO  · Replete electrolytes as indicated  · VTE PPX Heparin & SCDs  · Level 1 Full Code    Subjective:   Patient was seen and examined at bedside  She was found awake and displaying parkinsonian symptoms  She has no particular complaints except she wants to eat  No pain, shortness of breath, n/v/c/d, or other concerning symptoms  Has been passing gas  Later in the day, patient was observed during rounds with attending and appears to have worsened in terms of mental status  She states she is in [place] and she is "getting out of here right now"  She is oriented only to herself  Objective:     Vitals: Blood pressure 144/78, pulse 65, temperature 97 9 °F (36 6 °C), temperature source Oral, resp  rate 18, height 4' 11" (1 499 m), weight 40 4 kg (89 lb), SpO2 97 %, not currently breastfeeding  ,Body mass index is 17 98 kg/m²  Intake/Output Summary (Last 24 hours) at 7/21/2019 0636  Last data filed at 7/20/2019 2257  Gross per 24 hour   Intake 1000 ml   Output 680 ml   Net 320 ml       Physical Exam: /78 (BP Location: Left arm)   Pulse 74   Temp 97 9 °F (36 6 °C) (Oral)   Resp 18   Ht 4' 11" (1 499 m)   Wt 40 4 kg (89 lb)   SpO2 100%   BMI 17 98 kg/m²   General appearance: Alert, oriented  Mildly demented  Lungs: clear to auscultation bilaterally  Heart: regular rate and rhythm, S1, S2 normal, no murmur, click, rub or gallop  Abdomen: soft, non-tender; bowel sounds normal; no masses,  no organomegaly and incision site c/d/i  Extremities: extremities normal, warm and well-perfused; no cyanosis, clubbing, or edema  Neurologic: Grossly normal   Parkinsonian symptoms      Invasive Devices Peripheral Intravenous Line            Peripheral IV 07/20/19 Left;Ventral (anterior) Forearm 1 day          Drain            Closed/Suction Drain Midline Abdomen Bulb 2 days                Lab, Imaging and other studies: I have personally reviewed pertinent reports  VTE Pharmacologic Prophylaxis: Heparin  VTE Mechanical Prophylaxis: sequential compression device     Sharyle Barbone, DO  07/21/19  9:47 AM    Some portions of this record may have been generated with voice recognition software  There may be translation, syntax, or grammatical errors  Occasional wrong word or "sound-a-like" substitutions may have occurred due to the inherent limitations of the voice recognition software  Read the chart carefully and recognize, using context, where substations may have occurred   If you have any questions, please contact the dictating provider for clarification or correction, as needed

## 2019-07-22 PROBLEM — G92.8 TOXIC METABOLIC ENCEPHALOPATHY: Status: ACTIVE | Noted: 2019-07-22

## 2019-07-22 PROBLEM — E43 SEVERE PROTEIN-CALORIE MALNUTRITION (HCC): Status: ACTIVE | Noted: 2019-07-22

## 2019-07-22 LAB
ALBUMIN SERPL BCP-MCNC: 2.5 G/DL (ref 3.5–5)
ALP SERPL-CCNC: 112 U/L (ref 46–116)
ALT SERPL W P-5'-P-CCNC: 19 U/L (ref 12–78)
ANION GAP SERPL CALCULATED.3IONS-SCNC: 9 MMOL/L (ref 4–13)
AST SERPL W P-5'-P-CCNC: 36 U/L (ref 5–45)
BASOPHILS # BLD AUTO: 0.05 THOUSANDS/ΜL (ref 0–0.1)
BASOPHILS NFR BLD AUTO: 1 % (ref 0–1)
BILIRUB SERPL-MCNC: 0.6 MG/DL (ref 0.2–1)
BUN SERPL-MCNC: 10 MG/DL (ref 5–25)
CALCIUM SERPL-MCNC: 9.2 MG/DL (ref 8.3–10.1)
CHLORIDE SERPL-SCNC: 105 MMOL/L (ref 100–108)
CO2 SERPL-SCNC: 25 MMOL/L (ref 21–32)
CREAT SERPL-MCNC: 0.78 MG/DL (ref 0.6–1.3)
EOSINOPHIL # BLD AUTO: 0.21 THOUSAND/ΜL (ref 0–0.61)
EOSINOPHIL NFR BLD AUTO: 4 % (ref 0–6)
ERYTHROCYTE [DISTWIDTH] IN BLOOD BY AUTOMATED COUNT: 13.2 % (ref 11.6–15.1)
GFR SERPL CREATININE-BSD FRML MDRD: 73 ML/MIN/1.73SQ M
GLUCOSE SERPL-MCNC: 116 MG/DL (ref 65–140)
GLUCOSE SERPL-MCNC: 122 MG/DL (ref 65–140)
GLUCOSE SERPL-MCNC: 124 MG/DL (ref 65–140)
GLUCOSE SERPL-MCNC: 135 MG/DL (ref 65–140)
GLUCOSE SERPL-MCNC: 198 MG/DL (ref 65–140)
HCT VFR BLD AUTO: 34.7 % (ref 34.8–46.1)
HGB BLD-MCNC: 10.9 G/DL (ref 11.5–15.4)
IMM GRANULOCYTES # BLD AUTO: 0.03 THOUSAND/UL (ref 0–0.2)
IMM GRANULOCYTES NFR BLD AUTO: 1 % (ref 0–2)
LYMPHOCYTES # BLD AUTO: 0.53 THOUSANDS/ΜL (ref 0.6–4.47)
LYMPHOCYTES NFR BLD AUTO: 9 % (ref 14–44)
MAGNESIUM SERPL-MCNC: 1.9 MG/DL (ref 1.6–2.6)
MCH RBC QN AUTO: 29.5 PG (ref 26.8–34.3)
MCHC RBC AUTO-ENTMCNC: 31.4 G/DL (ref 31.4–37.4)
MCV RBC AUTO: 94 FL (ref 82–98)
MONOCYTES # BLD AUTO: 0.43 THOUSAND/ΜL (ref 0.17–1.22)
MONOCYTES NFR BLD AUTO: 7 % (ref 4–12)
NEUTROPHILS # BLD AUTO: 4.63 THOUSANDS/ΜL (ref 1.85–7.62)
NEUTS SEG NFR BLD AUTO: 78 % (ref 43–75)
NRBC BLD AUTO-RTO: 0 /100 WBCS
PHOSPHATE SERPL-MCNC: 2.4 MG/DL (ref 2.3–4.1)
PLATELET # BLD AUTO: 290 THOUSANDS/UL (ref 149–390)
PMV BLD AUTO: 10.2 FL (ref 8.9–12.7)
POTASSIUM SERPL-SCNC: 3.7 MMOL/L (ref 3.5–5.3)
PROT SERPL-MCNC: 6.3 G/DL (ref 6.4–8.2)
RBC # BLD AUTO: 3.69 MILLION/UL (ref 3.81–5.12)
SODIUM SERPL-SCNC: 139 MMOL/L (ref 136–145)
WBC # BLD AUTO: 5.88 THOUSAND/UL (ref 4.31–10.16)

## 2019-07-22 PROCEDURE — 84100 ASSAY OF PHOSPHORUS: CPT | Performed by: STUDENT IN AN ORGANIZED HEALTH CARE EDUCATION/TRAINING PROGRAM

## 2019-07-22 PROCEDURE — 80053 COMPREHEN METABOLIC PANEL: CPT | Performed by: STUDENT IN AN ORGANIZED HEALTH CARE EDUCATION/TRAINING PROGRAM

## 2019-07-22 PROCEDURE — 85025 COMPLETE CBC W/AUTO DIFF WBC: CPT | Performed by: STUDENT IN AN ORGANIZED HEALTH CARE EDUCATION/TRAINING PROGRAM

## 2019-07-22 PROCEDURE — 99024 POSTOP FOLLOW-UP VISIT: CPT | Performed by: PHYSICIAN ASSISTANT

## 2019-07-22 PROCEDURE — 83735 ASSAY OF MAGNESIUM: CPT | Performed by: STUDENT IN AN ORGANIZED HEALTH CARE EDUCATION/TRAINING PROGRAM

## 2019-07-22 PROCEDURE — 97110 THERAPEUTIC EXERCISES: CPT

## 2019-07-22 PROCEDURE — C9113 INJ PANTOPRAZOLE SODIUM, VIA: HCPCS | Performed by: SPECIALIST

## 2019-07-22 PROCEDURE — 99233 SBSQ HOSP IP/OBS HIGH 50: CPT | Performed by: FAMILY MEDICINE

## 2019-07-22 PROCEDURE — 82948 REAGENT STRIP/BLOOD GLUCOSE: CPT

## 2019-07-22 PROCEDURE — 97530 THERAPEUTIC ACTIVITIES: CPT

## 2019-07-22 PROCEDURE — 99222 1ST HOSP IP/OBS MODERATE 55: CPT | Performed by: PSYCHIATRY & NEUROLOGY

## 2019-07-22 RX ORDER — AMANTADINE HYDROCHLORIDE 100 MG/1
100 CAPSULE, GELATIN COATED ORAL 2 TIMES DAILY
Status: DISCONTINUED | OUTPATIENT
Start: 2019-07-22 | End: 2019-07-23 | Stop reason: HOSPADM

## 2019-07-22 RX ADMIN — PIPERACILLIN SODIUM,TAZOBACTAM SODIUM 2.25 G: 2; .25 INJECTION, POWDER, FOR SOLUTION INTRAVENOUS at 05:55

## 2019-07-22 RX ADMIN — HEPARIN SODIUM 5000 UNITS: 5000 INJECTION INTRAVENOUS; SUBCUTANEOUS at 05:55

## 2019-07-22 RX ADMIN — INSULIN LISPRO 1 UNITS: 100 INJECTION, SOLUTION INTRAVENOUS; SUBCUTANEOUS at 19:12

## 2019-07-22 RX ADMIN — HEPARIN SODIUM 5000 UNITS: 5000 INJECTION INTRAVENOUS; SUBCUTANEOUS at 13:20

## 2019-07-22 RX ADMIN — RASAGILINE 1 MG: 1 TABLET ORAL at 13:20

## 2019-07-22 RX ADMIN — ASPIRIN 81 MG: 81 TABLET, COATED ORAL at 11:08

## 2019-07-22 RX ADMIN — AMANTADINE 100 MG: 100 CAPSULE ORAL at 11:02

## 2019-07-22 RX ADMIN — AMANTADINE 100 MG: 100 CAPSULE ORAL at 19:11

## 2019-07-22 RX ADMIN — ROPINIROLE HYDROCHLORIDE 2 MG: 1 TABLET, FILM COATED ORAL at 22:04

## 2019-07-22 RX ADMIN — ROPINIROLE HYDROCHLORIDE 4 MG: 1 TABLET, FILM COATED ORAL at 22:02

## 2019-07-22 RX ADMIN — RISPERIDONE 0.25 MG: 0.25 TABLET ORAL at 11:07

## 2019-07-22 RX ADMIN — HEPARIN SODIUM 5000 UNITS: 5000 INJECTION INTRAVENOUS; SUBCUTANEOUS at 22:06

## 2019-07-22 RX ADMIN — ACETAMINOPHEN 650 MG: 325 TABLET, FILM COATED ORAL at 13:19

## 2019-07-22 RX ADMIN — PANTOPRAZOLE SODIUM 40 MG: 40 INJECTION, POWDER, FOR SOLUTION INTRAVENOUS at 08:59

## 2019-07-22 RX ADMIN — AMLODIPINE BESYLATE 5 MG: 5 TABLET ORAL at 11:08

## 2019-07-22 RX ADMIN — ACETAMINOPHEN 650 MG: 325 TABLET, FILM COATED ORAL at 19:11

## 2019-07-22 RX ADMIN — MIRTAZAPINE 30 MG: 15 TABLET, FILM COATED ORAL at 22:04

## 2019-07-22 NOTE — OCCUPATIONAL THERAPY NOTE
OT TREATMENT       07/22/19 1350   Restrictions/Precautions   Other Precautions Chair Alarm; Bed Alarm; Fall Risk   Pain Assessment   Pain Assessment No/denies pain   ADL   LB Dressing Assistance 4  Minimal Assistance   LB Dressing Deficit Don/doff R sock   LB Dressing Comments seated in chair    Functional Standing Tolerance   Time 45 seconds   Activity static standing with RW, mod assist    Transfers   Sit to Stand 4  Minimal assistance   Additional items Assist x 1   Stand to Sit 4  Minimal assistance   Additional items Assist x 1   Functional Mobility   Functional Mobility 3  Moderate assistance   Additional Comments few steps with RW (3 feet)   Additional items Rolling walker   ROM- Right Upper Extremities   R Shoulder AROM; Flexion; Horizontal ABduction   R Elbow AROM;Elbow extension;Elbow flexion   R Hand AROM; Thumb; Index finger; Long finger;Ring finger;Little finger   R Weight/Reps/Sets 10 times each seated in chair    ROM - Left Upper Extremities    L Shoulder AROM; Flexion; Horizontal ABduction   L Elbow AROM;Elbow flexion;Elbow extension   L Hand AROM; Thumb; Index finger; Long finger;Ring finger;Little finger   L Weight/Reps/Sets 10 times each seated in chair    Assessment   Assessment Patient is cooperative and pleasant  Patient able to follow 1 step commands today  Patient is generally weak and deconditioned  Patient will benefit from continued OT services to increase function with ADLS, transfers and functional mobility  Plan   Treatment Interventions ADL retraining;UE strengthening/ROM; Functional transfer training; Endurance training;Patient/family training;Equipment evaluation/education; Activityengagement; Compensatory technique education   Recommendation   OT Discharge Recommendation 24 hour supervision/assist  (home OT )   Licensure   [place] License Number  Jarett Shena Vallecillo [address] OTR/L [id number]

## 2019-07-22 NOTE — PLAN OF CARE
Problem: Potential for Falls  Goal: Patient will remain free of falls  Description  INTERVENTIONS:  - Assess patient frequently for physical needs  -  Identify cognitive and physical deficits and behaviors that affect risk of falls    -  [place] fall precautions as indicated by assessment   - Educate patient/family on patient safety including physical limitations  - Instruct patient to call for assistance with activity based on assessment  - Modify environment to reduce risk of injury  - Consider OT/PT consult to assist with strengthening/mobility  7/22/2019 1702 by Ignacio Bentley RN  Outcome: Progressing  7/22/2019 1658 by Ignacio Bentley RN  Outcome: Progressing     Problem: Prexisting or High Potential for Compromised Skin Integrity  Goal: Skin integrity is maintained or improved  Description  INTERVENTIONS:  - Identify patients at risk for skin breakdown  - Assess and monitor skin integrity  - Assess and monitor nutrition and hydration status  - Monitor labs (i e  albumin)  - Assess for incontinence   - Turn and reposition patient  - Assist with mobility/ambulation  - Relieve pressure over bony prominences  - Avoid friction and shearing  - Provide appropriate hygiene as needed including keeping skin clean and dry  - Evaluate need for skin moisturizer/barrier cream  - Collaborate with interdisciplinary team (i e  Nutrition, Rehabilitation, etc )   - Patient/family teaching  7/22/2019 1702 by Ignacio Bentley RN  Outcome: Progressing  7/22/2019 1658 by Ignacio Bentley RN  Outcome: Progressing     Problem: GASTROINTESTINAL - ADULT  Goal: Minimal or absence of nausea and/or vomiting  Description  INTERVENTIONS:  - Administer IV fluids as ordered to ensure adequate hydration  - Maintain NPO status until nausea and vomiting are resolved  - Nasogastric tube as ordered  - Administer ordered antiemetic medications as needed  - Provide nonpharmacologic comfort measures as appropriate  - Advance diet as tolerated, if ordered  - Nutrition services referral to assist patient with adequate nutrition and appropriate food choices  7/22/2019 1702 by Sandra Shirley RN  Outcome: Progressing  7/22/2019 1658 by Sandra Shirley RN  Outcome: Progressing     Problem: GENITOURINARY - ADULT  Goal: Urinary catheter remains patent  Description  INTERVENTIONS:  - Assess patency of urinary catheter  - If patient has a chronic allen, consider changing catheter if non-functioning  - Follow guidelines for intermittent irrigation of non-functioning urinary catheter  7/22/2019 1702 by Sandra Shirley RN  Outcome: Progressing  7/22/2019 1658 by Sandra Shirley RN  Outcome: Progressing     Problem: PAIN - ADULT  Goal: Verbalizes/displays adequate comfort level or baseline comfort level  Description  Interventions:  - Encourage patient to monitor pain and request assistance  - Assess pain using appropriate pain scale  - Administer analgesics based on type and severity of pain and evaluate response  - Implement non-pharmacological measures as appropriate and evaluate response  - Consider cultural and social influences on pain and pain management  - Notify physician/advanced practitioner if interventions unsuccessful or patient reports new pain  7/22/2019 1702 by Sandra Shirley RN  Outcome: Progressing  7/22/2019 1658 by Sandra Shirley RN  Outcome: Progressing     Problem: SAFETY ADULT  Goal: Patient will remain free of falls  Description  INTERVENTIONS:  - Assess patient frequently for physical needs  -  Identify cognitive and physical deficits and behaviors that affect risk of falls    -  [place] fall precautions as indicated by assessment   - Educate patient/family on patient safety including physical limitations  - Instruct patient to call for assistance with activity based on assessment  - Modify environment to reduce risk of injury  - Consider OT/PT consult to assist with strengthening/mobility  7/22/2019 1702 by Antonieta Mendoza Ca Lopez RN  Outcome: Progressing  7/22/2019 1658 by Kennedy Gonzales RN  Outcome: Progressing     Problem: DISCHARGE PLANNING  Goal: Discharge to home or other facility with appropriate resources  Description  INTERVENTIONS:  - Identify barriers to discharge w/patient and caregiver  - Arrange for needed discharge resources and transportation as appropriate  - Identify discharge learning needs (meds, wound care, etc )  - Arrange for interpretive services to assist at discharge as needed  - Refer to Case Management Department for coordinating discharge planning if the patient needs post-hospital services based on physician/advanced practitioner order or complex needs related to functional status, cognitive ability, or social support system  7/22/2019 1702 by Kennedy Gonzales RN  Outcome: Progressing  7/22/2019 1658 by Kennedy Gonzales RN  Outcome: Progressing     Problem: Nutrition/Hydration-ADULT  Goal: Nutrient/Hydration intake appropriate for improving, restoring or maintaining nutritional needs  Description  Monitor and assess patient's nutrition/hydration status for malnutrition (ex- brittle hair, bruises, dry skin, pale skin and conjunctiva, muscle wasting, smooth red tongue, and disorientation)  Collaborate with interdisciplinary team and initiate plan and interventions as ordered  Monitor patient's weight and dietary intake as ordered or per policy  Utilize nutrition screening tool and intervene per policy  Determine patient's food preferences and provide high-protein, high-caloric foods as appropriate       INTERVENTIONS:  - Monitor oral intake, urinary output, labs, and treatment plans  - Assess nutrition and hydration status and recommend course of action  - Evaluate amount of meals eaten  - Assist patient with eating if necessary   - Allow adequate time for meals  - Recommend/ encourage appropriate diets, oral nutritional supplements, and vitamin/mineral supplements  - Order, calculate, and assess calorie counts as needed  - Recommend, monitor, and adjust tube feedings and TPN/PPN based on assessed needs  - Assess need for intravenous fluids  - Provide specific nutrition/hydration education as appropriate  - Include patient/family/caregiver in decisions related to nutrition  7/22/2019 1702 by Raj Aguilar, RN  Outcome: Progressing  7/22/2019 1658 by Raj Aguilar, RN  Outcome: Progressing

## 2019-07-22 NOTE — PLAN OF CARE
Problem: Potential for Falls  Goal: Patient will remain free of falls  Description  INTERVENTIONS:  - Assess patient frequently for physical needs  -  Identify cognitive and physical deficits and behaviors that affect risk of falls    -  [place] fall precautions as indicated by assessment   - Educate patient/family on patient safety including physical limitations  - Instruct patient to call for assistance with activity based on assessment  - Modify environment to reduce risk of injury  - Consider OT/PT consult to assist with strengthening/mobility  7/22/2019 1702 by Cali Meadows RN  Outcome: Progressing  7/22/2019 1658 by Cali Meadows RN  Outcome: Progressing     Problem: Prexisting or High Potential for Compromised Skin Integrity  Goal: Skin integrity is maintained or improved  Description  INTERVENTIONS:  - Identify patients at risk for skin breakdown  - Assess and monitor skin integrity  - Assess and monitor nutrition and hydration status  - Monitor labs (i e  albumin)  - Assess for incontinence   - Turn and reposition patient  - Assist with mobility/ambulation  - Relieve pressure over bony prominences  - Avoid friction and shearing  - Provide appropriate hygiene as needed including keeping skin clean and dry  - Evaluate need for skin moisturizer/barrier cream  - Collaborate with interdisciplinary team (i e  Nutrition, Rehabilitation, etc )   - Patient/family teaching  7/22/2019 1702 by Cali Meadows RN  Outcome: Progressing  7/22/2019 1658 by Cali Meadows RN  Outcome: Progressing     Problem: GASTROINTESTINAL - ADULT  Goal: Minimal or absence of nausea and/or vomiting  Description  INTERVENTIONS:  - Administer IV fluids as ordered to ensure adequate hydration  - Maintain NPO status until nausea and vomiting are resolved  - Nasogastric tube as ordered  - Administer ordered antiemetic medications as needed  - Provide nonpharmacologic comfort measures as appropriate  - Advance diet as tolerated, if ordered  - Nutrition services referral to assist patient with adequate nutrition and appropriate food choices  7/22/2019 1702 by Ashwin Morejon RN  Outcome: Progressing  7/22/2019 1658 by Ashwin Morejon RN  Outcome: Progressing     Problem: PAIN - ADULT  Goal: Verbalizes/displays adequate comfort level or baseline comfort level  Description  Interventions:  - Encourage patient to monitor pain and request assistance  - Assess pain using appropriate pain scale  - Administer analgesics based on type and severity of pain and evaluate response  - Implement non-pharmacological measures as appropriate and evaluate response  - Consider cultural and social influences on pain and pain management  - Notify physician/advanced practitioner if interventions unsuccessful or patient reports new pain  7/22/2019 1702 by Ashwin Morejon RN  Outcome: Progressing  7/22/2019 1658 by Ashwin Morejon RN  Outcome: Progressing     Problem: SAFETY ADULT  Goal: Patient will remain free of falls  Description  INTERVENTIONS:  - Assess patient frequently for physical needs  -  Identify cognitive and physical deficits and behaviors that affect risk of falls    -  [place] fall precautions as indicated by assessment   - Educate patient/family on patient safety including physical limitations  - Instruct patient to call for assistance with activity based on assessment  - Modify environment to reduce risk of injury  - Consider OT/PT consult to assist with strengthening/mobility  7/22/2019 1702 by Ashwin Morejon RN  Outcome: Progressing  7/22/2019 1658 by Ashwin Morejon RN  Outcome: Progressing     Problem: DISCHARGE PLANNING  Goal: Discharge to home or other facility with appropriate resources  Description  INTERVENTIONS:  - Identify barriers to discharge w/patient and caregiver  - Arrange for needed discharge resources and transportation as appropriate  - Identify discharge learning needs (meds, wound care, etc )  - Arrange for interpretive services to assist at discharge as needed  - Refer to Case Management Department for coordinating discharge planning if the patient needs post-hospital services based on physician/advanced practitioner order or complex needs related to functional status, cognitive ability, or social support system  7/22/2019 1702 by Jhoan Staples RN  Outcome: Progressing  7/22/2019 1658 by Jhoan Staples RN  Outcome: Progressing     Problem: Nutrition/Hydration-ADULT  Goal: Nutrient/Hydration intake appropriate for improving, restoring or maintaining nutritional needs  Description  Monitor and assess patient's nutrition/hydration status for malnutrition (ex- brittle hair, bruises, dry skin, pale skin and conjunctiva, muscle wasting, smooth red tongue, and disorientation)  Collaborate with interdisciplinary team and initiate plan and interventions as ordered  Monitor patient's weight and dietary intake as ordered or per policy  Utilize nutrition screening tool and intervene per policy  Determine patient's food preferences and provide high-protein, high-caloric foods as appropriate       INTERVENTIONS:  - Monitor oral intake, urinary output, labs, and treatment plans  - Assess nutrition and hydration status and recommend course of action  - Evaluate amount of meals eaten  - Assist patient with eating if necessary   - Allow adequate time for meals  - Recommend/ encourage appropriate diets, oral nutritional supplements, and vitamin/mineral supplements  - Order, calculate, and assess calorie counts as needed  - Recommend, monitor, and adjust tube feedings and TPN/PPN based on assessed needs  - Assess need for intravenous fluids  - Provide specific nutrition/hydration education as appropriate  - Include patient/family/caregiver in decisions related to nutrition  7/22/2019 1702 by Jhoan Staples RN  Outcome: Progressing  7/22/2019 1658 by Jhoan Staples RN  Outcome: Progressing

## 2019-07-22 NOTE — PROGRESS NOTES
Progress Note - Coy Cushing 67 y o  female MRN: [id number]    Unit/Bed#: 2 [place] [address] Encounter: [id number]      Assessment & Plan:  67year old female with PMH as previously noted, initially admitted for exploratory laparotomy under the surgical service after CT scan showed intraperitoneal air  She is POD #4 s/p ex lap with Kolby's patch repair of perforated duodenal ulcer  [facility] has been consulted for management of others medical conditions  Her parkinson's disease and dementia have been acutely uncontrolled, and the patient is frequently found confused and displaying severe parkinsonian symptoms  She has been historically well controlled per chart review, however  states that the patient does experience a lot of symptoms at home too  Several medications such as amantadine and azilect were held for her surgery, however have been restarted  This morning her twitching and tremors appear well controlled, but this was the case yesterday too, and she was noted to be very symptomatic later in the morning  Psych has been consulted and has added risperdal to her regimen  Awaiting neurology recommendations  Pain control, diet advancement, and discharge disposition per surgical team     Duodenal Ulcer Perforation - POD #4 S/P Exploratory Laparotomy with Kolby's Patch Placement  · Initially identified via CT scan with free intraperitoneal air POA  · Pain control per surgical team   · NPO, anticipate advancing diet to [facility] per surgical recommendations and if patient's mentation remains stable  · Wean off IVF as diet advances  · Continue protonix 40 mg IV qd  · Day #4 of Zosyn  · Monitor vitals per protocol  · Trend WBC, fevers      Parkinson's Disease  · Appears acutely uncontrolled and quite advanced  Patient is observed with waxing/waning altered mentation and near-constant twitching and tremors  At times she is lucid, other times she is disoriented and agitated    · For now, continue Carbidopa-levodopa 48 195 mg per home regimen  · Continue requip 6 mg po qhs  · Continue azilect 1 mg po qd  · Per chart review of [facility] notes, patient should be on Amantadine 100 mg po bid  Will restart this medication now  · Consulting neurology & psychiatry  Recommendations appreciated  · Per psych - patient may benefit from supervised living  Add risperdal 0 25 mg bid  Haldol 1 mg IM q6h prn       Dementia  · Likely secondary to Parkinson's or multifactorial   · As above, patient is displaying waxing and waning alterations in her consciousness  · Continuing parkinson medications as above  · Appreciate neurology and psychiatry consults  Toxic Metabolic Encephalopathy  · Secondary to acute illness state vs uncontrolled Parkinson's/dementia  · See management above for Parkinson's & Dementia      NIDDM2  · Holding home metformin in the setting of prior RODRIGO  · ISS with accuchecks     Insomnia  · Stable  Continue remeron 30 mg po qhs      Essential HTN  · Stable  Continue norvasc 5 mg po qd  Severe Protein Calorie Malnutrition  · 21% weight loss in 12 months  BMI 17 98   · Currently NPO, but will start on nutritional supplements when tolerating oral diet  · Nutrition consulted, further recommendations appreciated     RODRIGO  · Resolved  · POA Cr 1 58, currently back to baseline     Global  · IVF D5 1/2NS @ 75 cc/h  · NPO  · Replete electrolytes as indicated  · VTE PPX Heparin & SCDs  · Level 1 Full Code    Subjective:   Patient was seen and examined at bedside  This morning she is still somewhat confused, stating that she just finished work  However, her parkinsonian symptoms are not as apparent this morning, as the patient is resting comfortably in bed  She received 1 mg haldol yesterday and has been mostly asleep ever since, per nursing team  Patient denies any pain, endorses passing gas, and states she is hungry and wishes to eat  Objective:     Vitals: Blood pressure 146/76, pulse 69, temperature 98 2 °F (36 8 °C), resp  rate 17, height 4' 11" (1 499 m), weight 40 4 kg (89 lb), SpO2 98 %, not currently breastfeeding  ,Body mass index is 17 98 kg/m²  Intake/Output Summary (Last 24 hours) at 7/22/2019 5309  Last data filed at 7/22/2019 0601  Gross per 24 hour   Intake 1035 ml   Output 450 ml   Net 585 ml       Physical Exam: /76   Pulse 69   Temp 98 2 °F (36 8 °C)   Resp 17   Ht 4' 11" (1 499 m)   Wt 40 4 kg (89 lb)   SpO2 98%   BMI 17 98 kg/m²   General appearance: alert, cooperative and frail-appearing  Confused and disoriented  No acute distress  Lungs: clear to auscultation bilaterally  Heart: regular rate and rhythm, S1, S2 normal, no murmur, click, rub or gallop  Abdomen: Soft, nontender  Incision site C/D/I  JB drain intact, draining small amount of serosanguinous fluid  Extremities: extremities normal, warm and well-perfused; no cyanosis, clubbing, or edema  Pulses: 2+ and symmetric  Neurologic: Confused  Parkinsonian symptoms including twitching and tremors are controlled this morning  Invasive Devices     Peripheral Intravenous Line            Peripheral IV 07/20/19 Left;Ventral (anterior) Forearm 2 days          Drain            Closed/Suction Drain Midline Abdomen Bulb 3 days                Lab, Imaging and other studies: I have personally reviewed pertinent reports  VTE Pharmacologic Prophylaxis: Heparin  VTE Mechanical Prophylaxis: sequential compression device     Sharlie Spatz, DO  07/22/19  7:42 AM    Some portions of this record may have been generated with voice recognition software  There may be translation, syntax, or grammatical errors  Occasional wrong word or "sound-a-like" substitutions may have occurred due to the inherent limitations of the voice recognition software  Read the chart carefully and recognize, using context, where substations may have occurred   If you have any questions, please contact the dictating provider for clarification or correction, as needed

## 2019-07-22 NOTE — PROGRESS NOTES
Progress Note - General Surgery   Rios Gautam 67 y o  female MRN: [id number]  Unit/Bed#: 2 [place] [address] Encounter: [id number]    Assessment:  1) POD #5 s/p exploratory laparotomy with Maria Luz Borders patch - electrolytes well-balanced, nonverbal currently, AVSS, appropriate urinary output, JB drain only 50 mL, stool output recorded per nursing, leukocytosis resolved, hemoglobin stable  2) AK I - resolved  3) Parkinson's disease - dementia also, possibly Lewy bodies, patient having some altered mental status, likely due to changes in Sinemet dosing when NPO, currently less agitated, currently nonverbal    Plan:  1-3)   - discontinue Zosyn  - pending neurology consult  - will review psychiatry consult  - management of Parkinson's medications per Neurology and Family Medicine  - NPO for now, when mental status improves can be placed back on clear liquids  - DVT prophylaxis with heparin q 8 hours  - repeat CBC, BMP, Mag, phos  - will removed drain when better oriented likely tomorrow  - will likely advance diet by tomorrow    Subjective/Objective   Chief Complaint:  Not responding verbally    Subjective:  Patient was seen and examined at bedside  Patient does appear comfortable  Patient was not responding verbally likely due to sedate of affects of Haldol  Patient does not appear to have any issues with bowel function per nursing report, I/Os  Patient does not appear to be having any abdominal pain  No nausea or vomiting  Minimal drain output    Objective:     Blood pressure 148/76, pulse 73, temperature 97 7 °F (36 5 °C), temperature source Oral, resp  rate 18, height 4' 11" (1 499 m), weight 40 4 kg (89 lb), SpO2 100 %, not currently breastfeeding  ,Body mass index is 17 98 kg/m²        Intake/Output Summary (Last 24 hours) at 7/22/2019 1022  Last data filed at 7/22/2019 0601  Gross per 24 hour   Intake 1035 ml   Output 450 ml   Net 585 ml       Invasive Devices     Peripheral Intravenous Line            Peripheral IV 07/20/19 Left;Ventral (anterior) Forearm 2 days          Drain            Closed/Suction Drain Midline Abdomen Bulb 3 days                Physical Exam: /76 (BP Location: Left arm)   Pulse 73   Temp 97 7 °F (36 5 °C) (Oral)   Resp 18   Ht 4' 11" (1 499 m)   Wt 40 4 kg (89 lb)   SpO2 100%   BMI 17 98 kg/m²   General appearance: Lethargic/sedated  Head: Normocephalic, without obvious abnormality, atraumatic  Lungs: clear to auscultation bilaterally  Heart: regular rate and rhythm, S1, S2 normal, no murmur, click, rub or gallop and Slight systolic ejection murmur consistent with possibly early stages of aortic stenosis  Abdomen: Soft, active bowel sounds x4, minimal tenderness to palpation  Skin: Skin color, texture, turgor normal  No rashes or lesions or Incision is clean, dry, intact    Lab, Imaging and other studies:  I have personally reviewed pertinent lab results    , CBC:   Lab Results   Component Value Date    WBC 5 88 07/22/2019    HGB 10 9 (L) 07/22/2019    HCT 34 7 (L) 07/22/2019    MCV 94 07/22/2019     07/22/2019    MCH 29 5 07/22/2019    MCHC 31 4 07/22/2019    RDW 13 2 07/22/2019    MPV 10 2 07/22/2019    NRBC 0 07/22/2019   , CMP:   Lab Results   Component Value Date    SODIUM 139 07/22/2019    K 3 7 07/22/2019     07/22/2019    CO2 25 07/22/2019    BUN 10 07/22/2019    CREATININE 0 78 07/22/2019    CALCIUM 9 2 07/22/2019    AST 36 07/22/2019    ALT 19 07/22/2019    ALKPHOS 112 07/22/2019    EGFR 73 07/22/2019     VTE Pharmacologic Prophylaxis: Heparin  VTE Mechanical Prophylaxis: sequential compression device

## 2019-07-22 NOTE — PHYSICAL THERAPY NOTE
PT TREATMENT     07/22/19 1430   Pain Assessment   Pain Assessment No/denies pain   Restrictions/Precautions   Other Precautions Chair Alarm; Bed Alarm; Fall Risk  (Parkinsons: unsteady gait)   General   Chart Reviewed Yes   Additional Pertinent History Parkinsons; post op for explor lap   Family/Caregiver Present No   Cognition   Overall Cognitive Status WFL   Arousal/Participation Cooperative   Attention Attends with cues to redirect   Following Commands Follows multistep commands with increased time or repetition   Comments Pt mentation and function is much improved since last PT session   Subjective   Subjective Pt wants to exercise and get back into bed   Bed Mobility   Supine to Sit 4  Minimal assistance   Additional items Verbal cues   Sit to Supine 4  Minimal assistance   Additional items Assist x 1;Verbal cues   Additional Comments Pt was positioned in bed, then requested to use commode for BM   Transfers   Sit to Stand 4  Minimal assistance   Additional items Assist x 1;Verbal cues; Impulsive   Stand to Sit 4  Minimal assistance   Additional items Verbal cues   Stand pivot 4  Minimal assistance   Additional items Assist x 1;Verbal cues   Toilet transfer 4  Minimal assistance   Additional items Assist x 1;Verbal cues; Commode   Additional Comments dependent for hygiene; diaper donned after BM with assist of 2 (due to unsteady while standing even with walker)   Ambulation/Elevation   Gait pattern Shuffling; Forward Flexion   Gait Assistance 4  Minimal assist   Additional items Assist x 1;Verbal cues; Tactile cues  (some assist at times to navigate RW in tight spaces)   Assistive Device Rolling walker   Distance 150 feet with one brief standing rest   Balance   Static Standing Poor   Dynamic Standing Poor   Ambulatory Poor +  (with RW support)   Activity Tolerance   Activity Tolerance Patient tolerated treatment well   Nurse Made Aware yes :Agnes Cruz can reconnect IV after walk   Exercises   Hip Flexion Sitting;20 reps;Bilateral  (also SLRs bilat x 10 each in supine)   Hip Adduction Sitting;20 reps;Bilateral  (via pillow squeeze)   Knee AROM Long Arc Quad Sitting;20 reps;Bilateral   Ankle Pumps Sitting;20 reps;Bilateral   Balance training  with RW while standing after BM for hygiene and donning diaper   Assessment   Prognosis Good   Problem List Decreased strength;Decreased endurance; Impaired balance;Decreased mobility; Decreased coordination;Decreased safety awareness  (Parkinsons)   Assessment Pt presented in chair and completed LE exercises, transferred back to bed at pt request  Once in bed, pt wanted to use commode  Pt transferred back out of bed to commode, assisted with hygiene, donned diaper and proceeded to ambulated with PT  Pt wanted to walk when PT suggested same  Pt used a RW : shuffling steps in tight spaces, but once in hallway with a straight path, pt is able to demonstrate an improved helen  Pt in bed at end of session with all needs in reach  RN aware  Cont  with skilled PT  Goals   Patient Goals to get stronger and go home   Plan   Treatment/Interventions ADL retraining;Functional transfer training;LE strengthening/ROM; Therapeutic exercise; Endurance training;Patient/family training;Equipment eval/education; Bed mobility;Gait training;Spoke to nursing;OT   Progress Progressing toward goals   Recommendation   Recommendation 24 hour supervision/assist;Outpatient PT   Licensure   [place] License Number  Kevin Austin PT  [id number]

## 2019-07-22 NOTE — PLAN OF CARE
Problem: Potential for Falls  Goal: Patient will remain free of falls  Description  INTERVENTIONS:  - Assess patient frequently for physical needs  -  Identify cognitive and physical deficits and behaviors that affect risk of falls    -  [place] fall precautions as indicated by assessment   - Educate patient/family on patient safety including physical limitations  - Instruct patient to call for assistance with activity based on assessment  - Modify environment to reduce risk of injury  - Consider OT/PT consult to assist with strengthening/mobility  Outcome: Progressing     Problem: Prexisting or High Potential for Compromised Skin Integrity  Goal: Skin integrity is maintained or improved  Description  INTERVENTIONS:  - Identify patients at risk for skin breakdown  - Assess and monitor skin integrity  - Assess and monitor nutrition and hydration status  - Monitor labs (i e  albumin)  - Assess for incontinence   - Turn and reposition patient  - Assist with mobility/ambulation  - Relieve pressure over bony prominences  - Avoid friction and shearing  - Provide appropriate hygiene as needed including keeping skin clean and dry  - Evaluate need for skin moisturizer/barrier cream  - Collaborate with interdisciplinary team (i e  Nutrition, Rehabilitation, etc )   - Patient/family teaching  Outcome: Progressing     Problem: GASTROINTESTINAL - ADULT  Goal: Minimal or absence of nausea and/or vomiting  Description  INTERVENTIONS:  - Administer IV fluids as ordered to ensure adequate hydration  - Maintain NPO status until nausea and vomiting are resolved  - Nasogastric tube as ordered  - Administer ordered antiemetic medications as needed  - Provide nonpharmacologic comfort measures as appropriate  - Advance diet as tolerated, if ordered  - Nutrition services referral to assist patient with adequate nutrition and appropriate food choices  Outcome: Progressing     Problem: GENITOURINARY - ADULT  Goal: Urinary catheter remains patent  Description  INTERVENTIONS:  - Assess patency of urinary catheter  - If patient has a chronic allen, consider changing catheter if non-functioning  - Follow guidelines for intermittent irrigation of non-functioning urinary catheter  Outcome: Progressing     Problem: PAIN - ADULT  Goal: Verbalizes/displays adequate comfort level or baseline comfort level  Description  Interventions:  - Encourage patient to monitor pain and request assistance  - Assess pain using appropriate pain scale  - Administer analgesics based on type and severity of pain and evaluate response  - Implement non-pharmacological measures as appropriate and evaluate response  - Consider cultural and social influences on pain and pain management  - Notify physician/advanced practitioner if interventions unsuccessful or patient reports new pain  Outcome: Progressing     Problem: SAFETY ADULT  Goal: Patient will remain free of falls  Description  INTERVENTIONS:  - Assess patient frequently for physical needs  -  Identify cognitive and physical deficits and behaviors that affect risk of falls    -  [place] fall precautions as indicated by assessment   - Educate patient/family on patient safety including physical limitations  - Instruct patient to call for assistance with activity based on assessment  - Modify environment to reduce risk of injury  - Consider OT/PT consult to assist with strengthening/mobility  Outcome: Progressing     Problem: DISCHARGE PLANNING  Goal: Discharge to home or other facility with appropriate resources  Description  INTERVENTIONS:  - Identify barriers to discharge w/patient and caregiver  - Arrange for needed discharge resources and transportation as appropriate  - Identify discharge learning needs (meds, wound care, etc )  - Arrange for interpretive services to assist at discharge as needed  - Refer to Case Management Department for coordinating discharge planning if the patient needs post-hospital services based on physician/advanced practitioner order or complex needs related to functional status, cognitive ability, or social support system  Outcome: Progressing     Problem: Nutrition/Hydration-ADULT  Goal: Nutrient/Hydration intake appropriate for improving, restoring or maintaining nutritional needs  Description  Monitor and assess patient's nutrition/hydration status for malnutrition (ex- brittle hair, bruises, dry skin, pale skin and conjunctiva, muscle wasting, smooth red tongue, and disorientation)  Collaborate with interdisciplinary team and initiate plan and interventions as ordered  Monitor patient's weight and dietary intake as ordered or per policy  Utilize nutrition screening tool and intervene per policy  Determine patient's food preferences and provide high-protein, high-caloric foods as appropriate       INTERVENTIONS:  - Monitor oral intake, urinary output, labs, and treatment plans  - Assess nutrition and hydration status and recommend course of action  - Evaluate amount of meals eaten  - Assist patient with eating if necessary   - Allow adequate time for meals  - Recommend/ encourage appropriate diets, oral nutritional supplements, and vitamin/mineral supplements  - Order, calculate, and assess calorie counts as needed  - Recommend, monitor, and adjust tube feedings and TPN/PPN based on assessed needs  - Assess need for intravenous fluids  - Provide specific nutrition/hydration education as appropriate  - Include patient/family/caregiver in decisions related to nutrition  Outcome: Progressing

## 2019-07-22 NOTE — PLAN OF CARE
Problem: OCCUPATIONAL THERAPY ADULT  Goal: Performs self-care activities at highest level of function for planned discharge setting  See evaluation for individualized goals  Outcome: Progressing  Note:   Limitation: Decreased ADL status, Decreased UE strength, Decreased endurance, Decreased self-care trans, Decreased high-level ADLs  Prognosis: Good  Assessment: Patient is cooperative and pleasant  Patient able to follow 1 step commands today  Patient is generally weak and deconditioned  Patient will benefit from continued OT services to increase function with ADLS, transfers and functional mobility        OT Discharge Recommendation: 24 hour supervision/assist(home OT )

## 2019-07-22 NOTE — PROGRESS NOTES
Patient examined spoke to the resident physician patient is alert awake cooperative she looks much better she could not explain me what made her confused and what happened to her even though she is able to tell me her age date of birth she knows that she is at [facility]   She improved with the confusion and delirium she is cooperative though she is a poor historian and she is not able to provide the background information she denies psychiatric history  She lives with her   She is not aware that she is on Remeron most probably from the family physician for depression  Resident physician reports that Ativan was not effective but she received the Haldol 1 does p r n  Yesterday and he did work well currently patient is not agitated no evidence of hallucinations not suicidal not agitated not lethargic  I will discontinue Risperdal at this time and continue Remeron and Haldol p r n  At this time  I will follow up

## 2019-07-22 NOTE — CONSULTS
[facility]   Neurology Initial Consult    Cynthia Ramirez is a 67 y o  female  [address] [facility] [address] [place] obtained from:   Chief Complaint   Patient presents with    Abdominal Pain     pt had CT earlier today for abd pain  +for free air  Assessment/Plan:    1  Recent perforated ulcer  2  Parkinson's disease   3  Psychosis       During my encounter with patient, she was quite coherent and cognitively intact  Her extremity rigidity is very minimal  Would resume home dose sinemet and amantadine  Patient can get psychotic behavior from delirium as has gone thought serious illness within past few days  Try to reorient  Avoid antipsychotics as much as possible  Discussed with patient and primary team       HPI:  Cynthia Ramirez is a 68 yo F with PMH of DM II, parkinson's disease  presents with epigastric pain  Patient recently had perforated duodenal ulcer and had undergone surgery  She has history of parkinson's since 2006  She follows neurologist at [facility]  There have been no recent changes  She has been feeling weaker recently since surgery so uses walker otherwise states that she can walk independently  She used to get dyskinesia and hence is on amantadine  She loves to bowl and is able to  She answered all my questions without any hesitation  Primary team has consulted neurology to ensure no changes to her parkinson's meds need to be made  She is found to have peritonitis and free air  She is planned to go for exploratory laparotomy  Past Medical History:   Diagnosis Date    RODRIGO (acute kidney injury) ([facility] ) 7/19/2019    Anal bleeding 1/29/2018    Arthritis     knee    Asthma     BPPV (benign paroxysmal positional vertigo) 7/19/2016    Last Assessment & Plan:  Hx consistent with BPPV  Neg Pocahontas-Halpike, however could not perform it adequately due to dyskinesias  Recommend lozano-daroff exercises at home  IF worsens, vestibular therapy      Bulging lumbar disc 12/10/2013    Closed fracture of one rib of left side 1/29/2018    Dementia     Diabetes mellitus ([facility] [address] )     TYPE 2    Gallbladder disease     GERD (gastroesophageal reflux disease)     Hematuria     last assessed 10/29/15    Hypertension     Orthostatic hypotension 7/13/2015    Ovarian cyst     LAST ASSESSED: 12/10/13    Parkinson's disease ([facility] )     Pneumonia of both lower lobes due to infectious organism ([facility] ) 3/27/2018    Pulmonary embolism with infarction ([facility] ) 9/2/2014    Sciatica 12/10/2013    Skin disorder     last assessed 12/10/13    Squamous cell carcinoma of skin 11/21/2016       Past Surgical History:   Procedure Laterality Date    APPENDECTOMY      1970'S    CHOLECYSTECTOMY      1970'S    LAPAROTOMY N/A 7/18/2019    Procedure: LAPAROTOMY EXPLORATORY;  Surgeon: Susan Summers MD;  Location: [address] [place] [address];  Service: General    NEUROPLASTY / [address] Right     OOPHORECTOMY Bilateral     REMOVAL OF BOTH OVARIES LAPAROSCOPIC     [facility] FLX W/RMVL OF TUMOR POLYP LESION SNARE TQ N/A 3/20/2018    Procedure: COLONOSCOPY;  Surgeon: Maggy Moncada MD;  Location: [facility]; Service: Gastroenterology     [facility] CATARACT EXTRACAP,INSERT LENS Right 12/4/2018    Procedure: EXTRACTION EXTRACAPSULAR CATARACT PHACO INTRAOCULAR LENS (IOL); Surgeon: Darryl Rosario MD;  Location: [facility] OR;  Service: Ophthalmology     [facility] CATARACT EXTRACAP,INSERT LENS  1/15/2019    Procedure: EXTRACTION EXTRACAPSULAR CATARACT PHACO INTRAOCULAR LENS (IOL);   Surgeon: Darryl Rosario MD;  Location: [facility] OR;  Service: Ophthalmology    TONSILLECTOMY      VENTRAL HERNIA REPAIR      WITH IMPLANT OF MESH       No Known Allergies      Current Facility-Administered Medications:     acetaminophen (TYLENOL) tablet 650 mg, 650 mg, Oral, Q6H [facility], Anand Funes PA-C, 650 mg at 07/22/19 1319    amantadine (SYMMETREL) capsule 100 mg, 100 mg, Oral, BID, Lynette Gates DO, 100 mg at 07/22/19 1102    amLODIPine (NORVASC) tablet 5 mg, 5 mg, Oral, Daily, Wava Pal, PA-C, 5 mg at 07/22/19 1108    aspirin (ECOTRIN LOW STRENGTH) EC tablet 81 mg, 81 mg, Oral, Daily, Anand Funes, PA-C, 81 mg at 07/22/19 1108    Carbidopa-Levodopa ER 48  MG CPCR 1 capsule, 1 capsule, Oral, 4x Daily, Wava Pal, PA-C, 1 capsule at 07/22/19 1508    Carbidopa-Levodopa ER 48  MG CPCR 2 capsule, 2 capsule, Oral, HS, Wava Pal, PA-C, 2 capsule at 07/20/19 2123    dextrose 5 % and sodium chloride 0 45 % with KCl 20 mEq/L infusion, 75 mL/hr, Intravenous, Continuous, Shahida Cancer PA-C, Last Rate: 75 mL/hr at 07/21/19 2344, 75 mL/hr at 07/21/19 2344    haloperidol lactate (HALDOL) injection 1 mg, 1 mg, Intramuscular, Q6H PRN, Angelica Regan MD    heparin (porcine) subcutaneous injection 5,000 Units, 5,000 Units, Subcutaneous, Q8H [address], 5,000 Units at 07/22/19 1320 **AND** [CANCELED] Platelet count, , , Once, Wava Pal, PA-C    hydrOXYzine HCL (ATARAX) tablet 25 mg, 25 mg, Oral, Q6H PRN, Roland Davies MD, 25 mg at 07/21/19 1047    insulin lispro (HumaLOG) 100 units/mL subcutaneous injection 1-5 Units, 1-5 Units, Subcutaneous, Q6H [address], 1 Units at 07/18/19 2243 **AND** Fingerstick Glucose (POCT), , , Q6H, Wava Pal, PA-C    mirtazapine (REMERON) tablet 30 mg, 30 mg, Oral, HS, Wava Pal, PA-C, 30 mg at 07/20/19 2124    morphine injection 2 mg, 2 mg, Intravenous, Q4H PRN, Wava Pal, PA-C    ondansetron (ZOFRAN) injection 4 mg, 4 mg, Intravenous, Q6H PRN, Wava Pal, PA-C    oxyCODONE (ROXICODONE) IR tablet 2 5 mg, 2 5 mg, Oral, Q4H PRN, Wava Pal, PA-C    oxyCODONE (ROXICODONE) IR tablet 5 mg, 5 mg, Oral, Q4H PRN, Wava Pal, PA-C    pantoprazole (PROTONIX) injection 40 mg, 40 mg, Intravenous, Q24H [address], Kwabena Larson MD, 40 mg at 07/22/19 0859    rasagiline (AZILECT) tablet 1 mg, 1 mg, Oral, Daily, Cassy Lucio DO, 1 mg at 07/22/19 1320    rOPINIRole (REQUIP) tablet 4 mg, 4 mg, Oral, HS, 4 mg at 07/20/19 2129 **AND** rOPINIRole (REQUIP) tablet 2 mg, 2 mg, Oral, HS, Susan Summers MD, 2 mg at 07/20/19 2129    Social History     Socioeconomic History    Marital status: /Civil Union     Spouse name: Not on file    Number of children: Not on file    Years of education: Not on file    Highest education level: Not on file   Occupational History    Occupation: [employer]     Comment: BILLING AND SHIPPING   Social Needs    Financial resource strain: Not on file    Food insecurity:     Worry: Not on file     Inability: Not on file    Transportation needs:     Medical: Not on file     Non-medical: Not on file   Tobacco Use    Smoking status: Never Smoker    Smokeless tobacco: Never Used   Substance and Sexual Activity    Alcohol use: Not Currently     Frequency: Patient refused     Drinks per session: Patient refused     Binge frequency: Never    Drug use: Never    Sexual activity: Yes   Lifestyle    Physical activity:     Days per week: Not on file     Minutes per session: Not on file    Stress: Not on file   Relationships    Social connections:     Talks on phone: Not on file     Gets together: Not on file     Attends Orthodox service: Not on file     Active member of club or organization: Not on file     Attends meetings of clubs or organizations: Not on file     Relationship status: Not on file    Intimate partner violence:     Fear of current or ex partner: Not on file     Emotionally abused: Not on file     Physically abused: Not on file     Forced sexual activity: Not on file   Other Topics Concern    Not on file   Social History Narrative    Son lives an hour away, daughter in [place]  Family History   Problem Relation Age of Onset    Alzheimer's disease Mother     Stroke Father     No Known Problems Sister     No Known Problems Brother          Review of systems:  Please see HPI for positive symptoms   No fever, no chills, no weight change  Ocular: No drainage, no blurred vision  HEENT:  No sore throat, earache, or congestion  No neck pain  COR:  No chest pain  No palpitations  Lungs:  no sob, wheezing,  GI:  +  nausea, no vomiting, no diarrhea, no constipation, no anorexia  :  No dysuria, frequency, or urgency  No hematuria  Musculoskeletal:  No joint pain or swelling or edema  Skin:  No rash or itching  Psychiatric:  no anxiety, no depression  Endocrine:  No polyuria or polydipsia  Physical examination:  Vitals:    07/22/19 1340   BP: 136/70   Pulse: 91   Resp: 18   Temp: (!) 97 2 °F (36 2 °C)   SpO2: 98%       GENERAL APPEARANCE:  The patient is alert, oriented  He is in no acute distress  HEENT:  Head is normocephalic  The sinuses are otherwise nontender  Pupils are equal and reactive  NECK:  Supple without lymphadenopathy  HEART:  Regular rate and rhythm  LUNGS:  clear to auscultation  No crackles or wheezes are heard  ABDOMEN:  Soft, nontender, nondistended with good bowel sounds heard  EXTREMITIES:  Without cyanosis, clubbing or edema  Mental status: The patient is alert, attentive, and oriented  Speech is clear and fluent, good repetition, comprehension, and naming  he recalls 3/3 objects at 5 minutes  Cranial nerves:  CN II: Visual fields are full to confrontation  Fundoscopic exam is normal with sharp discs and no vascular changes    Pupils are 3 mm and reactive to light  CN III, IV, VI: At primary gaze, there is no eye deviation  CN V: Facial sensation is intact to pinprick in all 3 divisions bilaterally  Corneal responses are intact  CN VII: Face is symmetric with normal eye closure and smile  CN VIII: Hearing is normal to rubbing fingers  CN IX, X: Palate elevates symmetrically  Phonation is normal   CN XI: Head turning and shoulder shrug are intact  CN XII: Tongue is midline with normal movements and no atrophy  Motor: There is no pronator drift of out-stretched arms    Muscle bulk and tone are normal  No rigidity noted during exam    Neck is extremely stiff  Muscle exam  Arm Right Left Leg Right Left   Deltoid 5/5 5/5 Iliopsoas 5/5 5/5   Biceps 5/5 5/5 Quads 5/5 5/5   Triceps 5/5 5/5 Hamstrings 5/5 5/5   Wrist Extension 5/5 5/5 Ankle Dorsi Flexion 5/5 5/5   Wrist Flexion 5/5 5/5 Ankle Plantar Flexion 5/5 5/5   Interossei 5/5 5/5 Ankle Eversion 5/5 5/5   APB 5/5 5/5 Ankle Inversion 5/5 5/5       Reflexes   RJ BJ TJ KJ AJ Plantars Green's   Right 2+ 2+ 2+ 2+ 2+ Downgoing Not present   Left 2+ 2+ 2+ 2+ 2+ Downgoing Not present     Sensory:  Light touch, pinprick, position sense, and vibration sense are intact in fingers and toes  Coordination:  Rapid alternating movements and fine finger movements are intact  There is no dysmetria on finger-to-nose and heel-knee-shin  There are no abnormal or extraneous movements  Romberg negative  Gait/Stance:  Able to walk with walker     Lab Results   Component Value Date    WBC 5 88 07/22/2019    HGB 10 9 (L) 07/22/2019    HCT 34 7 (L) 07/22/2019    MCV 94 07/22/2019     07/22/2019     Lab Results   Component Value Date    HGBA1C 6 4 03/08/2019     Lab Results   Component Value Date    ALT 19 07/22/2019    AST 36 07/22/2019    ALKPHOS 112 07/22/2019    BILITOT 0 4 11/04/2015     Lab Results   Component Value Date    GLUCOSE 117 (H) 11/04/2015    CALCIUM 9 2 07/22/2019     11/04/2015    K 3 7 07/22/2019    CO2 25 07/22/2019     07/22/2019    BUN 10 07/22/2019    CREATININE 0 78 07/22/2019         Radiology          Review of reports and notes reveal:  Ct Abdomen Pelvis Wo Contrast    Result Date: 7/18/2019  Moderate amount of free intraperitoneal air noted within the anterior upper abdomen raising the concern for a perforated viscus  However, there is no extravasation of the administered oral contrast into the peritoneal cavity  Surgical consultation is advised  No evidence of large or small bowel obstruction    I personally discussed this study with Minal Turner on 7/18/2019 at 4:24 PM  Workstation performed: [id number]     Xr Chest 1 View Portable    Result Date: 7/19/2019  Subsegmental atelectasis left base  Pneumoperitoneum as above  This was seen on CT abdominal study earlier today and has already been communicated to the clinical staff  Workstation performed: [id number]     Xr Abdomen Obstruction Series    Result Date: 7/22/2019  Nonobstructive bowel gas pattern  Workstation performed: [id number]     Independent Interpretation of images or specimens: Thank you for this consult  Total time of encounter: 55 min  More than 50% of time was spent in counseling and coordination of care of patient  WELLINGTON Uriostegui    [facility]  [address]  [address]ina [address]

## 2019-07-23 VITALS
TEMPERATURE: 96.4 F | OXYGEN SATURATION: 97 % | WEIGHT: 89 LBS | SYSTOLIC BLOOD PRESSURE: 135 MMHG | HEIGHT: 59 IN | BODY MASS INDEX: 17.94 KG/M2 | DIASTOLIC BLOOD PRESSURE: 71 MMHG | RESPIRATION RATE: 18 BRPM | HEART RATE: 92 BPM

## 2019-07-23 LAB
ALBUMIN SERPL BCP-MCNC: 2.6 G/DL (ref 3.5–5)
ALP SERPL-CCNC: 112 U/L (ref 46–116)
ALT SERPL W P-5'-P-CCNC: 18 U/L (ref 12–78)
ANION GAP SERPL CALCULATED.3IONS-SCNC: 9 MMOL/L (ref 4–13)
AST SERPL W P-5'-P-CCNC: 30 U/L (ref 5–45)
BASOPHILS # BLD AUTO: 0.03 THOUSANDS/ΜL (ref 0–0.1)
BASOPHILS NFR BLD AUTO: 1 % (ref 0–1)
BILIRUB SERPL-MCNC: 0.5 MG/DL (ref 0.2–1)
BUN SERPL-MCNC: 7 MG/DL (ref 5–25)
CALCIUM SERPL-MCNC: 8.8 MG/DL (ref 8.3–10.1)
CHLORIDE SERPL-SCNC: 102 MMOL/L (ref 100–108)
CO2 SERPL-SCNC: 26 MMOL/L (ref 21–32)
CREAT SERPL-MCNC: 0.77 MG/DL (ref 0.6–1.3)
EOSINOPHIL # BLD AUTO: 0.36 THOUSAND/ΜL (ref 0–0.61)
EOSINOPHIL NFR BLD AUTO: 7 % (ref 0–6)
ERYTHROCYTE [DISTWIDTH] IN BLOOD BY AUTOMATED COUNT: 13.3 % (ref 11.6–15.1)
GFR SERPL CREATININE-BSD FRML MDRD: 74 ML/MIN/1.73SQ M
GLUCOSE SERPL-MCNC: 104 MG/DL (ref 65–140)
GLUCOSE SERPL-MCNC: 111 MG/DL (ref 65–140)
GLUCOSE SERPL-MCNC: 117 MG/DL (ref 65–140)
GLUCOSE SERPL-MCNC: 224 MG/DL (ref 65–140)
HCT VFR BLD AUTO: 33.6 % (ref 34.8–46.1)
HGB BLD-MCNC: 10.7 G/DL (ref 11.5–15.4)
IMM GRANULOCYTES # BLD AUTO: 0.02 THOUSAND/UL (ref 0–0.2)
IMM GRANULOCYTES NFR BLD AUTO: 0 % (ref 0–2)
LYMPHOCYTES # BLD AUTO: 0.59 THOUSANDS/ΜL (ref 0.6–4.47)
LYMPHOCYTES NFR BLD AUTO: 11 % (ref 14–44)
MAGNESIUM SERPL-MCNC: 1.8 MG/DL (ref 1.6–2.6)
MCH RBC QN AUTO: 30.1 PG (ref 26.8–34.3)
MCHC RBC AUTO-ENTMCNC: 31.8 G/DL (ref 31.4–37.4)
MCV RBC AUTO: 94 FL (ref 82–98)
MONOCYTES # BLD AUTO: 0.45 THOUSAND/ΜL (ref 0.17–1.22)
MONOCYTES NFR BLD AUTO: 8 % (ref 4–12)
NEUTROPHILS # BLD AUTO: 4.08 THOUSANDS/ΜL (ref 1.85–7.62)
NEUTS SEG NFR BLD AUTO: 73 % (ref 43–75)
NRBC BLD AUTO-RTO: 0 /100 WBCS
PHOSPHATE SERPL-MCNC: 2.3 MG/DL (ref 2.3–4.1)
PLATELET # BLD AUTO: 294 THOUSANDS/UL (ref 149–390)
PMV BLD AUTO: 10.4 FL (ref 8.9–12.7)
POTASSIUM SERPL-SCNC: 3.6 MMOL/L (ref 3.5–5.3)
PROT SERPL-MCNC: 6.5 G/DL (ref 6.4–8.2)
RBC # BLD AUTO: 3.56 MILLION/UL (ref 3.81–5.12)
SODIUM SERPL-SCNC: 137 MMOL/L (ref 136–145)
WBC # BLD AUTO: 5.53 THOUSAND/UL (ref 4.31–10.16)

## 2019-07-23 PROCEDURE — 82948 REAGENT STRIP/BLOOD GLUCOSE: CPT

## 2019-07-23 PROCEDURE — 97530 THERAPEUTIC ACTIVITIES: CPT

## 2019-07-23 PROCEDURE — 84100 ASSAY OF PHOSPHORUS: CPT | Performed by: STUDENT IN AN ORGANIZED HEALTH CARE EDUCATION/TRAINING PROGRAM

## 2019-07-23 PROCEDURE — 99024 POSTOP FOLLOW-UP VISIT: CPT | Performed by: PHYSICIAN ASSISTANT

## 2019-07-23 PROCEDURE — 85025 COMPLETE CBC W/AUTO DIFF WBC: CPT | Performed by: STUDENT IN AN ORGANIZED HEALTH CARE EDUCATION/TRAINING PROGRAM

## 2019-07-23 PROCEDURE — C9113 INJ PANTOPRAZOLE SODIUM, VIA: HCPCS | Performed by: SPECIALIST

## 2019-07-23 PROCEDURE — NC001 PR NO CHARGE: Performed by: SPECIALIST

## 2019-07-23 PROCEDURE — 80053 COMPREHEN METABOLIC PANEL: CPT | Performed by: STUDENT IN AN ORGANIZED HEALTH CARE EDUCATION/TRAINING PROGRAM

## 2019-07-23 PROCEDURE — 83735 ASSAY OF MAGNESIUM: CPT | Performed by: STUDENT IN AN ORGANIZED HEALTH CARE EDUCATION/TRAINING PROGRAM

## 2019-07-23 PROCEDURE — 97535 SELF CARE MNGMENT TRAINING: CPT

## 2019-07-23 PROCEDURE — 99233 SBSQ HOSP IP/OBS HIGH 50: CPT | Performed by: FAMILY MEDICINE

## 2019-07-23 RX ORDER — PANTOPRAZOLE SODIUM 40 MG/1
40 TABLET, DELAYED RELEASE ORAL 2 TIMES DAILY
Qty: 60 TABLET | Refills: 0 | Status: SHIPPED | OUTPATIENT
Start: 2019-07-23 | End: 2019-08-07 | Stop reason: SDUPTHER

## 2019-07-23 RX ADMIN — ACETAMINOPHEN 650 MG: 325 TABLET, FILM COATED ORAL at 06:11

## 2019-07-23 RX ADMIN — ACETAMINOPHEN 650 MG: 325 TABLET, FILM COATED ORAL at 00:23

## 2019-07-23 RX ADMIN — AMANTADINE 100 MG: 100 CAPSULE ORAL at 09:14

## 2019-07-23 RX ADMIN — HEPARIN SODIUM 5000 UNITS: 5000 INJECTION INTRAVENOUS; SUBCUTANEOUS at 15:13

## 2019-07-23 RX ADMIN — ASPIRIN 81 MG: 81 TABLET, COATED ORAL at 09:14

## 2019-07-23 RX ADMIN — RASAGILINE 1 MG: 1 TABLET ORAL at 09:14

## 2019-07-23 RX ADMIN — PANTOPRAZOLE SODIUM 40 MG: 40 INJECTION, POWDER, FOR SOLUTION INTRAVENOUS at 09:14

## 2019-07-23 RX ADMIN — ACETAMINOPHEN 650 MG: 325 TABLET, FILM COATED ORAL at 12:08

## 2019-07-23 RX ADMIN — METFORMIN HYDROCHLORIDE 500 MG: 500 TABLET ORAL at 09:14

## 2019-07-23 RX ADMIN — HEPARIN SODIUM 5000 UNITS: 5000 INJECTION INTRAVENOUS; SUBCUTANEOUS at 06:11

## 2019-07-23 RX ADMIN — AMLODIPINE BESYLATE 5 MG: 5 TABLET ORAL at 09:14

## 2019-07-23 NOTE — SOCIAL WORK
RONNIE following to assist with DCP  Pt's daughter and  were present during therapy sessions today  After therapy pt and family discussed options of short term rehab and home care services until pt is strong enough to return to outpatient therapy  Family decided to have home care services  SW provided daughter with list of available home care agencies  Daughter chose to have referral made to [facility]  Referral was made  Prescription was also received for commode  SW also provided daughter with list of area skilled rehab facilities and explained 30 day window for rehab admission under Medicare in the event family feels it may be necessary  Discharge has been ordered  So pt will be returning home today  [facility] aware of [facility] and AVS has been sent to agency  Commode was ordered from and delivered by [facility] prior to discharge  No other discharge needs expressed or anticipated by pt or family at this time  Offered support in future if needed

## 2019-07-23 NOTE — DISCHARGE SUMMARY
Discharge Summary - Gracia Velasquez 67 y o  female MRN: [id number]    Unit/Bed#: [address] [place] [address] Encounter: [id number]    Admission Date: 7/18/2019   Discharge Date: 7/23/2019    Admitting Diagnosis:   Abdominal pain [R10 9]  Generalized abdominal pain [R10 84]  Free intraperitoneal air [K66 8]    Discharge Diagnoses: Principal Problem:    Free intraperitoneal air  Active Problems:    Type 2 diabetes mellitus without complication, without long-term current use of insulin ([facility])    Essential hypertension    GERD (gastroesophageal reflux disease)    Mixed hyperlipidemia    Insomnia    Parkinson's disease ([facility] )    RODRIGO (acute kidney injury) ([place] [address] )    Dementia    Toxic metabolic encephalopathy    Severe protein-calorie malnutrition ([place] [address] )      Consultations:  Family medicine, behavioral health, neurology    Procedures Performed:  Exploratory laparotomy with repair of duodenal ulcer with von's patch    Hospital Course: Gracia Velasquez is a 67 y o  female with history of Parkinson's disease, dementia, GERD, chronic constipation, and diabetes,  admitted for abdominal pain and free intraperitoneal air and peritonitis  Patient was taken to the operating room for an exploratory laparotomy to rule out perforated viscus  Intraoperatively a 3mm duodenal ulcer was identified and fixed with a von's patch and a JB drain was placed  There were no intraoperative complications and patient tolerated procedure well  Patient was admitted, started on a PPI, and placed NPO after surgery  RODRIGO resolved with IV fluid resuscitation  Carreon catheter was discontinued POD1  Patient began to experience some episodes of waxing and waning name altered mental status with some twitching and tremors  At times she was disoriented and agitated, other times lucid and oriented  It was discovered patient must be on brand name Rytaly instead of generic carbidopa levodopa and her complete home regimen of medications was restarted    Neurology and psychiatry were consulted for management of Parkinsonian symptoms and periods of AMS  They recommended avoiding antipsychotics and continuing Remeron  JB drain output remained serous  Patient advanced to clear liquids after mental status improved  POD5 JB drain was removed and patient was discharged to home with family support and home PT  Patient to continue PPI for now and follow up with GI and surgery  Condition at Discharge: good     Discharge instructions/Information to patient and family:   See after visit summary for information provided to patient and family  Provisions for Follow-Up Care:  See after visit summary for information related to follow-up care and any pertinent home health orders  Disposition: Home with home PT    Planned Readmission: No    Discharge Statement   I spent 25 minutes discharging the patient  This time was spent on the day of discharge  I had direct contact with the patient on the day of discharge  Additional documentation is required if more than 30 minutes were spent on discharge  Discharge Medications:  See after visit summary for reconciled discharge medications provided to patient and family

## 2019-07-23 NOTE — OCCUPATIONAL THERAPY NOTE
OT TREATMENT       07/23/19 1138   Restrictions/Precautions   Other Precautions Chair Alarm; Bed Alarm; Fall Risk   Pain Assessment   Pain Assessment No/denies pain   ADL   Toileting Assistance  4  Minimal Assistance   Toileting Deficit Grab bar use   Toileting Comments toilet transfer    Transfers   Sit to Stand 4  Minimal assistance   Additional items Assist x 1;Verbal cues   Stand to Sit 4  Minimal assistance   Additional items Assist x 1;Verbal cues   Functional Mobility   Functional Mobility 4  Minimal assistance   Additional Comments 100 feet, verbal cues    Additional items Rolling walker   ROM- Right Upper Extremities   R Shoulder AROM; Flexion; Horizontal ABduction  (chest press)   R Elbow AROM;Elbow extension;Elbow flexion   R Hand AROM; Thumb; Index finger; Long finger;Ring finger;Little finger   R Weight/Reps/Sets 10 times each seated in chair    ROM - Left Upper Extremities    L Shoulder AROM; Flexion; Horizontal ABduction  (chest press)   L Elbow AROM;Elbow flexion;Elbow extension   L Hand AROM; Index finger; Thumb;Ring finger; Long finger;Little finger   L Weight/Reps/Sets 10 times each seated in chair    Assessment   Assessment Patient is cooperative and pleasant  Patient is motivated to walk  Assist for steering walker and for balance  Patient able to follow 1 step commands with RW for safety  Endurance to activity and balance improved today  Plan   Treatment Interventions ADL retraining;Functional transfer training;UE strengthening/ROM; Endurance training;Patient/family training;Equipment evaluation/education; Activityengagement; Compensatory technique education   Recommendation   OT Discharge Recommendation 24 hour supervision/assist, home Dc SERRANO [id number] License Number  [facility] [id number]

## 2019-07-23 NOTE — NURSING NOTE
Pt d/c from [address] [place] [address] to home  D/c instructions and medications reviewed with the family, all questions answered  Pt, pt's spouse, and pt's daughter verbalized understanding  IV access removed prior to d/c  Pt left the unit via wheelchair accompanied by her spouse, daughter, and PCA  Pt left the unit with all belongings in her possession

## 2019-07-23 NOTE — PROGRESS NOTES
Progress Note - Tori Felix 67 y o  female MRN: [id number]    Unit/Bed#: [address] [place] [address] Encounter: [id number]      Assessment & Plan:  67year old female with PMH as previously noted, initially admitted for exploratory laparotomy under the surgical service after CT scan showed intraperitoneal air  She is POD #5 s/p ex lap with Kolby's patch repair of perforated duodenal ulcer  Previously, her parkinson's symptoms had been uncontrolled and the patient appeared to be experiencing acute delirium  Today she is appropriate one xam, A & O  X4, and quite lucid  Etiology is likely due to holding several medications for her surgery  Now that all of her home medications have been restarted, she is much better  Yesterday she tolerated a full liquid diet for dinner  Advanced to regular diabetic (low residue / non ulcerogenic) diet, which she tolerated well  Pain control per surgical team  Discharge disposition per surgical team, but from a medical standpoint, should be okay to discharge home  Duodenal Ulcer Perforation - POD #5 S/P Exploratory Laparotomy with Kolby's Patch Placement  · Initially identified via CT scan with free intraperitoneal air POA  · Pain control per surgical team   · Tolerated regular breakfast diet well  · Wean off IVF  · JB drain discontinued  · Continue protonix 40 mg IV qd  Transition to po on discharge  · S/p 4 day course of zosyn  · Monitor vitals per protocol  · Trend WBC, fevers      Parkinson's Disease  · Patient's symptoms are currently well-controlled, which was not the case earlier this week  Suspect that holding some of the patient's medications for surgery was contributory to her symptoms  She is now appropriate, A & O x4, and very pleasant  · For now, continue Carbidopa-levodopa 48  mg per home regimen  · Continue requip 6 mg po qhs  · Continue azilect 1 mg po qd  · Per chart review of [facility] notes, patient should be on Amantadine 100 mg po bid   Restarted this medication as well  · Consulting neurology & psychiatry  Recommendations appreciated    ? Per psych - risperdal started, but then discontinued when mental status improved  Prn haldol ordered, however not necessary at this point  ? Per neurology - continue all home medications  At the time of the neurologist's exam, patient was back to baseline in terms of her mentation and parkinsonian symptoms       Dementia  · Likely secondary to Parkinson's or multifactorial   · Continuing medications as above  · Appreciate neurology and psychiatry consults      Toxic Metabolic Encephalopathy - Resolved  · Secondary to acute illness state vs uncontrolled Parkinson's/dementia  · Patient is lucid, A & O x4, and very appropriate on exam today      NIDDM2  · Holding home metformin in the setting of prior RODRIGO  Can restart on discharge  · ISS with accuchecks  · Blood sugars well controlled with insulin regimen      Insomnia  · Stable  Continue remeron 30 mg po qhs      Essential HTN  · Stable  Continue norvasc 5 mg po qd     Severe Protein Calorie Malnutrition  · 21% weight loss in 12 months  BMI 17 98   · Currently NPO, but will start on nutritional supplements when tolerating oral diet  · Nutrition consulted, further recommendations appreciated     RODRIGO - Resolved  · POA Cr 1 58, currently back to baseline     Global  · IVF D5 1/2NS @ 75 cc/h  · Low Residue / Non Ulcerogenic ADA diet  · Replete electrolytes as indicated  · VTE PPX Heparin & SCDs  · Level 1 Full Code    Subjective:   Patient was seen and examined at bedside  Today she is found to be resting comfortably, with parkinsonian symptoms well controlled  She is no longer confused and encephalopathic  She speaks appropriately, and even requests an advancement of her diet as she wants eggs for breakfast, which she tolerated well  She is passing gas and has had a bowel movement yesterday   She denies any significant pain, or any nausea, vomiting, lightheadedness, dizziness, abdominal pain, or other concerning issues  Objective:     Vitals: Blood pressure 148/95, pulse 84, temperature 98 2 °F (36 8 °C), resp  rate 18, height 4' 11" (1 499 m), weight 40 4 kg (89 lb), SpO2 99 %, not currently breastfeeding  ,Body mass index is 17 98 kg/m²  Intake/Output Summary (Last 24 hours) at 7/23/2019 0753  Last data filed at 7/23/2019 0001  Gross per 24 hour   Intake    Output 1070 ml   Net -1070 ml       Physical Exam: /95   Pulse 84   Temp 98 2 °F (36 8 °C)   Resp 18   Ht 4' 11" (1 499 m)   Wt 40 4 kg (89 lb)   SpO2 99%   BMI 17 98 kg/m²   General appearance: alert and oriented, in no acute distress and cooperative  Thin and frail appearing  Lungs: clear to auscultation bilaterally  Heart: regular rate and rhythm, S1, S2 normal, no murmur, click, rub or gallop  Abdomen: soft, non-tender; bowel sounds normal; no masses,  no organomegaly  JB drain is removed and site is C/D/I  Incision site is intact, dry, healing well  Extremities: extremities normal, warm and well-perfused; no cyanosis, clubbing, or edema  Pulses: 2+ and symmetric  Neurologic: Grossly normal   No parkinsonian symptoms noted on exam      Invasive Devices     Peripheral Intravenous Line            Peripheral IV 07/20/19 Left;Ventral (anterior) Forearm 3 days          Drain            Closed/Suction Drain Midline Abdomen Bulb 4 days                Lab, Imaging and other studies: I have personally reviewed pertinent reports  VTE Pharmacologic Prophylaxis: Heparin  VTE Mechanical Prophylaxis: sequential compression device     Jim Chaudhry DO  07/23/19  11:59 AM    Some portions of this record may have been generated with voice recognition software  There may be translation, syntax, or grammatical errors  Occasional wrong word or "sound-a-like" substitutions may have occurred due to the inherent limitations of the voice recognition software   Read the chart carefully and recognize, using context, where substations may have occurred   If you have any questions, please contact the dictating provider for clarification or correction, as needed

## 2019-07-23 NOTE — DISCHARGE INSTRUCTIONS
Post-Operative Care Instructions      1  General: You may feel pulling sensations around the wound or funny aches and pains around the incisions  This is normal  Even minor surgery is a change in your body and this is your body's reaction to it  If you have had abdominal surgery, it may help to support the incision with a small pillow or blanket for comfort when moving or coughing  2  Wound care: The glue over the incisions will fall off over the next week or two      3  Showering: You may shower  Do not soak wound in a bath, hot tub, pool, lake, etc  Do not scrub or use exfoliants on the surgical wounds  4  Activity: Please continue your outpatient physical therapy sessions twice a week  You may go up and down stairs, walk as much as you are comfortable, but walk at least 3 times each day  If you have had abdominal surgery, do not perform any strenuous exercise or lift anything heavier than 10-15 pounds for at least 3 weeks, unless cleared by your physician  5  Diet: You may resume a low fiber/low residue diet, for which information is provided below    6  Medications: Resume all of your previous medications, unless told otherwise by the doctor  Please avoid nonsteroidal anti inflammatory drugs such as Advil, Motrin, ibuprofen, Aleve  Please begin taking pantoprazole 40mg twice daily as well  7  Driving: You will need someone to drive you home on the day of surgery  Do not drive or make any important decisions while on narcotic pain medication or for up to 24 hours after anesthesia for surgery  Generally, you may drive when you're off all narcotic pain medications  8  Upset Stomach: You may take Maalox, Tums, or similar items for an upset stomach  If your narcotic pain medication causes an upset stomach, do not take it on an empty stomach  Try taking it with at least some crackers or toast      9  Constipation: Patients often experienced constipation after surgery   You may take over-the-counter medication for this, such as Metamucil, Senokot, Colace, milk of magnesia, etc  If you experience significant nausea or vomiting after abdominal surgery, call the office before trying any of these medications  10  Call the office: If you are experiencing any of the following: fevers above 101 5°, significant nausea or vomiting, if the wound develops drainage and/or excessive redness around the wound, or if you have significant diarrhea or other worsening symptoms  Low Fiber/Low Residue Diet   WHAT YOU NEED TO KNOW:   A low-fiber diet limits foods that are high in fiber  Fiber is the part of fruits, vegetables, and grains that is not broken down by your body  You may need to follow this diet after surgery on your intestines  You may also need to follow this diet for certain conditions such as Crohn disease or ulcerative colitis  Ask your healthcare provider or dietitian how much fiber you can have each day  DISCHARGE INSTRUCTIONS:   Foods to include:  Read food labels to check the amount of fiber that are found in foods  Some foods that are low in fiber include the following:  · Grains:  Choose grains that have less than 2 grams of fiber in each serving   Examples include the following:     ¨ Cream of wheat and finely ground grits    ¨ Dry cereal made from rice     ¨ White bread, white pasta, and white rice    ¨ Crackers, bagels, and rolls made from white or refined flour    · Other foods:      ¨ Canned and well-cooked fruit without skins or seeds, and juice without pulp    ¨ Ripe bananas and melons    ¨ Canned and well-cooked vegetables without skins or seeds, and vegetable juice    ¨ Cow's milk, lactose-free milk, soy milk, and rice milk    ¨ Yogurt without nuts, fruit, or granola    ¨ Eggs, poultry (such as chicken and turkey), fish, and tender, ground, well-cooked beef     ¨ Tofu and smooth peanut butter    ¨ Broth and strained soups made of low-fiber foods  Foods to avoid: · Breads, cereals, crackers, and pasta made with whole wheat or whole grains (such as whole oats)    · [employer], wild rice, quinoa, kasha, and barley    · All fresh fruit with skin, except banana and melons     · Dried fruits and fruit juice with pulp    · Canned pineapple    · Raw vegetables    · Nuts, seeds, and popcorn    · Beans, nuts, peas, and lentils    · Tough meats    · Coconut and avocado  What else you should know about a low-fiber diet:  A low-fiber diet can decrease the amount of bowel movements you have  Drink liquids as directed to avoid constipation  Ask how much liquid to drink each day and which liquids are best for you  © 2017 [employer] Information is for End User's use only and may not be sold, redistributed or otherwise used for commercial purposes  All illustrations and images included in CareNotes® are the copyrighted property of A D A M , Inc  or George Amador  The above information is an  only  It is not intended as medical advice for individual conditions or treatments  Talk to your doctor, nurse or pharmacist before following any medical regimen to see if it is safe and effective for you

## 2019-07-23 NOTE — PLAN OF CARE
Problem: Potential for Falls  Goal: Patient will remain free of falls  Description  INTERVENTIONS:  - Assess patient frequently for physical needs  -  Identify cognitive and physical deficits and behaviors that affect risk of falls    -  [place] fall precautions as indicated by assessment   - Educate patient/family on patient safety including physical limitations  - Instruct patient to call for assistance with activity based on assessment  - Modify environment to reduce risk of injury  - Consider OT/PT consult to assist with strengthening/mobility  Outcome: Progressing     Problem: Prexisting or High Potential for Compromised Skin Integrity  Goal: Skin integrity is maintained or improved  Description  INTERVENTIONS:  - Identify patients at risk for skin breakdown  - Assess and monitor skin integrity  - Assess and monitor nutrition and hydration status  - Monitor labs (i e  albumin)  - Assess for incontinence   - Turn and reposition patient  - Assist with mobility/ambulation  - Relieve pressure over bony prominences  - Avoid friction and shearing  - Provide appropriate hygiene as needed including keeping skin clean and dry  - Evaluate need for skin moisturizer/barrier cream  - Collaborate with interdisciplinary team (i e  Nutrition, Rehabilitation, etc )   - Patient/family teaching  Outcome: Progressing     Problem: GASTROINTESTINAL - ADULT  Goal: Minimal or absence of nausea and/or vomiting  Description  INTERVENTIONS:  - Administer IV fluids as ordered to ensure adequate hydration  - Maintain NPO status until nausea and vomiting are resolved  - Nasogastric tube as ordered  - Administer ordered antiemetic medications as needed  - Provide nonpharmacologic comfort measures as appropriate  - Advance diet as tolerated, if ordered  - Nutrition services referral to assist patient with adequate nutrition and appropriate food choices  Outcome: Progressing     Problem: PAIN - ADULT  Goal: Verbalizes/displays adequate comfort level or baseline comfort level  Description  Interventions:  - Encourage patient to monitor pain and request assistance  - Assess pain using appropriate pain scale  - Administer analgesics based on type and severity of pain and evaluate response  - Implement non-pharmacological measures as appropriate and evaluate response  - Consider cultural and social influences on pain and pain management  - Notify physician/advanced practitioner if interventions unsuccessful or patient reports new pain  Outcome: Progressing     Problem: SAFETY ADULT  Goal: Patient will remain free of falls  Description  INTERVENTIONS:  - Assess patient frequently for physical needs  -  Identify cognitive and physical deficits and behaviors that affect risk of falls    -  [place] fall precautions as indicated by assessment   - Educate patient/family on patient safety including physical limitations  - Instruct patient to call for assistance with activity based on assessment  - Modify environment to reduce risk of injury  - Consider OT/PT consult to assist with strengthening/mobility  Outcome: Progressing     Problem: DISCHARGE PLANNING  Goal: Discharge to home or other facility with appropriate resources  Description  INTERVENTIONS:  - Identify barriers to discharge w/patient and caregiver  - Arrange for needed discharge resources and transportation as appropriate  - Identify discharge learning needs (meds, wound care, etc )  - Arrange for interpretive services to assist at discharge as needed  - Refer to Case Management Department for coordinating discharge planning if the patient needs post-hospital services based on physician/advanced practitioner order or complex needs related to functional status, cognitive ability, or social support system  Outcome: Progressing     Problem: Nutrition/Hydration-ADULT  Goal: Nutrient/Hydration intake appropriate for improving, restoring or maintaining nutritional needs  Description  Monitor and assess patient's nutrition/hydration status for malnutrition (ex- brittle hair, bruises, dry skin, pale skin and conjunctiva, muscle wasting, smooth red tongue, and disorientation)  Collaborate with interdisciplinary team and initiate plan and interventions as ordered  Monitor patient's weight and dietary intake as ordered or per policy  Utilize nutrition screening tool and intervene per policy  Determine patient's food preferences and provide high-protein, high-caloric foods as appropriate       INTERVENTIONS:  - Monitor oral intake, urinary output, labs, and treatment plans  - Assess nutrition and hydration status and recommend course of action  - Evaluate amount of meals eaten  - Assist patient with eating if necessary   - Allow adequate time for meals  - Recommend/ encourage appropriate diets, oral nutritional supplements, and vitamin/mineral supplements  - Order, calculate, and assess calorie counts as needed  - Recommend, monitor, and adjust tube feedings and TPN/PPN based on assessed needs  - Assess need for intravenous fluids  - Provide specific nutrition/hydration education as appropriate  - Include patient/family/caregiver in decisions related to nutrition  Outcome: Progressing

## 2019-07-23 NOTE — PROGRESS NOTES
Progress Note - General Surgery   Tori Felix 67 y o  female MRN: [id number]  Unit/Bed#: [address] [place] [address] Encounter: [id number]    Assessment:  · POD#5 s/p exploratory laparotomy with repair of duodenal ulcer with Zaida Basset patch -- tolerating diet this morning, having bowel movements  · RODRIGO -- resolved  · Parkinson's disease -- was acutely uncontrolled with waxing/waning changes in mental status and tremors, periods of agitation, along with periods of disorientation, much better today, may be caused by a couple days of medication alteration and being held for surgery, etc, she is now on home regimen and home med Rytaly  · Diabetes mellitus -- controlled, blood sugar levels ranging mostly within , transition back to home meds upon discharge      Plan:  · Begin carb controlled low residue/low fiber diet  · Neurology and psychiatry consult appreciated  · Risperdal was discontinued, continue Remeron  · Haldol PRN, but avoid using antipsychotics as much as possible  · No IV fluids as necessary at this time  · [address] [facility] in aiding with patient's care  · Discontinue JB drain  · likely send home with PPI  · discharge this afternoon      Subjective/Objective     Subjective: Patient AAOx3 speaking with me about her  and about bowling  She denies any nausea vomiting  She continues to have BMs  She is tolerating low residue diet  Objective:     Blood pressure 148/95, pulse 84, temperature 98 2 °F (36 8 °C), resp  rate 18, height 4' 11" (1 499 m), weight 40 4 kg (89 lb), SpO2 99 %, not currently breastfeeding  ,Body mass index is 17 98 kg/m²        Intake/Output Summary (Last 24 hours) at 7/23/2019 0803  Last data filed at 7/23/2019 0001  Gross per 24 hour   Intake    Output 1070 ml   Net -1070 ml       Invasive Devices     Peripheral Intravenous Line            Peripheral IV 07/20/19 Left;Ventral (anterior) Forearm 3 days          Drain            Closed/Suction Drain Midline Abdomen Bulb 4 days Physical Exam: /95   Pulse 84   Temp 98 2 °F (36 8 °C)   Resp 18   Ht 4' 11" (1 499 m)   Wt 40 4 kg (89 lb)   SpO2 99%   BMI 17 98 kg/m²   General appearance: alert and oriented, in no acute distress  Lungs: clear to auscultation bilaterally  Heart: regular rate and rhythm  Abdomen: soft, non-tender; bowel sounds normal; no masses,  no organomegaly, incision is clean dry intact and healing well  Patient feels she's still a little more distended than her baseline but no tympany to percussion and no tenderness to palpation  Lab, Imaging and other studies:  I have personally reviewed pertinent lab results    , CBC:   Lab Results   Component Value Date    WBC 5 53 07/23/2019    HGB 10 7 (L) 07/23/2019    HCT 33 6 (L) 07/23/2019    MCV 94 07/23/2019     07/23/2019    MCH 30 1 07/23/2019    MCHC 31 8 07/23/2019    RDW 13 3 07/23/2019    MPV 10 4 07/23/2019    NRBC 0 07/23/2019   , CMP:   Lab Results   Component Value Date    SODIUM 137 07/23/2019    K 3 6 07/23/2019     07/23/2019    CO2 26 07/23/2019    BUN 7 07/23/2019    CREATININE 0 77 07/23/2019    CALCIUM 8 8 07/23/2019    AST 30 07/23/2019    ALT 18 07/23/2019    ALKPHOS 112 07/23/2019    EGFR 74 07/23/2019     VTE Pharmacologic Prophylaxis: Heparin  VTE Mechanical Prophylaxis: sequential compression device

## 2019-07-23 NOTE — PHYSICAL THERAPY NOTE
PT TREATMENT     07/23/19 1501   Pain Assessment   Pain Assessment No/denies pain   Restrictions/Precautions   Other Precautions Fall Risk;Bed Alarm; Chair Alarm;Cognitive; Impulsive   General   Chart Reviewed Yes   Family/Caregiver Present   ( and daughter present for session)   Cognition   Arousal/Participation Cooperative   Attention Attends with cues to redirect   Following Commands Follows one step commands with increased time or repetition   Subjective   Subjective "I'm fine"   Bed Mobility   Supine to Sit 5  Supervision   Sit to Supine 5  Supervision   Transfers   Sit to Stand   (supervision/min assist)   Additional items Verbal cues  (multiple attempts, several balance losses)   Stand to Sit   (supervision/min assist)   Additional items Verbal cues   Stand pivot   (supervision/min assist)   Additional items Verbal cues; Impulsive  (with walker, + balance loss requiring gait belt assist)   Toilet transfer 4  Minimal assistance   Additional items Standard toilet;Verbal cues; Impulsive   Ambulation/Elevation   Gait pattern   (flexed posture)   Gait Assistance   (supervision/min assist)   Additional items Verbal cues; Tactile cues   Assistive Device Rolling walker   Distance 3x100 feet   Balance   Static Sitting Fair   Dynamic Sitting Fair -   Static Standing Fair   Dynamic Standing Poor   Activity Tolerance   Activity Tolerance Patient limited by fatigue;Patient tolerated treatment well   Assessment   Problem List Decreased strength;Decreased endurance; Impaired balance;Decreased mobility; Impaired judgement;Decreased safety awareness   Assessment Daughter and  present for entire session  Pt demonstrates much improved quality of gait today with almost no dyskinesia  Pt is however impulsive with sit to stand transfers and loses her balance with backing up with walker and when transferring to and from a chair at times    Recommended to family that a gait belt is used and pt ALWAYS has close supervision when standing or walking due to imbalance and risk of falls  Also recommend a commode to be used by the bedside at night and over the toilet during the day  Recommend home PT/OT for recommendations for home safety modifications and to assist with initial shower  If all of these conditions cannot be met, recommend STR, however pt refusing at this time  Plan   Treatment/Interventions ADL retraining;Functional transfer training;LE strengthening/ROM; Elevations; Therapeutic exercise;Cognitive reorientation;Patient/family training;Equipment eval/education; Bed mobility;Gait training   Progress Progressing toward goals   PT Frequency 5x/wk   Recommendation   Recommendation 24 hour supervision/assist;Home PT   Equipment Recommended   (gait belt, bedside commode)   Licensure   [place] License Number  Fawn Crigler PT [id number]

## 2019-07-23 NOTE — PLAN OF CARE
Problem: Potential for Falls  Goal: Patient will remain free of falls  Description  INTERVENTIONS:  - Assess patient frequently for physical needs  -  Identify cognitive and physical deficits and behaviors that affect risk of falls    -  [place] fall precautions as indicated by assessment   - Educate patient/family on patient safety including physical limitations  - Instruct patient to call for assistance with activity based on assessment  - Modify environment to reduce risk of injury  - Consider OT/PT consult to assist with strengthening/mobility  7/23/2019 1702 by Fidel Barth RN  Outcome: Adequate for Discharge  7/23/2019 1701 by Fidel Barth RN  Outcome: Adequate for Discharge  7/23/2019 0942 by Fidel Barth RN  Outcome: Progressing     Problem: Prexisting or High Potential for Compromised Skin Integrity  Goal: Skin integrity is maintained or improved  Description  INTERVENTIONS:  - Identify patients at risk for skin breakdown  - Assess and monitor skin integrity  - Assess and monitor nutrition and hydration status  - Monitor labs (i e  albumin)  - Assess for incontinence   - Turn and reposition patient  - Assist with mobility/ambulation  - Relieve pressure over bony prominences  - Avoid friction and shearing  - Provide appropriate hygiene as needed including keeping skin clean and dry  - Evaluate need for skin moisturizer/barrier cream  - Collaborate with interdisciplinary team (i e  Nutrition, Rehabilitation, etc )   - Patient/family teaching  7/23/2019 1702 by Fidel Barth RN  Outcome: Adequate for Discharge  7/23/2019 1701 by Fidel Barth RN  Outcome: Adequate for Discharge  7/23/2019 0942 by Fidel Barth RN  Outcome: Progressing     Problem: GASTROINTESTINAL - ADULT  Goal: Minimal or absence of nausea and/or vomiting  Description  INTERVENTIONS:  - Administer IV fluids as ordered to ensure adequate hydration  - Maintain NPO status until nausea and vomiting are resolved  - Nasogastric tube as ordered  - Administer ordered antiemetic medications as needed  - Provide nonpharmacologic comfort measures as appropriate  - Advance diet as tolerated, if ordered  - Nutrition services referral to assist patient with adequate nutrition and appropriate food choices  7/23/2019 1702 by Hakan Stevens RN  Outcome: Adequate for Discharge  7/23/2019 1701 by Hakan Stevens RN  Outcome: Adequate for Discharge  7/23/2019 0942 by Hakan Stevens RN  Outcome: Progressing     Problem: PAIN - ADULT  Goal: Verbalizes/displays adequate comfort level or baseline comfort level  Description  Interventions:  - Encourage patient to monitor pain and request assistance  - Assess pain using appropriate pain scale  - Administer analgesics based on type and severity of pain and evaluate response  - Implement non-pharmacological measures as appropriate and evaluate response  - Consider cultural and social influences on pain and pain management  - Notify physician/advanced practitioner if interventions unsuccessful or patient reports new pain  7/23/2019 1702 by Hakan Stevens RN  Outcome: Adequate for Discharge  7/23/2019 1701 by Hakan Stevens RN  Outcome: Adequate for Discharge  7/23/2019 0942 by Hakan Stevens RN  Outcome: Progressing     Problem: SAFETY ADULT  Goal: Patient will remain free of falls  Description  INTERVENTIONS:  - Assess patient frequently for physical needs  -  Identify cognitive and physical deficits and behaviors that affect risk of falls    -  [place] fall precautions as indicated by assessment   - Educate patient/family on patient safety including physical limitations  - Instruct patient to call for assistance with activity based on assessment  - Modify environment to reduce risk of injury  - Consider OT/PT consult to assist with strengthening/mobility  7/23/2019 1702 by Hakan Stevens RN  Outcome: Adequate for Discharge  7/23/2019 1701 by [facility] Matthieu Becker RN  Outcome: Adequate for Discharge  7/23/2019 0942 by Fidel Barth RN  Outcome: Progressing     Problem: DISCHARGE PLANNING  Goal: Discharge to home or other facility with appropriate resources  Description  INTERVENTIONS:  - Identify barriers to discharge w/patient and caregiver  - Arrange for needed discharge resources and transportation as appropriate  - Identify discharge learning needs (meds, wound care, etc )  - Arrange for interpretive services to assist at discharge as needed  - Refer to Case Management Department for coordinating discharge planning if the patient needs post-hospital services based on physician/advanced practitioner order or complex needs related to functional status, cognitive ability, or social support system  7/23/2019 1702 by Fidel Barth RN  Outcome: Adequate for Discharge  7/23/2019 1701 by Fidel Barth RN  Outcome: Adequate for Discharge  7/23/2019 0942 by Fidel Barth RN  Outcome: Progressing     Problem: Nutrition/Hydration-ADULT  Goal: Nutrient/Hydration intake appropriate for improving, restoring or maintaining nutritional needs  Description  Monitor and assess patient's nutrition/hydration status for malnutrition (ex- brittle hair, bruises, dry skin, pale skin and conjunctiva, muscle wasting, smooth red tongue, and disorientation)  Collaborate with interdisciplinary team and initiate plan and interventions as ordered  Monitor patient's weight and dietary intake as ordered or per policy  Utilize nutrition screening tool and intervene per policy  Determine patient's food preferences and provide high-protein, high-caloric foods as appropriate       INTERVENTIONS:  - Monitor oral intake, urinary output, labs, and treatment plans  - Assess nutrition and hydration status and recommend course of action  - Evaluate amount of meals eaten  - Assist patient with eating if necessary   - Allow adequate time for meals  - Recommend/ encourage appropriate diets, oral nutritional supplements, and vitamin/mineral supplements  - Order, calculate, and assess calorie counts as needed  - Recommend, monitor, and adjust tube feedings and TPN/PPN based on assessed needs  - Assess need for intravenous fluids  - Provide specific nutrition/hydration education as appropriate  - Include patient/family/caregiver in decisions related to nutrition  7/23/2019 1702 by Honorio Dyson RN  Outcome: Adequate for Discharge  7/23/2019 1701 by Honorio Dyson RN  Outcome: Adequate for Discharge  7/23/2019 0942 by Honorio Ortizn RN  Outcome: Progressing

## 2019-07-23 NOTE — PROGRESS NOTES
Patient examined discussed with the resident physician and  patient is alert awake cooperative she improved with confusion she is much better communicates well and she reports that she feels much better and she is going home  Resident physician also reports that patient is medically stable and she will be discharged today  Patient is able to tell me that she is at the hospital she is able to tell me her age she has no new complaint  She is eating her lunch and pleasant  No evidence of hallucinations no psychosis no delirium she is not agitated patient is improved to her baseline patient is suffering from mild dementia and depression no side effects of Remeron 30 mg HS noted  Patient is recommended psychiatric follow-up after discharge  Since patient is improved and stable I will discontinue Haldol p r n  At this time and continue her medications the same  Patient is not suicidal not agitated not lethargic and she is not in distress she offers no new complaints  Therapy done with good response

## 2019-07-24 ENCOUNTER — TRANSITIONAL CARE MANAGEMENT (OUTPATIENT)
Dept: FAMILY MEDICINE CLINIC | Facility: CLINIC | Age: 79
End: 2019-07-24

## 2019-07-24 ENCOUNTER — APPOINTMENT (OUTPATIENT)
Dept: PHYSICAL THERAPY | Facility: CLINIC | Age: 79
End: 2019-07-24
Payer: MEDICARE

## 2019-07-25 ENCOUNTER — TELEPHONE (OUTPATIENT)
Dept: FAMILY MEDICINE CLINIC | Facility: CLINIC | Age: 79
End: 2019-07-25

## 2019-07-25 NOTE — TELEPHONE ENCOUNTER
DR Puri  - PT HAS A BAD COUGH  PT ALSO HASN'T HAD A BOWEL MOVEMENT IN ONE WEEK  PT WANTS TO TAKE MIRALAX  SHE TOLD PT THEY NEEDED TO CHECK WITH YOU FIRST  SHE WANTS CALL BACK

## 2019-07-26 ENCOUNTER — APPOINTMENT (OUTPATIENT)
Dept: PHYSICAL THERAPY | Facility: CLINIC | Age: 79
End: 2019-07-26
Payer: MEDICARE

## 2019-07-26 ENCOUNTER — IN HOME VISIT (OUTPATIENT)
Dept: FAMILY MEDICINE CLINIC | Facility: CLINIC | Age: 79
End: 2019-07-26
Payer: MEDICARE

## 2019-07-26 DIAGNOSIS — R26.2 AMBULATORY DYSFUNCTION: ICD-10-CM

## 2019-07-26 DIAGNOSIS — I10 ESSENTIAL HYPERTENSION: ICD-10-CM

## 2019-07-26 DIAGNOSIS — E43 SEVERE PROTEIN-CALORIE MALNUTRITION (HCC): ICD-10-CM

## 2019-07-26 DIAGNOSIS — K26.5 DUODENAL ULCER PERFORATION (HCC): Primary | ICD-10-CM

## 2019-07-26 DIAGNOSIS — R05.9 COUGH: ICD-10-CM

## 2019-07-26 DIAGNOSIS — K59.00 CONSTIPATION, UNSPECIFIED CONSTIPATION TYPE: ICD-10-CM

## 2019-07-26 DIAGNOSIS — G20 PARKINSON'S DISEASE (HCC): ICD-10-CM

## 2019-07-26 PROCEDURE — 99348 HOME/RES VST EST LOW MDM 30: CPT | Performed by: FAMILY MEDICINE

## 2019-07-29 NOTE — PROGRESS NOTES
Assessment/Plan:     Diagnoses and all orders for this visit:    Duodenal Ulcer Perforation  S/p repair on 7/18/2019  Patient recovering well  No new complaints of abdominal pain  Incision is healing with no sign of infection or drainage  Mild erythema around the incision, for which Bacitracin cream was recommended to apply 2-3 times per day  Continue to encourage adequate intake, as tolerated by patient    Cough  Patient is not allowed to have any medication with Dexamethasone  Advised to take Tussive medication with just Guaifenesin in the ingredients  Will monitor for improvement   Parkinson's disease ([facility] [address] )  Stable, continue current medication  Continue use of walker for assistance with ambulation   Constipation, unspecified constipation type  Advised to take Miralax daily  Patient refused any additional treatment options at this time  Advised plentiful fluid hydration  If symptoms do not improve with offer additional options   Essential hypertension  BP in the normal range today  Continue current management  Ambulatory dysfunction  Due to patient's Parkinson's disease  Continue use of rolling walker to assist with ambulation   Severe protein-calorie malnutrition ([address] )  Encourage improvement in nutrition and incorporation of more protein in diet  Will continue to monitor for any weight loss    All medication was reviewed and will be refilled in the pharmacy accordingly         Subjective:      Patient ID: Cristain Otero is a 67 y o  female  66 y/o female seen and examined in her home  Patient recently had a repair of a perforated duodenal ulcer  She tolerated the surgery well  Continues to experience mild discomfort  She is not eating as much as she was before  Patient states that she has been experiencing a productive cough that has been worsening for the past week  The cough is accompanied by a runny nose  She denies sore throat, ear pain, fever, or chills  She denies sick contacts   She has not been taking any medication for the cough because she was warned against taking some medication due to adverse reactions  Additionally, she has been experiencing ongoing constipation for some time  She has not had a bowel movement in several days  She has taken Miralax in the past and it has been beneficial to her  She has been drinking more fluids recently in hopes of improving the constipation and in general to stay hydrated  She has been ambulating around the house with a walker  Patient currently living with her  who helps her at home and she also has assistance from her son who comes over often  The following portions of the patient's history were reviewed and updated as appropriate: allergies, current medications, past family history, past medical history, past social history, past surgical history and problem list     Review of Systems   Constitutional: Negative for appetite change, chills, diaphoresis and fever  HENT: Positive for congestion  Negative for ear pain, rhinorrhea, sinus pressure, sinus pain and sore throat  Respiratory: Positive for cough  Negative for chest tightness, shortness of breath and wheezing  Cardiovascular: Negative for chest pain, palpitations and leg swelling  Gastrointestinal: Positive for constipation  Negative for abdominal pain, diarrhea, nausea and vomiting  Genitourinary: Negative  Musculoskeletal: Positive for gait problem  Skin: Negative for rash  Psychiatric/Behavioral: Negative  Objective:    Vitals: /80,  95% O2,   HR 68,   RR 16         Physical Exam   Constitutional: She appears well-developed and well-nourished  No distress  HENT:   Head: Normocephalic and atraumatic  Nose: Nose normal    Mouth/Throat: Oropharynx is clear and moist    Eyes: Right eye exhibits no discharge  Left eye exhibits no discharge  Cardiovascular: Normal rate and regular rhythm  Exam reveals no gallop     Pulmonary/Chest: Effort normal and breath sounds normal  No respiratory distress  She has no wheezes  Abdominal: Soft  Bowel sounds are normal  She exhibits no distension  There is no tenderness  Incision in the mid region, mildly erythematous but healing with no sign of infection or drainage of fluid    Musculoskeletal:   Ambulating with walker  Significant hemiballismus present    Neurological: She is alert  Skin: Skin is warm and dry  No rash noted  She is not diaphoretic  No erythema  No pallor  Psychiatric: She has a normal mood and affect  Her behavior is normal  Judgment and thought content normal    Nursing note and vitals reviewed

## 2019-07-30 NOTE — TELEPHONE ENCOUNTER
DR ANTONIO - PT NEEDS REFILL FOR HER NEEDLES  PT'S  DROPPED FORM OFF FOR YOU TO FILL OUT AND SEND IN  SHE WOULD LIKE TO SPEAK TO YOU

## 2019-07-31 ENCOUNTER — APPOINTMENT (OUTPATIENT)
Dept: PHYSICAL THERAPY | Facility: CLINIC | Age: 79
End: 2019-07-31
Payer: MEDICARE

## 2019-07-31 ENCOUNTER — TELEPHONE (OUTPATIENT)
Dept: FAMILY MEDICINE CLINIC | Facility: CLINIC | Age: 79
End: 2019-07-31

## 2019-07-31 DIAGNOSIS — E11.9 TYPE 2 DIABETES MELLITUS WITHOUT COMPLICATION, WITHOUT LONG-TERM CURRENT USE OF INSULIN (HCC): Primary | ICD-10-CM

## 2019-07-31 DIAGNOSIS — Z71.89 COMPLEX CARE COORDINATION: Primary | ICD-10-CM

## 2019-07-31 RX ORDER — LANCETS
EACH MISCELLANEOUS
Qty: 100 EACH | Refills: 99 | Status: SHIPPED | OUTPATIENT
Start: 2019-07-31 | End: 2020-03-05 | Stop reason: SDUPTHER

## 2019-07-31 RX ORDER — BLOOD SUGAR DIAGNOSTIC, DRUM
STRIP MISCELLANEOUS
Qty: 100 EACH | Refills: 99 | Status: SHIPPED | OUTPATIENT
Start: 2019-07-31 | End: 2020-03-05 | Stop reason: SDUPTHER

## 2019-08-01 ENCOUNTER — OFFICE VISIT (OUTPATIENT)
Dept: SURGERY | Facility: CLINIC | Age: 79
End: 2019-08-01

## 2019-08-01 VITALS
HEIGHT: 59 IN | DIASTOLIC BLOOD PRESSURE: 68 MMHG | HEART RATE: 66 BPM | SYSTOLIC BLOOD PRESSURE: 110 MMHG | BODY MASS INDEX: 17.34 KG/M2 | WEIGHT: 86 LBS | TEMPERATURE: 98.1 F

## 2019-08-01 DIAGNOSIS — Z09 POSTOPERATIVE EXAMINATION: Primary | ICD-10-CM

## 2019-08-01 PROBLEM — K26.5 PERFORATED DUODENAL ULCER (HCC): Status: ACTIVE | Noted: 2019-08-01

## 2019-08-01 PROCEDURE — 99024 POSTOP FOLLOW-UP VISIT: CPT | Performed by: SURGERY

## 2019-08-01 NOTE — PROGRESS NOTES
Patient is here for her 1st visit status post hospitalization for perforated duodenal ulcer  Patient underwent emergent exploratory laparotomy and Kolby's patch  She reports tolerating a regular diet with normal bowel function  No significant pain complaints  Patient denies any fevers chills  Upper midline incision is healing well  Follow-up within her normal physicians  Follow-up with me p r n   Continue Protonix

## 2019-08-06 ENCOUNTER — TELEPHONE (OUTPATIENT)
Dept: GASTROENTEROLOGY | Facility: CLINIC | Age: 79
End: 2019-08-06

## 2019-08-06 NOTE — TELEPHONE ENCOUNTER
Offered pt 8/7 at 11am with Dr Brock Sarmiento  Pt was going to call back to confirm apt  She has two apts in her apt desk   Please cancel whichever apt she does not wish to take

## 2019-08-07 ENCOUNTER — TELEPHONE (OUTPATIENT)
Dept: FAMILY MEDICINE CLINIC | Facility: CLINIC | Age: 79
End: 2019-08-07

## 2019-08-07 ENCOUNTER — OFFICE VISIT (OUTPATIENT)
Dept: GASTROENTEROLOGY | Facility: CLINIC | Age: 79
End: 2019-08-07
Payer: MEDICARE

## 2019-08-07 VITALS
HEART RATE: 83 BPM | RESPIRATION RATE: 18 BRPM | HEIGHT: 59 IN | SYSTOLIC BLOOD PRESSURE: 102 MMHG | WEIGHT: 85.4 LBS | DIASTOLIC BLOOD PRESSURE: 56 MMHG | TEMPERATURE: 98 F | BODY MASS INDEX: 17.22 KG/M2

## 2019-08-07 DIAGNOSIS — K26.9 DUODENAL ULCER: Primary | ICD-10-CM

## 2019-08-07 DIAGNOSIS — K59.00 CONSTIPATION, UNSPECIFIED CONSTIPATION TYPE: ICD-10-CM

## 2019-08-07 PROCEDURE — 99204 OFFICE O/P NEW MOD 45 MIN: CPT | Performed by: INTERNAL MEDICINE

## 2019-08-07 RX ORDER — PANTOPRAZOLE SODIUM 40 MG/1
40 TABLET, DELAYED RELEASE ORAL 2 TIMES DAILY
Qty: 60 TABLET | Refills: 3 | Status: SHIPPED | OUTPATIENT
Start: 2019-08-07 | End: 2019-12-16 | Stop reason: SDUPTHER

## 2019-08-07 NOTE — PROGRESS NOTES
Consultation - [address] [facility]  Markos De León 67 y o  female MRN: [id number]  Unit/Bed#:  Encounter: [id number]        Consults    ASSESSMENT/PLAN:       1  Recent history of perforated duodenal ulcer-healing well, status post repair on July 18th with Pinky Mick patch  Patient is unsure if she is taking Protonix 40 mg b i d    is also unsure  She does not have any dyspepsia symptoms or abdominal pain at this time  Recent labs are notable for hemoglobin of 10 7, this was done on the last day of the hospitalization  She denies any melena or hematochezia at this time   -I will send another prescription for Protonix 40 mg b i d  Ten Sudha I emphasized to the  check vitals and make sure that she is taking this twice daily including 30 minutes before breakfast and 30 minutes before dinner   -I re-emphasized the importance of avoiding NSAIDs  -we will plan for EGD in 2-3 months to assess for malignancy given history of perforated duodenal ulcer and no previous EGDs  2  Constipation-likely idiopathic, patient was recently hospitalized, may have been secondary to poor motility  -encourage high-fiber diet   -continue taking MiraLax 1 tbsp on daily basis  -continue increase intake of water   -colonoscopy was done last year which was notable for diverticulosis and internal hemorrhoids  Despite it being a suboptimal prep, given advanced age and comorbidities, will hold off on repeating colonoscopy at this time  ______________________________________________________________________    Reason for Consult / Principal Problem: [unfilled]    HPI: Markos De León is a 67y o  year old female with history of Parkinson's, ambulatory dysfunction, hypertension, recently admitted with perforated duodenal ulcer requiring emergent surgery with von patch on July 18th presents for initial evaluation  Patient is a poor historian, unsure if she is taking Protonix 40 mg b i d   Which was prescribed at the time of the hospitalization and discharge  Patient states that she no longer has any abdominal pain  She states that she does have chronic constipation for which she has recently started taking MiraLax which has helped  She denies any nausea or vomiting  Denies dysphagia, hematemesis or melena  She denies NSAID use  She has never undergone an EGD  Patient is accompanied by her  who provided some of the history  She did undergo colonoscopy in 2018 by Dr Nicole Croft which was suboptimal prep but did not reveal any colon polyps but did show internal hemorrhoids and extensive left-sided diverticulosis  She denies a hematochezia  Review of Systems: The remainder of the review of systems was negative except for the pertinent positives noted in HPI  Historical Information   Past Medical History:   Diagnosis Date    RODRIGO (acute kidney injury) ([facility] [address] ) 7/19/2019    Anal bleeding 1/29/2018    Arthritis     knee    Asthma     BPPV (benign paroxysmal positional vertigo) 7/19/2016    Last Assessment & Plan:  Hx consistent with BPPV  Neg Joe-Halpike, however could not perform it adequately due to dyskinesias  Recommend lozano-daroff exercises at home  IF worsens, vestibular therapy      Bulging lumbar disc 12/10/2013    Closed fracture of one rib of left side 1/29/2018    Dementia     Diabetes mellitus ([facility] [address] )     TYPE 2    Gallbladder disease     GERD (gastroesophageal reflux disease)     Hematuria     last assessed 10/29/15    Hypertension     Orthostatic hypotension 7/13/2015    Ovarian cyst     LAST ASSESSED: 12/10/13    Parkinson's disease ([facility] [address] )     Pneumonia of both lower lobes due to infectious organism ([facility] [address] ) 3/27/2018    Pulmonary embolism with infarction ([facility] [address] ) 9/2/2014    Sciatica 12/10/2013    Skin disorder     last assessed 12/10/13    Squamous cell carcinoma of skin 11/21/2016     Past Surgical History:   Procedure Laterality Date    APPENDECTOMY      1970'S    CHOLECYSTECTOMY [address] 7/18/2019    Procedure: LAPAROTOMY EXPLORATORY;  Surgeon: Puja Arrieta MD;  Location: [facility];  Service: General    NEUROPLASTY / [address] Right     OOPHORECTOMY Bilateral     REMOVAL OF BOTH OVARIES LAPAROSCOPIC     [facility] Wedron FLX W/RMVL OF TUMOR POLYP LESION [address] TQ N/A 3/20/2018    Procedure: COLONOSCOPY;  Surgeon: Ayala Nicholson MD;  Location: [facility] GI LAB; Service: Gastroenterology     [facility] CATARACT EXTRACAP,INSERT LENS Right 12/4/2018    Procedure: EXTRACTION EXTRACAPSULAR CATARACT PHACO INTRAOCULAR LENS (IOL); Surgeon: Sosa Patrick MD;  Location: [facility] OR;  Service: Ophthalmology     [facility] CATARACT EXTRACAP,INSERT LENS  1/15/2019    Procedure: EXTRACTION EXTRACAPSULAR CATARACT PHACO INTRAOCULAR LENS (IOL); Surgeon: Sosa Patrick MD;  Location: [facility] OR;  Service: Ophthalmology    TONSILLECTOMY      VENTRAL HERNIA REPAIR      WITH IMPLANT OF MESH     Social History   Social History     Substance and Sexual Activity   Alcohol Use Not Currently    Frequency: Patient refused    Drinks per session: Patient refused    Binge frequency: Never     Social History     Substance and Sexual Activity   Drug Use Never     Social History     Tobacco Use   Smoking Status Never Smoker   Smokeless Tobacco Never Used     Family History   Problem Relation Age of Onset    Alzheimer's disease Mother     Stroke Father     No Known Problems Sister     No Known Problems Brother        Meds/Allergies       (Not in a hospital admission)  No current facility-administered medications for this visit  No Known Allergies    Objective     Blood pressure 102/56, pulse 83, temperature 98 °F (36 7 °C), temperature source Tympanic, resp  rate 18, height 4' 11" (1 499 m), weight 38 7 kg (85 lb 6 4 oz), not currently breastfeeding      [unfilled]    PHYSICAL EXAM     GEN: well nourished, well developed, no acute distress  HEENT: anicteric, MMM  CV: RRR, no m/r/g  CHEST: CTA b/l, no WRR  ABD: +BS, soft, mid abdomen scar is healing well  No tenderness or guarding  EXT: no c/c/e  SKIN: no rashes,  NEURO: aaox3    Lab Results:   No visits with results within 1 Day(s) from this visit  Latest known visit with results is:   No results displayed because visit has over 200 results          Imaging Studies: I have personally reviewed pertinent films in PACS

## 2019-08-07 NOTE — LETTER
August 7, 2019     Marge Montaño MD  [employer]  [address]    Patient: Markos De León   YOB: 1940   Date of Visit: 8/7/2019       Dear Dr Ziyad Brown: Thank you for referring Funmi Miguel to me for evaluation  Below are my notes for this consultation  If you have questions, please do not hesitate to call me  I look forward to following your patient along with you  Sincerely,        Jerica Chen MD        CC: No Recipients  Jerica Chen MD  8/7/2019 12:12 PM  Sign at close encounter  Consultation - [address] [facility]  Markos De León 67 y o  female MRN: [id number]  Unit/Bed#:  Encounter: [id number]        Consults    ASSESSMENT/PLAN:       1  Recent history of perforated duodenal ulcer-healing well, status post repair on July 18th with Pinky Mick patch  Patient is unsure if she is taking Protonix 40 mg b i d    is also unsure  She does not have any dyspepsia symptoms or abdominal pain at this time  Recent labs are notable for hemoglobin of 10 7, this was done on the last day of the hospitalization  She denies any melena or hematochezia at this time   -I will send another prescription for Protonix 40 mg b i d  Ten Haley I emphasized to the  check vitals and make sure that she is taking this twice daily including 30 minutes before breakfast and 30 minutes before dinner   -I re-emphasized the importance of avoiding NSAIDs  -we will plan for EGD in 2-3 months to assess for malignancy given history of perforated duodenal ulcer and no previous EGDs  2  Constipation-likely idiopathic, patient was recently hospitalized, may have been secondary to poor motility  -encourage high-fiber diet   -continue taking MiraLax 1 tbsp on daily basis  -continue increase intake of water   -colonoscopy was done last year which was notable for diverticulosis and internal hemorrhoids    Despite it being a suboptimal prep, given advanced age and comorbidities, will hold off on repeating colonoscopy at this time  ______________________________________________________________________    Reason for Consult / Principal Problem: [unfilled]    HPI: Juli Houston is a 67y o  year old female with history of Parkinson's, ambulatory dysfunction, hypertension, recently admitted with perforated duodenal ulcer requiring emergent surgery with von patch on July 18th presents for initial evaluation  Patient is a poor historian, unsure if she is taking Protonix 40 mg b i d  Which was prescribed at the time of the hospitalization and discharge  Patient states that she no longer has any abdominal pain  She states that she does have chronic constipation for which she has recently started taking MiraLax which has helped  She denies any nausea or vomiting  Denies dysphagia, hematemesis or melena  She denies NSAID use  She has never undergone an EGD  Patient is accompanied by her  who provided some of the history  She did undergo colonoscopy in 2018 by Dr Fiordaliza Argueta which was suboptimal prep but did not reveal any colon polyps but did show internal hemorrhoids and extensive left-sided diverticulosis  She denies a hematochezia  Review of Systems: The remainder of the review of systems was negative except for the pertinent positives noted in HPI  Historical Information   Past Medical History:   Diagnosis Date    RODRIGO (acute kidney injury) ([facility] [address] ) 7/19/2019    Anal bleeding 1/29/2018    Arthritis     knee    Asthma     BPPV (benign paroxysmal positional vertigo) 7/19/2016    Last Assessment & Plan:  Hx consistent with BPPV  Neg Joe-Halpike, however could not perform it adequately due to dyskinesias  Recommend lozano-daroff exercises at home  IF worsens, vestibular therapy      Bulging lumbar disc 12/10/2013    Closed fracture of one rib of left side 1/29/2018    Dementia     Diabetes mellitus (HCC)     TYPE 2    Gallbladder disease     GERD (gastroesophageal reflux disease)     Hematuria     last assessed 10/29/15    Hypertension     Orthostatic hypotension 7/13/2015    Ovarian cyst     LAST ASSESSED: 12/10/13    Parkinson's disease ([facility] [address] [facility]     Pneumonia of both lower lobes due to infectious organism ([facility] [address] ) 3/27/2018    Pulmonary embolism with infarction ([facility] [address] ) 9/2/2014    Sciatica 12/10/2013    Skin disorder     last assessed 12/10/13    Squamous cell carcinoma of skin 11/21/2016     Past Surgical History:   Procedure Laterality Date    APPENDECTOMY      1970'S    CHOLECYSTECTOMY      1970'S    LAPAROTOMY N/A 7/18/2019    Procedure: LAPAROTOMY EXPLORATORY;  Surgeon: Katherin Alexander MD;  Location: [address] [place] [address];  Service: General    NEUROPLASTY / [address] Right     OOPHORECTOMY Bilateral     REMOVAL OF BOTH OVARIES LAPAROSCOPIC     [facility]RM[facility] OF TUMOR POLYP LESION [address] TQ N/A 3/20/2018    Procedure: COLONOSCOPY;  Surgeon: Ana Morel MD;  Location: [facility]; Service: Gastroenterology     [facility] CATARACT EXTRACAP,INSERT LENS Right 12/4/2018    Procedure: EXTRACTION EXTRACAPSULAR CATARACT PHACO INTRAOCULAR LENS (IOL); Surgeon: Kishan Pittman MD;  Location: [facility] OR;  Service: Ophthalmology     [facility] CATARACT EXTRACAP,INSERT LENS  1/15/2019    Procedure: EXTRACTION EXTRACAPSULAR CATARACT PHACO INTRAOCULAR LENS (IOL);   Surgeon: Kishan Pittman MD;  Location: [facility] OR;  Service: Ophthalmology    TONSILLECTOMY      VENTRAL HERNIA REPAIR      WITH IMPLANT OF MESH     Social History   Social History     Substance and Sexual Activity   Alcohol Use Not Currently    Frequency: Patient refused    Drinks per session: Patient refused    Binge frequency: Never     Social History     Substance and Sexual Activity   Drug Use Never     Social History     Tobacco Use   Smoking Status Never Smoker   Smokeless Tobacco Never Used     Family History   Problem Relation Age of Onset    Alzheimer's disease Mother     Stroke Father     No Known Problems Sister     No Known Problems Brother        Meds/Allergies       (Not in a hospital admission)  No current facility-administered medications for this visit  No Known Allergies    Objective     Blood pressure 102/56, pulse 83, temperature 98 °F (36 7 °C), temperature source Tympanic, resp  rate 18, height 4' 11" (1 499 m), weight 38 7 kg (85 lb 6 4 oz), not currently breastfeeding  [unfilled]    PHYSICAL EXAM     GEN: well nourished, well developed, no acute distress  HEENT: anicteric, MMM  CV: RRR, no m/r/g  CHEST: CTA b/l, no WRR  ABD: +BS, soft, mid abdomen scar is healing well  No tenderness or guarding  EXT: no c/c/e  SKIN: no rashes,  NEURO: aaox3    Lab Results:   No visits with results within 1 Day(s) from this visit  Latest known visit with results is:   No results displayed because visit has over 200 results          Imaging Studies: I have personally reviewed pertinent films in PACS

## 2019-08-08 NOTE — PROGRESS NOTES
Self-Discharge    Patient admitted to hospital with GI issues, receiving home therapy  Patient will be discharged at this time due to health status

## 2019-08-09 ENCOUNTER — TELEPHONE (OUTPATIENT)
Dept: GASTROENTEROLOGY | Facility: CLINIC | Age: 79
End: 2019-08-09

## 2019-08-09 NOTE — TELEPHONE ENCOUNTER
jaswinder pt    Pt would like to know if she should be continuing the use of protonix until her egd on 10/8   Please advise

## 2019-08-15 ENCOUNTER — TELEPHONE (OUTPATIENT)
Dept: GASTROENTEROLOGY | Facility: CLINIC | Age: 79
End: 2019-08-15

## 2019-08-15 NOTE — TELEPHONE ENCOUNTER
Called [facility] to start [facility] with Yue Donaldson  It is pending  Faxed clinicals  Ref# [id number]

## 2019-08-19 ENCOUNTER — EVALUATION (OUTPATIENT)
Dept: PHYSICAL THERAPY | Facility: CLINIC | Age: 79
End: 2019-08-19
Payer: MEDICARE

## 2019-08-19 DIAGNOSIS — G20 PARKINSON'S DISEASE (HCC): Primary | ICD-10-CM

## 2019-08-19 DIAGNOSIS — R26.2 IMPAIRED AMBULATION: ICD-10-CM

## 2019-08-19 DIAGNOSIS — I10 ESSENTIAL HYPERTENSION: ICD-10-CM

## 2019-08-19 PROCEDURE — G8978 MOBILITY CURRENT STATUS: HCPCS

## 2019-08-19 PROCEDURE — G8979 MOBILITY GOAL STATUS: HCPCS

## 2019-08-19 PROCEDURE — 97164 PT RE-EVAL EST PLAN CARE: CPT

## 2019-08-19 NOTE — PROGRESS NOTES
PT Re-evaluation    Today's date: 2019  Patient name: Juli Houston  : 1940  MRN: [id number]  Referring provider: Mason Mena MD  Dx:   Encounter Diagnosis     ICD-10-CM    1  Parkinson's disease ([facility] [address] ) G20    2  Impaired ambulation R26 2        Start Time: 1730  Stop Time: 1815  Total time in clinic (min): 45 minutes    Assessment  Assessment details: Patient is a 67 y o  female who presents to skilled PT for balance and reactive postural control deficits secondary to Parkinson's Disease  Patient presents with overall decreases in function due to hospital stay and surgery  Patient challenged with her overall function, deemed a fall risk via all outcome measures today, decrease in strength and overall function via all outcome measures today  Patient reports increasing fatigue during session as well as heaviness in the legs, which may be attributed to functional status change  Patient's manual muscle tests are decreased as compared to last re-evaluation, slight weakness noted, minimal improvements noted today, strength has decreased due to weight loss and muscle mass loss, specifically in her LEs  Patient presents as a fall risk via gait speed, LUNDBERG balance test, 5x sit to stand, mCTSIB, and TUG test, and DGI, with overall results noting risk for falls and diminished reactive postural control, slight decreases per objective in ambulation activities with and without SPC as well as her sit to stand improvement, posterior loss of balance with sitting continually noted  Patient endurance scores are below age related norms via 2 minute walk test as well as 6 minute walk test, including 30 second sit to stand scores, noting decrease functional endurance and cardio capacity, decrease by 2 rep in 30 second sit to stand test  Patient is demonstrating difficulties with ambulation with turning noted in the DGI today,  indicating significant fall risk   Patient has attempted LSVT and PWR exercises, patient's ataxia and incoordination challenged her during sessions to complete successful repetitions  Patient displays overall reduction in somatosensory/proprioception awareness secondary to increased balance and postural corrections associated with mCTSIB, dyskinetic movements continually prevalent, patient notes an increased frequency in colds today  Patient may benefit from a rollator or Ustep walker, patient given walker at home and is showing overall improvements with stability as well as subjective reports of decrease falls, although she does not use any assistive device unless reminded by , patient reports increases in falls when she moves backwards as well as with no AD usage  Treating therapist explained safety issues with not utilizing AD at home  Patient will be continued to recommend utilizing SOLO step in the clinic, patient and treating therapist discussed potential transition to home maintenance program  Patient will benefit from skilled PT to address noted impairments and functional limitations they are causing with overall goal to return patient to highest level possible with reduced risk for falls  Patient reports minimal change to symptoms with medication change  Change in status and decrease in all outcome measures requires skilled PT in order to decrease fall risk and maximize function  Impairments: abnormal coordination, abnormal gait, abnormal muscle tone, abnormal or restricted ROM, abnormal movement, activity intolerance, difficulty understanding, impaired balance, impaired physical strength, pain with function, safety issue, poor posture  and poor body mechanics  Other impairment: Parkinson's Disease  Barriers to therapy: Parkinson's disease  Understanding of Dx/Px/POC: excellent   Prognosis: fair    Goals  STG: To be completed in 4 weeks  1  Patient will improve her sequencing with AD in 4 weeks in order to improve her community ambulation safety - Partially MET  2   Patient will improve her gait speed score by  10 meters/second or more in order to improve her ability to cross a street safely - MET  3  Patient will improve her TUG test score by 3 seconds or more in 4 weeks in order to improve her transfer to ambulation transition - MET  4  Patient will improve her 5x sit to stand test by 5 seconds or more in 4 weeks to improve her transfer safety - MET    LTG: To be completed in 8 weeks  1  Patient will complete a 6 minute walk test completely in 8 weeks to improve her cardiovascular endurance-MET  2  Patient will complete a DGI in 8 weeks to formally assess her dynamic balance  - MET  3  Patient will complete a MiniBESTest in 8 weeks to formally assess her dual tasking abilities as related to her primary dx - NOT MET    New Goals  -Patient will improve DGI by 2 points in 8 weeks in order to improve her dynamic balance at home and in the community- NOT met  -Patient will improve her 6 minute walk test distance by 100 feet or more in 8 weeks to improve her cardiovascular endurance  - NOT MET  -Patient will demonstrate an independent floor to stand transfer in 8 weeks to assist with fall recovery independently - NOT MET  -Patient will be independent in HEP in 8 weeks to assist with abilities at home and improve with overall function - NOT MET    New Goals- 8/19/2019- to be completed in 8 weeks  -Patient will report a decrease in fall frequency to 4x per week in order to improve her fall risk and decrease in injury    -Patient will improve her mCTSIB score in all conditions by 5 seconds in order to improve all components of her balance systems    -Patient will improve her LUNDBERG Balance Score by 4 points or more in order to maximize her static balance confidence       Cut off score   All date taken from [facility] or Rehab Measures    LUNDBERG test: 46/56                                              5 x STS Test:  MDC: 6 points                                                  MDC: 2 3 seconds   age norms                                                                 Age Norms   61-76 year old = M: 54, F: 55                        62-78 year old: 11 4 seconds   66-77 year old = M 47,  F: 50                       71-76 year old: 12 6 seconds     80-80 year old = M46,   F: 53                       80-80 year old: 14 8 seconds      TUG test:                                                                     10 Meter Walk Test:  MDC: 4 14 seconds       MDC:  59 ft/sec  Cut off score for Falls                                                  Age Norms  > 13 5 seconds community dwelling adults                 20-29; M: 4 56 ft/sec F: 4 62 ft/sec  > 32 2 Frail Elderly                                                      30-39: M 4 76 ft/sec  F: 4 68 ft/sec          40-49: M: 4 79 ft/sec  F: 4 62 ft/sec  6 Minute Walk Test      50-59: M: 4 76 ft/sec  F: 4 56 ft/sec  MDC: 190 feet       60-69: M: 4 56 ft/sec  F: 4 26 ft/sec  Age Norms       70-+    M: 4 36 ft/sec  F: 4 16 ft/sec  60-69:    M: 1876 F: 1765  70-79:    M: 1729 F: 1545  80-89 +: M: 9948 F; 1286         Plan  Patient would benefit from: PT eval  Planned modality interventions: biofeedback, cryotherapy, TENS and thermotherapy: hydrocollator packs  Planned therapy interventions: abdominal trunk stabilization, IADL retraining, activity modification, joint mobilization, manual therapy, ADL retraining, ADL training, massage, balance, motor coordination training, muscle pump exercises, balance/weight bearing training, body mechanics training, breathing training, canalith repositioning, neuromuscular re-education, postural training, patient education, coordination, strengthening, stretching, fine motor coordination training, functional ROM exercises, flexibility, therapeutic activities, therapeutic exercise, therapeutic training, transfer training, gait training, graded activity, graded exercise, graded motor and home exercise program  Frequency: 2x week  Duration in weeks: 16  Plan of Care beginning date: 2019  Plan of Care expiration date: 2019  Treatment plan discussed with: patient        Subjective Evaluation    History of Present Illness  Date of onset: 2019  Mechanism of injury: Patient is a 67year old female reporting to skilled PT with a diagnosis of Parkinson's Disease  Patient arrives today post a gastro surgery, received home therapy  Patient stated that she is receiving botox for her cervical spine on 9/10/2019  Patient reports she lost a lot of weight, states she is only 89 lbs  Patient reports that she has been falling approximately 2x/day on average  Patient reports that she is falling during bowling             Recurrent probem    Pain  Current pain rating: 3  At best pain ratin  At worst pain ratin  Location: Neck Pain  Quality: dull ache  Relieving factors: change in position  Aggravating factors: standing and sitting    Social Support  Steps to enter house: no  0  Stairs in house: yes   10  Lives with: spouse    Employment status: not working    Diagnostic Tests  No diagnostic tests performed  Treatments  No previous or current treatments  Patient Goals  Patient goals for therapy: independence with ADLs/IADLs, improved balance and return to sport/leisure activities  Patient goal: Patient wants to improve her balance to bowl         Objective     Static Posture     Comments  MCTSIB  -FTEO on firm- 30 seconds  -FTEC on firm- 5 seconds  -FTEO on foam- 0 seconds  -FTEC on Foam- 0 seconds    Bhat/56    Anticipatory Balance  -Anterior, posterior, medial, lateral- no balance reactions, fall would occur without PT intervention    Progress Note: 3/20/2019    mCTSIB  -FTEO on firm- 30 seconds  -FTEC on firm- 10 seconds  -FTEO on foam- 5 seconds  -FTEC on Foam- 0 seconds    Bhat/56    Anticipatory Balance  -Anterior, posterior, medial, lateral- no balance reactions, fall would occur without PT intervention    Progress Note: 2019    mCTSIB  -FTEO on firm- 30 seconds  -FTEC on firm- 12 seconds  -FTEO on foam- 8 seconds  -FTEC on Foam- 0 seconds    Bhat/56    Anticipatory Balance  -Anterior, posterior, medial, lateral- no balance reactions, fall would occur without PT intervention    Progress Note- 2019      mCTSIB  -FTEO on firm- 30 seconds  -FTEC on firm- 15 seconds  -FTEO on foam- 8 seconds  -FTEC on Foam- 2 seconds    Bhat/56    Anticipatory Balance  -Anterior, posterior, medial, lateral- no balance reactions, fall would occur without PT intervention    Progress Note- 2019      mCTSIB  -FTEO on firm- 30 seconds  -FTEC on firm- 4 5 seconds  -FTEO on foam- 2 1 seconds  -FTEC on Foam- 0 seconds    Bhat/56    Anticipatory Balance  -Anterior, posterior, medial, lateral- no balance reactions, fall would occur without PT intervention    Strength/Myotome Testing     Left Hip   Planes of Motion   Flexion: 3+  Extension: 3+  Abduction: 3+  Adduction: 3+    Right Hip   Planes of Motion   Extension: 4+  Abduction: 3+  Adduction: 3+    Left Knee   Flexion: 4  Extension: 4-    Right Knee   Flexion: 4  Extension: 4    Left Ankle/Foot   Dorsiflexion: 4-  Plantar flexion: 4    Right Ankle/Foot   Dorsiflexion: 4-  Plantar flexion: 4    Ambulation     Comments   Gait speed- 10 meters/ 15 01 seconds-   TUG Test- 21 02 Seconds  2 minute walk test- 200 feet  6 minute walk test- completed 4:40 seconds, 450 feet    Gait speed- 10 meters/seconds-  With SPC, meters/second without SPC  TUG Test- 19 32 with SPC, 12 85 without SPC    Not performed today due to fatigue, perform next session    Progress Note: 19  Gait speed- 10 meters/ 10 26seconds-  With SPC, meters/second without SPC  TUG Test- 17 seconds with SPC, 13 36 seconds without SPC    2 minute walk test- 250  feet  6 minute walk test- 750 Feet      Progress Note: 19  Gait speed- 10 meters/ 15 12 seconds-   66 meters/second With SPC,   73 meters/second without SPC  TUG Test- 18 35 seconds with SPC, 14 78 seconds without SPC    2 minute walk test- 250 feet  6 minute walk test-  Feet      Progress Note: 8/19/19  Gait Speed- 10 meters/17 45 seconds-  57 meters/second With SPC, 10 meters/ 18 56 second=  54 meters/second without SPC  -TUG- 19 05 seconds with SPC, 17 89 seconds without SPC    2 minute walk test- 150 feet  6 minute walk test- 450 feet      Functional Assessment        Comments  5x sit to stand- 12 50 seconds  30 second sit to stand- 10 reps (self terminated due to fatigue)     Progress Note: 3/20/2019  5x sit to stand- 11 06 seconds  30 second sit to stand- 11 reps    Progress Note:4/17/2019  5x sit to stand- 9 59 seconds  30 second sit to stand- 14 reps  DGI- 14/24    Progress Note: 6/17/2019  5x sit to stand- 8 94 seconds  30 second sit to stand- 16 reps  DGI- /24    Progress Note: 8/19/2019  5x sit to stand- 12 03 seconds  30 second sit to stand- 10 reps  DGI- 11/24      Flowsheet Rows      Most Recent Value   PT/OT G-Codes   Current Score  44   Projected Score  63   FOTO information reviewed  Yes   Assessment Type  Re-evaluation   G code set  Mobility: Walking & Moving Around   Mobility: Walking and Moving Around Current Status ()  CK   Mobility: Walking and Moving Around Goal Status ()  CK          Precautions: Parkinson's disease, fall risk     Daily Treatment Diary     Manual                                                                                   Exercise Diary                                                                                                                                                                                                                                                                                      Modalities

## 2019-08-20 ENCOUNTER — PATIENT OUTREACH (OUTPATIENT)
Dept: FAMILY MEDICINE CLINIC | Facility: CLINIC | Age: 79
End: 2019-08-20

## 2019-08-20 RX ORDER — AMLODIPINE BESYLATE 10 MG/1
5 TABLET ORAL DAILY
Qty: 45 TABLET | Refills: 3 | Status: SHIPPED | OUTPATIENT
Start: 2019-08-20 | End: 2020-06-11

## 2019-08-20 NOTE — PROGRESS NOTES
CM outreach to patient and   Explained role of CM  States they do not have any needs at this time Pt eating and drinking well  Has all appointments, going to PT  Pt is using walker and cane as appropriate  Pt has family support

## 2019-08-23 ENCOUNTER — HOSPITAL ENCOUNTER (EMERGENCY)
Facility: HOSPITAL | Age: 79
Discharge: HOME | End: 2019-08-23
Attending: EMERGENCY MEDICINE | Admitting: EMERGENCY MEDICINE
Payer: MEDICARE

## 2019-08-23 ENCOUNTER — APPOINTMENT (EMERGENCY)
Dept: RADIOLOGY | Facility: HOSPITAL | Age: 79
End: 2019-08-23
Payer: MEDICARE

## 2019-08-23 ENCOUNTER — TELEPHONE (OUTPATIENT)
Dept: FAMILY MEDICINE CLINIC | Facility: CLINIC | Age: 79
End: 2019-08-23

## 2019-08-23 VITALS
TEMPERATURE: 98.2 F | HEART RATE: 86 BPM | DIASTOLIC BLOOD PRESSURE: 68 MMHG | RESPIRATION RATE: 18 BRPM | OXYGEN SATURATION: 98 % | SYSTOLIC BLOOD PRESSURE: 143 MMHG

## 2019-08-23 DIAGNOSIS — S01.01XA SCALP LACERATION: ICD-10-CM

## 2019-08-23 DIAGNOSIS — Z12.31 ENCOUNTER FOR SCREENING MAMMOGRAM FOR BREAST CANCER: Primary | ICD-10-CM
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
## 2019-08-23 PROCEDURE — 70450 CT HEAD/BRAIN W/O DYE: CPT

## 2019-08-23 PROCEDURE — 99283 EMERGENCY DEPT VISIT LOW MDM: CPT

## 2019-08-23 NOTE — ED PROVIDER NOTES
History  Chief Complaint   Patient presents with   Zaynab Varner Fall     patient states she lost her balance and fell backwards, hitting her head  denies LOC  HPI    This is a 68 yo F who presents today with fall  Patient states lost her balance and fell backwards  She did hit her head  Denies loss of conscious  Patient states no neck pain  Patient does have a laceration to the right parietal area  No bleeding currently  Patient states up-to-date on her vaccinations  Tetanus is updated  No other complaints of chest pain shortness of breath  Patient does have history of Parkinson's  67 year female that presents with a fall  Patient will require 1 staple in the scalp  Will do a CT head  Patient's neck is cleared nexus cleared    Prior to Admission Medications   Prescriptions Last Dose Informant Patient Reported? Taking?    ACCU-CHEK COMPACT PLUS test strip   No No   Sig: Use daily   ACCU-CHEK SOFTCLIX LANCETS lancets   No No   Sig: Use as instructed   Amantadine HCl 100 MG tablet   Yes Yes   Sig: TAKE 1 TABLET TWICE A DAY   Carbidopa-Levodopa ER 48  MG CPCR   Yes No   Sig: Take 1 capsule by mouth every 4 (four) hours    Glucose Blood (COOL BLOOD GLUCOSE TEST STRIPS VI)   Yes No   Sig: As directed    Glucose Blood (COOL BLOOD GLUCOSE TEST STRIPS VI)   Yes No   Sig: As directed   Multiple Vitamin (MULTIVITAMIN) tablet  Self Yes Yes   Sig: Take 1 tablet by mouth daily   Omega-3 Fatty Acids (FISH OIL PO)   Yes Yes   Sig: Take 2 g by mouth daily   amLODIPine (NORVASC) 10 mg tablet   No Yes   Sig: Take 0 5 tablets (5 mg total) by mouth daily   aspirin (ECOTRIN LOW STRENGTH) 81 mg EC tablet   Yes Yes   Sig: Take 81 mg by mouth daily   glucose monitoring kit (FREESTYLE) monitoring kit   No No   Si each by Does not apply route as needed (DM control) ACCUCHEK METER COMPACT PLUS   metFORMIN (GLUCOPHAGE) 500 mg tablet   No Yes   Sig: TAKE 1 TABLET BY MOUTH  DAILY WITH BREAKFAST   mirtazapine (REMERON) 15 mg tablet Unknown at Unknown time  Yes No   Sig: Take 30 mg by mouth   pantoprazole (PROTONIX) 40 mg tablet   No Yes   Sig: Take 1 tablet (40 mg total) by mouth 2 (two) times a day for 30 days   rOPINIRole (REQUIP) 2 mg tablet   Yes No   Sig: Take 6 mg by mouth daily at bedtime    rasagiline (AZILECT) 1 MG   Yes Yes   Sig: TAKE 1 TABLET BY MOUTH  DAILY      Facility-Administered Medications: None       Past Medical History:   Diagnosis Date    RODRIGO (acute kidney injury) ([facility] ) 7/19/2019    Anal bleeding 1/29/2018    Arthritis     knee    Asthma     BPPV (benign paroxysmal positional vertigo) 7/19/2016    Last Assessment & Plan:  Hx consistent with BPPV  Neg [place], however could not perform it adequately due to dyskinesias  Recommend lozano-daroff exercises at home  IF worsens, vestibular therapy      Bulging lumbar disc 12/10/2013    Closed fracture of one rib of left side 1/29/2018    Dementia     Diabetes mellitus ([facility] )     TYPE 2    Gallbladder disease     GERD (gastroesophageal reflux disease)     Hematuria     last assessed 10/29/15    Hypertension     Orthostatic hypotension 7/13/2015    Ovarian cyst     LAST ASSESSED: 12/10/13    Parkinson's disease ([facility] [address] )     [facility]neum[facility] of both lower [facility] due to [facility] 3/27/2018    Pulmonary embolism with infarction [facility] 9/2/2014    Sciatica 12/10/2013    Skin disorder     last assessed 12/10/13    Squamous cell carcinoma of skin 11/21/2016       Past Surgical History:   Procedure Laterality Date    APPENDECTOMY      1970'S    CHOLECYSTECTOMY      1970'S    LAPAROTOMY N/A 7/18/2019    Procedure: LAPAROTOMY EXPLORATORY;  Surgeon: Libby Doyle MD;  Location: [address] [place] [address];  Service: General    NEUROPLASTY / [address] Right     OOPHORECTOMY Bilateral     REMOVAL OF BOTH OVARIES LAPAROSCOPIC     [facility] FLX W/RMVL OF TUMOR POLYP LESION [address] [facility] TQ N/A 3/20/2018    Procedure: COLONOSCOPY;  Surgeon: Paulino Bliss Willian Metzger MD;  Location: [facility]; Service: Gastroenterology     [facility] CATARACT EXTRACAP,INSERT LENS Right 12/4/2018    Procedure: EXTRACTION EXTRACAPSULAR CATARACT PHACO INTRAOCULAR LENS (IOL); Surgeon: Grant Cardoza MD;  Location: [facility] OR;  Service: Ophthalmology     [facility] CATARACT EXTRACAP,INSERT LENS  1/15/2019    Procedure: EXTRACTION EXTRACAPSULAR CATARACT PHACO INTRAOCULAR LENS (IOL); Surgeon: Grant Cardoza MD;  Location: [facility] OR;  Service: Ophthalmology    TONSILLECTOMY      VENTRAL HERNIA REPAIR      WITH IMPLANT OF MESH       Family History   Problem Relation Age of Onset    Alzheimer's disease Mother     Stroke Father     No Known Problems Sister     No Known Problems Brother      I have reviewed and agree with the history as documented  Social History     Tobacco Use    Smoking status: Never Smoker    Smokeless tobacco: Never Used   Substance Use Topics    Alcohol use: Not Currently     Frequency: Patient refused     Drinks per session: Patient refused     Binge frequency: Never    Drug use: Never        Review of Systems   Constitutional: Negative  Negative for diaphoresis and fever  HENT: Negative  Respiratory: Negative  Negative for cough, shortness of breath and wheezing  Cardiovascular: Negative  Negative for chest pain, palpitations and leg swelling  Gastrointestinal: Negative for abdominal distention, abdominal pain, nausea and vomiting  Genitourinary: Negative  Musculoskeletal: Negative  Skin: Negative  Neurological: Positive for headaches  Psychiatric/Behavioral: Negative  All other systems reviewed and are negative  Physical Exam  Physical Exam   Constitutional: She is oriented to person, place, and time  She appears well-developed and well-nourished  HENT:   Head: Normocephalic  1 cm laceration to the right prior to area no bleeding   Eyes: Pupils are equal, round, and reactive to light   EOM are normal    Neck: Normal range of motion  Neck supple  Tortacollis  chronic   Cardiovascular: Normal rate, regular rhythm and normal heart sounds  No murmur heard  Pulmonary/Chest: Effort normal and breath sounds normal    Abdominal: Soft  Bowel sounds are normal  She exhibits no distension  There is no tenderness  There is no rebound and no guarding  Musculoskeletal: Normal range of motion  Neurological: She is alert and oriented to person, place, and time  Skin: Skin is warm and dry  Capillary refill takes less than 2 seconds  Psychiatric: She has a normal mood and affect  Vitals reviewed  Vital Signs  ED Triage Vitals [08/23/19 1052]   Temperature Pulse Respirations Blood Pressure SpO2   98 2 °F (36 8 °C) 86 18 143/68 98 %      Temp Source Heart Rate Source Patient Position - Orthostatic VS BP Location FiO2 (%)   Tympanic Monitor -- -- --      Pain Score       No Pain           Vitals:    08/23/19 1052   BP: 143/68   Pulse: 86         Visual Acuity      ED Medications  Medications - No data to display    Diagnostic Studies  Results Reviewed     None                 CT head without contrast   Final Result by Damien Alston MD (08/23 1233)      No acute intracranial process or significant interval change  No skull fracture  Chronic microangiopathy  Workstation performed: [id number]                    Procedures  Laceration repair  Date/Time: 8/26/2019 6:49 AM  Performed by: Ryanne Hidalgo MD  Authorized by: Ryanne Hidalgo MD   Consent: Verbal consent obtained    Consent given by: patient  Patient identity confirmed: verbally with patient  Body area: head/neck  Location details: scalp  Laceration length: 1 cm    Wound Dehiscence:  Superficial Wound Dehiscence: simple closure      Procedure Details:  Irrigation solution: saline  Irrigation method: jet lavage  Amount of cleaning: standard  Skin closure: staples  Number of sutures: 1  Technique: simple  Approximation: close  Dressing: 4x4 sterile gauze  Patient tolerance: Patient tolerated the procedure well with no immediate complications             ED Course  ED Course as of Aug 26 0650   Fri Aug 23, 2019   1236 1 stable place will discharge patient                                  MDM    Disposition  Final diagnoses:   Fall   Scalp laceration     Time reflects when diagnosis was documented in both MDM as applicable and the Disposition within this note     Time User Action Codes Description Comment    8/23/2019 12:36 PM Lucrecia Salinas [W19  XXXA] Fall     8/23/2019 12:36 PM Lucrecia [place] Add [S01 01XA] Scalp laceration       ED Disposition     ED Disposition Condition Date/Time Comment    Discharge Stable Fri Aug 23, 2019 12:36 PM Monique Menard discharge to home/self care              Follow-up Information     Follow up With Specialties Details Why [address] [place] [address] & [address], MD Family Medicine  For suture removal in 5-7 days [address]   [phone]            Discharge Medication List as of 8/23/2019 12:36 PM      CONTINUE these medications which have NOT CHANGED    Details   Amantadine HCl 100 MG tablet TAKE 1 TABLET TWICE A DAY, Historical Med      amLODIPine (NORVASC) 10 mg tablet Take 0 5 tablets (5 mg total) by mouth daily, Starting Tue 8/20/2019, Normal      aspirin (ECOTRIN LOW STRENGTH) 81 mg EC tablet Take 81 mg by mouth daily, Historical Med      metFORMIN (GLUCOPHAGE) 500 mg tablet TAKE 1 TABLET BY MOUTH  DAILY WITH BREAKFAST, Normal      Multiple Vitamin (MULTIVITAMIN) tablet Take 1 tablet by mouth daily, Historical Med      Omega-3 Fatty Acids (FISH OIL PO) Take 2 g by mouth daily, Historical Med      pantoprazole (PROTONIX) 40 mg tablet Take 1 tablet (40 mg total) by mouth 2 (two) times a day for 30 days, Starting Wed 8/7/2019, Until Fri 9/6/2019, Normal      rasagiline (AZILECT) 1 MG TAKE 1 TABLET BY MOUTH  DAILY, Historical Med      !! ACCU-CHEK COMPACT PLUS test strip Use daily, Normal      ACCU-CHEK SOFTCLIX LANCETS lancets Use as instructed, Normal      Carbidopa-Levodopa ER 48  MG CPCR Take 1 capsule by mouth every 4 (four) hours , Starting Thu 8/9/2018, Historical Med      !! Glucose Blood (COOL BLOOD GLUCOSE TEST STRIPS VI) As directed , Historical Med      !! Glucose Blood (COOL BLOOD GLUCOSE TEST STRIPS VI) As directed, Historical Med      glucose monitoring kit (FREESTYLE) monitoring kit 1 each by Does not apply route as needed (DM control) ACCUCHEK METER COMPACT PLUS, Starting Fri 2/8/2019, Normal      mirtazapine (REMERON) 15 mg tablet Take 30 mg by mouth, Starting Fri 1/11/2019, Historical Med      rOPINIRole (REQUIP) 2 mg tablet Take 6 mg by mouth daily at bedtime , Starting Mon 7/15/2019, Historical Med       !! - Potential duplicate medications found  Please discuss with provider  No discharge procedures on file      ED Provider  Electronically Signed by           Stephy Rahman MD  08/26/19 7591

## 2019-08-23 NOTE — TELEPHONE ENCOUNTER
Patient called last night fell backward while getting on commode at 1:30am lump and bleeding  No loss of consciousness and no visual disturbance  This am still oozing blood and lump spoke with Dr Tiffany Diaz patient sent to [facility] for evaluation

## 2019-08-27 ENCOUNTER — TELEPHONE (OUTPATIENT)
Dept: GASTROENTEROLOGY | Facility: AMBULARY SURGERY CENTER | Age: 79
End: 2019-08-27

## 2019-08-28 ENCOUNTER — OFFICE VISIT (OUTPATIENT)
Dept: PHYSICAL THERAPY | Facility: CLINIC | Age: 79
End: 2019-08-28
Payer: MEDICARE

## 2019-08-28 DIAGNOSIS — G20 PARKINSON'S DISEASE (HCC): Primary | ICD-10-CM

## 2019-08-28 DIAGNOSIS — R26.2 IMPAIRED AMBULATION: ICD-10-CM

## 2019-08-28 PROCEDURE — 97530 THERAPEUTIC ACTIVITIES: CPT

## 2019-08-28 PROCEDURE — 97110 THERAPEUTIC EXERCISES: CPT

## 2019-08-28 NOTE — PROGRESS NOTES
Daily Note     Today's date: 2019  Patient name: Merle Acosta  : 1940  MRN: [id number]  Referring provider: Michael Randhawa MD  Dx:   Encounter Diagnosis     ICD-10-CM    1  Parkinson's disease ([facility] [address] ) G20    2  Impaired ambulation R26 2        Start Time: 1115  Stop Time: 1200  Total time in clinic (min): 45 minutes    Subjective: Patient arrived to therapy today with SPC, stated that last week she fell and hit the back of her head and received 1 staple  She was not admitted  Patient states that she feels "more uncoordinated recently"  Objective: See treatment diary below  -Sit to stands- 20 reps no UE  -Hip Flexion- 20 reps, 3 seconds, 2 UE support  -Hip Abduction- 20 reps, 3 second holds, 2 UE support  -Hip Extension- 20 reps, 3 second holds, 2 UE support  -Step Ups- forward, 20 reps, 1 UE support  -Step Ups- Laterally, 20 reps, 1 UE support  -Mini-Squats- 20 reps, 2 UE support  -Side Stepping with 2 UE support- 10 cycles of 10 feet  -Backwards Stepping with 2 UE support- 10 cycles of 10 feet  -Reaching outside ZEYAD- 20 reps, close supervision  Assessment: Gentle interventions performed today to assist with re-introduction of exercises post surgery  Patient demonstrated an increase in uncoordinated and choreic movements today during treatment session, and required increased verbal and tactile cuing during session to assist with her slowing down of movements and impulsivity  Patient challenged with increased cognitive load, patient continues to lose balance posteriorly  Patient requires skilled PT in order to improve her function with her diagnosis of Parkinson's disease  Plan: Continue per plan of care  Progress treatment as tolerated

## 2019-08-29 ENCOUNTER — OFFICE VISIT (OUTPATIENT)
Dept: FAMILY MEDICINE CLINIC | Facility: CLINIC | Age: 79
End: 2019-08-29
Payer: MEDICARE

## 2019-08-29 VITALS
DIASTOLIC BLOOD PRESSURE: 54 MMHG | HEART RATE: 78 BPM | SYSTOLIC BLOOD PRESSURE: 102 MMHG | RESPIRATION RATE: 16 BRPM | OXYGEN SATURATION: 97 % | WEIGHT: 86.8 LBS | BODY MASS INDEX: 17.53 KG/M2

## 2019-08-29 DIAGNOSIS — Z48.02 ENCOUNTER FOR STAPLE REMOVAL: Primary | ICD-10-CM

## 2019-08-29 PROCEDURE — 99213 OFFICE O/P EST LOW 20 MIN: CPT | Performed by: FAMILY MEDICINE

## 2019-08-29 NOTE — PROGRESS NOTES
Assessment/Plan:    Diagnoses and all orders for this visit:    #1: Encounter for staple removal  Site Cleaned with Alcohol Swab; 1 Staple Removed; Site remained D/C/I      Subjective:      Patient ID: Maira Duffy is a 67 y o  female  HPI  76-year-old female with a PMH of Parkinson's presents to clinic for staple removal   Seen at [facility] ED on 8/23/[facility] for fall  Per patient, it was due to a loss of balance  Did not lose consciousness  CT head was performed which did not show any acute intracranial pathology  Required 1 staple  Additional Concerns: None    The following portions of the patient's history were reviewed and updated as appropriate: allergies, current medications, past family history, past medical history, past social history, past surgical history and problem list     Review of Systems   Skin: Positive for wound  Head Laceration; Need Staple Removed   All other systems reviewed and are negative  Objective:    /54   Pulse 78   Resp 16   Wt 39 4 kg (86 lb 12 8 oz)   SpO2 97%   BMI 17 53 kg/m²      Physical Exam   Constitutional: She is oriented to person, place, and time  She appears well-developed and well-nourished  No distress  HENT:   Head: Head is with laceration  Cardiovascular: Normal rate, regular rhythm, normal heart sounds and intact distal pulses  Exam reveals no gallop and no friction rub  No murmur heard  Pulmonary/Chest: Effort normal and breath sounds normal    Abdominal: Soft  Bowel sounds are normal    Neurological: She is alert and oriented to person, place, and time  Skin: She is not diaphoretic  Nursing note and vitals reviewed

## 2019-08-30 ENCOUNTER — OFFICE VISIT (OUTPATIENT)
Dept: PHYSICAL THERAPY | Facility: CLINIC | Age: 79
End: 2019-08-30
Payer: MEDICARE

## 2019-08-30 DIAGNOSIS — G20 PARKINSON'S DISEASE (HCC): Primary | ICD-10-CM

## 2019-08-30 DIAGNOSIS — R26.2 IMPAIRED AMBULATION: ICD-10-CM

## 2019-08-30 PROCEDURE — 97530 THERAPEUTIC ACTIVITIES: CPT

## 2019-08-30 PROCEDURE — 97112 NEUROMUSCULAR REEDUCATION: CPT

## 2019-08-30 NOTE — PROGRESS NOTES
Daily Note     Today's date: 2019  Patient name: Gracia Velasquez  : 1940  MRN: [id number]  Referring provider: Saskia Rivera MD  Dx:   Encounter Diagnosis     ICD-10-CM    1  Parkinson's disease ([facility] [address] ) G20    2  Impaired ambulation R26 2        Start Time: 1115  Stop Time: 1200  Total time in clinic (min): 45 minutes    Subjective: Patient arrived to therapy today with SPC, stated that last week she fell and hit the back of her head and received 1 staple  She was not admitted  Patient states that she feels "more uncoordinated recently"  Objective: See treatment diary below  -Sit to stands- 20 reps no UE  -Hip Flexion- 20 reps, 3 seconds, 2 UE support  -Hip Abduction- 20 reps, 3 second holds, 2 UE support  -Hip Extension- 20 reps, 3 second holds, 2 UE support  -Step Ups- forward, 20 reps, 1 UE support  -Step Ups- Laterally, 20 reps, 1 UE support  -Mini-Squats- 20 reps, 2 UE support  -Side Stepping with 2 UE support- 10 cycles of 10 feet  -Backwards Stepping with 2 UE support- 10 cycles of 10 feet  -Reaching outside ZEYAD- 20 reps, close supervision    -Sit to stands- no UE, 20 reps   -Cone Taps- no UE, close supervision, 10 cycles of 10 cones      Assessment: Gentle interventions performed today to assist with re-introduction of exercises post surgery, continued again today but constant supervision required to prevent LOB today  Patient requires constant cuing to assist with decreasing the speed of her performance due to a decrease in her ability to maintain function and decrease in posterior LOB  Patient continually needs rest breaks to assist with recovery due to decrease in muscle mass due to weight loss  Patient requires skilled PT in order to improve her function with her diagnosis of Parkinson's disease  Plan: Continue per plan of care  Progress treatment as tolerated

## 2019-09-04 ENCOUNTER — OFFICE VISIT (OUTPATIENT)
Dept: PHYSICAL THERAPY | Facility: CLINIC | Age: 79
End: 2019-09-04
Payer: MEDICARE

## 2019-09-04 DIAGNOSIS — Z12.31 ENCOUNTER FOR SCREENING MAMMOGRAM FOR BREAST CANCER: ICD-10-CM

## 2019-09-04 DIAGNOSIS — G20 PARKINSON'S DISEASE (HCC): Primary | ICD-10-CM

## 2019-09-04 DIAGNOSIS — R26.2 IMPAIRED AMBULATION: ICD-10-CM

## 2019-09-04 PROCEDURE — 97110 THERAPEUTIC EXERCISES: CPT

## 2019-09-04 PROCEDURE — 97112 NEUROMUSCULAR REEDUCATION: CPT

## 2019-09-04 PROCEDURE — 97530 THERAPEUTIC ACTIVITIES: CPT

## 2019-09-04 NOTE — PROGRESS NOTES
Daily Note     Today's date: 2019  Patient name: Salvador Loco  : 1940  MRN: [id number]  Referring provider: Chey Lopez MD  Dx:   Encounter Diagnosis     ICD-10-CM    1  Parkinson's disease ([facility] [address] ) G20    2  Impaired ambulation R26 2        Start Time: 1115  Stop Time: 1200  Total time in clinic (min): 45 minutes    Subjective: Patient forgot to take her pill this morning  Patient went bowling yesterday and was able to bowl one game, but she had difficulty using the 10 lb bowling ball and walking up to the maria esther and throwing the ball  Objective: See treatment diary below    -Hip Flexion- 20 reps, 3 seconds, 2 UE support  -Hip Abduction- 20 reps, 3 second holds, 2 UE support  -Hip Extension- 20 reps, 3 second holds, 2 UE support  -FTEO: 30 sec x3  -FTEC: 30 sec x2  -FTEO on foam: 15 sec x3  -Step Ups- forward, 20 reps, 1 UE support  -Step Ups- Laterally, 20 reps, 1 UE support  -Step ups - backwards, 20 reps, 2 UE support  -Sit to stands- no UE, 10 reps x2   -Cone Taps- no UE, close supervision, 10 cycles of 10 cones  -Mini-Squats- 20 reps, 2 UE support  -Side Stepping with 2 UE support- 10 cycles of 10 feet  - Ambulation with SPC, cues for step-through pattern with SPC      Assessment: Patient tolerated treatment well, but she requires supervision for all exercises due to several LOB posteriorly to prevent fall risk  Patient was able to self-initiate reducing her speed with the exercises with less verbal cuing  Patient responded well to auditory cues for stepping with a step through gait pattern with a SPC  Patient requires skilled PT to increase strength, improve balance, and reduce her risk of fall due to her Parkinson's disease  Plan: Continue per plan of care  Progress treatment as tolerated

## 2019-09-06 ENCOUNTER — OFFICE VISIT (OUTPATIENT)
Dept: PHYSICAL THERAPY | Facility: CLINIC | Age: 79
End: 2019-09-06
Payer: MEDICARE

## 2019-09-06 DIAGNOSIS — R26.2 IMPAIRED AMBULATION: ICD-10-CM

## 2019-09-06 DIAGNOSIS — G20 PARKINSON'S DISEASE (HCC): Primary | ICD-10-CM

## 2019-09-06 PROCEDURE — 97112 NEUROMUSCULAR REEDUCATION: CPT

## 2019-09-06 PROCEDURE — 97116 GAIT TRAINING THERAPY: CPT

## 2019-09-06 NOTE — PROGRESS NOTES
Daily Note     Today's date: 2019  Patient name: Jeannette Ramos  : 1940  MRN: [id number]  Referring provider: Bianca Grant MD  Dx:   Encounter Diagnosis     ICD-10-CM    1  Parkinson's disease ([facility] [address] ) G20    2  Impaired ambulation R26 2        Start Time: 1115  Stop Time: 1200  Total time in clinic (min): 45 minutes    Subjective: Patient reported one fall in her house since last PT session because she was walking backwards and turning too quickly  Pt reports no injuries due to the fall  Objective: See treatment diary below    -Hip Flexion- 20 reps, 3 seconds, 2 UE support  -Hip Abduction- 20 reps, 3 second holds, 2 UE support  -Hip Extension- 20 reps, 3 second holds, 2 UE support  -Heel Raises- 30 reps, 3 second holds, 2 UE support  -Step Ups- Forward, laterally- 2 UE support- 20 reps, 4" steps     -FTEO: 30 sec x3  -FTEC: 30 sec x2  -FTEO on foam: 15 sec x3  -Step Ups- forward, 20 reps, 1 UE support  -Step Ups- Laterally, 20 reps, 1 UE support  -Step ups - backwards, 20 reps, 2 UE support  -Sit to stands- no UE, 10 reps x2   -Cone Taps- no UE, close supervision, 10 cycles of 10 cones  -Mini-Squats- 20 reps, 2 UE support  -Side Stepping with 2 UE support- 10 cycles of 10 feet  - Ambulation with SPC, cues for step-through pattern with SPC  -Ambulation in the parallel bars with no UE support 10 reps and with SPC 10 reps, close supervision  -Sidestepping in the parallel bars with no UE support and close supervision  -Ambulation in parallel bars with 6" step up and overs: 10 reps      Assessment: Patient tolerated treatment well but requires close supervision due to LOB posteriorly  Patient was able to 50% of the time self-initiate reducing speed during exercises but otherwise required cuing to reduce speed for safety  Patient ambulated forward, lateral, and up and over a step with no UE support and close supervision within the parallel bars with some LOB   Patient responded well to increasing step length and ambulating slowly  Patient would benefit from skilled PT to increase strength, improve balance, and reduce risk of falls  Plan: Continue per plan of care  Progress treatment as tolerated

## 2019-09-10 ENCOUNTER — OFFICE VISIT (OUTPATIENT)
Dept: FAMILY MEDICINE CLINIC | Facility: CLINIC | Age: 79
End: 2019-09-10
Payer: MEDICARE

## 2019-09-10 ENCOUNTER — TELEPHONE (OUTPATIENT)
Dept: SPEECH THERAPY | Facility: CLINIC | Age: 79
End: 2019-09-10

## 2019-09-10 VITALS
TEMPERATURE: 98.4 F | HEART RATE: 78 BPM | RESPIRATION RATE: 18 BRPM | WEIGHT: 86 LBS | DIASTOLIC BLOOD PRESSURE: 76 MMHG | BODY MASS INDEX: 17.37 KG/M2 | SYSTOLIC BLOOD PRESSURE: 138 MMHG | OXYGEN SATURATION: 97 %

## 2019-09-10 DIAGNOSIS — S80.01XA CONTUSION OF RIGHT KNEE, INITIAL ENCOUNTER: Primary | ICD-10-CM

## 2019-09-10 PROCEDURE — 99213 OFFICE O/P EST LOW 20 MIN: CPT | Performed by: FAMILY MEDICINE

## 2019-09-10 PROCEDURE — 1123F ACP DISCUSS/DSCN MKR DOCD: CPT | Performed by: FAMILY MEDICINE

## 2019-09-10 NOTE — PROGRESS NOTES
HPI: Patient  presents with right Knee pain  Falls a lot  Sometimes lands on knee  Last fall two days ago  Significant trauma history? yes  Significant overuse history? no  What makes symptoms worse: banging it  What makes symptoms better/treatments that have helped: rest  Radiation? no  Severity mild to moderate  Associated symptoms: no  Any meniscal symptoms (locking, true bucking, global swelling)? no    Previous Physical therapy? Not for this    Workman's compensation or legal case? no    Red flag screens: No unexplained fevers      Chart reviewed   Relevant medical, surgical and psychosocial history, medications and allergies reviewed  Imaging no       EXAM:   Alert, NAD Vitals as noted /76   Pulse 78   Temp 98 4 °F (36 9 °C)   Resp 18   Wt 39 kg (86 lb)   SpO2 97%   BMI 17 37 kg/m²     Gait: impaired due to Parkinsons  General coordination/movement integration: poor    Right KNEE INSPECTED:  ? Inspection: for redness, deformity, swelling, atrophy, signs of injury, skin changes, malalignment no  ? Palpation of joint line, area bursa, gonzalo-patellar for tenderness  Mild tender over peripatella and slightly pre-patellar bursa  ? PROM: (normal extension/flexion range is -5 to 130 degrees) full    ? Ligament testing: Collaterals intact  Lachman normal  Muscle testing of knee prime movers, done mid-range, isometrically: normal  ? General leg fitness level poor    ? Special tests:  ? Tests for patellofemoral pain syndrome mild + }  ? Distal neurovascular intact  ? Radiation of symptoms screens of LS spine and same side hip non-contributory: no complaints    General summary and Impression: contusion R knee        Risks and benefits of therapeutic plan discussed, answered all patient questions and concerns and patient expressed understanding and agreement of therapeutic plan      Follow up in 3 days

## 2019-09-11 ENCOUNTER — OFFICE VISIT (OUTPATIENT)
Dept: PHYSICAL THERAPY | Facility: CLINIC | Age: 79
End: 2019-09-11
Payer: MEDICARE

## 2019-09-11 DIAGNOSIS — G20 PARKINSON'S DISEASE (HCC): Primary | ICD-10-CM

## 2019-09-11 DIAGNOSIS — R26.2 IMPAIRED AMBULATION: ICD-10-CM

## 2019-09-11 PROCEDURE — 97140 MANUAL THERAPY 1/> REGIONS: CPT

## 2019-09-11 NOTE — PROGRESS NOTES
Daily Note     Today's date: 2019  Patient name: Danielle Sifuentes  : 1940  MRN: [id number]  Referring provider: Vicki Syed MD  Dx:   Encounter Diagnosis     ICD-10-CM    1  Parkinson's disease ([facility] [address] ) G20    2  Impaired ambulation R26 2        Start Time: 1115  Stop Time: 1200  Total time in clinic (min): 45 minutes    Subjective:Patient reports getting botox in both sides of her neck  She reports knee pain post fall a few weeks ago but is being followed up by PCP for her knee pain  Objective: See treatment diary below    No exercises were performed today:    -Hip Flexion- 20 reps, 3 seconds, 2 UE support  -Hip Abduction- 20 reps, 3 second holds, 2 UE support  -Hip Extension- 20 reps, 3 second holds, 2 UE support  -Heel Raises- 30 reps, 3 second holds, 2 UE support  -Step Ups- Forward, laterally- 2 UE support- 20 reps, 4" steps   -FTEO: 30 sec x3  -FTEC: 30 sec x2  -FTEO on foam: 15 sec x3  -Step Ups- forward, 20 reps, 1 UE support  -Step Ups- Laterally, 20 reps, 1 UE support  -Step ups - backwards, 20 reps, 2 UE support  -Sit to stands- no UE, 10 reps x2   -Cone Taps- no UE, close supervision, 10 cycles of 10 cones  -Mini-Squats- 20 reps, 2 UE support  -Side Stepping with 2 UE support- 10 cycles of 10 feet  - Ambulation with SPC, cues for step-through pattern with SPC  -Ambulation in the parallel bars with no UE support 10 reps and with SPC 10 reps, close supervision  -Sidestepping in the parallel bars with no UE support and close supervision  -Ambulation in parallel bars with 6" step up and overs: 10 reps    Manual PROM: 45 minutes  -L SB  -RR  -LR  -RR + LSB  -Extension  -Extension + LSB    Assessment: Patient was 1 day status post Botox injections on her neck musculature  Patient presents with resting flexion and R lateral flexion due to dystonia from her Parkinson's Disease   Patient responded well PROM into L lateral flexion, R and L rotation, and extension to decrease muscular tightness and increase cervical ROM  Patient was educated on her resting posture in order to make attempts to self-correct but will continue manual PROM  Patient will benefit from skilled PT to address muscular tightness and decreased cervical ROM, and balance impairment to decrease risk of falls and increase functional independence with ambulation and stair negotiation  Plan: Continue per plan of care  Progress treatment as tolerated

## 2019-09-13 ENCOUNTER — TELEPHONE (OUTPATIENT)
Dept: FAMILY MEDICINE CLINIC | Facility: CLINIC | Age: 79
End: 2019-09-13

## 2019-09-13 DIAGNOSIS — G20 MAJOR NEUROCOGNITIVE DISORDER, DUE TO PARKINSON'S DISEASE, WITHOUT BEHAVIORAL DISTURBANCE, MILD (HCC): Primary | ICD-10-CM

## 2019-09-13 DIAGNOSIS — F02.80 MAJOR NEUROCOGNITIVE DISORDER, DUE TO PARKINSON'S DISEASE, WITHOUT BEHAVIORAL DISTURBANCE, MILD (HCC): Primary | ICD-10-CM

## 2019-09-13 DIAGNOSIS — G24.3 CERVICAL DYSTONIA: ICD-10-CM

## 2019-09-13 DIAGNOSIS — F02.80 DEMENTIA ASSOCIATED WITH OTHER UNDERLYING DISEASE WITHOUT BEHAVIORAL DISTURBANCE (HCC): ICD-10-CM

## 2019-09-13 NOTE — TELEPHONE ENCOUNTER
PT CALLED TO REQUEST AN ORDER FOR SPEECH THERAPY AT [facility]  HAS AN APPT ON 10/4

## 2019-09-18 ENCOUNTER — OFFICE VISIT (OUTPATIENT)
Dept: PHYSICAL THERAPY | Facility: CLINIC | Age: 79
End: 2019-09-18
Payer: MEDICARE

## 2019-09-18 DIAGNOSIS — G20 PARKINSON'S DISEASE (HCC): Primary | ICD-10-CM

## 2019-09-18 DIAGNOSIS — R26.2 IMPAIRED AMBULATION: ICD-10-CM

## 2019-09-18 PROCEDURE — 97140 MANUAL THERAPY 1/> REGIONS: CPT

## 2019-09-18 NOTE — PROGRESS NOTES
Daily Note     Today's date: 2019  Patient name: Markos De León  : 1940  MRN: [id number]  Referring provider: Kym Sepulveda MD  Dx:   Encounter Diagnosis     ICD-10-CM    1  Parkinson's disease ([facility] [address] ) G20    2  Impaired ambulation R26 2        Start Time: 1115  Stop Time: 1200  Total time in clinic (min): 45 minutes    Subjective: Patient reports no improvements in neck tightness since getting the Botox  She reports she is having more difficulty looking up  She had one fall in the past week but no injury       Objective: See treatment diary below    - Step ups on 6" step, 2 UE support, 20 reps, looking straight ahead  - Ambulation with SPC: 200 feet (cues to increase step length and normalize gait rhythm)    Manual PROM: 35 minutes  -L SB  -RR  -LR  -RR + LSB  -Extension  -Extension + LSB    The following exercises were not performed today:    -Hip Flexion- 20 reps, 3 seconds, 2 UE support  -Hip Abduction- 20 reps, 3 second holds, 2 UE support  -Hip Extension- 20 reps, 3 second holds, 2 UE support  -Heel Raises- 30 reps, 3 second holds, 2 UE support  -Step Ups- Forward, laterally- 2 UE support- 20 reps, 4" steps   -FTEO: 30 sec x3  -FTEC: 30 sec x2  -FTEO on foam: 15 sec x3  -Step Ups- forward, 20 reps, 1 UE support  -Step Ups- Laterally, 20 reps, 1 UE support  -Step ups - backwards, 20 reps, 2 UE support  -Sit to stands- no UE, 10 reps x2   -Cone Taps- no UE, close supervision, 10 cycles of 10 cones  -Mini-Squats- 20 reps, 2 UE support  -Side Stepping with 2 UE support- 10 cycles of 10 feet  - Ambulation with SPC, cues for step-through pattern with SPC  -Ambulation in the parallel bars with no UE support 10 reps and with SPC 10 reps, close supervision  -Sidestepping in the parallel bars with no UE support and close supervision  -Ambulation in parallel bars with 6" step up and overs: 10 reps      Assessment: Patient was 1 week status post Botox injections on her neck musculature   Patient presents with resting flexion and R lateral flexion due to dystonia from her Parkinson's Disease with no change since Botox injections  Patient responded well to PROM and said it helped her be able to look up better  Patient was educated on resting posture in order to make self-corrections  Patient was encouraged to bring  to PT session for treating therapist to educate PROM techniques for at home to increase cervical ROM, and patient demonstrated understanding  Patient performed step ups with 2 UE support and was instructed to avoid downward gaze at feet and look up to increase active cervical ROM  Patient will benefit from skilled PT to address muscular tightness and decreased cervical ROM and improve balance to decrease risk of falls and increase functional independence with ambulation and stair negotiation  Plan: Continue per plan of care  Progress treatment as tolerated    Educate patient and patient's  on cervical PROM to decrease patient's resting flexion and R lateral flexion

## 2019-09-19 ENCOUNTER — TELEPHONE (OUTPATIENT)
Dept: FAMILY MEDICINE CLINIC | Facility: CLINIC | Age: 79
End: 2019-09-19

## 2019-09-19 NOTE — TELEPHONE ENCOUNTER
C/o knee hurts  Feels better when sits  No fever or redness  Hurts to walk on it  Doesn't feel Voltaren gel helps that much  Xray of R knee from 12/21/17: IMPRESSION:     Mild degenerative changes  No acute bony abnormality in the right knee  She will RTO for possible knee steroid injection

## 2019-09-20 ENCOUNTER — APPOINTMENT (OUTPATIENT)
Dept: PHYSICAL THERAPY | Facility: CLINIC | Age: 79
End: 2019-09-20
Payer: MEDICARE

## 2019-09-20 ENCOUNTER — OFFICE VISIT (OUTPATIENT)
Dept: FAMILY MEDICINE CLINIC | Facility: CLINIC | Age: 79
End: 2019-09-20
Payer: MEDICARE

## 2019-09-20 VITALS
WEIGHT: 83.2 LBS | BODY MASS INDEX: 16.8 KG/M2 | OXYGEN SATURATION: 97 % | TEMPERATURE: 97.6 F | DIASTOLIC BLOOD PRESSURE: 56 MMHG | SYSTOLIC BLOOD PRESSURE: 98 MMHG

## 2019-09-20 DIAGNOSIS — M17.11 PRIMARY OSTEOARTHRITIS OF RIGHT KNEE: Primary | ICD-10-CM

## 2019-09-20 DIAGNOSIS — G20 PARKINSON'S DISEASE (HCC): ICD-10-CM

## 2019-09-20 DIAGNOSIS — M22.2X1 PATELLOFEMORAL PAIN SYNDROME OF RIGHT KNEE: ICD-10-CM

## 2019-09-20 DIAGNOSIS — Z23 ENCOUNTER FOR IMMUNIZATION: ICD-10-CM

## 2019-09-20 PROBLEM — Z71.89 ENCOUNTER FOR HERB AND VITAMIN SUPPLEMENT MANAGEMENT: Status: ACTIVE | Noted: 2019-09-20

## 2019-09-20 PROCEDURE — 1124F ACP DISCUSS-NO DSCNMKR DOCD: CPT | Performed by: FAMILY MEDICINE

## 2019-09-20 PROCEDURE — G0008 ADMIN INFLUENZA VIRUS VAC: HCPCS | Performed by: FAMILY MEDICINE

## 2019-09-20 PROCEDURE — 99214 OFFICE O/P EST MOD 30 MIN: CPT | Performed by: FAMILY MEDICINE

## 2019-09-20 PROCEDURE — 90662 IIV NO PRSV INCREASED AG IM: CPT | Performed by: FAMILY MEDICINE

## 2019-09-20 NOTE — PROGRESS NOTES
Chief concern and HPI: Cynthia Ramirez is a 67 y o  female  F/u knee (right)      Xray R knee 12/17: IMPRESSION: Mild degenerative changes  No acute bony abnormality in the right knee    Getting EGD for ulcer 10/8    Previous relevant clinical information reviewed from medical, surgical and psychosocial history, medication and allergies and labs/studies  Patient Active Problem List   Diagnosis    Asthma    Carpal tunnel syndrome    Colon, diverticulosis    Type 2 diabetes mellitus without complication, without long-term current use of insulin ([address] )    Essential hypertension    GERD (gastroesophageal reflux disease)    Mixed hyperlipidemia    Insomnia    Spinal stenosis    Major neurocognitive disorder, due to parkinson's disease, without behavioral disturbance, mild (HCC)    Psoriasis    Restless legs syndrome    Weight loss, unintentional    Need for influenza vaccination    Arthritis    Age-related nuclear cataract of both eyes    Ambulatory dysfunction    Cervical dystonia    Confusion    Parkinson's disease ([address] )    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 ([address] )    Periumbilical abdominal pain    Free intraperitoneal air    RODRIGO (acute kidney injury) ([address] )    Dementia    Toxic metabolic encephalopathy    Severe protein-calorie malnutrition ([address] )    Perforated duodenal ulcer ([address] )           Review of systems: No new or worsening:   Unexplained fever/chills/sweats/weight loss  HEENT issues such as new ear, mouth, nose or eye problems  Respiratory issues such as new shortness of breath, cough  Heart issues such as new chest pain or pressure, palpitations  Abdominal or digestive issues such as diarrhea, constipation or blood in stool    BM's are normal  Genitourinary issues  Neurological issues such as new numbness or motor weakness  Psychological issues such as depression or anxiety  Skin issues such as new rashes      Exam:  Alert, no acute distress BP 98/56 (BP Location: Left arm, Patient Position: Sitting, Cuff Size: Standard)   Temp 97 6 °F (36 4 °C)   Wt 37 7 kg (83 lb 3 2 oz)   SpO2 97%   BMI 16 80 kg/m²   HEENT: Head normocephalic and atraumatic  Lungs: No respiratory labor  Cardiac: Regular rate and rhythm  Abdomen: Benign  Extremities: No cyanosis, clubbing or edema  Neuro screen: No gross deficits  R knee shows no inflammatory signs,  full A/PROM, stable ligaments  + gonzalo-patellar tenderness  Poor conditioning but no focal muscle deficits  No distal neuromuscular complaints      Summary Impression:  1  Encounter for immunization    - influenza vaccine, [id number], high-dose, PF 0 5 mL (FLUZONE HIGH-DOSE)  1  Encounter for immunization    - influenza vaccine, [id number], high-dose, PF 0 5 mL (FLUZONE HIGH-DOSE)    2  Patellofemoral pain syndrome of right knee  Ambulatory dysfunction from Parkinson's and deconditioning probably aggravate   - Ambulatory referral to Physical Therapy; Future  Curcumin/Boswellia    3  Primary osteoarthritis of right knee  As above  - Ambulatory referral to Physical Therapy; Future    4  Parkinson's disease ([facility] [address] )  Severe  Same Rx  F/u neuro          Risks and benefits of therapeutic plan discussed, answered all patient questions and concerns and patient expressed understanding and agreement of therapeutic plan            Follow up plan: 1 month

## 2019-09-20 NOTE — PATIENT INSTRUCTIONS
Curcumins are potent anti-inflammatory natural medicines derived from turmeric, the common [place]) spice  In order to reduce inflammation in the body, it is important to use brands that are specifically designed to be absorbed from the gut and are reputed to be of good quality, Common uses include for arthritis, autoimmune disorders, chronic pain or the inflammatory component of chronic diseases such as diabetes, heart disease and others  These can be purchased at local healthy food stores such as [employer] or reputable online sites such as www  [employer]  com    Pick one of the following brands:  1  Doctor's Best - 'Best Curcumin C3 Complex with BioPerine' 1 gram twice a day with or without food  2  Nut[employer]old 'Turmeric Curcumin Gold' 500 mg cap once to three times a day with food  3  Life Extension 'Super Bio-Curcumin' 400 mg 1 - 2 caps daily  4  NutriPhysical Curcumin Extreme 400 mg 1 cap daily  5  Indena S p  A  Meriva 500 mg 1 - 2 caps daily (available at [employer]  com)    A good combination botanical with both Curcumin and Boswellia is [employer] Knees an Joints: 1 cap 3x/day also will give you 1000 units of Vit  D3  Boswellia is generally safe and well tolerated, but occasionally can cause GI upset  Generally it is best to take Curcumin with a meal that has some fat (to help absorption), though the above formulations do have improved absorption even if not taken with a meal        Generally these supplements are very safe and well tolerated  Occasionally any supplement can cause stomach upset but this is unusual  Other side effects such as liver irritation are uncommon  Very high doses could thin the blood, so let your doctor know if you are on strong blood thinners such as Warfarin (Coumadin)  Patients with active gallbladder disease (biliary colic) might want to avoid high doses of curcumin     High dose Turmeric may increase urinary oxalate levels, theoretically increasing kidney stone formation is susceptible patients  Curcumin may bind iron, so if iron deficient, take iron and turmeric/curcumin at different times  Curcumin with the bioavailability (absorption) enhancer piperine from black pepper could increase the absorption of certain medications such as phenytoin, rifampin, propranolol, amlodipine, felodipine, nevirapine, so it might be best to take any of these drugs at a different time than Curcumin with Bioperine  All women who are pregnant or breast-feeding should discuss with their doctor before using any supplement or medicine

## 2019-09-23 ENCOUNTER — TELEPHONE (OUTPATIENT)
Dept: FAMILY MEDICINE CLINIC | Facility: CLINIC | Age: 79
End: 2019-09-23

## 2019-09-23 ENCOUNTER — TELEPHONE (OUTPATIENT)
Dept: GASTROENTEROLOGY | Facility: MEDICAL CENTER | Age: 79
End: 2019-09-23

## 2019-09-23 NOTE — TELEPHONE ENCOUNTER
Patient called back in requesting a call back in regards to  proper doseasge as she unable to take half of a capsule   She would like to know if she should be taking 500 mg or 1000 mg

## 2019-09-23 NOTE — TELEPHONE ENCOUNTER
Pt called re tumeric,lindanot able to get 500 mg,the capsules she got are 1,000 mg and she can t  Cut them in half  please advise

## 2019-09-23 NOTE — TELEPHONE ENCOUNTER
Dr Richelle Key patient    Patient called stated she is scheduled to have EGD on 10/8/2019 and would like to know if she should continue taking her pantoprazole   Patient can be reached at [phone]

## 2019-09-24 ENCOUNTER — TELEPHONE (OUTPATIENT)
Dept: FAMILY MEDICINE CLINIC | Facility: CLINIC | Age: 79
End: 2019-09-24

## 2019-09-25 ENCOUNTER — TELEPHONE (OUTPATIENT)
Dept: FAMILY MEDICINE CLINIC | Facility: CLINIC | Age: 79
End: 2019-09-25

## 2019-09-25 ENCOUNTER — OFFICE VISIT (OUTPATIENT)
Dept: PHYSICAL THERAPY | Facility: CLINIC | Age: 79
End: 2019-09-25
Payer: MEDICARE

## 2019-09-25 DIAGNOSIS — G20 PARKINSON'S DISEASE (HCC): Primary | ICD-10-CM

## 2019-09-25 DIAGNOSIS — R26.2 IMPAIRED AMBULATION: ICD-10-CM

## 2019-09-25 PROCEDURE — 97530 THERAPEUTIC ACTIVITIES: CPT

## 2019-09-25 PROCEDURE — 97140 MANUAL THERAPY 1/> REGIONS: CPT

## 2019-09-25 NOTE — PROGRESS NOTES
Daily Note     Today's date: 2019  Patient name: Judd Oppenheim  : 1940  MRN: [id number]  Referring provider: Cherie Galindo MD  Dx:   Encounter Diagnosis     ICD-10-CM    1  Parkinson's disease ([facility] [address] ) G20    2  Impaired ambulation R26 2        Start Time: 1115  Stop Time: 1200  Total time in clinic (min): 45 minutes    Subjective: Per patient and patient's  reports, patient is having continued difficulty looking up, making it difficult to eat and drink with no improvements in neck tightness since getting the Botox  She had several falls in the past week with bruises on her knees      Objective: See treatment diary below    Manual PROM: 30 minutes  -L SB  -RR  -LR  -RR + LSB  -Extension  -Extension + LSB    Education with patient's  (15 minutes)  - L SB  -LR  - Extension (patient's back on pillow with hanging off pillow and patient's head cradled off the table)    HEP: 2-3 minutes per stretch, 3x/day or greater    The following exercises were not performed today:  - Step ups on 6" step, 2 UE support, 20 reps, looking straight ahead  - Ambulation with SPC: 200 feet (cues to increase step length and normalize gait rhythm)  -Hip Flexion- 20 reps, 3 seconds, 2 UE support  -Hip Abduction- 20 reps, 3 second holds, 2 UE support  -Hip Extension- 20 reps, 3 second holds, 2 UE support  -Heel Raises- 30 reps, 3 second holds, 2 UE support  -Step Ups- Forward, laterally- 2 UE support- 20 reps, 4" steps   -FTEO: 30 sec x3  -FTEC: 30 sec x2  -FTEO on foam: 15 sec x3  -Step Ups- forward, 20 reps, 1 UE support  -Step Ups- Laterally, 20 reps, 1 UE support  -Step ups - backwards, 20 reps, 2 UE support  -Sit to stands- no UE, 10 reps x2   -Cone Taps- no UE, close supervision, 10 cycles of 10 cones  -Mini-Squats- 20 reps, 2 UE support  -Side Stepping with 2 UE support- 10 cycles of 10 feet  - Ambulation with SPC, cues for step-through pattern with SPC  -Ambulation in the parallel bars with no UE support 10 reps and with SPC 10 reps, close supervision  -Sidestepping in the parallel bars with no UE support and close supervision  -Ambulation in parallel bars with 6" step up and overs: 10 reps      Assessment: Patient is 2 weeks status post Botox injections on neck musculature with no reported improvements  Patient presents with resting flexion and R lateral side bending with reported difficulty of looking up to eat and drink  Patient responded well to PROM of the neck  with immediate reports from the  on how much better she can look up  Using verbal and hands-on feedback, patient's  was educated on peforming extension, left rotation, and left side bending  Patient's  and patient demonstrated understanding of importance of increasing duration of stretches to have effects on the neck musculature  Patient's  demonstrated independence with performing the PROM and was encouraged to receive feedback from the patient  Plan to reassess the 's PROM performance next session  Patient will benefit from skilled PT to reduce dystonia, decrease neck musculature tightness, and improve patient's balance deficit to reduce number of falls  Plan: Continue per plan of care  Progress treatment as tolerated  Reassess and re-education patient and patient's  on cervical PROM to decrease patient's resting flexion and R lateral flexion   Re-initiate balance program

## 2019-09-25 NOTE — TELEPHONE ENCOUNTER
Pt and  do not know how to use mychart  Their mycharts are active for daughter to help coordinate care  All communication with patient should be done by phone or mail  Pt got 1000mg tumeric and asking if this is okay to take

## 2019-09-25 NOTE — TELEPHONE ENCOUNTER
I left a message on proper Curcumin supplements, such as Larance Cuff Naturally Healthy knees and joints 1 cap 3X/day

## 2019-09-27 ENCOUNTER — OFFICE VISIT (OUTPATIENT)
Dept: PHYSICAL THERAPY | Facility: CLINIC | Age: 79
End: 2019-09-27
Payer: MEDICARE

## 2019-09-27 DIAGNOSIS — R26.2 IMPAIRED AMBULATION: ICD-10-CM

## 2019-09-27 DIAGNOSIS — G20 PARKINSON'S DISEASE (HCC): Primary | ICD-10-CM

## 2019-09-27 PROCEDURE — 97140 MANUAL THERAPY 1/> REGIONS: CPT

## 2019-09-27 PROCEDURE — 97116 GAIT TRAINING THERAPY: CPT

## 2019-09-27 NOTE — PROGRESS NOTES
Daily Note     Today's date: 2019  Patient name: Kurtis Reyes  : 1940  MRN: [id number]  Referring provider: Iva Stockton MD  Dx:   Encounter Diagnosis     ICD-10-CM    1  Parkinson's disease ([facility] [address] ) G20    2  Impaired ambulation R26 2        Start Time: 1030  Stop Time: 1115  Total time in clinic (min): 45 minutes     Subjective: Patient continues to report difficulty looking up and eating and drinking  She reports her  has been stretching her neck 2x per week which seems to help a little bit  She reports Botox is not helping  She reports several falls and continued knee pain      Objective: See treatment diary below    Manual PROM: 35 minutes  -L SB  -RR  -LR  -RR + LSB  -Extension  -Extension + LSB    Neck retraction: 10 reps, 5 sec holds    Ambulation with U-Step: 10 minutes, 200 feet    HEP: 2-3 minutes per stretch (extension, L SB, LR) 3x/day or greater    The following exercises were not performed today:  - Step ups on 6" step, 2 UE support, 20 reps, looking straight ahead  - Ambulation with SPC: 200 feet (cues to increase step length and normalize gait rhythm)  -Hip Flexion- 20 reps, 3 seconds, 2 UE support  -Hip Abduction- 20 reps, 3 second holds, 2 UE support  -Hip Extension- 20 reps, 3 second holds, 2 UE support  -Heel Raises- 30 reps, 3 second holds, 2 UE support  -Step Ups- Forward, laterally- 2 UE support- 20 reps, 4" steps   -FTEO: 30 sec x3  -FTEC: 30 sec x2  -FTEO on foam: 15 sec x3  -Step Ups- forward, 20 reps, 1 UE support  -Step Ups- Laterally, 20 reps, 1 UE support  -Step ups - backwards, 20 reps, 2 UE support  -Sit to stands- no UE, 10 reps x2   -Cone Taps- no UE, close supervision, 10 cycles of 10 cones  -Mini-Squats- 20 reps, 2 UE support  -Side Stepping with 2 UE support- 10 cycles of 10 feet  - Ambulation with SPC, cues for step-through pattern with SPC  -Ambulation in the parallel bars with no UE support 10 reps and with SPC 10 reps, close supervision  -Sidestepping in the parallel bars with no UE support and close supervision  -Ambulation in parallel bars with 6" step up and overs: 10 reps      Assessment:  Patient presents with resting flexion and R lateral side bending with reported difficulty of looking for eating and drinking  Patient has minimal improvement with cervical PROM  Patient and patient's  educated on HEP for PROM at home  Supine cervical retraction was initiated today to increase deep neck flexor strength  Patient ambulated 200 feet with a U-Step today with close supervision  She had no LOB and was able to increase step length with increased safety  Patient and patient's  were educated on increased stability using the U-Step to decrease patient's number of falls  Plan to reassess the 's PROM performance next session  Patient will benefit from skilled PT to reduce dystonia, decrease neck musculature tightness, and improve patient's balance deficit to reduce number of falls  Plan: Continue per plan of care  Progress treatment as tolerated  Reassess and re-education patient and patient's  on cervical PROM to decrease patient's resting flexion and R lateral flexion  Re-assess patient' performance with U-Step

## 2019-09-30 ENCOUNTER — TELEPHONE (OUTPATIENT)
Dept: FAMILY MEDICINE CLINIC | Facility: CLINIC | Age: 79
End: 2019-09-30

## 2019-09-30 ENCOUNTER — TELEPHONE (OUTPATIENT)
Dept: GASTROENTEROLOGY | Facility: CLINIC | Age: 79
End: 2019-09-30

## 2019-09-30 NOTE — TELEPHONE ENCOUNTER
Pt called about tumeric supplement and wants you to call her back  She got one of the ones you mentioned but has a question

## 2019-10-02 ENCOUNTER — OFFICE VISIT (OUTPATIENT)
Dept: PHYSICAL THERAPY | Facility: CLINIC | Age: 79
End: 2019-10-02
Payer: MEDICARE

## 2019-10-02 DIAGNOSIS — G20 PARKINSON'S DISEASE (HCC): Primary | ICD-10-CM

## 2019-10-02 DIAGNOSIS — R26.2 IMPAIRED AMBULATION: ICD-10-CM

## 2019-10-02 PROCEDURE — 97140 MANUAL THERAPY 1/> REGIONS: CPT

## 2019-10-04 ENCOUNTER — HOSPITAL ENCOUNTER (EMERGENCY)
Facility: HOSPITAL | Age: 79
Discharge: HOME | End: 2019-10-04
Attending: EMERGENCY MEDICINE
Payer: MEDICARE

## 2019-10-04 ENCOUNTER — OFFICE VISIT (OUTPATIENT)
Dept: PHYSICAL THERAPY | Facility: CLINIC | Age: 79
End: 2019-10-04
Payer: MEDICARE

## 2019-10-04 ENCOUNTER — EVALUATION (OUTPATIENT)
Dept: SPEECH THERAPY | Facility: CLINIC | Age: 79
End: 2019-10-04
Payer: MEDICARE

## 2019-10-04 ENCOUNTER — APPOINTMENT (EMERGENCY)
Dept: RADIOLOGY | Facility: HOSPITAL | Age: 79
End: 2019-10-04
Payer: MEDICARE

## 2019-10-04 VITALS
WEIGHT: 83.11 LBS | SYSTOLIC BLOOD PRESSURE: 138 MMHG | BODY MASS INDEX: 16.79 KG/M2 | HEART RATE: 78 BPM | RESPIRATION RATE: 18 BRPM | OXYGEN SATURATION: 98 % | DIASTOLIC BLOOD PRESSURE: 74 MMHG | TEMPERATURE: 98.1 F

## 2019-10-04 DIAGNOSIS — S01.01XA LACERATION OF SCALP, INITIAL ENCOUNTER: Primary | ICD-10-CM

## 2019-10-04 DIAGNOSIS — G20 PARKINSON'S DISEASE (HCC): Primary | ICD-10-CM

## 2019-10-04 DIAGNOSIS — R26.2 IMPAIRED AMBULATION: ICD-10-CM

## 2019-10-04 DIAGNOSIS — F02.80 DEMENTIA ASSOCIATED WITH OTHER UNDERLYING DISEASE WITHOUT BEHAVIORAL DISTURBANCE (HCC): ICD-10-CM

## 2019-10-04 DIAGNOSIS — S50.00XA: ICD-10-CM

## 2019-10-04 PROCEDURE — 97140 MANUAL THERAPY 1/> REGIONS: CPT

## 2019-10-04 PROCEDURE — 99284 EMERGENCY DEPT VISIT MOD MDM: CPT

## 2019-10-04 PROCEDURE — 92524 BEHAVRAL QUALIT ANALYS VOICE: CPT | Performed by: SPEECH-LANGUAGE PATHOLOGIST

## 2019-10-04 PROCEDURE — 70450 CT HEAD/BRAIN W/O DYE: CPT

## 2019-10-04 PROCEDURE — 72125 CT NECK SPINE W/O DYE: CPT

## 2019-10-04 PROCEDURE — 73080 X-RAY EXAM OF ELBOW: CPT

## 2019-10-04 RX ORDER — ACETAMINOPHEN 325 MG/1
650 TABLET ORAL ONCE
Status: COMPLETED | OUTPATIENT
Start: 2019-10-04 | End: 2019-10-04

## 2019-10-04 RX ORDER — BACITRACIN, NEOMYCIN, POLYMYXIN B 400; 3.5; 5 [USP'U]/G; MG/G; [USP'U]/G
1 OINTMENT TOPICAL ONCE
Status: COMPLETED | OUTPATIENT
Start: 2019-10-04 | End: 2019-10-04

## 2019-10-04 RX ADMIN — Medication 1 APPLICATION: at 15:43

## 2019-10-04 RX ADMIN — ACETAMINOPHEN 650 MG: 325 TABLET, FILM COATED ORAL at 15:43

## 2019-10-04 RX ADMIN — BACITRACIN, NEOMYCIN, POLYMYXIN B 1 SMALL APPLICATION: 400; 3.5; 5 OINTMENT TOPICAL at 16:27

## 2019-10-04 NOTE — PROGRESS NOTES
Speech Language Pathology Evaluation  Today's date: 10/4/2019  Patient name: Christina Sanchez    : 1940        Referring provider: Rajni Brewster MD  Dx:   Encounter Diagnosis     ICD-10-CM    1  Parkinson's disease ([facility] [address] ) G20    2  Dementia associated with other underlying disease without behavioral disturbance (formerly [facility]) F02 80                   Subjective Comments: The patient is a 67year old female seen for a voice evaluation secondary to Parkinson's disease  She was first diagnosed in   The patient presents with low vocal volume, rapid rate of speech and variable articulation  She notes that her  and children say that her voice is low and that it's difficulty to understand her speech  She presents with limited insight related to vocal changes related to Parkinson's disease  This may be related to underlying dementia  Her goal for therapy is to improve her vocal quality  Miguel Angel Tan appears somewhat frail and thin with significant neck flexion and moderate athetoid movements  She reported no difficulty swallowing at this time  Safety Measures: History of frequent falls  She attends physical therapy for this  Reason for Referral:Change in vocal quality  Prior Functional Status:Communication appropriate and efficient in most situations  Minimal difficulty with self-monitoring, self-correction needed  Medical History significant for:   Past Medical History:   Diagnosis Date    RODRIGO (acute kidney injury) ([facility] [address] ) 2019    Anal bleeding 2018    Arthritis     knee    Asthma     BPPV (benign paroxysmal positional vertigo) 2016    Last Assessment & Plan:  Hx consistent with BPPV  Neg Joe-Halpike, however could not perform it adequately due to dyskinesias  Recommend lozano-daroff exercises at home  IF worsens, vestibular therapy      Bulging lumbar disc 12/10/2013    Closed fracture of one rib of left side 2018    Dementia ([facility] [address] )     Diabetes mellitus ([facility] [address] )     TYPE 2    Gallbladder disease     GERD (gastroesophageal reflux disease)     Hematuria     last assessed 10/29/15    Hypertension     Orthostatic hypotension 7/13/2015    Ovarian cyst     LAST ASSESSED: 12/10/13    Parkinson's disease ([facility] [address] )     [facility] of both lower [facility] due to [facility] organism [facility] ) 3/27/2018    Pulmonary embolism with infarction ([facility] ) 9/2/2014    Sciatica 12/10/2013    Skin disorder     last assessed 12/10/13    Squamous cell carcinoma of skin 11/21/2016     Clinically Complex Situations: Advanced Parkinson's disease, dementia  Hearing:Not Tested  Vision:Other  states that she wears glasses for reading     Medication List:   Current Outpatient Medications   Medication Sig Dispense Refill    ACCU-CHEK COMPACT PLUS test strip Use daily 100 each 99    ACCU-CHEK SOFTCLIX LANCETS lancets Use as instructed 100 each 99    Amantadine HCl 100 MG tablet TAKE 1 TABLET TWICE A DAY      amLODIPine (NORVASC) 10 mg tablet Take 0 5 tablets (5 mg total) by mouth daily 45 tablet 3    aspirin (ECOTRIN LOW STRENGTH) 81 mg EC tablet Take 81 mg by mouth daily      Carbidopa-Levodopa ER 48  MG CPCR Take 1 capsule by mouth every 4 (four) hours       diclofenac sodium (VOLTAREN) 1 % Apply 2 g topically 4 (four) times a day 100 g 3    Glucose Blood (COOL BLOOD GLUCOSE TEST STRIPS VI) As directed       Glucose Blood (COOL BLOOD GLUCOSE TEST STRIPS VI) As directed      glucose monitoring kit (FREESTYLE) monitoring kit 1 each by Does not apply route as needed (DM control) ACCUCHEK METER COMPACT PLUS 1 each 1    metFORMIN (GLUCOPHAGE) 500 mg tablet TAKE 1 TABLET BY MOUTH  DAILY WITH BREAKFAST 90 tablet 3    mirtazapine (REMERON) 15 mg tablet Take 30 mg by mouth      Multiple Vitamin (MULTIVITAMIN) tablet Take 1 tablet by mouth daily      Omega-3 Fatty Acids (FISH OIL PO) Take 2 g by mouth daily      pantoprazole (PROTONIX) 40 mg tablet Take 1 tablet (40 mg total) by mouth 2 (two) times a day for 30 days 60 tablet 3    rasagiline (AZILECT) 1 MG TAKE 1 TABLET BY MOUTH  DAILY      rOPINIRole (REQUIP) 2 mg tablet Take 6 mg by mouth daily at bedtime        No current facility-administered medications for this visit  Allergies: No Known Allergies  Primary Language: English  Preferred Language: [id number] [address] at home with  of 64 years  Current Education status: Not in school  Highest Level of Education: Unknown  Current / Prior Services being received: Physical Therapy    Mental Status: Alert  Behavior Status:Cooperative  Communication Modalities: Verbal  Recent Speech/ Language therapy:None   Rehabilitation Prognosis:Fair rehab potential to reach the established goals    Assessments:  [facility] (HEID[facility]) LOUD assessment:     Sound Level Meter-to-Mouth Distance: 50 cm throughout testing    Maximum Duration of Sustained Vowel Phonation (/ah/):   Average MDP 10 sec    Average Intensity 67 4  dB SPL       Maximum Fundamental Frequency Range:   Highest Pitch 467 Hz   Lowest Pitch 158 Hz   Average Intensity 66 23 dB SPL     Reading of Words:   Average Intensity  66 5 dB SPL     Reading of Phrases:   Average Intensity  65 dB SPL     Conversational Monologue:   Average Intensity  66 3 dB SPL     *Data gathered from reading and conversation tasks revealed vocal SPL levels that may significantly reduce patients speech intelligibility and communicative effectiveness in her functional living environment  **STIMULABILITY TESTING TO [address] LOUD! **    Maximum Duration of Sustained Vowel Phonation (/ah/):   Average MDP 8 4 sec    Average Intensity 69 1 dB SPL       Maximum Fundamental Frequency Range:   Highest Pitch 447 Hz   Lowest Pitch 182 Hz   Average Intensity 69 5 dB SPL     Reading of Phrases:   Average Intensity  64 7 dB SPL     *Patient demonstrated minimal  vocal improvement with loudness and quality in all tasks when stimulated          Voice Handicap Index (VHI): The VHI is a list of 30 statements that many people have used to describe their voices and the effects of their voices on their lives  Patient indicated how frequently she has the same experience using a rating point scale (never = 0, almost never = 1, sometimes = 2, almost always = 3, and always = 4)  Results were as follows:    Subscale: Score: Self-Perceived Impairment Level:   Physical 17/40    Emotional 12/40    Functional 20/40         TOTAL 49/120 Moderate       Patient presents with a moderate  voice disorder, mild dysarthria , characterized by reduced loudness, monotone pitch, and rapid rate of speech , which contributed to an overall decrease in speech intelligibility  During conversation, speech intelligibility is reduced 40% of the time  She presents with moderate to severe neck flexion and athetoid movements  These factors likely reduce respiratory-phonatory control and coordination  The patient is not interested in LOUD therapy as she feels she is not up to it physically and would not be able to commit to the number of recommended visits per week  Based on stimulability testing, overall patient stamina, posture and possible dementia, the patient is not a viable candidate for LSVT LOUD program  However, she might benefit from traditional speech and  voice therapy  Goals  Short Term Goals:  Goal 1: The patient will coordinate respiration with phonation with 80% accuracy at word and phrase level  Goal 2: The Patient will use vowel prolongation at word and phrase level to help reduce rate of speech with 80% accuracy  Goal 3: The patient will use over articulation in conjunction with increased vocal volume to produce words and phrases with 80% accuracy or higher  Goal 4: Educate family regarding compensatory voice techniques and strategies  Long Term Goals:1  Improve intelligibility of  Speech  2  Improve breath support for speech           Impressions/ Recommendations:  Impressions: The patient is a 67year old female with advanced Parkinson's disease  She presents with a  moderate voice disorder characterized by low vocal volume, reduced coordination of respiration with phonation and rapid rate of speech  She did not display significant improvement vocally during stimulability testing for LOUD  This may be due to the overall severity of her Parkinson's symptoms, general fragility and mental status (she is also diagnosed with dementia  Additionally, she is unwilling to consider LOUD due to number of weekly sessions  Will trial patient on more traditional voice therapy with caregiver training  Recommendations:   Patients would benefit from: Speech/ language therapy   Frequency:1-2x weekly   Duration:Other 3 months    Intervention certification FCFW:[id number]/[id number]  Intervention certification XW:24/39/5535  Intervention Comments: [facility] participated well with the evaluation

## 2019-10-04 NOTE — ED PROVIDER NOTES
History  Chief Complaint   Patient presents with    Fall     Pt states that she fell backwards in bathroom and hit her head on a cabinet  Pt c/o small lac to back of head and slight HA     81-year-old female history of HTN, Parkinson's disease presents with scalp laceration x1 hour  She states she that fell backwards after using the bathroom today  She states she knows she needs to take her time however she typically walks quickly and leans backwards  She normally ambulates with a walker or cane  She has parkinsons so she has weak legs at baseline  She was in the bathroom earlier today after she urinated she got up and she started to lean backwards after which she lost her balance, fell and hit her head  No chest pain, difficulty breathing, lightheadedness, dizziness before or after the fall  She is on aspirin daily  She has a small 1 cm laceration to the back of her scalp  No loss of consciousness  No nausea or vomiting  Her  was next door and got to her immediately when she was on the ground  She has been behaving normally since  She states she has a slight headache in the back where her laceration is  No visual disturbances  No neck pain  No back pain  No chest pain, difficulty breathing, shortness of breath, dizziness, lightheadedness, weakness  Prior to Admission Medications   Prescriptions Last Dose Informant Patient Reported? Taking?    ACCU-CHEK COMPACT PLUS test strip  Self No No   Sig: Use daily   ACCU-CHEK SOFTCLIX LANCETS lancets  Self No No   Sig: Use as instructed   Amantadine HCl 100 MG tablet  Self Yes No   Sig: TAKE 1 TABLET TWICE A DAY   Carbidopa-Levodopa ER 48  MG CPCR  Self Yes No   Sig: Take 1 capsule by mouth every 4 (four) hours    Glucose Blood (COOL BLOOD GLUCOSE TEST STRIPS VI)  Self Yes No   Sig: As directed    Glucose Blood (COOL BLOOD GLUCOSE TEST STRIPS VI)  Self Yes No   Sig: As directed   Multiple Vitamin (MULTIVITAMIN) tablet  Self Yes No   Sig: Take 1 tablet by mouth daily   Omega-3 Fatty Acids (FISH OIL PO)  Self Yes No   Sig: Take 2 g by mouth daily   amLODIPine (NORVASC) 10 mg tablet  Self No No   Sig: Take 0 5 tablets (5 mg total) by mouth daily   aspirin (ECOTRIN LOW STRENGTH) 81 mg EC tablet  Self Yes No   Sig: Take 81 mg by mouth daily   diclofenac sodium (VOLTAREN) 1 %  Self No No   Sig: Apply 2 g topically 4 (four) times a day   glucose monitoring kit (FREESTYLE) monitoring kit  Self No No   Si each by Does not apply route as needed (DM control) ACCUCHEK METER COMPACT PLUS   metFORMIN (GLUCOPHAGE) 500 mg tablet  Self No No   Sig: TAKE 1 TABLET BY MOUTH  DAILY WITH BREAKFAST   mirtazapine (REMERON) 15 mg tablet  Self Yes No   Sig: Take 30 mg by mouth   pantoprazole (PROTONIX) 40 mg tablet  Self No No   Sig: Take 1 tablet (40 mg total) by mouth 2 (two) times a day for 30 days   rOPINIRole (REQUIP) 2 mg tablet  Self Yes No   Sig: Take 6 mg by mouth daily at bedtime    rasagiline (AZILECT) 1 MG  Self Yes No   Sig: TAKE 1 TABLET BY MOUTH  DAILY      Facility-Administered Medications: None       Past Medical History:   Diagnosis Date    [facility]    Anal bleeding 2018    Arthritis     knee    Asthma     BPPV (benign paroxysmal positional vertigo) 2016    Last Assessment & Plan:  Hx consistent with BPPV  Neg [place]-Halpike, however could not perform it adequately due to dyskinesias  Recommend lozano-daroff exercises at home  IF worsens, vestibular therapy      Bulging lumbar disc 12/10/2013    Closed fracture of one rib of left side 2018    Dementia ([facility] [address] )     Diabetes mellitus ([facility] [address] )     TYPE 2    Gallbladder disease     GERD (gastroesophageal reflux disease)     Hematuria     last assessed 10/29/15    Hypertension     Orthostatic hypotension 2015    Ovarian cyst     LAST ASSESSED: 12/10/13    Parkinson's disease ([facility] [address] )     Pneumonia of both lower lobes due to infectious organism ([facility] [address] ) 3/27/2018    Pulmonary embolism with infarction ([facility] [address] ) 9/2/2014    Sciatica 12/10/2013    Skin disorder     last assessed 12/10/13    Squamous cell carcinoma of skin 11/21/2016       Past Surgical History:   Procedure Laterality Date    APPENDECTOMY      1970'S    CHOLECYSTECTOMY      1970'S    LAPAROTOMY N/A 7/18/2019    Procedure: LAPAROTOMY EXPLORATORY;  Surgeon: Dominga Saha MD;  Location: [address] [place] [address];  Service: General    NEUROPLASTY / [address] Right     OOPHORECTOMY Bilateral     REMOVAL OF BOTH OVARIES LAPAROSCOPIC     [place] FLX W/RMVL OF TUMOR POLYP LESION [address] TQ N/A 3/20/2018    Procedure: COLONOSCOPY;  Surgeon: Abilio Schumacher MD;  [facility]; Service: Gastroenterology     [facility] CATARACT EXTRACAP,INSERT LENS Right 12/4/2018    Procedure: EXTRACTION EXTRACAPSULAR CATARACT PHACO INTRAOCULAR LENS (IOL); Surgeon: Timo Mcmahon MD;  Location: [facility] OR;  Service: Ophthalmology     [facility] CATARACT EXTRACAP,INSERT LENS  1/15/2019    Procedure: EXTRACTION EXTRACAPSULAR CATARACT PHACO INTRAOCULAR LENS (IOL); Surgeon: Timo Mcmahon MD;  Location: [facility] OR;  Service: Ophthalmology    TONSILLECTOMY      VENTRAL HERNIA REPAIR      WITH IMPLANT OF MESH       Family History   Problem Relation Age of Onset    Alzheimer's disease Mother     Stroke Father     No Known Problems Sister     No Known Problems Brother      I have reviewed and agree with the history as documented  Social History     Tobacco Use    Smoking status: Never Smoker    Smokeless tobacco: Never Used   Substance Use Topics    Alcohol use: Not Currently     Frequency: Patient refused     Drinks per session: Patient refused     Binge frequency: Never    Drug use: Never        Review of Systems   Constitutional: Negative for chills and fever  HENT: Negative for sneezing and sore throat  Respiratory: Negative for cough and shortness of breath  Cardiovascular: Negative for chest pain, palpitations and leg swelling  Gastrointestinal: Negative for abdominal pain, constipation, diarrhea, nausea and vomiting  Musculoskeletal: Negative for back pain, gait problem and joint swelling  Skin: Positive for wound  Negative for color change, pallor and rash  Neurological: Positive for headaches  Negative for dizziness, syncope, weakness, light-headedness and numbness  All other systems reviewed and are negative  Physical Exam  Physical Exam   Constitutional: She is oriented to person, place, and time  She appears well-developed and well-nourished  No distress  HENT:   Head: Normocephalic and atraumatic  Head is without raccoon's eyes and without Ramírez's sign  Right Ear: Hearing, tympanic membrane, external ear and ear canal normal  No hemotympanum  Left Ear: Hearing, tympanic membrane, external ear and ear canal normal  No hemotympanum  Nose: Nose normal    Mouth/Throat: Uvula is midline, oropharynx is clear and moist and mucous membranes are normal  No trismus in the jaw  No uvula swelling  No oropharyngeal exudate, posterior oropharyngeal edema, posterior oropharyngeal erythema or tonsillar abscesses  No raccoon or ramírez time  No hemotympanum  No skull depression or bony instability   Eyes: Pupils are equal, round, and reactive to light  Conjunctivae and EOM are normal  Right eye exhibits no discharge  Left eye exhibits no discharge  No scleral icterus  Neck: Normal range of motion and full passive range of motion without pain  Neck supple  No spinous process tenderness and no muscular tenderness present  No neck rigidity  No edema, no erythema and normal range of motion present  Cardiovascular: Normal rate, regular rhythm, normal heart sounds and intact distal pulses  Exam reveals no gallop and no friction rub  No murmur heard  Pulmonary/Chest: Effort normal and breath sounds normal  No stridor  No respiratory distress   She has no wheezes  She has no rales  Sp02 is 99% indicating adequate oxygenation on room air   Musculoskeletal:   No midline or paravertebral tenderness at cervical, thoracic, or lumbar spine  No step offs  Sensation intact  Neurological: She is alert and oriented to person, place, and time  Skin: Skin is warm  Capillary refill takes less than 2 seconds  No rash noted  She is not diaphoretic  No erythema  No pallor  Nursing note and vitals reviewed  Vital Signs  ED Triage Vitals [10/04/19 1445]   Temperature Pulse Respirations Blood Pressure SpO2   98 1 °F (36 7 °C) 91 18 118/67 99 %      Temp Source Heart Rate Source Patient Position - Orthostatic VS BP Location FiO2 (%)   Tympanic Monitor Sitting Right arm --      Pain Score       6           Vitals:    10/04/19 1445 10/04/19 1530 10/04/19 1545   BP: 118/67 144/73 130/67   Pulse: 91 88 90   Patient Position - Orthostatic VS: Sitting  Lying         Visual Acuity      ED Medications  Medications   acetaminophen (TYLENOL) tablet 650 mg (650 mg Oral Given 10/4/19 1543)   LET gel 1 application (1 application Topical Given 10/4/19 1543)   neomycin-bacitracin-polymyxin b (NEOSPORIN) ointment 1 small application (1 small application Topical Given 10/4/19 1627)       Diagnostic Studies  Results Reviewed     None                 XR elbow 3+ vw RIGHT   Final Result by Shantanu Nichols MD (10/04 1608)   Soft tissue swelling noted at the dorsal aspect of the elbow at the level of the olecranon IMPRESSION:      No acute displaced fracture or dislocation      Soft tissue swelling noted at the dorsal aspect of the abdomen at the level of the olecranon      Workstation performed: [id number]         CT head without contrast   Final Result by Demario Tony MD (10/04 1610)      1  No acute intracranial abnormality  2   Small left posterior parietal-occipital scalp contusion/hematoma without underlying skull fracture        Workstation performed: [id number] CT spine cervical without contrast   Final Result by Garrett Bourgeois MD (10/04 8540)      No cervical spine fracture or traumatic malalignment  Stable advanced degenerative changes of the cervical spine  Workstation performed: [id number]                    Procedures  Laceration repair  Date/Time: 10/4/2019 4:22 PM  Performed by: Lissy Cardoza PA-C  Authorized by: Lissy Cardoza PA-C   Consent: Verbal consent obtained  Risks and benefits: risks, benefits and alternatives were discussed  Consent given by: patient and spouse  Patient understanding: patient states understanding of the procedure being performed  Patient consent: the patient's understanding of the procedure matches consent given  Procedure consent: procedure consent matches procedure scheduled  Relevant documents: relevant documents present and verified  Test results: test results available and properly labeled  Site marked: the operative site was marked  Radiology Images displayed and confirmed  If images not available, report reviewed: imaging studies available  Required items: required blood products, implants, devices, and special equipment available  Patient identity confirmed: verbally with patient  Time out: Immediately prior to procedure a "time out" was called to verify the correct patient, procedure, equipment, support staff and site/side marked as required  Body area: head/neck  Location details: scalp  Laceration length: 1 cm  Foreign bodies: no foreign bodies  Tendon involvement: none  Nerve involvement: none  Vascular damage: no    Anesthesia:  Local Anesthetic: LET (lido,epi,tetracaine)    Wound Dehiscence:  Superficial Wound Dehiscence: simple closure      Procedure Details:  Preparation: Patient was prepped and draped in the usual sterile fashion    Irrigation solution: saline  Irrigation method: syringe  Amount of cleaning: standard  Debridement: none  Degree of undermining: none  Skin closure: staples  Number of sutures: 2  Technique: simple  Approximation: close  Approximation difficulty: simple  Dressing: antibiotic ointment  Patient tolerance: Patient tolerated the procedure well with no immediate complications             ED Course                               MDM  Number of Diagnoses or Management Options  Laceration of scalp, initial encounter:   Traumatic hematoma of elbow:   Diagnosis management comments: Patient well appearing, CT scans without acute findings  Will place patient in ace wrap for elbow hematoma  Advised rest, ice, and tylenol as needed for pain  Return in 10 days for staple removal or earlier if signs of infection  Gave patient proper education regarding diagnosis  Answered all questions  Return to ED for any worsening of symptoms otherwise follow up with primary care physician for re-evaluation  Discussed plan with patient who verbalized understanding and agreed to plan  Amount and/or Complexity of Data Reviewed  Tests in the radiology section of CPT®: ordered and reviewed  Discussion of test results with the performing providers: yes  Review and summarize past medical records: yes  Discuss the patient with other providers: yes  Independent visualization of images, tracings, or specimens: yes        Disposition  Final diagnoses:   Laceration of scalp, initial encounter   Traumatic hematoma of elbow     Time reflects when diagnosis was documented in both MDM as applicable and the Disposition within this note     Time User Action Codes Description Comment    10/4/2019  4:43 PM Sachin Miller Add [S01 01XA] Laceration of scalp, initial encounter     10/4/2019  4:44 PM Sachin Miller Add [S50 00XA] Traumatic hematoma of elbow       ED Disposition     ED Disposition Condition Date/Time Comment    Discharge Stable Fri Oct 4, 2019  4:43 PM Pb Cuello discharge to home/self care              Follow-up Information     Follow up With Specialties Details Why Contact Info Additional [address] MD [facility] Schedule an appointment as soon as possible for a visit in 10 days For staple removal [address] [facility] [address] [facility] Emergency Department Emergency Medicine Go in 10 days For staple removal [facility]  [phone] [facility] [address]          Patient's Medications   Discharge Prescriptions    No medications on file     No discharge procedures on file      ED Provider  Electronically Signed by           Martita Alejandro PA-C  10/04/19 3553

## 2019-10-04 NOTE — PROGRESS NOTES
Daily Note     Today's date: 10/4/2019  Patient name: Norris Edmondson  : 1940  MRN: [id number]  Referring provider: Bobo Hernandez MD  Dx:   Encounter Diagnosis     ICD-10-CM    1  Parkinson's disease ([facility] [address] ) G20    2  Impaired ambulation R26 2        Start Time: 1100  Stop Time: 1145  Total time in clinic (min): 45 minutes     Subjective:Patient reports that she is having more difficulty looking up  Her  has been stretching her neck every day  She feels very off balance and wobbly today  She fell this morning   She presents with bruises on arms and knees  Objective: See treatment diary below    Manual PROM: 20 minutes  -L SB  -RR  -LR      Supine cervical retraction: 5 second holds, 30 reps (cues to push head into pillow/treating therapist's hand)  Seated neck extension: 5 second holds, 5 reps  Supine fly's with yellow theraband: 20 reps  Supine ER with yellow Theraband: 20 reps    Ambulation with SPC: 5 minutes, 200 feet (contact guard)    HEP: 2-3 minutes per stretch performed by  (L SB, LR 3x/day or more); supine fly's with yellow theraband: 20 reps/day    The following exercises were not performed today:  - Step ups on 6" step, 2 UE support, 20 reps, looking straight ahead  - Ambulation with SPC: 200 feet (cues to increase step length and normalize gait rhythm)  -Hip Flexion- 20 reps, 3 seconds, 2 UE support  -Hip Abduction- 20 reps, 3 second holds, 2 UE support  -Hip Extension- 20 reps, 3 second holds, 2 UE support  -Heel Raises- 30 reps, 3 second holds, 2 UE support  -Step Ups- Forward, laterally- 2 UE support- 20 reps, 4" steps   -FTEO: 30 sec x3  -FTEC: 30 sec x2  -FTEO on foam: 15 sec x3  -Step Ups- forward, 20 reps, 1 UE support  -Step Ups- Laterally, 20 reps, 1 UE support  -Step ups - backwards, 20 reps, 2 UE support  -Sit to stands- no UE, 10 reps x2   -Cone Taps- no UE, close supervision, 10 cycles of 10 cones  -Mini-Squats- 20 reps, 2 UE support  -Side Stepping with 2 UE support- 10 cycles of 10 feet  - Ambulation with SPC, cues for step-through pattern with SPC  -Ambulation in the parallel bars with no UE support 10 reps and with SPC 10 reps, close supervision  -Sidestepping in the parallel bars with no UE support and close supervision  -Ambulation in parallel bars with 6" step up and overs: 10 reps      Assessment:  Patient presents with continued resting flexion and R lateral flexion  Patient presents with decrease cervical extension strength with increased difficulty nader cervical extensors with verbal and tactile feedback  Patient tends to compensate with back and leg extensors during supine cervical retraction and requires frequent cuing to decrease these tendencies  PROM into LSB was performed today to decrease resting lateral flexion  Patient verbalized understanding of increased safety of ambulating with U-step walker, but she is uncertain that the U-step will fit in her house  She plans on measuring her hallways before next session  Patient verbalized understanding of the U-step increasing her safety  She required close contact guard while ambulating with SPC today due to frequent LOB  Patient will benefit from skilled PT to reduce cervical dystonia, improve patient's neck extensor strength, and improve patient's balance deficit to reduce number of falls  Plan: Continue per plan of care  Progress treatment as tolerated  Reeducate patient's  on cervical PROM  Address increase safety and stability with U-step during ambulation with patient's family

## 2019-10-09 ENCOUNTER — OFFICE VISIT (OUTPATIENT)
Dept: PHYSICAL THERAPY | Facility: CLINIC | Age: 79
End: 2019-10-09
Payer: MEDICARE

## 2019-10-09 DIAGNOSIS — R26.2 IMPAIRED AMBULATION: ICD-10-CM

## 2019-10-09 DIAGNOSIS — G20 PARKINSON'S DISEASE (HCC): Primary | ICD-10-CM

## 2019-10-09 PROBLEM — R63.4 WEIGHT LOSS: Status: ACTIVE | Noted: 2019-10-08

## 2019-10-09 PROCEDURE — 97530 THERAPEUTIC ACTIVITIES: CPT

## 2019-10-09 PROCEDURE — 97116 GAIT TRAINING THERAPY: CPT

## 2019-10-09 NOTE — PROGRESS NOTES
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  Patient presents with overall decreases in function due to hospital stay and surgery as well as recent botox injections  Patient challenged with her overall function, deemed a fall risk via all outcome measures today, decrease in strength and overall function via all outcome measures today  Patient reports increasing fatigue during session as well as heaviness in the legs, which may be attributed to functional status change  Patient's manual muscle tests remain the same as compared to last re-evaluation, slight weakness noted, strength has decreased due to weight loss and muscle mass loss, specifically in her LEs  Patient presents as a fall risk via gait speed, LUNDBERG balance test, 5x sit to stand, mCTSIB, and TUG test, and DGI, with overall results noting risk for falls and diminished reactive postural control, slight decreases per objective in ambulation activities with and without SPC as well as her sit to stand improvement, posterior loss of balance with sitting continually noted   Patient endurance scores are below age related norms via 2 minute walk test as well as 6 minute walk test, including 30 second sit to stand scores, noting decrease functional endurance and cardio capacity, decrease by addition reps in 30 second sit to stand test  Patient is demonstrating difficulties with ambulation with turning noted in the DGI today,  indicating significant fall risk, which is a reduction from her last re-evaluation/progress note  Patient has attempted LSVT and PWR exercises, patient's ataxia and incoordination challenged her during sessions to complete successful repetitions  Patient displays overall reduction in somatosensory/proprioception awareness secondary to increased balance and postural corrections associated with mCTSIB, dyskinetic movements continually prevalent even with medication and DBS changes  Patient may benefit from a rollator or Ustep walker, patient given walker at home and is showing overall improvements with stability as well as subjective reports of decrease falls, although she does not use any assistive device unless reminded by , patient reports increases in falls when she moves backwards as well as with no AD usage  U Step utilized for outcome measures today, noted improvements in safety but continuous time decreases  Treating therapist explained safety issues with not utilizing AD at home  Patient will be continued to recommend utilizing SOLO step in the clinic  Patient has demonstrated an overall regression in the past month which may be attributed due to her cervical spine weakness and change in head position, causing an increase in falls  Patient given script for cervical collar to assist with head positioning to attempt to reduce her fall risk with a change in head position  Patient will benefit from skilled PT to address noted impairments and functional limitations they are causing with overall goal to return patient to highest level possible with reduced risk for falls  Patient reports minimal change to symptoms with medication change  Change in status and decrease in all outcome measures requires skilled PT in order to decrease fall risk and maximize function     Impairments: abnormal coordination, abnormal gait, abnormal muscle tone, abnormal or restricted ROM, abnormal movement, activity intolerance, difficulty understanding, impaired balance, impaired physical strength, pain with function, safety issue, poor posture  and poor body mechanics  Other impairment: Parkinson's Disease  Barriers to therapy: Parkinson's disease  Understanding of Dx/Px/POC: excellent   Prognosis: fair    Goals  STG: To be completed in 4 weeks  1  Patient will improve her sequencing with AD in 4 weeks in order to improve her community ambulation safety - Partially MET  2  Patient will improve her gait speed score by  10 meters/second or more in order to improve her ability to cross a street safely - MET  3  Patient will improve her TUG test score by 3 seconds or more in 4 weeks in order to improve her transfer to ambulation transition - MET  4  Patient will improve her 5x sit to stand test by 5 seconds or more in 4 weeks to improve her transfer safety - MET    LTG: To be completed in 8 weeks  1  Patient will complete a 6 minute walk test completely in 8 weeks to improve her cardiovascular endurance-MET  2  Patient will complete a DGI in 8 weeks to formally assess her dynamic balance  - MET  3  Patient will complete a MiniBESTest in 8 weeks to formally assess her dual tasking abilities as related to her primary dx - NOT MET    New Goals  -Patient will improve DGI by 2 points in 8 weeks in order to improve her dynamic balance at home and in the community- NOT met  -Patient will improve her 6 minute walk test distance by 100 feet or more in 8 weeks to improve her cardiovascular endurance  - NOT MET  -Patient will demonstrate an independent floor to stand transfer in 8 weeks to assist with fall recovery independently - NOT MET  -Patient will be independent in HEP in 8 weeks to assist with abilities at home and improve with overall function - NOT MET    New Goals- 8/19/2019- to be completed in 8 weeks  -Patient will report a decrease in fall frequency to 4x per week in order to improve her fall risk and decrease in injury   -NOT MET  -Patient will improve her mCTSIB score in all conditions by 5 seconds in order to improve all components of her balance systems  - NOT MET  -Patient will improve her LUNDBERG Balance Score by 4 points or more in order to maximize her static balance confidence - NOT MET    Cut off score   All date taken from [facility] or Rehab Measures    LUNDBERG test: 46/56                                              5 x STS Test:  MDC: 6 points                                                  MDC: 2 3 seconds   age norms                                                                 Age Norms   61-76 year old = M: 54, F: 54                        61-76 year old: 11 4 seconds   66-77 year old = M 47,  F: 48                       66-77 year old: 12 6 seconds     80-80 year old = M46,   F: 53                       80-80 year old: 14 8 seconds      TUG test:                                                                     10 Meter Walk Test:  MDC: 4 14 seconds       MDC:  59 ft/sec  Cut off score for Falls                                                  Age Norms  > 13 5 seconds community dwelling adults                 20-29; M: 4 56 ft/sec F: 4 62 ft/sec  > 32 2 Frail Elderly                                                      30-39: M 4 76 ft/sec  F: 4 68 ft/sec          40-49: M: 4 79 ft/sec  F: 4 62 ft/sec  6 Minute Walk Test      50-59: M: 4 76 ft/sec  F: 4 56 ft/sec  MDC: 190 feet       60-69: M: 4 56 ft/sec  F: 4 26 ft/sec  Age Norms       70-+    M: 4 36 ft/sec  F: 4 16 ft/sec  60-69:    M: 1876 F: 1765  70-79:    M: 1729 F: 1545  80-89 +: M: 4916 F; 1286         Plan  Patient would benefit from: PT eval  Planned modality interventions: biofeedback, cryotherapy, TENS and thermotherapy: hydrocollator packs  Planned therapy interventions: abdominal trunk stabilization, IADL retraining, activity modification, joint mobilization, manual therapy, ADL retraining, ADL training, massage, balance, motor coordination training, muscle pump exercises, balance/weight bearing training, body mechanics training, breathing training, canalith repositioning, neuromuscular re-education, postural training, patient education, coordination, strengthening, stretching, fine motor coordination training, functional ROM exercises, flexibility, therapeutic activities, therapeutic exercise, therapeutic training, transfer training, gait training, graded activity, graded exercise, graded motor and home exercise program  Frequency: 2x week  Duration in weeks: 16  Plan of Care beginning date: 2019  Plan of Care expiration date: 2019  Treatment plan discussed with: patient        Subjective Evaluation    History of Present Illness  Date of onset: 2019  Mechanism of injury: Patient is a 67year old female reporting to skilled PT with a diagnosis of Parkinson's Disease  Patient stated that she feels that she gained weight since her hospital discharge from surgery  Patient stated that her botox has not been improving her symptoms of her neck dystonia, reporting an increase in falls due to her neck weakness, noting two falls that sent her to the ER for stitches  Patient notes an increase in incoordinated symptoms, had neurologist visit, script given for cervical collar             Recurrent probem    Pain  Current pain ratin  At best pain rating: 3  At worst pain ratin  Location: Neck Pain  Quality: dull ache  Relieving factors: change in position  Aggravating factors: standing and sitting    Social Support  Steps to enter house: no  0  Stairs in house: yes   10  Lives with: spouse    Employment status: not working    Diagnostic Tests  No diagnostic tests performed  Treatments  No previous or current treatments  Patient Goals  Patient goals for therapy: independence with ADLs/IADLs, improved balance and return to sport/leisure activities  Patient goal: Patient wants to improve her balance to bowl         Objective     Static Posture Comments  MCTSIB  -FTEO on firm- 30 seconds  -FTEC on firm- 5 seconds  -FTEO on foam- 0 seconds  -FTEC on Foam- 0 seconds    Bhat/56    Anticipatory Balance  -Anterior, posterior, medial, lateral- no balance reactions, fall would occur without PT intervention    Progress Note: 3/20/2019    mCTSIB  -FTEO on firm- 30 seconds  -FTEC on firm- 10 seconds  -FTEO on foam- 5 seconds  -FTEC on Foam- 0 seconds    Bhat/56    Anticipatory Balance  -Anterior, posterior, medial, lateral- no balance reactions, fall would occur without PT intervention    Progress Note: 2019    mCTSIB  -FTEO on firm- 30 seconds  -FTEC on firm- 12 seconds  -FTEO on foam- 8 seconds  -FTEC on Foam- 0 seconds    Bhat/56    Anticipatory Balance  -Anterior, posterior, medial, lateral- no balance reactions, fall would occur without PT intervention    Progress Note- 2019      mCTSIB  -FTEO on firm- 30 seconds  -FTEC on firm- 15 seconds  -FTEO on foam- 8 seconds  -FTEC on Foam- 2 seconds    Bhat/56    Anticipatory Balance  -Anterior, posterior, medial, lateral- no balance reactions, fall would occur without PT intervention    Progress Note- 2019      mCTSIB  -FTEO on firm- 30 seconds  -FTEC on firm- 4 5 seconds  -FTEO on foam- 2 1 seconds  -FTEC on Foam- 0 seconds    Bhat/56    Anticipatory Balance  -Anterior, posterior, medial, lateral- no balance reactions, fall would occur without PT intervention    Progress Note: 10/9/2019    mCTSIB  -FTEO on firm- 15 seconds  -FTEC on firm- 3 seconds  -FTEO on foam- 1 8 seconds  -FTEC on Foam- 0 seconds    Bhat/56    Anticipatory Balance  -Anterior, posterior, medial, lateral- no balance reactions, fall would occur without PT intervention    Strength/Myotome Testing     Left Hip   Planes of Motion   Flexion: 3+  Extension: 3+  Abduction: 3+  Adduction: 3+    Right Hip   Planes of Motion   Extension: 4+  Abduction: 3+  Adduction: 3+    Left Knee   Flexion: 4  Extension: 4-    Right Knee   Flexion: 4  Extension: 4    Left Ankle/Foot   Dorsiflexion: 4-  Plantar flexion: 4    Right Ankle/Foot   Dorsiflexion: 4-  Plantar flexion: 4    Ambulation     Comments   Gait speed- 10 meters/ 15 01 seconds-   TUG Test- 21 02 Seconds  2 minute walk test- 200 feet  6 minute walk test- completed 4:40 seconds, 450 feet    Gait speed- 10 meters/seconds-  With SPC, meters/second without SPC      Not performed today due to fatigue, perform next session    Progress Note: 4/17/19  Gait speed- 10 meters/ 10 26seconds-  With SPC, meters/second without SPC  TUG Test- 17 seconds with SPC, 13 36 seconds without SPC    2 minute walk test- 250  feet  6 minute walk test- 750 Feet      Progress Note: 6/17/19  Gait speed- 10 meters/ 15 12 seconds-   66 meters/second With SPC,   73 meters/second without SPC  TUG Test- 18 35 seconds with SPC, 14 78 seconds without SPC    2 minute walk test- 250 feet  6 minute walk test-  Feet      Progress Note: 8/19/19  Gait Speed- 10 meters/17 45 seconds-  57 meters/second With SPC, 10 meters/ 18 56 second=  54 meters/second without SPC  -TUG- 19 05 seconds with SPC, 17 89 seconds without SPC    2 minute walk test- 150 feet  6 minute walk test- 450 feet    Progress Note: 10/9/19  Gait Speed- 10 meters/ 23 21 seconds-  43 meters/second With SPC, 10 meters/21 78 second=  46 meters/second with U step  TUG Test- 29 56 seconds with SPC, 28 23 seconds without SPC, 21 22 seconds with U step    2 minute walk test- 210  Feet with USTEP  6 minute walk test- 600 Feet with USTEP      Functional Assessment        Comments  5x sit to stand- 12 50 seconds  30 second sit to stand- 10 reps (self terminated due to fatigue)     Progress Note: 3/20/2019  5x sit to stand- 11 06 seconds  30 second sit to stand- 11 reps    Progress Note:4/17/2019  5x sit to stand- 9 59 seconds  30 second sit to stand- 14 reps  DGI- 14/24    Progress Note: 6/17/2019  5x sit to stand- 8 94 seconds  30 second sit to stand- 16 reps  DGI- 11/24    Progress Note: 8/19/2019  5x sit to stand- 12 03 seconds  30 second sit to stand- 10 reps  DGI- 11/24    Progress Note: 10/9/2019  5x sit to stand- 29 41 seconds  30 second sit to stand- 5 reps  DGI- 9/24        Gait training with U Step- 15 minutes with instruction    Precautions: Parkinson's disease, fall risk     Daily Treatment Diary     Manual                                                                                   Exercise Diary                                                                                                                                                                                                                                                                                      Modalities

## 2019-10-10 NOTE — PROGRESS NOTES
Speech Treatment Note    Today's date: 10/10/2019  Patient name: Julisa Taveras  : 1940  MRN: [id number]  Referring provider: No ref  provider found  Dx: No diagnosis found  Visit Number:02  Plan of Care expires: 2019  Pain level:0/10  NEXT PROGRESS NOTE DUE: 2019    Subjective/Behavioral: Seen for speech therapy  Goal 1: The patient will coordinate respiration with phonation with 80% accuracy at word and phrase level  Introduced, discussed and demonstrated deep, slow diaphragmatic breathing  Patient's posture and extreme neck flexion limits diaphragmatic breathing  Despite this, the patient was able to use slow inhalation and this helped improve coordination of respiration with phonation  100% modeling needed  Patient accuracy at less than 50%  Goal 2: The Patient will use vowel prolongation at word and phrase level to help reduce rate of speech with 80% accuracy  Vowel prolongation at 50% accuracy at word level  Goal 3: The patient will use over articulation in conjunction with increased vocal volume to produce words and phrases with 80% accuracy or higher  A list of functional phrases was compiled  When over articulation was combined with prompts to "talk louder" intelligibility at phrase level was at 100% accuracy  Spontaneous intelligibility was at approximately 60% due to low volume and rapid rate of speech  Goal 4: Educate family regarding compensatory voice techniques and strategies  Other:Reviewed testing and plan of care with patient  Patient is in agreement with POC at this time    Recommendations:Continue with Plan of Care

## 2019-10-11 ENCOUNTER — OFFICE VISIT (OUTPATIENT)
Dept: SPEECH THERAPY | Facility: CLINIC | Age: 79
End: 2019-10-11
Payer: MEDICARE

## 2019-10-11 ENCOUNTER — OFFICE VISIT (OUTPATIENT)
Dept: PHYSICAL THERAPY | Facility: CLINIC | Age: 79
End: 2019-10-11
Payer: MEDICARE

## 2019-10-11 DIAGNOSIS — F02.80 DEMENTIA ASSOCIATED WITH OTHER UNDERLYING DISEASE WITHOUT BEHAVIORAL DISTURBANCE (HCC): ICD-10-CM

## 2019-10-11 DIAGNOSIS — G20 PARKINSON'S DISEASE (HCC): Primary | ICD-10-CM

## 2019-10-11 DIAGNOSIS — R26.2 IMPAIRED AMBULATION: ICD-10-CM

## 2019-10-11 PROCEDURE — 92507 TX SP LANG VOICE COMM INDIV: CPT | Performed by: SPEECH-LANGUAGE PATHOLOGIST

## 2019-10-11 PROCEDURE — 97530 THERAPEUTIC ACTIVITIES: CPT

## 2019-10-11 PROCEDURE — 97140 MANUAL THERAPY 1/> REGIONS: CPT

## 2019-10-11 PROCEDURE — 97110 THERAPEUTIC EXERCISES: CPT

## 2019-10-11 PROCEDURE — 97116 GAIT TRAINING THERAPY: CPT

## 2019-10-11 NOTE — PROGRESS NOTES
Daily Note     Today's date: 10/11/2019  Patient name: Norris Edmondson  : 1940  MRN: [id number]  Referring provider: Bobo Hernandez MD  Dx:   Encounter Diagnosis     ICD-10-CM    1  Parkinson's disease ([facility] [address] ) G20    2  Impaired ambulation R26 2        Start Time: 1015  Stop Time: 1115  Total time in clinic (min): 60 minutes     Subjective: Patient reports her  is stretching her neck  She is still falling but no injury since ER visit and staples in head since before last visit  Patient continues to report that the U-step will not fit in her home  Objective: See treatment diary below    Manual PROM: 10 minutes  -L SB  -LR  -RR    Supine cervical chin tucks: 5 second holds, 30 reps (cues to push head into pillow/treating therapist's hand; patient unable to coordinate this movement today)  Seated rows: 15 reps  Prone Y's T's, and I's: 15 reps each direction, 3 second holds, with face pillow  Prone extension: 15 reps, 3 second holds, with face pillow  Seated neck extension: 5 second holds, 5 reps  Supine fly's with yellow theraband: 20 reps    Ambulation with SPC: 15 minutes, 400 feet (contact guard)    Bed mobility with positional changes- 15 minutes of instructions supine to long sit to prone    HEP: L SB, 3 minutes, 3x/day, supine fly's with yellow theraband)         Assessment:  Patient presents with continued resting flexion and R lateral flexion  Patient presents with decrease cervical extension strength with difficulty nader cervical extensors  Postural exercises in prone, supine, and seated were performed today  Patient tolerated prone exercises today for a short period of time with the use of the face pillow  Patient was unable to contract cervical extensors today with chin chucks due to weakness and lack of coordination   Patient will benefit from skilled PT to address patient's balance and strength impairment, improve postural stability, and gait train to decrease number of falls and maximize function with PD diagnosis  Plan: Continue per plan of care  Progress treatment as tolerated

## 2019-10-14 ENCOUNTER — OFFICE VISIT (OUTPATIENT)
Dept: FAMILY MEDICINE CLINIC | Facility: CLINIC | Age: 79
End: 2019-10-14
Payer: MEDICARE

## 2019-10-14 VITALS
DIASTOLIC BLOOD PRESSURE: 60 MMHG | TEMPERATURE: 97.8 F | HEART RATE: 88 BPM | BODY MASS INDEX: 17.79 KG/M2 | WEIGHT: 88.06 LBS | SYSTOLIC BLOOD PRESSURE: 104 MMHG | OXYGEN SATURATION: 95 %

## 2019-10-14 DIAGNOSIS — Z48.02 ENCOUNTER FOR STAPLE REMOVAL: Primary | ICD-10-CM

## 2019-10-14 PROCEDURE — 99213 OFFICE O/P EST LOW 20 MIN: CPT | Performed by: FAMILY MEDICINE

## 2019-10-14 NOTE — PROGRESS NOTES
Assessment/Plan:    No problem-specific Assessment & Plan notes found for this encounter  Diagnoses and all orders for this visit:    Encounter for staple removal    Other orders  -     Amantadine HCl ER (GOCOVRI) 137 MG CP24; Take 1 capsule by mouth          Staple Removal Performed by: Konstantin Dunlap  Consent: Verbal consent obtained  Risks and benefits: risks, benefits and alternatives were discussed Consent given by: patient Patient understanding: patient states understanding of the procedure being performed Patient consent: the patient's understanding of the procedure matches consent given  Time out: Immediately prior to procedure a "time out" was called to verify the correct patient, procedure, equipment, support staff and site/side marked as required  Head laceration was clean, no sign of infection  2 staple were removed with fashion, antibiotic ointment applied,Patient tolerated the procedure well with no immediate complications  Discussed with the patient and all questioned fully answered  She will call me if any problems arise  Subjective:      Patient ID: Yaquelin Hernandes is a 67 y o  female  Chief Complaint   Patient presents with    Suture / Staple Removal       76-year-old female history of HTN, Parkinson's disease presents with scalp laceration, s/p re(air in the ER  Patient stated that she normally ambulates with a walker or cane  She has parkinsons so she has weak legs at baseline  She was in the bathroom, after she urinated she got up and she started to lean backwards after which she lost her balance, fell and hit her head  No chest pain, difficulty breathing, lightheadedness, dizziness before or after the fall  She had a small 1 cm laceration to the back of her scalp which was repaired in the ER  No loss of consciousness  No nausea or vomiting  Rosemary Luo No visual disturbances  No neck pain  No back pain   No chest pain, difficulty breathing, shortness of breath, dizziness, lightheadedness, weakness  The following portions of the patient's history were reviewed and updated as appropriate: allergies, current medications, past family history, past medical history, past social history, past surgical history and problem list       Review of Systems   Constitutional: Negative for activity change, appetite change, fatigue and unexpected weight change  HENT: Negative for congestion, dental problem, drooling, ear discharge and ear pain  Staple intact, wound look clean and inatct    Respiratory: Negative for apnea, cough, choking, chest tightness and shortness of breath  Cardiovascular: Negative for chest pain, palpitations and leg swelling  Objective:    /60 (BP Location: Left arm, Patient Position: Sitting, Cuff Size: Child)   Pulse 88   Temp 97 8 °F (36 6 °C) (Tympanic)   Wt 39 9 kg (88 lb 1 oz)   SpO2 95%   BMI 17 79 kg/m²       Physical Exam   Constitutional:  oriented to person, place, and time  well-developed and well-nourished  No distress  HENT:  Normocephalic, Staple intact, wound look clean and inatct    Conjunctivae and EOM are normal  Pupils are equal, round, and reactive to light  Cardiovascular: Normal rate, regular rhythm, normal heart sounds and intact distal pulses  Exam reveals no gallop and no friction rub  No murmur heard  Psychiatric: normal mood and affect  behavior is normal  Judgment and thought content normal

## 2019-10-16 ENCOUNTER — OFFICE VISIT (OUTPATIENT)
Dept: PHYSICAL THERAPY | Facility: CLINIC | Age: 79
End: 2019-10-16
Payer: MEDICARE

## 2019-10-16 DIAGNOSIS — R26.2 IMPAIRED AMBULATION: ICD-10-CM

## 2019-10-16 DIAGNOSIS — G20 PARKINSON'S DISEASE (HCC): Primary | ICD-10-CM

## 2019-10-16 PROCEDURE — 97110 THERAPEUTIC EXERCISES: CPT

## 2019-10-16 PROCEDURE — 97116 GAIT TRAINING THERAPY: CPT

## 2019-10-16 NOTE — PROGRESS NOTES
Daily Note     Today's date: 10/16/2019  Patient name: Zoey Montero  : 1940  MRN: [id number]  Referring provider: Steve Leyva MD  Dx:   Encounter Diagnosis     ICD-10-CM    1  Parkinson's disease ([address] ) G20    2  Impaired ambulation R26 2        Start Time: 1030  Stop Time: 1115  Total time in clinic (min): 45 minutes     Subjective: Patient presents to skilled PT today with SPC and cervical Collar  States she had multiple falls over the weekend, and had one this morning, no injuries reported  Objective: See treatment diary below- Cervical Collar donned for all interventions    Seated Scapular retractions- 20 reps with towel roll   Prone Y's T's, and I's: 15 reps each direction, 3 second holds, with face pillow  Prone extension: 15 reps, 3 second holds, with face pillow  Seated neck extension: 5 second holds, 5 reps  Supine fly's with yellow theraband: 20 reps  Seated Long arc quads- 20 reps, 3 second holds, 3 lb cuff weights  Step taps- Forward, laterally- 6" steps- 20 reps each direction, 2 UE support, 3 lb cuff weights  Step Ups- Forward, laterally- 6" steps- 20 reps each direction, 2 UE support, 3 lb cuff weights  Seated Shoulder flexion- 20 reps, 2 lb cuff weights around wrists, towel roll support behind back   Seated Shoulder abduction- 20 reps, 2 lb cuff weights around wrists, towel roll support behind back  Ambulation with SPC: 15 minutes, 400 feet (contact guard for all interventions), in and out of the clinic  Seated med ball rotation- flexion/extension, Rotation, 2 lb bilaterally, 20 reps       Assessment:  Patient postural position slightly improved with cervical collar, musculature continually significantly weak potentially due to botox, head continually in a constant flexed and right side bent rotation, no strength to improve her head position actively   Patient continues to present with balance deficits and gait challenges with her neck positioning due to weakness, posterior balance deficits noted  Patient will benefit from skilled PT to address patient's balance and strength impairment, improve postural stability, and gait train to decrease number of falls and maximize function with PD diagnosis  Plan: Continue per plan of care  Progress treatment as tolerated

## 2019-10-18 ENCOUNTER — OFFICE VISIT (OUTPATIENT)
Dept: SPEECH THERAPY | Facility: CLINIC | Age: 79
End: 2019-10-18
Payer: MEDICARE

## 2019-10-18 ENCOUNTER — OFFICE VISIT (OUTPATIENT)
Dept: PHYSICAL THERAPY | Facility: CLINIC | Age: 79
End: 2019-10-18
Payer: MEDICARE

## 2019-10-18 DIAGNOSIS — G20 PARKINSON'S DISEASE (HCC): Primary | ICD-10-CM

## 2019-10-18 DIAGNOSIS — R26.2 IMPAIRED AMBULATION: ICD-10-CM

## 2019-10-18 PROCEDURE — 97110 THERAPEUTIC EXERCISES: CPT

## 2019-10-18 PROCEDURE — 97530 THERAPEUTIC ACTIVITIES: CPT

## 2019-10-18 PROCEDURE — 92507 TX SP LANG VOICE COMM INDIV: CPT | Performed by: SPEECH-LANGUAGE PATHOLOGIST

## 2019-10-18 PROCEDURE — 97116 GAIT TRAINING THERAPY: CPT

## 2019-10-18 NOTE — PROGRESS NOTES
Speech Treatment Note    Today's date: 10/18/2019  Patient name: Norris Edmondson  : 1940  MRN: [id number]  Referring provider: No ref  provider found  Dx:   Encounter Diagnosis     ICD-10-CM    1  Parkinson's disease ([address] ) [address]                   Visit Number:03  Plan of Care expires: 2019  Pain level:0/10  NEXT PROGRESS NOTE DUE: 2019  Bill with KX     Subjective/Behavioral: Seen for speech therapy  Goal 1: The patient will coordinate respiration with phonation with 80% accuracy at word and phrase level  Diaphragmatic breathing not addressed today  Goal 2: The Patient will use vowel prolongation at word and phrase level to help reduce rate of speech with 80% accuracy  Vowel prolongation at 50% accuracy at word level  Goal 3: The patient will use over articulation in conjunction with increased vocal volume to produce words and phrases with 80% accuracy or higher  A list of functional phrases was reviewed  When over articulation was combined with prompts to "talk louder" intelligibility at phrase level was at 100% accuracy  Spontaneous intelligibility was at approximately 60% due to low volume and rapid rate of speech, similar to last session  Rate of speech was much better when prompted to "talk louder " Vocal quality was excellent, with no signs of hyperfunctional voice  Goal 4: Educate family regarding compensatory voice techniques and strategies  Not addressed this session  Other:Reviewed testing and plan of care with patient  Patient is in agreement with POC at this time    Recommendations:Continue with Plan of Care

## 2019-10-18 NOTE — PROGRESS NOTES
Daily Note     Today's date: 10/18/2019  Patient name: Ana Laura Jha  : 1940  MRN: [id number]  Referring provider: Juan David MD  Dx:   Encounter Diagnosis     ICD-10-CM    1  Parkinson's disease ([facility] [address] ) G20    2  Impaired ambulation R26 2        Start Time: 1030  Stop Time: 1115  Total time in clinic (min): 45 minutes     Subjective: Patient presents to skilled PT today with SPC and cervical Collar  Arrived from speech therapy, speech therapist stated that she almost had multiple falls while walking  Objective: See treatment diary below- Cervical Collar donned for all interventions    Seated Scapular retractions- 20 reps with towel roll   Prone Y's T's, and I's: 15 reps each direction, 3 second holds, with face pillow  Prone extension: 15 reps, 3 second holds, with face pillow  Seated neck extension: 5 second holds, 5 reps  Supine fly's with yellow theraband: 20 reps  Seated Long arc quads- 20 reps, 3 second holds, 3 lb cuff weights  Step taps- Forward, laterally- 6" steps- 20 reps each direction, 2 UE support, 3 lb cuff weights  Step Ups- Forward, laterally- 6" steps- 20 reps each direction, 2 UE support, 3 lb cuff weights  Seated Shoulder flexion- 20 reps, 2 lb cuff weights around wrists, towel roll support behind back   Seated Shoulder abduction- 20 reps, 2 lb cuff weights around wrists, towel roll support behind back  Ambulation with SPC: 15 minutes, 400 feet (contact guard for all interventions), in and out of the clinic  Seated med ball rotation- flexion/extension, Rotation, D2 flexion, 2 lb bilaterally, 20 reps   Sit to stands with momentum control- 20 reps, 2 UE on hand rails for seat      Assessment:  Patient postural position slightly improved with cervical collar, instructed  on how to apply collar for best head position   Patient, treating therapist and  discussed patient needing to be assisted back and forth from entrance of clinic and leaving clinic due to her recent relapse in balance capabilities, both verbalized understanding  Patient will benefit from skilled PT to address patient's balance and strength impairment, improve postural stability, and gait train to decrease number of falls and maximize function with PD diagnosis  Plan: Continue per plan of care  Progress treatment as tolerated

## 2019-10-21 ENCOUNTER — TRANSCRIBE ORDERS (OUTPATIENT)
Dept: ADMINISTRATIVE | Facility: HOSPITAL | Age: 79
End: 2019-10-21

## 2019-10-21 ENCOUNTER — OFFICE VISIT (OUTPATIENT)
Dept: FAMILY MEDICINE CLINIC | Facility: CLINIC | Age: 79
End: 2019-10-21
Payer: MEDICARE

## 2019-10-21 ENCOUNTER — HOSPITAL ENCOUNTER (OUTPATIENT)
Dept: RADIOLOGY | Facility: HOSPITAL | Age: 79
Discharge: HOME | End: 2019-10-21
Attending: FAMILY MEDICINE
Payer: MEDICARE

## 2019-10-21 VITALS — RESPIRATION RATE: 18 BRPM | HEART RATE: 88 BPM | BODY MASS INDEX: 17.57 KG/M2 | TEMPERATURE: 98.2 F | WEIGHT: 87 LBS

## 2019-10-21 DIAGNOSIS — K26.5 PERFORATED DUODENAL ULCER (HCC): Primary | ICD-10-CM

## 2019-10-21 DIAGNOSIS — M48.061 SPINAL STENOSIS, LUMBAR REGION, WITHOUT NEUROGENIC CLAUDICATION: ICD-10-CM

## 2019-10-21 DIAGNOSIS — R26.2 AMBULATORY DYSFUNCTION: ICD-10-CM

## 2019-10-21 DIAGNOSIS — E11.9 DIABETES MELLITUS WITHOUT COMPLICATION (HCC): Primary | ICD-10-CM

## 2019-10-21 DIAGNOSIS — M54.50 ACUTE LEFT-SIDED LOW BACK PAIN WITHOUT SCIATICA: ICD-10-CM

## 2019-10-21 PROCEDURE — 72100 X-RAY EXAM L-S SPINE 2/3 VWS: CPT

## 2019-10-21 PROCEDURE — 99214 OFFICE O/P EST MOD 30 MIN: CPT | Performed by: FAMILY MEDICINE

## 2019-10-21 RX ORDER — PANTOPRAZOLE SODIUM 40 MG/1
1 TABLET, DELAYED RELEASE ORAL 2 TIMES DAILY
Refills: 0 | COMMUNITY
Start: 2019-10-18 | End: 2019-11-18

## 2019-10-21 NOTE — PROGRESS NOTES
SUBJECTIVE:  Chief complaint and HPI: Julisa Taveras is a 67 y o  female who presents for follow up of multiple chronic medical problems, as listed below in problem list  All problems are relatively stable except for the following issues: curcumin/boswellia seems to help knees some  Still falling a lot  Still some knee and back pain  Review of systems:  No fever/chills/sweats/unexplained weight loss  No chest pain/shortness of breath  No change in digestion or bowel movements  No change in urination        Chart reviewed for relevant medical, surgical and psychosocial history, medications and allergies, labs and studies  Patient Active Problem List   Diagnosis    Asthma    Carpal tunnel syndrome    Colon, diverticulosis    Type 2 diabetes mellitus without complication, without long-term current use of insulin ([facility] [address] )    Essential hypertension    GERD (gastroesophageal reflux disease)    Mixed hyperlipidemia    Insomnia    Spinal stenosis    Major neurocognitive disorder, due to parkinson's disease, without behavioral disturbance, mild (HCC)    Psoriasis    Restless legs syndrome    Weight loss, unintentional    Need for influenza vaccination    Arthritis    Age-related nuclear cataract of both eyes    Ambulatory dysfunction    Cervical dystonia    Confusion    Parkinson's disease ([facility] [address] )    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 ([facility] [address] )    Periumbilical abdominal pain    Free intraperitoneal air    RODRIGO (acute kidney injury) ([address] )    Dementia ([address] )    Toxic metabolic encephalopathy    Severe protein-calorie malnutrition ([address] )    Perforated duodenal ulcer ([facility] [address] )    Encounter for herb and vitamin supplement management    Weight loss       EXAM:  Pulse 88   Temp 98 2 °F (36 8 °C)   Resp 18   Wt 39 5 kg (87 lb)   BMI 17 57 kg/m²   The patient has uncontrolled body movements, with head dangling in flexion and to the right, severe ambulatory dysfunction    Alert and oriented times three, pleasant and cooperative  Vital signs are as noted by the nurse  Pulse 88   Temp 98 2 °F (36 8 °C)   Resp 18   Wt 39 5 kg (87 lb)   BMI 17 57 kg/m²     Head: normocephalic, atraumatic, head dangling after botulin injections neck muscles  Eyes: well perfused conjunctiva, not clinically pale, not jaundiced  Ears: external ear: no gross lesions  Nose: no rhinorrhea  Neck: supple, trachea in the midline and no concerning cervical lymphadenopathy  Lungs: No respiratory labor  Clear to auscultation  Heart: Regular rate and rhythm, no murmurs, gallops or rubs  Abdomen: Benign: soft, non-tender, non-distended  No guarding or rebound  No masses or organomegaly  Normal bowel sounds,   Skin: No pallor  No rashes noted  Extremities: Moves all extremities normally  No pedal edema  Has minor bruises L knee  MSK survey reveals no stigmata of fracture, including skull  Has bump base of scoliotic spine at lower lumbar area  ASSESSMENT/PLAN:  1  Perforated duodenal ulcer ([facility] [address] )  Clinically stable  2  Ambulatory dysfunction  Severe  Needs to use wheelchair or walker  [facility] is not adequate  Needs assistance with walking as much as possible  I reviewed this with pt and  who expressed understanding    - XR spine lumbar 2 or 3 views injury; Future    3  Acute left-sided low back pain without sciatica  Repeated falls  - XR spine lumbar 2 or 3 views injury; Future      Reviewed and reinforced basics of healthy lifestyle  Risks and benefits of therapeutic plan discussed, answered all patient questions and concerns and patient expressed understanding and agreement of therapeutic plan        Follow up plan: 3 months

## 2019-10-23 ENCOUNTER — OFFICE VISIT (OUTPATIENT)
Dept: PHYSICAL THERAPY | Facility: CLINIC | Age: 79
End: 2019-10-23
Payer: MEDICARE

## 2019-10-23 ENCOUNTER — TELEPHONE (OUTPATIENT)
Dept: FAMILY MEDICINE CLINIC | Facility: CLINIC | Age: 79
End: 2019-10-23

## 2019-10-23 DIAGNOSIS — M46.26 OSTEOMYELITIS OF LUMBAR SPINE (HCC): ICD-10-CM

## 2019-10-23 DIAGNOSIS — G20 PARKINSON'S DISEASE (HCC): Primary | ICD-10-CM

## 2019-10-23 DIAGNOSIS — R26.2 IMPAIRED AMBULATION: ICD-10-CM

## 2019-10-23 DIAGNOSIS — M48.061 SPINAL STENOSIS OF LUMBAR REGION WITHOUT NEUROGENIC CLAUDICATION: Primary | ICD-10-CM

## 2019-10-23 PROCEDURE — 97110 THERAPEUTIC EXERCISES: CPT

## 2019-10-23 NOTE — PROGRESS NOTES
Daily Note     Today's date: 10/23/2019  Patient name: Morris Jordan  : 1940  MRN: [id number]  Referring provider: Jada Youssef MD  Dx:   Encounter Diagnosis     ICD-10-CM    1  Parkinson's disease ([facility] [address] ) G20    2  Impaired ambulation R26 2        Start Time: 1030  Stop Time: 1115  Total time in clinic (min): 45 minutes     Subjective: Patient presents to skilled PT today with Rolling Walker and cervical Collar  Arrived to therapy accompanied with ,  reported fall this morning in which patient hit her head  Presents with slight abrasion on R side of temporal area, no active bleeding  Patient denies lightheadedness and dizziness, advised to seek further medical treatment with change in symptoms,  notified      Objective: See treatment diary below- Cervical Collar donned for all interventions    Seated Long arc quads- 20 reps, 3 second holds, 3 lb cuff weights  Step taps- Forward, laterally- 6" steps- 20 reps each direction, 2 UE support, 3 lb cuff weights  Step Ups- Forward, laterally- 6" steps- 20 reps each direction, 2 UE support, 3 lb cuff weights  Seated Hip Flexion- 20 reps, 3 second holds, 3 lb cuff weights  Seated Hip Abduction- 20 reps, 3 second holds, ball squeeze  Seated Shoulder flexion- 20 reps, 2 lb cuff weights around wrists, towel roll support behind back   Seated Shoulder abduction- 20 reps, 2 lb cuff weights around wrists, towel roll support behind back  Seated Scapular retractions- 20 reps, 3 second holds with towel roll biofeedback  Seated Abduction with theraband- red theraband, 20 reps, 3 second hold  Seated med ball rotation- flexion/extension, Rotation, D2 flexion, 2 lb bilaterally, 20 reps   Sit to stands with momentum control- 20 reps, 2 UE on hand rails for seat  Seated horizontal abduction, yellow theraband- 20 reps      Assessment:  Patient postural position slightly improved with cervical collar, instructed  on how to apply collar for best head position, patient needed readjustment during session today, re-introduction and application noted to  today  Patient continues to present with ataxic and uncoordinated movements, session continually performed in seated today for most interventions to improve safety as well as fall occurring this morning  Patient advised to utilize RW for safety until balance improves, patient verbalized understanding  Patient will benefit from skilled PT to address patient's balance and strength impairment, improve postural stability, and gait train to decrease number of falls and maximize function with PD diagnosis  Plan: Continue per plan of care  Progress treatment as tolerated

## 2019-10-23 NOTE — TELEPHONE ENCOUNTER
Veronica Waller PT IS CALLING TO LETTING YOU KNOW THAT SHE GOES FOR HER CAT SCAN NEXT THURS, 10/31/19  SW

## 2019-10-23 NOTE — PROGRESS NOTES
I reviewed xray LS spine and need for CT LS spine with IV contrast, which she voices understanding will get ASAP

## 2019-10-24 ENCOUNTER — TELEPHONE (OUTPATIENT)
Dept: FAMILY MEDICINE CLINIC | Facility: CLINIC | Age: 79
End: 2019-10-24

## 2019-10-24 LAB
LEFT EYE DIABETIC RETINOPATHY: NORMAL
RIGHT EYE DIABETIC RETINOPATHY: NORMAL

## 2019-10-25 ENCOUNTER — OFFICE VISIT (OUTPATIENT)
Dept: SPEECH THERAPY | Facility: CLINIC | Age: 79
End: 2019-10-25
Payer: MEDICARE

## 2019-10-25 ENCOUNTER — APPOINTMENT (OUTPATIENT)
Dept: PHYSICAL THERAPY | Facility: CLINIC | Age: 79
End: 2019-10-25
Payer: MEDICARE

## 2019-10-25 DIAGNOSIS — G20 PARKINSON'S DISEASE (HCC): Primary | ICD-10-CM

## 2019-10-25 DIAGNOSIS — I10 ESSENTIAL HYPERTENSION: Primary | ICD-10-CM

## 2019-10-25 DIAGNOSIS — F02.80 DEMENTIA ASSOCIATED WITH OTHER UNDERLYING DISEASE WITHOUT BEHAVIORAL DISTURBANCE (HCC): ICD-10-CM

## 2019-10-25 DIAGNOSIS — Z51.81 ENCOUNTER FOR THERAPEUTIC DRUG MONITORING: ICD-10-CM

## 2019-10-25 PROCEDURE — 92507 TX SP LANG VOICE COMM INDIV: CPT | Performed by: SPEECH-LANGUAGE PATHOLOGIST

## 2019-10-25 NOTE — PROGRESS NOTES
Speech Treatment Note    Today's date: 10/25/2019  Patient name: Ciara Toledo  : 1940  MRN: [id number]  Referring provider: No ref  provider found  Dx:   Encounter Diagnosis     ICD-10-CM    1  Parkinson's disease ([facility] [address] ) G20    2  Dementia associated with other underlying disease without behavioral disturbance ([facility] [address] ) F02 80                   Visit Number:04  Plan of Care expires: 2019  Pain level:0/10  NEXT PROGRESS NOTE DUE: 2019  Bill with KX     Subjective/Behavioral: Seen for speech therapy  She ambulated to the office with a rolling walker  Goal 1: The patient will coordinate respiration with phonation with 80% accuracy at word and phrase level  Diaphragmatic breathing was at 50% with maximum clinician modeling  The patient was encouraged to engage in slow deep breathing  Goal 2: The Patient will use vowel prolongation at word and phrase level to help reduce rate of speech with 80% accuracy  Vowel prolongation at 50% accuracy at  Sound level  Goal 3: The patient will use over articulation in conjunction with increased vocal volume to produce words and phrases with 80% accuracy or higher  A list of functional phrases was reviewed  When over articulation was combined with prompts to "talk louder" intelligibility at phrase level was at 90% accuracy  Spontaneous intelligibility was at approximately 60% due to low volume and rapid rate of speech, similar to last session  Rate of speech was much better when prompted to "talk louder " Vocal quality was excellent, with no signs of hyperfunctional voice  Goal 4: Educate family regarding compensatory voice techniques and strategies  Not addressed this session  Other:Reviewed testing and plan of care with patient  Patient is in agreement with POC at this time    Recommendations:Continue with Plan of Care

## 2019-10-28 RX ORDER — SODIUM CHLORIDE 9 MG/ML
125 INJECTION, SOLUTION INTRAVENOUS CONTINUOUS
Status: CANCELLED | OUTPATIENT
Start: 2019-10-29

## 2019-10-29 ENCOUNTER — HOSPITAL ENCOUNTER (EMERGENCY)
Facility: HOSPITAL | Age: 79
Discharge: HOME | End: 2019-10-29
Attending: EMERGENCY MEDICINE | Admitting: EMERGENCY MEDICINE
Payer: MEDICARE

## 2019-10-29 ENCOUNTER — APPOINTMENT (EMERGENCY)
Dept: RADIOLOGY | Facility: HOSPITAL | Age: 79
End: 2019-10-29
Payer: MEDICARE

## 2019-10-29 ENCOUNTER — HOSPITAL ENCOUNTER (OUTPATIENT)
Dept: GASTROENTEROLOGY | Facility: AMBULARY SURGERY CENTER | Age: 79
Setting detail: OUTPATIENT SURGERY
Discharge: HOME | End: 2019-10-29
Attending: INTERNAL MEDICINE

## 2019-10-29 ENCOUNTER — TELEPHONE (OUTPATIENT)
Dept: FAMILY MEDICINE CLINIC | Facility: CLINIC | Age: 79
End: 2019-10-29

## 2019-10-29 VITALS
OXYGEN SATURATION: 97 % | RESPIRATION RATE: 18 BRPM | WEIGHT: 89 LBS | HEART RATE: 74 BPM | DIASTOLIC BLOOD PRESSURE: 82 MMHG | TEMPERATURE: 97.1 F | SYSTOLIC BLOOD PRESSURE: 180 MMHG | BODY MASS INDEX: 17.98 KG/M2

## 2019-10-29 DIAGNOSIS — S09.90XA HEAD INJURY: ICD-10-CM

## 2019-10-29 DIAGNOSIS — S01.01XA LACERATION OF SCALP, INITIAL ENCOUNTER: ICD-10-CM
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
## 2019-10-29 PROCEDURE — 99283 EMERGENCY DEPT VISIT LOW MDM: CPT

## 2019-10-29 PROCEDURE — 70450 CT HEAD/BRAIN W/O DYE: CPT

## 2019-10-29 RX ORDER — BACITRACIN, NEOMYCIN, POLYMYXIN B 400; 3.5; 5 [USP'U]/G; MG/G; [USP'U]/G
1 OINTMENT TOPICAL ONCE
Status: COMPLETED | OUTPATIENT
Start: 2019-10-29 | End: 2019-10-29

## 2019-10-29 RX ADMIN — BACITRACIN, NEOMYCIN, POLYMYXIN B 1 SMALL APPLICATION: 400; 3.5; 5 OINTMENT TOPICAL at 09:38

## 2019-10-29 NOTE — TELEPHONE ENCOUNTER
I sent pt a [employer] message  Please have her RTO to discuss labs further within 2 weeks  Let me know if she has any UTI sx

## 2019-10-29 NOTE — ED PROVIDER NOTES
History  Chief Complaint   Patient presents with    Fall     pt scheduled for SDS today, fell in parking lot  c/o lac to head  No LOC  Patient has a history of severe Parkinson's disease and has a gait disturbance  The patient was dropped off at the hospital door in the rain this morning, lost her footing and fell striking the back of her head  Patient states she had no light headedness, no dizziness no loss of consciousness  She has no abdominal pain or vomiting  Patient has laceration to the back of the head  She is on aspirin  She denies other injury          Prior to Admission Medications   Prescriptions Last Dose Informant Patient Reported? Taking?    ACCU-CHEK COMPACT PLUS test strip  Self No No   Sig: Use daily   ACCU-CHEK SOFTCLIX LANCETS lancets  Self No No   Sig: Use as instructed   Amantadine HCl ER (GOCOVRI) 137 MG CP24   Yes No   Sig: Take 1 capsule by mouth   Carbidopa-Levodopa ER 48  MG CPCR  Self Yes No   Sig: Take 1 capsule by mouth every 4 (four) hours    Glucose Blood (COOL BLOOD GLUCOSE TEST STRIPS VI)  Self Yes No   Sig: As directed    Glucose Blood (COOL BLOOD GLUCOSE TEST STRIPS VI)  Self Yes No   Sig: As directed   Multiple Vitamin (MULTIVITAMIN) tablet  Self Yes No   Sig: Take 1 tablet by mouth daily   Omega-3 Fatty Acids (FISH OIL PO)  Self Yes No   Sig: Take 2 g by mouth daily   amLODIPine (NORVASC) 10 mg tablet  Self No No   Sig: Take 0 5 tablets (5 mg total) by mouth daily   aspirin (ECOTRIN LOW STRENGTH) 81 mg EC tablet  Self Yes No   Sig: Take 81 mg by mouth daily   diclofenac sodium (VOLTAREN) 1 %  Self No No   Sig: Apply 2 g topically 4 (four) times a day   glucose monitoring kit (FREESTYLE) monitoring kit  Self No No   Si each by Does not apply route as needed (DM control) ACCUCHEK METER COMPACT PLUS   metFORMIN (GLUCOPHAGE) 500 mg tablet  Self No No   Sig: TAKE 1 TABLET BY MOUTH  DAILY WITH BREAKFAST   Patient taking differently: 500 mg daily with breakfast pantoprazole (PROTONIX) 40 mg tablet  Self No No   Sig: Take 1 tablet (40 mg total) by mouth 2 (two) times a day for 30 days   pantoprazole (PROTONIX) 40 mg tablet   Yes No   Sig: Take 1 tablet by mouth 2 (two) times a day   rOPINIRole (REQUIP) 2 mg tablet  Self Yes No   Sig: Take 6 mg by mouth daily at bedtime    rasagiline (AZILECT) 1 MG  Self Yes No   Si mg daily       Facility-Administered Medications: None       Past Medical History:   Diagnosis Date    RODRIGO (acute kidney injury) ([facility] ) 2019    Anal bleeding 2018    Arthritis     knee    Asthma     BPPV (benign paroxysmal positional vertigo) 2016    Last Assessment & Plan:  Hx consistent with BPPV  Neg Maidens-Halpike, however could not perform it adequately due to dyskinesias  Recommend lozano-daroff exercises at home  IF worsens, vestibular therapy      Bulging lumbar disc 12/10/2013    Closed fracture of one rib of left side 2018    Dementia ([facility] )     Diabetes mellitus ([facility] )     TYPE 2    Gallbladder disease     GERD (gastroesophageal reflux disease)     Hematuria     last assessed 10/29/15    Hypertension     Orthostatic hypotension 2015    Ovarian cyst     LAST ASSESSED: 12/10/13    Parkinson's disease ([facility] )     [facility]neumonia of both lower lobes due to infectious organism [facility] ) 3/27/2018    Pulmonary embolism with infarction ([facility] ) 2014    Sciatica 12/10/2013    Skin disorder     last assessed 12/10/13    Squamous cell carcinoma of skin 2016       Past Surgical History:   Procedure Laterality Date    APPENDECTOMY      'S    CHOLECYSTECTOMY      'S    LAPAROTOMY N/A 2019    Procedure: LAPAROTOMY EXPLORATORY;  Surgeon: Reno Anderson MD;  Location: [address] [place] [address];  Service: General    NEUROPLASTY / [address] Right     OOPHORECTOMY Bilateral     REMOVAL OF BOTH OVARIES LAPAROSCOPIC    MO COLSC FLX W/RMVL OF TUMOR POLYP LESION SNARE TQ N/A 3/20/2018 Procedure: COLONOSCOPY;  Surgeon: Sridhar Candelario MD;  Location: [facility]; Service: Gastroenterology     [facility] CATARACT EXTRACAP,INSERT LENS Right 12/4/2018    Procedure: EXTRACTION EXTRACAPSULAR CATARACT PHACO INTRAOCULAR LENS (IOL); Surgeon: Mar Gaona MD;  Location: [facility];  Service: Ophthalmology     [facility] CATARACT EXTRACAP,INSERT LENS  1/15/2019    Procedure: EXTRACTION EXTRACAPSULAR CATARACT PHACO INTRAOCULAR LENS (IOL); Surgeon: Mar Gaona MD;  Location: [facility] OR;  Service: Ophthalmology    TONSILLECTOMY      VENTRAL HERNIA REPAIR      WITH IMPLANT OF MESH       Family History   Problem Relation Age of Onset    Alzheimer's disease Mother     Stroke Father     No Known Problems Sister     No Known Problems Brother      I have reviewed and agree with the history as documented  Social History     Tobacco Use    Smoking status: Never Smoker    Smokeless tobacco: Never Used   Substance Use Topics    Alcohol use: Not Currently     Frequency: Patient refused     Drinks per session: Patient refused     Binge frequency: Never    Drug use: Never        Review of Systems   Constitutional: Negative for fever  HENT: Negative for sore throat  Eyes: Negative for visual disturbance  Respiratory: Negative for cough and shortness of breath  Cardiovascular: Negative for chest pain  Gastrointestinal: Negative for abdominal pain and vomiting  Genitourinary: Negative for dysuria  Musculoskeletal: Positive for gait problem  Skin: Positive for wound  Neurological: Positive for weakness  Negative for dizziness, syncope, light-headedness and headaches  Hematological: Bruises/bleeds easily  Psychiatric/Behavioral: Negative for confusion  All other systems reviewed and are negative  Physical Exam  Physical Exam   Constitutional: She is oriented to person, place, and time  She appears well-developed and well-nourished  HENT:   Head: Normocephalic  Mouth/Throat: Oropharynx is clear and moist    There is bleeding stellate laceration and abrasion on the back of the scalp   Eyes: Conjunctivae and EOM are normal    Neck: Normal range of motion  Cardiovascular: Normal rate, regular rhythm and normal heart sounds  Pulmonary/Chest: Effort normal and breath sounds normal    Abdominal: Soft  There is no tenderness  Musculoskeletal: Normal range of motion  Neurological: She is alert and oriented to person, place, and time  Skin: Skin is warm and dry  Capillary refill takes less than 2 seconds  Psychiatric: She has a normal mood and affect  Her behavior is normal    Nursing note and vitals reviewed  Vital Signs  ED Triage Vitals   Temperature Pulse Respirations Blood Pressure SpO2   10/29/19 0746 10/29/19 0743 10/29/19 0743 10/29/19 0743 10/29/19 0743   (!) 97 1 °F (36 2 °C) 88 20 153/85 99 %      Temp Source Heart Rate Source Patient Position - Orthostatic VS BP Location FiO2 (%)   10/29/19 0746 10/29/19 0743 10/29/19 0743 10/29/19 0743 --   Tympanic Monitor Lying Left arm       Pain Score       10/29/19 0743       5           Vitals:    10/29/19 0743 10/29/19 0940   BP: 153/85 (!) 180/82   Pulse: 88 74   Patient Position - Orthostatic VS: Lying Lying         Visual Acuity  Visual Acuity      Most Recent Value   L Pupil Size (mm)  3   R Pupil Size (mm)  3          ED Medications  Medications   neomycin-bacitracin-polymyxin b (NEOSPORIN) ointment 1 small application (1 small application Topical Given 10/29/19 0200)       Diagnostic Studies  Results Reviewed     None                 CT head without contrast   Final Result by Mahin Joiner MD (10/29 5367)      No acute intracranial abnormality  Workstation performed: [id number]                    Procedures  Laceration repair  Date/Time: 10/29/2019 9:05 AM  Performed by: Marleni Bearden MD  Authorized by: Marleni Bearden MD   Consent: Verbal consent obtained    Risks and benefits: risks, benefits and alternatives were discussed  Consent given by: patient  Body area: head/neck  Location details: scalp  Laceration length: 3 cm  Foreign bodies: no foreign bodies    Anesthesia:  Local Anesthetic: lidocaine 1% with epinephrine      Procedure Details:  Irrigation solution: saline  Irrigation method: syringe  Amount of cleaning: standard  Debridement: moderate  Skin closure: 4-0 Prolene  Number of sutures: 5  Technique: simple  Approximation: close  Approximation difficulty: simple  Dressing: antibiotic ointment  Patient tolerance: Patient tolerated the procedure well with no immediate complications  Comments: Wound was stellate, with a small amount of tissue loss and extensive abrasion  ED Course                               MDM  Number of Diagnoses or Management Options  Fall, initial encounter:   Head injury:   Laceration of scalp, initial encounter:   Diagnosis management comments: Patient had a mechanical fall with a head injury and is on aspirin  Will CT the head  Scalp laceration will require repair      Disposition  Final diagnoses:   Fall, initial encounter   Head injury   Laceration of scalp, initial encounter     Time reflects when diagnosis was documented in both MDM as applicable and the Disposition within this note     Time User Action Codes Description Comment    10/29/2019  9:29 AM Radhika Lizarraga Add [R73  OWPB] Fall, initial encounter     10/29/2019  9:29 AM Baldo LAGUNA Add [N52 10HI] Head injury     10/29/2019  9:29 AM Radhika Lizarraga Add [S01 01XA] Laceration of scalp, initial encounter       ED Disposition     ED Disposition Condition Date/Time Comment    Discharge Stable Tue Oct 29, 2019  9:29 AM Shahida Harper discharge to home/self care              Follow-up Information     Follow up With Specialties Details Why Contact Info    Len Dumont MD [facility] Medicine Schedule an appointment as soon as possible for a visit   [address] [place] [id number]  [phone]            Discharge Medication List as of 10/29/2019  9:30 AM      CONTINUE these medications which have NOT CHANGED    Details   ! ! ACCU-CHEK COMPACT PLUS test strip Use daily, Normal      ACCU-CHEK SOFTCLIX LANCETS lancets Use as instructed, Normal      Amantadine HCl ER (GOCOVRI) 137 MG CP24 Take 1 capsule by mouth, Starting Fri 10/11/2019, Historical Med      amLODIPine (NORVASC) 10 mg tablet Take 0 5 tablets (5 mg total) by mouth daily, Starting Tue 8/20/2019, Normal      aspirin (ECOTRIN LOW STRENGTH) 81 mg EC tablet Take 81 mg by mouth daily, Historical Med      Carbidopa-Levodopa ER 48  MG CPCR Take 1 capsule by mouth every 4 (four) hours , Starting Thu 8/9/2018, Historical Med      diclofenac sodium (VOLTAREN) 1 % Apply 2 g topically 4 (four) times a day, Starting Tue 9/10/2019, Normal      !! Glucose Blood (COOL BLOOD GLUCOSE TEST STRIPS VI) As directed , Historical Med      !! Glucose Blood (COOL BLOOD GLUCOSE TEST STRIPS VI) As directed, Historical Med      glucose monitoring kit (FREESTYLE) monitoring kit 1 each by Does not apply route as needed (DM control) ACCUCHEK METER COMPACT PLUS, Starting Fri 2/8/2019, Normal      metFORMIN (GLUCOPHAGE) 500 mg tablet TAKE 1 TABLET BY MOUTH  DAILY WITH BREAKFAST, Normal      Multiple Vitamin (MULTIVITAMIN) tablet Take 1 tablet by mouth daily, Historical Med      Omega-3 Fatty Acids (FISH OIL PO) Take 2 g by mouth daily, Historical Med      pantoprazole (PROTONIX) 40 mg tablet Take 1 tablet by mouth 2 (two) times a day, Starting Fri 10/18/2019, Historical Med      rasagiline (AZILECT) 1 MG 1 mg daily , Starting Tue 2/12/2019, Historical Med      rOPINIRole (REQUIP) 2 mg tablet Take 6 mg by mouth daily at bedtime , Starting Mon 7/15/2019, Historical Med       !! - Potential duplicate medications found  Please discuss with provider  No discharge procedures on file      ED Provider  Electronically Signed by           Faith Gonzalez MD  10/29/19 Duy Rowe 87, MD  10/29/19 3102

## 2019-10-30 ENCOUNTER — OFFICE VISIT (OUTPATIENT)
Dept: PHYSICAL THERAPY | Facility: CLINIC | Age: 79
End: 2019-10-30
Payer: MEDICARE

## 2019-10-30 DIAGNOSIS — R26.2 IMPAIRED AMBULATION: ICD-10-CM

## 2019-10-30 DIAGNOSIS — G20 PARKINSON'S DISEASE (HCC): Primary | ICD-10-CM

## 2019-10-30 PROCEDURE — 97112 NEUROMUSCULAR REEDUCATION: CPT

## 2019-10-30 PROCEDURE — 97530 THERAPEUTIC ACTIVITIES: CPT

## 2019-10-30 PROCEDURE — 97110 THERAPEUTIC EXERCISES: CPT

## 2019-10-30 NOTE — PROGRESS NOTES
Daily Note     Today's date: 10/30/2019  Patient name: Marium Saavedra  : 1940  MRN: [id number]  Referring provider: Daniella Wilkerson MD  Dx:   Encounter Diagnosis     ICD-10-CM    1  Parkinson's disease ([facility] ) G20    2  Impaired ambulation R26 2        Start Time: 1030  Stop Time: 1115  Total time in clinic (min): 45 minutes     Subjective: Patient presents to skilled PT today with Rolling Walker and cervical Collar  Arrived to therapy accompanied with , patient stated that she fell yesterday outside of the hospital and hit her head     Objective: See treatment diary below- Cervical Collar donned for all interventions    Seated Long arc quads- 20 reps, 3 second holds, 3 lb cuff weights  Step taps- Forward, laterally- 6" steps- 20 reps each direction, 2 UE support, 3 lb cuff weights  Step Ups- Forward, laterally- 6" steps- 20 reps each direction, 2 UE support, 3 lb cuff weights  Seated Hip Flexion- 20 reps, 3 second holds, 3 lb cuff weights  Seated Hip Abduction- 20 reps, 3 second holds, ball squeeze  Seated Scapular retractions- 20 reps, 3 second holds with towel roll biofeedback  Boom Whackers- Seated, hit the floor, hit behind with upright posture- 20 reps   Seated Abduction with theraband- red theraband, 20 reps, 3 second hold  Seated med ball rotation- flexion/extension, Rotation, D2 flexion, 2 lb bilaterally, 20 reps   Sit to stands with momentum control- 20 reps, 2 UE on hand rails for seat with walker in front  Seated horizontal abduction, yellow theraband- 20 reps  Ambulation with Rolling walker for standing to sitting control and safety- 5 reps, extensive cuing needed      Assessment:  Patient postural position slightly improved with cervical collar continually, but patient cannot volitionally lift neck due to potential weakness from her botox injections   Patient continues to have difficulties with balance due to her PMH and comorbidities as well as her change in COG due to her cervical spine musculature weakness as well as her decrease in contributory postural reflexes  Patient will benefit from skilled PT to address patient's balance and strength impairment, improve postural stability, and gait train to decrease number of falls and maximize function with PD diagnosis  Plan: Continue per plan of care  Progress treatment as tolerated

## 2019-11-01 ENCOUNTER — OFFICE VISIT (OUTPATIENT)
Dept: SPEECH THERAPY | Facility: CLINIC | Age: 79
End: 2019-11-01
Payer: MEDICARE

## 2019-11-01 ENCOUNTER — OFFICE VISIT (OUTPATIENT)
Dept: PHYSICAL THERAPY | Facility: CLINIC | Age: 79
End: 2019-11-01
Payer: MEDICARE

## 2019-11-01 DIAGNOSIS — F02.80 DEMENTIA ASSOCIATED WITH OTHER UNDERLYING DISEASE WITHOUT BEHAVIORAL DISTURBANCE (HCC): ICD-10-CM

## 2019-11-01 DIAGNOSIS — G20 PARKINSON'S DISEASE (HCC): Primary | ICD-10-CM

## 2019-11-01 DIAGNOSIS — R26.2 IMPAIRED AMBULATION: ICD-10-CM

## 2019-11-01 PROCEDURE — 97116 GAIT TRAINING THERAPY: CPT

## 2019-11-01 PROCEDURE — 97110 THERAPEUTIC EXERCISES: CPT

## 2019-11-01 PROCEDURE — 97112 NEUROMUSCULAR REEDUCATION: CPT

## 2019-11-01 PROCEDURE — 92507 TX SP LANG VOICE COMM INDIV: CPT | Performed by: SPEECH-LANGUAGE PATHOLOGIST

## 2019-11-01 NOTE — PROGRESS NOTES
Speech Treatment Note/Progress Note    Today's date: 2019  Patient name: Maisha Moreno  : 1940  MRN: [id number]  Referring provider: No ref  provider found  Dx:   Encounter Diagnosis     ICD-10-CM    1  Parkinson's disease ([facility] [address] ) G20    2  Dementia associated with other underlying disease without behavioral disturbance ([facility] ) F02 80                   Visit Number:[id number]  Plan of Care expires: 2019  Pain level:0/10  NEXT PROGRESS NOTE DUE: 2019  Bill with KX     Subjective/Behavioral: Seen for speech therapy  She ambulated to the office with a rolling walker  Reports recent fall  Goal 1: The patient will coordinate respiration with phonation with 80% accuracy at word and phrase level  Diaphragmatic breathing was at 50% with maximum clinician modeling  The patient was encouraged to engage in slow deep breathing  Results similar to last session  Goal 2: The Patient will use vowel prolongation at word and phrase level to help reduce rate of speech with 80% accuracy  Vowel prolongation at 50% accuracy at  Sound level  Goal 3: The patient will use over articulation in conjunction with increased vocal volume to produce words and phrases with 80% accuracy or higher  A list of functional phrases was reviewed  When over articulation was combined with prompts to "talk louder" intelligibility at phrase level was at 90% accuracy  Spontaneous intelligibility was at approximately 60%- 65% accuracy  due to low volume and rapid rate of speech  Results slightly better than last session  Rate of speech was much better when prompted to "talk louder " Vocal quality was excellent, with no signs of hyperfunctional voice  Goal 4: Educate family regarding compensatory voice techniques and strategies  Not addressed this session  PROGRESS  Treatment has focused on improving intelligibility of speech   When over articulation is combined with prompts to "talk louder", intelligibility improves at phrase and sentence level (approximatley 65% accuracy at phrase level)  Overall intelligibility and breath support for speech is compromised by poor posture and extreme neck flexion  Other:Reviewed testing and plan of care with patient  Patient is in agreement with POC at this time    Recommendations:Continue with Plan of Care

## 2019-11-01 NOTE — PROGRESS NOTES
Daily Note     Today's date: 2019  Patient name: Natalie Park  : 1940  MRN: [id number]  Referring provider: Jes Workman MD  Dx:   Encounter Diagnosis     ICD-10-CM    1  Parkinson's disease ([facility] [address] [address] ) G20    2  Impaired ambulation R26 2                    Subjective: Patient reports she did not sleep well last night because "my stitches keep bleeding, but my doctor said that was normal for the head " She fell this morning and bumped her head "near my stitches " She denies any HA, dizziness, LoC and was advised by PT to notify her PCP  Objective: See treatment diary below- Cervical Collar donned for all interventions    Seated Long arc quads- 20 reps, 3 second holds, 3 lb cuff weights  Step taps- Forward, laterally- 6" steps- 20 reps each direction, 2 UE support, 3 lb cuff weights  Step Ups- Forward, laterally- 6" steps- 20 reps each direction, 2 UE support, 3 lb cuff weights  Seated Hip Flexion- 20 reps, 3 second holds, 3 lb cuff weights  Seated Hip Abduction- 20 reps, 3 second holds, ball squeeze  Seated Scapular retractions- 20 reps, 3 second holds with towel roll biofeedback  Boom Whackers- Seated, hit the floor, hit behind with upright posture- 20 reps   Seated Abduction with theraband- red theraband, 20 reps, 3 second hold  Seated med ball rotation- flexion/extension, Rotation, D2 flexion, 2 lb bilaterally, 20 reps   Sit to stands with momentum control- 20 reps, 2 UE on hand rails for seat with walker in front  Seated horizontal abduction, yellow theraband- 20 reps  Ambulation with Rolling walker for standing to sitting control and safety- 5 reps, extensive cuing needed      Assessment: Patient able to tolerate treatment session well today with increased fatigue due to "no sleep last night " She remains with slight improvements in postural position with cervical collar  Patient required frequent rest breaks and verbal cues to weight shift anteriorly during sit to stands with fair carryover   She demonstrated appropriate UE use with controlling sit to stands  Patient will continue to benefit from skilled outpatient PT to address her balance and strength deficits, improve postural stability, and gait train to decrease number of falls and maximize her function with PD diagnosis  Plan: Continue per plan of care  Progress treatment as tolerated

## 2019-11-04 NOTE — TELEPHONE ENCOUNTER
Patient made appt tomorrow to discuss labs,  She is having trouble starting her urine stream and also noticed her urine is more yellow than usual but not having any pain or burning

## 2019-11-05 ENCOUNTER — OFFICE VISIT (OUTPATIENT)
Dept: FAMILY MEDICINE CLINIC | Facility: CLINIC | Age: 79
End: 2019-11-05
Payer: MEDICARE

## 2019-11-05 VITALS
SYSTOLIC BLOOD PRESSURE: 128 MMHG | RESPIRATION RATE: 16 BRPM | HEART RATE: 89 BPM | BODY MASS INDEX: 17.98 KG/M2 | WEIGHT: 89 LBS | DIASTOLIC BLOOD PRESSURE: 64 MMHG | OXYGEN SATURATION: 99 %

## 2019-11-05 DIAGNOSIS — G20 MAJOR NEUROCOGNITIVE DISORDER, DUE TO PARKINSON'S DISEASE, WITHOUT BEHAVIORAL DISTURBANCE, MILD (HCC): ICD-10-CM

## 2019-11-05 DIAGNOSIS — I10 ESSENTIAL HYPERTENSION: ICD-10-CM

## 2019-11-05 DIAGNOSIS — E11.9 TYPE 2 DIABETES MELLITUS WITHOUT COMPLICATION, WITHOUT LONG-TERM CURRENT USE OF INSULIN (HCC): Primary | ICD-10-CM

## 2019-11-05 DIAGNOSIS — N17.9 ACUTE KIDNEY INJURY (HCC): ICD-10-CM

## 2019-11-05 DIAGNOSIS — F02.80 MAJOR NEUROCOGNITIVE DISORDER, DUE TO PARKINSON'S DISEASE, WITHOUT BEHAVIORAL DISTURBANCE, MILD (HCC): ICD-10-CM

## 2019-11-05 PROCEDURE — 3078F DIAST BP <80 MM HG: CPT | Performed by: FAMILY MEDICINE

## 2019-11-05 PROCEDURE — 3074F SYST BP LT 130 MM HG: CPT | Performed by: FAMILY MEDICINE

## 2019-11-05 PROCEDURE — 99214 OFFICE O/P EST MOD 30 MIN: CPT | Performed by: FAMILY MEDICINE

## 2019-11-05 NOTE — PROGRESS NOTES
SUBJECTIVE:  Chief complaint and HPI: Sada King is a 67 y o  female who presents for follow up of multiple chronic medical problems, as listed below in problem list  All problems are relatively stable except for the following issues: lab abnormalities without symptoms  See labs  Gained about 3 lbs in last few weeks  Good appetite    Review of systems:  No fever/chills/sweats/unexplained weight loss  No chest pain/shortness of breath  No change in digestion or bowel movements  No change in urination        Chart reviewed for relevant medical, surgical and psychosocial history, medications and allergies, labs and studies  Patient Active Problem List   Diagnosis    Asthma    Carpal tunnel syndrome    Colon, diverticulosis    Type 2 diabetes mellitus without complication, without long-term current use of insulin ([facility] )    Essential hypertension    GERD (gastroesophageal reflux disease)    Mixed hyperlipidemia    Insomnia    Spinal stenosis    Major neurocognitive disorder, due to parkinson's disease, without behavioral disturbance, mild (HCC)    Psoriasis    Restless legs syndrome    Weight loss, unintentional    Need for influenza vaccination    Arthritis    Age-related nuclear cataract of both eyes    Ambulatory dysfunction    Cervical dystonia    Confusion    Parkinson's disease ([facility]    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[facility] )    Periumbilical abdominal pain    Free intraperitoneal air    RODRIGO (acute kidney injury) ([address] )    Dementia ([address] )    Toxic metabolic encephalopathy    Severe protein-calorie malnutrition ([facility] )    Perforated duodenal ulcer ([facility] )    Encounter for herb and vitamin supplement management    Weight loss       EXAM:  /64   Pulse 89   Resp 16   Wt 40 4 kg (89 lb)   SpO2 99%   BMI 17 98 kg/m²   The patient appears well, in no apparent distress  Alert and oriented times three, pleasant and cooperative  Vital signs are as noted by the nurse   /64   Pulse 89 Resp 16   Wt 40 4 kg (89 lb)   SpO2 99%   BMI 17 98 kg/m²     Head: normocephalic, atraumatic  Sutured scalp laceration  Eyes: well perfused conjunctiva, not clinically pale, not jaundiced  Ears: external ear: no gross lesions  Nose: no rhinorrhea  Neck: supple, trachea in the midline and no concerning cervical lymphadenopathy  Lungs: No respiratory labor  Clear to auscultation  Heart: Regular rate and rhythm, 2/6 PREETI with loud S2   Abdomen: Benign: soft, non-tender, non-distended  No guarding or rebound  No masses or organomegaly  Normal bowel sounds,   Skin: No pallor  No rashes noted  Extremities: Moves all extremities normally  No pedal edema      ASSESSMENT/PLAN:  1  Type 2 diabetes mellitus without complication, without long-term current use of insulin (HCC)  Good control  - Urinalysis with reflex to microscopic  - Basic metabolic panel; Future    2  Essential hypertension  Good control  - Urinalysis with reflex to microscopic  - Basic metabolic panel; Future    3  Acute kidney injury [facility])  May be pre-renal  Increase water  - Urinalysis with reflex to microscopic  - Basic metabolic panel; Future    4  Major neurocognitive disorder, due to parkinson's disease, without behavioral disturbance, mild ([address] )  Continues to fall and is not using her walker like she was instructed to do  Is using only her cane, which is hazardous  This was expressed clearly to patient and she and her  voiced understanding  Reviewed and reinforced basics of healthy lifestyle  Risks and benefits of therapeutic plan discussed, answered all patient questions and concerns and patient expressed understanding and agreement of therapeutic plan        Follow up plan: 1 months

## 2019-11-06 ENCOUNTER — APPOINTMENT (OUTPATIENT)
Dept: LAB | Facility: HOSPITAL | Age: 79
End: 2019-11-06
Attending: FAMILY MEDICINE
Payer: MEDICARE

## 2019-11-06 ENCOUNTER — OFFICE VISIT (OUTPATIENT)
Dept: PHYSICAL THERAPY | Facility: CLINIC | Age: 79
End: 2019-11-06
Payer: MEDICARE

## 2019-11-06 DIAGNOSIS — N17.9 ACUTE KIDNEY INJURY (HCC): ICD-10-CM

## 2019-11-06 DIAGNOSIS — G20 PARKINSON'S DISEASE (HCC): Primary | ICD-10-CM

## 2019-11-06 DIAGNOSIS — R26.2 IMPAIRED AMBULATION: ICD-10-CM

## 2019-11-06 DIAGNOSIS — I10 ESSENTIAL HYPERTENSION: ICD-10-CM

## 2019-11-06 DIAGNOSIS — E11.9 TYPE 2 DIABETES MELLITUS WITHOUT COMPLICATION, WITHOUT LONG-TERM CURRENT USE OF INSULIN (HCC): ICD-10-CM

## 2019-11-06 LAB
ANION GAP SERPL CALCULATED.3IONS-SCNC: 6 MMOL/L (ref 4–13)
BACTERIA UR QL AUTO: ABNORMAL /HPF
BILIRUB UR QL STRIP: NEGATIVE
BUN SERPL-MCNC: 30 MG/DL (ref 5–25)
CALCIUM SERPL-MCNC: 9.3 MG/DL (ref 8.3–10.1)
CHLORIDE SERPL-SCNC: 107 MMOL/L (ref 100–108)
CLARITY UR: ABNORMAL
CO2 SERPL-SCNC: 28 MMOL/L (ref 21–32)
COLOR UR: YELLOW
CREAT SERPL-MCNC: 1.24 MG/DL (ref 0.6–1.3)
GFR SERPL CREATININE-BSD FRML MDRD: 42 ML/MIN/1.73SQ M
GLUCOSE P FAST SERPL-MCNC: 70 MG/DL (ref 65–99)
GLUCOSE UR STRIP-MCNC: NEGATIVE MG/DL
HGB UR QL STRIP.AUTO: ABNORMAL
KETONES UR STRIP-MCNC: NEGATIVE MG/DL
LEUKOCYTE ESTERASE UR QL STRIP: ABNORMAL
NITRITE UR QL STRIP: NEGATIVE
NON-SQ EPI CELLS URNS QL MICRO: ABNORMAL /HPF
OTHER STN SPEC: ABNORMAL
PH UR STRIP.AUTO: 5.5 [PH]
POTASSIUM SERPL-SCNC: 4.6 MMOL/L (ref 3.5–5.3)
PROT UR STRIP-MCNC: ABNORMAL MG/DL
RBC #/AREA URNS AUTO: ABNORMAL /HPF
SODIUM SERPL-SCNC: 141 MMOL/L (ref 136–145)
SP GR UR STRIP.AUTO: 1.02 (ref 1–1.03)
UROBILINOGEN UR QL STRIP.AUTO: 0.2 E.U./DL
WBC #/AREA URNS AUTO: ABNORMAL /HPF
WBC CLUMPS # UR AUTO: PRESENT /UL

## 2019-11-06 PROCEDURE — 97164 PT RE-EVAL EST PLAN CARE: CPT

## 2019-11-06 PROCEDURE — 80048 BASIC METABOLIC PNL TOTAL CA: CPT

## 2019-11-06 PROCEDURE — 36415 COLL VENOUS BLD VENIPUNCTURE: CPT

## 2019-11-06 PROCEDURE — 81001 URINALYSIS AUTO W/SCOPE: CPT | Performed by: FAMILY MEDICINE

## 2019-11-06 NOTE — PROGRESS NOTES
PT Re-evaluation/Progress note    Today's date: 2019  Patient name: Iwona Joiner  : 1940  MRN: [id number]  Referring provider: Howie Gallagher MD  Dx:   Encounter Diagnosis     ICD-10-CM    1  Parkinson's disease ([facility] [address] ) G20 PT plan of care cert/re-cert   2  Impaired ambulation R26 2 PT plan of care cert/re-cert       Start Time: 0945  Stop Time: 1030  Total time in clinic (min): 45 minutes    Assessment  Assessment details: Patient is a 67 y o  female who presents to skilled PT for balance and reactive postural control deficits secondary to Parkinson's Disease as well as recent BOTOX injections in her cervical spine for cervical dystonia  Patient presents with overall decreases in function due to hospital stay in July and August, gastro-intestinal surgery as well as recent botox injections in cervical spine musculature  Patient challenged with her overall function, deemed a fall risk via all outcome measures today, decrease in strength and overall function via all outcome measures today  Patient reports increasing fatigue during session as well as heaviness in the legs, which may be attributed to functional status change, noted improvement in weight due to subjective reports and doctor visit yesterday  Patient's manual muscle tests remain the same as compared to last re-evaluation, slight weakness noted, strength has decreased due to weight loss and muscle mass loss, specifically in her LEs  Patient loss of cervical spine musculature strength as well is affecting her posture, noted in Objective for her cervical spine as well as her UE bilaterally  Patient limited in overhead movements due to weakness in cervical spine musculature   Patient presents as a fall risk via gait speed, LUNDBERG balance test, 5x sit to stand, mCTSIB, and TUG test, and DGI, with overall results noting risk for falls and diminished reactive postural control, slight decreases per objective in ambulation activities with and without SPC as well as her sit to stand improvement, posterior loss of balance with sitting continually noted  Patient requested by treating therapist to only utilize RW due to her recent increase in falls and hospitalizations  Patient verbalized understanding  Patient endurance scores are below age related norms via 2 minute walk test as well as 6 minute walk test, including 30 second sit to stand scores, noting decrease functional endurance and cardio capacity, decrease by addition reps in 30 second sit to stand test  Patient is demonstrating difficulties with ambulation with turning noted in the DGI today, 9/24 indicating significant fall risk, which is the same from her last re-evaluation/progress note  Patient has attempted LSVT and PWR exercises, patient's ataxia and incoordination challenged her during sessions to complete successful repetitions  Patient displays overall reduction in somatosensory/proprioception awareness secondary to increased balance and postural corrections associated with mCTSIB, dyskinetic movements continually prevalent even with medication and DBS changes  Patient may benefit from a rollator or Ustep walker, patient given walker at home and is showing overall improvements with stability as well as subjective reports of decrease falls, although she does not use any assistive device unless reminded by , patient reports increases in falls when she moves backwards as well as with no AD usage  U Step utilized for outcome measures today, noted improvements in safety but continuous time decreases  Treating therapist explained safety issues with not utilizing AD at home  Patient will be continued to recommend utilizing SOLO step in the clinic  Patient has demonstrated an overall regression in the past month which may be attributed due to her cervical spine weakness and change in head position, causing an increase in falls   Patient given script for cervical collar to assist with head positioning to attempt to reduce her fall risk with a change in head position  Patient will benefit from skilled PT to address noted impairments and functional limitations they are causing with overall goal to return patient to highest level possible with reduced risk for falls  Patient reports minimal change to symptoms with medication change  Change in status and decrease in all outcome measures requires skilled PT in order to decrease fall risk and maximize function  Patient would continue to decondition as well as potentially decrease in her safety without physical therapy services  Impairments: abnormal coordination, abnormal gait, abnormal muscle tone, abnormal or restricted ROM, abnormal movement, activity intolerance, difficulty understanding, impaired balance, impaired physical strength, pain with function, safety issue, poor posture  and poor body mechanics  Other impairment: Parkinson's Disease  Barriers to therapy: Parkinson's disease  Understanding of Dx/Px/POC: excellent   Prognosis: fair    Goals  STG: To be completed in 4 weeks  1  Patient will improve her sequencing with AD in 4 weeks in order to improve her community ambulation safety - Partially MET  2  Patient will improve her gait speed score by  10 meters/second or more in order to improve her ability to cross a street safely - MET  3  Patient will improve her TUG test score by 3 seconds or more in 4 weeks in order to improve her transfer to ambulation transition - MET  4  Patient will improve her 5x sit to stand test by 5 seconds or more in 4 weeks to improve her transfer safety - MET    LTG: To be completed in 8 weeks  1  Patient will complete a 6 minute walk test completely in 8 weeks to improve her cardiovascular endurance-MET  2  Patient will complete a DGI in 8 weeks to formally assess her dynamic balance  - MET  3   Patient will complete a MiniBESTest in 8 weeks to formally assess her dual tasking abilities as related to her primary dx - MET    New Goals  -Patient will improve DGI by 2 points in 8 weeks in order to improve her dynamic balance at home and in the community- NOT met  -Patient will improve her 6 minute walk test distance by 100 feet or more in 8 weeks to improve her cardiovascular endurance  - NOT MET  -Patient will demonstrate an independent floor to stand transfer in 8 weeks to assist with fall recovery independently - NOT MET  -Patient will be independent in HEP in 8 weeks to assist with abilities at home and improve with overall function - NOT MET    New Goals- 8/19/2019- to be completed in 8 weeks  -Patient will report a decrease in fall frequency to 4x per week in order to improve her fall risk and decrease in injury  -NOT MET  -Patient will improve her mCTSIB score in all conditions by 5 seconds in order to improve all components of her balance systems  - NOT MET  -Patient will improve her LUNDBERG Balance Score by 4 points or more in order to maximize her static balance confidence - NOT MET    New Goals- 11/6/2019  -Patient will demonstrate increase in cervical spine MMT in 8 weeks in order to improve her cervical spine positioning as well looking up to scan her environment    -Patient will demonstrate an increase in postural awareness in 8 weeks in order to assist with upright mobility and head position    -Patient will demonstrate a 2 or more point increase in the MiniBEST Test in 8 weeks in order to improve her abilities to balance dynamically    -Patient will improve UE MMT by 1 or more grades out of 5 in 8 weeks to help assist with postural muscles for upright activities       Cut off score   All date taken from [facility] or Rehab Measures    LUNDBERG test: 46/56                                              5 x STS Test:  MDC: 6 points                                                  MDC: 2 3 seconds   age norms                                                                 Age Norms   61-76 year old = M: 54, F: 54 61-76 year old: 11 4 seconds   66-77 year old = M 47,  F: 50                       71-76 year old: 12 6 seconds     80-80 year old = M46,   F: 53                       80-80 year old: 14 8 seconds      TUG test:                                                                     10 Meter Walk Test:  MDC: 4 14 seconds       MDC:  59 ft/sec  Cut off score for Falls                                                  Age Norms  > 13 5 seconds community dwelling adults                 20-29; M: 4 56 ft/sec F: 4 62 ft/sec  > 32 2 Frail Elderly                                                      30-39: M 4 76 ft/sec  F: 4 68 ft/sec          40-49: M: 4 79 ft/sec  F: 4 62 ft/sec  6 Minute Walk Test      50-59: M: 4 76 ft/sec  F: 4 56 ft/sec  MDC: 190 feet       60-69: M: 4 56 ft/sec  F: 4 26 ft/sec  Age Norms       70-+    M: 4 36 ft/sec  F: 4 16 ft/sec  60-69:    M: 1876 F: 1765  70-79:    M: 1729 F: 1545  80-89 +: M: 80 F; 1286         Plan  Patient would benefit from: PT eval  Planned modality interventions: biofeedback, cryotherapy, TENS and thermotherapy: hydrocollator packs  Planned therapy interventions: abdominal trunk stabilization, IADL retraining, activity modification, joint mobilization, manual therapy, ADL retraining, ADL training, massage, balance, motor coordination training, muscle pump exercises, balance/weight bearing training, body mechanics training, breathing training, canalith repositioning, neuromuscular re-education, postural training, patient education, coordination, strengthening, stretching, fine motor coordination training, functional ROM exercises, flexibility, therapeutic activities, therapeutic exercise, therapeutic training, transfer training, gait training, graded activity, graded exercise, graded motor and home exercise program  Frequency: 2x week  Duration in weeks: 16  Plan of Care beginning date: 11/6/2019  Plan of Care expiration date: 2/26/2020  Treatment plan discussed with: patient and family        Subjective Evaluation    History of Present Illness  Date of onset: 2019  Mechanism of injury: Patient is a 67year old female reporting to skilled PT with a diagnosis of Parkinson's Disease  Patient stated that she is continually having trouble with her cervical spine musculature strength, cervical collar continually donned  Patient challenged with her ambulation and balance as well, noting challenges with her abilities to ambulate safely  Patient has been using the RW as her mode of transportation, notes one fall since her last re-evaluation, patient needed staples due to hitting the back of her head  Patient reports 3-5 falls/week             Recurrent probem    Pain  Current pain ratin  At best pain rating: 3  At worst pain ratin  Location: Neck Pain  Quality: dull ache  Relieving factors: change in position  Aggravating factors: standing and sitting    Social Support  Steps to enter house: no  0  Stairs in house: yes   10  Lives with: spouse    Employment status: not working    Diagnostic Tests  No diagnostic tests performed  Treatments  No previous or current treatments  Patient Goals  Patient goals for therapy: independence with ADLs/IADLs, improved balance and return to sport/leisure activities  Patient goal: Patient wants to improve her balance to bowl         Objective     Static Posture     Comments  MCTSIB  -FTEO on firm- 30 seconds  -FTEC on firm- 5 seconds  -FTEO on foam- 0 seconds  -FTEC on Foam- 0 seconds    Bhat/56    Anticipatory Balance  -Anterior, posterior, medial, lateral- no balance reactions, fall would occur without PT intervention    Progress Note: 3/20/2019    mCTSIB  -FTEO on firm- 30 seconds  -FTEC on firm- 10 seconds  -FTEO on foam- 5 seconds  -FTEC on Foam- 0 seconds    Bhat/56    Anticipatory Balance  -Anterior, posterior, medial, lateral- no balance reactions, fall would occur without PT intervention    Progress Note: 2019    mCTSIB  -FTEO on firm- 30 seconds  -FTEC on firm- 12 seconds  -FTEO on foam- 8 seconds  -FTEC on Foam- 0 seconds    Baht/56    Anticipatory Balance  -Anterior, posterior, medial, lateral- no balance reactions, fall would occur without PT intervention    Progress Note- 2019      mCTSIB  -FTEO on firm- 30 seconds  -FTEC on firm- 15 seconds  -FTEO on foam- 8 seconds  -FTEC on Foam- 2 seconds    Bhat/56    Anticipatory Balance  -Anterior, posterior, medial, lateral- no balance reactions, fall would occur without PT intervention    Progress Note- 2019      mCTSIB  -FTEO on firm- 30 seconds  -FTEC on firm- 4 5 seconds  -FTEO on foam- 2 1 seconds  -FTEC on Foam- 0 seconds    Bhat/    Anticipatory Balance  -Anterior, posterior, medial, lateral- no balance reactions, fall would occur without PT intervention    Progress Note: 10/9/2019    mCTSIB  -FTEO on firm- 15 seconds  -FTEC on firm- 3 seconds  -FTEO on foam- 1 8 seconds  -FTEC on Foam- 0 seconds    Bhat/    Anticipatory Balance  -Anterior, posterior, medial, lateral- no balance reactions, fall would occur without PT intervention    Progress Note: 2019    mCTSIB  -FTEO on firm- 30 seconds  -FTEC on firm- 4 seconds  -FTEO on foam- 0 seconds  -FTEC on Foam- 0 seconds    Bhat/56    Anticipatory Balance  -Anterior, posterior, medial, lateral- no balance reactions, fall would occur without PT intervention    Mini-Best-     Postural Observations  Seated posture: poor  Standing posture: poor  Correction of posture: has no consistent effect    Additional Postural Observation Details  Patient challenged with upright posture due to pre-morbid severe scoliosis as well as cervical spine weakness due to botox injections    Resting Posture-     R Lateral flexion, R rotation, and excessive flexion, minimal to no anti-gravity musculature strength    Strength/Myotome Testing   Cervical Spine   Neck extension: 1  Neck flexion: 2-    Left   Neck lateral flexion (C3): 1    Right   Neck lateral flexion (C3): 1    Left Shoulder     Planes of Motion   Flexion: 3+   Extension: 3+   Abduction: 3+   Adduction: 3+     Right Shoulder     Planes of Motion   Flexion: 3+   Extension: 3+   Abduction: 3+   Adduction: 3+     Left Hip   Planes of Motion   Flexion: 3+  Extension: 3+  Abduction: 3+  Adduction: 3+    Right Hip   Planes of Motion   Extension: 4+  Abduction: 3+  Adduction: 3+    Left Knee   Flexion: 4  Extension: 4-    Right Knee   Flexion: 4  Extension: 4    Left Ankle/Foot   Dorsiflexion: 4-  Plantar flexion: 4    Right Ankle/Foot   Dorsiflexion: 4-  Plantar flexion: 4    Ambulation     Comments   Gait speed- 10 meters/ 15 01 seconds-   TUG Test- 21 02 Seconds  2 minute walk test- 200 feet  6 minute walk test- completed 4:40 seconds, 450 feet    Gait speed- 10 meters/seconds-  With SPC, meters/second without SPC      Not performed today due to fatigue, perform next session    Progress Note: 4/17/19  Gait speed- 10 meters/ 10 26seconds-  With SPC, meters/second without SPC  TUG Test- 17 seconds with SPC, 13 36 seconds without SPC    2 minute walk test- 250  feet  6 minute walk test- 750 Feet      Progress Note: 6/17/19  Gait speed- 10 meters/ 15 12 seconds-   66 meters/second With SPC,   73 meters/second without SPC  TUG Test- 18 35 seconds with SPC, 14 78 seconds without SPC    2 minute walk test- 250 feet  6 minute walk test-  Feet      Progress Note: 8/19/19  Gait Speed- 10 meters/17 45 seconds-  57 meters/second With SPC, 10 meters/ 18 56 second=  54 meters/second without SPC  -TUG- 19 05 seconds with SPC, 17 89 seconds without SPC    2 minute walk test- 150 feet  6 minute walk test- 450 feet    Progress Note: 10/9/19  Gait Speed- 10 meters/ 23 21 seconds-  43 meters/second With SPC, 10 meters/21 78 second=  46 meters/second with U step  TUG Test- 29 56 seconds with SPC, 28 23 seconds without SPC, 21 22 seconds with U step    2 minute walk test- 210  Feet with USTEP  6 minute walk test- 600 Feet with USTEP      Progress Note: 11/6/19  Gait Speed- 10 meters/ 22 14 seconds meters/second with RW-   45 meters/second  TUG Test- 39 96 seconds with RW    2 minute walk test- 250 Feet with RW  6 minute walk test- 550 Feet with RW      Functional Assessment        Comments  5x sit to stand- 12 50 seconds  30 second sit to stand- 10 reps (self terminated due to fatigue)     Progress Note: 3/20/2019  5x sit to stand- 11 06 seconds  30 second sit to stand- 11 reps    Progress Note:4/17/2019  5x sit to stand- 9 59 seconds  30 second sit to stand- 14 reps  DGI- 14/24    Progress Note: 6/17/2019  5x sit to stand- 8 94 seconds  30 second sit to stand- 16 reps  DGI- 11/24    Progress Note: 8/19/2019  5x sit to stand- 12 03 seconds  30 second sit to stand- 10 reps  DGI- 11/24    Progress Note: 10/9/2019  5x sit to stand- 29 41 seconds  30 second sit to stand- 5 reps  DGI- 9/24    Progress Note: 11/6/2019  5x sit to stand- 26 23 seconds  30 second sit to stand- 6 reps  DGI- 9/24    Extensive cuing needed for hand placement for the sit to stand portion         Gait training with U Step- 15 minutes with instruction    Precautions: Parkinson's disease, fall risk     Daily Treatment Diary     Manual                                                                                   Exercise Diary                                                                                                                                                                                                                                                                                      Modalities Patient/Caregiver provided printed discharge information.

## 2019-11-08 ENCOUNTER — OFFICE VISIT (OUTPATIENT)
Dept: SPEECH THERAPY | Facility: CLINIC | Age: 79
End: 2019-11-08
Payer: MEDICARE

## 2019-11-08 ENCOUNTER — OFFICE VISIT (OUTPATIENT)
Dept: FAMILY MEDICINE CLINIC | Facility: CLINIC | Age: 79
End: 2019-11-08
Payer: MEDICARE

## 2019-11-08 ENCOUNTER — OFFICE VISIT (OUTPATIENT)
Dept: PHYSICAL THERAPY | Facility: CLINIC | Age: 79
End: 2019-11-08
Payer: MEDICARE

## 2019-11-08 VITALS
SYSTOLIC BLOOD PRESSURE: 124 MMHG | HEART RATE: 88 BPM | BODY MASS INDEX: 18.1 KG/M2 | DIASTOLIC BLOOD PRESSURE: 62 MMHG | TEMPERATURE: 96.8 F | OXYGEN SATURATION: 99 % | WEIGHT: 89.6 LBS

## 2019-11-08 DIAGNOSIS — Z48.02 VISIT FOR SUTURE REMOVAL: Primary | ICD-10-CM

## 2019-11-08 DIAGNOSIS — R26.2 IMPAIRED AMBULATION: ICD-10-CM

## 2019-11-08 DIAGNOSIS — G20 PARKINSON'S DISEASE (HCC): Primary | ICD-10-CM

## 2019-11-08 DIAGNOSIS — F02.80 DEMENTIA ASSOCIATED WITH OTHER UNDERLYING DISEASE WITHOUT BEHAVIORAL DISTURBANCE (HCC): ICD-10-CM

## 2019-11-08 DIAGNOSIS — L08.9 SKIN INFECTION: ICD-10-CM

## 2019-11-08 PROCEDURE — 92507 TX SP LANG VOICE COMM INDIV: CPT | Performed by: SPEECH-LANGUAGE PATHOLOGIST

## 2019-11-08 PROCEDURE — 99213 OFFICE O/P EST LOW 20 MIN: CPT | Performed by: FAMILY MEDICINE

## 2019-11-08 PROCEDURE — 97110 THERAPEUTIC EXERCISES: CPT

## 2019-11-08 PROCEDURE — 97530 THERAPEUTIC ACTIVITIES: CPT

## 2019-11-08 RX ORDER — CEPHALEXIN 500 MG/1
500 CAPSULE ORAL EVERY 12 HOURS SCHEDULED
Qty: 14 CAPSULE | Refills: 0 | Status: SHIPPED | OUTPATIENT
Start: 2019-11-08 | End: 2019-11-15

## 2019-11-08 NOTE — PROGRESS NOTES
[id number] [address] Note  Malinda Erickson, [facility]     Iwona Joiner MRN: [id number] : 1940 Age: 67 y o  Assessment/Plan        1  Visit for suture removal     2  Skin infection  cephalexin (KEFLEX) 500 mg capsule       Sutures from repaired scalp laceration removed, significant amount of blood-tinged yellow pus expressed  Wound was cleaned and left open, patient informed it will likely continue to drain and to continue to keep area clean  Given concern for infection have sent antibiotics to pharmacy, return next week for re-evaluation or sooner if symptoms worsen  Iwona Joiner acknowledged understanding of treatment plan, all questions answered  Plan discussed with attending physician Dr Valarie Rico  Subjective      Iwona Joiner is a 67 y o  female  Presents for suture removal  Had a fall on 10/29/19, seen in ED, had sutures placed for scalp laceration  CT head showed no acute abnormality  She has been feeling well and doing her best to keep the area clean  States she initially had some bleeding, but none recently  Denies any pain or discomfort        Current Outpatient Medications   Medication Sig Dispense Refill    ACCU-CHEK COMPACT PLUS test strip Use daily 100 each 99    ACCU-CHEK SOFTCLIX LANCETS lancets Use as instructed 100 each 99    Amantadine HCl ER (GOCOVRI) 137 MG CP24 Take 1 capsule by mouth      amLODIPine (NORVASC) 10 mg tablet Take 0 5 tablets (5 mg total) by mouth daily 45 tablet 3    aspirin (ECOTRIN LOW STRENGTH) 81 mg EC tablet Take 81 mg by mouth daily      Carbidopa-Levodopa ER 48  MG CPCR Take 1 capsule by mouth every 4 (four) hours       cephalexin (KEFLEX) 500 mg capsule Take 1 capsule (500 mg total) by mouth every 12 (twelve) hours for 7 days 14 capsule 0    diclofenac sodium (VOLTAREN) 1 % Apply 2 g topically 4 (four) times a day 100 g 3    Glucose Blood (COOL BLOOD GLUCOSE TEST STRIPS VI) As directed       Glucose Blood (COOL BLOOD GLUCOSE TEST STRIPS VI) As directed      glucose monitoring kit (FREESTYLE) monitoring kit 1 each by Does not apply route as needed (DM control) ACCUCHEK METER COMPACT PLUS 1 each 1    metFORMIN (GLUCOPHAGE) 500 mg tablet TAKE 1 TABLET BY MOUTH  DAILY WITH BREAKFAST (Patient taking differently: 500 mg daily with breakfast ) 90 tablet 3    Multiple Vitamin (MULTIVITAMIN) tablet Take 1 tablet by mouth daily      Omega-3 Fatty Acids (FISH OIL PO) Take 2 g by mouth daily      pantoprazole (PROTONIX) 40 mg tablet Take 1 tablet (40 mg total) by mouth 2 (two) times a day for 30 days 60 tablet 3    pantoprazole (PROTONIX) 40 mg tablet Take 1 tablet by mouth 2 (two) times a day  0    rasagiline (AZILECT) 1 MG 1 mg daily       rOPINIRole (REQUIP) 2 mg tablet Take 6 mg by mouth daily at bedtime        No current facility-administered medications for this visit  Objective      /62   Pulse 88   Temp (!) 96 8 °F (36 °C)   Wt 40 6 kg (89 lb 9 6 oz)   SpO2 99%   BMI 18 10 kg/m²     Physical Exam   Constitutional: She is oriented to person, place, and time  She appears well-developed and well-nourished  HENT:   Head: Normocephalic    2cm diameter scabbed over lesion to posterior scalp with 5 sutures in place  On initial exam, area was fluctuant, with significant expression of blood-tinged yellow pus after removal of the first suture  No expanding area of redness, very mildly tender to palpation  Eyes: Conjunctivae are normal    Pulmonary/Chest: Effort normal  No respiratory distress  Neurological: She is alert and oriented to person, place, and time  Skin: Skin is warm and dry  Vitals reviewed          Suture removal  Date/Time: 11/8/2019 1:00 PM  Performed by: Beronica Machado DO  Authorized by: Beronica Machado DO     Patient location:  Clinic  Consent:     Consent obtained:  Verbal    Consent given by:  Patient  Universal protocol:     Procedure explained and questions answered to patient or proxy's satisfaction: yes    Location:     Location:  Head/neck    Head/neck location:  Scalp  Procedure details: Tools used:  Suture removal kit    Wound appearance:  Draining and purulent    Drainage characteristics:  Blood-tinged, yellow pus    Number of sutures removed:  5  Post-procedure details:     Post-removal:  No dressing applied    Patient tolerance of procedure: Tolerated well, no immediate complications        Some portions of this record may have been generated with voice recognition software  There may be translation, syntax, or grammatical errors  Occasional wrong word or "sound-a-like" substitutions may have occurred due to the inherent limitations of the voice recognition software  Read the chart carefully and recognize, using context, where substations may have occurred  If you have any questions, please contact the dictating provider for clarification or correction, as needed

## 2019-11-08 NOTE — PROGRESS NOTES
Speech Treatment Note    Today's date: 2019  Patient name: Blanquita Briseno  : 1940  MRN: [id number]  Referring provider: No ref  provider found  Dx:   Encounter Diagnosis     ICD-10-CM    1  Parkinson's disease ([facility] [address] ) G20    2  Dementia associated with other underlying disease without behavioral disturbance ([facility] [address] ) F02 80                   Visit Number:[id number]  Plan of Care expires: 2019  Pain level:0/10  NEXT PROGRESS NOTE DUE: 2019  Bill with KX     Subjective/Behavioral: Seen for speech therapy  She ambulated to the office with a rolling walker  Reports recent fall  She was late for today's session and was seen for 30 minutes  Goal 1: The patient will coordinate respiration with phonation with 80% accuracy at word and phrase level  Diaphragmatic breathing continues  at 50% with maximum clinician modeling  The patient was encouraged to engage in slow deep breathing  Results similar to last session  Goal 2: The Patient will use vowel prolongation at word and phrase level to help reduce rate of speech with 80% accuracy  Not addressed this session  Goal 3: The patient will use over articulation in conjunction with increased vocal volume to produce words and phrases with 80% accuracy or higher  A list of functional phrases was reviewed  When over articulation was combined with prompts to "talk louder" intelligibility at phrase level was at 90% accuracy  Spontaneous intelligibility was at approximately 60%- 65% accuracy  due to low volume and rapid rate of speech  Results similar to  last session  Rate of speech was much better when prompted to "talk louder " Vocal quality was excellent, with no signs of hyperfunctional voice  Although patient is able to increase loudness, it's still difficult for her to be heard when background noise is present  Her overall posture and head position limit vocal projection  Goal 4: Educate family regarding compensatory voice techniques and strategies  Not addressed this session  Other:Reviewed testing and plan of care with patient  Patient is in agreement with POC at this time    Recommendations:Continue with Plan of Care

## 2019-11-08 NOTE — PROGRESS NOTES
Daily Note     Today's date: 2019  Patient name: Shahida Harper  : 1940  MRN: [id number]  Referring provider: Meghan Crabtree MD  Dx:   Encounter Diagnosis     ICD-10-CM    1  Parkinson's disease ([facility] [address] ) G20    2  Impaired ambulation R26 2        Start Time: 1030  Stop Time: 1115  Total time in clinic (min): 45 minutes     Subjective: Patient reported to PT from Speech therapy today, noted 1 fall this morning due to moving in the backwards direction even though she is aware that she should not be doing this motion  Objective: See treatment diary below- Cervical Collar donned for all interventions    Seated Long arc quads- 30 reps, 3 second holds, 3 lb cuff weights  Step taps- Forward, laterally- 6" steps- 20 reps each direction, 2 UE support, 3 lb cuff weights  Step Ups- Forward, laterally- 6" steps- 20 reps each direction, 2 UE support, 3 lb cuff weights  Seated Hip Flexion- 20 reps, 3 second holds, 3 lb cuff weights  Seated Hip Abduction- 20 reps, 3 second holds, ball squeeze  Seated Scapular retractions- 20 reps, 3 second holds with towel roll biofeedback  [employer]- Seated, hit the floor, hit behind with upright posture- 20 reps   [employer]- Seated, rotation, hit across for trunk extension   Seated Abduction with theraband- green theraband, 20 reps, 3 second hold  Sit to stands with momentum control- 20 reps, 2 UE on hand rails for seat with walker in front    Ambulation with Rolling walker for standing to sitting control and safety- 5 reps, extensive cuing needed  Step practice- 8" step, 1 UE, 30 reps ascending and descending  Assessment: Patient able to tolerate treatment session well today, demonstrating increased control in the seated position  Patient challenged with improving her safety ascending steps dueto posterior balance deficits, patient informed to keep her weight forward to prevent posterior LOB     Patient will continue to benefit from skilled outpatient PT to address her balance and strength deficits, improve postural stability, and gait train to decrease number of falls and maximize her function with PD diagnosis  Plan: Continue per plan of care  Progress treatment as tolerated

## 2019-11-11 ENCOUNTER — APPOINTMENT (OUTPATIENT)
Dept: PHYSICAL THERAPY | Facility: CLINIC | Age: 79
End: 2019-11-11
Payer: MEDICARE

## 2019-11-11 ENCOUNTER — TELEPHONE (OUTPATIENT)
Dept: FAMILY MEDICINE CLINIC | Facility: CLINIC | Age: 79
End: 2019-11-11

## 2019-11-13 ENCOUNTER — OFFICE VISIT (OUTPATIENT)
Dept: PHYSICAL THERAPY | Facility: CLINIC | Age: 79
End: 2019-11-13
Payer: MEDICARE

## 2019-11-13 DIAGNOSIS — G20 PARKINSON'S DISEASE (HCC): Primary | ICD-10-CM

## 2019-11-13 DIAGNOSIS — R26.2 IMPAIRED AMBULATION: ICD-10-CM

## 2019-11-13 PROCEDURE — 97116 GAIT TRAINING THERAPY: CPT

## 2019-11-13 PROCEDURE — 97110 THERAPEUTIC EXERCISES: CPT

## 2019-11-13 PROCEDURE — 97112 NEUROMUSCULAR REEDUCATION: CPT

## 2019-11-13 NOTE — PROGRESS NOTES
Daily Note     Today's date: 2019  Patient name: Zoey Montero  : 1940  MRN: [id number]  Referring provider: Steve Leyva MD  Dx:   Encounter Diagnosis     ICD-10-CM    1  Parkinson's disease ([facility] [address] ) G20    2  Impaired ambulation R26 2        Start Time: 427  Stop Time: 1200   Total time in clinic (min): 43 minutes     Subjective: Patient reported to PT from Speech therapy today, noted 1 fall this morning due to moving in the backwards direction even though she is aware that she should not be doing this motion  Objective: See treatment diary below- Cervical Collar donned for all interventions    Seated Long arc quads- 30 reps, 3 second holds, 3 lb cuff weights  Seated Neck Extension Active contraction- 20 reps, 3 second holds   Seated Neck Extension with Active Scapular retraction- 20 reps, 3 second holds  Seated Neck Extension with PT min assist x1- 20 reps, 3 second hold  Seated Hip Abduction- black theraband- 20 reps, 3 second hold  Seated Upper Trapezius activation- 30 reps, 3 second hold  Step taps- Forward, laterally- 6" steps- 20 reps each direction, 2 UE support, 3 lb cuff weights  Step Ups- Forward, laterally- 6" steps- 20 reps each direction, 2 UE support, 3 lb cuff weights  Seated Hip Flexion- 30 reps, 3 second holds, 3 lb cuff weights  Seated Scapular retractions- 20 reps, 3 second holds with towel roll biofeedback  [employer]- Seated, hit the floor, hit behind with upright posture- 20 reps   [employer]- Seated, rotation, hit across for trunk extension   Seated Abduction with theraband- green theraband, 20 reps, 3 second hold  Sit to stands with momentum control- 20 reps, 2 UE on hand rails for seat with walker in front    Ambulation with Rolling walker with black theraband tied around back for biofeedback for walker control- 300 feet      Assessment: Patient able to tolerate treatment session well today, session focused around cervical spine strengthening   Patient challenged with cervical extension, patient required min assist of 1 to improve her cervical spine ROM and strength, due to her premorbid dystonia as well as her weakness from her botox  Patient and  educated on black theraband around RW, patient demonstrated adequate performance today with better distance control of walker, patient and  asked to remove band if unsteadiness increases, patient and  verbalized understanding  Patient will continue to benefit from skilled outpatient PT to address her balance and strength deficits, improve postural stability, and gait train to decrease number of falls and maximize her function with PD diagnosis  Plan: Continue per plan of care  Progress treatment as tolerated

## 2019-11-14 ENCOUNTER — HOSPITAL ENCOUNTER (OUTPATIENT)
Dept: RADIOLOGY | Facility: HOSPITAL | Age: 79
Discharge: HOME | End: 2019-11-14
Attending: FAMILY MEDICINE
Payer: MEDICARE

## 2019-11-14 DIAGNOSIS — M48.061 SPINAL STENOSIS, LUMBAR REGION, WITHOUT NEUROGENIC CLAUDICATION: ICD-10-CM

## 2019-11-14 PROCEDURE — 72132 CT LUMBAR SPINE W/DYE: CPT

## 2019-11-14 RX ADMIN — IODIXANOL 85 ML: 320 INJECTION, SOLUTION INTRAVASCULAR at 11:19

## 2019-11-15 ENCOUNTER — TELEPHONE (OUTPATIENT)
Dept: FAMILY MEDICINE CLINIC | Facility: CLINIC | Age: 79
End: 2019-11-15

## 2019-11-15 ENCOUNTER — OFFICE VISIT (OUTPATIENT)
Dept: FAMILY MEDICINE CLINIC | Facility: CLINIC | Age: 79
End: 2019-11-15
Payer: MEDICARE

## 2019-11-15 ENCOUNTER — OFFICE VISIT (OUTPATIENT)
Dept: PHYSICAL THERAPY | Facility: CLINIC | Age: 79
End: 2019-11-15
Payer: MEDICARE

## 2019-11-15 VITALS
HEART RATE: 89 BPM | SYSTOLIC BLOOD PRESSURE: 120 MMHG | OXYGEN SATURATION: 96 % | BODY MASS INDEX: 18.28 KG/M2 | WEIGHT: 90.5 LBS | TEMPERATURE: 97.3 F | DIASTOLIC BLOOD PRESSURE: 68 MMHG

## 2019-11-15 DIAGNOSIS — S01.01XD LACERATION OF SCALP, SUBSEQUENT ENCOUNTER: Primary | ICD-10-CM

## 2019-11-15 DIAGNOSIS — R26.2 IMPAIRED AMBULATION: ICD-10-CM

## 2019-11-15 DIAGNOSIS — S32.009A CLOSED FRACTURE OF TRANSVERSE PROCESS OF LUMBAR VERTEBRA, INITIAL ENCOUNTER (HCC): Primary | ICD-10-CM

## 2019-11-15 DIAGNOSIS — G20 PARKINSON'S DISEASE (HCC): Primary | ICD-10-CM

## 2019-11-15 PROBLEM — S01.01XA SCALP LACERATION: Status: ACTIVE | Noted: 2019-11-15

## 2019-11-15 PROCEDURE — 97530 THERAPEUTIC ACTIVITIES: CPT

## 2019-11-15 PROCEDURE — 97110 THERAPEUTIC EXERCISES: CPT

## 2019-11-15 PROCEDURE — 99213 OFFICE O/P EST LOW 20 MIN: CPT | Performed by: FAMILY MEDICINE

## 2019-11-15 NOTE — TELEPHONE ENCOUNTER
Regarding CT spine: IMPRESSION:     Subacute fractures of the left L1 and L2 transverse processes  The fractures are new when compared to the July 18, 2019 and CT abdomen and pelvis study  Correlate for history of subacute trauma      Marked levoscoliotic curvature to the thoracolumbar spine with mild spondylolisthesis and subluxation      Advanced degenerative discogenic disease throughout the thoracolumbar spine with endplate osteophytic spurring, more notably on the right than left resulting in variable degrees of foraminal stenosis, most pronounced at L3-L4, correlate for ipsilateral   L3 radiculopathy      Moderate spinal stenosis at L3-L4 and L4-L5      The study was marked in EPIC for significant notification  I also reviewed labs  She is has no UTI symptoms, so will not treat for UTI despite UA findings of + bacteria and WBCs    NO fever or flank pain  She had scalp laceration sutures removed  Had a  little pus and a scab  Taking an antibiotic for this  She is [place] a [employer]  States her back feels 'alright'  No numbness or weakness down legs  No other red flag symptoms  Her next appt with me is January 21st  She is to come in to see us in about 1 - 2 weeks for a follow up  Report any UTI sx or fever or any concerning new spine symptoms

## 2019-11-15 NOTE — PROGRESS NOTES
Assessment/Plan:    Wound does not appear acutely infected at this time  Advised to apply bacitracin ointment to wound, keep dry, and clean, and restart antibiotic if wound develops pain, drainage, or swelling  Diagnoses and all orders for this visit:    Laceration of scalp, subsequent encounter          Subjective:      Patient ID: General Jimenez is a 67 y o  female  67year old female with parkinson's disease who presents for follow-up scalp laceration after sustaining fall on 10/29  Patient was seen for suture removal on 11/8 and puss was epressed from wound  Patient prescribed Keflex, however admits to not taking the medication due to "not likeing to take pills"  Since then reports wound has scabbed over, however does report whitish drainage on pillow 2 days prior  Denies any pain or fever  The following portions of the patient's history were reviewed and updated as appropriate: allergies, current medications, past family history, past medical history, past social history, past surgical history and problem list     Review of Systems   Constitutional: Negative for chills, fatigue and fever  HENT: Negative for congestion, rhinorrhea and sore throat  Eyes: Negative for visual disturbance  Respiratory: Negative for shortness of breath and wheezing  Cardiovascular: Negative for chest pain and palpitations  Gastrointestinal: Negative for abdominal pain, diarrhea, nausea and vomiting  Genitourinary: Negative for dysuria  Musculoskeletal: Negative for arthralgias  Neurological: Negative for dizziness, weakness and light-headedness  Psychiatric/Behavioral: Negative for suicidal ideas  Objective:      /68 (BP Location: Left arm, Patient Position: Sitting, Cuff Size: Child)   Pulse 89   Temp (!) 97 3 °F (36 3 °C) (Tympanic)   Wt 41 1 kg (90 lb 8 oz)   SpO2 96%   BMI 18 28 kg/m²          Physical Exam   Constitutional: She is oriented to person, place, and time   She appears well-developed and well-nourished  No distress  HENT:   4cm x 4cm scabbed over healing wound on posterior scalp, no drainage, erythema, or swelling   Cardiovascular: Normal rate, regular rhythm, normal heart sounds and intact distal pulses  Exam reveals no gallop and no friction rub  No murmur heard  Pulmonary/Chest: Effort normal and breath sounds normal  No stridor  No respiratory distress  She has no wheezes  She has no rales  Abdominal: Soft  Bowel sounds are normal  She exhibits no distension  There is no tenderness  There is no guarding  Neurological: She is alert and oriented to person, place, and time  Skin: Capillary refill takes less than 2 seconds  She is not diaphoretic  Psychiatric: She has a normal mood and affect   Her behavior is normal

## 2019-11-15 NOTE — PROGRESS NOTES
Daily Note     Today's date: 2019  Patient name: Yaquelin Hernandes  : 1940  MRN: [id number]  Referring provider: Marika Vinson MD  Dx:   Encounter Diagnosis     ICD-10-CM    1  Parkinson's disease ([facility] [address] ) G20    2  Impaired ambulation R26 2        Start Time: 1115  Stop Time: 1200   Total time in clinic (min): 43 minutes     Subjective: Patient reported to PT today reporting fatigue, patient reported that she fell yesterday moving backwards, patient stated that she removed her black band because she "didn't like it"  Objective: See treatment diary below- Cervical Collar donned for all interventions    Seated Long arc quads- 30 reps, 3 second holds, 3 lb cuff weights  Seated Neck Extension Active contraction- 20 reps, 3 second holds   Seated Neck Extension with Active Scapular retraction- 20 reps, 3 second holds  Seated Neck Extension with PT min assist x1- 20 reps, 3 second hold  Seated Hip Abduction- black theraband- 20 reps, 3 second hold  Seated Core Rotations with cone stacking- 20 reps with upward elevation of cervical spine   Seated Upper Trapezius activation- 30 reps, 3 second hold, cervical active extension noted  Step taps- Forward, laterally- 6" steps- 20 reps each direction, 2 UE support, 3 lb cuff weights  Step Ups- Forward, laterally- 6" steps- 20 reps each direction, 2 UE support, 3 lb cuff weights  Seated Hip Flexion- 30 reps, 3 second holds, 3 lb cuff weights  Seated Scapular retractions- 20 reps, 3 second holds with towel roll biofeedback  Standing step over holding walker- color dots, 6" gena, 20 reps bilaterally       Assessment: Patient able to tolerate treatment session well today, session focused around cervical muscular strength today   Patient improving her cervical spine anti-gravity strength to improve and activate cervical extensors today with cone stacking and cervical extension, although weakness continually evident today and continues to contribute to her fall risk and limitations in ZEYAD  Patient will continue to benefit from skilled outpatient PT to address her balance and strength deficits, improve postural stability, and gait train to decrease number of falls and maximize her function with PD diagnosis  Plan: Continue per plan of care  Progress treatment as tolerated

## 2019-11-18 VITALS — HEIGHT: 59 IN | WEIGHT: 90 LBS | BODY MASS INDEX: 18.14 KG/M2

## 2019-11-18 NOTE — PRE-PROCEDURE INSTRUCTIONS
Pre-Surgery Instructions:   Medication Instructions    ACCU-CHEK COMPACT PLUS test strip Patient was instructed by Physician and understands   ACCU-CHEK SOFTCLIX LANCETS lancets Patient was instructed by Physician and understands   Amantadine HCl ER (GOCOVRI) 137 MG CP24 Patient was instructed by Physician and understands   amLODIPine (NORVASC) 10 mg tablet Patient was instructed by Physician and understands   aspirin (ECOTRIN LOW STRENGTH) 81 mg EC tablet Patient was instructed by Physician and understands   Carbidopa-Levodopa ER 48  MG CPCR Patient was instructed by Physician and understands   diclofenac sodium (VOLTAREN) 1 % Patient was instructed by Physician and understands   glucose monitoring kit (FREESTYLE) monitoring kit Patient was instructed by Physician and understands   metFORMIN (GLUCOPHAGE) 500 mg tablet Patient was instructed by Physician and understands   Multiple Vitamin (MULTIVITAMIN) tablet Patient was instructed by Physician and understands   Omega-3 Fatty Acids (FISH OIL PO) Patient was instructed by Physician and understands   pantoprazole (PROTONIX) 40 mg tablet Patient was instructed by Physician and understands   rasagiline (AZILECT) 1 MG Patient was instructed by Physician and understands   rOPINIRole (REQUIP) 2 mg tablet Patient was instructed by Physician and understands   TURMERIC PO Patient was instructed by Physician and understands  Pt to follow Dr Alison Mayes instructions  Pt to take blood sugar,azilect,rytary,amlodipine the am of the procedure    Zackia Holter

## 2019-11-19 ENCOUNTER — HOSPITAL ENCOUNTER (OUTPATIENT)
Dept: GASTROENTEROLOGY | Facility: AMBULARY SURGERY CENTER | Age: 79
Setting detail: OUTPATIENT SURGERY
Discharge: HOME | End: 2019-11-19
Attending: INTERNAL MEDICINE

## 2019-11-19 DIAGNOSIS — K26.5 PERFORATED DUODENAL ULCER (HCC): ICD-10-CM

## 2019-11-19 RX ORDER — SODIUM CHLORIDE, SODIUM LACTATE, POTASSIUM CHLORIDE, CALCIUM CHLORIDE 600; 310; 30; 20 MG/100ML; MG/100ML; MG/100ML; MG/100ML
125 INJECTION, SOLUTION INTRAVENOUS CONTINUOUS
Status: CANCELLED | OUTPATIENT
Start: 2019-12-13

## 2019-11-19 NOTE — H&P
History and Physical - [facility]  Maisha Moreno 67 y o  female MRN: [id number]    HPI: Maisha Moreno is a 67y o  year old female who presents for evaluation of peptic ulcer disease, has history of perforated peptic ulcer status post Gloriajean Kill patch  Review of Systems    Historical Information   Past Medical History:   Diagnosis Date    RODRIGO (acute kidney injury) ([facility] [address] ) 7/19/2019    Anal bleeding 1/29/2018    Arthritis     knee    Asthma     BPPV (benign paroxysmal positional vertigo) 7/19/2016    Last Assessment & Plan:  Hx consistent with BPPV  Neg [place], however could not perform it adequately due to dyskinesias  Recommend lozano-daroff exercises at home  IF worsens, vestibular therapy      Bulging lumbar disc 12/10/2013    Closed fracture of one rib of left side 1/29/2018    Colon polyp     Dementia ([facility] [address] )     Diabetes mellitus [facility] )     TYPE 2    Fall     balance issue    Gallbladder disease     GERD (gastroesophageal reflux disease)     Hematuria     last assessed 10/29/15    Hypertension     Orthostatic hypotension 7/13/2015    Ovarian cyst     LAST ASSESSED: 12/10/13    Parkinson's disease ([facility] [address] )     Pneumonia of both lower lobes due to infectious organism ([facility] ) 3/27/2018    Pulmonary embolism with infarction ([facility] ) 9/2/2014    Sciatica 12/10/2013    Skin disorder     last assessed 12/10/13    Squamous cell carcinoma of skin 11/21/2016    Use of cane as ambulatory aid    Magdalene Age as ambulation aid     Wears glasses      Past Surgical History:   Procedure Laterality Date    APPENDECTOMY      1970'S    CATARACT EXTRACTION      CHOLECYSTECTOMY      1970'S    COLONOSCOPY      LAPAROTOMY N/A 7/18/2019    Procedure: LAPAROTOMY EXPLORATORY;  Surgeon: Shannon Dykes MD;  Location: [address] [place] [address];  Service: General    NEUROPLASTY / [address] Right     OOPHORECTOMY Bilateral     REMOVAL OF BOTH OVARIES LAPAROSCOPIC     Ivone Govea FLX W/RMVL OF TUMOR POLYP LESION SNARE TQ N/A 3/20/2018    Procedure: COLONOSCOPY;  Surgeon: Carol Salazar MD;  Location: [facility] GI LAB; Service: Gastroenterology     [facility] CATARACT EXTRACAP,INSERT LENS Right 12/4/2018    Procedure: EXTRACTION EXTRACAPSULAR CATARACT PHACO INTRAOCULAR LENS (IOL); Surgeon: Marilu Cullen MD;  Location: [facility] MAIN OR;  Service: Ophthalmology     [facility] CATARACT EXTRACAP,INSERT LENS  1/15/2019    Procedure: EXTRACTION EXTRACAPSULAR CATARACT PHACO INTRAOCULAR LENS (IOL); Surgeon: Marilu Cullen MD;  Location: [facility] MAIN OR;  Service: Ophthalmology    TONSILLECTOMY      VENTRAL HERNIA REPAIR      WITH IMPLANT OF MESH     Social History   Social History     Substance and Sexual Activity   Alcohol Use Not Currently    Frequency: Patient refused    Drinks per session: Patient refused    Binge frequency: Never     Social History     Substance and Sexual Activity   Drug Use Never     Social History     Tobacco Use   Smoking Status Never Smoker   Smokeless Tobacco Never Used     Family History   Problem Relation Age of Onset    Alzheimer's disease Mother     Stroke Father     No Known Problems Sister     No Known Problems Brother        Meds/Allergies       (Not in a hospital admission)    No Known Allergies    Objective     Ht 4' 11" (1 499 m)   Wt 40 8 kg (90 lb)   BMI 18 18 kg/m²       PHYSICAL EXAM    Gen: NAD  CV: RRR  CHEST: Clear  ABD: soft, NT/ND  EXT: no edema  Neuro: AAO      ASSESSMENT/PLAN:  This is a 67y o  year old female here for evaluation of peptic ulcer disease  PLAN:   Procedure:  EGD

## 2019-11-20 ENCOUNTER — OFFICE VISIT (OUTPATIENT)
Dept: PHYSICAL THERAPY | Facility: CLINIC | Age: 79
End: 2019-11-20
Payer: MEDICARE

## 2019-11-20 ENCOUNTER — TELEPHONE (OUTPATIENT)
Dept: GASTROENTEROLOGY | Facility: CLINIC | Age: 79
End: 2019-11-20

## 2019-11-20 ENCOUNTER — OFFICE VISIT (OUTPATIENT)
Dept: FAMILY MEDICINE CLINIC | Facility: CLINIC | Age: 79
End: 2019-11-20
Payer: MEDICARE

## 2019-11-20 VITALS
BODY MASS INDEX: 17.42 KG/M2 | HEART RATE: 83 BPM | RESPIRATION RATE: 18 BRPM | SYSTOLIC BLOOD PRESSURE: 88 MMHG | DIASTOLIC BLOOD PRESSURE: 48 MMHG | WEIGHT: 86.25 LBS | TEMPERATURE: 96.1 F | OXYGEN SATURATION: 96 %

## 2019-11-20 DIAGNOSIS — R26.2 IMPAIRED AMBULATION: ICD-10-CM

## 2019-11-20 DIAGNOSIS — G20 PARKINSON'S DISEASE (HCC): Primary | ICD-10-CM

## 2019-11-20 DIAGNOSIS — S01.01XD LACERATION OF SCALP, SUBSEQUENT ENCOUNTER: Primary | ICD-10-CM

## 2019-11-20 DIAGNOSIS — S01.01XD SCALP LACERATION, SUBSEQUENT ENCOUNTER: ICD-10-CM

## 2019-11-20 PROBLEM — S01.01XA LACERATION OF SCALP: Status: ACTIVE | Noted: 2019-11-20

## 2019-11-20 PROBLEM — S01.02XD: Status: ACTIVE | Noted: 2019-11-15

## 2019-11-20 PROCEDURE — 99213 OFFICE O/P EST LOW 20 MIN: CPT | Performed by: FAMILY MEDICINE

## 2019-11-20 PROCEDURE — 97530 THERAPEUTIC ACTIVITIES: CPT

## 2019-11-20 PROCEDURE — 97110 THERAPEUTIC EXERCISES: CPT

## 2019-11-20 RX ORDER — CEPHALEXIN 500 MG/1
500 CAPSULE ORAL EVERY 12 HOURS SCHEDULED
Qty: 20 CAPSULE | Refills: 0 | Status: SHIPPED | OUTPATIENT
Start: 2019-11-20 | End: 2019-11-30

## 2019-11-20 NOTE — PROGRESS NOTES
Assessment/Plan:       Problem List Items Addressed This Visit        Other    Scalp laceration, subsequent encounter - Primary     · Patient admitted to subjective fever  · Healing wound with surrounding erythema and yellow crusted drainage  · Sutures not staples were used for repair  · F/u in office in about 1 week for healing status  Assess for sinus formation  · Healing secondary intention  · recommended Keflex course 7-10 days  · C/w bacitracin, keep clean and dry         Relevant Medications    cephalexin (KEFLEX) 500 mg capsule            Subjective:      Patient ID: Zoey Montero is a 67 y o  female  HPI    The following portions of the patient's history were reviewed and updated as appropriate: allergies, current medications, past medical history and problem list     81-year-old female presents for follow-up scalp laceration  Patient sustained fall on 10/29  Sutures were removed on 11/18, pus was expressed from the wound at the time, patient subsequently placed on course of Keflex but was not adherent to course  At last visit on 11/15 wound was noted to not appear acutely infected  Patient was instructed to apply bacitracin ointment topically and keep wound clean and dry  Patient was further instructed to restart antibiotic if signs or symptoms of infection   who is present with patient has bene tending to wound, denies any associated warmth, erythema, or drainage   has been applying bacitracin  Patient admits to subjective fever and tenderness near wound, took 2 Tylenol last night  Patient states she fell on carpet floor last night on carpet on back, did not hit head  Past Medical History:   Diagnosis Date    RODRIGO (acute kidney injury) ([facility] [address] ) 7/19/2019    Anal bleeding 1/29/2018    Arthritis     knee    Asthma     BPPV (benign paroxysmal positional vertigo) 7/19/2016    Last Assessment & Plan:  Hx consistent with BPPV    Neg Alexsander, however could not perform it adequately due to dyskinesias  Recommend lozano-daroff exercises at home  IF worsens, vestibular therapy   Bulging lumbar disc 12/10/2013    Closed fracture of one rib of left side 1/29/2018    Colon polyp     Dementia ([facility] [address] )     Diabetes mellitus ([facility] [address] )     TYPE 2    Fall     balance issue    Gallbladder disease     GERD (gastroesophageal reflux disease)     Hematuria     last assessed 10/29/15    Hypertension     Orthostatic hypotension 7/13/2015    Ovarian cyst     LAST ASSESSED: 12/10/13    Parkinson's disease ([facility] [address] )     Pneumonia of both lower lobes due to infectious organism ([facility] [address] ) 3/27/2018    Pulmonary embolism with infarction ([facility] [address] ) 9/2/2014    Sciatica 12/10/2013    Skin disorder     last assessed 12/10/13    Squamous cell carcinoma of skin 11/21/2016    Use of cane as ambulatory aid    Elham Sails as ambulation aid     Wears glasses      Past Surgical History:   Procedure Laterality Date    APPENDECTOMY      1970'S    CATARACT EXTRACTION      CHOLECYSTECTOMY      1970'S    COLONOSCOPY      LAPAROTOMY N/A 7/18/2019    Procedure: LAPAROTOMY EXPLORATORY;  Surgeon: Ulises Montaño MD;  Location: [address] [place] [address];  Service: General    NEUROPLASTY / [address] Right     OOPHORECTOMY Bilateral     REMOVAL OF BOTH OVARIES LAPAROSCOPIC     [facility] FLX W/RMVL OF TUMOR POLYP LESION SNARE TQ N/A 3/20/2018    Procedure: COLONOSCOPY;  Surgeon: Giuseppe Dasilva MD;  Location: [facility]; Service: Gastroenterology     [facility] CATARACT EXTRACAP,INSERT LENS Right 12/4/2018    Procedure: EXTRACTION EXTRACAPSULAR CATARACT PHACO INTRAOCULAR LENS (IOL); Surgeon: Nancy Torres MD;  Location: [facility] MAIN OR;  Service: Ophthalmology     [facility] CATARACT EXTRACAP,INSERT LENS  1/15/2019    Procedure: EXTRACTION EXTRACAPSULAR CATARACT PHACO INTRAOCULAR LENS (IOL);   Surgeon: Nancy Torres MD;  Location: [facility] MAIN OR;  Service: Ophthalmology    TONSILLECTOMY      VENTRAL HERNIA REPAIR      WITH IMPLANT OF MESH       Current Outpatient Medications:     ACCU-CHEK COMPACT PLUS test strip, Use daily, Disp: 100 each, Rfl: 99    ACCU-CHEK SOFTCLIX LANCETS lancets, Use as instructed, Disp: 100 each, Rfl: 99    Amantadine HCl ER (GOCOVRI) 137 MG CP24, Take 1 capsule by mouth daily at bedtime , Disp: , Rfl:     amLODIPine (NORVASC) 10 mg tablet, Take 0 5 tablets (5 mg total) by mouth daily (Patient taking differently: Take 5 mg by mouth every morning ), Disp: 45 tablet, Rfl: 3    aspirin (ECOTRIN LOW STRENGTH) 81 mg EC tablet, Take 81 mg by mouth every morning , Disp: , Rfl:     Carbidopa-Levodopa ER 48  MG CPCR, Take 1 capsule by mouth every 6 (six) hours , Disp: , Rfl:     cephalexin (KEFLEX) 500 mg capsule, Take 1 capsule (500 mg total) by mouth every 12 (twelve) hours for 10 days, Disp: 20 capsule, Rfl: 0    diclofenac sodium (VOLTAREN) 1 %, Apply 2 g topically 4 (four) times a day (Patient taking differently: Apply 2 g topically daily at bedtime ), Disp: 100 g, Rfl: 3    glucose monitoring kit (FREESTYLE) monitoring kit, 1 each by Does not apply route as needed (DM control) ACCUCHEK METER COMPACT PLUS, Disp: 1 each, Rfl: 1    metFORMIN (GLUCOPHAGE) 500 mg tablet, TAKE 1 TABLET BY MOUTH  DAILY WITH BREAKFAST (Patient taking differently: 500 mg daily with breakfast ), Disp: 90 tablet, Rfl: 3    Multiple Vitamin (MULTIVITAMIN) tablet, Take 1 tablet by mouth every morning , Disp: , Rfl:     Omega-3 Fatty Acids (FISH OIL PO), Take 2 g by mouth every morning , Disp: , Rfl:     pantoprazole (PROTONIX) 40 mg tablet, Take 1 tablet (40 mg total) by mouth 2 (two) times a day for 30 days, Disp: 60 tablet, Rfl: 3    rasagiline (AZILECT) 1 MG, 1 mg every morning , Disp: , Rfl:     rOPINIRole (REQUIP) 2 mg tablet, Take 6 mg by mouth daily at bedtime , Disp: , Rfl:     TURMERIC PO, Take by mouth 3 (three) times a day, Disp: , Rfl:       Review of Systems      As noted in HPI    Objective:      BP (!) 88/48 (BP Location: Left arm, Patient Position: Sitting)   Pulse 83   Temp (!) 96 1 °F (35 6 °C) (Tympanic)   Resp 18   Wt 39 1 kg (86 lb 4 oz)   SpO2 96%   BMI 17 42 kg/m²          Physical Exam   Constitutional: She is oriented to person, place, and time  She appears well-developed  No distress  HENT:   Head: Normocephalic    4cm x 4cm scabbed over healing wound on posterior scalp, crusted yellow discharge, about 1 cm bordering erythema, tender   Cardiovascular: Normal rate, regular rhythm, normal heart sounds and intact distal pulses  Pulmonary/Chest: Effort normal and breath sounds normal  No respiratory distress  Abdominal: Soft  Bowel sounds are normal  There is no tenderness  Neurological: She is alert and oriented to person, place, and time  Skin: Capillary refill takes less than 2 seconds  She is not diaphoretic  There is erythema  Psychiatric: Thought content normal          Media Information      Document Information     Clinical Image - Mobile Device   Healing scalp laceration 11/19/19 11/20/2019 11:37 AM   Attached To:    Office Visit on 11/20/19 with Yamileth Menard, [address] [address]

## 2019-11-20 NOTE — TELEPHONE ENCOUNTER
Patients GI provider:  Dr Kennedy Persons    Number to return call: [phone]    Reason for call: Pt calling to reschedule procedure       Scheduled procedure/appointment date if applicable: Apt/procedure n/a

## 2019-11-20 NOTE — PROGRESS NOTES
Daily Note     Today's date: 2019  Patient name: Yulissa Little  : 1940  MRN: [id number]  Referring provider: Hans Calles MD  Dx:   Encounter Diagnosis     ICD-10-CM    1  Parkinson's disease ([facility] [address] ) G20    2  Impaired ambulation R26 2        Start Time: 1045  Stop Time: 1115   Total time in clinic (min): 30 minutes     Subjective: Patient reported to PT today reporting fatigue, arrived 15 minutes late  Patient stated that she fell backwards yesterday after letting go of her walker, stated that she hit her head  Denies LOC or bleeding, has follow up appointment scheduled with PCP today to examine head injury  Objective: See treatment diary below-     Seated Neck Extension Active contraction- 20 reps, 3 second holds   Seated Neck Extension Active contraction- 20 reps, 3 second holds, no Collar  Seated Neck Extension with Active Scapular retraction- 20 reps, 3 second holds  Seated Neck Extension with PT min assist x1- 20 reps, 3 second hold  Sit to stands with Collar on- 20 reps with hand placement, 3 second holds, then safety sit  Static Balance- Collar Donned- 30 seconds, 3 sets UE on walker   Static Balance- No Collar donned- 30 seconds, 3 sets, UE on walker, unable to balance without min assist     Assessment: Patient able to tolerate treatment session well today, condensed session today due to her needing to go to PCP appointment as well as her arriving late  Patient challenged with her static balance without collar, patient limits of stability challenged without collar, patient improving with her safety with sit to stands but requires constant cuing to improve her stability and safety cuing reminders  Patient will continue to benefit from skilled outpatient PT to address her balance and strength deficits, improve postural stability, and gait train to decrease number of falls and maximize her function with PD diagnosis  Plan: Continue per plan of care  Progress treatment as tolerated

## 2019-11-20 NOTE — ASSESSMENT & PLAN NOTE
· Patient admitted to subjective fever  · Healing wound with surrounding erythema and yellow crusted drainage  · Sutures not staples were used for repair  · F/u in office in about 1 week for healing status  Assess for sinus formation    · Healing secondary intention  · recommended Keflex course 7-10 days  · C/w bacitracin, keep clean and dry

## 2019-11-22 ENCOUNTER — OFFICE VISIT (OUTPATIENT)
Dept: FAMILY MEDICINE CLINIC | Facility: CLINIC | Age: 79
End: 2019-11-22
Payer: MEDICARE

## 2019-11-22 ENCOUNTER — OFFICE VISIT (OUTPATIENT)
Dept: SPEECH THERAPY | Facility: CLINIC | Age: 79
End: 2019-11-22
Payer: MEDICARE

## 2019-11-22 ENCOUNTER — OFFICE VISIT (OUTPATIENT)
Dept: PHYSICAL THERAPY | Facility: CLINIC | Age: 79
End: 2019-11-22
Payer: MEDICARE

## 2019-11-22 VITALS
HEART RATE: 58 BPM | RESPIRATION RATE: 18 BRPM | DIASTOLIC BLOOD PRESSURE: 82 MMHG | SYSTOLIC BLOOD PRESSURE: 122 MMHG | OXYGEN SATURATION: 97 %

## 2019-11-22 DIAGNOSIS — R26.2 IMPAIRED AMBULATION: ICD-10-CM

## 2019-11-22 DIAGNOSIS — G20 PARKINSON'S DISEASE (HCC): Primary | ICD-10-CM

## 2019-11-22 DIAGNOSIS — S01.01XA SCALP LACERATION, INITIAL ENCOUNTER: Primary | ICD-10-CM

## 2019-11-22 DIAGNOSIS — F02.80 DEMENTIA ASSOCIATED WITH OTHER UNDERLYING DISEASE WITHOUT BEHAVIORAL DISTURBANCE (HCC): ICD-10-CM

## 2019-11-22 PROCEDURE — 97110 THERAPEUTIC EXERCISES: CPT

## 2019-11-22 PROCEDURE — 97116 GAIT TRAINING THERAPY: CPT

## 2019-11-22 PROCEDURE — 92507 TX SP LANG VOICE COMM INDIV: CPT | Performed by: SPEECH-LANGUAGE PATHOLOGIST

## 2019-11-22 PROCEDURE — 97112 NEUROMUSCULAR REEDUCATION: CPT

## 2019-11-22 PROCEDURE — 99213 OFFICE O/P EST LOW 20 MIN: CPT | Performed by: FAMILY MEDICINE

## 2019-11-22 NOTE — PROGRESS NOTES
Speech Treatment Note    Today's date: 2019  Patient name: Morris Lawson  : 1940  MRN: [id number]  Referring provider: No ref  provider found  Dx:   Encounter Diagnosis     ICD-10-CM    1  Parkinson's disease ([facility] [address] ) G20    2  Dementia associated with other underlying disease without behavioral disturbance ([facility] [address] ) F02 80                   Visit Number:06  Plan of Care expires: 2019  Pain level:0/10  NEXT PROGRESS NOTE DUE: 2019  Bill with KX     Subjective/Behavioral: Seen for speech therapy  She ambulated to the office with a rolling walker  Reports falling this morning without injury  She was late for today's session and was seen for 30 minutes  Goal 1: The patient will coordinate respiration with phonation with 80% accuracy at word and phrase level  Diaphragmatic breathing continues  at 50% with maximum clinician modeling  The patient was encouraged to engage in slow deep breathing  Results similar to last session  Patient near max for training in this area  Goal 2: The Patient will use vowel prolongation at word and phrase level to help reduce rate of speech with 80% accuracy  Not addressed this session  Goal 3: The patient will use over articulation in conjunction with increased vocal volume to produce words and phrases with 80% accuracy or higher  A list of functional phrases was reviewed  When over articulation was combined with prompts to "talk louder" intelligibility at phrase level was at 90% accuracy  Spontaneous intelligibility was at approximately 60%- 65% accuracy  due to low volume and rapid rate of speech  Results similar to  last session  Rate of speech was much better when prompted to "talk louder " Vocal quality was excellent, with no signs of hyperfunctional voice  Although patient is able to increase loudness, it's still difficult for her to be heard when background noise is present  Her overall posture and head position limit vocal projection     Goal 4: Educate family regarding compensatory voice techniques and strategies  Reviewed "go loud" strategy and use of water to help clear mucus prior to voicing  Will continue therapy for 1 to 2 weeks then discharge as patient appears at maximum functional level with strategies  Other:Reviewed testing and plan of care with patient  Patient is in agreement with POC at this time    Recommendations:Continue with Plan of Care

## 2019-11-22 NOTE — PROGRESS NOTES
Daily Note     Today's date: 2019  Patient name: Emil Rinne  : 1940  MRN: [id number]  Referring provider: Casey Saeed MD  Dx:   Encounter Diagnosis     ICD-10-CM    1  Parkinson's disease ([facility] [address] ) G20    2  Impaired ambulation R26 2          Subjective: Patient reports falling this morning because she was not using her walker  She notes she fell backwards and did not hit her head, "but my elbow is sore "    Objective: See treatment diary below-       - Seated marches with 3 lb ankle weights: 20 reps  - LAQs with 3 lb ankle weights: 20 reps  - Seated Neck Extension Active contraction- 20 reps, 3 second holds, no Collar  - Seated Neck Extension with PT min assist x1- 20 reps, 3 second hold  - Sit to stands with Collar on- 20 reps with hand placement, 3 second holds  - Static Balance- Collar Donned- 30 seconds, 3 sets, no UE  - Seated core rotations: 4 lb med ball, 5 reps  - Seated core rotations: 2 lb med ball, 15 reps  - Hit floor and then wall behind with boom whackers with active cervical extension (collar donned): 20 reps  - Reach across body and hit boom whacker: 20 reps each side  - Seated hip abduction with 3 sec holds (yellow TB): 20 reps      Assessment: Patient able to tolerate treatment session well today with improvements noted with static standing balance  She was able to perform static standing without UE assist and PT at supervision/contact guard  She demonstrated decreased posterior LoB today and she notes that it may be due to her change in sneakers  She had improved AROM with increased repetitions reaching across, however fatigues quickly  She will continue to benefit from skilled outpatient PT to address her balance and strength deficits, improve postural stability, and gait train to decrease number of falls and maximize her function with PD diagnosis  Plan: Continue per plan of care  Progress treatment as tolerated

## 2019-11-22 NOTE — PROGRESS NOTES
Chief concern and HPI: Paris Jacobs is a 67 y o  female  Shantanu Garber and landed on her bottom a few minutes ago, then banged her head on the street when getting out of the car  NO LOC  NO new neurological symptoms (besides her baseline severe Parkinson's disease)  She continues to use cane rather than walker against medical advice, which leads to frequent falls  Denies headache, neck pain or any other body part injury or pain          Previous relevant clinical information reviewed from medical, surgical and psychosocial history, medication and allergies and labs/studies  Patient Active Problem List   Diagnosis    Asthma    Carpal tunnel syndrome    Colon, diverticulosis    Type 2 diabetes mellitus without complication, without long-term current use of insulin ([address] )    Essential hypertension    GERD (gastroesophageal reflux disease)    Mixed hyperlipidemia    Insomnia    Spinal stenosis    Major neurocognitive disorder, due to parkinson's disease, without behavioral disturbance, mild (HCC)    Psoriasis    Restless legs syndrome    Weight loss, unintentional    Need for influenza vaccination    Arthritis    Age-related nuclear cataract of both eyes    Ambulatory dysfunction    Cervical dystonia    Confusion    Parkinson's disease ([address] )    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 ([facility] [address] )    Periumbilical abdominal pain    Free intraperitoneal air    RODRIGO (acute kidney injury) ([address] )    Dementia ([address] )    Toxic metabolic encephalopathy    Severe protein-calorie malnutrition ([address] )    Perforated duodenal ulcer ([address] )    Encounter for herb and vitamin supplement management    Weight loss    Scalp laceration, subsequent encounter    Laceration of scalp           Review of systems: No new or worsening:   Unexplained fever/chills/sweats/weight loss  HEENT issues such as new ear, mouth, nose or eye problems  Respiratory issues such as new shortness of breath, cough   NO rib tenderness  Heart issues such as new chest pain or pressure, palpitations  Abdominal or digestive issues such as diarrhea, constipation or blood in stool  BM's are normal  Neurological issues such as new numbness or motor weakness  Skin issues such as new rashes      PHQ-9 Depression Screening    PHQ-9:    Frequency of the following problems over the past two weeks:                   Exam:  Alert, no acute distress /82   Pulse 58   Resp 18   SpO2 97%   HEENT: Head normocephalic with abraision near vertex recently sutured scalp wound  There are no gaping lesions to suture and simple pressure was adequate for hemostasis  No spine midline tenderness  Lungs: No respiratory labor  Clear to auscultation  No rib tenderness  Cardiac: Regular rate and rhythm  Abdomen: Benign  Normal active bowel sounds  Soft and non-tender  Extremities: No cyanosis, clubbing or edema  Neuro screen: severe baseline parkinsonian movements      Summary Impression:  1  Scalp laceration, initial encounter  Local wound care  Use Walker! And assistance when walking  Report new or worse sx        Risks and benefits of therapeutic plan discussed, answered all patient questions and concerns and patient expressed understanding and agreement of therapeutic plan          Follow up plan: 3 months

## 2019-11-27 ENCOUNTER — OFFICE VISIT (OUTPATIENT)
Dept: PHYSICAL THERAPY | Facility: CLINIC | Age: 79
End: 2019-11-27
Payer: MEDICARE

## 2019-11-27 DIAGNOSIS — R26.2 IMPAIRED AMBULATION: ICD-10-CM

## 2019-11-27 DIAGNOSIS — G20 PARKINSON'S DISEASE (HCC): Primary | ICD-10-CM

## 2019-11-27 PROCEDURE — 97110 THERAPEUTIC EXERCISES: CPT

## 2019-11-27 PROCEDURE — 97116 GAIT TRAINING THERAPY: CPT

## 2019-11-27 PROCEDURE — 97530 THERAPEUTIC ACTIVITIES: CPT

## 2019-11-27 NOTE — PROGRESS NOTES
Daily Note     Today's date: 2019  Patient name: Emil Rinne  : 1940  MRN: [id number]  Referring provider: Casey Saeed MD  Dx:   Encounter Diagnosis     ICD-10-CM    1  Parkinson's disease ([facility] [address] ) G20    2  Impaired ambulation R26 2      Time In- 11:15  Time Out- 12:00     Subjective: Patient arrived to clinic today with daughter and new rollator walker  Patient reported 2 falls this morning  Objective: See treatment diary below-     - Ambulation with Rollator Walker- 500 feet with safety cues  - Seated Neck Extension Active contraction- 20 reps, 3 second holds, no Collar  - Seated Neck Extension with PT min assist x1- 20 reps, 3 second hold  - Sit to stands with Collar on- 20 reps with hand placement, 3 second holds  - Static Balance- Collar Donned- 30 seconds, 3 sets, no UE  - Seated core rotations: 4 lb med ball, 5 reps  - Seated core rotations: 2 lb med ball, 15 reps  - Hit floor and then wall behind with boom whackers with active cervical extension (collar donned): 20 reps  - Reach across body and hit boom whacker: 20 reps each side  - Seated hip abduction with 3 sec holds (yellow TB): 20 reps  -Scapular retractions- 30 reps with cervical extension        Assessment: Patient able to tolerate treatment session well today with extensive education on walker usage and safety with walker  Patient verbalized and demonstrated understanding  Patient challenged with static balance without collar donned due to changes in body positioning and limits of stability  Patient improving with her awareness of her deficits but continued falls noted  She will continue to benefit from skilled outpatient PT to address her balance and strength deficits, improve postural stability, and gait train to decrease number of falls and maximize her function with PD diagnosis  Plan: Continue per plan of care  Progress treatment as tolerated

## 2019-11-29 ENCOUNTER — OFFICE VISIT (OUTPATIENT)
Dept: PHYSICAL THERAPY | Facility: CLINIC | Age: 79
End: 2019-11-29
Payer: MEDICARE

## 2019-11-29 ENCOUNTER — OFFICE VISIT (OUTPATIENT)
Dept: SPEECH THERAPY | Facility: CLINIC | Age: 79
End: 2019-11-29
Payer: MEDICARE

## 2019-11-29 DIAGNOSIS — G20 PARKINSON'S DISEASE (HCC): Primary | ICD-10-CM

## 2019-11-29 DIAGNOSIS — R26.2 IMPAIRED AMBULATION: ICD-10-CM

## 2019-11-29 DIAGNOSIS — F02.80 DEMENTIA ASSOCIATED WITH OTHER UNDERLYING DISEASE WITHOUT BEHAVIORAL DISTURBANCE (HCC): ICD-10-CM

## 2019-11-29 PROCEDURE — 97112 NEUROMUSCULAR REEDUCATION: CPT

## 2019-11-29 PROCEDURE — 97110 THERAPEUTIC EXERCISES: CPT

## 2019-11-29 PROCEDURE — 97116 GAIT TRAINING THERAPY: CPT

## 2019-11-29 PROCEDURE — 92507 TX SP LANG VOICE COMM INDIV: CPT | Performed by: SPEECH-LANGUAGE PATHOLOGIST

## 2019-11-29 NOTE — PROGRESS NOTES
Speech Treatment Note/Discharge Summary    Today's date: 2019  Patient name: Blanquita Briseno  : 1940  MRN: [id number]  Referring provider: No ref  provider found  Dx:   Encounter Diagnosis     ICD-10-CM    1  Parkinson's disease ([facility] [address] ) G20    2  Dementia associated with other underlying disease without behavioral disturbance ([facility] [address] ) F02 80                   Visit Number:07  Plan of Care expires: 2019  Pain level:0/10  NEXT PROGRESS NOTE DUE: 2019  Bill with KX     Subjective/Behavioral: Seen for speech therapy  She ambulated to the office with a rolling walker  Reports falling this morning without injury  She was brought to PT post session  Goal 1: The patient will coordinate respiration with phonation with 80% accuracy at word and phrase level  Diaphragmatic breathing continues  at 50% with maximum clinician modeling  The patient was encouraged to engage in slow deep breathing  Results similar to last session  Patient near max for training in this area  Goal 2: The Patient will use vowel prolongation at word and phrase level to help reduce rate of speech with 80% accuracy  Not addressed this session  Goal 3: The patient will use over articulation in conjunction with increased vocal volume to produce words and phrases with 80% accuracy or higher  A list of functional phrases was reviewed  When over articulation was combined with prompts to "talk louder" intelligibility at phrase level was at 90% accuracy  Spontaneous intelligibility was at approximately  65% accuracy due to low volume and rapid rate of speech  Results slightly higher than  session  Rate of speech was much better when prompted to "talk louder " Vocal quality was excellent, with no signs of hyperfunctional voice  Although patient is able to increase loudness, it's still difficult for her to be heard when background noise is present   Her overall posture and head position limit vocal projection  Patient appears to be reaching maximum benefit from this treatment  Goal 4: Educate family regarding compensatory voice techniques and strategies  Reviewed "go loud" strategy and use of water to help clear mucus prior to voicing  Discharge Summary  Patient displays improved loudness and intelligibility when using "go loud" strategy  The patient and her daughter noted improved ineligibility and fewer communication breakdowns when this strategy is used  Patient is able to use this strategy with good vocal quality and without signs of hyperfunctioning voice  Recommend discharge at this as patient seems to have reached maximum benefit from treatment at this time  Patient in agreement with discharge plan  Other: Recommend discharge as per above  Recommendations: Discharge at this time

## 2019-11-29 NOTE — PROGRESS NOTES
Daily Note     Today's date: 2019  Patient name: Norris Edmondson  : 1940  MRN: [id number]  Referring provider: Bobo Hernandez MD  Dx:   Encounter Diagnosis     ICD-10-CM    1  Parkinson's disease ([facility] [address] ) G20    2  Impaired ambulation R26 2      Time In- 11:15  Time Out- 12:00     Subjective: Patient arrived to PT today with Rollator and reports she fell backwards this morning in the kitchen  She notes she did not hit her head  Patient arrives without cervical collar  Objective: See treatment diary below-     - Ambulation with Rollator Walker- 500 feet with safety cues  - Seated Neck Extension Active contraction- 20 reps, 3 second holds, no collar  - Seated Neck Extension with PT min assist x1- 20 reps, 3 second hold, no collar  - Sit to stands with cervical extension- 20 reps with hand placement, 3 second holds  - Static Balance- no collar- 30 seconds, 3 sets, no UE  - Seated core rotations: 2 lb med ball, 20 reps  - Hit floor and then wall behind with boom whackers with active cervical extension (no collar): 20 reps  - Reach across body and hit boom whacker: 25 reps each side        Assessment: Patient able to tolerate treatment session well today with increased focus on cervical extension due to arrival to PT without cervical collar  Patient demonstrated improved balance with static standing without cervical collar donned, however remains challenged with prolonged standing due to limits of stability  She demonstrated appropriate hand placement with sit to stands today with decreased verbal cues from PT  Patient required frequent reminders to use walker even when putting on coat at the end of the session  She will continue to benefit from skilled outpatient PT to address her strength and balance deficits, improve postural stability, gait train to decrease number of falls and maximize her function with PD diagnosis  Plan: Continue per plan of care  Progress treatment as tolerated

## 2019-12-04 ENCOUNTER — OFFICE VISIT (OUTPATIENT)
Dept: PHYSICAL THERAPY | Facility: CLINIC | Age: 79
End: 2019-12-04
Payer: MEDICARE

## 2019-12-04 DIAGNOSIS — R26.2 IMPAIRED AMBULATION: ICD-10-CM

## 2019-12-04 DIAGNOSIS — G20 PARKINSON'S DISEASE (HCC): Primary | ICD-10-CM

## 2019-12-04 PROCEDURE — 97110 THERAPEUTIC EXERCISES: CPT

## 2019-12-04 NOTE — PROGRESS NOTES
Daily Note     Today's date: 2019  Patient name: Pb Cuello  : 1940  MRN: [id number]  Referring provider: Blake Burgos MD  Dx:   Encounter Diagnosis     ICD-10-CM    1  Parkinson's disease ([facility] [address] ) G20    2  Impaired ambulation R26 2      Time In- 10:15  Time Out- 11:00     Subjective: Patient arrived to PT today with Rollator and reports she fell backwards this morning in the kitchen as well as a new report of her leg giving out, R leg specifically, noting falls occurring for "no reason"  Patient challenged with her negotiation of walker  Patient arrives without cervical collar, states that she forgot    Objective: See treatment diary below-     - Ambulation with Rollator Walker- 250 feet, 2 cycles, min assist x1  - Seated Neck Extension Active contraction- 20 reps, 3 second holds, no collar  - Seated Neck Extension with PT min assist x1- 20 reps, 3 second hold, no collar  - Sit to stands with cervical extension- 20 reps with hand placement, 3 second holds, no collar  - Static Balance- no collar- 30 seconds, 3 sets, no UE  - Hit floor and then wall behind with boom whackers with active cervical extension (no collar): 20 reps  - Reach across body and hit boom whacker: 25 reps each side        Assessment: Patient able to tolerate treatment session fair today, seated interventions completed mostly today due to her instability with gait today as well as her subjective reports of falling due to knee instability  Patient and  educated on acquiring knee stability brace to assist with stability, advised if unexplained falls continue that she should contact her PCP or seek higher level of care  Patient and  verbalized understanding  She will continue to benefit from skilled outpatient PT to address her strength and balance deficits, improve postural stability, gait train to decrease number of falls and maximize her function with PD diagnosis  Plan: Continue per plan of care    Progress treatment as tolerated

## 2019-12-06 ENCOUNTER — OFFICE VISIT (OUTPATIENT)
Dept: PHYSICAL THERAPY | Facility: CLINIC | Age: 79
End: 2019-12-06
Payer: MEDICARE

## 2019-12-06 ENCOUNTER — OFFICE VISIT (OUTPATIENT)
Dept: FAMILY MEDICINE CLINIC | Facility: CLINIC | Age: 79
End: 2019-12-06
Payer: MEDICARE

## 2019-12-06 VITALS
WEIGHT: 85 LBS | BODY MASS INDEX: 17.17 KG/M2 | DIASTOLIC BLOOD PRESSURE: 54 MMHG | OXYGEN SATURATION: 100 % | HEART RATE: 66 BPM | RESPIRATION RATE: 16 BRPM | SYSTOLIC BLOOD PRESSURE: 100 MMHG

## 2019-12-06 DIAGNOSIS — R26.2 AMBULATORY DYSFUNCTION: ICD-10-CM

## 2019-12-06 DIAGNOSIS — G20 PARKINSON'S DISEASE (HCC): ICD-10-CM

## 2019-12-06 DIAGNOSIS — R26.2 IMPAIRED AMBULATION: ICD-10-CM

## 2019-12-06 DIAGNOSIS — S01.01XA LACERATION OF SCALP, INITIAL ENCOUNTER: Primary | ICD-10-CM

## 2019-12-06 DIAGNOSIS — G20 PARKINSON'S DISEASE (HCC): Primary | ICD-10-CM

## 2019-12-06 PROCEDURE — 99213 OFFICE O/P EST LOW 20 MIN: CPT | Performed by: FAMILY MEDICINE

## 2019-12-06 NOTE — PROGRESS NOTES
Chief concern and HPI: Chiara Squires is a 67 y o  female  Althea Bibles again and banged head earlier today  No LOC  Has 2 cm laceration L of vertex, no depressed skull fx  Previous relevant clinical information reviewed from medical, surgical and psychosocial history, medication and allergies and labs/studies  Patient Active Problem List   Diagnosis    Asthma    Carpal tunnel syndrome    Colon, diverticulosis    Type 2 diabetes mellitus without complication, without long-term current use of insulin ([facility] [address] )    Essential hypertension    GERD (gastroesophageal reflux disease)    Mixed hyperlipidemia    Insomnia    Spinal stenosis    Major neurocognitive disorder, due to parkinson's disease, without behavioral disturbance, mild (HCC)    Psoriasis    Restless legs syndrome    Weight loss, unintentional    Need for influenza vaccination    Arthritis    Age-related nuclear cataract of both eyes    Ambulatory dysfunction    Cervical dystonia    Confusion    Parkinson's disease ([facility] [address] )    [facility]throm[facility] [id number] [facility]    Periumbilical abdominal pain    Free intraperitoneal air    RODRIGO (acute kidney injury) ([facility]    Dementia ([facility] )    Toxic metabolic encephalopathy    Severe protein-calorie malnutrition ([facility] )    Perforated duodenal ulcer ([facility] )    Encounter for herb and vitamin supplement management    Weight loss    Scalp laceration, subsequent encounter    Laceration of scalp           Review of systems: No new or worsening:   Unexplained fever/chills/sweats      PHQ-9 Depression Screening    PHQ-9:    Frequency of the following problems over the past two weeks:                      Exam:  Alert, no acute distress /54   Pulse 66   Resp 16   Wt 38 6 kg (85 lb)   SpO2 100%   BMI 17 17 kg/m²   HEENT: Head normocephalic  No signs of depressed skull fx  Contusion/laceration mild bleeding stopped with simple pressure  No signs of infection  Lungs: No respiratory labor     Cardiac: Regular rate   Has severe Parkinsonian related disordered movements      Summary Impression:  1  Parkinson's disease ([address] )    - Walker    2  Laceration of scalp, initial encounter  Inspected and does not need suturing  Report any serious sx    3  Ambulatory dysfunction  Her repeated falls due to not using walker  Her home walker is wide and sometimes difficult to maneuver  I ordered another one:  Tessie Brunner    We discussed safety and need for ALWAYS getting assistance/using walker in all circumstances when trying to ambulate/transfer        Risks and benefits of therapeutic plan discussed, answered all patient questions and concerns and patient expressed understanding and agreement of therapeutic plan        Follow up plan: a week    Future Appointments   Date Time Provider Sydney Pugh   12/10/2019 11:00 AM Kel Muse DO [facility]   12/10/2019  2:15 PM Gopi Vieira MD [facility]   12/11/2019 10:15 AM Eric Drilling, PT [place] [facility]   12/13/2019 10:30 AM Trenton Drilling, PT [facility]   12/18/2019 10:15 AM Eric Drilling, PT [facility]   12/20/2019 10:15 AM Abebalola Garcia, PT [facility]   12/27/2019 10:15 AM Eric Drilling, PT [facility]   1/21/2020  2:15 PM Gopi Vieira MD [facility]

## 2019-12-07 NOTE — PROGRESS NOTES
Patient arrived in waiting room reporting that she fell and hit the back of her head on tile bathroom floor  Laceration on skull with active bleeding noted  Patient arrived with  and RW  Patient,  verbalized understanding after treating therapist suggested patient see PCP  Patient's session no charge visit

## 2019-12-10 ENCOUNTER — CONSULT (OUTPATIENT)
Dept: OBGYN CLINIC | Facility: CLINIC | Age: 79
End: 2019-12-10
Payer: MEDICARE

## 2019-12-10 ENCOUNTER — APPOINTMENT (OUTPATIENT)
Dept: RADIOLOGY | Facility: CLINIC | Age: 79
End: 2019-12-10
Payer: MEDICARE

## 2019-12-10 ENCOUNTER — OFFICE VISIT (OUTPATIENT)
Dept: FAMILY MEDICINE CLINIC | Facility: CLINIC | Age: 79
End: 2019-12-10
Payer: MEDICARE

## 2019-12-10 VITALS
TEMPERATURE: 97.4 F | RESPIRATION RATE: 16 BRPM | HEART RATE: 83 BPM | DIASTOLIC BLOOD PRESSURE: 70 MMHG | SYSTOLIC BLOOD PRESSURE: 106 MMHG | WEIGHT: 83.9 LBS | OXYGEN SATURATION: 98 % | BODY MASS INDEX: 16.95 KG/M2

## 2019-12-10 VITALS — WEIGHT: 89 LBS | BODY MASS INDEX: 17.98 KG/M2

## 2019-12-10 DIAGNOSIS — S32.009A CLOSED FRACTURE OF TRANSVERSE PROCESS OF LUMBAR VERTEBRA, INITIAL ENCOUNTER (HCC): ICD-10-CM

## 2019-12-10 DIAGNOSIS — M25.561 RIGHT KNEE PAIN, UNSPECIFIED CHRONICITY: ICD-10-CM

## 2019-12-10 DIAGNOSIS — M17.11 PRIMARY OSTEOARTHRITIS OF RIGHT KNEE: Primary | ICD-10-CM

## 2019-12-10 DIAGNOSIS — E11.9 TYPE 2 DIABETES MELLITUS WITHOUT COMPLICATION, WITHOUT LONG-TERM CURRENT USE OF INSULIN (HCC): Primary | ICD-10-CM

## 2019-12-10 DIAGNOSIS — I10 ESSENTIAL HYPERTENSION: ICD-10-CM

## 2019-12-10 DIAGNOSIS — G20 PARKINSON'S DISEASE (HCC): ICD-10-CM

## 2019-12-10 PROCEDURE — 99214 OFFICE O/P EST MOD 30 MIN: CPT | Performed by: FAMILY MEDICINE

## 2019-12-10 PROCEDURE — 73562 X-RAY EXAM OF KNEE 3: CPT

## 2019-12-10 PROCEDURE — 99203 OFFICE O/P NEW LOW 30 MIN: CPT | Performed by: ORTHOPAEDIC SURGERY

## 2019-12-10 PROCEDURE — 20610 DRAIN/INJ JOINT/BURSA W/O US: CPT | Performed by: ORTHOPAEDIC SURGERY

## 2019-12-10 RX ORDER — HYALURONATE SODIUM 10 MG/ML
20 SYRINGE (ML) INTRAARTICULAR
Status: COMPLETED | OUTPATIENT
Start: 2019-12-10 | End: 2019-12-10

## 2019-12-10 RX ADMIN — Medication 20 MG: at 12:00

## 2019-12-10 NOTE — PROGRESS NOTES
Assessment/Plan:  1  Primary osteoarthritis of right knee  XR knee 3 vw right non injury    Large joint arthrocentesis: R knee   2  Closed fracture of transverse process of lumbar vertebra, initial encounter [facility])  Ambulatory referral to Orthopedic Surgery       Scribe Attestation    I,:   Mila Reyes am acting as a scribe while in the presence of the attending physician :        I,:   Cali Jhaveri, DO personally performed the services described in this documentation    as scribed in my presence :            Beatriz Quintana has a closed transverse process of lumbar vertebra at L1-L2 visualized on recent CT  She is nontender in this area and this type of fracture typically resolves with conservative measures  She primarily complains of right sided knee pain consistent with osteoarthritis today  We discussed treatment options including cortisone injection, Euflexxa or just continuing with conservative treatment and physical therapy  Beatriz Quintana states the cortisone did not help at all in the past and would like to try the Euflexxa injection  She will continue with the physical therapy and balance therapy  She was given her 1st Euflexxa injection at today's appointment which she tolerated well  We discussed that the acute transverse lumbar vertebra fracture is stable and will continue to heal on its own  If the back pain returns she could schedule a follow up appointment for this pain  Her  She can ice the knee as needed  She will follow up in 1 week for Euflexxa #2  Her neurological exam was limited due to parkinson syndrome and she should follow up with her neurologist for further evaluation  Subjective:   Spencer Santiago is a 67 y o  female who presents to the office today for back pain  She presents to the office along side her caretaker who is concerned because she continues to fall   She does go to the physical therapy currently and balance therapy and the physical therapist noticed she had weakness on her right leg compared to the left leg  She does see Dr Rosie Puente who referred her to our office for her back pain  She reports having no back pain at today's visit but does have right knee pain  She states the physical therapy said she may have arthritis  She also reports seeing a orthopedic from [facility]  Where she was given a cortisone injection in the knee which did not help  Her pain today is a constant mild to moderate pain that radiates down her leg intermittently  The pain is worse in the morning and when it is at its worse it causes pain to radiate down her leg  She denies any lower extremity numbness at today's appointment  Review of Systems   Constitutional: Positive for activity change and fever  Negative for chills and unexpected weight change  HENT: Negative for hearing loss, nosebleeds and sore throat  Eyes: Negative for pain, redness and visual disturbance  Respiratory: Positive for cough  Negative for shortness of breath and wheezing  Cardiovascular: Negative for chest pain, palpitations and leg swelling  Gastrointestinal: Negative for abdominal pain, nausea and vomiting  Endocrine: Positive for polydipsia  Negative for polyuria  Genitourinary: Negative for dysuria and hematuria  Musculoskeletal: Positive for arthralgias  See HPI   Skin: Negative for rash and wound  Neurological: Positive for headaches  Negative for dizziness and numbness  Psychiatric/Behavioral: Negative for decreased concentration and suicidal ideas  The patient is nervous/anxious  Past Medical History:   Diagnosis Date    RODRIGO (acute kidney injury) ([facility] [address] ) 7/19/2019    Anal bleeding 1/29/2018    Arthritis     knee    Asthma     BPPV (benign paroxysmal positional vertigo) 7/19/2016    Last Assessment & Plan:  Hx consistent with BPPV  Neg [place], however could not perform it adequately due to dyskinesias  Recommend lozano-daroff exercises at home  IF worsens, vestibular therapy      Bulging lumbar disc 12/10/2013    Closed fracture of one rib of left side 1/29/2018    Colon polyp     Dementia ([facility] [address] )     Diabetes mellitus ([facility] [address] )     TYPE 2    Fall     balance issue    Gallbladder disease     GERD (gastroesophageal reflux disease)     Hematuria     last assessed 10/29/15    Hypertension     Orthostatic hypotension 7/13/2015    Ovarian cyst     LAST ASSESSED: 12/10/13    Parkinson's disease ([facility] [address] )     Pneumonia of both lower lobes due to infectious organism ([facility] [address] ) 3/27/2018    Pulmonary embolism with infarction ([facility] [address] ) 9/2/2014    Sciatica 12/10/2013    Skin disorder     last assessed 12/10/13    Squamous cell carcinoma of skin 11/21/2016    Use of cane as ambulatory aid    Rosan Gino as ambulation aid     Wears glasses        Past Surgical History:   Procedure Laterality Date    APPENDECTOMY      1970'S    CATARACT EXTRACTION      CHOLECYSTECTOMY      1970'S    COLONOSCOPY      LAPAROTOMY N/A 7/18/2019    Procedure: LAPAROTOMY EXPLORATORY;  Surgeon: Doris Dhillon MD;  Location: [address] [place] [address];  Service: General    NEUROPLASTY / [address] Right     OOPHORECTOMY Bilateral     REMOVAL OF BOTH OVARIES LAPAROSCOPIC     [facility] FLX W/RMVL OF TUMOR POLYP LESION SNARE TQ N/A 3/20/2018    Procedure: COLONOSCOPY;  Surgeon: Nannette Jacobsen MD;  Location: [place] [address] GI LAB; Service: Gastroenterology     [facility] CATARACT EXTRACAP,INSERT LENS Right 12/4/2018    Procedure: EXTRACTION EXTRACAPSULAR CATARACT PHACO INTRAOCULAR LENS (IOL); Surgeon: Kassandra Cook MD;  Location: [facility] MAIN OR;  Service: Ophthalmology     [facility] CATARACT EXTRACAP,INSERT LENS  1/15/2019    Procedure: EXTRACTION EXTRACAPSULAR CATARACT PHACO INTRAOCULAR LENS (IOL);   Surgeon: Kassandra Cook MD;  Location: [facility] MAIN OR;  Service: Ophthalmology    TONSILLECTOMY      VENTRAL HERNIA REPAIR      WITH IMPLANT OF MESH       Family History   Problem Relation Age of Onset    Alzheimer's disease Mother     Stroke Father     No Known Problems Sister     No Known Problems Brother        Social History     Occupational History    Occupation: [email or web address]     Comment: BILLING AND SHIPPING   Tobacco Use    Smoking status: Never Smoker    Smokeless tobacco: Never Used   Substance and Sexual Activity    Alcohol use: Not Currently     Frequency: Patient refused     Drinks per session: Patient refused     Binge frequency: Never    Drug use: Never    Sexual activity: Not on file         Current Outpatient Medications:     ACCU-CHEK COMPACT PLUS test strip, Use daily, Disp: 100 each, Rfl: 99    ACCU-CHEK SOFTCLIX LANCETS lancets, Use as instructed, Disp: 100 each, Rfl: 99    Amantadine HCl ER (GOCOVRI) 137 MG CP24, Take 1 capsule by mouth daily at bedtime , Disp: , Rfl:     amLODIPine (NORVASC) 10 mg tablet, Take 0 5 tablets (5 mg total) by mouth daily (Patient taking differently: Take 5 mg by mouth every morning ), Disp: 45 tablet, Rfl: 3    aspirin (ECOTRIN LOW STRENGTH) 81 mg EC tablet, Take 81 mg by mouth every morning , Disp: , Rfl:     Carbidopa-Levodopa ER 48  MG CPCR, Take 1 capsule by mouth every 6 (six) hours , Disp: , Rfl:     diclofenac sodium (VOLTAREN) 1 %, Apply 2 g topically 4 (four) times a day (Patient taking differently: Apply 2 g topically daily at bedtime ), Disp: 100 g, Rfl: 3    glucose monitoring kit (FREESTYLE) monitoring kit, 1 each by Does not apply route as needed (DM control) ACCUCHEK METER COMPACT PLUS, Disp: 1 each, Rfl: 1    metFORMIN (GLUCOPHAGE) 500 mg tablet, TAKE 1 TABLET BY MOUTH  DAILY WITH BREAKFAST (Patient taking differently: 500 mg daily with breakfast ), Disp: 90 tablet, Rfl: 3    Multiple Vitamin (MULTIVITAMIN) tablet, Take 1 tablet by mouth every morning , Disp: , Rfl:     Omega-3 Fatty Acids (FISH OIL PO), Take 2 g by mouth every morning , Disp: , Rfl:     rasagiline (AZILECT) 1 MG, 1 mg every morning , Disp: , Rfl:     rOPINIRole (REQUIP) 2 mg tablet, Take 6 mg by mouth daily at bedtime , Disp: , Rfl:     TURMERIC PO, Take by mouth 3 (three) times a day, Disp: , Rfl:     pantoprazole (PROTONIX) 40 mg tablet, Take 1 tablet (40 mg total) by mouth 2 (two) times a day for 30 days, Disp: 60 tablet, Rfl: 3    No Known Allergies    Objective: There were no vitals filed for this visit  Right Knee Exam     Tenderness   The patient is experiencing tenderness in the medial joint line  Range of Motion   The patient has normal right knee ROM  Tests   Daily:  Medial - negative Lateral - negative    Other   Swelling: none      Left Knee Exam     Tenderness   The patient is experiencing tenderness in the medial joint line  Range of Motion   The patient has normal left knee ROM  Back Exam     Tenderness   The patient is experiencing no tenderness  Tests   Straight leg raise right: negative  Straight leg raise left: negative            Physical Exam   Constitutional: She is oriented to person, place, and time  She appears well-developed  Resting tremor consistent with Parkinson's diagnosis, ambulating using walker, significant right-sided cervical curvature   HENT:   Head: Normocephalic and atraumatic  Eyes: Pupils are equal, round, and reactive to light  Conjunctivae are normal    Neck: Neck supple  No JVD present  Cardiovascular: Normal rate and intact distal pulses  Pulmonary/Chest: Effort normal  No respiratory distress  Musculoskeletal:   As noted in HPI   Neurological: She is alert and oriented to person, place, and time  Skin: Skin is warm and dry  Psychiatric: She has a normal mood and affect  Her behavior is normal    Nursing note and vitals reviewed      Large joint arthrocentesis: R knee  Date/Time: 12/10/2019 12:00 PM  Consent given by: patient  Site marked: site marked  Supporting Documentation  Indications: pain   Procedure Details  Location: knee - R knee  Needle size: 22 G  Ultrasound guidance: no  Approach: anterolateral  Medications administered: 20 mg Sodium Hyaluronate 20 MG/2ML    Patient tolerance: patient tolerated the procedure well with no immediate complications  Dressing:  Sterile dressing applied            I have personally reviewed pertinent films in PACS and my interpretation is as follows: Three view right knee x-ray taken on 12/10/2019 demonstrates no acute fracture or dislocation  Moderate degenerative changes are present

## 2019-12-10 NOTE — PROGRESS NOTES
SUBJECTIVE:  Chief complaint and HPI: Jailene Lopez is a 67 y o  female who presents for follow up of multiple chronic medical problems, as listed below in problem list  All problems are relatively stable except for the following issues: f/u DM, HTN, asthma  Is using walker more and not falling quite as often  Expresses lack of interest in healthy diet ('I'm 66 y/o')    Review of systems:  No fever/chills/sweats/unexplained weight loss  No chest pain/shortness of breath  No change in digestion or bowel movements  No change in urination  No new musculoskeletal or neurological symptoms  R knee still hurts  Seeing ortho  Chart reviewed for relevant medical, surgical and psychosocial history, medications and allergies, labs and studies  Patient Active Problem List   Diagnosis    Asthma    Carpal tunnel syndrome    Colon, diverticulosis    Type 2 diabetes mellitus without complication, without long-term current use of insulin ([address] )    Essential hypertension    GERD (gastroesophageal reflux disease)    Mixed hyperlipidemia    Insomnia    Spinal stenosis    Major neurocognitive disorder, due to parkinson's disease, without behavioral disturbance, mild (HCC)    Psoriasis    Restless legs syndrome    Weight loss, unintentional    Need for influenza vaccination    Arthritis    Age-related nuclear cataract of both eyes    Ambulatory dysfunction    Cervical dystonia    Confusion    Parkinson's disease ([address]    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 ([address]    Periumbilical abdominal pain    Free intraperitoneal air    RODRIGO (acute kidney injury) ([address]    Dementia ([address]    Toxic metabolic encephalopathy    Severe protein-calorie malnutrition ([address] )    Perforated duodenal ulcer ([address] )    Encounter for herb and vitamin supplement management    Weight loss    Scalp laceration, subsequent encounter    Laceration of scalp             EXAM:  The patient remains with severe Parkinsonian movements   otherwise in no apparent distress  Alert and oriented times three, pleasant and cooperative  Vital signs are as noted by the nurse  /70 (BP Location: Left arm, Patient Position: Sitting, Cuff Size: Standard)   Pulse 83   Temp (!) 97 4 °F (36 3 °C) (Tympanic)   Resp 16   Wt 38 1 kg (83 lb 14 4 oz)   SpO2 98%   BMI 16 95 kg/m²     Head: normocephalic, atraumatic  Scalp lesion healing  Wire in place for brain stimulator  Eyes: well perfused conjunctiva, not clinically pale, not jaundiced  Ears: external ear: no gross lesions  Nose: no rhinorrhea  Neck: supple, trachea in the midline and no concerning cervical lymphadenopathy  Lungs: No respiratory labor  Clear to auscultation  Heart: Regular rate and rhythm, no murmurs, gallops or rubs  Abdomen: Benign: soft, non-tender, non-distended  No guarding or rebound  No masses or organomegaly  Normal bowel sounds,   Skin: No pallor  No rashes noted  Extremities: Moves all extremities normally  No pedal edema  Vibration decreased bilaterally, decrease to LT on R foot      ASSESSMENT/PLAN:  1  Type 2 diabetes mellitus without complication, without long-term current use of insulin ([facility])  Home FBS mid 100's    2  Essential hypertension  stable    3  Parkinson's disease ([address] )  Poor control      Reviewed and reinforced basics of healthy lifestyle  Risks and benefits of therapeutic plan discussed, answered all patient questions and concerns and patient expressed understanding and agreement of therapeutic plan        Follow up plan: 3 months

## 2019-12-11 ENCOUNTER — OFFICE VISIT (OUTPATIENT)
Dept: PHYSICAL THERAPY | Facility: CLINIC | Age: 79
End: 2019-12-11
Payer: MEDICARE

## 2019-12-11 DIAGNOSIS — G20 PARKINSON'S DISEASE (HCC): Primary | ICD-10-CM

## 2019-12-11 DIAGNOSIS — R26.2 IMPAIRED AMBULATION: ICD-10-CM

## 2019-12-11 PROCEDURE — 97530 THERAPEUTIC ACTIVITIES: CPT

## 2019-12-11 PROCEDURE — 97110 THERAPEUTIC EXERCISES: CPT

## 2019-12-11 NOTE — PROGRESS NOTES
Daily Note     Today's date: 2019  Patient name: Spencer Santiago  : 1940  MRN: [id number]  Referring provider: Marianne Razo MD  Dx:   Encounter Diagnosis     ICD-10-CM    1  Parkinson's disease ([facility] [address] ) G20    2  Impaired ambulation R26 2      Time In- 10:15  Time Out- 11:00     Subjective: Patient arrived to PT today with rolling walker today, states that she received a shot in her R knee as well as a low back evaluation  Objective: See treatment diary below-     - Ambulation with Rollator Walker- 250 feet, 2 cycles, min assist x1    HEP Review    - Seated Neck Extension Active contraction- 20 reps, 3 second holds, no collar  - Seated Scapular retractions- 30 reps, 3 second holds  - Seated Scapular retractions, 30 reps, 3 second holds  - Seated Long arch quads- 30 reps, 3 second holds  - Seated Hip Abduction- 25 reps, 3 second holds   - Seated Hip Flexion- 25 reps, 3 second holds    - Static Balance- no collar- 30 seconds, 3 sets, no UE  - Hit floor and then wall behind with boom whackers with active cervical extension (no collar): 20 reps  - Reach across body and hit boom whacker: 25 reps each side  - Reach toward floor and hit boom whacker: 25 reps each side      Assessment: Patient able to tolerate treatment session fair today, performing all seated interventions well today, patient demonstrating all HEP interventions well today with proper form  Less discoordinated movements noted today  Patient improving with her function but requires consistent verbal and tactile cuing today  She will continue to benefit from skilled outpatient PT to address her strength and balance deficits, improve postural stability, gait train to decrease number of falls and maximize her function with PD diagnosis  Plan: Continue per plan of care  Progress treatment as tolerated

## 2019-12-11 NOTE — TELEPHONE ENCOUNTER
Pt called in to rescheduled EGD due to conflicting doc appts   Pt has been rescheduled to 10/29/2019
None known

## 2019-12-13 ENCOUNTER — OFFICE VISIT (OUTPATIENT)
Dept: PHYSICAL THERAPY | Facility: CLINIC | Age: 79
End: 2019-12-13
Payer: MEDICARE

## 2019-12-13 DIAGNOSIS — R26.2 IMPAIRED AMBULATION: ICD-10-CM

## 2019-12-13 DIAGNOSIS — G20 PARKINSON'S DISEASE (HCC): Primary | ICD-10-CM

## 2019-12-13 PROCEDURE — 97110 THERAPEUTIC EXERCISES: CPT

## 2019-12-13 PROCEDURE — 97116 GAIT TRAINING THERAPY: CPT

## 2019-12-13 PROCEDURE — 97112 NEUROMUSCULAR REEDUCATION: CPT

## 2019-12-13 NOTE — PROGRESS NOTES
Daily Note     Today's date: 2019  Patient name: Zoey Montero  : 1940  MRN: [id number]  Referring provider: Steve Leyva MD  Dx:   Encounter Diagnosis     ICD-10-CM    1  Parkinson's disease ([facility] [address] ) G20    2  Impaired ambulation R26 2         Subjective: Patient arrived to PT today with rolling walker today, states that she received a shot in her R knee on Tuesday and is scheduled for 2 more in the next 2 weeks  She reports "a couple small falls" backwards since her last session and notes she did not hit her head  Patient arrived not wearing her cervical collar and she notes if she wears it too long it gives her headaches  Objective: See treatment diary below-     - Ambulation with Rollator Walker- 250 feet, 2 cycles, min assist x1    HEP Review    - Seated Neck Extension Active contraction- 20 reps, 3 second holds, no collar  - Seated Scapular retractions- 30 reps, 3 second holds  - Seated Long arch quads- 30 reps, 3 second holds  - Seated Hip Abduction- 25 reps, 3 second holds   - Seated Hip Flexion- 25 reps, 3 second holds  - Bilateral shoulder ER with yellow TB: 20 reps, 3 sec holds    - Static Balance- no collar- 60 seconds, 3 sets, no UE  - Standing Reaching outside [place] for cones (minimal - forward/lateral) with object manipulation to stack cones to other hand (RW in front), PT contact guard/Reji  - Hit floor and then wall behind with boom whackers with active cervical extension (no collar): 20 reps  - Reach across body and hit boom whacker: 25 reps each side  - Seated Core Rotations: 20 reps, 3 lb DB      Assessment: Patient able to tolerate session well today  She continued to demonstrate proper form and proficiency with HEP  Patient displayed fatigue with increased repetitions and required longer rest break for recovery before next activity   She demonstrated fair weight shift when reaching outside [place] for cones with increased challenge going to the L and manipulating cones to stack with the opposite hand  She will continue to benefit from skilled outpatient PT to address her strength and balance deficits, improve postural stability, gait train to decrease number and maximize her function with PD diagnosis  Plan: Continue per plan of care  Progress treatment as tolerated

## 2019-12-16 ENCOUNTER — TELEPHONE (OUTPATIENT)
Dept: GASTROENTEROLOGY | Facility: CLINIC | Age: 79
End: 2019-12-16

## 2019-12-16 ENCOUNTER — TELEPHONE (OUTPATIENT)
Dept: FAMILY MEDICINE CLINIC | Facility: CLINIC | Age: 79
End: 2019-12-16

## 2019-12-16 DIAGNOSIS — K26.9 DUODENAL ULCER: ICD-10-CM

## 2019-12-16 RX ORDER — PANTOPRAZOLE SODIUM 40 MG/1
40 TABLET, DELAYED RELEASE ORAL 2 TIMES DAILY
Qty: 60 TABLET | Refills: 3 | Status: CANCELLED | OUTPATIENT
Start: 2019-12-16 | End: 2020-01-15

## 2019-12-16 NOTE — TELEPHONE ENCOUNTER
GI Physician: Dr Seble Childers    Refill Request for Medication: protonix    Dose: 40 mg    Quantity: 60 tablet    Pharmacy: [facility]

## 2019-12-17 ENCOUNTER — OFFICE VISIT (OUTPATIENT)
Dept: OBGYN CLINIC | Facility: CLINIC | Age: 79
End: 2019-12-17
Payer: MEDICARE

## 2019-12-17 DIAGNOSIS — M17.11 PRIMARY OSTEOARTHRITIS OF RIGHT KNEE: Primary | ICD-10-CM

## 2019-12-17 PROCEDURE — 20610 DRAIN/INJ JOINT/BURSA W/O US: CPT | Performed by: ORTHOPAEDIC SURGERY

## 2019-12-17 RX ORDER — PANTOPRAZOLE SODIUM 40 MG/1
40 TABLET, DELAYED RELEASE ORAL 2 TIMES DAILY
Qty: 60 TABLET | Refills: 5 | Status: SHIPPED | OUTPATIENT
Start: 2019-12-17 | End: 2020-06-01

## 2019-12-17 RX ORDER — HYALURONATE SODIUM 10 MG/ML
20 SYRINGE (ML) INTRAARTICULAR
Status: COMPLETED | OUTPATIENT
Start: 2019-12-17 | End: 2019-12-17

## 2019-12-17 RX ADMIN — Medication 20 MG: at 13:37

## 2019-12-17 NOTE — PROGRESS NOTES
Assessment/Plan:  1  Primary osteoarthritis of right knee         Harika tolerated her second Euflexxa injection in the right knee today  She will follow up in 1 week for the next injection  Subjective:   Yaquelin Hernandes is a 67 y o  female who presents for her second Euflexxa injection in the right knee       Past Medical History:   Diagnosis Date    RODRIGO (acute kidney injury) ([facility] [address] ) 7/19/2019    Anal bleeding 1/29/2018    Arthritis     knee    Asthma     BPPV (benign paroxysmal positional vertigo) 7/19/2016    Last Assessment & Plan:  Hx consistent with BPPV  Neg Joe-Halpike, however could not perform it adequately due to dyskinesias  Recommend lozano-daroff exercises at home  IF worsens, vestibular therapy      Bulging lumbar disc 12/10/2013    Closed fracture of one rib of left side 1/29/2018    Colon polyp     Dementia ([facility] [address] )     Diabetes mellitus ([facility] [address] )     TYPE 2    Fall     balance issue    Gallbladder disease     GERD (gastroesophageal reflux disease)     Hematuria     last assessed 10/29/15    Hypertension     Orthostatic hypotension 7/13/2015    Ovarian cyst     LAST ASSESSED: 12/10/13    Parkinson's disease ([facility] [address] )     Pneumonia of both lower lobes due to infectious organism ([facility] [address] ) 3/27/2018    Pulmonary embolism with infarction ([facility] [address] ) 9/2/2014    Sciatica 12/10/2013    Skin disorder     last assessed 12/10/13    Squamous cell carcinoma of skin 11/21/2016    Use of cane as ambulatory aid    Mylene Juarez as ambulation aid     Wears glasses        Past Surgical History:   Procedure Laterality Date    APPENDECTOMY      1970'S    CATARACT EXTRACTION      CHOLECYSTECTOMY      1970'S    COLONOSCOPY      LAPAROTOMY N/A 7/18/2019    Procedure: LAPAROTOMY EXPLORATORY;  Surgeon: Isa Gomez MD;  Location: [address] [place] [address];  Service: General    NEUROPLASTY / [address] Right     OOPHORECTOMY Bilateral     REMOVAL OF BOTH OVARIES LAPAROSCOPIC     [facility] W/RMVL OF TUMOR POLYP LESION SNARE TQ N/A 3/20/2018    Procedure: COLONOSCOPY;  Surgeon: Scottie Patiño MD;  Location: [facility] GI LAB; Service: Gastroenterology     [facility] CATARACT EXTRACAP,INSERT LENS Right 12/4/2018    Procedure: EXTRACTION EXTRACAPSULAR CATARACT PHACO INTRAOCULAR LENS (IOL); Surgeon: Ravinder Wheatley MD;  Location: [facility] MAIN OR;  Service: Ophthalmology     [facility] CATARACT EXTRACAP,INSERT LENS  1/15/2019    Procedure: EXTRACTION EXTRACAPSULAR CATARACT PHACO INTRAOCULAR LENS (IOL);   Surgeon: Ravinder Wheatley MD;  Location: [facility] OR;  Service: Ophthalmology    TONSILLECTOMY      VENTRAL HERNIA REPAIR      WITH IMPLANT OF MESH       Family History   Problem Relation Age of Onset    Alzheimer's disease Mother     Stroke Father     No Known Problems Sister     No Known Problems Brother        Social History     Occupational History    Occupation: [employer]     Comment: BILLING AND SHIPPING   Tobacco Use    Smoking status: Never Smoker    Smokeless tobacco: Never Used   Substance and Sexual Activity    Alcohol use: Not Currently     Frequency: Patient refused     Drinks per session: Patient refused     Binge frequency: Never    Drug use: Never    Sexual activity: Not on file         Current Outpatient Medications:     ACCU-CHEK COMPACT PLUS test strip, Use daily, Disp: 100 each, Rfl: 99    ACCU-CHEK SOFTCLIX LANCETS lancets, Use as instructed, Disp: 100 each, Rfl: 99    Amantadine HCl ER (GOCOVRI) 137 MG CP24, Take 1 capsule by mouth daily at bedtime , Disp: , Rfl:     amLODIPine (NORVASC) 10 mg tablet, Take 0 5 tablets (5 mg total) by mouth daily (Patient taking differently: Take 5 mg by mouth every morning ), Disp: 45 tablet, Rfl: 3    aspirin (ECOTRIN LOW STRENGTH) 81 mg EC tablet, Take 81 mg by mouth every morning , Disp: , Rfl:     Carbidopa-Levodopa ER 48  MG CPCR, Take 1 capsule by mouth every 6 (six) hours , Disp: , Rfl:     diclofenac sodium (VOLTAREN) 1 %, Apply 2 g topically 4 (four) times a day (Patient taking differently: Apply 2 g topically daily at bedtime ), Disp: 100 g, Rfl: 3    glucose monitoring kit (FREESTYLE) monitoring kit, 1 each by Does not apply route as needed (DM control) ACCUCHEK METER COMPACT PLUS, Disp: 1 each, Rfl: 1    metFORMIN (GLUCOPHAGE) 500 mg tablet, TAKE 1 TABLET BY MOUTH  DAILY WITH BREAKFAST (Patient taking differently: 500 mg daily with breakfast ), Disp: 90 tablet, Rfl: 3    Multiple Vitamin (MULTIVITAMIN) tablet, Take 1 tablet by mouth every morning , Disp: , Rfl:     Omega-3 Fatty Acids (FISH OIL PO), Take 2 g by mouth every morning , Disp: , Rfl:     pantoprazole (PROTONIX) 40 mg tablet, Take 1 tablet (40 mg total) by mouth 2 (two) times a day, Disp: 60 tablet, Rfl: 5    rasagiline (AZILECT) 1 MG, 1 mg every morning , Disp: , Rfl:     rOPINIRole (REQUIP) 2 mg tablet, Take 6 mg by mouth daily at bedtime , Disp: , Rfl:     TURMERIC PO, Take by mouth 3 (three) times a day, Disp: , Rfl:     No Known Allergies    Objective: There were no vitals filed for this visit      Ortho Exam    Physical Exam    Large joint arthrocentesis: R knee  Date/Time: 12/17/2019 1:37 PM  Consent given by: patient  Site marked: site marked  Supporting Documentation  Indications: pain   Procedure Details  Location: knee - R knee  Needle size: 22 G  Ultrasound guidance: no  Approach: anterolateral  Medications administered: 20 mg Sodium Hyaluronate 20 MG/2ML    Patient tolerance: patient tolerated the procedure well with no immediate complications  Dressing:  Sterile dressing applied

## 2019-12-18 ENCOUNTER — OFFICE VISIT (OUTPATIENT)
Dept: PHYSICAL THERAPY | Facility: CLINIC | Age: 79
End: 2019-12-18
Payer: MEDICARE

## 2019-12-18 DIAGNOSIS — G20 PARKINSON'S DISEASE (HCC): Primary | ICD-10-CM

## 2019-12-18 DIAGNOSIS — R26.2 IMPAIRED AMBULATION: ICD-10-CM

## 2019-12-18 PROCEDURE — 97530 THERAPEUTIC ACTIVITIES: CPT

## 2019-12-18 NOTE — PROGRESS NOTES
Daily Note     Today's date: 2019  Patient name: Eliecer Longoria  : 1940  MRN: [id number]  Referring provider: Humphrey Calvert MD  Dx:   Encounter Diagnosis     ICD-10-CM    1  Parkinson's disease ([facility] [address] ) G20    2  Impaired ambulation R26 2         Subjective: Patient arrived to PT today with rolling walker today, states its a new walker, reports increased ability to negotiate with this walker  Patient desires to improve her negotiation with this new walker  Objective: See treatment diary below-     - Ambulation with Rollator Walker- 250 feet, 2 cycles, min assist x1 for posterior balance control    -Sit to stands with locking and unlocking walker- 20 reps  -Sit to stand, turn to sit on rollator with locking- 20 reps each direction   -Sit to stand with rollator walker, transitioning to ambulation, then stand to sit- 10 cycles of 10 feet  -Sit to stand with ambulation through weaving through cones- 10 cycles of 15 feet  -Sit to stand with ambulation through busy environment- 300 feet      HEP Review    - Seated Neck Extension Active contraction- 20 reps, 3 second holds, no collar  - Seated Scapular retractions- 30 reps, 3 second holds  - Seated Long arch quads- 30 reps, 3 second holds  - Seated Hip Abduction- 25 reps, 3 second holds   - Seated Hip Flexion- 25 reps, 3 second holds  - Bilateral shoulder ER with yellow TB: 20 reps, 3 sec holds      Assessment: Patient able to tolerate session well today, showing abilities to transition through her ambulation with rollator walker, patient challenged with remembering her ability to improve her memory of the sequencing with her locking and unlocking of the rollator walker  Patient challenged with her ability to progress through busy environment, posterior LOB noted  Patient and  informed of the sequencing, patient and  verbalized understanding   She will continue to benefit from skilled outpatient PT to address her strength and balance deficits, improve postural stability, gait train to decrease number and maximize her function with PD diagnosis  Plan: Continue per plan of care  Progress treatment as tolerated

## 2019-12-20 ENCOUNTER — OFFICE VISIT (OUTPATIENT)
Dept: PHYSICAL THERAPY | Facility: CLINIC | Age: 79
End: 2019-12-20
Payer: MEDICARE

## 2019-12-20 DIAGNOSIS — R26.2 IMPAIRED AMBULATION: ICD-10-CM

## 2019-12-20 DIAGNOSIS — G20 PARKINSON'S DISEASE (HCC): Primary | ICD-10-CM

## 2019-12-20 PROCEDURE — 97116 GAIT TRAINING THERAPY: CPT

## 2019-12-20 PROCEDURE — 97530 THERAPEUTIC ACTIVITIES: CPT

## 2019-12-20 PROCEDURE — 97112 NEUROMUSCULAR REEDUCATION: CPT

## 2019-12-20 NOTE — PROGRESS NOTES
Daily Note     Today's date: 2019  Patient name: Yulissa Little  : 1940  MRN: [id number]  Referring provider: Hans Calles MD  Dx:   Encounter Diagnosis     ICD-10-CM    1  Parkinson's disease ([facility] [address] ) G20    2  Impaired ambulation R26 2         Subjective: Patient arrives using new Rollator and notes that she saw her neurologist yesterday and reports possibility of having Botox done "in the back of my neck " She notes her neurologist gave her another script for PT and PT plans to call her neurologist on Monday for further clarification before patient's next PT visit  Objective: See treatment diary below-     - Ambulation with Rollator Walker- 250 feet, 2 cycles  -Sit to stands with locking and unlocking walker- 20 reps, working on sequencing and weaning verbal cues  -Ambulation with PT random call out to turn to sit on rollator with locking- 10 reps each direction   -Sit to stand with ambulation weaving through cones and stepping over colored discs- 5 cycles of 30 feet  -Sit to stand with ambulation through busy environment- 300 feet      HEP Review    - Seated Neck Extension Active contraction- 20 reps, 3 second holds, no collar  - Seated Scapular retractions- 30 reps, 3 second holds  - Seated Long arch quads- 30 reps, 3 second holds  - Seated Hip Abduction- 25 reps, 3 second holds   - Seated Hip Flexion- 25 reps, 3 second holds  - Bilateral shoulder ER with yellow TB: 20 reps, 3 sec holds      Assessment: Patient able to tolerate treatment session well today with continued improvements in transfers using rollator walker  Following increased repetitions and PT verbal cues, patient displayed good carryover to avoid getting too far away from her walker  She continues to be challenged with sequencing of locking and unlocking her rollator requiring frequent PT verbal cues to correct  PT notified continued difficulty with sequencing to patient and her  and they are in good verbal understanding  Patient challenged turning to the L as compared to the R during cone weaves due to limited cervical rotation to the L with resultant hindrance of L visual field  She will continue to benefit from skilled outpatient PT to encourage safe mobility with rollator walker, improve postural stability, and gait train to reduce number of falls and maximize her function with PD diagnosis  Plan: Continue per plan of care  Progress treatment as tolerated

## 2019-12-24 ENCOUNTER — OFFICE VISIT (OUTPATIENT)
Dept: OBGYN CLINIC | Facility: CLINIC | Age: 79
End: 2019-12-24
Payer: MEDICARE

## 2019-12-24 DIAGNOSIS — M17.11 PRIMARY OSTEOARTHRITIS OF RIGHT KNEE: Primary | ICD-10-CM

## 2019-12-24 PROCEDURE — 20610 DRAIN/INJ JOINT/BURSA W/O US: CPT | Performed by: ORTHOPAEDIC SURGERY

## 2019-12-24 RX ORDER — HYALURONATE SODIUM 10 MG/ML
20 SYRINGE (ML) INTRAARTICULAR
Status: COMPLETED | OUTPATIENT
Start: 2019-12-24 | End: 2019-12-24

## 2019-12-24 RX ADMIN — Medication 20 MG: at 12:08

## 2019-12-24 NOTE — PROGRESS NOTES
Assessment/Plan:  1  Primary osteoarthritis of right knee       Renee green tolerated her right knee Euflexxa injection today  She will follow up as needed  We could repeat the series in 6 months if beneficial for her  Subjective:   Ana Laura Jha is a 67 y o  female who presents to the office for her third Euflexxa injection the right knee  Past Medical History:   Diagnosis Date    RODRIGO (acute kidney injury) ([facility] [address] ) 7/19/2019    Anal bleeding 1/29/2018    Arthritis     knee    Asthma     BPPV (benign paroxysmal positional vertigo) 7/19/2016    Last Assessment & Plan:  Hx consistent with BPPV  Neg Joe-Halpike, however could not perform it adequately due to dyskinesias  Recommend lozano-daroff exercises at home  IF worsens, vestibular therapy      Bulging lumbar disc 12/10/2013    Closed fracture of one rib of left side 1/29/2018    Colon polyp     Dementia ([facility] [address] )     Diabetes mellitus ([facility] [address] )     TYPE 2    Fall     balance issue    Gallbladder disease     GERD (gastroesophageal reflux disease)     Hematuria     last assessed 10/29/15    Hypertension     Orthostatic hypotension 7/13/2015    Ovarian cyst     LAST ASSESSED: 12/10/13    Parkinson's disease ([facility] [address] )     Pneumonia of both lower lobes due to infectious organism ([facility] [address] ) 3/27/2018    Pulmonary embolism with infarction ([facility] [address] ) 9/2/2014    Sciatica 12/10/2013    Skin disorder     last assessed 12/10/13    Squamous cell carcinoma of skin 11/21/2016    Use of cane as ambulatory aid    Genie Overlie as ambulation aid     Wears glasses        Past Surgical History:   Procedure Laterality Date    APPENDECTOMY      1970'S    CATARACT EXTRACTION      CHOLECYSTECTOMY      1970'S    COLONOSCOPY      LAPAROTOMY N/A 7/18/2019    Procedure: LAPAROTOMY EXPLORATORY;  Surgeon: Sourav Ornelas MD;  Location: [address] [place] [address];  Service: General    NEUROPLASTY / [address] Right     OOPHORECTOMY Bilateral     REMOVAL OF BOTH OVARIES LAPAROSCOPIC    [facility] W/RMVL OF TUMOR POLYP LESION SNARE TQ N/A 3/20/2018    Procedure: COLONOSCOPY;  Surgeon: Roro Pollack MD;  Location: [place] [address] GI LAB; Service: Gastroenterology     [facility] CATARACT EXTRACAP,INSERT LENS Right 12/4/2018    Procedure: EXTRACTION EXTRACAPSULAR CATARACT PHACO INTRAOCULAR LENS (IOL); Surgeon: Justin Graff MD;  Location: [facility] MAIN OR;  Service: Ophthalmology     [facility] CATARACT EXTRACAP,INSERT LENS  1/15/2019    Procedure: EXTRACTION EXTRACAPSULAR CATARACT PHACO INTRAOCULAR LENS (IOL);   Surgeon: Justin Graff MD;  Location: [facility] MAIN OR;  Service: Ophthalmology    TONSILLECTOMY      VENTRAL HERNIA REPAIR      WITH IMPLANT OF MESH       Family History   Problem Relation Age of Onset    Alzheimer's disease Mother     Stroke Father     No Known Problems Sister     No Known Problems Brother        Social History     Occupational History    Occupation: [employer]     Comment: BILLING AND SHIPPING   Tobacco Use    Smoking status: Never Smoker    Smokeless tobacco: Never Used   Substance and Sexual Activity    Alcohol use: Not Currently     Frequency: Patient refused     Drinks per session: Patient refused     Binge frequency: Never    Drug use: Never    Sexual activity: Not on file         Current Outpatient Medications:     ACCU-CHEK COMPACT PLUS test strip, Use daily, Disp: 100 each, Rfl: 99    ACCU-CHEK SOFTCLIX LANCETS lancets, Use as instructed, Disp: 100 each, Rfl: 99    Amantadine HCl ER (GOCOVRI) 137 MG CP24, Take 1 capsule by mouth daily at bedtime , Disp: , Rfl:     amLODIPine (NORVASC) 10 mg tablet, Take 0 5 tablets (5 mg total) by mouth daily (Patient taking differently: Take 5 mg by mouth every morning ), Disp: 45 tablet, Rfl: 3    aspirin (ECOTRIN LOW STRENGTH) 81 mg EC tablet, Take 81 mg by mouth every morning , Disp: , Rfl:     Carbidopa-Levodopa ER 48  MG CPCR, Take 1 capsule by mouth every 6 (six) hours , Disp: , Rfl:     diclofenac sodium (VOLTAREN) 1 %, Apply 2 g topically 4 (four) times a day (Patient taking differently: Apply 2 g topically daily at bedtime ), Disp: 100 g, Rfl: 3    glucose monitoring kit (FREESTYLE) monitoring kit, 1 each by Does not apply route as needed (DM control) ACCUCHEK METER COMPACT PLUS, Disp: 1 each, Rfl: 1    metFORMIN (GLUCOPHAGE) 500 mg tablet, TAKE 1 TABLET BY MOUTH  DAILY WITH BREAKFAST (Patient taking differently: 500 mg daily with breakfast ), Disp: 90 tablet, Rfl: 3    Multiple Vitamin (MULTIVITAMIN) tablet, Take 1 tablet by mouth every morning , Disp: , Rfl:     Omega-3 Fatty Acids (FISH OIL PO), Take 2 g by mouth every morning , Disp: , Rfl:     pantoprazole (PROTONIX) 40 mg tablet, Take 1 tablet (40 mg total) by mouth 2 (two) times a day, Disp: 60 tablet, Rfl: 5    rasagiline (AZILECT) 1 MG, 1 mg every morning , Disp: , Rfl:     rOPINIRole (REQUIP) 2 mg tablet, Take 6 mg by mouth daily at bedtime , Disp: , Rfl:     TURMERIC PO, Take by mouth 3 (three) times a day, Disp: , Rfl:     No Known Allergies    Objective: There were no vitals filed for this visit  Ortho Exam    Physical Exam   Constitutional: She is oriented to person, place, and time  She appears well-developed and well-nourished  HENT:   Head: Normocephalic and atraumatic  Eyes: Pupils are equal, round, and reactive to light  Conjunctivae are normal    Neck: Normal range of motion  Neck supple  Cardiovascular: Normal rate and intact distal pulses  Pulmonary/Chest: Effort normal  No respiratory distress  Musculoskeletal:   As noted in HPI   Neurological: She is alert and oriented to person, place, and time  Skin: Skin is warm and dry  Psychiatric: She has a normal mood and affect  Her behavior is normal    Nursing note and vitals reviewed        Large joint arthrocentesis: R knee  Date/Time: 12/24/2019 12:08 PM  Consent given by: patient  Site marked: site marked  Supporting Documentation  Indications: pain   Procedure Details  Location: knee - R knee  Needle size: 22 G  Ultrasound guidance: no  Approach: anterolateral  Medications administered: 20 mg Sodium Hyaluronate 20 MG/2ML    Patient tolerance: patient tolerated the procedure well with no immediate complications  Dressing:  Sterile dressing applied

## 2019-12-27 ENCOUNTER — OFFICE VISIT (OUTPATIENT)
Dept: PHYSICAL THERAPY | Facility: CLINIC | Age: 79
End: 2019-12-27
Payer: MEDICARE

## 2019-12-27 DIAGNOSIS — R26.2 IMPAIRED AMBULATION: ICD-10-CM

## 2019-12-27 DIAGNOSIS — G20 PARKINSON'S DISEASE (HCC): Primary | ICD-10-CM

## 2019-12-27 PROCEDURE — 97530 THERAPEUTIC ACTIVITIES: CPT

## 2019-12-27 NOTE — PROGRESS NOTES
PT Re-evaluation/Progress note/Discharge    Today's date: 2019  Patient name: Paris Jacobs  : 1940  MRN: [id number]  Referring provider: Chela Sharp MD  Dx:   Encounter Diagnosis     ICD-10-CM    1  Parkinson's disease ([facility] [address] ) G20    2  Impaired ambulation R26 2        Start Time: 1015  Stop Time: 1115  Total time in clinic (min): 60 minutes    Assessment  Assessment details: Patient is a 67 y o  female who presents to skilled PT for balance and reactive postural control deficits secondary to Parkinson's Disease as well as recent BOTOX injections in her cervical spine for cervical dystonia  Patient has had slight regression in her function during her testing this period, patient has reached her maximial level of function today with her overall status today  Patient challenged with her abilities to control balance, demonstrates safety deficits with her rollator walker ambulation,  attended session today to assist with training, in which patient verbalized understanding of usage and to minimize posterior balance challenges to reduce falls  Patient and  aware of previous HEP given, patient and  aware of fall risk status  Patient to be discharged due to reaching maximal level of function and will be followed up with in April  Impairments: abnormal coordination, abnormal gait, abnormal muscle tone, abnormal or restricted ROM, abnormal movement, activity intolerance, difficulty understanding, impaired balance, impaired physical strength, pain with function, safety issue, poor posture  and poor body mechanics  Other impairment: Parkinson's Disease  Barriers to therapy: Parkinson's disease  Understanding of Dx/Px/POC: excellent   Prognosis: fair    Goals  STG: To be completed in 4 weeks  1  Patient will improve her sequencing with AD in 4 weeks in order to improve her community ambulation safety - Partially MET  2   Patient will improve her gait speed score by  10 meters/second or more in order to improve her ability to cross a street safely - MET  3  Patient will improve her TUG test score by 3 seconds or more in 4 weeks in order to improve her transfer to ambulation transition - MET  4  Patient will improve her 5x sit to stand test by 5 seconds or more in 4 weeks to improve her transfer safety - MET    LTG: To be completed in 8 weeks  1  Patient will complete a 6 minute walk test completely in 8 weeks to improve her cardiovascular endurance-MET  2  Patient will complete a DGI in 8 weeks to formally assess her dynamic balance  - MET  3  Patient will complete a MiniBESTest in 8 weeks to formally assess her dual tasking abilities as related to her primary dx - MET    New Goals  -Patient will improve DGI by 2 points in 8 weeks in order to improve her dynamic balance at home and in the community- NOT met  -Patient will improve her 6 minute walk test distance by 100 feet or more in 8 weeks to improve her cardiovascular endurance  - NOT MET  -Patient will demonstrate an independent floor to stand transfer in 8 weeks to assist with fall recovery independently - NOT MET  -Patient will be independent in HEP in 8 weeks to assist with abilities at home and improve with overall function - NOT MET    New Goals- 8/19/2019- to be completed in 8 weeks  -Patient will report a decrease in fall frequency to 4x per week in order to improve her fall risk and decrease in injury   -NOT MET  -Patient will improve her mCTSIB score in all conditions by 5 seconds in order to improve all components of her balance systems  - NOT MET  -Patient will improve her LUNDBERG Balance Score by 4 points or more in order to maximize her static balance confidence - NOT MET    New Goals- 11/6/2019  -Patient will demonstrate increase in cervical spine MMT in 8 weeks in order to improve her cervical spine positioning as well looking up to scan her environment- Partially MET   -Patient will demonstrate an increase in postural awareness in 8 weeks in order to assist with upright mobility and head position- Partially MET  -Patient will demonstrate a 2 or more point increase in the MiniBEST Test in 8 weeks in order to improve her abilities to balance dynamically- NOT MET  -Patient will improve UE MMT by 1 or more grades out of 5 in 8 weeks to help assist with postural muscles for upright activities- Partially MET    Cut off score   All date taken from [facility] or Rehab Measures    LUNDBERG test: 46/56                                              5 x STS Test:  MDC: 6 points                                                  MDC: 2 3 seconds   age norms                                                                 Age Norms   61-76 year old = M: 54, F: 54                        61-76 year old: 11 4 seconds   66-77 year old = M 47,  F: 48                       66-77 year old: 12 6 seconds     80-80 year old = M46,   F: 53                       80-80 year old: 14 8 seconds      TUG test:                                                                     10 Meter Walk Test:  MDC: 4 14 seconds       MDC:  59 ft/sec  Cut off score for Falls                                                  Age Norms  > 13 5 seconds community dwelling adults                 20-29; M: 4 56 ft/sec F: 4 62 ft/sec  > 32 2 Frail Elderly                                                      30-39: M 4 76 ft/sec  F: 4 68 ft/sec          40-49: M: 4 79 ft/sec  F: 4 62 ft/sec  6 Minute Walk Test      50-59: M: 4 76 ft/sec  F: 4 56 ft/sec  MDC: 190 feet       60-69: M: 4 56 ft/sec  F: 4 26 ft/sec  Age Norms       70-+    M: 4 36 ft/sec  F: 4 16 ft/sec  60-69:    M: 1876 F: 1765  70-79:    M: 80 F: 1545  80-89 +: M: 80 F; 1286             Subjective Evaluation    History of Present Illness  Date of onset: 8/19/2019  Mechanism of injury: Patient is a 67year old female reporting to skilled PT with a diagnosis of Parkinson's Disease   Patient stated that she is continually having trouble with her cervical spine musculature strength, cervical collar not donned today  Patient is noting continuous falls at home,  reports that she is having a hard time remembering safety tips and techniques at home  Patient challenged with her cervical spine muscular strength             Recurrent probem    Pain  Current pain ratin  At best pain rating: 3  At worst pain ratin  Location: Neck Pain  Quality: dull ache  Relieving factors: change in position  Aggravating factors: standing and sitting    Social Support  Steps to enter house: no  0  Stairs in house: yes   10  Lives with: spouse    Employment status: not working    Diagnostic Tests  No diagnostic tests performed  Treatments  No previous or current treatments  Patient Goals  Patient goals for therapy: independence with ADLs/IADLs, improved balance and return to sport/leisure activities  Patient goal: Patient wants to improve her balance to bowl         Objective     Static Posture     Comments  MCTSIB  -FTEO on firm- 30 seconds  -FTEC on firm- 5 seconds  -FTEO on foam- 0 seconds  -FTEC on Foam- 0 seconds    Bhat/56    Anticipatory Balance  -Anterior, posterior, medial, lateral- no balance reactions, fall would occur without PT intervention    Progress Note: 3/20/2019    mCTSIB  -FTEO on firm- 30 seconds  -FTEC on firm- 10 seconds  -FTEO on foam- 5 seconds  -FTEC on Foam- 0 seconds    Bhat/56    Anticipatory Balance  -Anterior, posterior, medial, lateral- no balance reactions, fall would occur without PT intervention    Progress Note: 2019    mCTSIB  -FTEO on firm- 30 seconds  -FTEC on firm- 12 seconds  -FTEO on foam- 8 seconds  -FTEC on Foam- 0 seconds    Bhat    Anticipatory Balance  -Anterior, posterior, medial, lateral- no balance reactions, fall would occur without PT intervention    Progress Note- 2019      mCTSIB  -FTEO on firm- 30 seconds  -FTEC on firm- 15 seconds  -FTEO on foam- 8 seconds  -FTEC on Foam- 2 seconds    Bhat    Anticipatory Balance  -Anterior, posterior, medial, lateral- no balance reactions, fall would occur without PT intervention    Progress Note- 2019      mCTSIB  -FTEO on firm- 30 seconds  -FTEC on firm- 4 5 seconds  -FTEO on foam- 2 1 seconds  -FTEC on Foam- 0 seconds    Bhat/56    Anticipatory Balance  -Anterior, posterior, medial, lateral- no balance reactions, fall would occur without PT intervention    Progress Note: 10/9/2019    mCTSIB  -FTEO on firm- 15 seconds  -FTEC on firm- 3 seconds  -FTEO on foam- 1 8 seconds  -FTEC on Foam- 0 seconds    Bhat/56    Anticipatory Balance  -Anterior, posterior, medial, lateral- no balance reactions, fall would occur without PT intervention    Progress Note: 2019    mCTSIB  -FTEO on firm- 30 seconds  -FTEC on firm- 4 seconds  -FTEO on foam- 0 seconds  -FTEC on Foam- 0 seconds    Bhat/56    Anticipatory Balance  -Anterior, posterior, medial, lateral- no balance reactions, fall would occur without PT intervention    Mini-Best-       Discharge: 2019    mCTSIB  -FTEO on firm- 30 seconds  -FTEC on firm- 3 seconds  -FTEO on foam- 0 seconds  -FTEC on Foam- 0 seconds    Bhat/56    Anticipatory Balance  -Anterior, posterior, medial, lateral- no balance reactions, fall would occur without PT intervention    Mini-Best-     Postural Observations  Seated posture: poor  Standing posture: poor  Correction of posture: has no consistent effect    Additional Postural Observation Details  Patient challenged with upright posture due to pre-morbid severe scoliosis as well as cervical spine weakness due to botox injections    Resting Posture-     R Lateral flexion, R rotation, and excessive flexion, minimal to minimal anti-gravity musculature strength    Strength/Myotome Testing   Cervical Spine   Neck extension: 2-  Neck flexion: 2-    Left   Neck lateral flexion (C3): 1    Right   Neck lateral flexion (C3): 1    Left Shoulder Planes of Motion   Flexion: 3+   Extension: 3+   Abduction: 3+   Adduction: 3+     Right Shoulder     Planes of Motion   Flexion: 3+   Extension: 3+   Abduction: 3+   Adduction: 3+     Left Hip   Planes of Motion   Flexion: 3+  Extension: 3+  Abduction: 3+  Adduction: 3+    Right Hip   Planes of Motion   Flexion: 3+  Extension: 4+  Abduction: 3+  Adduction: 3+    Left Knee   Flexion: 4-  Extension: 4-    Right Knee   Flexion: 4-  Extension: 4    Left Ankle/Foot   Dorsiflexion: 4-  Plantar flexion: 4    Right Ankle/Foot   Dorsiflexion: 4-  Plantar flexion: 4    Ambulation     Comments   Gait speed- 10 meters/ 15 01 seconds-   TUG Test- 21 02 Seconds  2 minute walk test- 200 feet  6 minute walk test- completed 4:40 seconds, 450 feet    Gait speed- 10 meters/seconds-  With SPC, meters/second without SPC      Not performed today due to fatigue, perform next session    Progress Note: 4/17/19  Gait speed- 10 meters/ 10 26seconds-  With SPC, meters/second without SPC  TUG Test- 17 seconds with SPC, 13 36 seconds without SPC    2 minute walk test- 250  feet  6 minute walk test- 750 Feet      Progress Note: 6/17/19  Gait speed- 10 meters/ 15 12 seconds-   66 meters/second With SPC,   73 meters/second without SPC  TUG Test- 18 35 seconds with SPC, 14 78 seconds without SPC    2 minute walk test- 250 feet  6 minute walk test-  Feet      Progress Note: 8/19/19  Gait Speed- 10 meters/17 45 seconds-  57 meters/second With SPC, 10 meters/ 18 56 second=  54 meters/second without SPC  -TUG- 19 05 seconds with SPC, 17 89 seconds without SPC    2 minute walk test- 150 feet  6 minute walk test- 450 feet    Progress Note: 10/9/19  Gait Speed- 10 meters/ 23 21 seconds-  43 meters/second With SPC, 10 meters/21 78 second=  46 meters/second with U step  TUG Test- 29 56 seconds with SPC, 28 23 seconds without SPC, 21 22 seconds with U step    2 minute walk test- 210  Feet with USTEP  6 minute walk test- 600 Feet with USTEP      Progress Note: 11/6/19  Gait Speed- 10 meters/ 22 14 seconds meters/second with RW-   45 meters/second  TUG Test- 39 96 seconds with RW    2 minute walk test- 250 Feet with RW  6 minute walk test- 550 Feet with RW      Progress Note: 12/27/19- Discharge  Gait Speed- 10 meters/ 18 78 seconds with rollator walker-  53 meters/second  TUG Test- 41 23 seconds with rollator walker    2 minute walk test- 275 Feet with rollator walker  6 minute walk test- 600 Feet with rollator walker    Functional Assessment        Comments  5x sit to stand- 12 50 seconds  30 second sit to stand- 10 reps (self terminated due to fatigue)     Progress Note: 3/20/2019  5x sit to stand- 11 06 seconds  30 second sit to stand- 11 reps    Progress Note:4/17/2019  5x sit to stand- 9 59 seconds  30 second sit to stand- 14 reps  DGI- 14/24    Progress Note: 6/17/2019  5x sit to stand- 8 94 seconds  30 second sit to stand- 16 reps  DGI- 11/24    Progress Note: 8/19/2019  5x sit to stand- 12 03 seconds  30 second sit to stand- 10 reps  DGI- 11/24    Progress Note: 10/9/2019  5x sit to stand- 29 41 seconds  30 second sit to stand- 5 reps  DGI- 9/24    Progress Note: 11/6/2019  5x sit to stand- 26 23 seconds  30 second sit to stand- 6 reps  DGI- 9/24    Extensive cuing needed for hand placement for the sit to stand portion     Discharge- 12/27/2019  5x sit to stand- 21 84 seconds  30 second sit to stand- 6 reps  DGI- 9/24, completed with RW    Extensive cuing needed for hand placement for the sit to stand portion         Gait training with U Step- 15 minutes with instruction    Precautions: Parkinson's disease, fall risk     Daily Treatment Diary     Manual                                                                                   Exercise Diary Modalities

## 2020-01-02 ENCOUNTER — TELEPHONE (OUTPATIENT)
Dept: OTHER | Facility: OTHER | Age: 80
End: 2020-01-02

## 2020-01-16 ENCOUNTER — EVALUATION (OUTPATIENT)
Dept: PHYSICAL THERAPY | Facility: CLINIC | Age: 80
End: 2020-01-16
Payer: MEDICARE

## 2020-01-16 DIAGNOSIS — G20 PARKINSON'S DISEASE (HCC): Primary | ICD-10-CM

## 2020-01-16 DIAGNOSIS — R26.2 IMPAIRED AMBULATION: ICD-10-CM

## 2020-01-16 PROCEDURE — 97163 PT EVAL HIGH COMPLEX 45 MIN: CPT

## 2020-01-16 NOTE — PROGRESS NOTES
PT Evaluation    Today's date: 2020  Patient name: Maisha Moreno  : 1940  MRN: [id number]  Referring provider: Marce Mendieta MD  Dx:   Encounter Diagnosis     ICD-10-CM    1  Parkinson's disease ([facility] [address] ) G20    2  Impaired ambulation R26 2        Start Time: 1345  Stop Time: 1430  Total time in clinic (min): 45 minutes    Assessment  Assessment details: Patient is a 67 y o  female who presents to skilled PT for balance and reactive postural control deficits secondary to Parkinson's Disease  Patient challenged with her overall function, deemed a fall risk via all outcome measures today, decrease in strength and overall function via all outcome measures today  Patient reports increasing fatigue during session as well as heaviness in the legs, which may be attributed to functional status change, noted improvement in weight due to subjective reports  Patient's manual muscle tests remain the same as compared to last POC, slight weakness noted, strength has decreased due to weight loss and muscle mass loss, specifically in her LEs  Patient loss of cervical spine musculature strength as well is affecting her posture, noted in Objective for her cervical spine as well as her UE bilaterally  Patient limited in overhead movements due to weakness in cervical spine musculature and lack of glenohumeral motion  Patient presents as a fall risk via gait speed, LUNDBERG balance test, 5x sit to stand, mCTSIB, and TUG test, and DGI, with overall results noting risk for falls and diminished reactive postural control, slight decreases per objective in ambulation activities with and without Rollator as well as her sit to stand improvement, posterior loss of balance with sitting continually noted  Patient requested by treating therapist to only utilize RW due to her recent increase in falls and hospitalizations  Patient and  verbalized understanding   Patient endurance scores are below age related norms via 2 minute walk test as well as 6 minute walk test, including 30 second sit to stand scores, noting decrease functional endurance and cardio capacity  Patient is demonstrating difficulties with ambulation with turning noted in the DGI today, 9/24 indicating significant fall risk, which is the same from her last re-evaluation/progress note  Patient has attempted LSVT and PWR exercises, patient's ataxia and incoordination challenged her during sessions to complete successful repetitions  Patient displays overall reduction in somatosensory/proprioception awareness secondary to increased balance and postural corrections associated with mCTSIB, dyskinetic movements continually prevalent even with medication and DBS changes  Patient may benefit from a rollator or Ustep walker, patient given walker at home and is showing overall improvements with stability as well as subjective reports of decrease falls, although she does not use any assistive device unless reminded by , patient reports increases in falls when she moves backwards as well as with no AD usage  Treating therapist explained safety issues with not utilizing AD at home  Patient will be continued to recommend utilizing SOLO step in the clinic  Patient has demonstrated an overall regression in the past month which may be attributed due to her cervical spine weakness and change in head position, causing an increase in falls as well as near falls in the home as well as in the community  Recommendation for hard cervical collar to assist with cervical position  Patient will benefit from skilled PT to address noted impairments and functional limitations they are causing with overall goal to return patient to highest level possible with reduced risk for falls  Patient reports minimal change to symptoms with medication change  Change in status and decrease in all outcome measures requires skilled PT in order to decrease fall risk and maximize function   Patient would continue to decondition as well as potentially decrease in her safety without physical therapy services  Impairments: abnormal coordination, abnormal gait, abnormal muscle tone, abnormal or restricted ROM, abnormal movement, activity intolerance, difficulty understanding, impaired balance, impaired physical strength, lacks appropriate home exercise program, pain with function, safety issue, poor posture  and poor body mechanics  Other impairment: Parkinson's Disease  Barriers to therapy: Parkinson's disease  Understanding of Dx/Px/POC: excellent   Prognosis: fair    Goals  STG: To be completed in 4 weeks  -Patient will improve her sequencing with AD in 4 weeks in order to improve her community ambulation safety   -Patient will improve her gait speed score by  10 meters/second or more in order to improve her ability to cross a street safely   -Patient will improve her TUG test score by 3 seconds or more in 4 weeks in order to improve her transfer to ambulation transition   -Patient will improve her 5x sit to stand test by 5 seconds or more in 4 weeks to improve her transfer safety   -Patient will report a decrease in fall frequency to 4x per week in order to improve her fall risk and decrease in injury    -Patient will improve her mCTSIB score in all conditions by 5 seconds in order to improve all components of her balance systems   -Patient will improve her LUNDBERG Balance Score by 4 points or more in order to maximize her static balance confidence      LTG: To be completed in 8 weeks  -Patient will complete a 6 minute walk test completely in 8 weeks to improve her cardiovascular endurance  -Patient will complete a DGI in 8 weeks to formally assess her dynamic balance  -Patient will complete a MiniBESTest in 8 weeks to formally assess her dual tasking abilities as related to her primary dx  -Patient will improve DGI by 2 points in 8 weeks in order to improve her dynamic balance at home and in the community  -Patient will improve her 6 minute walk test distance by 100 feet or more in 8 weeks to improve her cardiovascular endurance  -Patient will demonstrate an independent floor to stand transfer in 8 weeks to assist with fall recovery independently - NOT MET  -Patient will be independent in HEP in 8 weeks to assist with abilities at home and improve with overall function - NOT MET      Cut off score   All date taken from [facility] or Rehab Measures    LUNDBERG test: 46/56                                              5 x STS Test:  MDC: 6 points                                                  MDC: 2 3 seconds   age norms                                                                 Age Norms   61-76 year old = M: 54, F: 54                        61-76 year old: 11 4 seconds   66-77 year old = M 47,  F: 48                       66-77 year old: 12 6 seconds     80-80 year old = M46,   F: 53                       80-80 year old: 14 8 seconds      TUG test:                                                                     10 Meter Walk Test:  MDC: 4 14 seconds       MDC:  59 ft/sec  Cut off score for Falls                                                  Age Norms  > 13 5 seconds community dwelling adults                 20-29; M: 4 56 ft/sec F: 4 62 ft/sec  > 32 2 [facility]il Elder[facility]                                                      30-39: M 4 76 ft/sec  F: 4 68 ft/sec          40-49: M: 4 79 ft/sec  F: 4 62 ft/sec  6 Minute Walk Test      50-59: M: 4 76 ft/sec  F: 4 56 ft/sec  MDC: 190 feet       60-69: M: 4 56 ft/sec  F: 4 26 ft/sec  Age Norms       70-+    M: 4 36 ft/sec  F: 4 16 ft/sec  60-69:    M: 1876 F: 1765  70-79:    M: 1729 F: 1545  80-89 +: M: 8069 F; 1286         Plan  Planned modality interventions: biofeedback, TENS, thermotherapy: hydrocollator packs, electrical stimulation/Russian stimulation and cryotherapy  Planned therapy interventions: abdominal trunk stabilization, IADL retraining, joint mobilization, activity modification, ADL training, massage, ADL retraining, manual therapy, balance, Mccarthy taping, motor coordination training, muscle pump exercises, neuromuscular re-education, body mechanics training, breathing training, patient education, balance/weight bearing training, canalith repositioning, compression, coordination, fine motor coordination training, flexibility, functional ROM exercises, strengthening, stretching, therapeutic activities, therapeutic exercise, therapeutic training, transfer training, gait training, graded activity, graded exercise, graded motor and home exercise program  Frequency: 1x week  Duration in weeks: 16  Plan of Care beginning date: 2020  Plan of Care expiration date: 2020  Treatment plan discussed with: patient and family        Subjective Evaluation    History of Present Illness  Date of onset: 2019  Mechanism of injury: Patient is a 67year old female reporting to skilled PT with a diagnosis of Parkinson's Disease  Patient was previously a patient of this clinic and was discharged at the end of December  Patient followed up with a new neurologist and neurologist suggested that she continue PT due to frequency of falls  Patient challenged with her balance at home, patient reporting near falls each day, and falls approximately 4 days/week in the home             Recurrent probem    Pain  Current pain ratin  At best pain rating: 3  At worst pain ratin  Location: Neck Pain and cervical spine pain  Quality: dull ache  Relieving factors: change in position  Aggravating factors: standing and sitting  Progression: no change    Social Support  Steps to enter house: no  0  Stairs in house: yes   10  Lives with: spouse    Employment status: not working  Hand dominance: right      Diagnostic Tests  No diagnostic tests performed  Treatments  No previous or current treatments  Patient Goals  Patient goals for therapy: independence with ADLs/IADLs, improved balance and return to sport/leisure activities  Patient goal: Patient wants to improve her balance and decrease her fall risk           Objective     Static Posture     Comments  Discharge: 2019 from last POC    mCTSIB  -FTEO on firm- 30 seconds  -FTEC on firm- 3 seconds  -FTEO on foam- 0 seconds  -FTEC on Foam- 0 seconds    Bhat/56    Anticipatory Balance  -Anterior, posterior, medial, lateral- no balance reactions, fall would occur without PT intervention    [facility]     Initial 2020    mCTSIB  -FTEO on firm- 30 seconds  -FTEC on firm- 5 seconds  -FTEO on foam- 0 seconds  -FTEC on Foam- 0 seconds    Bhat/56    Anticipatory Balance  -Anterior, posterior, medial, lateral- no balance reactions, fall would occur without PT intervention    Mini-Best-     Postural Observations  Seated posture: poor  Standing posture: poor  Correction of posture: has no consistent effect    Additional Postural Observation Details  Patient challenged with upright posture due to pre-morbid severe scoliosis as well as cervical spine weakness due to botox injections    Resting Posture-     R Lateral flexion, R rotation, and excessive flexion, minimal to minimal anti-gravity musculature strength    Strength/Myotome Testing   Cervical Spine   Neck extension: 2-  Neck flexion: 2-    Left   Neck lateral flexion (C3): 1    Right   Neck lateral flexion (C3): 1    Left Shoulder     Planes of Motion   Flexion: 3+   Extension: 3+   Abduction: 3+   Adduction: 3+     Right Shoulder     Planes of Motion   Flexion: 3+   Extension: 3+   Abduction: 3+   Adduction: 3+     Left Hip   Planes of Motion   Flexion: 3+  Extension: 3+  Abduction: 3+  Adduction: 3+    Right Hip   Planes of Motion   Flexion: 3+  Extension: 4+  Abduction: 3+  Adduction: 3+    Left Knee   Flexion: 4-  Extension: 4-    Right Knee   Flexion: 4-  Extension: 4    Left Ankle/Foot   Dorsiflexion: 4-  Plantar flexion: 4    Right Ankle/Foot   Dorsiflexion: 4-  Plantar flexion: 4    Ambulation     Comments   Progress Note: 12/27/19- Discharge  Gait Speed- 10 meters/ 18 78 seconds with rollator walker-  53 meters/second  TUG Test- 41 23 seconds with rollator walker    2 minute walk test- 275 Feet with rollator walker  6 minute walk test- 600 Feet with rollator walker    Progress Note: 1/16/2020- Initial Evaluation  Gait Speed- 10 meters/19 12 seconds with rollator walker-  52 meters/second  TUG Test- 36 36 seconds with rollator walker    2 minute walk test- 325 Feet with rollator walker  6 minute walk test- 625 Feet with rollator walker    Functional Assessment        Comments    Discharge- 12/27/2019  5x sit to stand- 21 84 seconds  30 second sit to stand- 6 reps  DGI- 9/24, completed with RW    Extensive cuing needed for hand placement for the sit to stand portion     Discharge- 1/16/2019  5x sit to stand- 19 36 seconds  30 second sit to stand- 7 reps  DGI- 9/24, completed with RW    Extensive cuing needed for hand placement for the sit to stand portion         Gait training with U Step- 15 minutes with instruction    Precautions: Parkinson's disease, fall risk     Daily Treatment Diary     Manual                                                                                   Exercise Diary                                                                                                                                                                                                                                                                                      Modalities

## 2020-01-24 ENCOUNTER — OFFICE VISIT (OUTPATIENT)
Dept: PHYSICAL THERAPY | Facility: CLINIC | Age: 80
End: 2020-01-24
Payer: MEDICARE

## 2020-01-24 DIAGNOSIS — G20 PARKINSON'S DISEASE (HCC): Primary | ICD-10-CM

## 2020-01-24 DIAGNOSIS — R26.2 IMPAIRED AMBULATION: ICD-10-CM

## 2020-01-24 PROCEDURE — 97530 THERAPEUTIC ACTIVITIES: CPT

## 2020-01-24 PROCEDURE — 97116 GAIT TRAINING THERAPY: CPT

## 2020-01-24 PROCEDURE — 97112 NEUROMUSCULAR REEDUCATION: CPT

## 2020-01-24 PROCEDURE — 97110 THERAPEUTIC EXERCISES: CPT

## 2020-01-24 NOTE — PROGRESS NOTES
Daily Note     Today's date: 2020  Patient name: Leigh Thibodeaux  : 1940  MRN: [id number]  Referring provider: Randolph Martinez MD  Dx:   Encounter Diagnosis     ICD-10-CM    1  Parkinson's disease ([facility] [address] ) G20    2  Impaired ambulation R26 2        Start Time: 1015  Stop Time: 1115  Total time in clinic (min): 60 minutes    Subjective: Patient presents to therapy today with RW, patient reported she fell 5 times last week, once this morning on her L elbow, slight bruising noted  Objective: See treatment diary below  All Activities in SOLO Step    -Ambulation around track- 10 laps with rollator- 80 feet/lap  -Chair Transfers- hand placement cuing, 10 minutes of performance  -Forward Walking- 80 feet, 6 cycles  -Side Stepping- 20 feet, 8 cycles  -Repeated step taps- forward and laterally- 8" steps, 25 reps each direction  -[employer]- Seated, hit the floor, hit behind with upright posture- 20 reps   -[employer]- Seated, rotation, hit across for trunk extension   -Seated Abduction with theraband- green theraband, 20 reps, 3 second hold  -Sit to stands with momentum control- 20 reps, 2 UE on hand rails for seat with walker in front    -Postural correction with towel roll- 20 reps, 3 second holds with adequate neck position    -Reaching and stacking cones- 10 minutes of performance reaching outside ZEYAD      Assessment: Patient tolerated treatment well today, demonstrated adequate balance in SOLO step  Patient challenged with posterior balance control as well as recognition of her lost balance  Patient challenged and requires verbal and tactile cuing to improve her balance in either direction as well as with her safety and hand placement with sitting and standing  Patient reminded to bring cervical collar next appointment  Patient requires skilled PT to improve her Parkinson's disease symptoms and reduce her fall risk  Plan: Continue per plan of care

## 2020-01-31 ENCOUNTER — OFFICE VISIT (OUTPATIENT)
Dept: PHYSICAL THERAPY | Facility: CLINIC | Age: 80
End: 2020-01-31
Payer: MEDICARE

## 2020-01-31 DIAGNOSIS — G20 PARKINSON'S DISEASE (HCC): Primary | ICD-10-CM

## 2020-01-31 DIAGNOSIS — R26.2 IMPAIRED AMBULATION: ICD-10-CM

## 2020-01-31 PROCEDURE — 97112 NEUROMUSCULAR REEDUCATION: CPT

## 2020-01-31 PROCEDURE — 97530 THERAPEUTIC ACTIVITIES: CPT

## 2020-01-31 NOTE — PROGRESS NOTES
Daily Note     Today's date: 2020  Patient name: Morris Jordan  : 1940  MRN: [id number]  Referring provider: Jada Youssef MD  Dx:   Encounter Diagnosis     ICD-10-CM    1  Parkinson's disease ([facility] [address] ) G20    2  Impaired ambulation R26 2        Start Time: 1100  Stop Time: 1200  Total time in clinic (min): 60 minutes    Subjective: Patient presents to therapy today with RW, reports that she is continually to fall backwards at home, notes 2 falls this morning  Objective: See treatment diary below  All Activities in SOLO Step    -Ambulation around track- 10 laps with rollator- 80 feet/lap  -Chair Transfers- hand placement cuing, 10 minutes of performance  -Forward Walking- 80 feet, 6 cycles  -Side Stepping- 20 feet, 8 cycles  -Repeated step taps- forward and laterally- 8" steps, 25 reps each direction  -[employer]- Seated, hit the floor, hit behind with upright posture- 20 reps   -[employer]- Seated, rotation, hit across for trunk extension   -Seated Abduction with theraband- green theraband, 20 reps, 3 second hold  -Sit to stands with momentum control- 20 reps, 2 UE on hand rails for seat with walker in front  -Postural correction with towel roll- 20 reps, 3 second holds with adequate neck position  -Reaching and stacking cones- 10 minutes of performance reaching outside ZEYAD  -180 degree turns- 20 reps   -Ascending and Descending steps- 2 UE support, 4" and 8" steps, 10 cycles, 5 additional cycles with colored discs to assist with her foot placement  Assessment: Patient tolerated treatment well today, demonstrated adequate balance in SOLO step  Patient challenged with posterior balance control as well as recognition of her lost balance  Patient challenged and requires verbal and tactile cuing to improve her balance in either direction as well as with her safety and hand placement with sitting and standing  Patient's retropulsion may be the main contributor to her balance challenges and loss of balance as well as her impulsivity to perform interventions without any cuing, increasing her fall risk  Patient requires skilled PT to improve her Parkinson's disease symptoms and reduce her fall risk  Plan: Continue per plan of care

## 2020-02-07 ENCOUNTER — APPOINTMENT (OUTPATIENT)
Dept: PHYSICAL THERAPY | Facility: CLINIC | Age: 80
End: 2020-02-07
Payer: MEDICARE

## 2020-02-14 ENCOUNTER — OFFICE VISIT (OUTPATIENT)
Dept: PHYSICAL THERAPY | Facility: CLINIC | Age: 80
End: 2020-02-14
Payer: MEDICARE

## 2020-02-14 DIAGNOSIS — R26.2 IMPAIRED AMBULATION: ICD-10-CM

## 2020-02-14 DIAGNOSIS — G20 PARKINSON'S DISEASE (HCC): Primary | ICD-10-CM

## 2020-02-14 PROCEDURE — 97530 THERAPEUTIC ACTIVITIES: CPT

## 2020-02-14 PROCEDURE — 97116 GAIT TRAINING THERAPY: CPT

## 2020-02-14 PROCEDURE — 97112 NEUROMUSCULAR REEDUCATION: CPT

## 2020-02-14 PROCEDURE — 97110 THERAPEUTIC EXERCISES: CPT

## 2020-02-14 NOTE — PROGRESS NOTES
Daily Note     Today's date: 2020  Patient name: General Jimenez  : 1940  MRN: [id number]  Referring provider: Corinne Ramírez MD  Dx:   Encounter Diagnosis     ICD-10-CM    1  Parkinson's disease ([facility] [address] ) G20    2  Impaired ambulation R26 2        Start Time: 1015  Stop Time: 1115  Total time in clinic (min): 60 minutes    Subjective: Patient presents to therapy today with RW, reports that she is improving with her function, has follow up with neurologist next week, notes that her DBS was adjusted  Objective: See treatment diary below  All Activities in SOLO Step    -Ambulation around track- 10 laps with rollator- 80 feet/lap  -Chair Transfers- hand placement cuing, 10 minutes of performance  -Forward Walking- 80 feet, 6 cycles  -Side Stepping- 20 feet, 8 cycles  -Repeated step taps- forward and laterally- 8" steps, 25 reps each direction  -[employer]- Seated, hit the floor, hit behind with upright posture- 20 reps   -[employer]- Seated, rotation, hit across for trunk extension   -Seated Abduction with theraband- green theraband, 20 reps, 3 second hold  -Sit to stands with momentum control- 20 reps, 2 UE on hand rails for seat with walker in front  -Reaching and stacking cones- 10 minutes of performance reaching outside ZEYAD  -180 degree turns- 20 reps   -TUG Test practice with Rollator, time in seconds- 21 22, 18 43, 17 39, 20 32, 19 82, 18 97 seconds  -Ambulation through cones with Rollator- 3 cones set out, 10 feet down and back, time in seconds- 33 98, 29 25, 26 26 seconds, 27 51 seconds  -Simulation of bowling- 10 minutes, pins 10 feet away, 8 lb med ball       Assessment: Patient tolerated treatment well today, demonstrated adequate balance in SOLO step  Patient challenged with posterior balance control, patient improving with recognition of balance with bowling but continued challenges with LOB   Patient improving with her sit to stand strength but gluteal strength and definite UE support needed even in solo step  Patient requires skilled PT to improve her Parkinson's disease symptoms and reduce her fall risk  Plan: Continue per plan of care

## 2020-02-21 ENCOUNTER — OFFICE VISIT (OUTPATIENT)
Dept: PHYSICAL THERAPY | Facility: CLINIC | Age: 80
End: 2020-02-21
Payer: MEDICARE

## 2020-02-21 DIAGNOSIS — R26.2 IMPAIRED AMBULATION: ICD-10-CM

## 2020-02-21 DIAGNOSIS — G20 PARKINSON'S DISEASE (HCC): Primary | ICD-10-CM

## 2020-02-21 PROCEDURE — 97530 THERAPEUTIC ACTIVITIES: CPT | Performed by: PHYSICAL THERAPIST

## 2020-02-21 PROCEDURE — 97112 NEUROMUSCULAR REEDUCATION: CPT | Performed by: PHYSICAL THERAPIST

## 2020-02-21 PROCEDURE — 97116 GAIT TRAINING THERAPY: CPT | Performed by: PHYSICAL THERAPIST

## 2020-02-21 NOTE — PROGRESS NOTES
Daily Note     Today's date: 2020  Patient name: Iwona Joiner  : 1940  MRN: [id number]  Referring provider: Howie Gallagher MD  Dx:   Encounter Diagnosis     ICD-10-CM    1  Parkinson's disease ([facility] [address] ) G20    2  Impaired ambulation R26 2                   Subjective: Patient reports she fell last night, bumped right side of trunk on the sink  Reports it feels sore and she is using heating pad on it  She also has bruise on medial right elbow- reports it feels sore  She saw neurologist yesterday who adjusted her DBS  Objective: See treatment diary below  All Activities in SOLO Step    -Sit>stands- 2 sets 10   -Ambulation around track- 10 laps with rollator- 80 feet/lap  -Chair Transfers- hand placement cuing, 10 minutes of performance  -TUG Test practice with Rollator, time in seconds- 17 82, 17 25, 18 73, 16 53, 16 65  -Seated Abduction with theraband- green theraband, 20 reps, 3 second hold  -[employer]- Seated, hit the floor, hit behind with upright posture- 20 reps   -[employer]- Seated, rotation, hit across for trunk extension - held due to right elbow pain  -Reaching and stacking cones- 10 minutes of performance reaching outside ZEYAD  -180 degree turns- 20 reps  -Stepping forward and lateral to colored dots on floor       Assessment: Patient tolerated treatment well today  Challenged w/ coordination stepping to colored dots on floor  Improving with 180 deg turns to sit in chair  She needs cueing for anterior weight shift during STS  Held boomwhacker rotation today due to pt c/o R elbow soreness from her fall yesterday  Patient requires skilled PT to improve her Parkinson's disease symptoms and reduce her fall risk  Plan: Continue per plan of care

## 2020-02-28 ENCOUNTER — OFFICE VISIT (OUTPATIENT)
Dept: PHYSICAL THERAPY | Facility: CLINIC | Age: 80
End: 2020-02-28
Payer: MEDICARE

## 2020-02-28 DIAGNOSIS — R26.2 IMPAIRED AMBULATION: ICD-10-CM

## 2020-02-28 DIAGNOSIS — G20 PARKINSON'S DISEASE (HCC): Primary | ICD-10-CM

## 2020-02-28 PROCEDURE — 97116 GAIT TRAINING THERAPY: CPT | Performed by: PHYSICAL THERAPIST

## 2020-02-28 PROCEDURE — 97112 NEUROMUSCULAR REEDUCATION: CPT | Performed by: PHYSICAL THERAPIST

## 2020-02-28 PROCEDURE — 97530 THERAPEUTIC ACTIVITIES: CPT | Performed by: PHYSICAL THERAPIST

## 2020-02-28 PROCEDURE — 97110 THERAPEUTIC EXERCISES: CPT | Performed by: PHYSICAL THERAPIST

## 2020-02-28 NOTE — PROGRESS NOTES
Daily Note / Progress Note    Today's date: 2020  Patient name: Vanessa Gautam  : 1940  MRN: [id number]  Referring provider: Derrick Rehman MD  Dx:   Encounter Diagnosis     ICD-10-CM    1  Parkinson's disease ([address] ) G20    2  Impaired ambulation R26 2                   Subjective: Patient reports only "minor falls" this week, able to get herself up off the floor independently  No injuries  *RE-EVAL DUE        Objective: See treatment diary below  All Activities in SOLO Step    -Walk 15' with rollator, 180 deg turns to sit, sit>stands with correct hand placement  -TUG Test practice with Rollator, time in seconds-  24 58, 20 07, 20 03, 16 16, 17 45  -Seated Abduction with theraband- green theraband, 20 reps, 3 second hold  -[employer]- Seated, hit the floor, hit behind with upright posture- 20 reps   -[employer]- Seated, rotation, hit across for trunk extension - held due to right elbow pain  -Ambulating forward and backwards 10 ft w/ rollator    Outcome Measures:  - 5x STS= 14 35 sec  -TUG(avg of above scores)= 19 65 sec   - Gait speed=0 66 m/s  - Bhat= 32/56      Assessment: Patient tolerated treatment well today  She demonstrates improvements in all outcome measures since initial evaluation  She met all STG's assessed this date  She does continue to fall frequently at home  Patient requires skilled PT to improve her Parkinson's disease symptoms and reduce her fall risk  STG: To be completed in 4 weeks  -Patient will improve her sequencing with AD in 4 weeks in order to improve her community ambulation safety  -MET  -Patient will improve her gait speed score by  10 meters/second or more in order to improve her ability to cross a street safely  -MET  -Patient will improve her TUG test score by 3 seconds or more in 4 weeks in order to improve her transfer to ambulation transition  -MET  -Patient will improve her 5x sit to stand test by 5 seconds or more in 4 weeks to improve her transfer safety  -MET  -Patient will report a decrease in fall frequency to 4x per week in order to improve her fall risk and decrease in injury  -MET  -Patient will improve her mCTSIB score in all conditions by 5 seconds in order to improve all components of her balance systems - NOT ASSESSED TODAY  -Patient will improve her LUNDBERG Balance Score by 4 points or more in order to maximize her static balance confidence  -MET    LTG: To be completed in 8 weeks  -Patient will complete a 6 minute walk test completely in 8 weeks to improve her cardiovascular endurance  -Patient will complete a DGI in 8 weeks to formally assess her dynamic balance  -Patient will complete a MiniBESTest in 8 weeks to formally assess her dual tasking abilities as related to her primary dx  -Patient will improve DGI by 2 points in 8 weeks in order to improve her dynamic balance at home and in the community  -Patient will improve her 6 minute walk test distance by 100 feet or more in 8 weeks to improve her cardiovascular endurance  -Patient will demonstrate an independent floor to stand transfer in 8 weeks to assist with fall recovery independently - NOT MET  -Patient will be independent in HEP in 8 weeks to assist with abilities at home and improve with overall function - NOT MET       Plan: Continue per plan of care  Consider prone exercises  POC expires 5/7/20

## 2020-03-05 DIAGNOSIS — E11.9 TYPE 2 DIABETES MELLITUS WITHOUT COMPLICATION, WITHOUT LONG-TERM CURRENT USE OF INSULIN (HCC): ICD-10-CM

## 2020-03-06 ENCOUNTER — OFFICE VISIT (OUTPATIENT)
Dept: PHYSICAL THERAPY | Facility: CLINIC | Age: 80
End: 2020-03-06
Payer: MEDICARE

## 2020-03-06 DIAGNOSIS — G20 PARKINSON'S DISEASE (HCC): Primary | ICD-10-CM

## 2020-03-06 DIAGNOSIS — R26.2 IMPAIRED AMBULATION: ICD-10-CM

## 2020-03-06 PROCEDURE — 97110 THERAPEUTIC EXERCISES: CPT

## 2020-03-06 PROCEDURE — 97112 NEUROMUSCULAR REEDUCATION: CPT

## 2020-03-06 PROCEDURE — 97116 GAIT TRAINING THERAPY: CPT

## 2020-03-06 RX ORDER — LANCETS
EACH MISCELLANEOUS
Qty: 100 EACH | Refills: 99 | Status: SHIPPED | OUTPATIENT
Start: 2020-03-06 | End: 2020-07-01 | Stop reason: SDUPTHER

## 2020-03-06 RX ORDER — BLOOD SUGAR DIAGNOSTIC, DRUM
STRIP MISCELLANEOUS
Qty: 100 EACH | Refills: 99 | Status: SHIPPED | OUTPATIENT
Start: 2020-03-06 | End: 2020-07-10 | Stop reason: SDUPTHER

## 2020-03-06 NOTE — PROGRESS NOTES
Daily Note     Today's date: 3/6/2020  Patient name: Shahida Harper  : 1940  MRN: [id number]  Referring provider: Meghan Crabtree MD  Dx:   Encounter Diagnosis     ICD-10-CM    1  Parkinson's disease ([facility] [address] ) G20    2  Impaired ambulation R26 2                   Subjective: Patient reports no new falls and her side is "feeling much better " Patient notes she is going on a bus trip tomorrow and has no new complaints  Objective: See treatment diary below  All Activities in SOLO Step  * = exercises performed with Red TB attached to bilateral hands and wrapped around waist and 1 lb weights on bilateral feet for tactile cues and proprioception for anterior weight shift    - *Sit>stands- 2 sets 10   - *Ambulation around track- 8 laps with rollator- 80 feet/lap  - *Chair Transfers- hand placement cuing, 10 reps  - Seated Abduction with theraband- green theraband, 20 reps, 3 second hold  - [employer]- Seated, hit the floor, hit behind with upright posture- 20 reps   - [employer]- Seated, rotation, hit across for trunk extension  - *180 degree turns- 20 reps  - *Obstacle Course (cone weaving, stepping over cones): 3 laps around track      Assessment: Patient able to tolerate treatment session well today  She demonstrated improved anterior weight shift during exercises with TB and weights for tactile cues and proprioceptive feedback with most noted improvement with STS  She continued to require verbal cues for hand placement with chair transfers with fair carryover  She will continue to benefit from skilled PT to improve her Parkinson's disease symptoms and reduce her fall risk  Plan: Continue per plan of care

## 2020-03-10 ENCOUNTER — OFFICE VISIT (OUTPATIENT)
Dept: FAMILY MEDICINE CLINIC | Facility: CLINIC | Age: 80
End: 2020-03-10
Payer: MEDICARE

## 2020-03-10 VITALS
OXYGEN SATURATION: 94 % | DIASTOLIC BLOOD PRESSURE: 62 MMHG | BODY MASS INDEX: 18.2 KG/M2 | TEMPERATURE: 98.5 F | HEART RATE: 92 BPM | WEIGHT: 90.1 LBS | SYSTOLIC BLOOD PRESSURE: 124 MMHG

## 2020-03-10 DIAGNOSIS — E11.649 TYPE 2 DIABETES MELLITUS WITH HYPOGLYCEMIA WITHOUT COMA, WITHOUT LONG-TERM CURRENT USE OF INSULIN (HCC): ICD-10-CM

## 2020-03-10 DIAGNOSIS — F02.80 MAJOR NEUROCOGNITIVE DISORDER, DUE TO PARKINSON'S DISEASE, WITHOUT BEHAVIORAL DISTURBANCE, MILD (HCC): ICD-10-CM

## 2020-03-10 DIAGNOSIS — M46.26 OSTEOMYELITIS OF LUMBAR SPINE (HCC): ICD-10-CM

## 2020-03-10 DIAGNOSIS — E11.9 TYPE 2 DIABETES MELLITUS WITHOUT COMPLICATION, WITHOUT LONG-TERM CURRENT USE OF INSULIN (HCC): Primary | ICD-10-CM

## 2020-03-10 DIAGNOSIS — D68.52 PROTHROMBIN G20210A MUTATION (HCC): ICD-10-CM

## 2020-03-10 DIAGNOSIS — I10 ESSENTIAL HYPERTENSION: ICD-10-CM

## 2020-03-10 DIAGNOSIS — E43 SEVERE PROTEIN-CALORIE MALNUTRITION (HCC): ICD-10-CM

## 2020-03-10 DIAGNOSIS — Z71.89 ENCOUNTER FOR HERB AND VITAMIN SUPPLEMENT MANAGEMENT: ICD-10-CM

## 2020-03-10 DIAGNOSIS — E78.2 MIXED HYPERLIPIDEMIA: ICD-10-CM

## 2020-03-10 DIAGNOSIS — G20 MAJOR NEUROCOGNITIVE DISORDER, DUE TO PARKINSON'S DISEASE, WITHOUT BEHAVIORAL DISTURBANCE, MILD (HCC): ICD-10-CM

## 2020-03-10 DIAGNOSIS — G20 PARKINSON'S DISEASE (HCC): ICD-10-CM

## 2020-03-10 PROBLEM — S01.01XD SCALP LACERATION, SUBSEQUENT ENCOUNTER: Status: RESOLVED | Noted: 2019-11-15 | Resolved: 2020-03-10

## 2020-03-10 PROBLEM — K66.8 FREE INTRAPERITONEAL AIR: Status: RESOLVED | Noted: 2019-07-18 | Resolved: 2020-03-10

## 2020-03-10 PROBLEM — G92.8 TOXIC METABOLIC ENCEPHALOPATHY: Status: RESOLVED | Noted: 2019-07-22 | Resolved: 2020-03-10

## 2020-03-10 PROCEDURE — 2022F DILAT RTA XM EVC RTNOPTHY: CPT | Performed by: FAMILY MEDICINE

## 2020-03-10 PROCEDURE — 99214 OFFICE O/P EST MOD 30 MIN: CPT | Performed by: FAMILY MEDICINE

## 2020-03-10 PROCEDURE — 1036F TOBACCO NON-USER: CPT | Performed by: FAMILY MEDICINE

## 2020-03-10 PROCEDURE — 4040F PNEUMOC VAC/ADMIN/RCVD: CPT | Performed by: FAMILY MEDICINE

## 2020-03-10 PROCEDURE — 1160F RVW MEDS BY RX/DR IN RCRD: CPT | Performed by: FAMILY MEDICINE

## 2020-03-10 PROCEDURE — 3066F NEPHROPATHY DOC TX: CPT | Performed by: FAMILY MEDICINE

## 2020-03-10 PROCEDURE — 3074F SYST BP LT 130 MM HG: CPT | Performed by: FAMILY MEDICINE

## 2020-03-10 PROCEDURE — 3078F DIAST BP <80 MM HG: CPT | Performed by: FAMILY MEDICINE

## 2020-03-10 NOTE — PATIENT INSTRUCTIONS
Avoid white flour products  Eat whole grains instead, e g  Barley steel cut oats, bulgar wheat, brown rice      Curcumins are potent anti-inflammatory natural medicines derived from turmeric, the common [place] ([facility]) spice  In order to reduce inflammation in the body, it is important to use brands that are specifically designed to be absorbed from the gut and are reputed to be of good quality, Common uses include for arthritis, autoimmune disorders, chronic pain or the inflammatory component of chronic   diseases such as diabetes, heart disease and others  These can be purchased at local healthy food stores such as [employer] or reputable online sites such as www  [place]  com    Pick one of the following brands:    2  Nutrigold 'Turmeric Curcumin Gold' 500 mg cap once to three times a day with food  Generally it is best to take Curcumin with a meal that has some fat (to help absorption), though the above formulations do have improved absorption even if not taken with a meal        Generally these supplements are very safe and well tolerated  Occasionally any supplement can cause stomach upset but this is unusual  Other side effects such as liver irritation are uncommon  Very high doses could thin the blood, so let your doctor know if you are on strong blood thinners such as Warfarin (Coumadin)  Patients with active gallbladder disease (biliary colic) might want to avoid high doses of curcumin  High dose Turmeric may increase urinary oxalate levels, theoretically increasing kidney stone formation is susceptible patients  Curcumin may bind iron, so if iron deficient, take iron and turmeric/curcumin at different times     Curcumin with the bioavailability (absorption) enhancer piperine from black pepper could increase the absorption of certain medications such as phenytoin, rifampin, propranolol, amlodipine, felodipine, nevirapine, so it might be best to take any of these drugs at a different time than Curcumin with Suresh  All women who are pregnant or breast-feeding should discuss with their doctor before using any supplement or medicine

## 2020-03-10 NOTE — PROGRESS NOTES
SUBJECTIVE:  Chief complaint and HPI: Blanquita Briseno is a 79 y o  female who presents for follow up of multiple chronic medical problems, as listed below in problem list  All problems are relatively stable except for the following issues: check up  She will talk to GI on if really needs a colonoscopy    Last fall yesterday, when 'careless'  States did not hurt anything  Is using walker more regularly  Neuro changed her to Carbidopa-Levodopa ER 48  MG which seems to work better  Review of systems:  No fever/chills/sweats/unexplained weight loss  No chest pain/shortness of breath  No change in digestion or bowel movements  No change in urination  No new musculoskeletal or neurological symptoms      Chart reviewed for relevant medical, surgical and psychosocial history, medications and allergies, labs and studies      Patient Active Problem List   Diagnosis    Asthma    Carpal tunnel syndrome    Colon, diverticulosis    Type 2 diabetes mellitus without complication, without long-term current use of insulin ([address] )    Essential hypertension    GERD (gastroesophageal reflux disease)    Mixed hyperlipidemia    Insomnia    Spinal stenosis    Major neurocognitive disorder, due to parkinson's disease, without behavioral disturbance, mild (HCC)    Psoriasis    Restless legs syndrome    Weight loss, unintentional    Need for influenza vaccination    Arthritis    Age-related nuclear cataract of both eyes    Ambulatory dysfunction    Cervical dystonia    Confusion    Parkinson's disease ([address] )    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 ([facility] )    Periumbilical abdominal pain    Free intraperitoneal air    RODRIGO (acute kidney injury) ([address] )    Dementia ([address] )    Toxic metabolic encephalopathy    Severe protein-calorie malnutrition ([address] )    Perforated duodenal ulcer ([facility] )    Encounter for herb and vitamin supplement management    Weight loss    Scalp laceration, subsequent encounter    Laceration of scalp BMI Counseling: Body mass index is 18 2 kg/m²  The BMI is below normal  Dietary education for weight gain was provided  Falls Plan of Care: balance, strength, and gait training instructions were provided  Recommended assistive device to help with gait and balance  EXAM:  /62 (BP Location: Left arm, Patient Position: Sitting, Cuff Size: Child)   Pulse 92   Temp 98 5 °F (36 9 °C) (Tympanic)   Wt 40 9 kg (90 lb 1 6 oz)   SpO2 94%   BMI 18 20 kg/m²   The patient appears  in no apparent distress  Alert and oriented times three, pleasant and cooperative with Parkinsonian related movements  Vital signs are as noted by the nurse  /62 (BP Location: Left arm, Patient Position: Sitting, Cuff Size: Child)   Pulse 92   Temp 98 5 °F (36 9 °C) (Tympanic)   Wt 40 9 kg (90 lb 1 6 oz)   SpO2 94%   BMI 18 20 kg/m²     Head: normocephalic, atraumatic  Eyes: well perfused conjunctiva, not clinically pale, not jaundiced  Ears: external ear: no gross lesions  Nose: no rhinorrhea  Neck: supple, trachea in the midline and no concerning cervical lymphadenopathy  Lungs: No respiratory labor  Clear to auscultation  Heart: Regular rate and rhythm, 2/6 murmur with loud S2  Abdomen: Benign: soft, non-tender, non-distended  No guarding or rebound  No masses or organomegaly  Normal bowel sounds,   Skin: No pallor  No rashes noted  Extremities: Moves all extremities normally  No pedal edema      ASSESSMENT/PLAN:  1  Severe protein-calorie malnutrition ([address] )  Discussed  nutrient dense foods    2  Osteomyelitis of lumbar spine ([address] )  resolved    3  Type 2 diabetes mellitus with hypoglycemia without coma, without long-term current use of insulin ([facility])  Avoid white flour products    4  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 ([facility])  monitor    5  Type 2 diabetes mellitus without complication, without long-term current use of insulin ([facility])    - HEMOGLOBIN A1C W/ EAG ESTIMATION;  Future  - Microalbumin / creatinine urine ratio    6  Parkinson's disease ([facility] [address] )      7  Essential hypertension  stable    8  Encounter for herb and vitamin supplement management  Reviewed precautions with curcumin    9  Mixed hyperlipidemia  Healthy diet    10  Major neurocognitive disorder, due to parkinson's disease, without behavioral disturbance, mild ([facility] )  As above      Reviewed and reinforced basics of healthy lifestyle  Risks and benefits of therapeutic plan discussed, answered all patient questions and concerns and patient expressed understanding and agreement of therapeutic plan        Follow up plan: 3 months

## 2020-03-13 ENCOUNTER — OFFICE VISIT (OUTPATIENT)
Dept: PHYSICAL THERAPY | Facility: CLINIC | Age: 80
End: 2020-03-13
Payer: MEDICARE

## 2020-03-13 DIAGNOSIS — R26.2 IMPAIRED AMBULATION: ICD-10-CM

## 2020-03-13 DIAGNOSIS — G20 PARKINSON'S DISEASE (HCC): Primary | ICD-10-CM

## 2020-03-13 PROCEDURE — 97116 GAIT TRAINING THERAPY: CPT

## 2020-03-13 PROCEDURE — 97112 NEUROMUSCULAR REEDUCATION: CPT

## 2020-03-13 PROCEDURE — 97530 THERAPEUTIC ACTIVITIES: CPT

## 2020-03-13 NOTE — PROGRESS NOTES
Daily Note     Today's date: 3/13/2020  Patient name: Christina Sanchez  : 1940  MRN: [id number]  Referring provider: Stepan Tran MD  Dx:   Encounter Diagnosis     ICD-10-CM    1  Parkinson's disease ([facility] [address] ) G20    2  Impaired ambulation R26 2                   Subjective: Patient reports she is "pretty tired" today from her [address] trip last weekend and noted she fell a few days ago and did not hit her head  She feels she fell "because I have been pretty tired "       Objective: See treatment diary below  All Activities in SOLO Step  * = exercises performed with Red TB attached to bilateral hands and wrapped around waist and 1 lb weights on bilateral feet for tactile cues (held) and proprioception for anterior weight shift    - Floor to ceiling boomwhackers: 20 reps  - Floor to ceiling boomwhackers with cervical extension: 20 reps  - *Ambulation around track- 2 sets, 4 laps with rollator- 80 feet/lap  - *Sit>stands no UE: 3 sets, 5 reps  - Ambulation with practice sequencing sitting onto Rollator: 15 reps      Not today:  - *Chair Transfers- hand placement cuing, 10 reps  - Seated Abduction with theraband- green theraband, 20 reps, 3 second hold  - [employer]- Seated, hit the floor, hit behind with upright posture- 20 reps   - [employer]- Seated, rotation, hit across for trunk extension  - *180 degree turns- 20 reps  - *Obstacle Course (cone weaving, stepping over cones): 3 laps around track      Assessment: Patient able to tolerate treatment session fair today with noteable increased fatigue in which she attributes to "still really tired from my [employer] bus trip last weekend " She was able to perform STS with no UE assist with TB around waist for tactile cue with 1 LoB posteriorly to sit back in chair  She required verbal cues for sequencing to maintain locks on Rollator when performing STS and turning before proceeding to ambulation  PT held weights on feet today due to increased fatigue  She will continue to benefit from skilled PT to improve her Parkinson's disease symptoms and reduce her fall risk  Plan: Continue per plan of care

## 2020-03-20 ENCOUNTER — APPOINTMENT (OUTPATIENT)
Dept: PHYSICAL THERAPY | Facility: CLINIC | Age: 80
End: 2020-03-20
Payer: MEDICARE

## 2020-03-25 DIAGNOSIS — E11.9 TYPE 2 DIABETES MELLITUS WITHOUT COMPLICATION, WITHOUT LONG-TERM CURRENT USE OF INSULIN (HCC): ICD-10-CM

## 2020-03-27 ENCOUNTER — APPOINTMENT (OUTPATIENT)
Dept: PHYSICAL THERAPY | Facility: CLINIC | Age: 80
End: 2020-03-27
Payer: MEDICARE

## 2020-04-03 ENCOUNTER — APPOINTMENT (OUTPATIENT)
Dept: LAB | Facility: CLINIC | Age: 80
End: 2020-04-03
Payer: MEDICARE

## 2020-04-03 ENCOUNTER — OFFICE VISIT (OUTPATIENT)
Dept: PHYSICAL THERAPY | Facility: CLINIC | Age: 80
End: 2020-04-03
Payer: MEDICARE

## 2020-04-03 DIAGNOSIS — R26.2 IMPAIRED AMBULATION: ICD-10-CM

## 2020-04-03 DIAGNOSIS — G20 PARKINSON'S DISEASE (HCC): Primary | ICD-10-CM

## 2020-04-03 DIAGNOSIS — E11.9 TYPE 2 DIABETES MELLITUS WITHOUT COMPLICATION, WITHOUT LONG-TERM CURRENT USE OF INSULIN (HCC): ICD-10-CM

## 2020-04-03 LAB
BACTERIA UR QL AUTO: ABNORMAL /HPF
BILIRUB UR QL STRIP: NEGATIVE
CAOX CRY URNS QL MICRO: ABNORMAL /HPF
CLARITY UR: ABNORMAL
COLOR UR: YELLOW
CREAT UR-MCNC: 138 MG/DL
EST. AVERAGE GLUCOSE BLD GHB EST-MCNC: 140 MG/DL
GLUCOSE UR STRIP-MCNC: NEGATIVE MG/DL
HBA1C MFR BLD: 6.5 %
HGB UR QL STRIP.AUTO: ABNORMAL
KETONES UR STRIP-MCNC: NEGATIVE MG/DL
LEUKOCYTE ESTERASE UR QL STRIP: ABNORMAL
MICROALBUMIN UR-MCNC: 105 MG/L (ref 0–20)
MICROALBUMIN/CREAT 24H UR: 76 MG/G CREATININE (ref 0–30)
NITRITE UR QL STRIP: NEGATIVE
NON-SQ EPI CELLS URNS QL MICRO: ABNORMAL /HPF
PH UR STRIP.AUTO: 5.5 [PH]
PROT UR STRIP-MCNC: ABNORMAL MG/DL
RBC #/AREA URNS AUTO: ABNORMAL /HPF
SP GR UR STRIP.AUTO: 1.02 (ref 1–1.03)
UROBILINOGEN UR QL STRIP.AUTO: 0.2 E.U./DL
WBC #/AREA URNS AUTO: ABNORMAL /HPF

## 2020-04-03 PROCEDURE — 83036 HEMOGLOBIN GLYCOSYLATED A1C: CPT

## 2020-04-03 PROCEDURE — 97116 GAIT TRAINING THERAPY: CPT

## 2020-04-03 PROCEDURE — 82043 UR ALBUMIN QUANTITATIVE: CPT | Performed by: FAMILY MEDICINE

## 2020-04-03 PROCEDURE — 81001 URINALYSIS AUTO W/SCOPE: CPT | Performed by: FAMILY MEDICINE

## 2020-04-03 PROCEDURE — 36415 COLL VENOUS BLD VENIPUNCTURE: CPT

## 2020-04-03 PROCEDURE — 97110 THERAPEUTIC EXERCISES: CPT

## 2020-04-03 PROCEDURE — 82570 ASSAY OF URINE CREATININE: CPT | Performed by: FAMILY MEDICINE

## 2020-04-03 PROCEDURE — 97112 NEUROMUSCULAR REEDUCATION: CPT

## 2020-04-08 ENCOUNTER — APPOINTMENT (OUTPATIENT)
Dept: PHYSICAL THERAPY | Facility: CLINIC | Age: 80
End: 2020-04-08
Payer: MEDICARE

## 2020-04-10 ENCOUNTER — OFFICE VISIT (OUTPATIENT)
Dept: PHYSICAL THERAPY | Facility: CLINIC | Age: 80
End: 2020-04-10
Payer: MEDICARE

## 2020-04-10 DIAGNOSIS — R26.2 IMPAIRED AMBULATION: ICD-10-CM

## 2020-04-10 DIAGNOSIS — G20 PARKINSON'S DISEASE (HCC): Primary | ICD-10-CM

## 2020-04-10 PROCEDURE — 97164 PT RE-EVAL EST PLAN CARE: CPT

## 2020-04-13 ENCOUNTER — TELEPHONE (OUTPATIENT)
Dept: FAMILY MEDICINE CLINIC | Facility: CLINIC | Age: 80
End: 2020-04-13

## 2020-04-17 ENCOUNTER — OFFICE VISIT (OUTPATIENT)
Dept: PHYSICAL THERAPY | Facility: CLINIC | Age: 80
End: 2020-04-17
Payer: MEDICARE

## 2020-04-17 DIAGNOSIS — R26.2 IMPAIRED AMBULATION: ICD-10-CM

## 2020-04-17 DIAGNOSIS — G20 PARKINSON'S DISEASE (HCC): Primary | ICD-10-CM

## 2020-04-17 PROCEDURE — 97530 THERAPEUTIC ACTIVITIES: CPT

## 2020-04-17 PROCEDURE — 97110 THERAPEUTIC EXERCISES: CPT

## 2020-04-17 PROCEDURE — 97112 NEUROMUSCULAR REEDUCATION: CPT

## 2020-04-21 ENCOUNTER — TELEMEDICINE (OUTPATIENT)
Dept: FAMILY MEDICINE CLINIC | Facility: CLINIC | Age: 80
End: 2020-04-21
Payer: MEDICARE

## 2020-04-21 DIAGNOSIS — I10 ESSENTIAL HYPERTENSION: ICD-10-CM

## 2020-04-21 DIAGNOSIS — N18.30 CKD (CHRONIC KIDNEY DISEASE) STAGE 3, GFR 30-59 ML/MIN (HCC): ICD-10-CM

## 2020-04-21 DIAGNOSIS — E11.9 TYPE 2 DIABETES MELLITUS WITHOUT COMPLICATION, WITHOUT LONG-TERM CURRENT USE OF INSULIN (HCC): Primary | ICD-10-CM

## 2020-04-21 PROCEDURE — 1036F TOBACCO NON-USER: CPT | Performed by: FAMILY MEDICINE

## 2020-04-21 PROCEDURE — 2022F DILAT RTA XM EVC RTNOPTHY: CPT | Performed by: FAMILY MEDICINE

## 2020-04-21 PROCEDURE — 4040F PNEUMOC VAC/ADMIN/RCVD: CPT | Performed by: FAMILY MEDICINE

## 2020-04-21 PROCEDURE — 3060F POS MICROALBUMINURIA REV: CPT | Performed by: FAMILY MEDICINE

## 2020-04-21 PROCEDURE — 3044F HG A1C LEVEL LT 7.0%: CPT | Performed by: FAMILY MEDICINE

## 2020-04-21 PROCEDURE — 3066F NEPHROPATHY DOC TX: CPT | Performed by: FAMILY MEDICINE

## 2020-04-21 PROCEDURE — 3078F DIAST BP <80 MM HG: CPT | Performed by: FAMILY MEDICINE

## 2020-04-21 PROCEDURE — 3074F SYST BP LT 130 MM HG: CPT | Performed by: FAMILY MEDICINE

## 2020-04-21 PROCEDURE — 99442 PR PHYS/QHP TELEPHONE EVALUATION 11-20 MIN: CPT | Performed by: FAMILY MEDICINE

## 2020-04-21 PROCEDURE — 1160F RVW MEDS BY RX/DR IN RCRD: CPT | Performed by: FAMILY MEDICINE

## 2020-04-22 ENCOUNTER — TELEPHONE (OUTPATIENT)
Dept: FAMILY MEDICINE CLINIC | Facility: CLINIC | Age: 80
End: 2020-04-22

## 2020-04-22 PROBLEM — K26.5 PERFORATED DUODENAL ULCER (HCC): Status: RESOLVED | Noted: 2019-08-01 | Resolved: 2020-04-22

## 2020-04-22 RX ORDER — GABAPENTIN 100 MG/1
100 CAPSULE ORAL 3 TIMES DAILY
COMMUNITY
Start: 2020-04-20 | End: 2020-09-04 | Stop reason: SDUPTHER

## 2020-04-22 RX ORDER — PANTOPRAZOLE SODIUM 40 MG/1
40 TABLET, DELAYED RELEASE ORAL 2 TIMES DAILY
COMMUNITY
Start: 2020-04-01 | End: 2021-01-25 | Stop reason: SDUPTHER

## 2020-04-24 ENCOUNTER — TELEPHONE (OUTPATIENT)
Dept: FAMILY MEDICINE CLINIC | Facility: CLINIC | Age: 80
End: 2020-04-24

## 2020-04-24 ENCOUNTER — APPOINTMENT (OUTPATIENT)
Dept: PHYSICAL THERAPY | Facility: CLINIC | Age: 80
End: 2020-04-24
Payer: MEDICARE

## 2020-04-27 ENCOUNTER — TELEPHONE (OUTPATIENT)
Dept: FAMILY MEDICINE CLINIC | Facility: CLINIC | Age: 80
End: 2020-04-27

## 2020-04-28 ENCOUNTER — TELEPHONE (OUTPATIENT)
Dept: FAMILY MEDICINE CLINIC | Facility: CLINIC | Age: 80
End: 2020-04-28

## 2020-05-01 ENCOUNTER — OFFICE VISIT (OUTPATIENT)
Dept: PHYSICAL THERAPY | Facility: CLINIC | Age: 80
End: 2020-05-01
Payer: MEDICARE

## 2020-05-01 DIAGNOSIS — R26.2 IMPAIRED AMBULATION: ICD-10-CM

## 2020-05-01 DIAGNOSIS — G20 PARKINSON'S DISEASE (HCC): Primary | ICD-10-CM

## 2020-05-01 PROCEDURE — 97110 THERAPEUTIC EXERCISES: CPT | Performed by: PHYSICAL THERAPIST

## 2020-05-01 PROCEDURE — 97530 THERAPEUTIC ACTIVITIES: CPT | Performed by: PHYSICAL THERAPIST

## 2020-05-01 PROCEDURE — 97112 NEUROMUSCULAR REEDUCATION: CPT | Performed by: PHYSICAL THERAPIST

## 2020-05-08 ENCOUNTER — TELEPHONE (OUTPATIENT)
Dept: FAMILY MEDICINE CLINIC | Facility: CLINIC | Age: 80
End: 2020-05-08

## 2020-05-08 ENCOUNTER — OFFICE VISIT (OUTPATIENT)
Dept: PHYSICAL THERAPY | Facility: CLINIC | Age: 80
End: 2020-05-08
Payer: MEDICARE

## 2020-05-08 DIAGNOSIS — R26.2 IMPAIRED AMBULATION: ICD-10-CM

## 2020-05-08 DIAGNOSIS — N18.30 CKD (CHRONIC KIDNEY DISEASE) STAGE 3, GFR 30-59 ML/MIN (HCC): ICD-10-CM

## 2020-05-08 DIAGNOSIS — I10 ESSENTIAL HYPERTENSION: ICD-10-CM

## 2020-05-08 DIAGNOSIS — G20 PARKINSON'S DISEASE (HCC): ICD-10-CM

## 2020-05-08 DIAGNOSIS — E11.9 TYPE 2 DIABETES MELLITUS WITHOUT COMPLICATION, WITHOUT LONG-TERM CURRENT USE OF INSULIN (HCC): ICD-10-CM

## 2020-05-08 DIAGNOSIS — G20 PARKINSON'S DISEASE (HCC): Primary | ICD-10-CM

## 2020-05-08 DIAGNOSIS — K21.9 GASTROESOPHAGEAL REFLUX DISEASE, ESOPHAGITIS PRESENCE NOT SPECIFIED: Primary | ICD-10-CM

## 2020-05-08 PROCEDURE — 97530 THERAPEUTIC ACTIVITIES: CPT | Performed by: PHYSICAL THERAPIST

## 2020-05-08 PROCEDURE — 97110 THERAPEUTIC EXERCISES: CPT | Performed by: PHYSICAL THERAPIST

## 2020-05-08 PROCEDURE — 97112 NEUROMUSCULAR REEDUCATION: CPT | Performed by: PHYSICAL THERAPIST

## 2020-05-15 ENCOUNTER — OFFICE VISIT (OUTPATIENT)
Dept: PHYSICAL THERAPY | Facility: CLINIC | Age: 80
End: 2020-05-15
Payer: MEDICARE

## 2020-05-15 DIAGNOSIS — G20 PARKINSON'S DISEASE (HCC): Primary | ICD-10-CM

## 2020-05-15 DIAGNOSIS — R26.2 IMPAIRED AMBULATION: ICD-10-CM

## 2020-05-15 PROCEDURE — 97530 THERAPEUTIC ACTIVITIES: CPT | Performed by: PHYSICAL THERAPIST

## 2020-05-15 PROCEDURE — 97112 NEUROMUSCULAR REEDUCATION: CPT | Performed by: PHYSICAL THERAPIST

## 2020-05-19 ENCOUNTER — APPOINTMENT (OUTPATIENT)
Dept: LAB | Facility: CLINIC | Age: 80
End: 2020-05-19
Payer: MEDICARE

## 2020-05-19 DIAGNOSIS — E11.9 TYPE 2 DIABETES MELLITUS WITHOUT COMPLICATION, WITHOUT LONG-TERM CURRENT USE OF INSULIN (HCC): ICD-10-CM

## 2020-05-19 DIAGNOSIS — I10 ESSENTIAL HYPERTENSION: ICD-10-CM

## 2020-05-19 DIAGNOSIS — Z51.81 ENCOUNTER FOR THERAPEUTIC DRUG MONITORING: ICD-10-CM

## 2020-05-19 DIAGNOSIS — N18.30 CKD (CHRONIC KIDNEY DISEASE) STAGE 3, GFR 30-59 ML/MIN (HCC): ICD-10-CM

## 2020-05-19 LAB
ANION GAP SERPL CALCULATED.3IONS-SCNC: 6 MMOL/L (ref 4–13)
BACTERIA UR QL AUTO: ABNORMAL /HPF
BILIRUB UR QL STRIP: NEGATIVE
BUN SERPL-MCNC: 24 MG/DL (ref 5–25)
CALCIUM SERPL-MCNC: 9.5 MG/DL (ref 8.3–10.1)
CAOX CRY URNS QL MICRO: ABNORMAL /HPF
CHLORIDE SERPL-SCNC: 106 MMOL/L (ref 100–108)
CLARITY UR: ABNORMAL
CO2 SERPL-SCNC: 28 MMOL/L (ref 21–32)
COLOR UR: YELLOW
CREAT SERPL-MCNC: 1.27 MG/DL (ref 0.6–1.3)
GFR SERPL CREATININE-BSD FRML MDRD: 40 ML/MIN/1.73SQ M
GLUCOSE P FAST SERPL-MCNC: 158 MG/DL (ref 65–99)
GLUCOSE UR STRIP-MCNC: NEGATIVE MG/DL
HGB UR QL STRIP.AUTO: NEGATIVE
HYALINE CASTS #/AREA URNS LPF: ABNORMAL /LPF
KETONES UR STRIP-MCNC: NEGATIVE MG/DL
LEUKOCYTE ESTERASE UR QL STRIP: ABNORMAL
NITRITE UR QL STRIP: NEGATIVE
NON-SQ EPI CELLS URNS QL MICRO: ABNORMAL /HPF
PH UR STRIP.AUTO: 6 [PH]
POTASSIUM SERPL-SCNC: 4.4 MMOL/L (ref 3.5–5.3)
PROT UR STRIP-MCNC: ABNORMAL MG/DL
RBC #/AREA URNS AUTO: ABNORMAL /HPF
SODIUM SERPL-SCNC: 140 MMOL/L (ref 136–145)
SP GR UR STRIP.AUTO: 1.02 (ref 1–1.03)
UROBILINOGEN UR QL STRIP.AUTO: 0.2 E.U./DL
WBC #/AREA URNS AUTO: ABNORMAL /HPF

## 2020-05-19 PROCEDURE — 80048 BASIC METABOLIC PNL TOTAL CA: CPT

## 2020-05-19 PROCEDURE — 81001 URINALYSIS AUTO W/SCOPE: CPT | Performed by: FAMILY MEDICINE

## 2020-05-19 PROCEDURE — 36415 COLL VENOUS BLD VENIPUNCTURE: CPT

## 2020-05-22 ENCOUNTER — OFFICE VISIT (OUTPATIENT)
Dept: PHYSICAL THERAPY | Facility: CLINIC | Age: 80
End: 2020-05-22
Payer: MEDICARE

## 2020-05-22 ENCOUNTER — TELEPHONE (OUTPATIENT)
Dept: FAMILY MEDICINE CLINIC | Facility: CLINIC | Age: 80
End: 2020-05-22

## 2020-05-22 DIAGNOSIS — G20 PARKINSON'S DISEASE (HCC): Primary | ICD-10-CM

## 2020-05-22 DIAGNOSIS — R26.2 IMPAIRED AMBULATION: ICD-10-CM

## 2020-05-22 PROCEDURE — 97110 THERAPEUTIC EXERCISES: CPT | Performed by: PHYSICAL THERAPIST

## 2020-05-22 PROCEDURE — 97112 NEUROMUSCULAR REEDUCATION: CPT | Performed by: PHYSICAL THERAPIST

## 2020-05-22 RX ORDER — ISTRADEFYLLINE 20 MG/1
20 TABLET, FILM COATED ORAL DAILY
COMMUNITY
Start: 2020-05-13 | End: 2021-01-17 | Stop reason: HOSPADM

## 2020-05-26 ENCOUNTER — OFFICE VISIT (OUTPATIENT)
Dept: FAMILY MEDICINE CLINIC | Facility: CLINIC | Age: 80
End: 2020-05-26
Payer: MEDICARE

## 2020-05-26 ENCOUNTER — TELEPHONE (OUTPATIENT)
Dept: FAMILY MEDICINE CLINIC | Facility: CLINIC | Age: 80
End: 2020-05-26

## 2020-05-26 VITALS
OXYGEN SATURATION: 96 % | WEIGHT: 95.9 LBS | HEART RATE: 89 BPM | RESPIRATION RATE: 16 BRPM | HEIGHT: 55 IN | TEMPERATURE: 99.2 F | DIASTOLIC BLOOD PRESSURE: 68 MMHG | SYSTOLIC BLOOD PRESSURE: 116 MMHG | BODY MASS INDEX: 22.19 KG/M2

## 2020-05-26 DIAGNOSIS — Z09 FOLLOW UP: Primary | ICD-10-CM

## 2020-05-26 DIAGNOSIS — R82.81 PYURIA: Primary | ICD-10-CM

## 2020-05-26 PROCEDURE — 99213 OFFICE O/P EST LOW 20 MIN: CPT | Performed by: FAMILY MEDICINE

## 2020-05-26 PROCEDURE — 3078F DIAST BP <80 MM HG: CPT | Performed by: FAMILY MEDICINE

## 2020-05-26 PROCEDURE — 3060F POS MICROALBUMINURIA REV: CPT | Performed by: FAMILY MEDICINE

## 2020-05-26 PROCEDURE — 1036F TOBACCO NON-USER: CPT | Performed by: FAMILY MEDICINE

## 2020-05-26 PROCEDURE — 3066F NEPHROPATHY DOC TX: CPT | Performed by: FAMILY MEDICINE

## 2020-05-26 PROCEDURE — 3044F HG A1C LEVEL LT 7.0%: CPT | Performed by: FAMILY MEDICINE

## 2020-05-26 PROCEDURE — 1160F RVW MEDS BY RX/DR IN RCRD: CPT | Performed by: FAMILY MEDICINE

## 2020-05-26 PROCEDURE — 3074F SYST BP LT 130 MM HG: CPT | Performed by: FAMILY MEDICINE

## 2020-05-26 PROCEDURE — 4040F PNEUMOC VAC/ADMIN/RCVD: CPT | Performed by: FAMILY MEDICINE

## 2020-05-26 PROCEDURE — 2022F DILAT RTA XM EVC RTNOPTHY: CPT | Performed by: FAMILY MEDICINE

## 2020-05-29 ENCOUNTER — OFFICE VISIT (OUTPATIENT)
Dept: PHYSICAL THERAPY | Facility: CLINIC | Age: 80
End: 2020-05-29
Payer: MEDICARE

## 2020-05-29 ENCOUNTER — TRANSCRIBE ORDERS (OUTPATIENT)
Dept: LAB | Facility: CLINIC | Age: 80
End: 2020-05-29

## 2020-05-29 ENCOUNTER — TELEPHONE (OUTPATIENT)
Dept: FAMILY MEDICINE CLINIC | Facility: CLINIC | Age: 80
End: 2020-05-29

## 2020-05-29 DIAGNOSIS — G20 PARKINSON'S DISEASE (HCC): Primary | ICD-10-CM

## 2020-05-29 DIAGNOSIS — K26.9 DUODENAL ULCER: ICD-10-CM

## 2020-05-29 DIAGNOSIS — R26.2 IMPAIRED AMBULATION: ICD-10-CM

## 2020-05-29 PROCEDURE — 97110 THERAPEUTIC EXERCISES: CPT | Performed by: PHYSICAL THERAPIST

## 2020-05-29 PROCEDURE — 97112 NEUROMUSCULAR REEDUCATION: CPT | Performed by: PHYSICAL THERAPIST

## 2020-05-29 PROCEDURE — 97530 THERAPEUTIC ACTIVITIES: CPT | Performed by: PHYSICAL THERAPIST

## 2020-06-01 ENCOUNTER — APPOINTMENT (OUTPATIENT)
Dept: LAB | Facility: CLINIC | Age: 80
End: 2020-06-01
Payer: MEDICARE

## 2020-06-01 DIAGNOSIS — R10.9 ABDOMINAL PAIN, UNSPECIFIED ABDOMINAL LOCATION: Primary | ICD-10-CM

## 2020-06-01 LAB
BACTERIA UR QL AUTO: ABNORMAL /HPF
BILIRUB UR QL STRIP: NEGATIVE
CAOX CRY URNS QL MICRO: ABNORMAL /HPF
CLARITY UR: ABNORMAL
COLOR UR: ABNORMAL
GLUCOSE UR STRIP-MCNC: NEGATIVE MG/DL
HGB UR QL STRIP.AUTO: ABNORMAL
KETONES UR STRIP-MCNC: ABNORMAL MG/DL
LEUKOCYTE ESTERASE UR QL STRIP: ABNORMAL
NITRITE UR QL STRIP: NEGATIVE
NON-SQ EPI CELLS URNS QL MICRO: ABNORMAL /HPF
PH UR STRIP.AUTO: 5.5 [PH]
PROT UR STRIP-MCNC: ABNORMAL MG/DL
RBC #/AREA URNS AUTO: ABNORMAL /HPF
SP GR UR STRIP.AUTO: 1.02 (ref 1–1.03)
UROBILINOGEN UR QL STRIP.AUTO: 0.2 E.U./DL
WBC #/AREA URNS AUTO: ABNORMAL /HPF

## 2020-06-01 PROCEDURE — 81001 URINALYSIS AUTO W/SCOPE: CPT

## 2020-06-01 RX ORDER — PANTOPRAZOLE SODIUM 40 MG/1
TABLET, DELAYED RELEASE ORAL
Qty: 60 TABLET | Refills: 5 | Status: SHIPPED | OUTPATIENT
Start: 2020-06-01 | End: 2020-12-07

## 2020-06-02 ENCOUNTER — TELEPHONE (OUTPATIENT)
Dept: FAMILY MEDICINE CLINIC | Facility: CLINIC | Age: 80
End: 2020-06-02

## 2020-06-05 ENCOUNTER — OFFICE VISIT (OUTPATIENT)
Dept: PHYSICAL THERAPY | Facility: CLINIC | Age: 80
End: 2020-06-05
Payer: MEDICARE

## 2020-06-05 DIAGNOSIS — R26.2 IMPAIRED AMBULATION: ICD-10-CM

## 2020-06-05 DIAGNOSIS — G20 PARKINSON'S DISEASE (HCC): Primary | ICD-10-CM

## 2020-06-05 PROCEDURE — 97112 NEUROMUSCULAR REEDUCATION: CPT | Performed by: PHYSICAL THERAPIST

## 2020-06-05 PROCEDURE — 97110 THERAPEUTIC EXERCISES: CPT | Performed by: PHYSICAL THERAPIST

## 2020-06-11 DIAGNOSIS — I10 ESSENTIAL HYPERTENSION: ICD-10-CM

## 2020-06-11 RX ORDER — AMLODIPINE BESYLATE 10 MG/1
TABLET ORAL
Qty: 45 TABLET | Refills: 3 | Status: SHIPPED | OUTPATIENT
Start: 2020-06-11 | End: 2020-09-09 | Stop reason: SDUPTHER

## 2020-06-12 ENCOUNTER — OFFICE VISIT (OUTPATIENT)
Dept: PHYSICAL THERAPY | Facility: CLINIC | Age: 80
End: 2020-06-12
Payer: MEDICARE

## 2020-06-12 DIAGNOSIS — G20 PARKINSON'S DISEASE (HCC): ICD-10-CM

## 2020-06-12 DIAGNOSIS — R26.2 IMPAIRED AMBULATION: Primary | ICD-10-CM

## 2020-06-12 PROCEDURE — 97112 NEUROMUSCULAR REEDUCATION: CPT | Performed by: PHYSICAL THERAPIST

## 2020-06-12 PROCEDURE — 97110 THERAPEUTIC EXERCISES: CPT | Performed by: PHYSICAL THERAPIST

## 2020-06-15 ENCOUNTER — TELEPHONE (OUTPATIENT)
Dept: FAMILY MEDICINE CLINIC | Facility: CLINIC | Age: 80
End: 2020-06-15

## 2020-06-15 DIAGNOSIS — E11.9 TYPE 2 DIABETES MELLITUS WITHOUT COMPLICATION, WITHOUT LONG-TERM CURRENT USE OF INSULIN (HCC): ICD-10-CM

## 2020-06-15 RX ORDER — BLOOD-GLUCOSE METER
1 KIT MISCELLANEOUS AS NEEDED
Qty: 1 EACH | Refills: 0 | Status: SHIPPED | OUTPATIENT
Start: 2020-06-15 | End: 2021-02-05 | Stop reason: SDUPTHER

## 2020-06-16 ENCOUNTER — TELEMEDICINE (OUTPATIENT)
Dept: FAMILY MEDICINE CLINIC | Facility: CLINIC | Age: 80
End: 2020-06-16
Payer: MEDICARE

## 2020-06-16 DIAGNOSIS — G20 MAJOR NEUROCOGNITIVE DISORDER, DUE TO PARKINSON'S DISEASE, WITHOUT BEHAVIORAL DISTURBANCE, MILD (HCC): ICD-10-CM

## 2020-06-16 DIAGNOSIS — E11.9 TYPE 2 DIABETES MELLITUS WITHOUT COMPLICATION, WITHOUT LONG-TERM CURRENT USE OF INSULIN (HCC): ICD-10-CM

## 2020-06-16 DIAGNOSIS — F02.80 MAJOR NEUROCOGNITIVE DISORDER, DUE TO PARKINSON'S DISEASE, WITHOUT BEHAVIORAL DISTURBANCE, MILD (HCC): ICD-10-CM

## 2020-06-16 DIAGNOSIS — F03.90 DEMENTIA (HCC): ICD-10-CM

## 2020-06-16 DIAGNOSIS — I10 ESSENTIAL HYPERTENSION: Primary | ICD-10-CM

## 2020-06-16 DIAGNOSIS — E78.2 MIXED HYPERLIPIDEMIA: ICD-10-CM

## 2020-06-16 PROCEDURE — 99442 PR PHYS/QHP TELEPHONE EVALUATION 11-20 MIN: CPT | Performed by: FAMILY MEDICINE

## 2020-06-19 ENCOUNTER — OFFICE VISIT (OUTPATIENT)
Dept: PHYSICAL THERAPY | Facility: CLINIC | Age: 80
End: 2020-06-19
Payer: MEDICARE

## 2020-06-19 DIAGNOSIS — R26.2 IMPAIRED AMBULATION: Primary | ICD-10-CM

## 2020-06-19 DIAGNOSIS — G20 PARKINSON'S DISEASE (HCC): ICD-10-CM

## 2020-06-19 PROCEDURE — 97110 THERAPEUTIC EXERCISES: CPT | Performed by: PHYSICAL THERAPIST

## 2020-06-19 PROCEDURE — 97112 NEUROMUSCULAR REEDUCATION: CPT | Performed by: PHYSICAL THERAPIST

## 2020-06-25 ENCOUNTER — TELEPHONE (OUTPATIENT)
Dept: FAMILY MEDICINE CLINIC | Facility: CLINIC | Age: 80
End: 2020-06-25

## 2020-06-25 ENCOUNTER — HOSPITAL ENCOUNTER (OUTPATIENT)
Facility: HOSPITAL | Age: 80
Setting detail: OBSERVATION
Discharge: HOME | End: 2020-06-26
Attending: EMERGENCY MEDICINE | Admitting: FAMILY MEDICINE
Payer: MEDICARE

## 2020-06-25 DIAGNOSIS — G20 PARKINSON'S DISEASE (HCC): ICD-10-CM

## 2020-06-25 DIAGNOSIS — R63.8: ICD-10-CM

## 2020-06-25 DIAGNOSIS — M54.9 SEVERE BACK PAIN: Primary | ICD-10-CM

## 2020-06-25 LAB
BASOPHILS # BLD AUTO: 0.13 THOUSANDS/ΜL (ref 0–0.1)
BASOPHILS NFR BLD AUTO: 1 % (ref 0–1)
EOSINOPHIL # BLD AUTO: 0.63 THOUSAND/ΜL (ref 0–0.61)
EOSINOPHIL NFR BLD AUTO: 6 % (ref 0–6)
ERYTHROCYTE [DISTWIDTH] IN BLOOD BY AUTOMATED COUNT: 13.8 % (ref 11.6–15.1)
HCT VFR BLD AUTO: 39.8 % (ref 34.8–46.1)
HGB BLD-MCNC: 12.4 G/DL (ref 11.5–15.4)
IMM GRANULOCYTES # BLD AUTO: 0.04 THOUSAND/UL (ref 0–0.2)
IMM GRANULOCYTES NFR BLD AUTO: 0 % (ref 0–2)
LYMPHOCYTES # BLD AUTO: 1 THOUSANDS/ΜL (ref 0.6–4.47)
LYMPHOCYTES NFR BLD AUTO: 10 % (ref 14–44)
MCH RBC QN AUTO: 29.7 PG (ref 26.8–34.3)
MCHC RBC AUTO-ENTMCNC: 31.2 G/DL (ref 31.4–37.4)
MCV RBC AUTO: 95 FL (ref 82–98)
MONOCYTES # BLD AUTO: 0.95 THOUSAND/ΜL (ref 0.17–1.22)
MONOCYTES NFR BLD AUTO: 9 % (ref 4–12)
NEUTROPHILS # BLD AUTO: 7.53 THOUSANDS/ΜL (ref 1.85–7.62)
NEUTS SEG NFR BLD AUTO: 74 % (ref 43–75)
NRBC BLD AUTO-RTO: 0 /100 WBCS
PLATELET # BLD AUTO: 276 THOUSANDS/UL (ref 149–390)
PMV BLD AUTO: 10 FL (ref 8.9–12.7)
RBC # BLD AUTO: 4.17 MILLION/UL (ref 3.81–5.12)
WBC # BLD AUTO: 10.28 THOUSAND/UL (ref 4.31–10.16)

## 2020-06-25 PROCEDURE — 85025 COMPLETE CBC W/AUTO DIFF WBC: CPT | Performed by: EMERGENCY MEDICINE

## 2020-06-25 PROCEDURE — 96374 THER/PROPH/DIAG INJ IV PUSH: CPT

## 2020-06-25 PROCEDURE — 96375 TX/PRO/DX INJ NEW DRUG ADDON: CPT

## 2020-06-25 PROCEDURE — 99285 EMERGENCY DEPT VISIT HI MDM: CPT

## 2020-06-25 PROCEDURE — 84484 ASSAY OF TROPONIN QUANT: CPT | Performed by: EMERGENCY MEDICINE

## 2020-06-25 PROCEDURE — 80048 BASIC METABOLIC PNL TOTAL CA: CPT | Performed by: EMERGENCY MEDICINE

## 2020-06-25 PROCEDURE — 1124F ACP DISCUSS-NO DSCNMKR DOCD: CPT | Performed by: FAMILY MEDICINE

## 2020-06-25 PROCEDURE — 99285 EMERGENCY DEPT VISIT HI MDM: CPT | Performed by: EMERGENCY MEDICINE

## 2020-06-25 PROCEDURE — 36415 COLL VENOUS BLD VENIPUNCTURE: CPT | Performed by: EMERGENCY MEDICINE

## 2020-06-25 RX ORDER — LORAZEPAM 2 MG/ML
0.5 INJECTION INTRAMUSCULAR ONCE
Status: COMPLETED | OUTPATIENT
Start: 2020-06-25 | End: 2020-06-25

## 2020-06-25 RX ADMIN — MORPHINE SULFATE 2 MG: 2 INJECTION, SOLUTION INTRAMUSCULAR; INTRAVENOUS at 23:43

## 2020-06-25 RX ADMIN — LORAZEPAM 0.5 MG: 2 INJECTION INTRAMUSCULAR; INTRAVENOUS at 23:42

## 2020-06-26 ENCOUNTER — APPOINTMENT (EMERGENCY)
Dept: RADIOLOGY | Facility: HOSPITAL | Age: 80
End: 2020-06-26
Payer: MEDICARE

## 2020-06-26 ENCOUNTER — APPOINTMENT (OUTPATIENT)
Dept: PHYSICAL THERAPY | Facility: CLINIC | Age: 80
End: 2020-06-26
Payer: MEDICARE

## 2020-06-26 VITALS
TEMPERATURE: 97.8 F | HEART RATE: 64 BPM | SYSTOLIC BLOOD PRESSURE: 142 MMHG | DIASTOLIC BLOOD PRESSURE: 72 MMHG | OXYGEN SATURATION: 96 % | RESPIRATION RATE: 12 BRPM

## 2020-06-26 PROBLEM — M54.9 INTRACTABLE BACK PAIN: Status: ACTIVE | Noted: 2020-06-26
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
## 2020-06-26 LAB
ANION GAP SERPL CALCULATED.3IONS-SCNC: 5 MMOL/L (ref 4–13)
ANION GAP SERPL CALCULATED.3IONS-SCNC: 7 MMOL/L (ref 4–13)
BASOPHILS # BLD AUTO: 0.12 THOUSANDS/ΜL (ref 0–0.1)
BASOPHILS NFR BLD AUTO: 2 % (ref 0–1)
BUN SERPL-MCNC: 26 MG/DL (ref 5–25)
BUN SERPL-MCNC: 30 MG/DL (ref 5–25)
CALCIUM SERPL-MCNC: 8.4 MG/DL (ref 8.3–10.1)
CALCIUM SERPL-MCNC: 9.1 MG/DL (ref 8.3–10.1)
CHLORIDE SERPL-SCNC: 107 MMOL/L (ref 100–108)
CHLORIDE SERPL-SCNC: 107 MMOL/L (ref 100–108)
CO2 SERPL-SCNC: 29 MMOL/L (ref 21–32)
CO2 SERPL-SCNC: 31 MMOL/L (ref 21–32)
CREAT SERPL-MCNC: 1.27 MG/DL (ref 0.6–1.3)
CREAT SERPL-MCNC: 1.61 MG/DL (ref 0.6–1.3)
EOSINOPHIL # BLD AUTO: 0.24 THOUSAND/ΜL (ref 0–0.61)
EOSINOPHIL NFR BLD AUTO: 3 % (ref 0–6)
ERYTHROCYTE [DISTWIDTH] IN BLOOD BY AUTOMATED COUNT: 13.6 % (ref 11.6–15.1)
GFR SERPL CREATININE-BSD FRML MDRD: 30 ML/MIN/1.73SQ M
GFR SERPL CREATININE-BSD FRML MDRD: 40 ML/MIN/1.73SQ M
GLUCOSE SERPL-MCNC: 110 MG/DL (ref 65–140)
GLUCOSE SERPL-MCNC: 128 MG/DL (ref 65–140)
GLUCOSE SERPL-MCNC: 137 MG/DL (ref 65–140)
GLUCOSE SERPL-MCNC: 140 MG/DL (ref 65–140)
GLUCOSE SERPL-MCNC: 140 MG/DL (ref 65–140)
GLUCOSE SERPL-MCNC: 142 MG/DL (ref 65–140)
HCT VFR BLD AUTO: 35.5 % (ref 34.8–46.1)
HGB BLD-MCNC: 11.2 G/DL (ref 11.5–15.4)
IMM GRANULOCYTES # BLD AUTO: 0.03 THOUSAND/UL (ref 0–0.2)
IMM GRANULOCYTES NFR BLD AUTO: 0 % (ref 0–2)
LYMPHOCYTES # BLD AUTO: 0.61 THOUSANDS/ΜL (ref 0.6–4.47)
LYMPHOCYTES NFR BLD AUTO: 8 % (ref 14–44)
MAGNESIUM SERPL-MCNC: 2.1 MG/DL (ref 1.6–2.6)
MCH RBC QN AUTO: 30.1 PG (ref 26.8–34.3)
MCHC RBC AUTO-ENTMCNC: 31.5 G/DL (ref 31.4–37.4)
MCV RBC AUTO: 95 FL (ref 82–98)
MONOCYTES # BLD AUTO: 0.9 THOUSAND/ΜL (ref 0.17–1.22)
MONOCYTES NFR BLD AUTO: 12 % (ref 4–12)
NEUTROPHILS # BLD AUTO: 5.62 THOUSANDS/ΜL (ref 1.85–7.62)
NEUTS SEG NFR BLD AUTO: 75 % (ref 43–75)
NRBC BLD AUTO-RTO: 0 /100 WBCS
PHOSPHATE SERPL-MCNC: 3.7 MG/DL (ref 2.3–4.1)
PLATELET # BLD AUTO: 219 THOUSANDS/UL (ref 149–390)
PMV BLD AUTO: 10 FL (ref 8.9–12.7)
POTASSIUM SERPL-SCNC: 4.3 MMOL/L (ref 3.5–5.3)
POTASSIUM SERPL-SCNC: 4.7 MMOL/L (ref 3.5–5.3)
RBC # BLD AUTO: 3.72 MILLION/UL (ref 3.81–5.12)
SARS-COV-2 RNA RESP QL NAA+PROBE: NEGATIVE
SODIUM SERPL-SCNC: 143 MMOL/L (ref 136–145)
SODIUM SERPL-SCNC: 143 MMOL/L (ref 136–145)
TROPONIN I SERPL-MCNC: <0.02 NG/ML
WBC # BLD AUTO: 7.52 THOUSAND/UL (ref 4.31–10.16)

## 2020-06-26 PROCEDURE — 72131 CT LUMBAR SPINE W/O DYE: CPT

## 2020-06-26 PROCEDURE — 87635 SARS-COV-2 COVID-19 AMP PRB: CPT | Performed by: EMERGENCY MEDICINE

## 2020-06-26 PROCEDURE — 97110 THERAPEUTIC EXERCISES: CPT

## 2020-06-26 PROCEDURE — 80048 BASIC METABOLIC PNL TOTAL CA: CPT | Performed by: STUDENT IN AN ORGANIZED HEALTH CARE EDUCATION/TRAINING PROGRAM

## 2020-06-26 PROCEDURE — 72128 CT CHEST SPINE W/O DYE: CPT

## 2020-06-26 PROCEDURE — 94760 N-INVAS EAR/PLS OXIMETRY 1: CPT

## 2020-06-26 PROCEDURE — NC001 PR NO CHARGE: Performed by: FAMILY MEDICINE

## 2020-06-26 PROCEDURE — 96376 TX/PRO/DX INJ SAME DRUG ADON: CPT

## 2020-06-26 PROCEDURE — 96375 TX/PRO/DX INJ NEW DRUG ADDON: CPT

## 2020-06-26 PROCEDURE — 85025 COMPLETE CBC W/AUTO DIFF WBC: CPT | Performed by: STUDENT IN AN ORGANIZED HEALTH CARE EDUCATION/TRAINING PROGRAM

## 2020-06-26 PROCEDURE — 82948 REAGENT STRIP/BLOOD GLUCOSE: CPT

## 2020-06-26 PROCEDURE — 99219 PR INITIAL OBSERVATION CARE/DAY 50 MINUTES: CPT | Performed by: FAMILY MEDICINE

## 2020-06-26 PROCEDURE — 97163 PT EVAL HIGH COMPLEX 45 MIN: CPT

## 2020-06-26 PROCEDURE — 84100 ASSAY OF PHOSPHORUS: CPT | Performed by: STUDENT IN AN ORGANIZED HEALTH CARE EDUCATION/TRAINING PROGRAM

## 2020-06-26 PROCEDURE — 83735 ASSAY OF MAGNESIUM: CPT | Performed by: STUDENT IN AN ORGANIZED HEALTH CARE EDUCATION/TRAINING PROGRAM

## 2020-06-26 RX ORDER — LORAZEPAM 2 MG/ML
1 INJECTION INTRAMUSCULAR ONCE
Status: COMPLETED | OUTPATIENT
Start: 2020-06-26 | End: 2020-06-26

## 2020-06-26 RX ORDER — DOCUSATE SODIUM 100 MG/1
100 CAPSULE, LIQUID FILLED ORAL 2 TIMES DAILY
Status: DISCONTINUED | OUTPATIENT
Start: 2020-06-26 | End: 2020-06-26 | Stop reason: HOSPADM

## 2020-06-26 RX ORDER — PANTOPRAZOLE SODIUM 40 MG/1
40 TABLET, DELAYED RELEASE ORAL 2 TIMES DAILY
Status: DISCONTINUED | OUTPATIENT
Start: 2020-06-26 | End: 2020-06-26 | Stop reason: HOSPADM

## 2020-06-26 RX ORDER — ACETAMINOPHEN 325 MG/1
650 TABLET ORAL EVERY 6 HOURS PRN
Status: DISCONTINUED | OUTPATIENT
Start: 2020-06-26 | End: 2020-06-26 | Stop reason: HOSPADM

## 2020-06-26 RX ORDER — NAPROXEN 500 MG/1
500 TABLET ORAL 2 TIMES DAILY WITH MEALS
Qty: 14 TABLET | Refills: 0 | Status: SHIPPED | OUTPATIENT
Start: 2020-06-26 | End: 2020-07-22 | Stop reason: ALTCHOICE

## 2020-06-26 RX ORDER — MORPHINE SULFATE 4 MG/ML
4 INJECTION, SOLUTION INTRAMUSCULAR; INTRAVENOUS ONCE
Status: COMPLETED | OUTPATIENT
Start: 2020-06-26 | End: 2020-06-26

## 2020-06-26 RX ORDER — AMANTADINE HYDROCHLORIDE 100 MG/1
100 CAPSULE, GELATIN COATED ORAL
Status: DISCONTINUED | OUTPATIENT
Start: 2020-06-26 | End: 2020-06-26 | Stop reason: HOSPADM

## 2020-06-26 RX ORDER — AMLODIPINE BESYLATE 10 MG/1
10 TABLET ORAL DAILY
Status: DISCONTINUED | OUTPATIENT
Start: 2020-06-26 | End: 2020-06-26 | Stop reason: HOSPADM

## 2020-06-26 RX ORDER — ONDANSETRON 2 MG/ML
4 INJECTION INTRAMUSCULAR; INTRAVENOUS EVERY 6 HOURS PRN
Status: DISCONTINUED | OUTPATIENT
Start: 2020-06-26 | End: 2020-06-26 | Stop reason: HOSPADM

## 2020-06-26 RX ORDER — CALCIUM CARBONATE 200(500)MG
1000 TABLET,CHEWABLE ORAL DAILY PRN
Status: DISCONTINUED | OUTPATIENT
Start: 2020-06-26 | End: 2020-06-26 | Stop reason: HOSPADM

## 2020-06-26 RX ORDER — ASPIRIN 81 MG/1
81 TABLET ORAL EVERY MORNING
Status: DISCONTINUED | OUTPATIENT
Start: 2020-06-26 | End: 2020-06-26 | Stop reason: HOSPADM

## 2020-06-26 RX ORDER — ROPINIROLE 1 MG/1
2 TABLET, FILM COATED ORAL
Status: DISCONTINUED | OUTPATIENT
Start: 2020-06-26 | End: 2020-06-26 | Stop reason: HOSPADM

## 2020-06-26 RX ORDER — CARBIDOPA AND LEVODOPA 50; 200 MG/1; MG/1
1 TABLET, EXTENDED RELEASE ORAL 4 TIMES DAILY
Status: DISCONTINUED | OUTPATIENT
Start: 2020-06-26 | End: 2020-06-26 | Stop reason: HOSPADM

## 2020-06-26 RX ORDER — SELEGILINE HYDROCHLORIDE 5 MG/1
5 TABLET ORAL
Status: DISCONTINUED | OUTPATIENT
Start: 2020-06-26 | End: 2020-06-26 | Stop reason: HOSPADM

## 2020-06-26 RX ORDER — HEPARIN SODIUM 5000 [USP'U]/ML
5000 INJECTION, SOLUTION INTRAVENOUS; SUBCUTANEOUS EVERY 8 HOURS SCHEDULED
Status: DISCONTINUED | OUTPATIENT
Start: 2020-06-26 | End: 2020-06-26 | Stop reason: HOSPADM

## 2020-06-26 RX ORDER — GABAPENTIN 100 MG/1
100 CAPSULE ORAL 3 TIMES DAILY
Status: DISCONTINUED | OUTPATIENT
Start: 2020-06-26 | End: 2020-06-26 | Stop reason: HOSPADM

## 2020-06-26 RX ADMIN — LORAZEPAM 1 MG: 2 INJECTION INTRAMUSCULAR; INTRAVENOUS at 00:53

## 2020-06-26 RX ADMIN — PANTOPRAZOLE SODIUM 40 MG: 40 TABLET, DELAYED RELEASE ORAL at 09:48

## 2020-06-26 RX ADMIN — DOCUSATE SODIUM 100 MG: 100 CAPSULE, LIQUID FILLED ORAL at 09:49

## 2020-06-26 RX ADMIN — AMLODIPINE BESYLATE 10 MG: 10 TABLET ORAL at 09:48

## 2020-06-26 RX ADMIN — HEPARIN SODIUM 5000 UNITS: 5000 INJECTION INTRAVENOUS; SUBCUTANEOUS at 06:12

## 2020-06-26 RX ADMIN — MORPHINE SULFATE 4 MG: 4 INJECTION INTRAVENOUS at 00:54

## 2020-06-26 RX ADMIN — SELEGILINE HYDROCHLORIDE 5 MG: 5 TABLET ORAL at 09:49

## 2020-06-26 RX ADMIN — ASPIRIN 81 MG: 81 TABLET, DELAYED RELEASE ORAL at 09:49

## 2020-06-26 RX ADMIN — GABAPENTIN 100 MG: 100 CAPSULE ORAL at 09:48

## 2020-06-26 RX ADMIN — CARBIDOPA AND LEVODOPA 1 TABLET: 50; 200 TABLET, EXTENDED RELEASE ORAL at 09:49

## 2020-06-26 RX ADMIN — SODIUM CHLORIDE 1000 ML: 0.9 INJECTION, SOLUTION INTRAVENOUS at 03:24

## 2020-06-29 ENCOUNTER — TELEMEDICINE (OUTPATIENT)
Dept: FAMILY MEDICINE CLINIC | Facility: CLINIC | Age: 80
End: 2020-06-29
Payer: MEDICARE

## 2020-06-29 ENCOUNTER — TRANSITIONAL CARE MANAGEMENT (OUTPATIENT)
Dept: FAMILY MEDICINE CLINIC | Facility: CLINIC | Age: 80
End: 2020-06-29

## 2020-06-29 DIAGNOSIS — M17.11 PRIMARY OSTEOARTHRITIS OF RIGHT KNEE: Primary | ICD-10-CM

## 2020-06-29 PROCEDURE — 99496 TRANSJ CARE MGMT HIGH F2F 7D: CPT | Performed by: FAMILY MEDICINE

## 2020-07-01 ENCOUNTER — TELEPHONE (OUTPATIENT)
Dept: FAMILY MEDICINE CLINIC | Facility: CLINIC | Age: 80
End: 2020-07-01

## 2020-07-01 DIAGNOSIS — E11.9 TYPE 2 DIABETES MELLITUS WITHOUT COMPLICATION, WITHOUT LONG-TERM CURRENT USE OF INSULIN (HCC): ICD-10-CM

## 2020-07-01 RX ORDER — LANCETS
EACH MISCELLANEOUS
Qty: 200 EACH | Refills: 99 | Status: SHIPPED | OUTPATIENT
Start: 2020-07-01 | End: 2020-07-10 | Stop reason: SDUPTHER

## 2020-07-01 NOTE — TELEPHONE ENCOUNTER
[facility] pharmacy calling regarding refill of freestyle lancets    Sig on rx needs to have how many times the patient is testing per day

## 2020-07-02 ENCOUNTER — APPOINTMENT (OUTPATIENT)
Dept: PHYSICAL THERAPY | Facility: CLINIC | Age: 80
End: 2020-07-02
Payer: MEDICARE

## 2020-07-06 ENCOUNTER — OFFICE VISIT (OUTPATIENT)
Dept: OBGYN CLINIC | Facility: CLINIC | Age: 80
End: 2020-07-06
Payer: MEDICARE

## 2020-07-06 VITALS
HEIGHT: 59 IN | HEART RATE: 89 BPM | TEMPERATURE: 98.3 F | DIASTOLIC BLOOD PRESSURE: 77 MMHG | WEIGHT: 97 LBS | SYSTOLIC BLOOD PRESSURE: 151 MMHG | BODY MASS INDEX: 19.56 KG/M2

## 2020-07-06 DIAGNOSIS — M17.11 PRIMARY OSTEOARTHRITIS OF RIGHT KNEE: ICD-10-CM

## 2020-07-06 DIAGNOSIS — M54.50 ACUTE MIDLINE LOW BACK PAIN WITHOUT SCIATICA: Primary | ICD-10-CM

## 2020-07-06 PROCEDURE — 99214 OFFICE O/P EST MOD 30 MIN: CPT | Performed by: ORTHOPAEDIC SURGERY

## 2020-07-06 PROCEDURE — 3077F SYST BP >= 140 MM HG: CPT | Performed by: ORTHOPAEDIC SURGERY

## 2020-07-06 PROCEDURE — 3078F DIAST BP <80 MM HG: CPT | Performed by: ORTHOPAEDIC SURGERY

## 2020-07-06 PROCEDURE — 4040F PNEUMOC VAC/ADMIN/RCVD: CPT | Performed by: ORTHOPAEDIC SURGERY

## 2020-07-06 PROCEDURE — 3060F POS MICROALBUMINURIA REV: CPT | Performed by: ORTHOPAEDIC SURGERY

## 2020-07-06 PROCEDURE — 1160F RVW MEDS BY RX/DR IN RCRD: CPT | Performed by: ORTHOPAEDIC SURGERY

## 2020-07-06 PROCEDURE — 1036F TOBACCO NON-USER: CPT | Performed by: ORTHOPAEDIC SURGERY

## 2020-07-06 PROCEDURE — 3044F HG A1C LEVEL LT 7.0%: CPT | Performed by: ORTHOPAEDIC SURGERY

## 2020-07-06 PROCEDURE — 3066F NEPHROPATHY DOC TX: CPT | Performed by: ORTHOPAEDIC SURGERY

## 2020-07-06 PROCEDURE — 2022F DILAT RTA XM EVC RTNOPTHY: CPT | Performed by: ORTHOPAEDIC SURGERY

## 2020-07-07 NOTE — PROGRESS NOTES
Assessment/Plan:  1  Acute midline low back pain without sciatica     2  Primary osteoarthritis of right knee  Ambulatory referral to [address] [place] [address],[address] [facility] seems to be doing well today and has no pain in her back or her knees  She likely had an acute muscle spasm secondary to her degenerative changes in her spine  She has a normal examination today and is not in any discomfort  We will forego any further workup or treatment unless her pain returns  She has no pain in her knees today and I do not recommend continued injections unless the pain returns  She will contact our office at her request if her knee pain resumes  Subjective:   Chase Hopkins is a 79 y o  female who presents to the office for evaluation for an episode of acute back pain  She states 1 week ago she had the acute onset of severe back pain and was admitted to the hospital   She had imaging which did not show any significant abnormality  She was discharged and advised to follow up in our office  She states that her back feels normal today and she does not have any recurrent pain or radiculopathy down her leg  She states that she has no pain in her back and she has been previously seen for bilateral knee osteoarthritis which she states seems to be doing well after her recent Euflexxa injections  She denies any complaints today  Review of Systems   Constitutional: Negative for chills, fever and unexpected weight change  HENT: Negative for hearing loss, nosebleeds and sore throat  Eyes: Negative for pain, redness and visual disturbance  Respiratory: Negative for cough, shortness of breath and wheezing  Cardiovascular: Negative for chest pain, palpitations and leg swelling  Gastrointestinal: Negative for abdominal pain, nausea and vomiting  Endocrine: Negative for polydipsia and polyuria  Genitourinary: Negative for dysuria and hematuria  Musculoskeletal:        See HPI   Skin: Negative for rash and wound  Neurological: Negative for dizziness, numbness and headaches  Psychiatric/Behavioral: Negative for decreased concentration and suicidal ideas  The patient is not nervous/anxious  Past Medical History:   Diagnosis Date    RODRIGO (acute kidney injury) ([facility] [address] ) 7/19/2019    Anal bleeding 1/29/2018    Arthritis     knee    Asthma     BPPV (benign paroxysmal positional vertigo) 7/19/2016    Last Assessment & Plan:  Hx consistent with BPPV  Neg [place], however could not perform it adequately due to dyskinesias  Recommend lozano-daroff exercises at home  IF worsens, vestibular therapy      Bulging lumbar disc 12/10/2013    Closed fracture of one rib of left side 1/29/2018    Colon polyp     Dementia ([facility] )     Fall     balance issue    Gallbladder disease     GERD (gastroesophageal reflux disease)     Hematuria     last assessed 10/29/15    Orthostatic hypotension 7/13/2015    Ovarian cyst     LAST ASSESSED: 12/10/13    Parkinson's disease ([facility] )     Pneumonia of both lower lobes due to infectious organism 3/27/2018    Pulmonary embolism with infarction ([facility] ) 9/2/2014    Scalp laceration, subsequent encounter 11/15/2019    Sciatica 12/10/2013    Skin disorder     last assessed 12/10/13    Squamous cell carcinoma of skin 11/21/2016    Toxic metabolic encephalopathy 8/81/0513    Use of cane as ambulatory aid    Rommie Baars as ambulation aid     Wears glasses        Past Surgical History:   Procedure Laterality Date    APPENDECTOMY      1970'S    CATARACT EXTRACTION      CHOLECYSTECTOMY      1970'S    COLONOSCOPY      LAPAROTOMY N/A 7/18/2019    Procedure: LAPAROTOMY EXPLORATORY;  Surgeon: Tomas Mayo MD;  Location: [address] [place] [address];  Service: General    NEUROPLASTY / Temi Ruiz [facility] Right     OOPHORECTOMY Bilateral     REMOVAL OF BOTH OVARIES LAPAROSCOPIC    MD COLSC FLX W/RMVL OF TUMOR POLYP LESION SNARE TQ N/A 3/20/2018    Procedure: COLONOSCOPY;  Surgeon: Isa Abel MD;  Location: [facility]; Service: Gastroenterology    IL XCAPSL CTRC RMVL INSJ IO LENS PROSTH W/O ECP Right 12/4/2018    Procedure: EXTRACTION EXTRACAPSULAR CATARACT PHACO INTRAOCULAR LENS (IOL); Surgeon: Vanessa Henning MD;  Location: [facility] MAIN OR;  Service: Ophthalmology    IL XCAPSL CTRC RMVL INSJ IO LENS PROSTH W/O ECP  1/15/2019    Procedure: EXTRACTION EXTRACAPSULAR CATARACT PHACO INTRAOCULAR LENS (IOL);   Surgeon: Vanessa Henning MD;  Location: [facility] MAIN OR;  Service: Ophthalmology    TONSILLECTOMY      VENTRAL HERNIA REPAIR      WITH IMPLANT OF MESH       Family History   Problem Relation Age of Onset    Alzheimer's disease Mother     Stroke Father     No Known Problems Sister     No Known Problems Brother        Social History     Occupational History    Occupation: [employer]     Comment: BILLING AND SHIPPING   Tobacco Use    Smoking status: Never Smoker    Smokeless tobacco: Never Used   Substance and Sexual Activity    Alcohol use: Not Currently     Frequency: Patient refused     Drinks per session: Patient refused     Binge frequency: Never    Drug use: Never    Sexual activity: Not on file         Current Outpatient Medications:     ACCU-CHEK COMPACT PLUS test strip, Use daily, Disp: 100 each, Rfl: 99    Accu-Chek Softclix Lancets lancets, Use as instructed for daily testing, Disp: 200 each, Rfl: 99    Amantadine HCl ER (GOCOVRI) 137 MG CP24, Take 1 capsule by mouth daily at bedtime , Disp: , Rfl:     amLODIPine (NORVASC) 10 mg tablet, TAKE ONE-HALF TABLET BY  MOUTH DAILY, Disp: 45 tablet, Rfl: 3    aspirin (ECOTRIN LOW STRENGTH) 81 mg EC tablet, Take 81 mg by mouth every morning , Disp: , Rfl:     Carbidopa-Levodopa ER 48  MG CPCR, Take 1 capsule by mouth every 6 (six) hours , Disp: , Rfl:     diclofenac sodium (VOLTAREN) 1 %, Apply 2 g topically 4 (four) times a day (Patient taking differently: Apply 2 g topically daily at bedtime ), Disp: 100 g, Rfl: 3    gabapentin (NEURONTIN) 100 mg capsule, Take 100 mg by mouth Three times a day, Disp: , Rfl:     glucose monitoring kit (FREESTYLE) monitoring kit, 1 each by Does not apply route as needed (DM control) ACCUCHEK METER COMPACT PLUS, Disp: 1 each, Rfl: 0    metFORMIN (GLUCOPHAGE) 500 mg tablet, TAKE 1 TABLET BY MOUTH  DAILY WITH BREAKFAST, Disp: 90 tablet, Rfl: 3    Multiple Vitamin (MULTIVITAMIN) tablet, Take 1 tablet by mouth every morning , Disp: , Rfl:     NOURIANZ tablet, 20 mg daily, Disp: , Rfl:     Omega-3 Fatty Acids (FISH OIL PO), Take 2 g by mouth every morning , Disp: , Rfl:     pantoprazole (PROTONIX) 40 mg tablet, Take 40 mg by mouth 2 (two) times a day, Disp: , Rfl:     pantoprazole (PROTONIX) 40 mg tablet, take 1 tablet by mouth twice a day, Disp: 60 tablet, Rfl: 5    rasagiline (AZILECT) 1 MG, 1 mg every morning , Disp: , Rfl:     rOPINIRole (REQUIP) 2 mg tablet, Take 6 mg by mouth daily at bedtime , Disp: , Rfl:     TURMERIC PO, Take by mouth 3 (three) times a day, Disp: , Rfl:     naproxen (EC NAPROSYN) 500 MG EC tablet, Take 1 tablet (500 mg total) by mouth 2 (two) times a day with meals for 7 days, Disp: 14 tablet, Rfl: 0    No Known Allergies    Objective:  Vitals:    07/06/20 1130   BP: 151/77   Pulse: 89   Temp: 98 3 °F (36 8 °C)       Right Knee Exam     Tenderness   The patient is experiencing no tenderness  Range of Motion   Extension: normal   Flexion: normal     Other   Swelling: none  Effusion: no effusion present      Left Knee Exam     Tenderness   The patient is experiencing no tenderness  Range of Motion   Extension: normal   Flexion: normal     Other   Swelling: none  Effusion: no effusion present      Back Exam     Tenderness   The patient is experiencing no tenderness       Range of Motion   Flexion: normal     Muscle Strength   Right Quadriceps:  5/5   Left Quadriceps:  5/5   Right Hamstrings:  5/5   Left Hamstrings:  5/5     Other Sensation: normal  Gait: wheelchair  Observations   Left Knee   Negative for effusion  Right Knee   Negative for effusion  Physical Exam   Constitutional: She is oriented to person, place, and time  She appears well-developed and well-nourished  HENT:   Head: Normocephalic and atraumatic  Eyes: Pupils are equal, round, and reactive to light  Conjunctivae are normal    Neck: Normal range of motion  Neck supple  Cardiovascular: Normal rate and intact distal pulses  Pulmonary/Chest: Effort normal  No respiratory distress  Musculoskeletal:        Right knee: She exhibits no effusion  Left knee: She exhibits no effusion  As noted in HPI   Neurological: She is alert and oriented to person, place, and time  Skin: Skin is warm and dry  Psychiatric: She has a normal mood and affect  Her behavior is normal    Nursing note and vitals reviewed

## 2020-07-10 ENCOUNTER — EVALUATION (OUTPATIENT)
Dept: PHYSICAL THERAPY | Facility: CLINIC | Age: 80
End: 2020-07-10
Payer: MEDICARE

## 2020-07-10 DIAGNOSIS — E11.9 TYPE 2 DIABETES MELLITUS WITHOUT COMPLICATION, WITHOUT LONG-TERM CURRENT USE OF INSULIN (HCC): ICD-10-CM

## 2020-07-10 DIAGNOSIS — R26.2 IMPAIRED AMBULATION: ICD-10-CM

## 2020-07-10 DIAGNOSIS — G20 PARKINSON'S DISEASE (HCC): Primary | ICD-10-CM

## 2020-07-10 DIAGNOSIS — R47.81 SLURRED SPEECH: Primary | ICD-10-CM

## 2020-07-10 PROCEDURE — 97163 PT EVAL HIGH COMPLEX 45 MIN: CPT | Performed by: PHYSICAL THERAPIST

## 2020-07-10 RX ORDER — BLOOD SUGAR DIAGNOSTIC, DRUM
STRIP MISCELLANEOUS
Qty: 100 EACH | Refills: 99 | Status: SHIPPED | OUTPATIENT
Start: 2020-07-10 | End: 2020-07-10 | Stop reason: ALTCHOICE

## 2020-07-10 RX ORDER — LANCETS
EACH MISCELLANEOUS
Qty: 200 EACH | Refills: 99 | Status: SHIPPED | OUTPATIENT
Start: 2020-07-10 | End: 2020-07-10

## 2020-07-10 RX ORDER — LANCETS 28 GAUGE
EACH MISCELLANEOUS
Qty: 100 EACH | Refills: 4 | Status: SHIPPED | OUTPATIENT
Start: 2020-07-10 | End: 2021-02-05 | Stop reason: SDUPTHER

## 2020-07-10 NOTE — PROGRESS NOTES
PT Evaluation    Today's date: 7/10/2020  Patient name: Jona Leyden  : 1940  MRN: [id number]  Referring provider: Marissa Adler MD  Dx:   Encounter Diagnosis     ICD-10-CM    1  Impaired ambulation R26 2    2  [facility] ([facility] G20                   Assessment  Assessment details: Patient is a 67 y o  female who presents to skilled PT for balance and reactive postural control deficits secondary to Parkinson's Disease  Patient challenged with her overall function, deemed a fall risk via all outcome measures today, decrease in strength and overall function via all outcome measures today  Patient reports increasing fatigue during session as well as heaviness in the legs, which may be attributed to functional status change, noted improvement in weight due to subjective reports  Patient's manual muscle tests remain the same as compared to last POC, slight weakness noted, strength has decreased due to weight loss and muscle mass loss, specifically in her LEs  Patient loss of cervical spine musculature strength as well is affecting her posture, noted in Objective for her cervical spine as well as her UE bilaterally  Patient limited in overhead movements due to weakness in cervical spine musculature and lack of glenohumeral motion  Patient presents as a fall risk via gait speed, LUNDBERG balance test, 5x sit to stand, mCTSIB, and TUG test, and DGI, with overall results noting risk for falls and diminished reactive postural control, slight decreases per objective in ambulation activities with and without Rollator as well as her sit to stand improvement, posterior loss of balance with sitting continually noted  Patient requested by treating therapist to only utilize RW due to her recent increase in falls and hospitalizations  Patient and  verbalized understanding   Patient endurance scores are below age related norms via 2 minute walk test as well as 6 minute walk test, including 30 second sit to stand scores, noting decrease functional endurance and cardio capacity  Patient is demonstrating difficulties with ambulation with turning noted in the DGI today, 9/24 indicating significant fall risk, which is the same from her last re-evaluation/progress note  Patient has attempted LSVT and PWR exercises, patient's ataxia and incoordination challenged her during sessions to complete successful repetitions  Patient displays overall reduction in somatosensory/proprioception awareness secondary to increased balance and postural corrections associated with mCTSIB, dyskinetic movements continually prevalent even with medication and DBS changes  Patient may benefit from a rollator or Ustep walker, patient given walker at home and is showing overall improvements with stability as well as subjective reports of decrease falls, although she does not use any assistive device unless reminded by , patient reports increases in falls when she moves backwards as well as with no AD usage  Treating therapist explained safety issues with not utilizing AD at home  Patient will be continued to recommend utilizing SOLO step in the clinic  Patient has demonstrated an overall regression in the past month which may be attributed due to her cervical spine weakness and change in head position, causing an increase in falls as well as near falls in the home as well as in the community  Recommendation for hard cervical collar to assist with cervical position  Patient will benefit from skilled PT to address noted impairments and functional limitations they are causing with overall goal to return patient to highest level possible with reduced risk for falls  Patient reports minimal change to symptoms with medication change  Change in status and decrease in all outcome measures requires skilled PT in order to decrease fall risk and maximize function   Patient would continue to decondition as well as potentially decrease in her safety without physical therapy services  Impairments: abnormal coordination, abnormal gait, abnormal muscle tone, abnormal or restricted ROM, abnormal movement, activity intolerance, difficulty understanding, impaired balance, impaired physical strength, lacks appropriate home exercise program, pain with function, safety issue, poor posture  and poor body mechanics  Other impairment: Parkinson's Disease  Barriers to therapy: Parkinson's disease  Understanding of Dx/Px/POC: excellent   Prognosis: fair    Goals  STG: To be completed in 4 weeks  -Patient will improve her sequencing with AD in 4 weeks in order to improve her community ambulation safety   -Patient will improve her gait speed score by  10 meters/second or more in order to improve her ability to cross a street safely   -Patient will improve her TUG test score by 3 seconds or more in 4 weeks in order to improve her transfer to ambulation transition   -Patient will improve her 5x sit to stand test by 5 seconds or more in 4 weeks to improve her transfer safety   -Patient will report a decrease in fall frequency to 4x per week in order to improve her fall risk and decrease in injury    -Patient will improve her mCTSIB score in all conditions by 5 seconds in order to improve all components of her balance systems   -Patient will improve her LUNDBERG Balance Score by 4 points or more in order to maximize her static balance confidence      LTG: To be completed in 8 weeks  -Patient will complete a 6 minute walk test completely in 8 weeks to improve her cardiovascular endurance  -Patient will complete a DGI in 8 weeks to formally assess her dynamic balance  -Patient will complete a MiniBESTest in 8 weeks to formally assess her dual tasking abilities as related to her primary dx  -Patient will improve DGI by 2 points in 8 weeks in order to improve her dynamic balance at home and in the community  -Patient will improve her 6 minute walk test distance by 100 feet or more in 8 weeks to improve her cardiovascular endurance  -Patient will demonstrate an independent floor to stand transfer in 8 weeks to assist with fall recovery independently - NOT MET  -Patient will be independent in HEP in 8 weeks to assist with abilities at home and improve with overall function - NOT MET      Cut off score   All date taken from [facility] Section or Rehab Measures    LUNDBERG test: 46/56                                              5 x STS Test:  MDC: 6 points                                                  MDC: 2 3 seconds   age norms                                                                 Age Norms   61-76 year old = M: 54, F: 54                        61-76 year old: 11 4 seconds   66-77 year old = M 47,  F: 48                       66-77 year old: 12 6 seconds     80-80 year old = M46,   F: 48                       80-80 year old: 14 8 seconds      TUG test:                                                                     10 Meter Walk Test:  MDC: 4 14 seconds       MDC:  59 ft/sec  Cut off score for Falls                                                  Age Norms  > 13 5 seconds community dwelling adults                 20-29; M: 4 56 ft/sec F: 4 62 ft/sec  > 32 2 Frail Elderly                                                      30-39: M 4 76 ft/sec  F: 4 68 ft/sec          40-49: M: 4 79 ft/sec  F: 4 62 ft/sec  6 Minute Walk Test      50-59: M: 4 76 ft/sec  F: 4 56 ft/sec  MDC: 190 feet       60-69: M: 4 56 ft/sec  F: 4 26 ft/sec  Age Norms       70-+    M: 4 36 ft/sec  F: 4 16 ft/sec  60-69:    M: 1876 F: 1765  70-79:    M: 1729 F: 1545  80-89 +: M: 5364 F; 1286         Plan  Planned modality interventions: biofeedback, TENS, thermotherapy: hydrocollator packs, electrical stimulation/Russian stimulation and cryotherapy  Planned therapy interventions: abdominal trunk stabilization, IADL retraining, joint mobilization, activity modification, ADL training, massage, ADL retraining, manual therapy, balance, Mccarthy taping, motor coordination training, muscle pump exercises, neuromuscular re-education, body mechanics training, breathing training, patient education, balance/weight bearing training, canalith repositioning, compression, coordination, fine motor coordination training, flexibility, functional ROM exercises, strengthening, stretching, therapeutic activities, therapeutic exercise, therapeutic training, transfer training, gait training, graded activity, graded exercise, graded motor and home exercise program  Frequency: 1x week  Plan of Care beginning date: 7/10/2020  Plan of Care expiration date: 10/9/2020  Treatment plan discussed with: patient and family        Subjective Evaluation    History of Present Illness  Date of onset: 2019  Mechanism of injury: Patient is a 67year old female reporting to skilled PT with a diagnosis of Parkinson's Disease  Patient was previously a patient of this clinic and was discharged at the end of December  Patient followed up with a new neurologist and neurologist suggested that she continue PT due to frequency of falls  Patient challenged with her balance at home, patient reporting near falls each day, and falls approximately 4 days/week in the home             Recurrent probem    Pain  Current pain ratin  At best pain rating: 3  At worst pain ratin  Location: Neck Pain and cervical spine pain  Quality: dull ache  Relieving factors: change in position  Aggravating factors: standing and sitting  Progression: no change    Social Support  Steps to enter house: no  0  Stairs in house: yes   10  Lives with: spouse    Employment status: not working  Hand dominance: right      Diagnostic Tests  No diagnostic tests performed  Treatments  No previous or current treatments  Patient Goals  Patient goals for therapy: independence with ADLs/IADLs, improved balance and return to sport/leisure activities  Patient goal: Patient wants to improve her balance and decrease her fall risk           Objective     Static Posture     Comments  Initial 2020    mCTSIB  -FTEO on firm- 30 seconds  -FTEC on firm- 5 seconds  -FTEO on foam- 0 seconds  -FTEC on Foam- 0 seconds    Bhat/56    Anticipatory Balance  -Anterior, posterior, medial, lateral- no balance reactions, fall would occur without PT intervention    [facility]     Initial 7/10/2020: use of rollator     mCTSIB  -FTEO on firm- 17 seconds  -FTEC on firm- 11 seconds  -FTEO on foam- 0 seconds  -FTEC on Foam- 0 seconds    Bhat/56    Anticipatory Balance  -Anterior, posterior, medial, lateral- no balance reactions, fall would occur without PT intervention    [facility]-     5 x STS: 21 12 seconds    10 MWT: 10/15 38 =  65 M/S    2 minute walk test: 240 feet , ( unable to complete 6 minute walk test)       Postural Observations  Seated posture: poor  Standing posture: poor  Correction of posture: has no consistent effect    Additional Postural Observation Details  Patient challenged with upright posture due to pre-morbid severe scoliosis as well as cervical spine weakness due to botox injections    Resting Posture-     R Lateral flexion, R rotation, and excessive flexion, minimal to minimal anti-gravity musculature strength    Strength/Myotome Testing   Cervical Spine   Neck extension: 2-  Neck flexion: 2-    Left   Neck lateral flexion (C3): 1    Right   Neck lateral flexion (C3): 1    Left Shoulder     Planes of Motion   Flexion: 3+   Extension: 3+   Abduction: 3+   Adduction: 3+     Right Shoulder     Planes of Motion   Flexion: 3+   Extension: 3+   Abduction: 3+   Adduction: 3+     Left Hip   Planes of Motion   Flexion: 3+  Extension: 3+  Abduction: 3+  Adduction: 3+    Right Hip   Planes of Motion   Flexion: 3+  Extension: 4+  Abduction: 3+  Adduction: 3+    Left Knee   Flexion: 4-  Extension: 4-    Right Knee   Flexion: 4-  Extension: 4    Left Ankle/Foot   Dorsiflexion: 4-  Plantar flexion: 4    Right Ankle/Foot   Dorsiflexion: 4-  Plantar flexion: 4        Gait training with U Step- 15 minutes with instruction    Precautions: Parkinson's disease, fall risk     Daily Treatment Diary     Manual                                                                                   Exercise Diary                                                                                                                                                                                                                                                                                      Modalities

## 2020-07-10 NOTE — PROGRESS NOTES
Hi Ms Landy Cantu: Here are my responses to your questions  1  I hope your dad gets his machine fixed soon  2  Freestyle lancets have been ordered  She has enough test strips per her pharmacist  3  I looked at our telephone record and I am not sure who called her  She can call our  staff if she has any questions  4  I've made a note to check her ears & hearing  5  I am ordering speech therapy  6  I am making a note for your dad to sign the waiver  Let me know if I can be of further assistance   Dr Kelly Munoz

## 2020-07-13 ENCOUNTER — TELEPHONE (OUTPATIENT)
Dept: FAMILY MEDICINE CLINIC | Facility: CLINIC | Age: 80
End: 2020-07-13

## 2020-07-17 ENCOUNTER — APPOINTMENT (OUTPATIENT)
Dept: PHYSICAL THERAPY | Facility: CLINIC | Age: 80
End: 2020-07-17
Payer: MEDICARE

## 2020-07-20 ENCOUNTER — OFFICE VISIT (OUTPATIENT)
Dept: UROLOGY | Facility: CLINIC | Age: 80
End: 2020-07-20
Payer: MEDICARE

## 2020-07-20 VITALS
HEART RATE: 68 BPM | WEIGHT: 94 LBS | BODY MASS INDEX: 18.95 KG/M2 | TEMPERATURE: 97.6 F | SYSTOLIC BLOOD PRESSURE: 110 MMHG | DIASTOLIC BLOOD PRESSURE: 70 MMHG | HEIGHT: 59 IN

## 2020-07-20 DIAGNOSIS — R82.81 PYURIA: ICD-10-CM

## 2020-07-20 DIAGNOSIS — R33.9 INCOMPLETE BLADDER EMPTYING: Primary | ICD-10-CM

## 2020-07-20 LAB — POST-VOID RESIDUAL VOLUME, ML POC: 204 ML

## 2020-07-20 PROCEDURE — 1160F RVW MEDS BY RX/DR IN RCRD: CPT | Performed by: PHYSICIAN ASSISTANT

## 2020-07-20 PROCEDURE — 3066F NEPHROPATHY DOC TX: CPT | Performed by: PHYSICIAN ASSISTANT

## 2020-07-20 PROCEDURE — 4040F PNEUMOC VAC/ADMIN/RCVD: CPT | Performed by: PHYSICIAN ASSISTANT

## 2020-07-20 PROCEDURE — 3078F DIAST BP <80 MM HG: CPT | Performed by: PHYSICIAN ASSISTANT

## 2020-07-20 PROCEDURE — 2022F DILAT RTA XM EVC RTNOPTHY: CPT | Performed by: PHYSICIAN ASSISTANT

## 2020-07-20 PROCEDURE — 3060F POS MICROALBUMINURIA REV: CPT | Performed by: PHYSICIAN ASSISTANT

## 2020-07-20 PROCEDURE — 1036F TOBACCO NON-USER: CPT | Performed by: PHYSICIAN ASSISTANT

## 2020-07-20 PROCEDURE — 3044F HG A1C LEVEL LT 7.0%: CPT | Performed by: PHYSICIAN ASSISTANT

## 2020-07-20 PROCEDURE — 99203 OFFICE O/P NEW LOW 30 MIN: CPT | Performed by: PHYSICIAN ASSISTANT

## 2020-07-20 PROCEDURE — 3074F SYST BP LT 130 MM HG: CPT | Performed by: PHYSICIAN ASSISTANT

## 2020-07-20 PROCEDURE — 3008F BODY MASS INDEX DOCD: CPT | Performed by: PHYSICIAN ASSISTANT

## 2020-07-20 PROCEDURE — 51798 US URINE CAPACITY MEASURE: CPT | Performed by: PHYSICIAN ASSISTANT

## 2020-07-20 NOTE — PROGRESS NOTES
1  Incomplete bladder emptying  US kidney and bladder    Basic metabolic panel    Urine culture   2  Pyuria  POCT Measure PVR    Ambulatory referral to Urology       Assessment and plan:       1  Voiding dysfunction  2  Asymptomatic bacteriuria  - patient is voiding volitionally on her own and is currently asymptomatic of urinary infection  - does appear to have some baseline voiding dysfunction however demonstrating acceptable bladder emptying at this time  - we reviewed that due to her underlying Parkinson's disease, diabetes, and dementia, this can contribute to decreased bladder compliance  - will plan for continued observation with an ultrasound of the kidney and bladder, BMP, and urine culture in approximately 3-4 months  - patient was encouraged to hydrate, perform timed voiding, and avoid constipation  - she was encouraged to contact us at any point should she have symptoms of urinary infection and or difficulty voiding   - patient would likely not be able to perform clean intermittent catheterization given her dexterity  - patient and spouse verbalized understanding  All questions answered  Miya Garsia PA-C      Chief Complaint     Chief Complaint   Patient presents with    Pyuria     new patient        History of Present Illness     Miguelito Neff is a 79 y o  female presenting today as a new patient for urinary infection  Patient had a retained urine specimen her primary care provider which was concerning for bacteriuria  Patient however is completely asymptomatic of urinary infection  Denies any dysuria, gross hematuria, suprapubic pressure, nausea, vomiting, fevers, or chills  Patient does report some symptoms of voiding dysfunction  Specifically she endorses only voiding a few times during the day and evening  She does typically feel empty after voiding, however does not feel that she can simply sit down at any point and urinate    Denies any history of previous  surgical manipulation or need for Carreon catheter  Denies previously seeing a urologist     Patient does state that she is well hydrated and denies any constipation  Patient was recently discharged from the hospital in June 2020 due to back pain  She has a history of dementia, Parkinson's disease, GERD, diabetes, hypertension  Patient had a mechanical fall which resulted in severe back pain  Patient states that this is improved  Patient was unable to provide a urine specimen in the office today  Bladder scan of 204 mL, last voided approximately 3 hours ago  Laboratory     Lab Results   Component Value Date    CREATININE 1 27 06/26/2020       Review of Systems     Review of Systems   Constitutional: Negative for activity change, appetite change, chills, diaphoresis, fatigue, fever and unexpected weight change  Respiratory: Negative for chest tightness and shortness of breath  Cardiovascular: Negative for chest pain, palpitations and leg swelling  Gastrointestinal: Negative for abdominal distention, abdominal pain, constipation, diarrhea, nausea and vomiting  Genitourinary: Negative for decreased urine volume, difficulty urinating, dysuria, enuresis, flank pain, frequency, genital sores, hematuria and urgency  Musculoskeletal: Negative for back pain, gait problem and myalgias  Skin: Negative for color change, pallor, rash and wound  Psychiatric/Behavioral: Negative for behavioral problems  The patient is not nervous/anxious  Allergies     No Known Allergies    Physical Exam     Physical Exam   Constitutional: She is oriented to person, place, and time  No distress  frail   HENT:   Head: Normocephalic and atraumatic  Right Ear: External ear normal    Left Ear: External ear normal    Nose: Nose normal    Eyes: Conjunctivae are normal  Right eye exhibits no discharge  Left eye exhibits no discharge  Neck: Normal range of motion  No tracheal deviation present  Pulmonary/Chest: Effort normal  No respiratory distress  Musculoskeletal: She exhibits no edema or deformity  Kyphosis  Ambulates with walker assistance   Neurological: She is alert and oriented to person, place, and time  She exhibits abnormal muscle tone  Coordination abnormal    Skin: She is not diaphoretic  No erythema  No pallor     Psychiatric: Her behavior is normal          Vital Signs     Vitals:    07/20/20 0925   BP: 110/70   BP Location: Left arm   Patient Position: Sitting   Cuff Size: Adult   Pulse: 68   Temp: 97 6 °F (36 4 °C)   Weight: 42 6 kg (94 lb)   Height: 4' 11" (1 499 m)         Current Medications       Current Outpatient Medications:     Amantadine HCl ER (GOCOVRI) 137 MG CP24, Take 1 capsule by mouth daily at bedtime , Disp: , Rfl:     amLODIPine (NORVASC) 10 mg tablet, TAKE ONE-HALF TABLET BY  MOUTH DAILY, Disp: 45 tablet, Rfl: 3    aspirin (ECOTRIN LOW STRENGTH) 81 mg EC tablet, Take 81 mg by mouth every morning , Disp: , Rfl:     Carbidopa-Levodopa ER 48  MG CPCR, Take 1 capsule by mouth every 6 (six) hours , Disp: , Rfl:     diclofenac sodium (VOLTAREN) 1 %, Apply 2 g topically 4 (four) times a day (Patient taking differently: Apply 2 g topically daily at bedtime ), Disp: 100 g, Rfl: 3    gabapentin (NEURONTIN) 100 mg capsule, Take 100 mg by mouth Three times a day, Disp: , Rfl:     glucose monitoring kit (FREESTYLE) monitoring kit, 1 each by Does not apply route as needed (DM control) ACCUCHEK METER COMPACT PLUS, Disp: 1 each, Rfl: 0    Lancets (FREESTYLE) lancets, For daily testing, Disp: 100 each, Rfl: 4    metFORMIN (GLUCOPHAGE) 500 mg tablet, TAKE 1 TABLET BY MOUTH  DAILY WITH BREAKFAST, Disp: 90 tablet, Rfl: 3    Multiple Vitamin (MULTIVITAMIN) tablet, Take 1 tablet by mouth every morning , Disp: , Rfl:     NOURIANZ tablet, 20 mg daily, Disp: , Rfl:     Omega-3 Fatty Acids (FISH OIL PO), Take 2 g by mouth every morning , Disp: , Rfl:     pantoprazole (PROTONIX) 40 mg tablet, Take 40 mg by mouth 2 (two) times a day, Disp: , Rfl:     pantoprazole (PROTONIX) 40 mg tablet, take 1 tablet by mouth twice a day, Disp: 60 tablet, Rfl: 5    rasagiline (AZILECT) 1 MG, 1 mg every morning , Disp: , Rfl:     rOPINIRole (REQUIP) 2 mg tablet, Take 6 mg by mouth daily at bedtime , Disp: , Rfl:     TURMERIC PO, Take by mouth 3 (three) times a day, Disp: , Rfl:     naproxen (EC NAPROSYN) 500 MG EC tablet, Take 1 tablet (500 mg total) by mouth 2 (two) times a day with meals for 7 days, Disp: 14 tablet, Rfl: 0      Active Problems     Patient Active Problem List   Diagnosis    Asthma    Carpal tunnel syndrome    Colon, diverticulosis    Type 2 diabetes mellitus without complication, without long-term current use of insulin (HCC)    Essential hypertension    GERD (gastroesophageal reflux disease)    Mixed hyperlipidemia    Insomnia    Spinal stenosis    Major neurocognitive disorder, due to parkinson's disease, without behavioral disturbance, mild (HCC)    Psoriasis    Restless legs syndrome    Weight loss, unintentional    Need for influenza vaccination    Arthritis    Age-related nuclear cataract of both eyes    Ambulatory dysfunction    Cervical dystonia    Confusion    [facility]    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[facility]    RODRIGO (acute kidney injury) ([facility]    Dementia ([facility]    Severe protein-calorie malnutrition ([facility]    Encounter for herb and vitamin supplement management    Weight loss    Laceration of scalp    Osteomyelitis of lumbar spine (HCC)    Fall    Intractable back pain    Primary osteoarthritis of right knee         Past Medical History     Past Medical History:   Diagnosis Date    RODRIGO (acute kidney injury) ([facility] 7/19/2019    Anal bleeding 1/29/2018    Arthritis     knee    Asthma     BPPV (benign paroxysmal positional vertigo) 7/19/2016    Last Assessment & Plan:  Hx consistent with BPPV    Neg Vancleave-Halpike, however could not perform it adequately due to dyskinesias  Recommend lozano-daroff exercises at home  IF worsens, vestibular therapy   Bulging lumbar disc 12/10/2013    Closed fracture of one rib of left side 1/29/2018    Colon polyp     D[facility]     Fall     balance issue    Gallbladder disease     GERD (gastroesophageal reflux disease)     Hematuria     last assessed 10/29/15    Orthostatic hypotension 7/13/2015    Ovarian cyst     LAST ASSESSED: 12/10/13    Parkinson's disease ([facility]     Pneumonia of both lower lobes due to infectious organism 3/27/2018    Pulmonary embolism with infarction ([facility] ) 9/2/2014    Scalp laceration, subsequent encounter 11/15/2019    Sciatica 12/10/2013    Skin disorder     last assessed 12/10/13    Squamous cell carcinoma of skin 11/21/2016    Toxic metabolic encephalopathy 1/35/7425    Use of cane as ambulatory aid    [place] Doing as ambulation aid     Wears glasses          Surgical History     Past Surgical History:   Procedure Laterality Date    APPENDECTOMY      1970'S    CATARACT EXTRACTION      CHOLECYSTECTOMY      1970'S    COLONOSCOPY      LAPAROTOMY N/A 7/18/2019    Procedure: LAPAROTOMY EXPLORATORY;  Surgeon: Milo Lowe MD;  Location: [address] [place] [address];  Service: General    NEUROPLASTY / [address] Right     OOPHORECTOMY Bilateral     REMOVAL OF BOTH OVARIES LAPAROSCOPIC     [place] FLX W/RMVL OF TUMOR POLYP LESION SNARE TQ N/A 3/20/2018    Procedure: COLONOSCOPY;  Surgeon: Norma Maria MD;  Location: [place] [address] GI LAB; Service: Gastroenterology    TN XCAPSL CTRC RMVL INSJ IO LENS PROSTH W/O ECP Right 12/4/2018    Procedure: EXTRACTION EXTRACAPSULAR CATARACT PHACO INTRAOCULAR LENS (IOL);   Surgeon: Humberto Clarke MD;  Location: [facility] MAIN OR;  Service: Ophthalmology    TN XCAPSL CTRC RMVL INSJ IO LENS PROSTH W/O ECP  1/15/2019    Procedure: EXTRACTION EXTRACAPSULAR CATARACT PHACO INTRAOCULAR LENS (IOL); Surgeon: Jessica Ramirez MD;  Location: [facility] MAIN OR;  Service: Ophthalmology    TONSILLECTOMY      VENTRAL HERNIA REPAIR      WITH IMPLANT OF MESH         Family History     Family History   Problem Relation Age of Onset    Alzheimer's disease Mother     Stroke Father     No Known Problems Sister     No Known Problems Brother          Social History     Social History       Radiology

## 2020-07-22 ENCOUNTER — OFFICE VISIT (OUTPATIENT)
Dept: FAMILY MEDICINE CLINIC | Facility: CLINIC | Age: 80
End: 2020-07-22
Payer: MEDICARE

## 2020-07-22 VITALS
BODY MASS INDEX: 19.01 KG/M2 | RESPIRATION RATE: 18 BRPM | WEIGHT: 96.8 LBS | HEIGHT: 60 IN | TEMPERATURE: 99.4 F | SYSTOLIC BLOOD PRESSURE: 114 MMHG | HEART RATE: 90 BPM | DIASTOLIC BLOOD PRESSURE: 80 MMHG

## 2020-07-22 DIAGNOSIS — R26.2 AMBULATORY DYSFUNCTION: ICD-10-CM

## 2020-07-22 DIAGNOSIS — E11.9 TYPE 2 DIABETES MELLITUS WITHOUT COMPLICATION, WITHOUT LONG-TERM CURRENT USE OF INSULIN (HCC): Primary | ICD-10-CM

## 2020-07-22 DIAGNOSIS — F02.80 MAJOR NEUROCOGNITIVE DISORDER, DUE TO PARKINSON'S DISEASE, WITHOUT BEHAVIORAL DISTURBANCE, MILD (HCC): ICD-10-CM

## 2020-07-22 DIAGNOSIS — Z71.89 ENCOUNTER FOR HERB AND VITAMIN SUPPLEMENT MANAGEMENT: ICD-10-CM

## 2020-07-22 DIAGNOSIS — I10 ESSENTIAL HYPERTENSION: ICD-10-CM

## 2020-07-22 DIAGNOSIS — G20 MAJOR NEUROCOGNITIVE DISORDER, DUE TO PARKINSON'S DISEASE, WITHOUT BEHAVIORAL DISTURBANCE, MILD (HCC): ICD-10-CM

## 2020-07-22 LAB — SL AMB POCT GLUCOSE BLD: 178

## 2020-07-22 PROCEDURE — 1036F TOBACCO NON-USER: CPT | Performed by: FAMILY MEDICINE

## 2020-07-22 PROCEDURE — 3060F POS MICROALBUMINURIA REV: CPT | Performed by: FAMILY MEDICINE

## 2020-07-22 PROCEDURE — 3074F SYST BP LT 130 MM HG: CPT | Performed by: FAMILY MEDICINE

## 2020-07-22 PROCEDURE — 3044F HG A1C LEVEL LT 7.0%: CPT | Performed by: FAMILY MEDICINE

## 2020-07-22 PROCEDURE — 1160F RVW MEDS BY RX/DR IN RCRD: CPT | Performed by: FAMILY MEDICINE

## 2020-07-22 PROCEDURE — 3066F NEPHROPATHY DOC TX: CPT | Performed by: FAMILY MEDICINE

## 2020-07-22 PROCEDURE — 99214 OFFICE O/P EST MOD 30 MIN: CPT | Performed by: FAMILY MEDICINE

## 2020-07-22 PROCEDURE — 3079F DIAST BP 80-89 MM HG: CPT | Performed by: FAMILY MEDICINE

## 2020-07-22 PROCEDURE — 4040F PNEUMOC VAC/ADMIN/RCVD: CPT | Performed by: FAMILY MEDICINE

## 2020-07-22 PROCEDURE — 3008F BODY MASS INDEX DOCD: CPT | Performed by: FAMILY MEDICINE

## 2020-07-22 PROCEDURE — 2022F DILAT RTA XM EVC RTNOPTHY: CPT | Performed by: FAMILY MEDICINE

## 2020-07-22 NOTE — PROGRESS NOTES
SUBJECTIVE:  Chief complaint and HPI: Soniya Long is a 79 y o  female who presents for follow up of multiple chronic medical problems, as listed below in problem list  All problems are relatively stable except for the following issues:   Was hospitalized 6/25 with severe back pain  Has improved  Upper back and across shoulders  Doesn't feel needs tx now  Home blood sugars ok  Mid 100's  RLQ bump on abd - appears benign      Review of systems:  No fever/chills/sweats/unexplained weight loss  No chest pain/shortness of breath  No change in digestion or bowel movements  No change in urination  No new musculoskeletal or neurological symptoms      Chart reviewed for relevant medical, surgical and psychosocial history, medications and allergies, labs and studies      Patient Active Problem List   Diagnosis    Asthma    Carpal tunnel syndrome    Colon, diverticulosis    Type 2 diabetes mellitus without complication, without long-term current use of insulin ([facility] )    Essential hypertension    GERD (gastroesophageal reflux disease)    Mixed hyperlipidemia    Insomnia    Spinal stenosis    Major neurocognitive disorder, due to parkinson's disease, without behavioral disturbance, mild (HCC)    Psoriasis    Restless legs syndrome    Weight loss, unintentional    Need for influenza vaccination    Arthritis    Age-related nuclear cataract of both eyes    Ambulatory dysfunction    Cervical dystonia    Confusion    Parkinson's disease ([address] )    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 ([address]    RODRIGO (acute kidney injury) ([facility] [address] [facility] )    Dementia ([address] )    Severe protein-calorie malnutrition ([facility] )    Encounter for herb and vitamin supplement management    Weight loss    Laceration of scalp    Osteomyelitis of lumbar spine ([address] )    Fall    Intractable back pain    Primary osteoarthritis of right knee             EXAM:  /80 (BP Location: Right arm, Patient Position: Sitting, Cuff Size: Standard)   Pulse 90   Temp 99 4 °F (37 4 °C) (Tympanic)   Resp 18   Ht 5' (1 524 m)   Wt 43 9 kg (96 lb 12 8 oz)   BMI 18 90 kg/m²   The patient appears well, in no apparent distress  Alert and oriented times three, pleasant and cooperative  Vital signs are as noted by the nurse  /80 (BP Location: Right arm, Patient Position: Sitting, Cuff Size: Standard)   Pulse 90   Temp 99 4 °F (37 4 °C) (Tympanic)   Resp 18   Ht 5' (1 524 m)   Wt 43 9 kg (96 lb 12 8 oz)   BMI 18 90 kg/m²     Head: normocephalic, atraumatic  Eyes: well perfused conjunctiva, not clinically pale, not jaundiced  Ears: external ear: no gross lesions  Nose: no rhinorrhea  Neck: supple, trachea in the midline and no concerning cervical lymphadenopathy  Lungs: No respiratory labor  Clear to auscultation  Heart: Regular rate and rhythm, no murmurs, gallops or rubs  Abdomen: Benign: soft, non-tender, non-distended  No guarding or rebound  No masses or organomegaly  Normal bowel sounds,   Skin: No pallor  No rashes noted  Extremities: Moves all extremities normally  No pedal edema      ASSESSMENT/PLAN:  1  Type 2 diabetes mellitus without complication, without long-term current use of insulin ([facility])    - POCT blood glucose    2  Essential hypertension  BP well controlled    3  Major neurocognitive disorder, due to parkinson's disease, without behavioral disturbance, mild ([address] )  F/u neuro    4  Ambulatory dysfunction  About same  Use Rollator always  No free walking    5  Encounter for herb and vitamin supplement management  Never took turmeric  Will send her info      Reviewed and reinforced basics of healthy lifestyle  Risks and benefits of therapeutic plan discussed, answered all patient questions and concerns and patient expressed understanding and agreement of therapeutic plan        Follow up plan: 1 months AWV

## 2020-07-24 ENCOUNTER — OFFICE VISIT (OUTPATIENT)
Dept: PHYSICAL THERAPY | Facility: CLINIC | Age: 80
End: 2020-07-24
Payer: MEDICARE

## 2020-07-24 DIAGNOSIS — R26.2 IMPAIRED AMBULATION: ICD-10-CM

## 2020-07-24 DIAGNOSIS — G20 PARKINSON'S DISEASE (HCC): Primary | ICD-10-CM

## 2020-07-24 PROCEDURE — 97112 NEUROMUSCULAR REEDUCATION: CPT | Performed by: PHYSICAL THERAPIST

## 2020-07-24 PROCEDURE — 97110 THERAPEUTIC EXERCISES: CPT | Performed by: PHYSICAL THERAPIST

## 2020-07-24 NOTE — PROGRESS NOTES
Daily Note     Today's date: 2020  Patient name: Leonor Stone  : 1940  MRN: [id number]  Referring provider: Chaitanya Johansen MD  Dx:   Encounter Diagnosis     ICD-10-CM    1  Parkinson's disease ([facility] [address] ) G20    2  Impaired ambulation R26 2        Start Time: 1015  Stop Time: 1115  Total time in clinic (min): 60 minutes    Subjective: Client stated she had a fall yesterday when not using her rollator  Currently 4/10 back pain  Objective: See treatment diary below    Neuro:  - FWD/BWD walking using rollator  - Cone weaving ambulation without Rollator: 2 sets, 4 laps around large therapy gym within various spaces (narrow and wide) with LLE proprioceptive wrapping  Noted improvements in heelstrike, step length, and pelvic stability    - Ambulation with use of proprioceptive wrapping with improved stance time and swing phase  76' X 2  - Item retrieval using rollator and reacher with mod cues for sequencing and problem solving  Continue to reinforce  TE:  - Standing floor to ceiling with green boom whackers: 2x10 reps with use of sit to/from stand   - side-stepping without BLE proprioceptive wrapping today with up to min A due to posterior LOB  Emphasis on reducing veering  Not Today:  - Alternating hand to knee x 30 bilaterally with CG to min A, incidental unilateral UE support  - Bowling: with emphasis on weight shifting, item retrieval to/from floor and standing tolerance  Pt completed activity and remained standing x 8 minutes prior to fatigue        Assessment:  Client with good participation throughout  She had another fall yesterday, but states it was when she wasn't using her rollator  This PT reinforced education on importance of AD use due to her high risk of falls without one  She denies hitting her head and confirms 4/10 back pain  Noted increase in truncal ataxia today   Client will benefit from continued skilled OPPT services in order to maximize functional ability, mobility and participation throughout life roles in home and community  Plan: Continue per plan of care  Progress treatment as tolerated

## 2020-07-31 ENCOUNTER — OFFICE VISIT (OUTPATIENT)
Dept: PHYSICAL THERAPY | Facility: CLINIC | Age: 80
End: 2020-07-31
Payer: MEDICARE

## 2020-07-31 DIAGNOSIS — R26.2 IMPAIRED AMBULATION: Primary | ICD-10-CM

## 2020-07-31 DIAGNOSIS — G20 PARKINSON'S DISEASE (HCC): ICD-10-CM

## 2020-07-31 PROCEDURE — 97112 NEUROMUSCULAR REEDUCATION: CPT | Performed by: PHYSICAL THERAPIST

## 2020-07-31 PROCEDURE — 97110 THERAPEUTIC EXERCISES: CPT | Performed by: PHYSICAL THERAPIST

## 2020-07-31 NOTE — PROGRESS NOTES
Daily Note     Today's date: 2020  Patient name: Flakito Lynn  : 1940  MRN: [id number]  Referring provider: Caro Disla MD  Dx:   Encounter Diagnosis     ICD-10-CM    1  Impaired ambulation R26 2    2  Parkinson's disease ([facility] [address] ) Carolee Don        Start Time: 1017  Stop Time: 1115  Total time in clinic (min): 58 minutes    Subjective: Client stated she had a fall yesterday when not using her rollator  Currently 4/10 back pain  Objective: See treatment diary below    Neuro:  - FWD/BWD walking without AD  - Cone weaving ambulation without Rollator: 2 sets, 4 laps around large therapy gym within various spaces (narrow and wide) with LLE proprioceptive wrapping  Noted improvements in heelstrike, step length, and pelvic stability    - Ambulation with use of proprioceptive wrapping with improved stance time and swing phase  76' X 2  -  EO on blue airex with focus on balance reactions  - Bowling: with emphasis on weight shifting, item retrieval to/from floor and standing tolerance  Pt completed activity and remained standing x 8 minutes prior to fatigue    TE:  - Standing floor to ceiling with green boom whackers: 2x10 reps with use of sit to/from stand   - side-stepping without BLE proprioceptive wrapping today with CG- min A due to posterior LOB  Not Today:  - Alternating hand to knee x 30 bilaterally with CG to min A, incidental unilateral UE support  - Item retrieval using rollator and reacher with mod cues for sequencing and problem solving  Continue to reinforce  Assessment:  Client with good participation throughout  She said shes only had 1 fall this week  Continue to reinforce rollator use as she admits her falls mostly happen when she doesn't use them   As per client, she states her falls are not "as bad" as they have been in the past  Client will benefit from continued skilled OPPT services in order to maximize functional ability, mobility and participation throughout life roles in home and community  Plan: Continue per plan of care  Progress treatment as tolerated

## 2020-08-07 ENCOUNTER — OFFICE VISIT (OUTPATIENT)
Dept: PHYSICAL THERAPY | Facility: CLINIC | Age: 80
End: 2020-08-07
Payer: MEDICARE

## 2020-08-07 DIAGNOSIS — G20 PARKINSON'S DISEASE (HCC): Primary | ICD-10-CM

## 2020-08-07 DIAGNOSIS — R26.2 IMPAIRED AMBULATION: ICD-10-CM

## 2020-08-07 PROCEDURE — 97110 THERAPEUTIC EXERCISES: CPT

## 2020-08-07 PROCEDURE — 97112 NEUROMUSCULAR REEDUCATION: CPT

## 2020-08-07 NOTE — PROGRESS NOTES
Daily Note     Today's date: 2020  Patient name: Jona Leyden  : 1940  MRN: [id number]  Referring provider: Marissa Adler MD  Dx:   Encounter Diagnosis     ICD-10-CM    1  Parkinson's disease ([facility] [address] )  G20    2  Impaired ambulation  R26 2        Start Time: 1130  Stop Time: 1215  Total time in clinic (min): 45 minutes    Subjective: Client stated that she has had a medication change of the Gabapentin from 3 pills to 2, patient feels very weak in the LEs, states she has had multiple falls this week  Objective: See treatment diary below    Neuro:  - FWD/BWD walking without AD  - Cone weaving ambulation without Rollator: 2 sets, 4 laps around large therapy gym within various spaces (narrow and wide) with LLE proprioceptive wrapping  Noted improvements in heelstrike, step length, and pelvic stability    - Ambulation with use of proprioceptive wrapping with improved stance time and swing phase  76' X 2  -  EO on blue airex with focus on balance reactions  - Bowling: with emphasis on weight shifting, item retrieval to/from floor and standing tolerance  Pt completed activity and remained standing x 8 minutes prior to fatigue  -Ambulation with neuromuscular control tap- cog with memory of pattern of cones, 20 cycles    TE:  - Standing floor to ceiling with green boom whackers: 2x10 reps with use of sit to/from stand   - side-stepping without BLE proprioceptive wrapping today with CG- min A due to posterior LOB  Assessment:  Client with good participation throughout  Patient fatigued at the end of the session, patient improving with her foot clearance, cognitive load decreases performance  Patient challenged with overall, patient improving with overall symptoms, bilateral knees noting weakness  Client will benefit from continued skilled OPPT services in order to maximize functional ability, mobility and participation throughout life roles in home and community  Plan: Continue per plan of care  Progress treatment as tolerated

## 2020-08-14 ENCOUNTER — OFFICE VISIT (OUTPATIENT)
Dept: PHYSICAL THERAPY | Facility: CLINIC | Age: 80
End: 2020-08-14
Payer: MEDICARE

## 2020-08-14 DIAGNOSIS — R26.2 IMPAIRED AMBULATION: ICD-10-CM

## 2020-08-14 DIAGNOSIS — G20 PARKINSON'S DISEASE (HCC): Primary | ICD-10-CM

## 2020-08-14 PROCEDURE — 97112 NEUROMUSCULAR REEDUCATION: CPT | Performed by: PHYSICAL THERAPIST

## 2020-08-14 NOTE — PROGRESS NOTES
Progress Report  Last AL: 2020  Visit 1/10     Today's date: 2020  Patient name: Mynor Nix  : 1940  MRN: [id number]  Referring provider: Laura Miner MD  Dx:   Encounter Diagnosis     ICD-10-CM    1  Parkinson's disease ([address] )  G20    2  Impaired ambulation  R26 2                   Subjective: Client stated that she has had a medication change of the Gabapentin from 3 pills to 2, patient feels very weak in the LEs, states she has had multiple falls this week  Objective: See treatment diary below  Initial 7/10/2020: use of rollator     mCTSIB  -FTEO on firm- 17 seconds  -FTEC on firm- 11 seconds  -FTEO on foam- 0 seconds  -FTEC on Foam- 0 seconds    Bhat/56    Anticipatory Balance  -Anterior, posterior, medial, lateral- no balance reactions, fall would occur without PT intervention    [facility]     5 x STS: 21 12 seconds    10 MWT: 10/15 38 =  65 M/S    6 minute walk test: 400 feet     AL: 2020    mCTSIB  -FTEO on firm- 30 seconds  -FTEC on firm- 22 seconds  -FTEO on foam- 0 seconds  -FTEC on Foam- 0 seconds    Bhat/56    Anticipatory Balance  -Anterior, posterior, medial, lateral- no balance reactions, fall would occur without PT intervention    [facility]-     5 x STS: 19 seconds with BL UE support via hand rails     10 MWT: 10/14 2 =  71 M/S    6 minute walk test: 500 feet     TU 32 seconds       Goals  STG: To be completed in 4 weeks  -Patient will improve her sequencing with AD in 4 weeks in order to improve her community ambulation safety  NOT MET  -Patient will improve her gait speed score by  10 meters/second or more in order to improve her ability to cross a street safely  MET  -Patient will improve her TUG test score by 3 seconds or more in 4 weeks in order to improve her transfer to ambulation transition  NOT MET  -Patient will improve her 5x sit to stand test by 5 seconds or more in 4 weeks to improve her transfer safety   NOT MET  -Patient will report a decrease in fall frequency to 4x per week in order to improve her fall risk and decrease in injury  NOT MET   -Patient will improve her mCTSIB score in all conditions by 5 seconds in order to improve all components of her balance systems  MET  -Patient will improve her LUNDBERG Balance Score by 4 points or more in order to maximize her static balance confidence  NOT MET    LTG: To be completed in 8 weeks  -Patient will complete a 6 minute walk test completely in 8 weeks to improve her cardiovascular endurance NOT MET  -Patient will complete a DGI in 8 weeks to formally assess her dynamic balance NOT MET  -Patient will complete a MiniBESTest in 8 weeks to formally assess her dual tasking abilities as related to her primary dx NOT MET  -Patient will improve DGI by 2 points in 8 weeks in order to improve her dynamic balance at home and in the community NOT MET  -Patient will improve her 6 minute walk test distance by 100 feet or more in 8 weeks to improve her cardiovascular endurance NOT MET  -Patient will demonstrate an independent floor to stand transfer in 8 weeks to assist with fall recovery independently - NOT MET  -Patient will be independent in HEP in 8 weeks to assist with abilities at home and improve with overall function - NOT MET    Assessment:   Patient is a 79 y o  female who presents to skilled PT for balance and reactive postural control deficits secondary to Parkinson's Disease  Patient reports increasing fatigue during session as well as heaviness in the legs, which may be attributed to functional status change, noted improvement in weight due to subjective reports  Patient less active due to COVID 19  Achieved 2 STG with Progress update  Has displayed improvement in noted outcome measures  Patient continues to remain very at risk for falls with verbal report of a fall at home weekly at times  Recommendation for hard cervical collar to assist with cervical position   Patient will benefit from skilled PT to address noted impairments and functional limitations they are causing with overall goal to return patient to highest level possible with reduced risk for falls  Patient reports minimal change to symptoms with medication change  Change in status and decrease in all outcome measures requires skilled PT in order to decrease fall risk and maximize function  Patient would continue to decondition as well as potentially decrease in her safety without physical therapy services  Plan: Continue per plan of care  Progress treatment as tolerated

## 2020-08-17 ENCOUNTER — TELEPHONE (OUTPATIENT)
Dept: FAMILY MEDICINE CLINIC | Facility: CLINIC | Age: 80
End: 2020-08-17

## 2020-08-17 DIAGNOSIS — E11.9 TYPE 2 DIABETES MELLITUS WITHOUT COMPLICATION, WITHOUT LONG-TERM CURRENT USE OF INSULIN (HCC): Primary | ICD-10-CM

## 2020-08-17 RX ORDER — BLOOD SUGAR DIAGNOSTIC
STRIP MISCELLANEOUS
Qty: 600 EACH | Refills: 11 | Status: SHIPPED | OUTPATIENT
Start: 2020-08-17 | End: 2021-01-17 | Stop reason: HOSPADM

## 2020-08-21 ENCOUNTER — OFFICE VISIT (OUTPATIENT)
Dept: PHYSICAL THERAPY | Facility: CLINIC | Age: 80
End: 2020-08-21
Payer: MEDICARE

## 2020-08-21 DIAGNOSIS — G20 PARKINSON'S DISEASE (HCC): Primary | ICD-10-CM

## 2020-08-21 DIAGNOSIS — R26.2 IMPAIRED AMBULATION: ICD-10-CM

## 2020-08-21 PROCEDURE — 97112 NEUROMUSCULAR REEDUCATION: CPT

## 2020-08-21 NOTE — PROGRESS NOTES
Daily Note     Today's date: 2020  Patient name: Flakito Lynn  : 1940  MRN: [id number]  Referring provider: Caro Disla MD  Dx:   Encounter Diagnosis     ICD-10-CM    1  Parkinson's disease ([facility] [address] )  G20    2  Impaired ambulation  R26 2                   Subjective: Client stated that she has had a medication change of the Gabapentin from 3 pills to 2, patient feels very weak in the LEs, and stated she fell this morning, however noted she did not hit her head  Objective: See treatment diary below    Neuro:  - FWD/BWD walking without AD: 10 laps 10 ft fwd/bwd  - Cone weaving ambulation without Rollator: 2 sets, 4 laps around large therapy gym within various spaces (narrow and wide) without LLE proprioceptive wrapping  Noted improvements in heelstrike, step length, and pelvic stability  -  EO on blue airex with focus on balance reactions and looking ahead: 5 minutes total  - Standing floor to ceiling with green boom whackers: 2x10 reps with use of sit to/from stand         Assessment: Patient able to tolerate treatment session fair today with increased overall fatigue requiring increased number and duration of rest breaks throughout  Occasional LoB in posterior and R lateral directions today during cone weaving, however with no LoB during fwd/bwd ambulation without AD  Fair balance noted on foam when patient is looking down at her feet with increased sway posteriorly when looking ahead  Good self corrections on foam today  She will continue to benefit from continued skilled OPPT services in order to maximize functional ability, mobility and participation throughout life roles in home and community  Plan: Continue per plan of care  Progress treatment as tolerated

## 2020-08-28 ENCOUNTER — OFFICE VISIT (OUTPATIENT)
Dept: PHYSICAL THERAPY | Facility: CLINIC | Age: 80
End: 2020-08-28
Payer: MEDICARE

## 2020-08-28 DIAGNOSIS — G20 PARKINSON'S DISEASE (HCC): Primary | ICD-10-CM

## 2020-08-28 DIAGNOSIS — R26.2 IMPAIRED AMBULATION: ICD-10-CM

## 2020-08-28 PROCEDURE — 97116 GAIT TRAINING THERAPY: CPT

## 2020-08-28 PROCEDURE — 97530 THERAPEUTIC ACTIVITIES: CPT

## 2020-08-28 PROCEDURE — 97112 NEUROMUSCULAR REEDUCATION: CPT

## 2020-08-28 NOTE — PROGRESS NOTES
Daily Note     Today's date: 2020  Patient name: Shai Tello  : 1940  MRN: [id number]  Referring provider: Janiya Mendez MD  Dx:   Encounter Diagnosis     ICD-10-CM    1  Parkinson's disease ([facility] [address] )  G20    2  Impaired ambulation  R26 2        Start Time: 1130  Stop Time: 1215  Total time in clinic (min): 45 minutes    Subjective: Client stated that she has had a medication changes to her leg pain due to gabapentin  Patient challenged with falls, noted 2 falls last week, one fall had laceration on L UE no bleeding  Objective: See treatment diary below    Neuro:  - FWD/BWD walking without AD: 10 laps 10 ft fwd/bwd  - Cone weaving ambulation without Rollator: 2 sets, 4 laps around large therapy gym within various spaces (narrow and wide) without LLE proprioceptive wrapping  Noted improvements in heelstrike, step length, and pelvic stability  -  EO on blue airex with focus on balance reactions and looking ahead: 5 minutes total  - Standing floor to ceiling with green boom whackers: 2x10 reps with use of sit to/from stand   -Standing with dual tasking with memory of pattern of cones- 10 cycles of 15 feet        Assessment: Patient able to tolerate treatment session well, challenged with coordination with overall and posterior balance  She will continue to benefit from continued skilled OPPT services in order to maximize functional ability, mobility and participation throughout life roles in home and community  Plan: Continue per plan of care  Progress treatment as tolerated

## 2020-09-04 ENCOUNTER — OFFICE VISIT (OUTPATIENT)
Dept: FAMILY MEDICINE CLINIC | Facility: CLINIC | Age: 80
End: 2020-09-04
Payer: MEDICARE

## 2020-09-04 VITALS
TEMPERATURE: 99.6 F | BODY MASS INDEX: 18.61 KG/M2 | SYSTOLIC BLOOD PRESSURE: 100 MMHG | DIASTOLIC BLOOD PRESSURE: 60 MMHG | HEART RATE: 87 BPM | HEIGHT: 60 IN | OXYGEN SATURATION: 95 % | WEIGHT: 94.8 LBS | RESPIRATION RATE: 18 BRPM

## 2020-09-04 DIAGNOSIS — H25.13 AGE-RELATED NUCLEAR CATARACT OF BOTH EYES: ICD-10-CM

## 2020-09-04 DIAGNOSIS — M17.11 PRIMARY OSTEOARTHRITIS OF RIGHT KNEE: ICD-10-CM

## 2020-09-04 DIAGNOSIS — L40.9 PSORIASIS: ICD-10-CM

## 2020-09-04 DIAGNOSIS — E78.2 MIXED HYPERLIPIDEMIA: ICD-10-CM

## 2020-09-04 DIAGNOSIS — R41.0 CONFUSION: ICD-10-CM

## 2020-09-04 DIAGNOSIS — G20 MAJOR NEUROCOGNITIVE DISORDER, DUE TO PARKINSON'S DISEASE, WITHOUT BEHAVIORAL DISTURBANCE, MILD (HCC): ICD-10-CM

## 2020-09-04 DIAGNOSIS — G25.81 RESTLESS LEGS SYNDROME: ICD-10-CM

## 2020-09-04 DIAGNOSIS — J45.909 UNCOMPLICATED ASTHMA, UNSPECIFIED ASTHMA SEVERITY, UNSPECIFIED WHETHER PERSISTENT: ICD-10-CM

## 2020-09-04 DIAGNOSIS — D68.52 PROTHROMBIN G20210A MUTATION (HCC): ICD-10-CM

## 2020-09-04 DIAGNOSIS — M48.061 SPINAL STENOSIS OF LUMBAR REGION WITHOUT NEUROGENIC CLAUDICATION: ICD-10-CM

## 2020-09-04 DIAGNOSIS — G20 PARKINSON'S DISEASE (HCC): ICD-10-CM

## 2020-09-04 DIAGNOSIS — R26.2 AMBULATORY DYSFUNCTION: ICD-10-CM

## 2020-09-04 DIAGNOSIS — M54.9 INTRACTABLE BACK PAIN: ICD-10-CM

## 2020-09-04 DIAGNOSIS — I10 ESSENTIAL HYPERTENSION: ICD-10-CM

## 2020-09-04 DIAGNOSIS — N17.9 AKI (ACUTE KIDNEY INJURY) (HCC): ICD-10-CM

## 2020-09-04 DIAGNOSIS — E11.9 TYPE 2 DIABETES MELLITUS WITHOUT COMPLICATION, WITHOUT LONG-TERM CURRENT USE OF INSULIN (HCC): ICD-10-CM

## 2020-09-04 DIAGNOSIS — G24.3 CERVICAL DYSTONIA: ICD-10-CM

## 2020-09-04 DIAGNOSIS — F02.80 DEMENTIA ASSOCIATED WITH OTHER UNDERLYING DISEASE WITHOUT BEHAVIORAL DISTURBANCE (HCC): ICD-10-CM

## 2020-09-04 DIAGNOSIS — Z71.89 ENCOUNTER FOR HERB AND VITAMIN SUPPLEMENT MANAGEMENT: ICD-10-CM

## 2020-09-04 DIAGNOSIS — F02.80 MAJOR NEUROCOGNITIVE DISORDER, DUE TO PARKINSON'S DISEASE, WITHOUT BEHAVIORAL DISTURBANCE, MILD (HCC): ICD-10-CM

## 2020-09-04 DIAGNOSIS — K57.30 COLON, DIVERTICULOSIS: Primary | ICD-10-CM

## 2020-09-04 DIAGNOSIS — K21.9 GASTROESOPHAGEAL REFLUX DISEASE, ESOPHAGITIS PRESENCE NOT SPECIFIED: ICD-10-CM

## 2020-09-04 PROBLEM — M46.26 OSTEOMYELITIS OF LUMBAR SPINE (HCC): Status: RESOLVED | Noted: 2020-03-10 | Resolved: 2020-09-04

## 2020-09-04 PROBLEM — E43 SEVERE PROTEIN-CALORIE MALNUTRITION (HCC): Status: RESOLVED | Noted: 2019-07-22 | Resolved: 2020-09-04

## 2020-09-04 PROBLEM — R63.4 WEIGHT LOSS: Status: RESOLVED | Noted: 2019-10-08 | Resolved: 2020-09-04

## 2020-09-04 PROCEDURE — G0439 PPPS, SUBSEQ VISIT: HCPCS | Performed by: FAMILY MEDICINE

## 2020-09-04 RX ORDER — GABAPENTIN 100 MG/1
100 CAPSULE ORAL
Qty: 90 CAPSULE | Refills: 3 | Status: SHIPPED | OUTPATIENT
Start: 2020-09-04 | End: 2021-02-05 | Stop reason: SDUPTHER

## 2020-09-04 NOTE — PROGRESS NOTES
Falls Plan of Care: balance, strength, and gait training instructions were provided and referral to physical therapy  Diabetic Foot Exam    Right Foot/Ankle   Right Foot Inspection  Skin Exam: skin normal and skin intact no dry skin, no warmth, no callus, no erythema, no maceration, no abnormal color, no pre-ulcer, no ulcer and no callus                          Toe Exam: ROM and strength within normal limits  Sensory   Vibration: intact  Proprioception: intact   Monofilament testing: intact  Vascular  Capillary refills: < 3 seconds  The right DP pulse is 2+  The right PT pulse is 2+  Left Foot/Ankle  Left Foot Inspection  Skin Exam: skin normal and skin intactno dry skin, no warmth, no erythema, no maceration, normal color, no pre-ulcer, no ulcer and no callus                         Toe Exam: ROM and strength within normal limits                   Sensory   Vibration: intact  Proprioception: intact  Monofilament: intact  Vascular  Capillary refills: < 3 seconds  The left DP pulse is 2+  The left PT pulse is 2+  Assign Risk Category:  No deformity present; No loss of protective sensation; No weak pulses       Risk: 0  Risk assessment and chart reviewed  1  Colon, diverticulosis  stable    2  Gastroesophageal reflux disease, esophagitis presence not specified  stable    3  Type 2 diabetes mellitus without complication, without long-term current use of insulin ([address] )  Fairly good control  - A1c w/GlycoMark(R) Reflex; Future    4  Uncomplicated asthma, unspecified asthma severity, unspecified whether persistent  stable    5  Essential hypertension  Adequate BP control    6  Cervical dystonia  F/u neuro    7  Dementia associated with other underlying disease without behavioral disturbance (HCC)  mild    8  Confusion  mild    9  Major neurocognitive disorder, due to parkinson's disease, without behavioral disturbance, mild (HCC)  Severe  F/u neuro    10  Parkinson's disease ([address] )  F/u neuro    11   Primary osteoarthritis of right knee  stable    12  Psoriasis  Well con trolled    13  RODRIGO (acute kidney injury) ([facility] [address] )  monitor    14  Prothrombin [id number] mutation (HCC)  monitor    15  Ambulatory dysfunction  Severe  Always use walker    16  Age-related nuclear cataract of both eyes  same    17  Encounter for herb and vitamin supplement management  See list    18  Intractable back pain  Manageable now  - gabapentin (NEURONTIN) 100 mg capsule; Take 1 capsule (100 mg total) by mouth daily at bedtime  Dispense: 90 capsule; Refill: 3    19  Mixed hyperlipidemia  Same tx    20  Restless legs syndrome  Fair control    21  Spinal stenosis of lumbar region without neurogenic claudication  Fair control  - gabapentin (NEURONTIN) 100 mg capsule; Take 1 capsule (100 mg total) by mouth daily at bedtime  Dispense: 90 capsule;  Refill: 3

## 2020-09-04 NOTE — PROGRESS NOTES
Health Risk Assessment:   Patient rates overall health as fair  Patient feels that their physical health rating is slightly worse  Eyesight was rated as slightly worse  Hearing was rated as same  Patient feels that their emotional and mental health rating is same  Pain experienced in the last 7 days has been some  Patient's pain rating has been 5/10  Patient states that she has experienced no weight loss or gain in last 6 months  Depression Screening:   PHQ-2 Score: 0      Fall Risk Screening: In the past year, patient has experienced: history of falling in past year    Number of falls: 2 or more  Injured during fall?: Yes    Feels unsteady when standing or walking?: Yes    Worried about falling?: Yes      Urinary Incontinence Screening:   Patient has not leaked urine accidently in the last six months  Home Safety:  Patient does not have trouble with stairs inside or outside of their home  Patient has working smoke alarms and has no working carbon monoxide detector  Home safety hazards include: none  Nutrition:   Current diet is Regular  Medications:   Patient is not currently taking any over-the-counter supplements  Patient is not able to manage medications   helps with medications    Activities of Daily Living (ADLs)/Instrumental Activities of Daily Living (IADLs):   Walk and transfer into and out of bed and chair?: Yes  Dress and groom yourself?: Yes    Bathe or shower yourself?: Yes    Feed yourself? Yes  Do your laundry/housekeeping?: No  Manage your money, pay your bills and track your expenses?: Yes  Make your own meals?: No    Do your own shopping?: No    ADL comments:  does laundry and housekeeping, helps makes her meals and she goes along to do food shopping  Previous Hospitalizations:   Any hospitalizations or ED visits within the last 12 months?: Yes    How many hospitalizations have you had in the last year?: 1-2    Advance Care Planning:   Living will:  Yes Advanced directive: Yes    Advanced directive counseling given: No      PREVENTIVE SCREENINGS      Cardiovascular Screening:    General: Screening Not Indicated and History Lipid Disorder      Diabetes Screening:     General: Screening Not Indicated and History Diabetes      Breast Cancer Screening:     General: Screening Current      Cervical Cancer Screening:    General: Screening Not Indicated      Lung Cancer Screening:     General: Screening Not Indicated

## 2020-09-09 DIAGNOSIS — I10 ESSENTIAL HYPERTENSION: ICD-10-CM

## 2020-09-10 RX ORDER — AMLODIPINE BESYLATE 10 MG/1
5 TABLET ORAL DAILY
Qty: 45 TABLET | Refills: 3 | Status: SHIPPED | OUTPATIENT
Start: 2020-09-10 | End: 2021-01-25 | Stop reason: SDUPTHER

## 2020-09-11 ENCOUNTER — OFFICE VISIT (OUTPATIENT)
Dept: PHYSICAL THERAPY | Facility: CLINIC | Age: 80
End: 2020-09-11
Payer: MEDICARE

## 2020-09-11 DIAGNOSIS — R26.2 IMPAIRED AMBULATION: ICD-10-CM

## 2020-09-11 DIAGNOSIS — G20 PARKINSON'S DISEASE (HCC): Primary | ICD-10-CM

## 2020-09-11 PROCEDURE — 97112 NEUROMUSCULAR REEDUCATION: CPT

## 2020-09-11 PROCEDURE — 97530 THERAPEUTIC ACTIVITIES: CPT

## 2020-09-11 PROCEDURE — 97116 GAIT TRAINING THERAPY: CPT

## 2020-09-11 NOTE — PROGRESS NOTES
Daily Note     Today's date: 2020  Patient name: Wilbur Mercado  : 1940  MRN: [id number]  Referring provider: Alpa Pennington MD  Dx:   Encounter Diagnosis     ICD-10-CM    1  Parkinson's disease ([facility] [address] )  G20    2  Impaired ambulation  R26 2        Start Time: 7648  Stop Time: 1130  Total time in clinic (min): 45 minutes    Subjective: Client stated that she is having troubles with knee pain as well as challenges with falls  Multiple falls in the past week as well as bruise on L upper back and L elbow, LOB laterally and backwards reported  Objective: See treatment diary below    Neuro:  - FWD/BWD walking without AD: 10 laps 10 ft fwd/bwd  - Cone weaving ambulation without Rollator: 2 sets, 4 laps around large therapy gym within various spaces (narrow and wide) without LLE proprioceptive wrapping  Noted improvements in heelstrike, step length, and pelvic stability    -Walking through obstacles    - Standing floor to ceiling with green boom whackers: 2x10 reps with use of sit to/from stand   -Standing with dual tasking with memory of pattern of cones- 10 cycles of 15 feet  -speed ladder- 10 cycles of 10 feet, forward and lateral walking  -turning around cone- 10 feet, L turn or R, 10 cycles each         Assessment: Patient able to tolerate treatment session well, challenged with coordination with overall and posterior balance  Patient is very impulsive with her stepping today, patient requires consistent cuing in order to improve her overall step height, small steps are her initial choice, requires verbal and tactile cuing as well as gait belt to reaminShe will continue to benefit from continued skilled OPPT services in order to maximize functional ability, mobility and participation throughout life roles in home and community  Plan: Continue per plan of care  Progress treatment as tolerated

## 2020-09-17 ENCOUNTER — TRANSCRIBE ORDERS (OUTPATIENT)
Dept: ADMINISTRATIVE | Facility: HOSPITAL | Age: 80
End: 2020-09-17

## 2020-09-17 ENCOUNTER — APPOINTMENT (OUTPATIENT)
Dept: LAB | Facility: HOSPITAL | Age: 80
End: 2020-09-17
Attending: FAMILY MEDICINE
Payer: MEDICARE

## 2020-09-17 DIAGNOSIS — E11.9 TYPE 2 DIABETES MELLITUS WITHOUT COMPLICATION, WITHOUT LONG-TERM CURRENT USE OF INSULIN (HCC): ICD-10-CM

## 2020-09-17 PROCEDURE — 84378 SUGARS SINGLE QUANT: CPT

## 2020-09-17 PROCEDURE — 36415 COLL VENOUS BLD VENIPUNCTURE: CPT

## 2020-09-17 PROCEDURE — 83036 HEMOGLOBIN GLYCOSYLATED A1C: CPT

## 2020-09-18 ENCOUNTER — OFFICE VISIT (OUTPATIENT)
Dept: PHYSICAL THERAPY | Facility: CLINIC | Age: 80
End: 2020-09-18
Payer: MEDICARE

## 2020-09-18 DIAGNOSIS — G20 PARKINSON'S DISEASE (HCC): Primary | ICD-10-CM

## 2020-09-18 DIAGNOSIS — R26.2 IMPAIRED AMBULATION: ICD-10-CM

## 2020-09-18 PROCEDURE — 97112 NEUROMUSCULAR REEDUCATION: CPT

## 2020-09-18 PROCEDURE — 97530 THERAPEUTIC ACTIVITIES: CPT

## 2020-09-18 NOTE — PROGRESS NOTES
Progress Report  Last WI: 2020  Visit 1/10     Today's date: 2020  Patient name: Ceci Toscano  : 1940  MRN: [id number]  Referring provider: El Claudio MD  Dx:   Encounter Diagnosis     ICD-10-CM    1  Parkinson's disease ([address] )  G20    2  Impaired ambulation  R26 2        Start Time: 59  Stop Time: 1130  Total time in clinic (min): 45 minutes    Subjective: Client stated that she has had a medication change of the Gabapentin from 3 pills to 2, patient notes that her LEs get fatigued too easily  Patient challenged with overall symptoms, patient notes at least 1-2 falls/day, patient limited in community function, patient challenged with her overall motion today  Patient notes posterior LOB, desires to use SPC, but safety is utilized with rollator best  No injuries reported except for minor bruising on L UE         Objective: See treatment diary below  Initial 7/10/2020: use of rollator     mCTSIB  -FTEO on firm- 17 seconds  -FTEC on firm- 11 seconds  -FTEO on foam- 0 seconds  -FTEC on Foam- 0 seconds    Bhat/56    Anticipatory Balance  -Anterior, posterior, medial, lateral- no balance reactions, fall would occur without PT intervention    [facility]     5 x STS: 21 12 seconds    10 MWT: 10/15 38 =  65 M/S    6 minute walk test: 400 feet     W[place]: 2020    mCTSIB  -FTEO on firm- 30 seconds  -FTEC on firm- 22 seconds  -FTEO on foam- 0 seconds  -FTEC on Foam- 0 seconds    Bhat/56    Anticipatory Balance  -Anterior, posterior, medial, lateral- no balance reactions, fall would occur without PT intervention    [facility]-     5 x STS: 19 seconds with BL UE support via hand rails     10 MWT: 10/14 2 =  71 M/S    6 minute walk test: 500 feet     TU 32 seconds     WI: 2020    mCTSIB  -FTEO on firm- 30 seconds  -FTEC on firm- 30 seconds  -FTEO on foam- 0 seconds  -FTEC on Foam- 0 seconds    Bhat/56    Anticipatory Balance  -Anterior, posterior, medial, lateral- no balance reactions, fall would occur without PT intervention    Mini-Best- 15/28    5 x STS: 15 39 seconds with 2 UE support     30 second sit to stand- 12 reps, 2 UE support on chairs    10 MWT: 10/14 2 =  71 M/S    6 minute walk test: 700 feet with rollator walker and gait belt follow    TUG: 15 79 seconds with Rollator; 16 43 seconds with gait belt and no AD    Treatment  -Weaving through Nuvosuns- 10 cycles of 10 feet  -Weaving through Hex[employer] with reaching for cones- 10 cycles of 10 feet            Goals  STG: To be completed in 4 weeks  -Patient will improve her sequencing with AD in 4 weeks in order to improve her community ambulation safety  NOT MET  -Patient will improve her gait speed score by  10 meters/second or more in order to improve her ability to cross a street safely  MET  -Patient will improve her TUG test score by 3 seconds or more in 4 weeks in order to improve her transfer to ambulation transition  MET  -Patient will improve her 5x sit to stand test by 5 seconds or more in 4 weeks to improve her transfer safety  NOT MET  -Patient will report a decrease in fall frequency to 4x per week in order to improve her fall risk and decrease in injury  NOT MET   -Patient will improve her mCTSIB score in all conditions by 5 seconds in order to improve all components of her balance systems  MET  -Patient will improve her LUNDBERG Balance Score by 4 points or more in order to maximize her static balance confidence   NOT MET    LTG: To be completed in 8 weeks  -Patient will complete a 6 minute walk test completely in 8 weeks to improve her cardiovascular endurance NOT MET  -Patient will complete a DGI in 8 weeks to formally assess her dynamic balance NOT MET  -Patient will complete a MiniBESTest in 8 weeks to formally assess her dual tasking abilities as related to her primary dx MET  -Patient will improve DGI by 2 points in 8 weeks in order to improve her dynamic balance at home and in the community NOT MET  -Patient will improve her 6 minute walk test distance by 100 feet or more in 8 weeks to improve her cardiovascular endurance- MET  -Patient will demonstrate an independent floor to stand transfer in 8 weeks to assist with fall recovery independently - NOT MET  -Patient will be independent in HEP in 8 weeks to assist with abilities at home and improve with overall function - NOT MET    Assessment:   Patient is a 79 y o  female who presents to skilled PT for balance and reactive postural control deficits secondary to Parkinson's Disease  Patient reports increasing fatigue during session as well as heaviness in the legs, which may be attributed to functional status change, noted improvement in weight due to subjective reports  Patient less active due to COVID 19  Patient improving with her outcome measures, patient continually deemed a fall risk, but patient improving slightly with her fall risk as compared to her last Progress note  Recommendation for hard cervical collar to assist with cervical position, recommended due to her cervical dystonia  Patient will benefit from skilled PT to address noted impairments and functional limitations they are causing with overall goal to return patient to highest level possible with reduced risk for falls  Patient reports minimal change to symptoms with medication change  Change in status and decrease in all outcome measures requires skilled PT in order to decrease fall risk and maximize function  Patient would continue to decondition as well as potentially decrease in her safety without physical therapy services  Plan: Continue per plan of care  Progress treatment as tolerated

## 2020-09-20 LAB
Lab: NORMAL
MISCELLANEOUS LAB TEST RESULT: NORMAL

## 2020-09-25 ENCOUNTER — OFFICE VISIT (OUTPATIENT)
Dept: PHYSICAL THERAPY | Facility: CLINIC | Age: 80
End: 2020-09-25
Payer: MEDICARE

## 2020-09-25 DIAGNOSIS — G20 PARKINSON'S DISEASE (HCC): Primary | ICD-10-CM

## 2020-09-25 DIAGNOSIS — R26.2 IMPAIRED AMBULATION: ICD-10-CM

## 2020-09-25 PROCEDURE — 97112 NEUROMUSCULAR REEDUCATION: CPT

## 2020-09-25 PROCEDURE — 97530 THERAPEUTIC ACTIVITIES: CPT

## 2020-09-25 NOTE — PROGRESS NOTES
Daily Note     Today's date: 2020  Patient name: Loi Duran  : 1940  MRN: [id number]  Referring provider: Mir Arcos MD  Dx:   Encounter Diagnosis     ICD-10-CM    1  Parkinson's disease ([facility] [address] )  G20    2  Impaired ambulation  R26 2        Start Time: 1100  Stop Time: 1145  Total time in clinic (min): 45 minutes    Subjective: Client stated her gabapentin has been decreased - takes one at night; legs ar fatigued - did a lot of walking yesterday - food shopping & went to [place]       Objective: See treatment diary below    Neuro:  - FWD/BWD walking without AD: 10 laps 10 ft fwd/bwd  - Cone weaving ambulation without Rollator: 2 sets, 4 laps around large therapy gym within various spaces (narrow and wide) without LLE proprioceptive wrapping  ( NP this session )   -Walking through obstacles  ( NP)   - Standing floor to ceiling with green boom whackers: 2x10 reps with use of sit to/from stand   -Standing with dual tasking with memory of pattern of cones- 10 cycles of 15 feet  -speed ladder- 10 cycles of 10 feet, forward and lateral walking  -turning around cone- 10 feet, L turn or R, 10 cycles each         Assessment: Patient able to tolerate treatment session well; patient requires use of gait belt for safety & verbal cueing to improve heel strike & step length & to slow pace - impulsive at times; patient does respond to cueing;  occ rest periods due to LE fatigue; She will continue to benefit from continued skilled OPPT services in order to maximize functional ability, mobility and participation throughout life roles in home and community  Plan: Continue per plan of care  Progress treatment as tolerated

## 2020-10-02 ENCOUNTER — OFFICE VISIT (OUTPATIENT)
Dept: PHYSICAL THERAPY | Facility: CLINIC | Age: 80
End: 2020-10-02
Payer: MEDICARE

## 2020-10-02 DIAGNOSIS — R26.2 IMPAIRED AMBULATION: ICD-10-CM

## 2020-10-02 DIAGNOSIS — G20 PARKINSON'S DISEASE (HCC): Primary | ICD-10-CM

## 2020-10-02 PROCEDURE — 97112 NEUROMUSCULAR REEDUCATION: CPT | Performed by: PHYSICAL THERAPIST

## 2020-10-09 ENCOUNTER — OFFICE VISIT (OUTPATIENT)
Dept: PHYSICAL THERAPY | Facility: CLINIC | Age: 80
End: 2020-10-09
Payer: MEDICARE

## 2020-10-09 DIAGNOSIS — G20 PARKINSON'S DISEASE (HCC): Primary | ICD-10-CM

## 2020-10-09 DIAGNOSIS — R26.2 IMPAIRED AMBULATION: ICD-10-CM

## 2020-10-09 PROCEDURE — 97116 GAIT TRAINING THERAPY: CPT

## 2020-10-09 PROCEDURE — 97112 NEUROMUSCULAR REEDUCATION: CPT

## 2020-10-12 NOTE — PROGRESS NOTES
Spoke to pt concerned about being able to hold urine for U/S  Pt has Parkinson's  Advised pt to arrive one hour before scheduled appt and to start drinking 24 oz of water as directed  Told pt to wear depends and to let technician know if she is unable to hold her urine  If test is unable to be completed they will have to let ordering provider, [employer], know and advise with further instructions      Gave pt address and phone number of [phone] [facility] [address] where she is having U/S

## 2020-10-16 ENCOUNTER — APPOINTMENT (OUTPATIENT)
Dept: PHYSICAL THERAPY | Facility: CLINIC | Age: 80
End: 2020-10-16
Payer: MEDICARE

## 2020-10-23 ENCOUNTER — OFFICE VISIT (OUTPATIENT)
Dept: PHYSICAL THERAPY | Facility: CLINIC | Age: 80
End: 2020-10-23
Payer: MEDICARE

## 2020-10-23 DIAGNOSIS — G20 PARKINSON'S DISEASE (HCC): Primary | ICD-10-CM

## 2020-10-23 DIAGNOSIS — R26.2 IMPAIRED AMBULATION: ICD-10-CM

## 2020-10-23 PROCEDURE — 97164 PT RE-EVAL EST PLAN CARE: CPT

## 2020-10-29 LAB
LEFT EYE DIABETIC RETINOPATHY: NORMAL
RIGHT EYE DIABETIC RETINOPATHY: NORMAL

## 2020-10-30 ENCOUNTER — OFFICE VISIT (OUTPATIENT)
Dept: PHYSICAL THERAPY | Facility: CLINIC | Age: 80
End: 2020-10-30
Payer: MEDICARE

## 2020-10-30 DIAGNOSIS — R26.2 IMPAIRED AMBULATION: ICD-10-CM

## 2020-10-30 DIAGNOSIS — G20 PARKINSON'S DISEASE (HCC): Primary | ICD-10-CM

## 2020-10-30 PROCEDURE — 97112 NEUROMUSCULAR REEDUCATION: CPT

## 2020-10-30 PROCEDURE — 97116 GAIT TRAINING THERAPY: CPT

## 2020-11-03 ENCOUNTER — HOSPITAL ENCOUNTER (OUTPATIENT)
Dept: ULTRASOUND IMAGING | Facility: HOSPITAL | Age: 80
Discharge: HOME | End: 2020-11-03
Payer: MEDICARE

## 2020-11-03 DIAGNOSIS — R33.9 INCOMPLETE BLADDER EMPTYING: ICD-10-CM

## 2020-11-03 PROCEDURE — 76770 US EXAM ABDO BACK WALL COMP: CPT

## 2020-11-06 ENCOUNTER — OFFICE VISIT (OUTPATIENT)
Dept: PHYSICAL THERAPY | Facility: CLINIC | Age: 80
End: 2020-11-06
Payer: MEDICARE

## 2020-11-06 DIAGNOSIS — R26.2 IMPAIRED AMBULATION: ICD-10-CM

## 2020-11-06 DIAGNOSIS — G20 PARKINSON'S DISEASE (HCC): Primary | ICD-10-CM

## 2020-11-06 PROCEDURE — 97530 THERAPEUTIC ACTIVITIES: CPT

## 2020-11-06 PROCEDURE — 97112 NEUROMUSCULAR REEDUCATION: CPT

## 2020-11-13 ENCOUNTER — OFFICE VISIT (OUTPATIENT)
Dept: PHYSICAL THERAPY | Facility: CLINIC | Age: 80
End: 2020-11-13
Payer: MEDICARE

## 2020-11-13 DIAGNOSIS — G20 PARKINSON'S DISEASE (HCC): Primary | ICD-10-CM

## 2020-11-13 DIAGNOSIS — R26.2 IMPAIRED AMBULATION: ICD-10-CM

## 2020-11-13 PROCEDURE — 97112 NEUROMUSCULAR REEDUCATION: CPT

## 2020-11-13 PROCEDURE — 97116 GAIT TRAINING THERAPY: CPT

## 2020-11-16 ENCOUNTER — TELEPHONE (OUTPATIENT)
Dept: UROLOGY | Facility: MEDICAL CENTER | Age: 80
End: 2020-11-16

## 2020-11-16 DIAGNOSIS — R33.9 INCOMPLETE BLADDER EMPTYING: Primary | ICD-10-CM

## 2020-11-16 DIAGNOSIS — R82.81 PYURIA: ICD-10-CM

## 2020-11-17 ENCOUNTER — LAB (OUTPATIENT)
Dept: LAB | Facility: CLINIC | Age: 80
End: 2020-11-17
Payer: MEDICARE

## 2020-11-17 DIAGNOSIS — R82.81 PYURIA: ICD-10-CM

## 2020-11-17 DIAGNOSIS — R33.9 INCOMPLETE BLADDER EMPTYING: ICD-10-CM

## 2020-11-17 LAB
ANION GAP SERPL CALCULATED.3IONS-SCNC: 8 MMOL/L (ref 4–13)
BUN SERPL-MCNC: 28 MG/DL (ref 5–25)
CALCIUM SERPL-MCNC: 9.2 MG/DL (ref 8.3–10.1)
CHLORIDE SERPL-SCNC: 101 MMOL/L (ref 100–108)
CO2 SERPL-SCNC: 28 MMOL/L (ref 21–32)
CREAT SERPL-MCNC: 1.33 MG/DL (ref 0.6–1.3)
GFR SERPL CREATININE-BSD FRML MDRD: 38 ML/MIN/1.73SQ M
GLUCOSE SERPL-MCNC: 119 MG/DL (ref 65–140)
POTASSIUM SERPL-SCNC: 4.6 MMOL/L (ref 3.5–5.3)
SODIUM SERPL-SCNC: 137 MMOL/L (ref 136–145)

## 2020-11-17 PROCEDURE — 87147 CULTURE TYPE IMMUNOLOGIC: CPT

## 2020-11-17 PROCEDURE — 87086 URINE CULTURE/COLONY COUNT: CPT

## 2020-11-17 PROCEDURE — 36415 COLL VENOUS BLD VENIPUNCTURE: CPT

## 2020-11-17 PROCEDURE — 87186 SC STD MICRODIL/AGAR DIL: CPT

## 2020-11-17 PROCEDURE — 87077 CULTURE AEROBIC IDENTIFY: CPT

## 2020-11-17 PROCEDURE — 80048 BASIC METABOLIC PNL TOTAL CA: CPT

## 2020-11-19 LAB
BACTERIA UR CULT: ABNORMAL
BACTERIA UR CULT: ABNORMAL

## 2020-11-20 ENCOUNTER — OFFICE VISIT (OUTPATIENT)
Dept: UROLOGY | Facility: CLINIC | Age: 80
End: 2020-11-20
Payer: MEDICARE

## 2020-11-20 VITALS
WEIGHT: 98 LBS | DIASTOLIC BLOOD PRESSURE: 60 MMHG | HEART RATE: 87 BPM | SYSTOLIC BLOOD PRESSURE: 100 MMHG | BODY MASS INDEX: 19.24 KG/M2 | HEIGHT: 60 IN

## 2020-11-20 DIAGNOSIS — R82.71 ASYMPTOMATIC BACTERIURIA: Primary | ICD-10-CM

## 2020-11-20 PROCEDURE — 99213 OFFICE O/P EST LOW 20 MIN: CPT | Performed by: PHYSICIAN ASSISTANT

## 2020-12-04 ENCOUNTER — OFFICE VISIT (OUTPATIENT)
Dept: FAMILY MEDICINE CLINIC | Facility: CLINIC | Age: 80
End: 2020-12-04
Payer: MEDICARE

## 2020-12-04 VITALS
WEIGHT: 99 LBS | HEART RATE: 81 BPM | HEIGHT: 60 IN | TEMPERATURE: 98.7 F | DIASTOLIC BLOOD PRESSURE: 62 MMHG | BODY MASS INDEX: 19.44 KG/M2 | OXYGEN SATURATION: 97 % | SYSTOLIC BLOOD PRESSURE: 110 MMHG | RESPIRATION RATE: 16 BRPM

## 2020-12-04 DIAGNOSIS — Z23 ENCOUNTER FOR IMMUNIZATION: Primary | ICD-10-CM

## 2020-12-04 DIAGNOSIS — N18.31 STAGE 3A CHRONIC KIDNEY DISEASE (HCC): ICD-10-CM

## 2020-12-04 DIAGNOSIS — E11.610 TYPE 2 DIABETES MELLITUS WITH DIABETIC NEUROPATHIC ARTHROPATHY, UNSPECIFIED WHETHER LONG TERM INSULIN USE (HCC): ICD-10-CM

## 2020-12-04 DIAGNOSIS — H91.93 BILATERAL HEARING LOSS, UNSPECIFIED HEARING LOSS TYPE: ICD-10-CM

## 2020-12-04 DIAGNOSIS — E11.9 TYPE 2 DIABETES MELLITUS WITHOUT COMPLICATION, WITHOUT LONG-TERM CURRENT USE OF INSULIN (HCC): ICD-10-CM

## 2020-12-04 DIAGNOSIS — G24.3 CERVICAL DYSTONIA: ICD-10-CM

## 2020-12-04 DIAGNOSIS — F02.81 DEMENTIA DUE TO PARKINSON'S DISEASE WITH BEHAVIORAL DISTURBANCE (HCC): ICD-10-CM

## 2020-12-04 DIAGNOSIS — G20 DEMENTIA DUE TO PARKINSON'S DISEASE WITH BEHAVIORAL DISTURBANCE (HCC): ICD-10-CM

## 2020-12-04 PROBLEM — S01.01XA LACERATION OF SCALP: Status: RESOLVED | Noted: 2019-11-20 | Resolved: 2020-12-04

## 2020-12-04 LAB — SL AMB POCT HEMOGLOBIN AIC: 6.4 (ref ?–6.5)

## 2020-12-04 PROCEDURE — 99214 OFFICE O/P EST MOD 30 MIN: CPT | Performed by: FAMILY MEDICINE

## 2020-12-04 PROCEDURE — 90662 IIV NO PRSV INCREASED AG IM: CPT

## 2020-12-04 PROCEDURE — G0008 ADMIN INFLUENZA VIRUS VAC: HCPCS

## 2020-12-04 PROCEDURE — 83036 HEMOGLOBIN GLYCOSYLATED A1C: CPT | Performed by: FAMILY MEDICINE

## 2020-12-05 DIAGNOSIS — K26.9 DUODENAL ULCER: ICD-10-CM

## 2020-12-07 RX ORDER — PANTOPRAZOLE SODIUM 40 MG/1
TABLET, DELAYED RELEASE ORAL
Qty: 60 TABLET | Refills: 2 | Status: SHIPPED | OUTPATIENT
Start: 2020-12-07 | End: 2021-01-17 | Stop reason: HOSPADM

## 2020-12-11 NOTE — TELEPHONE ENCOUNTER
Spoke with patient, she insisted she does not go to gastro   Informed her she has seen Dr Onelia Vitale in the past  She said she never heard of her

## 2021-01-06 ENCOUNTER — OFFICE VISIT (OUTPATIENT)
Dept: FAMILY MEDICINE CLINIC | Facility: CLINIC | Age: 81
End: 2021-01-06
Payer: MEDICARE

## 2021-01-06 ENCOUNTER — TELEPHONE (OUTPATIENT)
Dept: FAMILY MEDICINE CLINIC | Facility: CLINIC | Age: 81
End: 2021-01-06

## 2021-01-06 VITALS
BODY MASS INDEX: 19.05 KG/M2 | HEART RATE: 79 BPM | RESPIRATION RATE: 16 BRPM | DIASTOLIC BLOOD PRESSURE: 60 MMHG | OXYGEN SATURATION: 97 % | TEMPERATURE: 98.3 F | HEIGHT: 59 IN | WEIGHT: 94.5 LBS | SYSTOLIC BLOOD PRESSURE: 108 MMHG

## 2021-01-06 DIAGNOSIS — G20 DEMENTIA DUE TO PARKINSON'S DISEASE WITH BEHAVIORAL DISTURBANCE (HCC): ICD-10-CM

## 2021-01-06 DIAGNOSIS — E11.9 TYPE 2 DIABETES MELLITUS WITHOUT COMPLICATION, WITHOUT LONG-TERM CURRENT USE OF INSULIN (HCC): Primary | ICD-10-CM

## 2021-01-06 DIAGNOSIS — S01.01XA LACERATION OF SCALP, INITIAL ENCOUNTER: ICD-10-CM

## 2021-01-06 DIAGNOSIS — F02.81 DEMENTIA DUE TO PARKINSON'S DISEASE WITH BEHAVIORAL DISTURBANCE (HCC): ICD-10-CM

## 2021-01-06 DIAGNOSIS — R29.6 FREQUENT FALLS: ICD-10-CM

## 2021-01-06 DIAGNOSIS — I10 ESSENTIAL HYPERTENSION: ICD-10-CM

## 2021-01-06 PROCEDURE — 99214 OFFICE O/P EST MOD 30 MIN: CPT | Performed by: FAMILY MEDICINE

## 2021-01-06 RX ORDER — ROPINIROLE 2 MG/1
TABLET, FILM COATED ORAL
COMMUNITY
Start: 2020-12-15 | End: 2021-09-10 | Stop reason: HOSPADM

## 2021-01-06 NOTE — PROGRESS NOTES
ASSESSMENT/PLAN:  1  Type 2 diabetes mellitus without complication, without long-term current use of insulin (HCC)  Stable BS    2  Essential hypertension  STABLE    3  Dementia due to Parkinson's disease with behavioral disturbance [facility])  May be worsening  F/u Neuro in a few days      4  Frequent falls  Pt long h/o falls multiple times a week  Again emphasized need for assistance whenever is standing/walking or using Rolator  Increased in past week  No clinical evidence of intracranial new phenomenon, but monitor, report any worsening sx  5  Laceration of scalp, initial encounter  This was closed  F/u 7 - 10 days for recheck and sutures out if healing well  Reviewed and reinforced basics of healthy lifestyle  Risks and benefits of therapeutic plan discussed, answered all patient questions and concerns and patient expressed understanding and agreement of therapeutic plan  Follow up plan:approx 1 week for wound check and check status      SUBJECTIVE:  Chief complaint and HPI: Yulissa Little is a 79 y o  female who presents for follow up of multiple chronic medical problems, as listed below in problem list  All problems are relatively stable except for the following issues: worse balance and increased falling for past couple of weeks  L knee is sore  Pt denies any new headache/neck problems or new neuro findings  Is falling more than usual past few weeks      Review of systems:  No fever/chills/sweats/unexplained weight loss  No chest pain/shortness of breath  No change in digestion or bowel movements  No change in urination  No new  neurological symptoms beyond severe parkinson's sx      Chart reviewed for relevant medical, surgical and psychosocial history, medications and allergies, labs and studies  CT Head from 10/29/19 showed  No acute intracranial abnormality      Patient Active Problem List   Diagnosis    Asthma    Carpal tunnel syndrome    Colon, diverticulosis    Type 2 diabetes mellitus without complication, without long-term current use of insulin (HCC)    Essential hypertension    GERD (gastroesophageal reflux disease)    Mixed hyperlipidemia    Insomnia    Spinal stenosis    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)    Psoriasis    Restless legs syndrome    Need for influenza vaccination    Arthritis    Age-related nuclear cataract of both eyes    Ambulatory dysfunction    Cervical dystonia    Confusion    Parkinson's disease ([facility] )    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[facility] )    Dementia ([facility] )    Encounter for herb and vitamin supplement management    Intractable back pain    Primary osteoarthritis of right knee    Asymptomatic bacteriuria    Stage 3a chronic kidney disease    Frequent falls             EXAM:  /60   Pulse 79   Temp 98 3 °F (36 8 °C)   Resp 16   Ht 4' 11" (1 499 m)   Wt 42 9 kg (94 lb 8 oz)   SpO2 97%   BMI 19 09 kg/m²   The patient appears well, in no apparent distress  Alert and oriented times three, pleasant and cooperative  Vital signs are as noted by the nurse  /60   Pulse 79   Temp 98 3 °F (36 8 °C)   Resp 16   Ht 4' 11" (1 499 m)   Wt 42 9 kg (94 lb 8 oz)   SpO2 97%   BMI 19 09 kg/m²     Head: normocephalic, + scalp laceration R occiput  After consent obtained, this was inspected, no foreign body seen, no signs of depressed skull fracture, was  locally anesthetized with 1% lidocaine with epi and  closed with running 4 0 Ethilon suture  Tolerated well  Good hemostasis  No depressed skull fx palpable  Has completed usual series of Tetanus shots already  Eyes: well perfused conjunctiva, not clinically pale, not jaundiced  Ears: external ear: no gross lesions  Nose: no rhinorrhea  Neck: supple, trachea in the midline and no concerning cervical lymphadenopathy  Lungs: No respiratory labor     Heart: Regular rate and rhythm, no murmurs, gallops or rubs  Abdomen: Benign: soft, non-tender, non-distended  No guarding or rebound  ,   Skin: No pallor  No rashes noted  Extremities: Moves all extremities with Parkinsonian movements (baseline)  No new neuro signs apparent  No pedal edema  L knee with some superficial bruises  No point tenderness or deformity  Good PROM L knee  All internal ligament tests intact  Neg Lachman   Able to bear wt on leg (though off balance as always)

## 2021-01-06 NOTE — TELEPHONE ENCOUNTER
Can you please call this patient's daughter, she worried about her mother  She wants to know if she had test for UTI today at her visit, if yes if it was positive?    Daughter called from [facility]    [phone]

## 2021-01-07 ENCOUNTER — LAB (OUTPATIENT)
Dept: LAB | Facility: CLINIC | Age: 81
DRG: 683 | End: 2021-01-07
Payer: MEDICARE

## 2021-01-07 DIAGNOSIS — F02.81 DEMENTIA DUE TO PARKINSON'S DISEASE WITH BEHAVIORAL DISTURBANCE (HCC): ICD-10-CM

## 2021-01-07 DIAGNOSIS — M17.11 PRIMARY OSTEOARTHRITIS OF RIGHT KNEE: ICD-10-CM

## 2021-01-07 DIAGNOSIS — R26.2 AMBULATORY DYSFUNCTION: ICD-10-CM

## 2021-01-07 DIAGNOSIS — G20 PARKINSON'S DISEASE (HCC): Primary | ICD-10-CM

## 2021-01-07 DIAGNOSIS — M48.061 SPINAL STENOSIS OF LUMBAR REGION WITHOUT NEUROGENIC CLAUDICATION: ICD-10-CM

## 2021-01-07 DIAGNOSIS — N18.31 STAGE 3A CHRONIC KIDNEY DISEASE (HCC): ICD-10-CM

## 2021-01-07 DIAGNOSIS — R29.6 FREQUENT FALLS: ICD-10-CM

## 2021-01-07 DIAGNOSIS — E11.9 TYPE 2 DIABETES MELLITUS WITHOUT COMPLICATION, WITHOUT LONG-TERM CURRENT USE OF INSULIN (HCC): ICD-10-CM

## 2021-01-07 DIAGNOSIS — G20 PARKINSON'S DISEASE (HCC): ICD-10-CM

## 2021-01-07 DIAGNOSIS — I10 ESSENTIAL HYPERTENSION: ICD-10-CM

## 2021-01-07 DIAGNOSIS — G20 DEMENTIA DUE TO PARKINSON'S DISEASE WITH BEHAVIORAL DISTURBANCE (HCC): ICD-10-CM

## 2021-01-07 LAB
ALBUMIN SERPL BCP-MCNC: 4.2 G/DL (ref 3.5–5)
ALP SERPL-CCNC: 85 U/L (ref 46–116)
ALT SERPL W P-5'-P-CCNC: 11 U/L (ref 12–78)
ANION GAP SERPL CALCULATED.3IONS-SCNC: 4 MMOL/L (ref 4–13)
AST SERPL W P-5'-P-CCNC: 22 U/L (ref 5–45)
BASOPHILS # BLD AUTO: 0.13 THOUSANDS/ΜL (ref 0–0.1)
BASOPHILS NFR BLD AUTO: 2 % (ref 0–1)
BILIRUB SERPL-MCNC: 0.75 MG/DL (ref 0.2–1)
BUN SERPL-MCNC: 34 MG/DL (ref 5–25)
CALCIUM SERPL-MCNC: 9.9 MG/DL (ref 8.3–10.1)
CHLORIDE SERPL-SCNC: 111 MMOL/L (ref 100–108)
CO2 SERPL-SCNC: 27 MMOL/L (ref 21–32)
CREAT SERPL-MCNC: 2.42 MG/DL (ref 0.6–1.3)
EOSINOPHIL # BLD AUTO: 0.44 THOUSAND/ΜL (ref 0–0.61)
EOSINOPHIL NFR BLD AUTO: 6 % (ref 0–6)
ERYTHROCYTE [DISTWIDTH] IN BLOOD BY AUTOMATED COUNT: 13.2 % (ref 11.6–15.1)
GFR SERPL CREATININE-BSD FRML MDRD: 18 ML/MIN/1.73SQ M
GLUCOSE P FAST SERPL-MCNC: 104 MG/DL (ref 65–99)
HCT VFR BLD AUTO: 39.5 % (ref 34.8–46.1)
HGB BLD-MCNC: 12.4 G/DL (ref 11.5–15.4)
IMM GRANULOCYTES # BLD AUTO: 0.03 THOUSAND/UL (ref 0–0.2)
IMM GRANULOCYTES NFR BLD AUTO: 0 % (ref 0–2)
LYMPHOCYTES # BLD AUTO: 0.87 THOUSANDS/ΜL (ref 0.6–4.47)
LYMPHOCYTES NFR BLD AUTO: 11 % (ref 14–44)
MCH RBC QN AUTO: 29.8 PG (ref 26.8–34.3)
MCHC RBC AUTO-ENTMCNC: 31.4 G/DL (ref 31.4–37.4)
MCV RBC AUTO: 95 FL (ref 82–98)
MONOCYTES # BLD AUTO: 0.75 THOUSAND/ΜL (ref 0.17–1.22)
MONOCYTES NFR BLD AUTO: 10 % (ref 4–12)
NEUTROPHILS # BLD AUTO: 5.54 THOUSANDS/ΜL (ref 1.85–7.62)
NEUTS SEG NFR BLD AUTO: 71 % (ref 43–75)
NRBC BLD AUTO-RTO: 0 /100 WBCS
PLATELET # BLD AUTO: 287 THOUSANDS/UL (ref 149–390)
PMV BLD AUTO: 10.7 FL (ref 8.9–12.7)
POTASSIUM SERPL-SCNC: 4.6 MMOL/L (ref 3.5–5.3)
PROT SERPL-MCNC: 7.8 G/DL (ref 6.4–8.2)
RBC # BLD AUTO: 4.16 MILLION/UL (ref 3.81–5.12)
SODIUM SERPL-SCNC: 142 MMOL/L (ref 136–145)
WBC # BLD AUTO: 7.76 THOUSAND/UL (ref 4.31–10.16)

## 2021-01-07 PROCEDURE — 80053 COMPREHEN METABOLIC PANEL: CPT

## 2021-01-07 PROCEDURE — 36415 COLL VENOUS BLD VENIPUNCTURE: CPT

## 2021-01-07 PROCEDURE — 85025 COMPLETE CBC W/AUTO DIFF WBC: CPT

## 2021-01-07 NOTE — PROGRESS NOTES
I discussed the case with daughter Janay Maciel who lives about a 1000 miles away but is very involved in her parents care  Because Mrs Mami Hendrix has a little more confusion and more falling for the past week or so, I am checking some labs to make sure this is not a  urinary tract infection even though she offers no symptoms and making sure other basic labs are okay    A complex care management consultation has also been ordered so that the home situation can be evaluated to help minimize this varies frequent call falls

## 2021-01-08 ENCOUNTER — TELEPHONE (OUTPATIENT)
Dept: FAMILY MEDICINE CLINIC | Facility: CLINIC | Age: 81
End: 2021-01-08

## 2021-01-08 ENCOUNTER — APPOINTMENT (OUTPATIENT)
Dept: LAB | Facility: HOSPITAL | Age: 81
DRG: 683 | End: 2021-01-08
Attending: FAMILY MEDICINE
Payer: MEDICARE

## 2021-01-08 ENCOUNTER — PATIENT OUTREACH (OUTPATIENT)
Dept: FAMILY MEDICINE CLINIC | Facility: CLINIC | Age: 81
End: 2021-01-08

## 2021-01-08 ENCOUNTER — HOSPITAL ENCOUNTER (OUTPATIENT)
Dept: RADIOLOGY | Facility: HOSPITAL | Age: 81
Discharge: HOME | DRG: 683 | End: 2021-01-08
Attending: FAMILY MEDICINE
Payer: MEDICARE

## 2021-01-08 ENCOUNTER — TRANSCRIBE ORDERS (OUTPATIENT)
Dept: ADMINISTRATIVE | Facility: HOSPITAL | Age: 81
End: 2021-01-08

## 2021-01-08 DIAGNOSIS — R29.6 FREQUENT FALLS: ICD-10-CM

## 2021-01-08 DIAGNOSIS — F02.80 DEMENTIA ASSOCIATED WITH OTHER UNDERLYING DISEASE WITHOUT BEHAVIORAL DISTURBANCE (HCC): ICD-10-CM

## 2021-01-08 DIAGNOSIS — S09.90XA TRAUMATIC INJURY OF HEAD, INITIAL ENCOUNTER: ICD-10-CM

## 2021-01-08 DIAGNOSIS — G20 DEMENTIA DUE TO PARKINSON'S DISEASE WITH BEHAVIORAL DISTURBANCE (HCC): ICD-10-CM

## 2021-01-08 DIAGNOSIS — F02.81 DEMENTIA DUE TO PARKINSON'S DISEASE WITH BEHAVIORAL DISTURBANCE (HCC): ICD-10-CM

## 2021-01-08 DIAGNOSIS — G20 DEMENTIA DUE TO PARKINSON'S DISEASE WITH BEHAVIORAL DISTURBANCE (HCC): Primary | ICD-10-CM

## 2021-01-08 DIAGNOSIS — N18.30 STAGE 3 CHRONIC KIDNEY DISEASE, UNSPECIFIED WHETHER STAGE 3A OR 3B CKD (HCC): ICD-10-CM

## 2021-01-08 DIAGNOSIS — N18.30 STAGE 3 CHRONIC KIDNEY DISEASE, UNSPECIFIED WHETHER STAGE 3A OR 3B CKD (HCC): Primary | ICD-10-CM

## 2021-01-08 DIAGNOSIS — F02.81 DEMENTIA DUE TO PARKINSON'S DISEASE WITH BEHAVIORAL DISTURBANCE (HCC): Primary | ICD-10-CM

## 2021-01-08 DIAGNOSIS — N30.01 ACUTE CYSTITIS WITH HEMATURIA: Primary | ICD-10-CM

## 2021-01-08 LAB
ANION GAP SERPL CALCULATED.3IONS-SCNC: 6 MMOL/L (ref 4–13)
BACTERIA UR QL AUTO: ABNORMAL /HPF
BILIRUB UR QL STRIP: NEGATIVE
BUN SERPL-MCNC: 36 MG/DL (ref 5–25)
CALCIUM SERPL-MCNC: 9.1 MG/DL (ref 8.3–10.1)
CHLORIDE SERPL-SCNC: 107 MMOL/L (ref 100–108)
CLARITY UR: ABNORMAL
CO2 SERPL-SCNC: 29 MMOL/L (ref 21–32)
COLOR UR: YELLOW
CREAT SERPL-MCNC: 1.97 MG/DL (ref 0.6–1.3)
CREAT UR-MCNC: 171 MG/DL
GFR SERPL CREATININE-BSD FRML MDRD: 24 ML/MIN/1.73SQ M
GLUCOSE SERPL-MCNC: 218 MG/DL (ref 65–140)
GLUCOSE UR STRIP-MCNC: NEGATIVE MG/DL
HGB UR QL STRIP.AUTO: ABNORMAL
KETONES UR STRIP-MCNC: NEGATIVE MG/DL
LEUKOCYTE ESTERASE UR QL STRIP: ABNORMAL
MICROALBUMIN UR-MCNC: 81 MG/L (ref 0–20)
MICROALBUMIN/CREAT 24H UR: 47 MG/G CREATININE (ref 0–30)
NITRITE UR QL STRIP: POSITIVE
NON-SQ EPI CELLS URNS QL MICRO: ABNORMAL /HPF
PH UR STRIP.AUTO: 5 [PH]
POTASSIUM SERPL-SCNC: 4.2 MMOL/L (ref 3.5–5.3)
PROT UR STRIP-MCNC: NEGATIVE MG/DL
RBC #/AREA URNS AUTO: ABNORMAL /HPF
SODIUM SERPL-SCNC: 142 MMOL/L (ref 136–145)
SP GR UR STRIP.AUTO: >=1.03 (ref 1–1.03)
UROBILINOGEN UR QL STRIP.AUTO: 0.2 E.U./DL
WBC #/AREA URNS AUTO: ABNORMAL /HPF

## 2021-01-08 PROCEDURE — 80048 BASIC METABOLIC PNL TOTAL CA: CPT

## 2021-01-08 PROCEDURE — 82043 UR ALBUMIN QUANTITATIVE: CPT | Performed by: FAMILY MEDICINE

## 2021-01-08 PROCEDURE — 36415 COLL VENOUS BLD VENIPUNCTURE: CPT

## 2021-01-08 PROCEDURE — 70450 CT HEAD/BRAIN W/O DYE: CPT

## 2021-01-08 PROCEDURE — 82570 ASSAY OF URINE CREATININE: CPT | Performed by: FAMILY MEDICINE

## 2021-01-08 PROCEDURE — 81001 URINALYSIS AUTO W/SCOPE: CPT | Performed by: FAMILY MEDICINE

## 2021-01-08 PROCEDURE — G1004 CDSM NDSC: HCPCS

## 2021-01-08 RX ORDER — SULFAMETHOXAZOLE AND TRIMETHOPRIM 800; 160 MG/1; MG/1
1 TABLET ORAL EVERY 12 HOURS SCHEDULED
Qty: 10 TABLET | Refills: 0 | Status: SHIPPED | OUTPATIENT
Start: 2021-01-08 | End: 2021-01-17 | Stop reason: HOSPADM

## 2021-01-08 NOTE — TELEPHONE ENCOUNTER
I talked with daughter Napoleon Reese:   She notes less clear in mind  Speech sl  Slurred  I went over available labs  Problem with furnace - house was 80 degrees  We discussed sending to ER, getting a CT head  Napoleon Reese is concerned abuot sending her to ER during the [place] pandemic and doesn't want to send her to ER now  She is to see neurologist today for 1/2 hr  but may need to re-schedule  I'll order stat CT head - done  Napoleon Hugh will call central scheduling to get this done today    She also needs to give the urine specimen    Needs to drink more fluid because of RODRIGO or worsening CKD

## 2021-01-08 NOTE — TELEPHONE ENCOUNTER
I reviewed benign CT scan  She forgot to give urine specimen - she understands needs to do this  I reviewed labs  Matteo Arrieta states 'I feel fine'  No urinary symptoms  NO fever  I discussed worsened kidney function  She will f/u 5 - 8 days    F//u appt with neurologist

## 2021-01-08 NOTE — TELEPHONE ENCOUNTER
I left a message that she likely has a UTI and to start and her Bactrim antibiotics right away and to let me know if she is worse in any way

## 2021-01-08 NOTE — TELEPHONE ENCOUNTER
Ravindra Renner told her mom has a uti and will ensure Ms Carol Hernandes takes her Bactrim  To let us know if any issues

## 2021-01-09 ENCOUNTER — APPOINTMENT (EMERGENCY)
Dept: RADIOLOGY | Facility: HOSPITAL | Age: 81
DRG: 683 | End: 2021-01-09
Payer: MEDICARE

## 2021-01-09 ENCOUNTER — NURSE TRIAGE (OUTPATIENT)
Dept: OTHER | Facility: OTHER | Age: 81
End: 2021-01-09

## 2021-01-09 ENCOUNTER — HOSPITAL ENCOUNTER (EMERGENCY)
Facility: HOSPITAL | Age: 81
Discharge: HOME | DRG: 683 | End: 2021-01-09
Attending: EMERGENCY MEDICINE
Payer: MEDICARE

## 2021-01-09 VITALS
DIASTOLIC BLOOD PRESSURE: 77 MMHG | TEMPERATURE: 97.9 F | SYSTOLIC BLOOD PRESSURE: 165 MMHG | RESPIRATION RATE: 20 BRPM | OXYGEN SATURATION: 95 % | HEART RATE: 76 BPM | WEIGHT: 95 LBS | BODY MASS INDEX: 19.19 KG/M2

## 2021-01-09 DIAGNOSIS — R41.82 ALTERED MENTAL STATUS: ICD-10-CM

## 2021-01-09 DIAGNOSIS — N39.0 UTI (URINARY TRACT INFECTION): ICD-10-CM

## 2021-01-09 DIAGNOSIS — F05 DELIRIUM DUE TO ANOTHER MEDICAL CONDITION: Primary | ICD-10-CM

## 2021-01-09 DIAGNOSIS — N18.9 CHRONIC KIDNEY DISEASE (CKD): ICD-10-CM

## 2021-01-09 LAB
ALBUMIN SERPL BCP-MCNC: 4.1 G/DL (ref 3.5–5)
ALP SERPL-CCNC: 69 U/L (ref 46–116)
ALT SERPL W P-5'-P-CCNC: 16 U/L (ref 12–78)
ANION GAP SERPL CALCULATED.3IONS-SCNC: 11 MMOL/L (ref 4–13)
AST SERPL W P-5'-P-CCNC: 28 U/L (ref 5–45)
BACTERIA UR QL AUTO: ABNORMAL /HPF
BASOPHILS # BLD AUTO: 0.08 THOUSANDS/ΜL (ref 0–0.1)
BASOPHILS NFR BLD AUTO: 1 % (ref 0–1)
BILIRUB SERPL-MCNC: 0.7 MG/DL (ref 0.2–1)
BILIRUB UR QL STRIP: NEGATIVE
BUN SERPL-MCNC: 28 MG/DL (ref 5–25)
CALCIUM SERPL-MCNC: 9.6 MG/DL (ref 8.3–10.1)
CHLORIDE SERPL-SCNC: 104 MMOL/L (ref 100–108)
CLARITY UR: ABNORMAL
CO2 SERPL-SCNC: 27 MMOL/L (ref 21–32)
COLOR UR: YELLOW
CREAT SERPL-MCNC: 1.95 MG/DL (ref 0.6–1.3)
EOSINOPHIL # BLD AUTO: 0.08 THOUSAND/ΜL (ref 0–0.61)
EOSINOPHIL NFR BLD AUTO: 1 % (ref 0–6)
ERYTHROCYTE [DISTWIDTH] IN BLOOD BY AUTOMATED COUNT: 13 % (ref 11.6–15.1)
GFR SERPL CREATININE-BSD FRML MDRD: 24 ML/MIN/1.73SQ M
GLUCOSE SERPL-MCNC: 100 MG/DL (ref 65–140)
GLUCOSE UR STRIP-MCNC: NEGATIVE MG/DL
HCT VFR BLD AUTO: 38.6 % (ref 34.8–46.1)
HGB BLD-MCNC: 11.6 G/DL (ref 11.5–15.4)
HGB UR QL STRIP.AUTO: ABNORMAL
IMM GRANULOCYTES # BLD AUTO: 0.02 THOUSAND/UL (ref 0–0.2)
IMM GRANULOCYTES NFR BLD AUTO: 0 % (ref 0–2)
KETONES UR STRIP-MCNC: NEGATIVE MG/DL
LEUKOCYTE ESTERASE UR QL STRIP: ABNORMAL
LYMPHOCYTES # BLD AUTO: 0.56 THOUSANDS/ΜL (ref 0.6–4.47)
LYMPHOCYTES NFR BLD AUTO: 7 % (ref 14–44)
MCH RBC QN AUTO: 29 PG (ref 26.8–34.3)
MCHC RBC AUTO-ENTMCNC: 30.1 G/DL (ref 31.4–37.4)
MCV RBC AUTO: 97 FL (ref 82–98)
MONOCYTES # BLD AUTO: 0.46 THOUSAND/ΜL (ref 0.17–1.22)
MONOCYTES NFR BLD AUTO: 5 % (ref 4–12)
MUCOUS THREADS UR QL AUTO: ABNORMAL
NEUTROPHILS # BLD AUTO: 7.42 THOUSANDS/ΜL (ref 1.85–7.62)
NEUTS SEG NFR BLD AUTO: 86 % (ref 43–75)
NITRITE UR QL STRIP: NEGATIVE
NON-SQ EPI CELLS URNS QL MICRO: ABNORMAL /HPF
NRBC BLD AUTO-RTO: 0 /100 WBCS
OTHER STN SPEC: ABNORMAL
PH UR STRIP.AUTO: 5.5 [PH]
PLATELET # BLD AUTO: 225 THOUSANDS/UL (ref 149–390)
PMV BLD AUTO: 10.4 FL (ref 8.9–12.7)
POTASSIUM SERPL-SCNC: 4.8 MMOL/L (ref 3.5–5.3)
PROT SERPL-MCNC: 7.5 G/DL (ref 6.4–8.2)
PROT UR STRIP-MCNC: NEGATIVE MG/DL
RBC # BLD AUTO: 4 MILLION/UL (ref 3.81–5.12)
RBC #/AREA URNS AUTO: ABNORMAL /HPF
SODIUM SERPL-SCNC: 142 MMOL/L (ref 136–145)
SP GR UR STRIP.AUTO: >=1.03 (ref 1–1.03)
TROPONIN I SERPL-MCNC: <0.02 NG/ML
UROBILINOGEN UR QL STRIP.AUTO: 0.2 E.U./DL
WBC # BLD AUTO: 8.62 THOUSAND/UL (ref 4.31–10.16)
WBC #/AREA URNS AUTO: ABNORMAL /HPF

## 2021-01-09 PROCEDURE — 36415 COLL VENOUS BLD VENIPUNCTURE: CPT | Performed by: EMERGENCY MEDICINE

## 2021-01-09 PROCEDURE — G1004 CDSM NDSC: HCPCS

## 2021-01-09 PROCEDURE — 87077 CULTURE AEROBIC IDENTIFY: CPT | Performed by: EMERGENCY MEDICINE

## 2021-01-09 PROCEDURE — 87086 URINE CULTURE/COLONY COUNT: CPT | Performed by: EMERGENCY MEDICINE

## 2021-01-09 PROCEDURE — 85025 COMPLETE CBC W/AUTO DIFF WBC: CPT | Performed by: EMERGENCY MEDICINE

## 2021-01-09 PROCEDURE — 93005 ELECTROCARDIOGRAM TRACING: CPT

## 2021-01-09 PROCEDURE — 70450 CT HEAD/BRAIN W/O DYE: CPT

## 2021-01-09 PROCEDURE — 99285 EMERGENCY DEPT VISIT HI MDM: CPT | Performed by: EMERGENCY MEDICINE

## 2021-01-09 PROCEDURE — 84484 ASSAY OF TROPONIN QUANT: CPT | Performed by: EMERGENCY MEDICINE

## 2021-01-09 PROCEDURE — 99285 EMERGENCY DEPT VISIT HI MDM: CPT

## 2021-01-09 PROCEDURE — 72125 CT NECK SPINE W/O DYE: CPT

## 2021-01-09 PROCEDURE — 81001 URINALYSIS AUTO W/SCOPE: CPT | Performed by: EMERGENCY MEDICINE

## 2021-01-09 PROCEDURE — 80053 COMPREHEN METABOLIC PANEL: CPT | Performed by: EMERGENCY MEDICINE

## 2021-01-09 PROCEDURE — 71045 X-RAY EXAM CHEST 1 VIEW: CPT

## 2021-01-09 PROCEDURE — 87186 SC STD MICRODIL/AGAR DIL: CPT | Performed by: EMERGENCY MEDICINE

## 2021-01-09 RX ORDER — SULFAMETHOXAZOLE AND TRIMETHOPRIM 800; 160 MG/1; MG/1
1 TABLET ORAL ONCE
Status: COMPLETED | OUTPATIENT
Start: 2021-01-09 | End: 2021-01-09

## 2021-01-09 RX ORDER — DOCUSATE SODIUM 100 MG/1
100 CAPSULE, LIQUID FILLED ORAL 2 TIMES DAILY
COMMUNITY
End: 2021-09-10 | Stop reason: HOSPADM

## 2021-01-09 RX ORDER — ACETAMINOPHEN 325 MG/1
650 TABLET ORAL EVERY 6 HOURS PRN
COMMUNITY

## 2021-01-09 RX ADMIN — SULFAMETHOXAZOLE AND TRIMETHOPRIM 1 TABLET: 800; 160 TABLET ORAL at 17:31

## 2021-01-09 NOTE — TELEPHONE ENCOUNTER
Reason for Disposition   [1] Difficult to awaken or acting confused (e g , disoriented, slurred speech) AND [2] present now AND [3] has diabetes (diabetes mellitus)    Answer Assessment - Initial Assessment Questions  1  LEVEL OF CONSCIOUSNESS: "How is he (she, the patient) acting right now?" (e g , alert-oriented, confused, lethargic, stuporous, comatose)      Pt is awake but very confused  Per : "she is just laying on the floor in the middle of the room  She can't stand on her own " Both daughter and  stated that pt is usually very alert and independent  Says within the last three days they have noticed a decline but today she has worsened significantly  2  ONSET: "When did the confusion start?"  (minutes, hours, days)      Started three days ago- worse today  3  PATTERN "Does this come and go, or has it been constant since it started?"  "Is it present now?"      This is new for the last three days  4  ALCOHOL or DRUGS: "Has he been drinking alcohol or taking any drugs?"       N/a    5  NARCOTIC MEDICATIONS: "Has he been receiving any narcotic medications?" (e g , morphine, Vicodin)      N/a    6  CAUSE: "What do you think is causing the confusion?"       Thinks pt may have not taken her Parkinsons medication but uncertain  Pt also recently diagnosed with UTI- has only taken one days worth of antibiotic  7  OTHER SYMPTOMS: "Are there any other symptoms?" (e g , difficulty breathing, headache, fever, weakness)      No other symptoms      Protocols used: CONFUSION - DELIRIUM-ADULT-

## 2021-01-09 NOTE — ED PROVIDER NOTES
History  Chief Complaint   Patient presents with    Medication Problem     per son @ bedside & EMS, pt recently diagnosed with a UTI & son thinks her medication cocktail may be off  Pt in the ER with EMS for ms changes  Per EMS, pt confused this morning, crying, insisting that she wanted to go home, and unable to recognize her   Pt has a hx of Parkinson's dz, and was recently diagnosed with a UTI  At the time of initial eval, pt is awake and alert, oriented x 3, with garbled speech, which is her baseline  Pt was recently diagnosed with a UTI per PCP, started on a course of bactrim, of which she took the 1st dose after the onset of her confusion today  Pt's son states that none of her meds were changed recently  Pt is calm in the ER during my initial eval, back to her baseline, per son  History provided by:  Patient, EMS personnel and relative  History limited by:  Dementia   used: No    Altered Mental Status  Presenting symptoms: disorientation    Severity:  Moderate  Most recent episode: Today  Episode history:  Single  Timing:  Constant  Progression:  Worsening  Chronicity:  New  Context: recent infection    Associated symptoms: no abdominal pain, no fever, no nausea, no rash and no vomiting        Prior to Admission Medications   Prescriptions Last Dose Informant Patient Reported? Taking?    Amantadine HCl ER (GOCOVRI) 137 MG CP24  Self Yes Yes   Sig: Take 1 capsule by mouth daily at bedtime    Lancets (FREESTYLE) lancets Unknown at Unknown time Self No No   Sig: For daily testing   Multiple Vitamin (MULTIVITAMIN) tablet Unknown at Unknown time Self Yes No   Sig: Take 1 tablet by mouth every morning    NOURIANZ tablet Unknown at Unknown time Self Yes No   Si mg daily   Omega-3 Fatty Acids (FISH OIL PO) Unknown at Unknown time Self Yes No   Sig: Take 2 g by mouth every morning    TURMERIC PO Unknown at Unknown time Self Yes No   Sig: Take by mouth 3 (three) times a day acetaminophen (TYLENOL) 325 mg tablet   Yes Yes   Sig: Take 650 mg by mouth every 6 (six) hours as needed for mild pain   amLODIPine (NORVASC) 10 mg tablet  Self No Yes   Sig: Take 0 5 tablets (5 mg total) by mouth daily   aspirin (ECOTRIN LOW STRENGTH) 81 mg EC tablet Unknown at Unknown time Self Yes No   Sig: Take 81 mg by mouth every morning    carbidopa-levodopa (SINEMET)  mg per tablet   Yes Yes   Sig: Take 1 tablet by mouth 5 (five) times a day   diclofenac sodium (VOLTAREN) 1 % Unknown at Unknown time Self No No   Sig: Apply 2 g topically 4 (four) times a day   Patient taking differently: Apply 2 g topically daily at bedtime    docusate sodium (COLACE) 100 mg capsule   Yes Yes   Sig: Take 100 mg by mouth 2 (two) times a day   gabapentin (NEURONTIN) 100 mg capsule Unknown at Unknown time Self No No   Sig: Take 1 capsule (100 mg total) by mouth daily at bedtime   glucose blood (FREESTYLE TEST STRIPS) test strip Unknown at Unknown time Self No No   Sig: Use as instructed daily testing  Report abnormal levels     glucose monitoring kit (FREESTYLE) monitoring kit Unknown at Unknown time Self No No   Si each by Does not apply route as needed (DM control) ACCUCHEK METER COMPACT PLUS   metFORMIN (GLUCOPHAGE) 500 mg tablet  Self No Yes   Sig: TAKE 1 TABLET BY MOUTH  DAILY WITH BREAKFAST   pantoprazole (PROTONIX) 40 mg tablet  Self Yes Yes   Sig: Take 40 mg by mouth 2 (two) times a day   pantoprazole (PROTONIX) 40 mg tablet Unknown at Unknown time  No No   Sig: take 1 tablet by mouth twice a day   rOPINIRole (REQUIP) 2 mg tablet   Yes Yes   Sig: TAKE 3 TABLETS BY MOUTH  NIGHTLY   rasagiline (AZILECT) 1 MG  Self Yes Yes   Si mg every morning    sulfamethoxazole-trimethoprim (BACTRIM DS) 800-160 mg per tablet   No Yes   Sig: Take 1 tablet by mouth every 12 (twelve) hours for 5 days      Facility-Administered Medications: None       Past Medical History:   Diagnosis Date    RODRIGO (acute kidney injury) ([facility] ) 7/19/2019    RODRIGO (acute kidney injury) ([facility] ) 7/19/2019    Anal bleeding 1/29/2018    Arthritis     knee    Asthma     BPPV (benign paroxysmal positional vertigo) 7/19/2016    Last Assessment & Plan:  Hx consistent with BPPV  Neg Fairlee-Halpike, however could not perform it adequately due to dyskinesias  Recommend lozano-daroff exercises at home  IF worsens, vestibular therapy   Bulging lumbar disc 12/10/2013    Closed fracture of one rib of left side 1/29/2018    Colon polyp     Dementia ([facility] )     Fall     balance issue    Gallbladder disease     GERD (gastroesophageal reflux disease)     Hematuria     last assessed 10/29/15    Orthostatic hypotension 7/13/2015    Osteomyelitis of lumbar spine ([facility] ) 3/10/2020    Ovarian cyst     LAST ASSESSED: 12/10/13    Parkinson's disease ([facility] )     Pneumonia of both lower lobes due to infectious organism 3/27/2018    Pulmonary embolism with infarction ([facility] ) 9/2/2014    Scalp laceration, subsequent encounter 11/15/2019    Sciatica 12/10/2013    Severe protein-calorie malnutrition ([facility] ) 7/22/2019    Skin disorder     last assessed 12/10/13    Squamous cell carcinoma of skin 11/21/2016    Toxic metabolic encephalopathy 8/48/4976    Use of cane as ambulatory aid     Walker as ambulation aid     Wears glasses     Weight loss 10/8/2019    Last Assessment & Plan:  Recommend Boost or Ensure  F/u with PCP      Weight loss, unintentional 5/22/2015       Past Surgical History:   Procedure Laterality Date    APPENDECTOMY      1970'S    CATARACT EXTRACTION      CHOLECYSTECTOMY      1970'S    COLONOSCOPY      LAPAROTOMY N/A 7/18/2019    Procedure: LAPAROTOMY EXPLORATORY;  Surgeon: Gertrude Atikns MD;  Location: [address] [place] [address];  Service: General    NEUROPLASTY / [address] Right     OOPHORECTOMY Bilateral     REMOVAL OF BOTH OVARIES LAPAROSCOPIC    CT COLSC FLX W/RMVL OF TUMOR POLYP LESION [address] TQ N/A 3/20/2018    Procedure: COLONOSCOPY;  Surgeon: Scottie Patiño MD;  Location: [facility] GI LAB; Service: Gastroenterology    MN XCAPSL CTRC RMVL INSJ IO LENS PROSTH W/O ECP Right 12/4/2018    Procedure: EXTRACTION EXTRACAPSULAR CATARACT PHACO INTRAOCULAR LENS (IOL); Surgeon: Ravinder Wheatley MD;  Location: [facility] MAIN OR;  Service: Ophthalmology    MN XCAPSL CTRC RMVL INSJ IO LENS PROSTH W/O ECP  1/15/2019    Procedure: EXTRACTION EXTRACAPSULAR CATARACT PHACO INTRAOCULAR LENS (IOL); Surgeon: Ravinder Wheatley MD;  Location: [facility] MAIN OR;  Service: Ophthalmology    TONSILLECTOMY      VENTRAL HERNIA REPAIR      WITH IMPLANT OF MESH       Family History   Problem Relation Age of Onset    Alzheimer's disease Mother     Stroke Father     No Known Problems Sister     No Known Problems Brother      I have reviewed and agree with the history as documented  E-Cigarette/Vaping    E-Cigarette Use Never User      E-Cigarette/Vaping Substances     Social History     Tobacco Use    Smoking status: Never Smoker    Smokeless tobacco: Never Used   Substance Use Topics    Alcohol use: Not Currently    Drug use: Never       Review of Systems   Unable to perform ROS: Mental status change   Constitutional: Negative for chills and fever  Respiratory: Negative for cough, chest tightness and shortness of breath  Gastrointestinal: Negative for abdominal pain, diarrhea, nausea and vomiting  Genitourinary: Negative for dysuria, frequency, hematuria and urgency  Musculoskeletal: Negative for back pain, neck pain and neck stiffness  Skin: Negative for color change, pallor, rash and wound  All other systems reviewed and are negative  Physical Exam  Physical Exam  Vitals signs and nursing note reviewed  Constitutional:       General: She is not in acute distress  Appearance: She is well-developed  She is not diaphoretic  HENT:      Head: Normocephalic and atraumatic     Eyes:      Conjunctiva/sclera: Conjunctivae normal  Pupils: Pupils are equal, round, and reactive to light  Neck:      Musculoskeletal: Normal range of motion and neck supple  Cardiovascular:      Rate and Rhythm: Normal rate and regular rhythm  Heart sounds: Normal heart sounds  No murmur  Pulmonary:      Effort: Pulmonary effort is normal  No respiratory distress  Breath sounds: Normal breath sounds  Abdominal:      General: Bowel sounds are normal  There is no distension  Palpations: Abdomen is soft  Tenderness: There is no abdominal tenderness  Musculoskeletal: Normal range of motion  General: No deformity  Skin:     General: Skin is warm and dry  Capillary Refill: Capillary refill takes less than 2 seconds  Coloration: Skin is not pale  Findings: No rash  Neurological:      Mental Status: She is alert and oriented to person, place, and time  GCS: GCS eye subscore is 4  GCS verbal subscore is 5  GCS motor subscore is 6  Cranial Nerves: No cranial nerve deficit  Gait: Gait abnormal       Comments: Dystonic movements   Psychiatric:         Speech: Speech is slurred  Behavior: Behavior normal  Behavior is not aggressive or hyperactive           Vital Signs  ED Triage Vitals [01/09/21 1518]   Temperature Pulse Respirations Blood Pressure SpO2   97 9 °F (36 6 °C) 92 20 119/76 97 %      Temp Source Heart Rate Source Patient Position - Orthostatic VS BP Location FiO2 (%)   Tympanic Monitor Sitting Left arm --      Pain Score       --           Vitals:    01/09/21 1518 01/09/21 1736   BP: 119/76 165/77   Pulse: 92 76   Patient Position - Orthostatic VS: Sitting Sitting         Visual Acuity      ED Medications  Medications   sulfamethoxazole-trimethoprim (BACTRIM DS) 800-160 mg per tablet 1 tablet (1 tablet Oral Given 1/9/21 1731)       Diagnostic Studies  Results Reviewed     Procedure Component Value Units Date/Time    Urine Microscopic [[id number]]  (Abnormal) Collected: 01/09/21 1554    Lab Status: Final result Specimen: Urine, Straight Cath Updated: 01/09/21 1620     RBC, UA 1-2 /hpf      WBC, UA 30-50 /hpf      Epithelial Cells Moderate /hpf      Bacteria, UA Innumerable /hpf      OTHER OBSERVATIONS WBCs Clumped     MUCUS THREADS Occasional    Urine culture [[id number]] Collected: 01/09/21 1554    Lab Status:  In process Specimen: Urine, Straight Cath Updated: 01/09/21 1620    UA w Reflex to Microscopic w Reflex to Culture [[id number]]  (Abnormal) Collected: 01/09/21 1554    Lab Status: Final result Specimen: Urine, Straight Cath Updated: 01/09/21 1603     Color, UA Yellow     Clarity, UA Slightly Cloudy     Specific Gravity, UA >=1 030     pH, UA 5 5     Leukocytes, UA Small     Nitrite, UA Negative     Protein, UA Negative mg/dl      Glucose, UA Negative mg/dl      Ketones, UA Negative mg/dl      Urobilinogen, UA 0 2 E U /dl      Bilirubin, UA Negative     Blood, UA Small    Troponin I [[id number]]  (Normal) Collected: 01/09/21 1536    Lab Status: Final result Specimen: Blood from Arm, Right Updated: 01/09/21 1603     Troponin I <0 02 ng/mL     Comprehensive metabolic panel [[id number]]  (Abnormal) Collected: 01/09/21 1536    Lab Status: Final result Specimen: Blood from Arm, Right Updated: 01/09/21 1558     Sodium 142 mmol/L      Potassium 4 8 mmol/L      Chloride 104 mmol/L      CO2 27 mmol/L      ANION GAP 11 mmol/L      BUN 28 mg/dL      Creatinine 1 95 mg/dL      Glucose 100 mg/dL      Calcium 9 6 mg/dL      AST 28 U/L      ALT 16 U/L      Alkaline Phosphatase 69 U/L      Total Protein 7 5 g/dL      Albumin 4 1 g/dL      Total Bilirubin 0 70 mg/dL      eGFR 24 ml/min/1 73sq m     Narrative:      Mayela guidelines for Chronic Kidney Disease (CKD):     Stage 1 with normal or high GFR (GFR > 90 mL/min/1 73 square meters)    Stage 2 Mild CKD (GFR = 60-89 mL/min/1 73 square meters)    Stage 3A Moderate CKD (GFR = 45-59 mL/min/1 73 square meters)    Stage 3B Moderate CKD (GFR = 30-44 mL/min/1 73 square meters)    Stage 4 Severe CKD (GFR = 15-29 mL/min/1 73 square meters)    Stage 5 End Stage CKD (GFR <15 mL/min/1 73 square meters)  Note: GFR calculation is accurate only with a steady state creatinine    CBC and differential [[id number]]  (Abnormal) Collected: 01/09/21 1536    Lab Status: Final result Specimen: Blood from Arm, Right Updated: 01/09/21 1542     WBC 8 62 Thousand/uL      RBC 4 00 Million/uL      Hemoglobin 11 6 g/dL      Hematocrit 38 6 %      MCV 97 fL      MCH 29 0 pg      MCHC 30 1 g/dL      RDW 13 0 %      MPV 10 4 fL      Platelets 351 Thousands/uL      nRBC 0 /100 WBCs      Neutrophils Relative 86 %      Immat GRANS % 0 %      Lymphocytes Relative 7 %      Monocytes Relative 5 %      Eosinophils Relative 1 %      Basophils Relative 1 %      Neutrophils Absolute 7 42 Thousands/µL      Immature Grans Absolute 0 02 Thousand/uL      Lymphocytes Absolute 0 56 Thousands/µL      Monocytes Absolute 0 46 Thousand/µL      Eosinophils Absolute 0 08 Thousand/µL      Basophils Absolute 0 08 Thousands/µL                  CT head without contrast   Final Result by Abad Benavides DO (01/09 1628)      Stable examination  No acute intracranial abnormality  Workstation performed: [id number]         CT cervical spine without contrast   Final Result by Abad Benavides DO (01/09 1634)      Progression of cervical and upper thoracic degenerative change, compared to the prior examination  New lytic changes are seen within the right C2-3 facet joint  Multilevel mild canal stenosis and foraminal narrowing  Workstation performed: [id number]         XR chest 1 view portable   Final Result by González Velazquez MD (01/10 1056)      No acute cardiopulmonary disease                    Workstation performed: [id number]                    Procedures  ECG 12 Lead Documentation Only    Date/Time: 1/9/2021 5:14 PM  Performed by: France Velázquez   Authorized by: Shannon Quintanilla DO     Indications / Diagnosis:  Ms changes  ECG reviewed by me, the ED Provider: yes    Patient location:  ED  Previous ECG:     Previous ECG:  Unavailable    Comparison to cardiac monitor: Yes    Interpretation:     Interpretation: non-specific    Rate:     ECG rate:  83bpm    ECG rate assessment: normal    Rhythm:     Rhythm: paced    Pacing:     Capture:  Complete    Type of pacing:  Atrial  Ectopy:     Ectopy: none    QRS:     QRS axis:  Normal  Conduction:     Conduction: normal    ST segments:     ST segments:  Normal             ED Course                                           MDM  Number of Diagnoses or Management Options  Altered mental status: new and requires workup  Chronic kidney disease (CKD): established and worsening  Delirium due to another medical condition: new and requires workup  UTI (urinary tract infection): new and requires workup  Diagnosis management comments: Pt in the ER with acute delirium likely secondary to UTI  Pt to continue bactrim as prescribed, urine culture pending  Labs/EKG/CT reviewed and neg for acute pathology  Pt with CKD, currently at baseline  Pt now back to baseline, and her son is comfortable with her discharge to home  Pt will f/u with PCP and return for worsening symptoms          Amount and/or Complexity of Data Reviewed  Clinical lab tests: ordered and reviewed  Tests in the radiology section of CPT®: ordered and reviewed    Risk of Complications, Morbidity, and/or Mortality  Presenting problems: high  Diagnostic procedures: high  Management options: high    Patient Progress  Patient progress: improved      Disposition  Final diagnoses:   Delirium due to another medical condition   UTI (urinary tract infection)   Altered mental status   Chronic kidney disease (CKD)     Time reflects when diagnosis was documented in both MDM as applicable and the Disposition within this note     Time User Action Codes Description Comment 1/9/2021  5:13 PM Celine CORTEZ Add [F05] Delirium due to another medical condition     1/9/2021  5:13 PM Jaime Chance DIEGO Add [N39 0] UTI (urinary tract infection)     1/9/2021  5:13 PM Celine CORTEZ Add [R41 82] Altered mental status     1/9/2021  5:14 PM Celine Altamirano Add [N18 9] Chronic kidney disease (CKD)       ED Disposition     ED Disposition Condition Date/Time Comment    Discharge Stable Sat Jan 9, 2021  5:12 PM Lanette Walsh discharge to home/self care  Follow-up Information     Follow up With Specialties Details Why [address] [place] [address] & [facility], MD [facility] Medicine Schedule an appointment as soon as possible for a visit in 2 days for follow up [address]   [phone]            Discharge Medication List as of 1/9/2021  5:15 PM      CONTINUE these medications which have NOT CHANGED    Details   acetaminophen (TYLENOL) 325 mg tablet Take 650 mg by mouth every 6 (six) hours as needed for mild pain, Historical Med      Amantadine HCl ER (GOCOVRI) 137 MG CP24 Take 1 capsule by mouth daily at bedtime , Starting Fri 10/11/2019, Historical Med      amLODIPine (NORVASC) 10 mg tablet Take 0 5 tablets (5 mg total) by mouth daily, Starting Thu 9/10/2020, Normal      carbidopa-levodopa (SINEMET)  mg per tablet Take 1 tablet by mouth 5 (five) times a day, Starting Wed 12/16/2020, Until Tue 3/16/2021, Historical Med      docusate sodium (COLACE) 100 mg capsule Take 100 mg by mouth 2 (two) times a day, Historical Med      metFORMIN (GLUCOPHAGE) 500 mg tablet TAKE 1 TABLET BY MOUTH  DAILY WITH BREAKFAST, Normal      !!  pantoprazole (PROTONIX) 40 mg tablet Take 40 mg by mouth 2 (two) times a day, Starting Wed 4/1/2020, Historical Med      rasagiline (AZILECT) 1 MG 1 mg every morning , Starting Tue 2/12/2019, Historical Med      rOPINIRole (REQUIP) 2 mg tablet TAKE 3 TABLETS BY MOUTH  NIGHTLY, Historical Med sulfamethoxazole-trimethoprim (BACTRIM DS) 800-160 mg per tablet Take 1 tablet by mouth every 12 (twelve) hours for 5 days, Starting Fri 1/8/2021, Until Wed 1/13/2021, Normal      aspirin (ECOTRIN LOW STRENGTH) 81 mg EC tablet Take 81 mg by mouth every morning , Historical Med      diclofenac sodium (VOLTAREN) 1 % Apply 2 g topically 4 (four) times a day, Starting Tue 9/10/2019, Normal      gabapentin (NEURONTIN) 100 mg capsule Take 1 capsule (100 mg total) by mouth daily at bedtime, Starting Fri 9/4/2020, Until Sat 9/4/2021, Normal      glucose blood (FREESTYLE TEST STRIPS) test strip Use as instructed daily testing  Report abnormal levels  , Normal      glucose monitoring kit (FREESTYLE) monitoring kit 1 each by Does not apply route as needed (DM control) ACCUCHEK METER COMPACT PLUS, Starting Mon 6/15/2020, Normal      Lancets (FREESTYLE) lancets For daily testing, Normal      Multiple Vitamin (MULTIVITAMIN) tablet Take 1 tablet by mouth every morning , Historical Med      NOURIANZ tablet 20 mg daily, Starting Wed 5/13/2020, Historical Med      Omega-3 Fatty Acids (FISH OIL PO) Take 2 g by mouth every morning , Historical Med      !! pantoprazole (PROTONIX) 40 mg tablet take 1 tablet by mouth twice a day, Normal      TURMERIC PO Take by mouth 3 (three) times a day, Historical Med       !! - Potential duplicate medications found  Please discuss with provider  No discharge procedures on file      PDMP Review     None          ED Provider  Electronically Signed by           Consuelo Arechiga DO  01/10/21 1779

## 2021-01-09 NOTE — DISCHARGE INSTRUCTIONS
Return to the ER for further concerns or worsening symptoms  Follow up with your primary care physician in 1-2 days  Continue your antibiotics as previously prescribed good, to achieve stated therapy goals

## 2021-01-09 NOTE — TELEPHONE ENCOUNTER
Regarding: UTI, mental status change due not taking Parkinson's medications because of the UTI  ----- Message from Aaron Santana sent at 1/9/2021  1:02 PM EST -----  "My mother was diagnosed with a UTI and now she is confused  I am concerned because she has this confusion which might stop her from taking her Parkinson's medication because she is almost delirious  She has been falling more so to because she is not taking her medication because of her UTI   I need medical advice on what I should do "

## 2021-01-09 NOTE — TELEPHONE ENCOUNTER
Daughter Soni Meyer called in regards to worsening symptoms with her mom Fly Nava  Just started on Bactrim yesterday for UTI  Says pt seems more confused today  Daughter Jessica Lim) does not live with pt- lives in [place]  Gave me telephone number to pts  to gather assessment information  After gathering information from pts  protocol recommendation is to call 911  Discussed with  and with pts daughter Aleksander Simms  Verbalized understanding

## 2021-01-10 ENCOUNTER — HOSPITAL ENCOUNTER (INPATIENT)
Facility: HOSPITAL | Age: 81
LOS: 7 days | Discharge: HOME HEALTH | DRG: 683 | End: 2021-01-17
Attending: EMERGENCY MEDICINE | Admitting: FAMILY MEDICINE
Payer: MEDICARE

## 2021-01-10 ENCOUNTER — TELEPHONE (OUTPATIENT)
Dept: OTHER | Facility: OTHER | Age: 81
End: 2021-01-10

## 2021-01-10 DIAGNOSIS — R63.6 UNDERWEIGHT: ICD-10-CM

## 2021-01-10 DIAGNOSIS — R41.82 ALTERED MENTAL STATUS: Primary | ICD-10-CM

## 2021-01-10 DIAGNOSIS — G25.81 RESTLESS LEGS SYNDROME: ICD-10-CM

## 2021-01-10 DIAGNOSIS — E86.0 DEHYDRATION: ICD-10-CM

## 2021-01-10 DIAGNOSIS — S92.353A FRACTURE OF 5TH METATARSAL: ICD-10-CM

## 2021-01-10 DIAGNOSIS — F02.81 DEMENTIA DUE TO PARKINSON'S DISEASE WITH BEHAVIORAL DISTURBANCE (HCC): ICD-10-CM

## 2021-01-10 DIAGNOSIS — N39.0 URINARY TRACT INFECTION: ICD-10-CM

## 2021-01-10 DIAGNOSIS — Z96.89 S/P DEEP BRAIN STIMULATOR PLACEMENT: ICD-10-CM

## 2021-01-10 DIAGNOSIS — R41.82 ALTERED MENTAL STATUS, UNSPECIFIED ALTERED MENTAL STATUS TYPE: ICD-10-CM

## 2021-01-10 DIAGNOSIS — G20 DEMENTIA DUE TO PARKINSON'S DISEASE WITH BEHAVIORAL DISTURBANCE (HCC): ICD-10-CM

## 2021-01-10 DIAGNOSIS — G20 PARKINSON'S DISEASE (HCC): ICD-10-CM

## 2021-01-10 LAB
ALBUMIN SERPL BCP-MCNC: 3.5 G/DL (ref 3.5–5)
ALP SERPL-CCNC: 58 U/L (ref 46–116)
ALT SERPL W P-5'-P-CCNC: 12 U/L (ref 12–78)
ANION GAP SERPL CALCULATED.3IONS-SCNC: 10 MMOL/L (ref 4–13)
AST SERPL W P-5'-P-CCNC: 57 U/L (ref 5–45)
ATRIAL RATE: 83 BPM
BASOPHILS # BLD AUTO: 0.09 THOUSANDS/ΜL (ref 0–0.1)
BASOPHILS NFR BLD AUTO: 1 % (ref 0–1)
BILIRUB SERPL-MCNC: 0.7 MG/DL (ref 0.2–1)
BUN SERPL-MCNC: 37 MG/DL (ref 5–25)
CALCIUM SERPL-MCNC: 9.1 MG/DL (ref 8.3–10.1)
CHLORIDE SERPL-SCNC: 105 MMOL/L (ref 100–108)
CO2 SERPL-SCNC: 23 MMOL/L (ref 21–32)
CREAT SERPL-MCNC: 2.74 MG/DL (ref 0.6–1.3)
EOSINOPHIL # BLD AUTO: 0.1 THOUSAND/ΜL (ref 0–0.61)
EOSINOPHIL NFR BLD AUTO: 1 % (ref 0–6)
ERYTHROCYTE [DISTWIDTH] IN BLOOD BY AUTOMATED COUNT: 13.2 % (ref 11.6–15.1)
GFR SERPL CREATININE-BSD FRML MDRD: 16 ML/MIN/1.73SQ M
GLUCOSE SERPL-MCNC: 93 MG/DL (ref 65–140)
HCT VFR BLD AUTO: 34 % (ref 34.8–46.1)
HGB BLD-MCNC: 10.3 G/DL (ref 11.5–15.4)
IMM GRANULOCYTES # BLD AUTO: 0.05 THOUSAND/UL (ref 0–0.2)
IMM GRANULOCYTES NFR BLD AUTO: 1 % (ref 0–2)
LACTATE SERPL-SCNC: 1.2 MMOL/L (ref 0.5–2)
LYMPHOCYTES # BLD AUTO: 0.7 THOUSANDS/ΜL (ref 0.6–4.47)
LYMPHOCYTES NFR BLD AUTO: 6 % (ref 14–44)
MCH RBC QN AUTO: 28.9 PG (ref 26.8–34.3)
MCHC RBC AUTO-ENTMCNC: 30.3 G/DL (ref 31.4–37.4)
MCV RBC AUTO: 96 FL (ref 82–98)
MONOCYTES # BLD AUTO: 0.83 THOUSAND/ΜL (ref 0.17–1.22)
MONOCYTES NFR BLD AUTO: 8 % (ref 4–12)
NEUTROPHILS # BLD AUTO: 9.16 THOUSANDS/ΜL (ref 1.85–7.62)
NEUTS SEG NFR BLD AUTO: 83 % (ref 43–75)
NRBC BLD AUTO-RTO: 0 /100 WBCS
P AXIS: 28 DEGREES
PLATELET # BLD AUTO: 237 THOUSANDS/UL (ref 149–390)
PMV BLD AUTO: 10.6 FL (ref 8.9–12.7)
POTASSIUM SERPL-SCNC: 5.1 MMOL/L (ref 3.5–5.3)
POTASSIUM SERPL-SCNC: 6.4 MMOL/L (ref 3.5–5.3)
POTASSIUM SERPL-SCNC: 6.7 MMOL/L (ref 3.5–5.3)
PR INTERVAL: 270 MS
PROT SERPL-MCNC: 6.9 G/DL (ref 6.4–8.2)
QRS AXIS: -28 DEGREES
QRSD INTERVAL: 78 MS
QT INTERVAL: 378 MS
QTC INTERVAL: 444 MS
RBC # BLD AUTO: 3.56 MILLION/UL (ref 3.81–5.12)
SODIUM SERPL-SCNC: 138 MMOL/L (ref 136–145)
T WAVE AXIS: 29 DEGREES
VENTRICULAR RATE: 83 BPM
WBC # BLD AUTO: 10.93 THOUSAND/UL (ref 4.31–10.16)

## 2021-01-10 PROCEDURE — 80053 COMPREHEN METABOLIC PANEL: CPT | Performed by: EMERGENCY MEDICINE

## 2021-01-10 PROCEDURE — 96361 HYDRATE IV INFUSION ADD-ON: CPT

## 2021-01-10 PROCEDURE — 96365 THER/PROPH/DIAG IV INF INIT: CPT

## 2021-01-10 PROCEDURE — 36415 COLL VENOUS BLD VENIPUNCTURE: CPT | Performed by: EMERGENCY MEDICINE

## 2021-01-10 PROCEDURE — 99285 EMERGENCY DEPT VISIT HI MDM: CPT

## 2021-01-10 PROCEDURE — 83605 ASSAY OF LACTIC ACID: CPT | Performed by: EMERGENCY MEDICINE

## 2021-01-10 PROCEDURE — 84132 ASSAY OF SERUM POTASSIUM: CPT | Performed by: EMERGENCY MEDICINE

## 2021-01-10 PROCEDURE — 93010 ELECTROCARDIOGRAM REPORT: CPT | Performed by: INTERNAL MEDICINE

## 2021-01-10 PROCEDURE — 87040 BLOOD CULTURE FOR BACTERIA: CPT | Performed by: EMERGENCY MEDICINE

## 2021-01-10 PROCEDURE — 99284 EMERGENCY DEPT VISIT MOD MDM: CPT | Performed by: EMERGENCY MEDICINE

## 2021-01-10 PROCEDURE — 85025 COMPLETE CBC W/AUTO DIFF WBC: CPT | Performed by: EMERGENCY MEDICINE

## 2021-01-10 RX ORDER — SODIUM CHLORIDE 9 MG/ML
125 INJECTION, SOLUTION INTRAVENOUS CONTINUOUS
Status: DISCONTINUED | OUTPATIENT
Start: 2021-01-10 | End: 2021-01-11

## 2021-01-10 RX ORDER — CEFTRIAXONE 1 G/50ML
1000 INJECTION, SOLUTION INTRAVENOUS ONCE
Status: COMPLETED | OUTPATIENT
Start: 2021-01-10 | End: 2021-01-10

## 2021-01-10 RX ADMIN — CEFTRIAXONE 1000 MG: 1 INJECTION, SOLUTION INTRAVENOUS at 21:35

## 2021-01-10 RX ADMIN — SODIUM CHLORIDE 125 ML/HR: 0.9 INJECTION, SOLUTION INTRAVENOUS at 21:32

## 2021-01-11 ENCOUNTER — APPOINTMENT (INPATIENT)
Dept: RADIOLOGY | Facility: HOSPITAL | Age: 81
DRG: 683 | End: 2021-01-11
Payer: MEDICARE

## 2021-01-11 PROBLEM — R41.82 ALTERED MENTAL STATUS: Status: ACTIVE | Noted: 2021-01-11

## 2021-01-11 PROBLEM — N17.9 ACUTE RENAL FAILURE SUPERIMPOSED ON STAGE 3 CHRONIC KIDNEY DISEASE (HCC): Status: ACTIVE | Noted: 2021-01-11

## 2021-01-11 PROBLEM — N39.0 UTI (URINARY TRACT INFECTION): Status: ACTIVE | Noted: 2021-01-11

## 2021-01-11 PROBLEM — I49.8 SINUS ARRHYTHMIA: Status: ACTIVE | Noted: 2021-01-11

## 2021-01-11 PROBLEM — Z96.89 S/P DEEP BRAIN STIMULATOR PLACEMENT: Status: ACTIVE | Noted: 2021-01-11

## 2021-01-11 PROBLEM — N18.31 STAGE 3A CHRONIC KIDNEY DISEASE (HCC): Status: RESOLVED | Noted: 2020-12-04 | Resolved: 2021-01-11

## 2021-01-11 PROBLEM — N18.30 ACUTE RENAL FAILURE SUPERIMPOSED ON STAGE 3 CHRONIC KIDNEY DISEASE (HCC): Status: ACTIVE | Noted: 2021-01-11

## 2021-01-11 LAB
ALBUMIN SERPL BCP-MCNC: 3.4 G/DL (ref 3.5–5)
ALP SERPL-CCNC: 61 U/L (ref 46–116)
ALT SERPL W P-5'-P-CCNC: 13 U/L (ref 12–78)
AMORPH URATE CRY URNS QL MICRO: ABNORMAL /HPF
ANION GAP SERPL CALCULATED.3IONS-SCNC: 9 MMOL/L (ref 4–13)
AST SERPL W P-5'-P-CCNC: 40 U/L (ref 5–45)
BACTERIA UR QL AUTO: ABNORMAL /HPF
BASOPHILS # BLD AUTO: 0.09 THOUSANDS/ΜL (ref 0–0.1)
BASOPHILS NFR BLD AUTO: 1 % (ref 0–1)
BILIRUB SERPL-MCNC: 0.7 MG/DL (ref 0.2–1)
BILIRUB UR QL STRIP: NEGATIVE
BUN SERPL-MCNC: 34 MG/DL (ref 5–25)
CALCIUM ALBUM COR SERPL-MCNC: 9.5 MG/DL (ref 8.3–10.1)
CALCIUM SERPL-MCNC: 9 MG/DL (ref 8.3–10.1)
CHLORIDE SERPL-SCNC: 108 MMOL/L (ref 100–108)
CLARITY UR: ABNORMAL
CO2 SERPL-SCNC: 25 MMOL/L (ref 21–32)
COLOR UR: YELLOW
CREAT SERPL-MCNC: 2.19 MG/DL (ref 0.6–1.3)
EOSINOPHIL # BLD AUTO: 0.19 THOUSAND/ΜL (ref 0–0.61)
EOSINOPHIL NFR BLD AUTO: 3 % (ref 0–6)
ERYTHROCYTE [DISTWIDTH] IN BLOOD BY AUTOMATED COUNT: 13.1 % (ref 11.6–15.1)
GFR SERPL CREATININE-BSD FRML MDRD: 21 ML/MIN/1.73SQ M
GLUCOSE SERPL-MCNC: 105 MG/DL (ref 65–140)
GLUCOSE SERPL-MCNC: 114 MG/DL (ref 65–140)
GLUCOSE SERPL-MCNC: 76 MG/DL (ref 65–140)
GLUCOSE SERPL-MCNC: 77 MG/DL (ref 65–140)
GLUCOSE SERPL-MCNC: 89 MG/DL (ref 65–140)
GLUCOSE SERPL-MCNC: 96 MG/DL (ref 65–140)
GLUCOSE UR STRIP-MCNC: NEGATIVE MG/DL
HCT VFR BLD AUTO: 35.7 % (ref 34.8–46.1)
HGB BLD-MCNC: 10.8 G/DL (ref 11.5–15.4)
HGB UR QL STRIP.AUTO: ABNORMAL
IMM GRANULOCYTES # BLD AUTO: 0.03 THOUSAND/UL (ref 0–0.2)
IMM GRANULOCYTES NFR BLD AUTO: 0 % (ref 0–2)
KETONES UR STRIP-MCNC: ABNORMAL MG/DL
LEUKOCYTE ESTERASE UR QL STRIP: ABNORMAL
LYMPHOCYTES # BLD AUTO: 0.75 THOUSANDS/ΜL (ref 0.6–4.47)
LYMPHOCYTES NFR BLD AUTO: 11 % (ref 14–44)
MAGNESIUM SERPL-MCNC: 2 MG/DL (ref 1.6–2.6)
MCH RBC QN AUTO: 29.3 PG (ref 26.8–34.3)
MCHC RBC AUTO-ENTMCNC: 30.3 G/DL (ref 31.4–37.4)
MCV RBC AUTO: 97 FL (ref 82–98)
MONOCYTES # BLD AUTO: 0.62 THOUSAND/ΜL (ref 0.17–1.22)
MONOCYTES NFR BLD AUTO: 9 % (ref 4–12)
NEUTROPHILS # BLD AUTO: 5.14 THOUSANDS/ΜL (ref 1.85–7.62)
NEUTS SEG NFR BLD AUTO: 76 % (ref 43–75)
NITRITE UR QL STRIP: NEGATIVE
NON-SQ EPI CELLS URNS QL MICRO: ABNORMAL /HPF
NRBC BLD AUTO-RTO: 0 /100 WBCS
PH UR STRIP.AUTO: 5.5 [PH]
PHOSPHATE SERPL-MCNC: 3.3 MG/DL (ref 2.3–4.1)
PLATELET # BLD AUTO: 223 THOUSANDS/UL (ref 149–390)
PMV BLD AUTO: 11 FL (ref 8.9–12.7)
POTASSIUM SERPL-SCNC: 4.7 MMOL/L (ref 3.5–5.3)
PROT SERPL-MCNC: 6.4 G/DL (ref 6.4–8.2)
PROT UR STRIP-MCNC: NEGATIVE MG/DL
RBC # BLD AUTO: 3.69 MILLION/UL (ref 3.81–5.12)
RBC #/AREA URNS AUTO: ABNORMAL /HPF
SODIUM SERPL-SCNC: 142 MMOL/L (ref 136–145)
SP GR UR STRIP.AUTO: >=1.03 (ref 1–1.03)
UROBILINOGEN UR QL STRIP.AUTO: 0.2 E.U./DL
WBC # BLD AUTO: 6.82 THOUSAND/UL (ref 4.31–10.16)
WBC #/AREA URNS AUTO: ABNORMAL /HPF

## 2021-01-11 PROCEDURE — 84100 ASSAY OF PHOSPHORUS: CPT | Performed by: STUDENT IN AN ORGANIZED HEALTH CARE EDUCATION/TRAINING PROGRAM

## 2021-01-11 PROCEDURE — 80053 COMPREHEN METABOLIC PANEL: CPT | Performed by: STUDENT IN AN ORGANIZED HEALTH CARE EDUCATION/TRAINING PROGRAM

## 2021-01-11 PROCEDURE — 83735 ASSAY OF MAGNESIUM: CPT | Performed by: STUDENT IN AN ORGANIZED HEALTH CARE EDUCATION/TRAINING PROGRAM

## 2021-01-11 PROCEDURE — 99223 1ST HOSP IP/OBS HIGH 75: CPT | Performed by: PSYCHIATRY & NEUROLOGY

## 2021-01-11 PROCEDURE — 99221 1ST HOSP IP/OBS SF/LOW 40: CPT | Performed by: FAMILY MEDICINE

## 2021-01-11 PROCEDURE — 87086 URINE CULTURE/COLONY COUNT: CPT | Performed by: STUDENT IN AN ORGANIZED HEALTH CARE EDUCATION/TRAINING PROGRAM

## 2021-01-11 PROCEDURE — 92610 EVALUATE SWALLOWING FUNCTION: CPT

## 2021-01-11 PROCEDURE — NC001 PR NO CHARGE: Performed by: FAMILY MEDICINE

## 2021-01-11 PROCEDURE — 82948 REAGENT STRIP/BLOOD GLUCOSE: CPT

## 2021-01-11 PROCEDURE — 81001 URINALYSIS AUTO W/SCOPE: CPT | Performed by: STUDENT IN AN ORGANIZED HEALTH CARE EDUCATION/TRAINING PROGRAM

## 2021-01-11 PROCEDURE — 73630 X-RAY EXAM OF FOOT: CPT

## 2021-01-11 PROCEDURE — 85025 COMPLETE CBC W/AUTO DIFF WBC: CPT | Performed by: STUDENT IN AN ORGANIZED HEALTH CARE EDUCATION/TRAINING PROGRAM

## 2021-01-11 RX ORDER — SODIUM CHLORIDE, SODIUM LACTATE, POTASSIUM CHLORIDE, CALCIUM CHLORIDE 600; 310; 30; 20 MG/100ML; MG/100ML; MG/100ML; MG/100ML
50 INJECTION, SOLUTION INTRAVENOUS CONTINUOUS
Status: DISCONTINUED | OUTPATIENT
Start: 2021-01-11 | End: 2021-01-11

## 2021-01-11 RX ORDER — DEXTROSE MONOHYDRATE 25 G/50ML
25 INJECTION, SOLUTION INTRAVENOUS ONCE
Status: COMPLETED | OUTPATIENT
Start: 2021-01-11 | End: 2021-01-11

## 2021-01-11 RX ORDER — LEVOFLOXACIN 5 MG/ML
250 INJECTION, SOLUTION INTRAVENOUS
Status: DISCONTINUED | OUTPATIENT
Start: 2021-01-11 | End: 2021-01-12

## 2021-01-11 RX ORDER — AMANTADINE HYDROCHLORIDE 100 MG/1
100 CAPSULE, GELATIN COATED ORAL
Status: DISCONTINUED | OUTPATIENT
Start: 2021-01-11 | End: 2021-01-12

## 2021-01-11 RX ORDER — SODIUM CHLORIDE, SODIUM LACTATE, POTASSIUM CHLORIDE, CALCIUM CHLORIDE 600; 310; 30; 20 MG/100ML; MG/100ML; MG/100ML; MG/100ML
60 INJECTION, SOLUTION INTRAVENOUS CONTINUOUS
Status: DISCONTINUED | OUTPATIENT
Start: 2021-01-11 | End: 2021-01-11

## 2021-01-11 RX ORDER — ACETAMINOPHEN 325 MG/1
650 TABLET ORAL EVERY 6 HOURS PRN
Status: DISCONTINUED | OUTPATIENT
Start: 2021-01-11 | End: 2021-01-17 | Stop reason: HOSPADM

## 2021-01-11 RX ORDER — PANTOPRAZOLE SODIUM 40 MG/1
40 TABLET, DELAYED RELEASE ORAL
Status: DISCONTINUED | OUTPATIENT
Start: 2021-01-11 | End: 2021-01-17 | Stop reason: HOSPADM

## 2021-01-11 RX ORDER — DEXTROSE, SODIUM CHLORIDE, SODIUM LACTATE, POTASSIUM CHLORIDE, AND CALCIUM CHLORIDE 5; .6; .31; .03; .02 G/100ML; G/100ML; G/100ML; G/100ML; G/100ML
60 INJECTION, SOLUTION INTRAVENOUS CONTINUOUS
Status: DISCONTINUED | OUTPATIENT
Start: 2021-01-11 | End: 2021-01-11

## 2021-01-11 RX ORDER — HEPARIN SODIUM 5000 [USP'U]/ML
5000 INJECTION, SOLUTION INTRAVENOUS; SUBCUTANEOUS EVERY 8 HOURS SCHEDULED
Status: DISCONTINUED | OUTPATIENT
Start: 2021-01-11 | End: 2021-01-17 | Stop reason: HOSPADM

## 2021-01-11 RX ORDER — ROPINIROLE 1 MG/1
6 TABLET, FILM COATED ORAL
Status: DISCONTINUED | OUTPATIENT
Start: 2021-01-11 | End: 2021-01-17 | Stop reason: HOSPADM

## 2021-01-11 RX ORDER — CHLORAL HYDRATE 500 MG
2000 CAPSULE ORAL EVERY MORNING
Status: DISCONTINUED | OUTPATIENT
Start: 2021-01-11 | End: 2021-01-17 | Stop reason: HOSPADM

## 2021-01-11 RX ORDER — AMLODIPINE BESYLATE 5 MG/1
5 TABLET ORAL DAILY
Status: DISCONTINUED | OUTPATIENT
Start: 2021-01-11 | End: 2021-01-17 | Stop reason: HOSPADM

## 2021-01-11 RX ORDER — DEXTROSE, SODIUM CHLORIDE, SODIUM LACTATE, POTASSIUM CHLORIDE, AND CALCIUM CHLORIDE 5; .6; .31; .03; .02 G/100ML; G/100ML; G/100ML; G/100ML; G/100ML
60 INJECTION, SOLUTION INTRAVENOUS CONTINUOUS
Status: DISPENSED | OUTPATIENT
Start: 2021-01-11 | End: 2021-01-12

## 2021-01-11 RX ORDER — SELEGILINE HYDROCHLORIDE 5 MG/1
5 TABLET ORAL
Status: DISCONTINUED | OUTPATIENT
Start: 2021-01-11 | End: 2021-01-12

## 2021-01-11 RX ORDER — LORAZEPAM 2 MG/ML
0.5 INJECTION INTRAMUSCULAR EVERY 6 HOURS PRN
Status: DISCONTINUED | OUTPATIENT
Start: 2021-01-11 | End: 2021-01-17 | Stop reason: HOSPADM

## 2021-01-11 RX ADMIN — DEXTROSE, SODIUM CHLORIDE, SODIUM LACTATE, POTASSIUM CHLORIDE, AND CALCIUM CHLORIDE 60 ML/HR: 5; .6; .31; .03; .02 INJECTION, SOLUTION INTRAVENOUS at 10:19

## 2021-01-11 RX ADMIN — PANTOPRAZOLE SODIUM 40 MG: 40 TABLET, DELAYED RELEASE ORAL at 07:12

## 2021-01-11 RX ADMIN — DEXTROSE MONOHYDRATE 25 ML: 500 INJECTION PARENTERAL at 10:19

## 2021-01-11 RX ADMIN — CARBIDOPA AND LEVODOPA 1 TABLET: 25; 100 TABLET ORAL at 11:55

## 2021-01-11 RX ADMIN — CARBIDOPA AND LEVODOPA 1 TABLET: 25; 100 TABLET ORAL at 19:33

## 2021-01-11 RX ADMIN — DEXTROSE, SODIUM CHLORIDE, SODIUM LACTATE, POTASSIUM CHLORIDE, AND CALCIUM CHLORIDE 60 ML/HR: 5; .6; .31; .03; .02 INJECTION, SOLUTION INTRAVENOUS at 19:58

## 2021-01-11 RX ADMIN — LEVOFLOXACIN 250 MG: 5 INJECTION, SOLUTION INTRAVENOUS at 02:56

## 2021-01-11 RX ADMIN — ROPINIROLE HYDROCHLORIDE 6 MG: 1 TABLET, FILM COATED ORAL at 02:56

## 2021-01-11 RX ADMIN — HEPARIN SODIUM 5000 UNITS: 5000 INJECTION INTRAVENOUS; SUBCUTANEOUS at 02:15

## 2021-01-11 RX ADMIN — AMANTADINE 100 MG: 100 CAPSULE ORAL at 22:11

## 2021-01-11 RX ADMIN — CARBIDOPA AND LEVODOPA 1 TABLET: 25; 100 TABLET ORAL at 22:11

## 2021-01-11 RX ADMIN — AMANTADINE 100 MG: 100 CAPSULE ORAL at 02:57

## 2021-01-11 RX ADMIN — HEPARIN SODIUM 5000 UNITS: 5000 INJECTION INTRAVENOUS; SUBCUTANEOUS at 15:26

## 2021-01-11 RX ADMIN — ROPINIROLE HYDROCHLORIDE 6 MG: 1 TABLET, FILM COATED ORAL at 22:11

## 2021-01-11 RX ADMIN — AMLODIPINE BESYLATE 5 MG: 5 TABLET ORAL at 08:23

## 2021-01-11 RX ADMIN — CARBIDOPA AND LEVODOPA 1 TABLET: 25; 100 TABLET ORAL at 08:21

## 2021-01-11 RX ADMIN — SELEGILINE HYDROCHLORIDE 5 MG: 5 TABLET ORAL at 08:24

## 2021-01-11 RX ADMIN — CARBIDOPA AND LEVODOPA 1 TABLET: 25; 100 TABLET ORAL at 15:26

## 2021-01-11 RX ADMIN — OMEGA-3 FATTY ACIDS CAP 1000 MG 2000 MG: 1000 CAP at 08:21

## 2021-01-11 RX ADMIN — SODIUM CHLORIDE, SODIUM LACTATE, POTASSIUM CHLORIDE, AND CALCIUM CHLORIDE 50 ML/HR: .6; .31; .03; .02 INJECTION, SOLUTION INTRAVENOUS at 01:48

## 2021-01-11 RX ADMIN — SODIUM CHLORIDE, SODIUM LACTATE, POTASSIUM CHLORIDE, AND CALCIUM CHLORIDE 60 ML/HR: .6; .31; .03; .02 INJECTION, SOLUTION INTRAVENOUS at 15:49

## 2021-01-11 RX ADMIN — HEPARIN SODIUM 5000 UNITS: 5000 INJECTION INTRAVENOUS; SUBCUTANEOUS at 22:11

## 2021-01-11 NOTE — SPEECH THERAPY NOTE
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)    Acute renal failure superimposed on stage 3 chronic kidney disease (HCC)    Sinus arrhythmia    S/P deep brain stimulator placement           Past Medical History  Past Medical History:   Diagnosis Date    RODRIGO (acute kidney injury) ([facility] ) 7/19/2019    RODRIGO (acute kidney injury) ([facility] [facility] ) 7/19/2019    Anal bleeding 1/29/2018    Arthritis     knee    Asthma     BPPV (benign paroxysmal positional vertigo) 7/19/2016    Last Assessment & Plan:  Hx consistent with BPPV  Neg Joe-Halpike, however could not perform it adequately due to dyskinesias  Recommend lozano-daroff exercises at home  IF worsens, vestibular therapy      Bulging lumbar disc 12/10/2013    Closed fracture of one rib of left side 1/29/2018    Colon polyp     Dementia ([address] )     Fall     balance issue    Gallbladder disease     GERD (gastroesophageal reflux disease)     Hematuria     last assessed 10/29/15    Orthostatic hypotension 7/13/2015    Osteomyelitis of lumbar spine ([address] ) 3/10/2020    Ovarian cyst     LAST ASSESSED: 12/10/13    Parkinson's disease ([address] )     Pneumonia of both lower lobes due to infectious organism 3/27/2018    Pulmonary embolism with infarction ([address] ) 9/2/2014    Scalp laceration, subsequent encounter 11/15/2019    Sciatica 12/10/2013    Severe protein-calorie malnutrition ([address] ) 7/22/2019    Skin disorder     last assessed 12/10/13    Squamous cell carcinoma of skin 11/21/2016    Stage 3a chronic kidney disease 12/4/2020    Toxic metabolic encephalopathy 0/69/1788    Use of cane as ambulatory aid    Vandana Corral as ambulation aid     Wears glasses     Weight loss 10/8/2019    Last Assessment & Plan:  Recommend Boost or Ensure  F/u with PCP   Weight loss, unintentional 5/22/2015       Past Surgical History  Past Surgical History:   Procedure Laterality Date    APPENDECTOMY      1970'S    CATARACT EXTRACTION      CHOLECYSTECTOMY      1970'S    COLONOSCOPY      LAPAROTOMY N/A 7/18/2019    Procedure: LAPAROTOMY EXPLORATORY;  Surgeon: Gerri Membreno MD;  Location: [address] [place] [address];  Service: General    NEUROPLASTY / [address] Right     OOPHORECTOMY Bilateral     REMOVAL OF BOTH OVARIES LAPAROSCOPIC     Wollard Crockett FLX W/RMVL OF TUMOR POLYP LESION [address] [facility] TQ N/A 3/20/2018    Procedure: COLONOSCOPY;  Surgeon: Ricardo Liu MD;  Location: [facility] GI LAB; Service: Gastroenterology    CA XCAPSL CTRC RMVL INSJ IO LENS PROSTH W/O ECP Right 12/4/2018    Procedure: EXTRACTION EXTRACAPSULAR CATARACT PHACO INTRAOCULAR LENS (IOL); Surgeon: Angeline Schilling MD;  Location: [facility] MAIN OR;  Service: Ophthalmology    CA XCAPSL CTRC RMVL INSJ IO LENS PROSTH W/O ECP  1/15/2019    Procedure: EXTRACTION EXTRACAPSULAR CATARACT PHACO INTRAOCULAR LENS (IOL); Surgeon: Angeline Schilling MD;  Location: [facility] OR;  Service: Ophthalmology    TONSILLECTOMY      VENTRAL HERNIA REPAIR      WITH IMPLANT OF MESH       Summary    Pt presents with oropharyngeal swallowing skills grossly WFL, however pt  Does have difficulty with orienting to foods and liquids secondary to tremors        Recommendations:   Diet: regular diet and thin liquids   Meds: whole with puree and crushed with puree   Frequent Oral care: 4x/day  Aspiration precautions and compensatory swallowing strategies: upright posture, slow rate of feeding and straws recommended  Other Recommendations/ considerations: needs assistance for meals/drinks      Current Medical Status  Pt is a 79 y o  female who presented to [facility] with past medical history of type 2 diabetes mellitus, hypertension, GERD, hyperlipidemia, Parkinson's disease with dementia, and CKD stage 3 was brought to the ED today by her son for increased confusion and weakness  She lives with her elderly , who has been unable to take care of her  Patient is alert and oriented during interview  Patient is alone during interview and has significant speech difficulty and dementia which significantly limits HPI  On chart review, she recently fell and hit her head; she underwent CT scan of her head yesterday (1/9) which was negative for any abnormal findings  On 1/8/2021 she had a urinalysis done ordered by Dr Tish Cardoza which showed findings consistent with UTI and was started on Bactrim  Jing Arbour Past medical history:   Please see H&P for details    Special Studies:  1/8/21: CT head wo contrast: No acute intracranial abnormality  1/9/21: Chest Xray: No acute cardiopulmonary disease  1/9/21: CT head wo contrast: Stable examination  No acute intracranial abnormality  Social/Education/Vocational Hx:  Pt lives with family  Pt  Was unable to tell me about her ADLs at home or what level (if any) they receive  She denies any difficulties with eating or drinking at home and states she has no difficulties with medications  Swallow Information   Current Risks for Dysphagia & Aspiration: AMS  Current Symptoms/Concerns: AMS  Current Diet: regular diet and thin liquids   Baseline Diet: regular diet and thin liquids    Baseline Assessment   Behavior/Cognition: alert  Speech/Language Status: able to follow commands, she is verbal, however dysarthric and difficult to understand in unknown contexts  She is also confused and stated that she is going home today at 4:30 but could not tell me where she was (name of the place), what type of place this is, the month, or the day   She did state the year  Pt  Was also mentioning her children- whom according to her- 1 lives in [place] and the other in [place], [place], but she could not state when she sees them  She did not make mention of her   Patient Positioning: upright in recliner, pt  Was noted to have significant resting tremors  Lyerly belt in place for safety  Swallow Mechanism Exam   Facial: symmetrical  Labial: unable to assess due to resting tremors  Lingual:  unable to assess due to resting tremors  Velum:  unable to assess due to resting tremors  Mandible:  unable to assess due to resting tremors  Dentition: adequate  Vocal quality:dysarthric   Volitional Cough: unable to initiate volitional cough   Respiratory: room air    Consistencies Assessed and Performance   Consistencies Administered: ice chips, thin liquids, puree and hard solids    Oral Stage: grossly WFL  Pt  Did require assistance for orientation due to her tremors, even with volitional movement she was still noted to have difficulty  Mastication, bolus formation, and transfer were adequate and timely with materials trialed today  No significant oral residue was noted  No pocketing was noted  No overt s/s of reduced oral control  Pharyngeal Stage: grossly WFL  Swallow initiation appeared prompt  Laryngeal rise was palpated and judged to be within functional limits  No coughing, throat clearing, change in vocal quality, or respiratory status noted with today's materials        Esophageal Concerns: none reported      Results Reviewed with: patient and MD   Dysphagia Goals: pt will tolerate regular consistency with thin liquids without s/s of aspiration x1-2 sessions  Discharge recommendation: dependent on progress    Speech Therapy Prognosis   Prognosis: good    Prognosis Considerations: medical status and prior medical history     Portia Guerra MS CCC-SLP  Speech Language Pathologist  Available via [facility]  [place] License # [id number]  [id number] # [id number]- [id number]

## 2021-01-11 NOTE — ASSESSMENT & PLAN NOTE
Patient presented with altered mental status  Was seen in ED 1 day prior to admission with same presentation  Family members corroborate change in mental status  At prior ED visit 1 day ago, CT head was negative for acute bleed hemorrhage  Abnormal urinalysis was noted for leukocytes and concern for UTI and pt was discharged with antibiotics, however mentation did not return to baseline  · Cause of AMS unknown- possible UTI versus dehydration versus AK versus antibiotic side effects  · Chest x-ray showed respiratory motion artifacts with new interstitial opacities which may be edema versus hypoventilation  · Neurology consulted: continue with Sinemet regimen 1 tablet 5 times a day,  rasagiline 1 mg daily-substituted with selegiline 5 mg daily, Continue on ropinirole 2 mg tablets, 3 find mouth nightly, Continue with Nourianz 20mg daily, can DC at recommendation of Neurology at [facility], starting Seroquel 25mg bedtime per recommendation of Neurology at [facility]  Patient's MRI brain is negative for acute process  Her mentation is better  Likely altered previously from dehydration, RODRIGO, UTI, abx side effects  Resume PD regimen as recommended by her neurologist  Patient is cleared for discharged by neurology    · PT OT recommended short-term rehab

## 2021-01-11 NOTE — ASSESSMENT & PLAN NOTE
Patient seen by [facility] neurologist  Per Dr Shell Bergeron note the patient's caregivers have difficulty taking care of the patient    · PT OT recommend short-term rehab  · Neurology consult:  Continue Sinemet, rasagiline, with selegiline, ropinirole, nourianz per neurologist   Start Seroquel 25 mg at bedtime per neurologist

## 2021-01-11 NOTE — ED NOTES
Patient son is on the phone, states his concerns are around the patient having increased balance issues and confusion since the UTI  Patient son wants any updates        Joanna Rodgers RN  01/10/21 8022

## 2021-01-11 NOTE — PLAN OF CARE
Pt  Will tolerate regular diet with thin liquids without s/s of aspiration over 3-4 sessions or as indicted by treating SLP  Pt  Will trial advanced materials with SLP for possible diet upgrade  SLP to determine if VBS with speech is indicated

## 2021-01-11 NOTE — ASSESSMENT & PLAN NOTE
During examination patient had irregular rhythm which was confirmed on EKG done on 1/9 which showed sinus arrhythmia    · EKG on admission:  baseline artifacts, sinus rhythm, sinus arrhythmia, Anterior infarct , age undetermined  · No significant events on Telemetry

## 2021-01-11 NOTE — PLAN OF CARE
Problem: Potential for Falls  Goal: Patient will remain free of falls  Description: INTERVENTIONS:  - Assess patient frequently for physical needs  -  Identify cognitive and physical deficits and behaviors that affect risk of falls    -  [place] fall precautions as indicated by assessment   - Educate patient/family on patient safety including physical limitations  - Instruct patient to call for assistance with activity based on assessment  - Modify environment to reduce risk of injury  - Consider OT/PT consult to assist with strengthening/mobility  Outcome: Progressing     Problem: Prexisting or High Potential for Compromised Skin Integrity  Goal: Skin integrity is maintained or improved  Description: INTERVENTIONS:  - Identify patients at risk for skin breakdown  - Assess and monitor skin integrity  - Assess and monitor nutrition and hydration status  - Monitor labs   - Assess for incontinence   - Turn and reposition patient  - Assist with mobility/ambulation  - Relieve pressure over bony prominences  - Avoid friction and shearing  - Provide appropriate hygiene as needed including keeping skin clean and dry  - Evaluate need for skin moisturizer/barrier cream  - Collaborate with interdisciplinary team   - Patient/family teaching  - Consider wound care consult   Outcome: Progressing

## 2021-01-11 NOTE — ASSESSMENT & PLAN NOTE
Lab Results   Component Value Date    EGFR 16 01/10/2021    EGFR 24 01/09/2021    EGFR 24 01/08/2021    CREATININE 2 74 (H) 01/10/2021    CREATININE 1 95 (H) 01/09/2021    CREATININE 1 97 (H) 01/08/2021   Patient on admission had elevated creatinine function when compared to last measured creatinine  Possibly secondary to dehydration    · Will start on IVF  · Monitor creatinine function daily

## 2021-01-11 NOTE — PLAN OF CARE
Problem: Potential for Falls  Goal: Patient will remain free of falls  Description: INTERVENTIONS:  - Assess patient frequently for physical needs  -  Identify cognitive and physical deficits and behaviors that affect risk of falls  -  [place] fall precautions as indicated by assessment   - Educate patient/family on patient safety including physical limitations  - Instruct patient to call for assistance with activity based on assessment  - Modify environment to reduce risk of injury  - Consider OT/PT consult to assist with strengthening/mobility  1/11/2021 1715 by Desire Donaldson RN  Outcome: Progressing  1/11/2021 1714 by Desire Donaldson RN  Outcome: Progressing     Problem: Prexisting or High Potential for Compromised Skin Integrity  Goal: Skin integrity is maintained or improved  Description: INTERVENTIONS:  - Identify patients at risk for skin breakdown  - Assess and monitor skin integrity  - Assess and monitor nutrition and hydration status  - Monitor labs   - Assess for incontinence   - Turn and reposition patient  - Assist with mobility/ambulation  - Relieve pressure over bony prominences  - Avoid friction and shearing  - Provide appropriate hygiene as needed including keeping skin clean and dry  - Evaluate need for skin moisturizer/barrier cream  - Collaborate with interdisciplinary team   - Patient/family teaching  - Consider wound care consult   1/11/2021 1715 by Desire Donaldson RN  Outcome: Progressing  1/11/2021 1714 by Desire Donaldson RN  Outcome: Progressing     Problem: Nutrition/Hydration-ADULT  Goal: Nutrient/Hydration intake appropriate for improving, restoring or maintaining nutritional needs  Description: Monitor and assess patient's nutrition/hydration status for malnutrition  Collaborate with interdisciplinary team and initiate plan and interventions as ordered  Monitor patient's weight and dietary intake as ordered or per policy  Utilize nutrition screening tool and intervene as necessary  Determine patient's food preferences and provide high-protein, high-caloric foods as appropriate       INTERVENTIONS:  - Monitor oral intake, urinary output, labs, and treatment plans  - Assess nutrition and hydration status and recommend course of action  - Evaluate amount of meals eaten  - Assist patient with eating if necessary   - Allow adequate time for meals  - Recommend/ encourage appropriate diets, oral nutritional supplements, and vitamin/mineral supplements  - Assess need for intravenous fluids  - Provide specific nutrition/hydration education as appropriate  - Include patient/family/caregiver in decisions related to nutrition  1/11/2021 1715 by Iwona Castillo RN  Outcome: Progressing  1/11/2021 1714 by Iwona Castillo RN  Outcome: Progressing     Problem: GENITOURINARY - ADULT  Goal: Maintains or returns to baseline urinary function  Description: INTERVENTIONS:  - Assess urinary function  - Encourage oral fluids to ensure adequate hydration if ordered  - Administer IV fluids as ordered to ensure adequate hydration  - Administer ordered medications as needed  - Offer frequent toileting  - Follow urinary retention protocol if ordered  Outcome: Progressing  Goal: Absence of urinary retention  Description: INTERVENTIONS:  - Assess patients ability to void and empty bladder  - Monitor I/O  - Bladder scan as needed  - Discuss with physician/AP medications to alleviate retention as needed  - Discuss catheterization for long term situations as appropriate  Outcome: Progressing  Goal: Urinary catheter remains patent  Description: INTERVENTIONS:  - Assess patency of urinary catheter  - If patient has a chronic allen, consider changing catheter if non-functioning  - Follow guidelines for intermittent irrigation of non-functioning urinary catheter  Outcome: Progressing     Problem: METABOLIC, FLUID AND ELECTROLYTES - ADULT  Goal: Electrolytes maintained within normal limits  Description: INTERVENTIONS:  - Monitor labs and assess patient for signs and symptoms of electrolyte imbalances  - Administer electrolyte replacement as ordered  - Monitor response to electrolyte replacements, including repeat lab results as appropriate  - Instruct patient on fluid and nutrition as appropriate  Outcome: Progressing  Goal: Fluid balance maintained  Description: INTERVENTIONS:  - Monitor labs   - Monitor I/O and WT  - Instruct patient on fluid and nutrition as appropriate  - Assess for signs & symptoms of volume excess or deficit  Outcome: Progressing  Goal: Glucose maintained within target range  Description: INTERVENTIONS:  - Monitor Blood Glucose as ordered  - Assess for signs and symptoms of hyperglycemia and hypoglycemia  - Administer ordered medications to maintain glucose within target range  - Assess nutritional intake and initiate nutrition service referral as needed  Outcome: Progressing     Problem: INFECTION - ADULT  Goal: Absence or prevention of progression during hospitalization  Description: INTERVENTIONS:  - Assess and monitor for signs and symptoms of infection  - Monitor lab/diagnostic results  - Monitor all insertion sites, i e  indwelling lines, tubes, and drains  - Monitor endotracheal if appropriate and nasal secretions for changes in amount and color  - [place] appropriate cooling/warming therapies per order  - Administer medications as ordered  - Instruct and encourage patient and family to use good hand hygiene technique  - Identify and instruct in appropriate isolation precautions for identified infection/condition  Outcome: Progressing  Goal: Absence of fever/infection during neutropenic period  Description: INTERVENTIONS:  - Monitor WBC    Outcome: Progressing

## 2021-01-11 NOTE — CONSULTS
[address]   Neurology Initial Consult    Christina Sanchez is a 79 y o  female  2 [address] [facility] obtained from:   Chief Complaint   Patient presents with    Altered Mental Status     patient sent in by caregiver for increased confusion, patient alert and oriented during triage, patient does not understand why she is here, denies pain         Assessment/Plan:    1 Parkinson's disease with dementia  2  Metabolic encephalopathy 2nd to UTI  3  Dyskinesia with DBS in place  4  Lower extremity weakness  5  RLS    -fall risk  -continue amantadine 100 mg daily at bedtime  -continue with Sinemet regimen 1 tablet 5 times a day  -continue rasagiline 1 mg daily-substituted with selegiline 5 mg daily  -Continue on ropinirole 2 mg tablets, 3 find mouth nightly  -Continue with Nourianz 20mg daily  -IV antibiotics per PCP  -IV fluid balance per PCP  -urine culture remains pending  -speech therapy for swallow  -PT/OT for mobility and strengthening  -follow up with patient's outpatient neurologist regarding any change to regimen or DBS  -the case management/ for possible placement and strengthening    Patient is a 62-year-old female who suffers from Parkinson's disease with degree of dementia  Patients baseline is generally oriented, able to walk with a walker with assist, capable to pull in assisting with most of her ADLs, adequate strength and well controlled dyskinesia  According to outpatient Neurology notation, patient has increased dyskinesia with agitation and anxiety  Patient was seen by PCP on January 8th diagnosed with urinary tract infection was started on oral Bactrim  Patient became disoriented at the following day was brought to the ER for further evaluation  Determine at that time patient was disoriented related to her infection, had regained orientation while in the ER therefore was sent home on continued oral antibiotics    Patient's family did not feel patient was doing well, had increased inability to walk and continued to be confused  They had noted that she is unable to recognize her own  and he or his caregiver were unable to provide her the care she needed  Patient was brought back to the ER for further evaluation and treatment  In the ER patient had increase in her BUN and creatinine levels with a decrease in her GFR  Her electrolytes were off with initial K level 6 4, question hemolyzed was then 5 1 and 4 7 today  UA was done with positive leukocytes and WBCs with moderate amount of bacteria noted in the visual field  CT of the head was completed without any abnormal findings, noted presence of DBS  Patient was then admitted for IV antibiotics and IV fluid hydration  Neurology consulted for altered mental status with Parkinson's disease and dementia  Patient also with RLS  Patient on multiple medications for treatment of her Parkinson's disease with dyskinesia  She has her own outpatient neurologist who manages these medications as well as her DBS  Suspect patient altered mental status related to her urinary tract infection and dehydration with electrolyte imbalances  Recommend further hydration and antibiotic therapies per PCP continue to monitor for improvement  Will remain on her current medication regimen per her neurologist and recommend patient continue to follow-up with her outpatient neurologist upon discharge for further evaluation and possible adjustments needed to her DBS  Fela Delcid will need follow up in in 6 weeks with movement disorder and memory attending  She will not require outpatient neurological testing  Patient has her own outpatient neurologist in the [facility] who follows her for her PD and DBS system  Will need to continue to follow-up with them post discharge      HPI:  Deepali    Airam Rico is a 73yr old female with PD w/DBC, Dementia, RLS, Asthma, BPPV, Orthostatic Hypotension, DM and h/o UTI and RODRIGO  Pt was recently seen OP by her PCP for suspected UTI and ordered for urine studies and started on Bactrim  Pt family had brought the patient into the ED the following day for increased confusion as she was unable to recognize her  and was disoriented  Pt arrival to the ED she was oriented with mumbled/slurred speech which was reportedly normal for this patient  She was sent home with UTI on continued use of oral abx  The following day, pt family indicated that the pt could no walk, was having balance issues and continued to become increasingly confused despite the abx she was on  Therefore, they returned to the ED with her  PT was afebrile on arrival, She had no focal deficits and was oriented to person and place  Pt was noted to have elevation of her Bun/Creat since the day prior with electrolyte imbalance  GFR down to 16  Pt was started on IV abx per ER MD and received IVF hydration  Creat is trending down today with a slight increase in her GFR to 19 this am      Pt has a H/O PD with DBS and PD related Dementia  She is on Amantadine, Sinemet and rasagiline for her home regimen  Pt is also on Requip for RLS  Pt has a DBS and treats with Neurology in [facility] network  Pt PD symptoms started on 2006 with leg stiffness and has had a DBS since 2017 which have allowed them to decrease her medications  Pt baseline per Neurology notations is initial am weakness in her legs with tension that improves thru the day  She develops increase in Dyskinesia with anxiety  Her motor symptoms tend to worsen 1/2hr to 1hr prior to the next dosing  She is rigid in B/L LE R>L and is slow in her movement  She has recall and Short term memory deficits and uses a walker for ambulation which is slow and not shuffling  She has mild difficulty with her ADL and has occasional drooling  Today, patient reports that she feels a little bit more clear    States that she still feels fatigued and at times confused  Patient is able to answer most questions relatively clearly  Speech is mumbled and at times difficult to comprehend  Patient is oriented to herself, was unable to indicate that she is in the hospital or in what town she is in  When patient reoriented she said that she knows she is in the hospital   Patient also unable to provide accurate date and noted the last holiday was Christmas and that it was December  Patient was unable to give a year  Patient is able to name objects, their purpose, there color and there shape  Did require frequent verbal cues to maintain concentration and focus  Patient is able to follow simple commands  He is globally weak, however is equal bilaterally  Patient has equal sensation and reflexes and poor coordination related to dyskinesia  Patient does have Parkinson's disease, is on multiple medications for movement  Patient's urine culture from January 9, 2021 indicative of Klebsiella infection, repeat culture sent and remains pending from today  Patient is currently afebrile, blood pressure event steady at 134/63  Past Medical History:   Diagnosis Date    RODRIGO (acute kidney injury) ([facility] [address] ) 7/19/2019    RODRIGO (acute kidney injury) ([facility] [address] ) 7/19/2019    Anal bleeding 1/29/2018    Arthritis     knee    Asthma     BPPV (benign paroxysmal positional vertigo) 7/19/2016    Last Assessment & Plan:  Hx consistent with BPPV  Neg Joe-Halpike, however could not perform it adequately due to dyskinesias  Recommend lozano-daroff exercises at home  IF worsens, vestibular therapy      Bulging lumbar disc 12/10/2013    Closed fracture of one rib of left side 1/29/2018    Colon polyp     Dementia ([facility] [address] )     Fall     balance issue    Gallbladder disease     GERD (gastroesophageal reflux disease)     Hematuria     last assessed 10/29/15    Orthostatic hypotension 7/13/2015    Osteomyelitis of lumbar spine ([facility] [address] ) 3/10/2020    Ovarian cyst     LAST ASSESSED: 12/10/13    Parkinson's disease ([facility] [address] )     Pneumonia of both lower lobes due to infectious organism 3/27/2018    Pulmonary embolism with infarction ([facility] [address] ) 9/2/2014    Scalp laceration, subsequent encounter 11/15/2019    Sciatica 12/10/2013    Severe protein-calorie malnutrition ([facility] [address] ) 7/22/2019    Skin disorder     last assessed 12/10/13    Squamous cell carcinoma of skin 11/21/2016    Stage 3a chronic kidney disease 12/4/2020    Toxic metabolic encephalopathy 2/54/6706    Use of cane as ambulatory aid    Sparks Serum as ambulation aid     Wears glasses     Weight loss 10/8/2019    Last Assessment & Plan:  Recommend Boost or Ensure  F/u with PCP   Weight loss, unintentional 5/22/2015       Past Surgical History:   Procedure Laterality Date    APPENDECTOMY      1970'S    CATARACT EXTRACTION      CHOLECYSTECTOMY      1970'S    COLONOSCOPY      LAPAROTOMY N/A 7/18/2019    Procedure: LAPAROTOMY EXPLORATORY;  Surgeon: Gold Arce MD;  Location: [address] [place] [address];  Service: General    NEUROPLASTY / [address] Right     OOPHORECTOMY Bilateral     REMOVAL OF BOTH OVARIES LAPAROSCOPIC     Wollard [place] FLX W/RMVL OF TUMOR POLYP LESION [address] [facility] TQ N/A 3/20/2018    Procedure: COLONOSCOPY;  Surgeon: Gary Oliva MD;  Location: [place] [address] GI LAB; Service: Gastroenterology    MD XCAPSL CTRC RMVL INSJ IO LENS PROSTH W/O ECP Right 12/4/2018    Procedure: EXTRACTION EXTRACAPSULAR CATARACT PHACO INTRAOCULAR LENS (IOL); Surgeon: Alba Alexandre MD;  Location: [facility] MAIN OR;  Service: Ophthalmology    MD XCAPSL CTRC RMVL INSJ IO LENS PROSTH W/O ECP  1/15/2019    Procedure: EXTRACTION EXTRACAPSULAR CATARACT PHACO INTRAOCULAR LENS (IOL);   Surgeon: Alba Alexandre MD;  Location: [facility] MAIN OR;  Service: Ophthalmology    TONSILLECTOMY      VENTRAL HERNIA REPAIR      WITH IMPLANT OF MESH       No Known Allergies      Current Facility-Administered Medications:     acetaminophen (TYLENOL) tablet 650 mg, 650 mg, Oral, Q6H PRN, Donato Shields MD    amantadine (SYMMETREL) capsule 100 mg, 100 mg, Oral, HS, Donato Shields MD, 100 mg at 01/11/21 0257    amLODIPine (NORVASC) tablet 5 mg, 5 mg, Oral, Daily, Donato Shields MD, 5 mg at 01/11/21 7272    carbidopa-levodopa (SINEMET)  mg per tablet 1 tablet, 1 tablet, Oral, 5x Daily, Donato Shields MD, 1 tablet at 01/11/21 6875    fish oil capsule 2,000 mg, 2,000 mg, Oral, QAM, Donato Shields MD, 2,000 mg at 01/11/21 5089    heparin (porcine) subcutaneous injection 5,000 Units, 5,000 Units, Subcutaneous, Q8H [address], 5,000 Units at 01/11/21 0215 **AND** [CANCELED] Platelet count, , , Once, Donato Shields MD    insulin lispro (HumaLOG) 100 units/mL subcutaneous injection 1-5 Units, 1-5 Units, Subcutaneous, Q6H [address] **AND** Fingerstick Glucose (POCT), , , Q6H, Donato Shields MD    [facility]) tablet 20 mg, 20 mg, Oral, Daily, Donato Shields MD    lactated ringers infusion, 50 mL/hr, Intravenous, Continuous, Donato Shields MD, Last Rate: 50 mL/hr at 01/11/21 0148, 50 mL/hr at 01/11/21 0148    levofloxacin (LEVAQUIN) IVPB (premix in dextrose) 250 mg 50 mL, 250 mg, Intravenous, Q48H, Donato Shields MD, Last Rate: 50 mL/hr at 01/11/21 0256, 250 mg at 01/11/21 0256    pantoprazole (PROTONIX) EC tablet 40 mg, 40 mg, Oral, Early Morning, Donato Shields MD, 40 mg at 01/11/21 5833    rOPINIRole (REQUIP) tablet 6 mg, 6 mg, Oral, HS, Donato Shields MD, 6 mg at 01/11/21 0256    selegiline (ELDEPRYL) tablet 5 mg, 5 mg, Oral, Daily With Breakfast, Donato Shields MD, 5 mg at 01/11/21 0694    sodium chloride 0 9 % bolus 1,000 mL, 1,000 mL, Intravenous, Once, Donato Shields MD, Stopped at 01/10/21 4945    Social History     Socioeconomic History    Marital status: /Civil Union     Spouse name: Not on file    Number of children: Not on file    Years of education: Not on file    Highest education level: Not on file   Occupational History    Occupation: [employer]     Comment: BILLING AND SHIPPING   Social Needs    Financial resource strain: Not on file    Food insecurity     Worry: Not on file     Inability: Not on file    Transportation needs     Medical: Not on file     Non-medical: Not on file   Tobacco Use    Smoking status: Never Smoker    Smokeless tobacco: Never Used   Substance and Sexual Activity    Alcohol use: Not Currently    Drug use: Never    Sexual activity: Not on file   Lifestyle    Physical activity     Days per week: Not on file     Minutes per session: Not on file    Stress: Not on file   Relationships    Social connections     Talks on phone: Not on file     Gets together: Not on file     Attends Hoahaoism service: Not on file     Active member of club or organization: Not on file     Attends meetings of clubs or organizations: Not on file     Relationship status: Not on file    Intimate partner violence     Fear of current or ex partner: Not on file     Emotionally abused: Not on file     Physically abused: Not on file     Forced sexual activity: Not on file   Other Topics Concern    Not on file   Social History Narrative    Son lives an hour away, daughter in [place]  Family History   Problem Relation Age of Onset    Alzheimer's disease Mother     Stroke Father     No Known Problems Sister     No Known Problems Brother          Review of systems:  Please see HPI for positive symptoms  Constitutional: No fever, no chills, no weight change  + fatigue  Ocular: No diplopia, no blurred vision, spots/zigzag lines  HEENT:  No sore throat, headache or congestion  COR:  No chest pain  No palpitations  Lungs:  no sob  GI:  no  nausea, no vomiting, no diarrhea, no constipation, no anorexia     :  No dysuria, frequency, or urgency  No hematuria  Musculoskeletal:  No joint pain or swelling   Skin:  No rash or itching  Psychiatric:  no anxiety, no depression  Endocrine:  No polyuria or polydipsia  Physical examination:  /72   Pulse 87   Temp 98 8 °F (37 1 °C)   Resp 16   Ht 5' 3" (1 6 m)   Wt 43 kg (94 lb 12 8 oz)   SpO2 96%   BMI 16 79 kg/m²     GENERAL APPEARANCE:  The patient is alert, oriented x1  HEENT:  Head is normocephalic  Pupils are equal and reactive  NECK:  Supple without lymphadenopathy  HEART:  Regular rate and rhythm  LUNGS:  No audible wheeze or stridor  ABDOMEN:  Soft, nontender, nondistended with good bowel sounds heard  EXTREMITIES:  Without cyanosis, clubbing or edema  Mental status: The patient is alert, attentive, and oriented x1  Speech is clear and fluent, good repetition, comprehension, and naming  Cranial nerves:  CN II: Visual fields are full to confrontation  Fundoscopic exam is normal with sharp discs and no vascular changes  Pupils are 3 mm and briskly reactive to light  CN III, IV, VI: At primary gaze, there is no eye deviation  CN V: Facial sensation is intact in all 3 divisions bilaterally  Corneal responses are intact  CN VII: Face is symmetric with normal eye closure and smile  CN VIII: Hearing is normal to rubbing fingers L>R  CN IX, X: Palate elevates symmetrically  Phonation is soft and mumbling  CN XI: Head turning and shoulder shrug are intact  CN XII: Tongue is midline with normal movements and no atrophy  Motor: There is no pronator drift of out-stretched arms  Muscle bulk and tone are weak but = b/l     Muscle exam  Arm Right Left Leg Right Left   Deltoid 4/5 4/5 Iliopsoas 4/5 4/5   Biceps 4/5 4/5 Quads 4/5 4/5   Triceps 4/5 4/5 Hamstrings 4/5 4/5   Wrist Extension 4/5 4/5 Ankle Dorsi Flexion 4/5 4/5   Wrist Flexion 4/5 4/5 Ankle Plantar Flexion 4/5 4/5        Reflexes    RJ BJ TJ KJ AJ Plantars Green's Right 2+ 2+ 2+ 2+ 2+ Downgoing Not present   Left 2+ 2+ 2+ 2+ 2+ Downgoing Not present      Sensory:  Light touch, Temperature, position sense, and vibration sense are intact in fingers and toes  Coordination:  There is + dysmetria on finger-to-nose and heel-knee-shin  There are + dyskinesia movements  Romberg deferred d/t fall risk  Gait/Stance:  Deferred d/t fall risk    Lab Results   Component Value Date    WBC 6 82 01/11/2021    HGB 10 8 (L) 01/11/2021    HCT 35 7 01/11/2021    MCV 97 01/11/2021     01/11/2021     Lab Results   Component Value Date    HGBA1C 6 4 12/04/2020     Lab Results   Component Value Date    ALT 13 01/11/2021    AST 40 01/11/2021    ALKPHOS 61 01/11/2021    BILITOT 0 4 11/04/2015     Lab Results   Component Value Date    GLUCOSE 117 (H) 11/04/2015    CALCIUM 9 0 01/11/2021     11/04/2015    K 4 7 01/11/2021    CO2 25 01/11/2021     01/11/2021    BUN 34 (H) 01/11/2021    CREATININE 2 19 (H) 01/11/2021         Review of reports and notes reveal:  Independent Interpretation of images or specimens:  Xr Chest 1 View Portable  Result Date: 1/10/2021  No acute cardiopulmonary disease  Workstation performed: [id number]     Ct Head Without Contrast  Result Date: 1/9/2021  Stable examination  No acute intracranial abnormality  Workstation performed: [id number]     Ct Head Wo Contrast  Result Date: 1/8/2021  No acute intracranial abnormality  Workstation performed: [id number]     Ct Cervical Spine Without Contrast  Result Date: 1/9/2021  Progression of cervical and upper thoracic degenerative change, compared to the prior examination  New lytic changes are seen within the right C2-3 facet joint  Multilevel mild canal stenosis and foraminal narrowing  Workstation performed: [id number]           Thank you for this consult  Total time of encounter: 70 Minutes  More than 50% of time was spent in counseling and coordination of care of patient

## 2021-01-11 NOTE — QUICK NOTE
Spoke to patient's son, Samira Angelo ([phone])  HE IS THIS PATIENT'S POINT OF CONTACT! He noted that patient's baseline is as follows:  · Able to walk short distances unsupported  · Oriented X3, able to carry on full conversations   · No dietary restrictions  · Able to complete move around her house without help if she has a walker or her cane       Informed Samira Angelo that we are treating a UTI with a different antibiotic than what was originally given to her outpatient  He is aware that we are waiting for a urine culture and sensitivities  He is also aware that our neurologist is following along with us  He requests that if any changes are made to her medication regimen, that her outpatient neurologist with [facility] is made aware  Samira Angelo notes that  is able to transport her to her specialist appointments without issue  Confirmed that patient is full code  Samira Angelo expresses that he would support transfer to [facility] if it is necessary for her to get back to her baseline  End goal would be for her to return to home with her , however he notes that with patient's more frequent falls, it has been very difficult for the  to provide adequate care           To be cosigned by Dr Jessica Ortega

## 2021-01-11 NOTE — PROGRESS NOTES
[facility] 26 Progress Note - Ciara Toledo 79 y o  female MRN: [id number]    Unit/Bed#: [address] [place] [address] Encounter: [id number]      Assessment/Plan:  * UTI (urinary tract infection)  Assessment & Plan  71-year-old female with reported altered mental status by family member was found to have probable UTI on urinalysis and urine microscope done yesterday 1/9  On admission patient was oriented to person/place and alert  She has received 1 g Rocephin IV at the ED  Given patient's RODRIGO and renal impairment will start her on alternate antibiotic  · Repeat U/A with culture  · Urinary retention protocol  · I/O  · Levofloxacin 250 mg q48h (renal dosing) started on 1/11    Dementia due to Parkinson's disease with behavioral disturbance [facility])  Assessment & Plan  Patient seen by [facility] neurologist  Per Dr Anamaria Espinoza note the patient's caregivers have difficulty taking care of the patient  · CM for possible placement  · PT/OT  · Continue home medications    Sinus arrhythmia  Assessment & Plan  During examination patient had irregular rhythm which was confirmed on EKG done on 1/9 which showed sinus arrhythmia  · Morning EKG  · Telemetry    Acute renal failure superimposed on stage 3 chronic kidney disease [facility])  Assessment & Plan  Lab Results   Component Value Date    EGFR 21 01/11/2021    EGFR 16 01/10/2021    EGFR 24 01/09/2021    CREATININE 2 19 (H) 01/11/2021    CREATININE 2 74 (H) 01/10/2021    CREATININE 1 95 (H) 01/09/2021   Patient on admission had elevated creatinine function when compared to last measured creatinine  Possibly secondary to dehydration  · Will start on IVF  · Monitor creatinine function daily    Type 2 diabetes mellitus without complication, without long-term current use of insulin ([facility])  Assessment & Plan  Lab Results   Component Value Date    HGBA1C 6 4 12/04/2020       No results for input(s): POCGLU in the last 72 hours      Blood Sugar Average: Last 72 hrs:  ISS  Hold metformin due to RODRIGO    Mixed hyperlipidemia  Assessment & Plan  Continue home medications    GERD (gastroesophageal reflux disease)  Assessment & Plan  Will continue home medication    Essential hypertension  Assessment & Plan  Will continue home medication          Subjective:   Patient seen examined this morning at bedside  Patient awake, but not alert or oriented to person, place, or time  Unable to answer questions appropriately  Objective:     Vitals: Blood pressure 146/72, pulse 87, temperature 98 8 °F (37 1 °C), resp  rate 16, height 5' 3" (1 6 m), weight 43 kg (94 lb 12 8 oz), SpO2 96 %, not currently breastfeeding  ,Body mass index is 16 79 kg/m²    Wt Readings from Last 3 Encounters:   01/11/21 43 kg (94 lb 12 8 oz)   01/09/21 43 1 kg (95 lb)   01/06/21 42 9 kg (94 lb 8 oz)       Intake/Output Summary (Last 24 hours) at 1/11/2021 0828  Last data filed at 1/10/2021 2210  Gross per 24 hour   Intake 50 ml   Output    Net 50 ml       Physical Exam: General appearance: uncooperative  Head: Normocephalic, without obvious abnormality, atraumatic  Lungs: clear to auscultation bilaterally  Heart: regular rate and rhythm, S1, S2 normal, no murmur, click, rub or gallop  Abdomen: soft, non-tender; bowel sounds normal; no masses,  no organomegaly  Extremities: extremities normal, warm and well-perfused; no cyanosis, clubbing, or edema  Neurologic: Mental status: Alert, oriented, thought content appropriate, alertness: alert, orientation: not oriented, affect: inappropriate     Recent Results (from the past 24 hour(s))   CBC and differential    Collection Time: 01/10/21  9:22 PM   Result Value Ref Range    WBC 10 93 (H) 4 31 - 10 16 Thousand/uL    RBC 3 56 (L) 3 81 - 5 12 Million/uL    Hemoglobin 10 3 (L) 11 5 - 15 4 g/dL    Hematocrit 34 0 (L) 34 8 - 46 1 %    MCV 96 82 - 98 fL    MCH 28 9 26 8 - 34 3 pg    MCHC 30 3 (L) 31 4 - 37 4 g/dL    RDW 13 2 11 6 - 15 1 %    MPV 10 6 8 9 - 12 7 fL    Platelets 107 860 - 349 Thousands/uL nRBC 0 /100 WBCs    Neutrophils Relative 83 (H) 43 - 75 %    Immat GRANS % 1 0 - 2 %    Lymphocytes Relative 6 (L) 14 - 44 %    Monocytes Relative 8 4 - 12 %    Eosinophils Relative 1 0 - 6 %    Basophils Relative 1 0 - 1 %    Neutrophils Absolute 9 16 (H) 1 85 - 7 62 Thousands/µL    Immature Grans Absolute 0 05 0 00 - 0 20 Thousand/uL    Lymphocytes Absolute 0 70 0 60 - 4 47 Thousands/µL    Monocytes Absolute 0 83 0 17 - 1 22 Thousand/µL    Eosinophils Absolute 0 10 0 00 - 0 61 Thousand/µL    Basophils Absolute 0 09 0 00 - 0 10 Thousands/µL   Comprehensive metabolic panel    Collection Time: 01/10/21  9:22 PM   Result Value Ref Range    Sodium 138 136 - 145 mmol/L    Potassium 6 7 (HH) 3 5 - 5 3 mmol/L    Chloride 105 100 - 108 mmol/L    CO2 23 21 - 32 mmol/L    ANION GAP 10 4 - 13 mmol/L    BUN 37 (H) 5 - 25 mg/dL    Creatinine 2 74 (H) 0 60 - 1 30 mg/dL    Glucose 93 65 - 140 mg/dL    Calcium 9 1 8 3 - 10 1 mg/dL    AST 57 (H) 5 - 45 U/L    ALT 12 12 - 78 U/L    Alkaline Phosphatase 58 46 - 116 U/L    Total Protein 6 9 6 4 - 8 2 g/dL    Albumin 3 5 3 5 - 5 0 g/dL    Total Bilirubin 0 70 0 20 - 1 00 mg/dL    eGFR 16 ml/min/1 73sq m   Lactic acid    Collection Time: 01/10/21  9:22 PM   Result Value Ref Range    LACTIC ACID 1 2 0 5 - 2 0 mmol/L   Potassium    Collection Time: 01/10/21 10:10 PM   Result Value Ref Range    Potassium 6 4 (HH) 3 5 - 5 3 mmol/L   Potassium    Collection Time: 01/10/21 10:30 PM   Result Value Ref Range    Potassium 5 1 3 5 - 5 3 mmol/L   Fingerstick Glucose (POCT)    Collection Time: 01/11/21  1:09 AM   Result Value Ref Range    POC Glucose 114 65 - 140 mg/dl   UA w Reflex to Microscopic w Reflex to Culture    Collection Time: 01/11/21  1:58 AM    Specimen: Urine, Straight Cath   Result Value Ref Range    Color, UA Yellow     Clarity, UA Slightly Cloudy     Specific Gravity, UA >=1 030 1 000 - 1 030    pH, UA 5 5 5 0, 5 5, 6 0, 6 5, 7 0, 7 5, 8 0, 8 5, 9 0    Leukocytes, UA Moderate (A) Negative    Nitrite, UA Negative Negative    Protein, UA Negative Negative mg/dl    Glucose, UA Negative Negative mg/dl    Ketones, UA Trace (A) Negative mg/dl    Urobilinogen, UA 0 2 0 2, 1 0 E U /dl E U /dl    Bilirubin, UA Negative Negative    Blood, UA Moderate (A) Negative   Urine Microscopic    Collection Time: 01/11/21  1:58 AM   Result Value Ref Range    RBC, UA 1-2 None Seen, 0-1, 1-2, 2-4, 0-5 /hpf    WBC, UA 20-30 (A) None Seen, 0-1, 1-2, 0-5, 2-4 /hpf    Epithelial Cells Moderate (A) None Seen, Occasional /hpf    Bacteria, UA Moderate (A) None Seen, Occasional /hpf    AMORPH URATES Moderate /hpf   CBC and differential    Collection Time: 01/11/21  5:48 AM   Result Value Ref Range    WBC 6 82 4 31 - 10 16 Thousand/uL    RBC 3 69 (L) 3 81 - 5 12 Million/uL    Hemoglobin 10 8 (L) 11 5 - 15 4 g/dL    Hematocrit 35 7 34 8 - 46 1 %    MCV 97 82 - 98 fL    MCH 29 3 26 8 - 34 3 pg    MCHC 30 3 (L) 31 4 - 37 4 g/dL    RDW 13 1 11 6 - 15 1 %    MPV 11 0 8 9 - 12 7 fL    Platelets 132 886 - 996 Thousands/uL    nRBC 0 /100 WBCs    Neutrophils Relative 76 (H) 43 - 75 %    Immat GRANS % 0 0 - 2 %    Lymphocytes Relative 11 (L) 14 - 44 %    Monocytes Relative 9 4 - 12 %    Eosinophils Relative 3 0 - 6 %    Basophils Relative 1 0 - 1 %    Neutrophils Absolute 5 14 1 85 - 7 62 Thousands/µL    Immature Grans Absolute 0 03 0 00 - 0 20 Thousand/uL    Lymphocytes Absolute 0 75 0 60 - 4 47 Thousands/µL    Monocytes Absolute 0 62 0 17 - 1 22 Thousand/µL    Eosinophils Absolute 0 19 0 00 - 0 61 Thousand/µL    Basophils Absolute 0 09 0 00 - 0 10 Thousands/µL   Comprehensive metabolic panel    Collection Time: 01/11/21  5:48 AM   Result Value Ref Range    Sodium 142 136 - 145 mmol/L    Potassium 4 7 3 5 - 5 3 mmol/L    Chloride 108 100 - 108 mmol/L    CO2 25 21 - 32 mmol/L    ANION GAP 9 4 - 13 mmol/L    BUN 34 (H) 5 - 25 mg/dL    Creatinine 2 19 (H) 0 60 - 1 30 mg/dL    Glucose 77 65 - 140 mg/dL    Calcium 9 0 8 3 - 10 1 mg/dL Corrected Calcium 9 5 8 3 - 10 1 mg/dL    AST 40 5 - 45 U/L    ALT 13 12 - 78 U/L    Alkaline Phosphatase 61 46 - 116 U/L    Total Protein 6 4 6 4 - 8 2 g/dL    Albumin 3 4 (L) 3 5 - 5 0 g/dL    Total Bilirubin 0 70 0 20 - 1 00 mg/dL    eGFR 21 ml/min/1 73sq m   Magnesium    Collection Time: 01/11/21  5:48 AM   Result Value Ref Range    Magnesium 2 0 1 6 - 2 6 mg/dL   Phosphorus    Collection Time: 01/11/21  5:48 AM   Result Value Ref Range    Phosphorus 3 3 2 3 - 4 1 mg/dL   Fingerstick Glucose (POCT)    Collection Time: 01/11/21  8:20 AM   Result Value Ref Range    POC Glucose 76 65 - 140 mg/dl       Current Facility-Administered Medications   Medication Dose Route Frequency Provider Last Rate Last Admin    acetaminophen (TYLENOL) tablet 650 mg  650 mg Oral Q6H PRN Neal Oshea MD        amantadine (SYMMETREL) capsule 100 mg  100 mg Oral HS Neal Oshea MD   100 mg at 01/11/21 0257    amLODIPine (NORVASC) tablet 5 mg  5 mg Oral Daily Nael Oshea MD   5 mg at 01/11/21 2258    carbidopa-levodopa (SINEMET)  mg per tablet 1 tablet  1 tablet Oral 5x Daily Neal Oshea MD   1 tablet at 01/11/21 1968    fish oil capsule 2,000 mg  2,000 mg Oral QAM Neal Oshea MD   2,000 mg at 01/11/21 3934    heparin (porcine) subcutaneous injection 5,000 Units  5,000 Units Subcutaneous [facility] Neal Oshea MD   5,000 Units at 01/11/21 0215    insulin lispro (HumaLOG) 100 units/mL subcutaneous injection 1-5 Units  1-5 Units Subcutaneous Q6H Lonnie Foote MD        [facility]) tablet 20 mg  20 mg Oral Daily Neal Oshea MD        lactated ringers infusion  50 mL/hr Intravenous Continuous Neal Oshea MD 50 mL/hr at 01/11/21 0148 50 mL/hr at 01/11/21 0148    levofloxacin (LEVAQUIN) IVPB (premix in dextrose) 250 mg 50 mL  250 mg Intravenous [address], MD 50 mL/hr at 01/11/21 0256 250 mg at 01/11/21 0256    pantoprazole (PROTONIX) EC tablet 40 mg  40 mg Oral Early Morning Lisa Mccord MD   40 mg at 01/11/21 8575    rOPINIRole (REQUIP) tablet 6 mg  6 mg Oral HS Lisa Mccord MD   6 mg at 01/11/21 0256    selegiline (ELDEPRYL) tablet 5 mg  5 mg Oral Daily With Sarbjit Higuera MD   5 mg at 01/11/21 6024    sodium chloride 0 9 % bolus 1,000 mL  1,000 mL Intravenous Once Lisa Mccord MD   Stopped at 01/10/21 2254       Invasive Devices     Peripheral Intravenous Line            Peripheral IV 01/10/21 Right Antecubital less than 1 day                Lab, Imaging and other studies: I have personally reviewed pertinent reports      VTE Pharmacologic Prophylaxis: Heparin  VTE Mechanical Prophylaxis: sequential compression device    Rufina Uribe MD

## 2021-01-11 NOTE — ASSESSMENT & PLAN NOTE
Lab Results   Component Value Date    EGFR 53 01/14/2021    EGFR 45 01/13/2021    EGFR 28 01/12/2021    CREATININE 1 01 01/14/2021    CREATININE 1 16 01/13/2021    CREATININE 1 69 (H) 01/12/2021   Patient on admission had elevated creatinine function when compared to last measured creatinine  Possibly secondary to dehydration  · Was started on IV fluids with D5 lactated Ringer's with subsequent resolution of RODRIGO  IV fluids were discontinued    · Monitor creatinine function daily

## 2021-01-11 NOTE — ED NOTES
Arm boards applied to each side of patient IV and soft dressing applied to keep patient IV flowing at a better rate        Tracie Grace RN  01/10/21 7967

## 2021-01-11 NOTE — ASSESSMENT & PLAN NOTE
Lab Results   Component Value Date    HGBA1C 6 4 12/04/2020   Hold metformin due to RODRIGO  Insulin sliding scale algorithm 1 in place for glycemic control    Continue to monitor

## 2021-01-11 NOTE — H&P
H&P Exam - Eliecer Longoria 79 y o  female MRN: [id number]    Unit/Bed#: [address] [place] [address] Encounter: [id number]    Assessment:  50-year-old female with the past medical history of type 2 diabetes mellitus, hypertension, GERD, hyperlipidemia, Parkinson's disease with dementia, and CKD stage 3 was brought to the ED today by her son for increased confusion and weakness  Will be admitted for inpatient service under Dr Cira Salgado  Expected length of stay is more than 2 midnights  Plan:  * UTI (urinary tract infection)  Assessment & Plan  50-year-old female with reported altered mental status by family member was found to have probable UTI on urinalysis and urine microscope done yesterday 1/9  On admission patient was oriented to person/place and alert  She has received 1 g Rocephin IV at the ED  Given patient's RODRIGO and renal impairment will start her on alternate antibiotic  · Repeat U/A with culture  · Urinary retention protocol  · I/O  · Levofloxacin 250 mg q48h (renal dosing)    Acute renal failure superimposed on stage 3 chronic kidney disease [facility])  Assessment & Plan  Lab Results   Component Value Date    EGFR 16 01/10/2021    EGFR 24 01/09/2021    EGFR 24 01/08/2021    CREATININE 2 74 (H) 01/10/2021    CREATININE 1 95 (H) 01/09/2021    CREATININE 1 97 (H) 01/08/2021   Patient on admission had elevated creatinine function when compared to last measured creatinine  Possibly secondary to dehydration  · Will start on IVF  · Monitor creatinine function daily    Sinus arrhythmia  Assessment & Plan  During examination patient had irregular rhythm which was confirmed on EKG done on 1/9 which showed sinus arrhythmia  · Morning EKG  · Telemetry    Dementia due to Parkinson's disease with behavioral disturbance [facility])  Assessment & Plan  Patient seen by [facility] neurologist  Per Dr Stan Henry note the patient's caregivers have difficulty taking care of the patient    · CM for possible placement  · PT/OT  · Continue home medications    Mixed hyperlipidemia  Assessment & Plan  Continue home medications    GERD (gastroesophageal reflux disease)  Assessment & Plan  Will continue home medication    Essential hypertension  Assessment & Plan  Will continue home medication    Type 2 diabetes mellitus without complication, without long-term current use of insulin (HCC)  Assessment & Plan  Lab Results   Component Value Date    HGBA1C 6 4 12/04/2020       No results for input(s): POCGLU in the last 72 hours  Blood Sugar Average: Last 72 hrs:  ISS  Hold metformin due to RODRIGO    History of Present Illness   30-year-old female with the past medical history of type 2 diabetes mellitus, hypertension, GERD, hyperlipidemia, Parkinson's disease with dementia, and CKD stage 3 was brought to the ED today by her son for increased confusion and weakness  She lives with her elderly , who has been unable to take care of her  Patient is alert and oriented during interview  Patient is alone during interview and has significant speech difficulty and dementia which significantly limits HPI  On chart review, she recently fell and hit her head; she underwent CT scan of her head yesterday (1/9) which was negative for any abnormal findings  On 1/8/2021 she had a urinalysis done ordered by Dr Korin Brandon which showed findings consistent with UTI and was started on Bactrim  Review of Systems   Respiratory: Negative for shortness of breath  Cardiovascular: Negative for chest pain  Gastrointestinal: Negative for abdominal pain, nausea and vomiting  Endocrine: Negative for polyuria  Genitourinary: Negative for difficulty urinating, dysuria and flank pain  Neurological: Positive for tremors and speech difficulty  Negative for weakness       ED:  NS 1 L bolus, NS @125 mL/hr, Rocephin 1 g IV    Historical Information   Past Medical History:   Diagnosis Date    RODRIGO (acute kidney injury) ([facility] ) 7/19/2019    RODRIGO (acute kidney injury) ([facility] ) 7/19/2019  Anal bleeding 1/29/2018    Arthritis     knee    Asthma     BPPV (benign paroxysmal positional vertigo) 7/19/2016    Last Assessment & Plan:  Hx consistent with BPPV  Neg Leslie-Halpike, however could not perform it adequately due to dyskinesias  Recommend lozano-daroff exercises at home  IF worsens, vestibular therapy   Bulging lumbar disc 12/10/2013    Closed fracture of one rib of left side 1/29/2018    Colon polyp     Dementia ([facility] )     Fall     balance issue    Gallbladder disease     GERD (gastroesophageal reflux disease)     Hematuria     last assessed 10/29/15    Orthostatic hypotension 7/13/2015    Osteomyelitis of lumbar spine ([facility] ) 3/10/2020    Ovarian cyst     LAST ASSESSED: 12/10/13    Parkinson's disease ([facility] )     Pneumonia of both lower lobes due to infectious organism 3/27/2018    Pulmonary embolism with infarction ([facility] ) 9/2/2014    Scalp laceration, subsequent encounter 11/15/2019    Sciatica 12/10/2013    Severe protein-calorie malnutrition ([facility] ) 7/22/2019    Skin disorder     last assessed 12/10/13    Squamous cell carcinoma of skin 11/21/2016    Stage 3a chronic kidney disease 12/4/2020    Toxic metabolic encephalopathy 6/86/3185    Use of cane as ambulatory aid     Walker as ambulation aid     Wears glasses     Weight loss 10/8/2019    Last Assessment & Plan:  Recommend Boost or Ensure  F/u with PCP      Weight loss, unintentional 5/22/2015     Past Surgical History:   Procedure Laterality Date    APPENDECTOMY      1970'S    CATARACT EXTRACTION      CHOLECYSTECTOMY      1970'S    COLONOSCOPY      LAPAROTOMY N/A 7/18/2019    Procedure: LAPAROTOMY EXPLORATORY;  Surgeon: Shemar Alvares MD;  Location: [address] [place] [address];  Service: General    NEUROPLASTY / [address] Right     OOPHORECTOMY Bilateral     REMOVAL OF BOTH OVARIES LAPAROSCOPIC    RI COLSC FLX W/RMVL OF TUMOR POLYP LESION SNARE TQ N/A 3/20/2018    Procedure: COLONOSCOPY; Surgeon: Geovanna Padilla MD;  Location: [facility] GI LAB; Service: Gastroenterology    SD XCAPSL CTRC RMVL INSJ IO LENS PROSTH W/O ECP Right 2018    Procedure: EXTRACTION EXTRACAPSULAR CATARACT PHACO INTRAOCULAR LENS (IOL); Surgeon: Duane Mcfarland MD;  Location: [facility] MAIN OR;  Service: Ophthalmology    SD XCAPSL CTRC RMVL INSJ IO LENS PROSTH W/O ECP  1/15/2019    Procedure: EXTRACTION EXTRACAPSULAR CATARACT PHACO INTRAOCULAR LENS (IOL); Surgeon: Duane Mcfarland MD;  Location: [facility] MAIN OR;  Service: Ophthalmology    TONSILLECTOMY      VENTRAL HERNIA REPAIR      WITH IMPLANT OF MESH     Social History   Social History     Substance and Sexual Activity   Alcohol Use Not Currently     Social History     Substance and Sexual Activity   Drug Use Never     Social History     Tobacco Use   Smoking Status Never Smoker   Smokeless Tobacco Never Used     E-Cigarette Use: Never User     E-Cigarette/Vaping Substances       Family History:   Family History   Problem Relation Age of Onset    Alzheimer's disease Mother     Stroke Father     No Known Problems Sister     No Known Problems Brother        Meds/Allergies   PTA meds:   Prior to Admission Medications   Prescriptions Last Dose Informant Patient Reported? Taking?    Amantadine HCl ER (GOCOVRI) 137 MG CP24  Self Yes No   Sig: Take 1 capsule by mouth daily at bedtime    Lancets (FREESTYLE) lancets  Self No No   Sig: For daily testing   Multiple Vitamin (MULTIVITAMIN) tablet  Self Yes No   Sig: Take 1 tablet by mouth every morning    NOURIANZ tablet  Self Yes No   Si mg daily   Omega-3 Fatty Acids (FISH OIL PO)  Self Yes No   Sig: Take 2 g by mouth every morning    TURMERIC PO  Self Yes No   Sig: Take by mouth 3 (three) times a day   acetaminophen (TYLENOL) 325 mg tablet   Yes No   Sig: Take 650 mg by mouth every 6 (six) hours as needed for mild pain   amLODIPine (NORVASC) 10 mg tablet  Self No No   Sig: Take 0 5 tablets (5 mg total) by mouth daily aspirin (ECOTRIN LOW STRENGTH) 81 mg EC tablet  Self Yes No   Sig: Take 81 mg by mouth every morning    carbidopa-levodopa (SINEMET)  mg per tablet   Yes No   Sig: Take 1 tablet by mouth 5 (five) times a day   diclofenac sodium (VOLTAREN) 1 %  Self No No   Sig: Apply 2 g topically 4 (four) times a day   Patient taking differently: Apply 2 g topically daily at bedtime    docusate sodium (COLACE) 100 mg capsule   Yes No   Sig: Take 100 mg by mouth 2 (two) times a day   gabapentin (NEURONTIN) 100 mg capsule  Self No No   Sig: Take 1 capsule (100 mg total) by mouth daily at bedtime   glucose blood (FREESTYLE TEST STRIPS) test strip  Self No No   Sig: Use as instructed daily testing  Report abnormal levels     glucose monitoring kit (FREESTYLE) monitoring kit  Self No No   Si each by Does not apply route as needed (DM control) ACCUCHEK METER COMPACT PLUS   metFORMIN (GLUCOPHAGE) 500 mg tablet  Self No No   Sig: TAKE 1 TABLET BY MOUTH  DAILY WITH BREAKFAST   pantoprazole (PROTONIX) 40 mg tablet  Self Yes No   Sig: Take 40 mg by mouth 2 (two) times a day   pantoprazole (PROTONIX) 40 mg tablet   No No   Sig: take 1 tablet by mouth twice a day   rOPINIRole (REQUIP) 2 mg tablet   Yes No   Sig: TAKE 3 TABLETS BY MOUTH  NIGHTLY   rasagiline (AZILECT) 1 MG  Self Yes No   Si mg every morning    sulfamethoxazole-trimethoprim (BACTRIM DS) 800-160 mg per tablet   No No   Sig: Take 1 tablet by mouth every 12 (twelve) hours for 5 days      Facility-Administered Medications: None     No Known Allergies    Objective   First Vitals:   Blood Pressure: 134/62 (01/10/21 2032)  Pulse: 84 (01/10/21 2032)  Temperature: (!) 96 9 °F (36 1 °C) (01/10/21 2032)  Temp Source: Tympanic (01/10/21 2032)  Respirations: 18 (01/10/21 2032)  Weight - Scale: 43 kg (94 lb 12 8 oz) (01/10/21 2032)  SpO2: 97 % (01/10/21 2032)    Current Vitals:   Blood Pressure: 115/95 (21)  Pulse: 81 (21)  Temperature: 97 8 °F (36 6 °C) (01/11/21 0009)  Temp Source: Tympanic (01/10/21 2032)  Respirations: 16 (01/10/21 2309)  Weight - Scale: 43 kg (94 lb 12 8 oz) (01/10/21 2032)  SpO2: 96 % (01/11/21 0009)      Intake/Output Summary (Last 24 hours) at 1/11/2021 0121  Last data filed at 1/10/2021 2210  Gross per 24 hour   Intake 50 ml   Output    Net 50 ml       Invasive Devices     Peripheral Intravenous Line            Peripheral IV 01/10/21 Right Antecubital less than 1 day                Physical Exam  Vitals signs reviewed  HENT:      Mouth/Throat:      Mouth: Mucous membranes are dry  Cardiovascular:      Rate and Rhythm: Normal rate  Rhythm irregular  Heart sounds: Normal heart sounds, S1 normal and S2 normal    Pulmonary:      Effort: Pulmonary effort is normal       Breath sounds: Normal breath sounds  No decreased breath sounds, wheezing, rhonchi or rales  Abdominal:      General: Abdomen is flat  Bowel sounds are normal       Palpations: Abdomen is soft  Tenderness: There is abdominal tenderness in the suprapubic area  There is no right CVA tenderness or left CVA tenderness  Musculoskeletal:      Right lower leg: No edema  Left lower leg: No edema  Skin:     Capillary Refill: Capillary refill takes less than 2 seconds  Comments: Multiple bruises on LE   Neurological:      Mental Status: She is alert  Motor: Tremor present         Lab Results:   Results Reviewed     Procedure Component Value Units Date/Time    Potassium [[id number]]  (Normal) Collected: 01/10/21 2230    Lab Status: Final result Specimen: Blood from Arm, Left Updated: 01/10/21 2245     Potassium 5 1 mmol/L     Potassium [[id number]]  (Abnormal) Collected: 01/10/21 2210    Lab Status: Final result Specimen: Blood from Arm, Right Updated: 01/10/21 2224     Potassium 6 4 mmol/L     Comprehensive metabolic panel [[id number]]  (Abnormal) Collected: 01/10/21 2122    Lab Status: Final result Specimen: Blood from Arm, Right Updated: 01/10/21 2203 Sodium 138 mmol/L      Potassium 6 7 mmol/L      Chloride 105 mmol/L      CO2 23 mmol/L      ANION GAP 10 mmol/L      BUN 37 mg/dL      Creatinine 2 74 mg/dL      Glucose 93 mg/dL      Calcium 9 1 mg/dL      AST 57 U/L      ALT 12 U/L      Alkaline Phosphatase 58 U/L      Total Protein 6 9 g/dL      Albumin 3 5 g/dL      Total Bilirubin 0 70 mg/dL      eGFR 16 ml/min/1 73sq m     Narrative:      [place] guidelines for Chronic Kidney Disease (CKD):     Stage 1 with normal or high GFR (GFR > 90 mL/min/1 73 square meters)    Stage 2 Mild CKD (GFR = 60-89 mL/min/1 73 square meters)    Stage 3A Moderate CKD (GFR = 45-59 mL/min/1 73 square meters)    Stage 3B Moderate CKD (GFR = 30-44 mL/min/1 73 square meters)    Stage 4 Severe CKD (GFR = 15-29 mL/min/1 73 square meters)    Stage 5 End Stage CKD (GFR <15 mL/min/1 73 square meters)  Note: GFR calculation is accurate only with a steady state creatinine    Lactic acid [[id number]]  (Normal) Collected: 01/10/21 2122    Lab Status: Final result Specimen: Blood from Arm, Right Updated: 01/10/21 2148     LACTIC ACID 1 2 mmol/L     Narrative:      Result may be elevated if tourniquet was used during collection      CBC and differential [[id number]]  (Abnormal) Collected: 01/10/21 2122    Lab Status: Final result Specimen: Blood from Arm, Right Updated: 01/10/21 2129     WBC 10 93 Thousand/uL      RBC 3 56 Million/uL      Hemoglobin 10 3 g/dL      Hematocrit 34 0 %      MCV 96 fL      MCH 28 9 pg      MCHC 30 3 g/dL      RDW 13 2 %      MPV 10 6 fL      Platelets 841 Thousands/uL      nRBC 0 /100 WBCs      Neutrophils Relative 83 %      Immat GRANS % 1 %      Lymphocytes Relative 6 %      Monocytes Relative 8 %      Eosinophils Relative 1 %      Basophils Relative 1 %      Neutrophils Absolute 9 16 Thousands/µL      Immature Grans Absolute 0 05 Thousand/uL      Lymphocytes Absolute 0 70 Thousands/µL      Monocytes Absolute 0 83 Thousand/µL Eosinophils Absolute 0 10 Thousand/µL      Basophils Absolute 0 09 Thousands/µL     Blood culture #2 [[id number]] Collected: 01/10/21 2122    Lab Status: In process Specimen: Blood from Arm, Right Updated: 01/10/21 2126    Blood culture #1 [[id number]] Collected: 01/10/21 2122    Lab Status: In process Specimen: Blood from Arm, Right Updated: 01/10/21 2126          Imaging:   No orders to display     EKG, Pathology, and Other Studies: Sinus rhythm, sinus arrhythmia     Code Status: Level 1 - Full Code  Counseling / Coordination of Care: Total floor / unit time spent today 45 minutes

## 2021-01-11 NOTE — ED NOTES
Voicemail left for patient's son at number on file to inform him of his mother's admission  Directed to call 2nd floor for any further questions        Domenica Camarillo RN  01/10/21 4804

## 2021-01-11 NOTE — PROGRESS NOTES
Outreach to patients daughter  Concerns regarding mothers current state of health  Lengthy discussion regarding medication management  Mother is reported to be filling her own pill boxes  Daughter lives in [place] and son lives one hour from patients home  Daughter is looking for assistance in the home  Would like an RN to come and do medications for her mother  Explained VNA services and the requirement that there must be a skilled need  CM will discuss with Dr Dandy Rivera to formulate plan to perhaps get VNA services  Discussed private pay options for home care  Photo of pill packs that are done by [address] [facility] sent to daughter  Explained the benefit of having mothers pills done this way  Discussed cost differences between using retail pharmacy vs mail order  CM will continue to work with family to meet complex healthcare needs  Received text message from patients daughter advising that mother was back in the hospital due to UTI  CM will work with inpatient CM to coordinate care

## 2021-01-11 NOTE — ASSESSMENT & PLAN NOTE
71-year-old female with reported altered mental status by family member was found to have probable UTI on urinalysis and urine microscope done yesterday 1/9  On admission patient was oriented to person/place and alert  She has received 1 g Rocephin IV at the ED  Given patient's RODRIGO and renal impairment will start her on alternate antibiotic    · Repeat U/A with culture  · Negative, unlikely to be UTI  · Urinary retention protocol  · I/O  · Levofloxacin 250 mg q48h (renal dosing) started on 1/11, discontinued on 01/12

## 2021-01-11 NOTE — ED PROVIDER NOTES
History  Chief Complaint   Patient presents with    Altered Mental Status     patient sent in by caregiver for increased confusion, patient alert and oriented during triage, patient does not understand why she is here, denies pain     Patient is a 61-year-old female  She was seen here yesterday  She has a history of Parkinson's and dementia  She was seen here yesterday for weakness and difficulty standing  She was found have a urinary tract infection  She did undergo CT of her head which did not show any acute disease  She did undergo CT scan of her cervical spine which was negative for fracture or subluxation  There were chronic changes of DJD  She did have a chest x-ray done which was negative for infiltrates  EKG was without ischemia  She was discharged home on antibiotics  She lives with her elderly   She returns to the emergency room today because he is unable take care of her  She has become increasingly weak and altered  She is having difficulty ambulating and caring out ADLs  History is limited by dementia  There is no history of fever  Prior to Admission Medications   Prescriptions Last Dose Informant Patient Reported? Taking?    Amantadine HCl ER (GOCOVRI) 137 MG CP24  Self Yes No   Sig: Take 1 capsule by mouth daily at bedtime    Lancets (FREESTYLE) lancets  Self No No   Sig: For daily testing   Multiple Vitamin (MULTIVITAMIN) tablet  Self Yes No   Sig: Take 1 tablet by mouth every morning    NOURIANZ tablet  Self Yes No   Si mg daily   Omega-3 Fatty Acids (FISH OIL PO)  Self Yes No   Sig: Take 2 g by mouth every morning    TURMERIC PO  Self Yes No   Sig: Take by mouth 3 (three) times a day   acetaminophen (TYLENOL) 325 mg tablet   Yes No   Sig: Take 650 mg by mouth every 6 (six) hours as needed for mild pain   amLODIPine (NORVASC) 10 mg tablet  Self No No   Sig: Take 0 5 tablets (5 mg total) by mouth daily   aspirin (ECOTRIN LOW STRENGTH) 81 mg EC tablet  Self Yes No Sig: Take 81 mg by mouth every morning    carbidopa-levodopa (SINEMET)  mg per tablet   Yes No   Sig: Take 1 tablet by mouth 5 (five) times a day   diclofenac sodium (VOLTAREN) 1 %  Self No No   Sig: Apply 2 g topically 4 (four) times a day   Patient taking differently: Apply 2 g topically daily at bedtime    docusate sodium (COLACE) 100 mg capsule   Yes No   Sig: Take 100 mg by mouth 2 (two) times a day   gabapentin (NEURONTIN) 100 mg capsule  Self No No   Sig: Take 1 capsule (100 mg total) by mouth daily at bedtime   glucose blood (FREESTYLE TEST STRIPS) test strip  Self No No   Sig: Use as instructed daily testing  Report abnormal levels  glucose monitoring kit (FREESTYLE) monitoring kit  Self No No   Si each by Does not apply route as needed (DM control) ACCUCHEK METER COMPACT PLUS   metFORMIN (GLUCOPHAGE) 500 mg tablet  Self No No   Sig: TAKE 1 TABLET BY MOUTH  DAILY WITH BREAKFAST   pantoprazole (PROTONIX) 40 mg tablet  Self Yes No   Sig: Take 40 mg by mouth 2 (two) times a day   pantoprazole (PROTONIX) 40 mg tablet   No No   Sig: take 1 tablet by mouth twice a day   rOPINIRole (REQUIP) 2 mg tablet   Yes No   Sig: TAKE 3 TABLETS BY MOUTH  NIGHTLY   rasagiline (AZILECT) 1 MG  Self Yes No   Si mg every morning    sulfamethoxazole-trimethoprim (BACTRIM DS) 800-160 mg per tablet   No No   Sig: Take 1 tablet by mouth every 12 (twelve) hours for 5 days      Facility-Administered Medications: None       Past Medical History:   Diagnosis Date    RODRIGO (acute kidney injury) ([facility] ) 2019    RODRIGO (acute kidney injury) ([facility] ) 2019    Anal bleeding 2018    Arthritis     knee    Asthma     BPPV (benign paroxysmal positional vertigo) 2016    Last Assessment & Plan:  Hx consistent with BPPV  Neg [place], however could not perform it adequately due to dyskinesias  Recommend lozano-daroff exercises at home  IF worsens, vestibular therapy      Bulging lumbar disc 12/10/2013    Closed fracture of one rib of left side 1/29/2018    Colon polyp     D[facility]     Fall     balance issue    Gallbladder disease     GERD (gastroesophageal reflux disease)     Hematuria     last assessed 10/29/15    Orthostatic hypotension 7/13/2015    Osteomyelitis of lumbar spine ([facility] ) 3/10/2020    Ovarian cyst     LAST ASSESSED: 12/10/13    Parkinson's disease ([facility] )     Pneumonia of both lower lobes due to infectious organism 3/27/2018    Pulmonary embolism with infarction ([facility] ) 9/2/2014    Scalp laceration, subsequent encounter 11/15/2019    Sciatica 12/10/2013    Severe protein-calorie malnutrition ([facility] ) 7/22/2019    Skin disorder     last assessed 12/10/13    Squamous cell carcinoma of skin 11/21/2016    Toxic metabolic encephalopathy 0/50/3071    Use of cane as ambulatory aid     Walker as ambulation aid     Wears glasses     Weight loss 10/8/2019    Last Assessment & Plan:  Recommend Boost or Ensure  F/u with PCP   Weight loss, unintentional 5/22/2015       Past Surgical History:   Procedure Laterality Date    APPENDECTOMY      1970'S    CATARACT EXTRACTION      CHOLECYSTECTOMY      1970'S    COLONOSCOPY      LAPAROTOMY N/A 7/18/2019    Procedure: LAPAROTOMY EXPLORATORY;  Surgeon: Isa Gomez MD;  Location: [address] [place] [address];  Service: General    NEUROPLASTY / [address] Right     OOPHORECTOMY Bilateral     REMOVAL OF BOTH OVARIES LAPAROSCOPIC     [place] FLX W/RMVL OF TUMOR POLYP LESION [address] [facility] TQ N/A 3/20/2018    Procedure: COLONOSCOPY;  Surgeon: Miryam Sloan MD;  [facility] GI LAB; Service: Gastroenterology    KS XCAPSL CTRC RMVL INSJ IO LENS PROSTH W/O ECP Right 12/4/2018    Procedure: EXTRACTION EXTRACAPSULAR CATARACT PHACO INTRAOCULAR LENS (IOL);   Surgeon: Reena Jefferson MD;  Location: [facility] MAIN OR;  Service: Ophthalmology    KS XCAPSL CTRC RMVL INSJ IO LENS PROSTH W/O ECP  1/15/2019    Procedure: EXTRACTION EXTRACAPSULAR CATARACT PHACO INTRAOCULAR LENS (IOL); Surgeon: Jazmyne Rodriguez MD;  Location: [facility] MAIN OR;  Service: Ophthalmology    TONSILLECTOMY      VENTRAL HERNIA REPAIR      WITH IMPLANT OF MESH       Family History   Problem Relation Age of Onset    Alzheimer's disease Mother     Stroke Father     No Known Problems Sister     No Known Problems Brother      I have reviewed and agree with the history as documented  E-Cigarette/Vaping    E-Cigarette Use Never User      E-Cigarette/Vaping Substances     Social History     Tobacco Use    Smoking status: Never Smoker    Smokeless tobacco: Never Used   Substance Use Topics    Alcohol use: Not Currently    Drug use: Never       Review of Systems   Unable to perform ROS: Dementia       Physical Exam  Physical Exam  Vitals signs reviewed  Constitutional:       Appearance: She is not ill-appearing  HENT:      Head: Normocephalic and atraumatic  Mouth/Throat:      Mouth: Mucous membranes are moist    Eyes:      General: No scleral icterus  Extraocular Movements: Extraocular movements intact  Pupils: Pupils are equal, round, and reactive to light  Neck:      Musculoskeletal: Normal range of motion and neck supple  Cardiovascular:      Rate and Rhythm: Normal rate and regular rhythm  Heart sounds: Normal heart sounds  No murmur  No friction rub  No gallop  Pulmonary:      Effort: Pulmonary effort is normal  No respiratory distress  Breath sounds: Normal breath sounds  No stridor  No wheezing, rhonchi or rales  Abdominal:      General: Bowel sounds are normal  There is no distension  Palpations: Abdomen is soft  There is no mass  Tenderness: There is no abdominal tenderness  There is no guarding  Musculoskeletal:         General: No swelling or tenderness  Skin:     General: Skin is warm and dry  Neurological:      Mental Status: She is alert  Comments: Oriented to person and place  No lateralizing deficits    No facial droop  Psychiatric:         Behavior: Behavior is agitated           Vital Signs  ED Triage Vitals [01/10/21 2032]   Temperature Pulse Respirations Blood Pressure SpO2   (!) 96 9 °F (36 1 °C) 84 18 134/62 97 %      Temp Source Heart Rate Source Patient Position - Orthostatic VS BP Location FiO2 (%)   Tympanic Monitor -- -- --      Pain Score       --           Vitals:    01/10/21 2032   BP: 134/62   Pulse: 84         Visual Acuity      ED Medications  Medications   sodium chloride 0 9 % infusion (125 mL/hr Intravenous New Bag 1/10/21 2132)   sodium chloride 0 9 % bolus 1,000 mL (has no administration in time range)   cefTRIAXone (ROCEPHIN) IVPB (premix in dextrose) 1,000 mg 50 mL (0 mg Intravenous Stopped 1/10/21 2210)       Diagnostic Studies  Results Reviewed     Procedure Component Value Units Date/Time    Potassium [[id number]]  (Normal) Collected: 01/10/21 2230    Lab Status: Final result Specimen: Blood from Arm, Left Updated: 01/10/21 2245     Potassium 5 1 mmol/L     Potassium [[id number]]  (Abnormal) Collected: 01/10/21 2210    Lab Status: Final result Specimen: Blood from Arm, Right Updated: 01/10/21 2224     Potassium 6 4 mmol/L     Comprehensive metabolic panel [[id number]]  (Abnormal) Collected: 01/10/21 2122    Lab Status: Final result Specimen: Blood from Arm, Right Updated: 01/10/21 2203     Sodium 138 mmol/L      Potassium 6 7 mmol/L      Chloride 105 mmol/L      CO2 23 mmol/L      ANION GAP 10 mmol/L      BUN 37 mg/dL      Creatinine 2 74 mg/dL      Glucose 93 mg/dL      Calcium 9 1 mg/dL      AST 57 U/L      ALT 12 U/L      Alkaline Phosphatase 58 U/L      Total Protein 6 9 g/dL      Albumin 3 5 g/dL      Total Bilirubin 0 70 mg/dL      eGFR 16 ml/min/1 73sq m     Narrative:      Mayela guidelines for Chronic Kidney Disease (CKD):     Stage 1 with normal or high GFR (GFR > 90 mL/min/1 73 square meters)    Stage 2 Mild CKD (GFR = 60-89 mL/min/1 73 square meters)    Stage 3A Moderate CKD (GFR = 45-59 mL/min/1 73 square meters)    Stage 3B Moderate CKD (GFR = 30-44 mL/min/1 73 square meters)    Stage 4 Severe CKD (GFR = 15-29 mL/min/1 73 square meters)    Stage 5 End Stage CKD (GFR <15 mL/min/1 73 square meters)  Note: GFR calculation is accurate only with a steady state creatinine    Lactic acid [[id number]]  (Normal) Collected: 01/10/21 2122    Lab Status: Final result Specimen: Blood from Arm, Right Updated: 01/10/21 2148     LACTIC ACID 1 2 mmol/L     Narrative:      Result may be elevated if tourniquet was used during collection  CBC and differential [[id number]]  (Abnormal) Collected: 01/10/21 2122    Lab Status: Final result Specimen: Blood from Arm, Right Updated: 01/10/21 2129     WBC 10 93 Thousand/uL      RBC 3 56 Million/uL      Hemoglobin 10 3 g/dL      Hematocrit 34 0 %      MCV 96 fL      MCH 28 9 pg      MCHC 30 3 g/dL      RDW 13 2 %      MPV 10 6 fL      Platelets 851 Thousands/uL      nRBC 0 /100 WBCs      Neutrophils Relative 83 %      Immat GRANS % 1 %      Lymphocytes Relative 6 %      Monocytes Relative 8 %      Eosinophils Relative 1 %      Basophils Relative 1 %      Neutrophils Absolute 9 16 Thousands/µL      Immature Grans Absolute 0 05 Thousand/uL      Lymphocytes Absolute 0 70 Thousands/µL      Monocytes Absolute 0 83 Thousand/µL      Eosinophils Absolute 0 10 Thousand/µL      Basophils Absolute 0 09 Thousands/µL     Blood culture #2 [[id number]] Collected: 01/10/21 2122    Lab Status: In process Specimen: Blood from Arm, Right Updated: 01/10/21 2126    Blood culture #1 [[id number]] Collected: 01/10/21 2122    Lab Status:  In process Specimen: Blood from Arm, Right Updated: 01/10/21 2126                 No orders to display              Procedures  Procedures         ED Course                             SBIRT 22yo+      Most Recent Value   SBIRT (23 yo +)   In order to provide better care to our patients, we are screening all of our patients for alcohol and drug use  Would it be okay to ask you these screening questions? No Filed at: 01/10/2021 2203                    MDM  Number of Diagnoses or Management Options  Diagnosis management comments: Patient is not septic  However she probably is somewhat dehydrated  She does have an acute elevation in BUN and creatinine  Yesterday she clearly had a urinary tract infection  Family unable to take care of her at home because she is increasingly altered had having difficulty ambulating  Consulted with Rosangela Cespedes for admission  Patient did receive IV fluids and empiric antibiotics in the emergency room  Amount and/or Complexity of Data Reviewed  Clinical lab tests: ordered and reviewed  Review and summarize past medical records: yes  Discuss the patient with other providers: yes        Disposition  Final diagnoses: Altered mental status   Urinary tract infection   Dehydration     Time reflects when diagnosis was documented in both MDM as applicable and the Disposition within this note     Time User Action Codes Description Comment    1/10/2021 10:51 PM Saluda Bolster Add [R41 82] Altered mental status     1/10/2021 10:51 PM Amee Bolster Add [N39 0] Urinary tract infection     1/10/2021 10:51 PM [place] Bolster Add [E86 0] Dehydration       ED Disposition     ED Disposition Condition Date/Time Comment    Admit Stable Sun Stefan 10, 2021 10:52 PM       Follow-up Information    None         Patient's Medications   Discharge Prescriptions    No medications on file     No discharge procedures on file      PDMP Review     None          ED Provider  Electronically Signed by           Rachel Brooke MD  01/10/21 3421

## 2021-01-11 NOTE — TELEPHONE ENCOUNTER
[facility] called to let you know that her mother, Renee green is back in the [facility] ED  She said that once she got home from her original visit to the ED, her symptoms did not subside and that led her to going back to the ED  [facility] asked that she remain in the ED until her antibiotics and medications are corrected  Please call her with any updates when available

## 2021-01-12 ENCOUNTER — APPOINTMENT (INPATIENT)
Dept: RADIOLOGY | Facility: HOSPITAL | Age: 81
DRG: 683 | End: 2021-01-12
Payer: MEDICARE

## 2021-01-12 ENCOUNTER — PATIENT OUTREACH (OUTPATIENT)
Dept: FAMILY MEDICINE CLINIC | Facility: CLINIC | Age: 81
End: 2021-01-12

## 2021-01-12 ENCOUNTER — TELEPHONE (OUTPATIENT)
Dept: FAMILY MEDICINE CLINIC | Facility: CLINIC | Age: 81
End: 2021-01-12

## 2021-01-12 PROBLEM — R39.89 SUSPECTED UTI: Status: ACTIVE | Noted: 2021-01-11

## 2021-01-12 PROBLEM — R39.89 SUSPECTED UTI: Status: RESOLVED | Noted: 2021-01-11 | Resolved: 2021-01-12

## 2021-01-12 PROBLEM — S92.353A FRACTURE OF 5TH METATARSAL: Status: ACTIVE | Noted: 2021-01-12

## 2021-01-12 PROBLEM — R63.6 UNDERWEIGHT: Status: ACTIVE | Noted: 2021-01-12

## 2021-01-12 LAB
AMMONIA PLAS-SCNC: <10 UMOL/L (ref 11–35)
ANION GAP SERPL CALCULATED.3IONS-SCNC: 11 MMOL/L (ref 4–13)
BACTERIA UR CULT: ABNORMAL
BACTERIA UR CULT: ABNORMAL
BACTERIA UR CULT: NORMAL
BASOPHILS # BLD AUTO: 0.12 THOUSANDS/ΜL (ref 0–0.1)
BASOPHILS NFR BLD AUTO: 2 % (ref 0–1)
BUN SERPL-MCNC: 26 MG/DL (ref 5–25)
CALCIUM SERPL-MCNC: 9.1 MG/DL (ref 8.3–10.1)
CHLORIDE SERPL-SCNC: 106 MMOL/L (ref 100–108)
CK MB SERPL-MCNC: 1.2 % (ref 0–2.5)
CK MB SERPL-MCNC: 13.6 NG/ML (ref 0–5)
CK SERPL-CCNC: 1136 U/L (ref 26–192)
CO2 SERPL-SCNC: 24 MMOL/L (ref 21–32)
CREAT SERPL-MCNC: 1.69 MG/DL (ref 0.6–1.3)
EOSINOPHIL # BLD AUTO: 0.24 THOUSAND/ΜL (ref 0–0.61)
EOSINOPHIL NFR BLD AUTO: 4 % (ref 0–6)
ERYTHROCYTE [DISTWIDTH] IN BLOOD BY AUTOMATED COUNT: 13 % (ref 11.6–15.1)
GFR SERPL CREATININE-BSD FRML MDRD: 28 ML/MIN/1.73SQ M
GLUCOSE SERPL-MCNC: 109 MG/DL (ref 65–140)
GLUCOSE SERPL-MCNC: 122 MG/DL (ref 65–140)
GLUCOSE SERPL-MCNC: 133 MG/DL (ref 65–140)
GLUCOSE SERPL-MCNC: 136 MG/DL (ref 65–140)
GLUCOSE SERPL-MCNC: 146 MG/DL (ref 65–140)
GLUCOSE SERPL-MCNC: 96 MG/DL (ref 65–140)
HCT VFR BLD AUTO: 38.6 % (ref 34.8–46.1)
HGB BLD-MCNC: 11.9 G/DL (ref 11.5–15.4)
IMM GRANULOCYTES # BLD AUTO: 0.02 THOUSAND/UL (ref 0–0.2)
IMM GRANULOCYTES NFR BLD AUTO: 0 % (ref 0–2)
LYMPHOCYTES # BLD AUTO: 0.74 THOUSANDS/ΜL (ref 0.6–4.47)
LYMPHOCYTES NFR BLD AUTO: 14 % (ref 14–44)
MAGNESIUM SERPL-MCNC: 1.9 MG/DL (ref 1.6–2.6)
MCH RBC QN AUTO: 29.1 PG (ref 26.8–34.3)
MCHC RBC AUTO-ENTMCNC: 30.8 G/DL (ref 31.4–37.4)
MCV RBC AUTO: 94 FL (ref 82–98)
MONOCYTES # BLD AUTO: 0.47 THOUSAND/ΜL (ref 0.17–1.22)
MONOCYTES NFR BLD AUTO: 9 % (ref 4–12)
NEUTROPHILS # BLD AUTO: 3.87 THOUSANDS/ΜL (ref 1.85–7.62)
NEUTS SEG NFR BLD AUTO: 71 % (ref 43–75)
NRBC BLD AUTO-RTO: 0 /100 WBCS
PHOSPHATE SERPL-MCNC: 2.9 MG/DL (ref 2.3–4.1)
PLATELET # BLD AUTO: 231 THOUSANDS/UL (ref 149–390)
PMV BLD AUTO: 10.7 FL (ref 8.9–12.7)
POTASSIUM SERPL-SCNC: 4.1 MMOL/L (ref 3.5–5.3)
PROCALCITONIN SERPL-MCNC: <0.05 NG/ML
RBC # BLD AUTO: 4.09 MILLION/UL (ref 3.81–5.12)
SODIUM SERPL-SCNC: 141 MMOL/L (ref 136–145)
WBC # BLD AUTO: 5.46 THOUSAND/UL (ref 4.31–10.16)

## 2021-01-12 PROCEDURE — 99232 SBSQ HOSP IP/OBS MODERATE 35: CPT | Performed by: FAMILY MEDICINE

## 2021-01-12 PROCEDURE — 99232 SBSQ HOSP IP/OBS MODERATE 35: CPT | Performed by: PSYCHIATRY & NEUROLOGY

## 2021-01-12 PROCEDURE — 82550 ASSAY OF CK (CPK): CPT | Performed by: STUDENT IN AN ORGANIZED HEALTH CARE EDUCATION/TRAINING PROGRAM

## 2021-01-12 PROCEDURE — 80048 BASIC METABOLIC PNL TOTAL CA: CPT | Performed by: STUDENT IN AN ORGANIZED HEALTH CARE EDUCATION/TRAINING PROGRAM

## 2021-01-12 PROCEDURE — 84145 PROCALCITONIN (PCT): CPT | Performed by: STUDENT IN AN ORGANIZED HEALTH CARE EDUCATION/TRAINING PROGRAM

## 2021-01-12 PROCEDURE — 97163 PT EVAL HIGH COMPLEX 45 MIN: CPT

## 2021-01-12 PROCEDURE — 83735 ASSAY OF MAGNESIUM: CPT | Performed by: STUDENT IN AN ORGANIZED HEALTH CARE EDUCATION/TRAINING PROGRAM

## 2021-01-12 PROCEDURE — 85025 COMPLETE CBC W/AUTO DIFF WBC: CPT | Performed by: STUDENT IN AN ORGANIZED HEALTH CARE EDUCATION/TRAINING PROGRAM

## 2021-01-12 PROCEDURE — 71045 X-RAY EXAM CHEST 1 VIEW: CPT

## 2021-01-12 PROCEDURE — 84100 ASSAY OF PHOSPHORUS: CPT | Performed by: STUDENT IN AN ORGANIZED HEALTH CARE EDUCATION/TRAINING PROGRAM

## 2021-01-12 PROCEDURE — 82948 REAGENT STRIP/BLOOD GLUCOSE: CPT

## 2021-01-12 PROCEDURE — 82140 ASSAY OF AMMONIA: CPT | Performed by: GENERAL PRACTICE

## 2021-01-12 PROCEDURE — 82553 CREATINE MB FRACTION: CPT | Performed by: STUDENT IN AN ORGANIZED HEALTH CARE EDUCATION/TRAINING PROGRAM

## 2021-01-12 RX ORDER — SODIUM CHLORIDE 9 MG/ML
100 INJECTION, SOLUTION INTRAVENOUS CONTINUOUS
Status: DISCONTINUED | OUTPATIENT
Start: 2021-01-13 | End: 2021-01-13

## 2021-01-12 RX ORDER — SODIUM CHLORIDE 9 MG/ML
150 INJECTION, SOLUTION INTRAVENOUS CONTINUOUS
Status: DISCONTINUED | OUTPATIENT
Start: 2021-01-12 | End: 2021-01-13

## 2021-01-12 RX ORDER — SELEGILINE HYDROCHLORIDE 5 MG/1
5 TABLET ORAL
Status: DISCONTINUED | OUTPATIENT
Start: 2021-01-13 | End: 2021-01-17 | Stop reason: HOSPADM

## 2021-01-12 RX ORDER — SACCHAROMYCES BOULARDII 250 MG
250 CAPSULE ORAL 2 TIMES DAILY
Status: DISCONTINUED | OUTPATIENT
Start: 2021-01-12 | End: 2021-01-17 | Stop reason: HOSPADM

## 2021-01-12 RX ORDER — QUETIAPINE FUMARATE 25 MG/1
25 TABLET, FILM COATED ORAL
Status: DISCONTINUED | OUTPATIENT
Start: 2021-01-12 | End: 2021-01-17 | Stop reason: HOSPADM

## 2021-01-12 RX ORDER — DEXTROSE, SODIUM CHLORIDE, SODIUM LACTATE, POTASSIUM CHLORIDE, AND CALCIUM CHLORIDE 5; .6; .31; .03; .02 G/100ML; G/100ML; G/100ML; G/100ML; G/100ML
75 INJECTION, SOLUTION INTRAVENOUS CONTINUOUS
Status: DISCONTINUED | OUTPATIENT
Start: 2021-01-12 | End: 2021-01-12

## 2021-01-12 RX ORDER — DEXTROSE, SODIUM CHLORIDE, SODIUM LACTATE, POTASSIUM CHLORIDE, AND CALCIUM CHLORIDE 5; .6; .31; .03; .02 G/100ML; G/100ML; G/100ML; G/100ML; G/100ML
60 INJECTION, SOLUTION INTRAVENOUS CONTINUOUS
Status: DISCONTINUED | OUTPATIENT
Start: 2021-01-12 | End: 2021-01-12

## 2021-01-12 RX ORDER — LORAZEPAM 2 MG/ML
0.5 INJECTION INTRAMUSCULAR ONCE
Status: DISCONTINUED | OUTPATIENT
Start: 2021-01-12 | End: 2021-01-13 | Stop reason: SDUPTHER

## 2021-01-12 RX ADMIN — HEPARIN SODIUM 5000 UNITS: 5000 INJECTION INTRAVENOUS; SUBCUTANEOUS at 15:00

## 2021-01-12 RX ADMIN — Medication 250 MG: at 18:25

## 2021-01-12 RX ADMIN — HEPARIN SODIUM 5000 UNITS: 5000 INJECTION INTRAVENOUS; SUBCUTANEOUS at 21:59

## 2021-01-12 RX ADMIN — LORAZEPAM 0.5 MG: 2 INJECTION INTRAMUSCULAR; INTRAVENOUS at 09:07

## 2021-01-12 RX ADMIN — SELEGILINE HYDROCHLORIDE 5 MG: 5 TABLET ORAL at 09:00

## 2021-01-12 RX ADMIN — ROPINIROLE HYDROCHLORIDE 6 MG: 1 TABLET, FILM COATED ORAL at 21:59

## 2021-01-12 RX ADMIN — DEXTROSE, SODIUM CHLORIDE, SODIUM LACTATE, POTASSIUM CHLORIDE, AND CALCIUM CHLORIDE 75 ML/HR: 5; .6; .31; .03; .02 INJECTION, SOLUTION INTRAVENOUS at 12:08

## 2021-01-12 RX ADMIN — PANTOPRAZOLE SODIUM 40 MG: 40 TABLET, DELAYED RELEASE ORAL at 06:48

## 2021-01-12 RX ADMIN — LORAZEPAM 0.5 MG: 2 INJECTION INTRAMUSCULAR; INTRAVENOUS at 00:59

## 2021-01-12 RX ADMIN — CARBIDOPA AND LEVODOPA 1 TABLET: 25; 100 TABLET ORAL at 06:48

## 2021-01-12 RX ADMIN — OMEGA-3 FATTY ACIDS CAP 1000 MG 2000 MG: 1000 CAP at 09:00

## 2021-01-12 RX ADMIN — CARBIDOPA AND LEVODOPA 1 TABLET: 25; 100 TABLET ORAL at 14:59

## 2021-01-12 RX ADMIN — HEPARIN SODIUM 5000 UNITS: 5000 INJECTION INTRAVENOUS; SUBCUTANEOUS at 06:47

## 2021-01-12 RX ADMIN — QUETIAPINE FUMARATE 25 MG: 25 TABLET ORAL at 21:59

## 2021-01-12 RX ADMIN — Medication 250 MG: at 09:00

## 2021-01-12 RX ADMIN — LORAZEPAM 0.5 MG: 2 INJECTION INTRAMUSCULAR; INTRAVENOUS at 19:09

## 2021-01-12 RX ADMIN — AMLODIPINE BESYLATE 5 MG: 5 TABLET ORAL at 09:00

## 2021-01-12 RX ADMIN — CARBIDOPA AND LEVODOPA 1 TABLET: 25; 100 TABLET ORAL at 12:07

## 2021-01-12 RX ADMIN — CARBIDOPA AND LEVODOPA 1 TABLET: 25; 100 TABLET ORAL at 21:59

## 2021-01-12 RX ADMIN — SODIUM CHLORIDE 150 ML/HR: 0.9 INJECTION, SOLUTION INTRAVENOUS at 22:00

## 2021-01-12 RX ADMIN — CARBIDOPA AND LEVODOPA 1 TABLET: 25; 100 TABLET ORAL at 18:25

## 2021-01-12 NOTE — PROGRESS NOTES
[facility] 79 y o  female MRN: [id number]    Unit/Bed#: [address] [place] [address] Encounter: [id number]      Assessment/Plan:  * Altered mental status  Assessment & Plan  Patient presents altered from baseline  Was seen in ED 2 days prior to admission with same presentation  Discussion with son explained that current mentation is off from her baseline  CT head negative for acute bleed hemorrhage  Patient discharged with p o  Antibiotics  However symptoms did not alleviate  · Urine culture resulted negative  Cause of AMS unknown, MRI ordered, will follow  Dementia due to Parkinson's disease with behavioral disturbance [facility])  Assessment & Plan  Patient seen by [facility] neurologist  Per Dr Shanell Shi note the patient's caregivers have difficulty taking care of the patient  · CM for possible placement  · PT/OT  · Continue home medications    Sinus arrhythmia  Assessment & Plan  During examination patient had irregular rhythm which was confirmed on EKG done on 1/9 which showed sinus arrhythmia  · Morning EKG  · Telemetry    Acute renal failure superimposed on stage 3 chronic kidney disease [facility])  Assessment & Plan  Lab Results   Component Value Date    EGFR 28 01/12/2021    EGFR 21 01/11/2021    EGFR 16 01/10/2021    CREATININE 1 69 (H) 01/12/2021    CREATININE 2 19 (H) 01/11/2021    CREATININE 2 74 (H) 01/10/2021   Patient on admission had elevated creatinine function when compared to last measured creatinine  Possibly secondary to dehydration  · Will start on IVF  · Monitor creatinine function daily    Suspected UTIresolved as of 1/12/2021  Assessment & Plan  69-year-old female with reported altered mental status by family member was found to have probable UTI on urinalysis and urine microscope done yesterday 1/9  On admission patient was oriented to person/place and alert  She has received 1 g Rocephin IV at the ED    Given patient's RODRIGO and renal impairment will start her on alternate antibiotic  · Repeat U/A with culture  · Negative, unlikely to be UTI  · Urinary retention protocol  · I/O  · Levofloxacin 250 mg q48h (renal dosing) started on 1/11, discontinued on 01/12    Type 2 diabetes mellitus without complication, without long-term current use of insulin (HCC)  Assessment & Plan  Lab Results   Component Value Date    HGBA1C 6 4 12/04/2020       No results for input(s): POCGLU in the last 72 hours  Blood Sugar Average: Last 72 hrs:  ISS  Hold metformin due to RODRIGO    Mixed hyperlipidemia  Assessment & Plan  Continue home medications    GERD (gastroesophageal reflux disease)  Assessment & Plan  Will continue home medication    Essential hypertension  Assessment & Plan  Will continue home medication          Subjective:   Patient seen examined at bedside this morning  Patient appears trying to leave the bed  Patient still appears altered, aware of the year but not aware of where she is or why  Answers questions inappropriately  Objective:     Vitals: Blood pressure 164/80, pulse 83, temperature 98 6 °F (37 °C), temperature source Oral, resp  rate 16, height 5' 3" (1 6 m), weight 42 8 kg (94 lb 5 7 oz), SpO2 98 %, not currently breastfeeding  ,Body mass index is 16 71 kg/m²    Wt Readings from Last 3 Encounters:   01/12/21 42 8 kg (94 lb 5 7 oz)   01/12/21 42 8 kg (94 lb 5 7 oz)   01/09/21 43 1 kg (95 lb)       Intake/Output Summary (Last 24 hours) at 1/12/2021 1231  Last data filed at 1/12/2021 1208  Gross per 24 hour   Intake 81094 ml   Output    Net 19973 ml       Physical Exam: General appearance: Alert, disoriented, in no acute distress  Head: Normocephalic, without obvious abnormality, atraumatic  Lungs: clear to auscultation bilaterally  Heart: regular rate and rhythm, S1, S2 normal, no murmur, click, rub or gallop  Abdomen: soft, non-tender; bowel sounds normal; no masses,  no organomegaly  Extremities: extremities normal, warm and well-perfused; no cyanosis, clubbing, or edema  Neurologic: Mental status: Communicating inappropriately, unaware of place or situation     Recent Results (from the past 24 hour(s))   Fingerstick Glucose (POCT)    Collection Time: 01/11/21  2:29 PM   Result Value Ref Range    POC Glucose 96 65 - 140 mg/dl   Fingerstick Glucose (POCT)    Collection Time: 01/11/21  5:00 PM   Result Value Ref Range    POC Glucose 89 65 - 140 mg/dl   Fingerstick Glucose (POCT)    Collection Time: 01/11/21 10:20 PM   Result Value Ref Range    POC Glucose 105 65 - 140 mg/dl   Fingerstick Glucose (POCT)    Collection Time: 01/12/21 12:27 AM   Result Value Ref Range    POC Glucose 109 65 - 140 mg/dl   Phosphorus    Collection Time: 01/12/21  6:45 AM   Result Value Ref Range    Phosphorus 2 9 2 3 - 4 1 mg/dL   Magnesium    Collection Time: 01/12/21  6:45 AM   Result Value Ref Range    Magnesium 1 9 1 6 - 2 6 mg/dL   Basic metabolic panel    Collection Time: 01/12/21  6:45 AM   Result Value Ref Range    Sodium 141 136 - 145 mmol/L    Potassium 4 1 3 5 - 5 3 mmol/L    Chloride 106 100 - 108 mmol/L    CO2 24 21 - 32 mmol/L    ANION GAP 11 4 - 13 mmol/L    BUN 26 (H) 5 - 25 mg/dL    Creatinine 1 69 (H) 0 60 - 1 30 mg/dL    Glucose 122 65 - 140 mg/dL    Calcium 9 1 8 3 - 10 1 mg/dL    eGFR 28 ml/min/1 73sq m   CBC and differential    Collection Time: 01/12/21  6:45 AM   Result Value Ref Range    WBC 5 46 4 31 - 10 16 Thousand/uL    RBC 4 09 3 81 - 5 12 Million/uL    Hemoglobin 11 9 11 5 - 15 4 g/dL    Hematocrit 38 6 34 8 - 46 1 %    MCV 94 82 - 98 fL    MCH 29 1 26 8 - 34 3 pg    MCHC 30 8 (L) 31 4 - 37 4 g/dL    RDW 13 0 11 6 - 15 1 %    MPV 10 7 8 9 - 12 7 fL    Platelets 271 905 - 630 Thousands/uL    nRBC 0 /100 WBCs    Neutrophils Relative 71 43 - 75 %    Immat GRANS % 0 0 - 2 %    Lymphocytes Relative 14 14 - 44 %    Monocytes Relative 9 4 - 12 %    Eosinophils Relative 4 0 - 6 %    Basophils Relative 2 (H) 0 - 1 %    Neutrophils Absolute 3 87 1 85 - 7 62 Thousands/µL Immature Grans Absolute 0 02 0 00 - 0 20 Thousand/uL    Lymphocytes Absolute 0 74 0 60 - 4 47 Thousands/µL    Monocytes Absolute 0 47 0 17 - 1 22 Thousand/µL    Eosinophils Absolute 0 24 0 00 - 0 61 Thousand/µL    Basophils Absolute 0 12 (H) 0 00 - 0 10 Thousands/µL   Fingerstick Glucose (POCT)    Collection Time: 01/12/21  7:33 AM   Result Value Ref Range    POC Glucose 96 65 - 140 mg/dl   Fingerstick Glucose (POCT)    Collection Time: 01/12/21 11:43 AM   Result Value Ref Range    POC Glucose 146 (H) 65 - 140 mg/dl       Current Facility-Administered Medications   Medication Dose Route Frequency Provider Last Rate Last Admin    acetaminophen (TYLENOL) tablet 650 mg  650 mg Oral Q6H PRN Donato Shields MD        amantadine (SYMMETREL) capsule 100 mg  100 mg Oral HS Donato Shields MD   100 mg at 01/11/21 2211    amLODIPine (NORVASC) tablet 5 mg  5 mg Oral Daily Donato Shields MD   5 mg at 01/12/21 0900    carbidopa-levodopa (SINEMET)  mg per tablet 1 tablet  1 tablet Oral 5x Daily KENIA Delgado        dextrose 5 % in lactated Ringer's infusion  75 mL/hr Intravenous Continuous Reid Reagan MD 75 mL/hr at 01/12/21 1208 75 mL/hr at 01/12/21 1208    fish oil capsule 2,000 mg  2,000 mg Oral QAM Donato Sihelds MD   2,000 mg at 01/12/21 0900    heparin (porcine) subcutaneous injection 5,000 Units  5,000 Units Subcutaneous Counts include [facility] beds at the [facility] Donato Shields MD   5,000 Units at 01/12/21 0647    insulin lispro (HumaLOG) 100 units/mL subcutaneous injection 1-5 Units  1-5 Units Subcutaneous TID AC Jnena Plummer DO        insulin lispro (HumaLOG) 100 units/mL subcutaneous injection 1-5 Units  1-5 Units Subcutaneous HS Jenna Plummer DO        [START ON 1/13/2021] istradefylline (NOURIANZ) tablet 20 mg  20 mg Oral Daily KENIA Delgado        LORazepam (ATIVAN) injection 0 5 mg  0 5 mg Intravenous Q6H PRN Reid Reagan MD 0 5 mg at 01/12/21 1726    LORazepam (ATIVAN) injection 0 5 mg  0 5 mg Intravenous Once Reid Reagan MD        pantoprazole (PROTONIX) EC tablet 40 mg  40 mg Oral Early Morning Daniel Borjas MD   40 mg at 01/12/21 0648    rOPINIRole (REQUIP) tablet 6 mg  6 mg Oral HS Daniel Borjas MD   6 mg at 01/11/21 2211    saccharomyces boulardii (FLORASTOR) capsule 250 mg  250 mg Oral BID Calixto James DO   250 mg at 01/12/21 0900    [START ON 1/13/2021] selegiline (ELDEPRYL) tablet 5 mg  5 mg Oral Daily With Breakfast KENIA Delgado        sodium chloride 0 9 % bolus 1,000 mL  1,000 mL Intravenous Once Daniel Borjas MD   Stopped at 01/10/21 5034       Invasive Devices     Peripheral Intravenous Line            Peripheral IV 01/11/21 Right Forearm less than 1 day                Lab, Imaging and other studies: I have personally reviewed pertinent reports      VTE Pharmacologic Prophylaxis: Heparin  VTE Mechanical Prophylaxis: reason for no mechanical VTE prophylaxis Causing agitation for patient    Pamella Galicia MD

## 2021-01-12 NOTE — PROGRESS NOTES
Received phone call from patients kate Lamb  Discussed patients present hospitalization and plan of care  If eligible family may consider STR  Discussed options for care if patient does not go to STR  [facility] along with services that are provided, A agencies that provide care, but are private pay  Discussed disease progression, both parkinsons and dementia along with advanced care planning  Suggested that she inquire about parents finances to see if they are eligible for Medicaid  Medicaid would be helpful in providing patient with needed resources and supports  Discussed [facility] program      CM will continue to follow patients admission and continue to assist care planning and disease management  Link with [employer] emailed to kate Chandra [place]

## 2021-01-12 NOTE — PLAN OF CARE
Problem: Potential for Falls  Goal: Patient will remain free of falls  Description: INTERVENTIONS:  - Assess patient frequently for physical needs  -  Identify cognitive and physical deficits and behaviors that affect risk of falls  -  [place] fall precautions as indicated by assessment   - Educate patient/family on patient safety including physical limitations  - Instruct patient to call for assistance with activity based on assessment  - Modify environment to reduce risk of injury  - Consider OT/PT consult to assist with strengthening/mobility  Outcome: Progressing     Problem: Prexisting or High Potential for Compromised Skin Integrity  Goal: Skin integrity is maintained or improved  Description: INTERVENTIONS:  - Identify patients at risk for skin breakdown  - Assess and monitor skin integrity  - Assess and monitor nutrition and hydration status  - Monitor labs   - Assess for incontinence   - Turn and reposition patient  - Assist with mobility/ambulation  - Relieve pressure over bony prominences  - Avoid friction and shearing  - Provide appropriate hygiene as needed including keeping skin clean and dry  - Evaluate need for skin moisturizer/barrier cream  - Collaborate with interdisciplinary team   - Patient/family teaching  - Consider wound care consult   Outcome: Progressing     Problem: Nutrition/Hydration-ADULT  Goal: Nutrient/Hydration intake appropriate for improving, restoring or maintaining nutritional needs  Description: Monitor and assess patient's nutrition/hydration status for malnutrition  Collaborate with interdisciplinary team and initiate plan and interventions as ordered  Monitor patient's weight and dietary intake as ordered or per policy  Utilize nutrition screening tool and intervene as necessary  Determine patient's food preferences and provide high-protein, high-caloric foods as appropriate       INTERVENTIONS:  - Monitor oral intake, urinary output, labs, and treatment plans  - Assess nutrition and hydration status and recommend course of action  - Evaluate amount of meals eaten  - Assist patient with eating if necessary   - Allow adequate time for meals  - Recommend/ encourage appropriate diets, oral nutritional supplements, and vitamin/mineral supplements  - Assess need for intravenous fluids  - Provide specific nutrition/hydration education as appropriate  - Include patient/family/caregiver in decisions related to nutrition  Outcome: Progressing     Problem: GENITOURINARY - ADULT  Goal: Maintains or returns to baseline urinary function  Description: INTERVENTIONS:  - Assess urinary function  - Encourage oral fluids to ensure adequate hydration if ordered  - Administer IV fluids as ordered to ensure adequate hydration  - Administer ordered medications as needed  - Offer frequent toileting  - Follow urinary retention protocol if ordered  Outcome: Progressing  Goal: Absence of urinary retention  Description: INTERVENTIONS:  - Assess patients ability to void and empty bladder  - Monitor I/O  - Bladder scan as needed  - Discuss with physician/AP medications to alleviate retention as needed  - Discuss catheterization for long term situations as appropriate  Outcome: Progressing  Goal: Urinary catheter remains patent  Description: INTERVENTIONS:  - Assess patency of urinary catheter  - If patient has a chronic allen, consider changing catheter if non-functioning  - Follow guidelines for intermittent irrigation of non-functioning urinary catheter  Outcome: Progressing

## 2021-01-12 NOTE — ASSESSMENT & PLAN NOTE
Patient had swelling of 5th metatarsal of left foot on admission    Point tenderness on exam    · X-ray of left foot: Possible fractures of the 5th middle phalanx and the 5th metatarsal neck  · Tiger texted Dr Rhianna Jay from Podiatry who recommended outpatient follow-up right after discharge

## 2021-01-12 NOTE — CASE MANAGEMENT
LOS: 1 DAY  UNPLANNED RA RISK SCORE: 19  RA: NO  BUNDLE: NO    Patient is here with AMS and lethargic and sleeping  CM met with patients  Agnes Ritter at bedside and discussed discharge planning and PT's recommendation for [facility] at discharge  He is unsure at this time if he wants to go that route  IMM discussed and a copy was provided to him  Patient lives with her  Agnes Ritter in a house  There are 2 steps to enter and then 12 steps to the 2nd floor  There are bathrooms on both floors  Her bedroom is on the 2nd floor where she also has a bedside commode she uses  She also sometimes sleeps on the couch on the first floor  All steps have railings  Patient is independent of ADL's with minimal assistance of her  prior to admission  She ambulates independently with a walker or with a cane while holding her husbands arm  She also has a wheel chair and a shower chair  Patients  does the house work and cleaning  He drives patient to all appointments  Patient does not have a h/o MI or D&A problems at present or in the past      She has medication coverage and uses the [facility] in [place]  Her PCP is Dr Len Dumont  No POA or LW and no information wanted at this time  The above discharge plan was discussed with both patients  and her son Kaden Oglesby  They will review choices and discuss amongst themselves  Cm will need to set up transport  CM to follow for safe discharge plan

## 2021-01-12 NOTE — PROGRESS NOTES
Neurology Consult Follow Up      Yaquelin Hernandes is a 79 y o  female  [address]    [phone]        Assessment/Recommendations:    1 Parkinson's disease with dementia  2  Metabolic encephalopathy 2nd to UTI  3  Dyskinesia with DBS in place  4  Lower extremity weakness  5  RLS  6  Continued RODRIGO-dehydration     -fall risk  -continue with Sinemet regimen 1 tablet 5 times a day  -continue rasagiline 1 mg daily-substituted with selegiline 5 mg daily  -Continue on ropinirole 2 mg tablets, 3 find mouth nightly  -Continue with Nourianz 20mg daily-Can DC at recommendation of Neurology at [facility]  -Give 1/13/21 dose of Selegiline at 11am and  Afternoon Sinemet at 1300  -Starting Seroquel 25mg bedtime per recommendation of Neurology at [facility]  -IV antibiotics per PCP  -IV fluid balance per PCP  -MRI of Brain-rule out infarct  -speech therapy for swallow  -PT/OT for mobility and strengthening  -follow up with patient's outpatient neurologist regarding any change to regimen or DBS  -the case management/ for possible placement and strengthening     Patient is a 71-year-old female who suffers from Parkinson's disease with degree of dementia  Patients baseline is generally oriented, able to walk with a walker with assist, capable to pull in assisting with most of her ADLs, adequate strength and well controlled dyskinesia  According to outpatient Neurology notation, patient has increased dyskinesia with agitation and anxiety  Patient was seen by PCP on January 8th diagnosed with urinary tract infection was started on oral Bactrim  Patient became disoriented at the following day was brought to the ER for further evaluation  Determine at that time patient was disoriented related to her infection, had regained orientation while in the ER therefore was sent home on continued oral antibiotics  Patient's family did not feel patient was doing well, had increased inability to walk and continued to be confused  They had noted that she is unable to recognize her own  and he or his caregiver were unable to provide her the care she needed  Patient was brought back to the ER for further evaluation and treatment      In the ER patient had increase in her BUN and creatinine levels with a decrease in her GFR  Her electrolytes were off with initial K level 6 4, question hemolyzed was then 5 1 and 4 7 today  UA was done with positive leukocytes and WBCs with moderate amount of bacteria noted in the visual field  CT of the head was completed without any abnormal findings, noted presence of DBS  Patient was then admitted for IV antibiotics and IV fluid hydration  Neurology consulted for altered mental status with Parkinson's disease and dementia  Patient also with RLS  Eval of pt today, she is less alert, however received IV Ativan for increased dykinetic movement and discomfort  Pt knows her name is Yudelka Sanders but would not answer where she was or who her family is today  Pt was able to assist with most of the exam, has equal UE strength, was unable to test tricep today  Pt has strong and quick movement of the LE spontaneously, however had difficulty maintain LE elevation from the bed  Unable to perform coordination testing with the pt today  Reflexes equal bilaterally  Urine culture of 1/11/2021 with no growth <1000cfu/mL, prior study on 1/9/2021 with <[id number] Klebsiella  Pt continues to have increased Creat level at 1 6 today  Patient on multiple medications for treatment of her Parkinson's disease with dyskinesia  She has her own outpatient neurologist who manages these medications as well as her DBS  D/W Medical team, there is concern that her symptoms seem more sudden and noted that her last culture of urine remains negative  Concern for underlying neurologic changes  Would like MRI of the Brain     This was ordered and scheduled for 1/13/21 at 130pm however pt is not allowed to be premedicated or receive anesthesia for this testing  [employer] asked for maximization of her current PD regimen  Will have 2pm Sinemet given at 1pm and Daily Selegiline and Nourianz given at 11am to be in full effect for scheduled MRI time  At this time, recommend further hydration and antibiotic therapies per PCP continue to monitor for improvement  Will remain on her current medication regimen per her neurologist and recommend patient continue to follow-up with her outpatient neurologist upon discharge for further evaluation and possible adjustments needed to her DBS  ADDENDUM:  Spoke with hospital PCP team   They had spoken with Pt's OP Neurologist who recommended DC of amantadine and Nourianz  Per reports, neurologist had indicated that the Waynetta Knee has not been of benefit for this patient anyway  Recommended Seroquel for psychosis as well at 25mg at bedtime  Will hold off on discontinuation of multiple medication at one time, therefore, will Elissa Hays given its ineffectiveness  PCP discussed poss EEG, however, pt has not been shown to have clinical evidence of seizure activity and has had increased dykinesia therefore is not likely to have a viable/clear study  Recommend that pt continue to follow up OP Neurology for OP EEG if he deems it necessary        Emil Rinne will need follow up in in 6 weeks with movement disorder and memory attending  She will not require outpatient neurological testing  Patient has her own outpatient neurologist in the [facility] who follows her for her PD and DBS system  Will need to continue to follow-up with them post discharge     Chief Complaint:    Subjective:   Pt reports "Durenda Beau been better" when asked how she is doing  Pt awake and alert to conversation neuro exam   Pt had spastic movements after plantar reflex exam, pt remained awake thru this and stated that she "just cant take that", reports that the testing hurt her feet      Past Medical History:   Diagnosis Date  [facility] 7/19/2019    RODRIGO (acute kidney [facility]) ([facility] 7/19/2019    Anal bleeding 1/29/2018    Arthritis     knee    Asthma     BPPV (benign paroxysmal positional vertigo) 7/19/2016    Last Assessment & Plan:  Hx consistent with BPPV  Neg Joe-Halpike, however could not perform it adequately due to dyskinesias  Recommend lozano-daroff exercises at home  IF worsens, vestibular therapy   Bulging lumbar disc 12/10/2013    Closed fracture of one rib of left side 1/29/2018    Colon polyp     D[facility]     Fall     balance issue    Gallbladder disease     GERD (gastroesophageal reflux disease)     Hematuria     last assessed 10/29/15    Orthostatic hypotension 7/13/2015    Osteomyelitis of lumbar spine ([facility] ) 3/10/2020    Ovarian cyst     LAST ASSESSED: 12/10/13    Parkinson's disease ([facility] )     Pneumonia of both lower lobes due to infectious organism 3/27/2018    Pulmonary embolism with infarction ([facility] ) 9/2/2014    Scalp laceration, subsequent encounter 11/15/2019    Sciatica 12/10/2013    Severe protein-calorie malnutrition ([facility] ) 7/22/2019    Skin disorder     last assessed 12/10/13    Squamous cell carcinoma of skin 11/21/2016    Stage 3a chronic kidney disease 12/4/2020    Toxic metabolic encephalopathy 2/66/0710    Use of cane as ambulatory aid     Walker as ambulation aid     Wears glasses     Weight loss 10/8/2019    Last Assessment & Plan:  Recommend Boost or Ensure  F/u with PCP      Weight loss, unintentional 5/22/2015     Social History     Socioeconomic History    Marital status: /Civil Union     Spouse name: Not on file    Number of children: Not on file    Years of education: Not on file    Highest education level: Not on file   Occupational History    Occupation: [employer]     Comment: BILLING AND SHIPPING   Social Needs    Financial resource strain: Not on file    Food insecurity     Worry: Not on file     Inability: Not on file    Transportation needs     Medical: Not on file     Non-medical: Not on file   Tobacco Use    Smoking status: Never Smoker    Smokeless tobacco: Never Used   Substance and Sexual Activity    Alcohol use: Not Currently    Drug use: Never    Sexual activity: Not on file   Lifestyle    Physical activity     Days per week: Not on file     Minutes per session: Not on file    Stress: Not on file   Relationships    Social connections     Talks on phone: Not on file     Gets together: Not on file     Attends Yazidi service: Not on file     Active member of club or organization: Not on file     Attends meetings of clubs or organizations: Not on file     Relationship status: Not on file    Intimate partner violence     Fear of current or ex partner: Not on file     Emotionally abused: Not on file     Physically abused: Not on file     Forced sexual activity: Not on file   Other Topics Concern    Not on file   Social History Narrative    Son lives an hour away, daughter in [place]  Family History   Problem Relation Age of Onset    Alzheimer's disease Mother     Stroke Father     No Known Problems Sister     No Known Problems Brother        ROS:  Unable to fully assess pt ROS, speech mumbled today, status post      Objective:  /80   Pulse 84   Temp 98 6 °F (37 °C)   Resp 12   Ht 5' 3" (1 6 m)   Wt 42 8 kg (94 lb 5 7 oz)   SpO2 96%   BMI 16 71 kg/m²     General: alert   Mental status: oriented x1  Attention: distracted  Knowledge: poor  Language and Speech: increased mumbling   Cranial nerves:   CN II: Visual fields are full to confrontation  Pupils are 3 mm and briskly reactive to light  CN III, IV, VI: At primary gaze, there is no eye deviation  CN V: Facial sensation is intact in all 3 divisions bilaterally  Corneal responses are intact  CN VII: Face is symmetrical, with normal eye closure and smile    CN VIII: Hearing is normal to rubbing fingers  CN IX, X: Palate elevates symmetrically  Phonation is mumbled  CN XI: Head turning and shoulder shrug are intact  CN XII: Tongue is midline with normal movements and no atrophy  Motor:  Pronator testing WNL, limited amount of time able to maintain elevation  Muscle bulk and tone are normal in bilateral upper extremities  Slightly weaker in LE, however, pt has strong and brisk spontaneous movement of both LE  Muscle exam  Arm Right Left Leg Right Left   Deltoid 4+/5 4+/5 Iliopsoas 4/5 4/5   Biceps 4+/5 4+/5 Quads 4/5 4/5   Triceps 4+/5 4+/5 Hamstrings 4/5 4/5   Wrist Extension 4+/5 4+/5 Ankle Dorsi Flexion 4/5 4/5   Wrist Flexion 4+/5 4+/5 Ankle Plantar Flexion 4/5 4/5       Sensory: normal to light touch, temperature, vibration  Proprioception intact  Gait: Deferred, fall risk  Coordination: BETH     Reflexes    RJ BJ TJ KJ AJ Plantars Green's   Right 2+ 2+ 2+ 2+ 2+ Downgoing Not present   Left 2+ 2+ 2+ 2+ 2+ Downgoing Not present     + rhythmic dyskinetic/myoclonic movement upon plantar reflex testing  Heart: Regular rate and rhythm  Lung: clear to auscultation   Abd: soft, non-tender, non-distended with positive bowel sounds in all quads  Skin: dry and intact    Labs:      Lab Results   Component Value Date    WBC 5 46 01/12/2021    HGB 11 9 01/12/2021    HCT 38 6 01/12/2021    MCV 94 01/12/2021     01/12/2021     Lab Results   Component Value Date    HGBA1C 6 4 12/04/2020     Lab Results   Component Value Date    ALT 13 01/11/2021    AST 40 01/11/2021    ALKPHOS 61 01/11/2021    BILITOT 0 4 11/04/2015     Lab Results   Component Value Date    GLUCOSE 117 (H) 11/04/2015    CALCIUM 9 1 01/12/2021     11/04/2015    K 4 1 01/12/2021    CO2 24 01/12/2021     01/12/2021    BUN 26 (H) 01/12/2021    CREATININE 1 69 (H) 01/12/2021       Review of reports and notes reveal:   Independent review of films/reports:  Xr Chest 1 View Portable    Result Date: 1/10/2021  No acute cardiopulmonary disease   Workstation performed: [id number]     Ct Head Without Contrast    Result Date: 1/9/2021  Stable examination  No acute intracranial abnormality  Workstation performed: [id number]     Ct Head Wo Contrast    Result Date: 1/8/2021  No acute intracranial abnormality  Workstation performed: [id number]     Ct Cervical Spine Without Contrast    Result Date: 1/9/2021  Progression of cervical and upper thoracic degenerative change, compared to the prior examination  New lytic changes are seen within the right C2-3 facet joint  Multilevel mild canal stenosis and foraminal narrowing  Workstation performed: [id number]         Thank you for this consult      Total time of encounter:  30 min  More than 50% of the time was used in counseling and/or coordination of care  Extent of couseling and/or coordination of care

## 2021-01-12 NOTE — QUICK NOTE
Spoke to patient's son, [address]  He is aware that patient is undergoing an MRI tomorrow to rule out other possible causes for altered mental status as her urine cultures have been negative  He is also aware that I spoke to patient's outpatient neurologist   He understands and agrees with the plan  All questions were answered to satisfaction      To be cosigned by Dr Michel Beaulieu

## 2021-01-12 NOTE — QUICK NOTE
Spoke to patient's outpatient neurologist from [facility]  His recommendations are as follows:    - stop amantadine  This can be restarted outpatient at a later time  - stop Kacie Mitchell as he has it documented that this was not working for her anyway  - add seroquel 25 mg po daily for agitation  - possible EEG to r/o seizures    After speaking with our Neurology team, it was decided that we will continue the amantadine and stop only the Kacie Mitchell for now to monitor how she does  We will add the seroquel  EEG can be done outpatient       To be cosigned by Dr Colten Ware

## 2021-01-12 NOTE — PHYSICAL THERAPY NOTE
PT EVALUATION            01/12/21 1001   Note Type   Note type Evaluation   Pain Assessment   Pain Assessment Tool FLACC   Pain Rating: FLACC (Rest) - Face 1   Pain Rating: FLACC (Rest) - Legs 1   Pain Rating: FLACC (Rest) - Activity 1   Pain Rating: FLACC (Rest) - Cry 1   Pain Rating: FLACC (Rest) - Consolability 1   Score: FLACC (Rest) 5   Home Living  All history obtained from previous records, pt unable to give any history  Type of Home House   Prior Function   Level of [place]   (ambulatory with rollator)   Lives With Spouse   Receives Help From Family   ADL Assistance Needs assistance   Restrictions/Precautions   Other Precautions Fall Risk;Bed Alarm; Chair Alarm;Cognitive;Agitated;Combative   General   Additional Pertinent History Pt admitted with altered mental status  Pt has a history of parkinsons disease and dementia but is ambulatory with a walker at baseline  Family/Caregiver Present Yes  (1:1 aid present)   Cognition   Overall Cognitive Status Impaired   Arousal/Participation Uncooperative   Orientation Level Oriented to person   Following Commands Unable to follow one step commands   Comments Pt writhing in the bed     RLE Assessment   RLE Assessment    LLE   assessment   PROM WFL, unable to follow MMT commands, AROM noted all joints      PROM WFL, unable to follow MMT commands, AROM noted all joints       Coordination   Movements are Fluid and Coordinated 0   Coordination and Movement Description Dyskinesia entire body         Cognition   Orientation Level Oriented to person   Bed Mobility   Supine to Sit 2  Maximal assistance   Additional items Assist x 2   Sit to Supine 2  Maximal assistance   Additional items Assist x 2   Additional Comments pt extending her whole body nearly sliding off the edge of the bed   Transfers   Sit to Stand Unable to assess   Additional items   (pt resisting standing up)   Balance   Static Sitting Poor   Dynamic Sitting Poor   Activity Tolerance   Activity Tolerance Treatment limited secondary to agitation   Nurse Made Aware RN   Assessment   Prognosis Good   Problem List Decreased strength;Decreased endurance; Impaired balance;Decreased mobility; Decreased coordination;Decreased cognition; Impaired judgement;Decreased safety awareness   Assessment Patient seen for Physical Therapy evaluation  Patient admitted with Altered mental status  Comorbidities affecting patient's physical performance include: parkinsons disease, DM, dementia, spinal stenosis, falls  Personal factors affecting patient at time of initial evaluation include: ambulating with assistive device, inability to ambulate household distances, decreased cognition, decreased initiation and engagement, impulsivity, limited insight into impairments and inability to perform ADLS  Prior to admission, patient was requiring assist for functional mobility with walker  Please find objective findings from Physical Therapy assessment regarding body systems outlined above with impairments and limitations including weakness, impaired balance, impaired coordination, gait deviations, decreased activity tolerance, decreased functional mobility tolerance, decreased safety awareness, impaired judgement, fall risk and decreased cognition  The Barthel Index was used as a functional outcome tool presenting with a score of 0 today indicating marked limitations of functional mobility and ADLS  Patient's clinical presentation is currently unstable/unpredictable as seen in patient's presentation of varying levels of cognitive performance, increased fall risk, new onset of impairment of functional mobility, decreased endurance and new onset of weakness  Pt would benefit from continued Physical Therapy treatment to address deficits as defined above and maximize level of functional mobility  As demonstrated by objective findings, the assigned level of complexity for this evaluation is high     Goals   Patient Goals unable to state due to confusion   STG Expiration Date 01/19/21   Short Term Goal #1 improve bed mobility to max assist, improve transfers to max assist, assess ambulation   LTG Expiration Date 01/26/21   Long Term Goal #1 no falls, improve bed mobility to min assist improve transfers to min assist, improve ambulation with walker to min assist 50 feet so pt can negoitate her home   Plan   Treatment/Interventions ADL retraining;Functional transfer training;LE strengthening/ROM; Therapeutic exercise; Endurance training;Gait training;Bed mobility; Equipment eval/education;Patient/family training;Cognitive reorientation   PT Frequency 5x/wk   Recommendation   PT Discharge Recommendation Post-Acute Rehabilitation Services   Barthel Index   Feeding 0   Bathing 0   Grooming Score 0   Dressing Score 0   Bladder Score 0   Bowels Score 0   Toilet Use Score 0   Transfers (Bed/Chair) Score 0   Mobility (Level Surface) Score 0   Stairs Score 0   Barthel Index Score 0   Licensure   [place] License Number  Duarte RIVERA  [id number]

## 2021-01-13 ENCOUNTER — TELEPHONE (OUTPATIENT)
Dept: OTHER | Facility: HOSPITAL | Age: 81
End: 2021-01-13

## 2021-01-13 ENCOUNTER — APPOINTMENT (INPATIENT)
Dept: RADIOLOGY | Facility: HOSPITAL | Age: 81
DRG: 683 | End: 2021-01-13
Payer: MEDICARE

## 2021-01-13 ENCOUNTER — TELEPHONE (OUTPATIENT)
Dept: FAMILY MEDICINE CLINIC | Facility: CLINIC | Age: 81
End: 2021-01-13

## 2021-01-13 LAB
ANION GAP SERPL CALCULATED.3IONS-SCNC: 12 MMOL/L (ref 4–13)
ARTERIAL PATENCY WRIST A: YES
BASE EXCESS BLDA CALC-SCNC: 0.7 MMOL/L
BASOPHILS # BLD AUTO: 0.1 THOUSANDS/ΜL (ref 0–0.1)
BASOPHILS NFR BLD AUTO: 2 % (ref 0–1)
BODY TEMPERATURE: 97 DEGREES FEHRENHEIT
BUN SERPL-MCNC: 16 MG/DL (ref 5–25)
CALCIUM SERPL-MCNC: 8.9 MG/DL (ref 8.3–10.1)
CHLORIDE SERPL-SCNC: 107 MMOL/L (ref 100–108)
CK MB SERPL-MCNC: 3 NG/ML (ref 0–5)
CK MB SERPL-MCNC: <1 % (ref 0–2.5)
CK SERPL-CCNC: 611 U/L (ref 26–192)
CO2 SERPL-SCNC: 24 MMOL/L (ref 21–32)
CREAT SERPL-MCNC: 1.16 MG/DL (ref 0.6–1.3)
EOSINOPHIL # BLD AUTO: 0.36 THOUSAND/ΜL (ref 0–0.61)
EOSINOPHIL NFR BLD AUTO: 7 % (ref 0–6)
ERYTHROCYTE [DISTWIDTH] IN BLOOD BY AUTOMATED COUNT: 13.2 % (ref 11.6–15.1)
FOLATE SERPL-MCNC: >20 NG/ML (ref 3.1–17.5)
GFR SERPL CREATININE-BSD FRML MDRD: 45 ML/MIN/1.73SQ M
GLUCOSE SERPL-MCNC: 109 MG/DL (ref 65–140)
GLUCOSE SERPL-MCNC: 113 MG/DL (ref 65–140)
GLUCOSE SERPL-MCNC: 115 MG/DL (ref 65–140)
GLUCOSE SERPL-MCNC: 147 MG/DL (ref 65–140)
GLUCOSE SERPL-MCNC: 45 MG/DL (ref 65–140)
GLUCOSE SERPL-MCNC: 68 MG/DL (ref 65–140)
GLUCOSE SERPL-MCNC: 87 MG/DL (ref 65–140)
GLUCOSE SERPL-MCNC: 98 MG/DL (ref 65–140)
HCO3 BLDA-SCNC: 25.4 MMOL/L (ref 22–28)
HCT VFR BLD AUTO: 44.1 % (ref 34.8–46.1)
HGB BLD-MCNC: 13.1 G/DL (ref 11.5–15.4)
IMM GRANULOCYTES # BLD AUTO: 0.01 THOUSAND/UL (ref 0–0.2)
IMM GRANULOCYTES NFR BLD AUTO: 0 % (ref 0–2)
LYMPHOCYTES # BLD AUTO: 0.71 THOUSANDS/ΜL (ref 0.6–4.47)
LYMPHOCYTES NFR BLD AUTO: 13 % (ref 14–44)
MAGNESIUM SERPL-MCNC: 1.8 MG/DL (ref 1.6–2.6)
MCH RBC QN AUTO: 29.6 PG (ref 26.8–34.3)
MCHC RBC AUTO-ENTMCNC: 29.7 G/DL (ref 31.4–37.4)
MCV RBC AUTO: 100 FL (ref 82–98)
MONOCYTES # BLD AUTO: 0.52 THOUSAND/ΜL (ref 0.17–1.22)
MONOCYTES NFR BLD AUTO: 10 % (ref 4–12)
NEUTROPHILS # BLD AUTO: 3.62 THOUSANDS/ΜL (ref 1.85–7.62)
NEUTS SEG NFR BLD AUTO: 68 % (ref 43–75)
NON VENT ROOM AIR: 21 %
NRBC BLD AUTO-RTO: 0 /100 WBCS
O2 CT BLDA-SCNC: 15.3 ML/DL (ref 16–23)
OXYHGB MFR BLDA: 91.5 % (ref 94–97)
PCO2 BLDA: 41.2 MM HG (ref 36–44)
PCO2 TEMP ADJ BLDA: 39.6 MM HG (ref 36–44)
PH BLD: 7.42 [PH] (ref 7.35–7.45)
PH BLDA: 7.41 [PH] (ref 7.35–7.45)
PHOSPHATE SERPL-MCNC: 2.8 MG/DL (ref 2.3–4.1)
PLATELET # BLD AUTO: 217 THOUSANDS/UL (ref 149–390)
PMV BLD AUTO: 10.6 FL (ref 8.9–12.7)
PO2 BLD: 60.1 MM HG (ref 75–129)
PO2 BLDA: 63.9 MM HG (ref 75–129)
POTASSIUM SERPL-SCNC: 4.4 MMOL/L (ref 3.5–5.3)
PROCALCITONIN SERPL-MCNC: <0.05 NG/ML
RBC # BLD AUTO: 4.43 MILLION/UL (ref 3.81–5.12)
SODIUM SERPL-SCNC: 143 MMOL/L (ref 136–145)
SPECIMEN SOURCE: ABNORMAL
TSH SERPL DL<=0.05 MIU/L-ACNC: 1.3 UIU/ML (ref 0.36–3.74)
VIT B12 SERPL-MCNC: 1698 PG/ML (ref 100–900)
WBC # BLD AUTO: 5.32 THOUSAND/UL (ref 4.31–10.16)

## 2021-01-13 PROCEDURE — 82553 CREATINE MB FRACTION: CPT | Performed by: GENERAL PRACTICE

## 2021-01-13 PROCEDURE — 83735 ASSAY OF MAGNESIUM: CPT | Performed by: STUDENT IN AN ORGANIZED HEALTH CARE EDUCATION/TRAINING PROGRAM

## 2021-01-13 PROCEDURE — 84145 PROCALCITONIN (PCT): CPT | Performed by: STUDENT IN AN ORGANIZED HEALTH CARE EDUCATION/TRAINING PROGRAM

## 2021-01-13 PROCEDURE — 82607 VITAMIN B-12: CPT | Performed by: GENERAL PRACTICE

## 2021-01-13 PROCEDURE — 85025 COMPLETE CBC W/AUTO DIFF WBC: CPT | Performed by: STUDENT IN AN ORGANIZED HEALTH CARE EDUCATION/TRAINING PROGRAM

## 2021-01-13 PROCEDURE — 82948 REAGENT STRIP/BLOOD GLUCOSE: CPT

## 2021-01-13 PROCEDURE — 80048 BASIC METABOLIC PNL TOTAL CA: CPT | Performed by: STUDENT IN AN ORGANIZED HEALTH CARE EDUCATION/TRAINING PROGRAM

## 2021-01-13 PROCEDURE — 36600 WITHDRAWAL OF ARTERIAL BLOOD: CPT

## 2021-01-13 PROCEDURE — G1004 CDSM NDSC: HCPCS

## 2021-01-13 PROCEDURE — 99232 SBSQ HOSP IP/OBS MODERATE 35: CPT | Performed by: FAMILY MEDICINE

## 2021-01-13 PROCEDURE — 70551 MRI BRAIN STEM W/O DYE: CPT

## 2021-01-13 PROCEDURE — 99232 SBSQ HOSP IP/OBS MODERATE 35: CPT | Performed by: PSYCHIATRY & NEUROLOGY

## 2021-01-13 PROCEDURE — 84443 ASSAY THYROID STIM HORMONE: CPT | Performed by: GENERAL PRACTICE

## 2021-01-13 PROCEDURE — 84100 ASSAY OF PHOSPHORUS: CPT | Performed by: STUDENT IN AN ORGANIZED HEALTH CARE EDUCATION/TRAINING PROGRAM

## 2021-01-13 PROCEDURE — 97167 OT EVAL HIGH COMPLEX 60 MIN: CPT

## 2021-01-13 PROCEDURE — 84425 ASSAY OF VITAMIN B-1: CPT | Performed by: STUDENT IN AN ORGANIZED HEALTH CARE EDUCATION/TRAINING PROGRAM

## 2021-01-13 PROCEDURE — 82550 ASSAY OF CK (CPK): CPT | Performed by: GENERAL PRACTICE

## 2021-01-13 PROCEDURE — 82805 BLOOD GASES W/O2 SATURATION: CPT | Performed by: STUDENT IN AN ORGANIZED HEALTH CARE EDUCATION/TRAINING PROGRAM

## 2021-01-13 PROCEDURE — 82746 ASSAY OF FOLIC ACID SERUM: CPT | Performed by: GENERAL PRACTICE

## 2021-01-13 RX ORDER — MAGNESIUM SULFATE HEPTAHYDRATE 40 MG/ML
2 INJECTION, SOLUTION INTRAVENOUS ONCE
Status: COMPLETED | OUTPATIENT
Start: 2021-01-14 | End: 2021-01-14

## 2021-01-13 RX ORDER — DEXTROSE, SODIUM CHLORIDE, SODIUM LACTATE, POTASSIUM CHLORIDE, AND CALCIUM CHLORIDE 5; .6; .31; .03; .02 G/100ML; G/100ML; G/100ML; G/100ML; G/100ML
75 INJECTION, SOLUTION INTRAVENOUS CONTINUOUS
Status: DISCONTINUED | OUTPATIENT
Start: 2021-01-13 | End: 2021-01-14

## 2021-01-13 RX ORDER — DEXTROSE MONOHYDRATE 25 G/50ML
25 INJECTION, SOLUTION INTRAVENOUS ONCE
Status: COMPLETED | OUTPATIENT
Start: 2021-01-13 | End: 2021-01-13

## 2021-01-13 RX ORDER — AMANTADINE HYDROCHLORIDE 100 MG/1
100 CAPSULE, GELATIN COATED ORAL
Status: DISCONTINUED | OUTPATIENT
Start: 2021-01-13 | End: 2021-01-17 | Stop reason: HOSPADM

## 2021-01-13 RX ORDER — SODIUM CHLORIDE, SODIUM GLUCONATE, SODIUM ACETATE, POTASSIUM CHLORIDE, MAGNESIUM CHLORIDE, SODIUM PHOSPHATE, DIBASIC, AND POTASSIUM PHOSPHATE .53; .5; .37; .037; .03; .012; .00082 G/100ML; G/100ML; G/100ML; G/100ML; G/100ML; G/100ML; G/100ML
100 INJECTION, SOLUTION INTRAVENOUS CONTINUOUS
Status: DISCONTINUED | OUTPATIENT
Start: 2021-01-13 | End: 2021-01-13

## 2021-01-13 RX ORDER — LORAZEPAM 2 MG/ML
1 INJECTION INTRAMUSCULAR ONCE
Status: COMPLETED | OUTPATIENT
Start: 2021-01-13 | End: 2021-01-13

## 2021-01-13 RX ADMIN — CARBIDOPA AND LEVODOPA 1 TABLET: 25; 100 TABLET ORAL at 21:36

## 2021-01-13 RX ADMIN — QUETIAPINE FUMARATE 25 MG: 25 TABLET ORAL at 21:36

## 2021-01-13 RX ADMIN — HEPARIN SODIUM 5000 UNITS: 5000 INJECTION INTRAVENOUS; SUBCUTANEOUS at 06:34

## 2021-01-13 RX ADMIN — SELEGILINE HYDROCHLORIDE 5 MG: 5 TABLET ORAL at 11:54

## 2021-01-13 RX ADMIN — SODIUM CHLORIDE, SODIUM GLUCONATE, SODIUM ACETATE, POTASSIUM CHLORIDE AND MAGNESIUM CHLORIDE 100 ML/HR: 526; 502; 368; 37; 30 INJECTION, SOLUTION INTRAVENOUS at 07:28

## 2021-01-13 RX ADMIN — DEXTROSE, SODIUM CHLORIDE, SODIUM LACTATE, POTASSIUM CHLORIDE, AND CALCIUM CHLORIDE 75 ML/HR: 5; .6; .31; .03; .02 INJECTION, SOLUTION INTRAVENOUS at 12:42

## 2021-01-13 RX ADMIN — AMANTADINE 100 MG: 100 CAPSULE ORAL at 21:37

## 2021-01-13 RX ADMIN — HEPARIN SODIUM 5000 UNITS: 5000 INJECTION INTRAVENOUS; SUBCUTANEOUS at 21:36

## 2021-01-13 RX ADMIN — DEXTROSE MONOHYDRATE 25 ML: 500 INJECTION PARENTERAL at 21:08

## 2021-01-13 RX ADMIN — LORAZEPAM 0.5 MG: 2 INJECTION INTRAMUSCULAR; INTRAVENOUS at 18:38

## 2021-01-13 RX ADMIN — SODIUM CHLORIDE 100 ML/HR: 0.9 INJECTION, SOLUTION INTRAVENOUS at 04:00

## 2021-01-13 RX ADMIN — CARBIDOPA AND LEVODOPA 1 TABLET: 25; 100 TABLET ORAL at 11:54

## 2021-01-13 RX ADMIN — LORAZEPAM 1 MG: 2 INJECTION INTRAMUSCULAR; INTRAVENOUS at 14:31

## 2021-01-13 RX ADMIN — CARBIDOPA AND LEVODOPA 1 TABLET: 25; 100 TABLET ORAL at 13:26

## 2021-01-13 RX ADMIN — DEXTROSE MONOHYDRATE 25 ML: 500 INJECTION PARENTERAL at 07:28

## 2021-01-13 RX ADMIN — Medication 250 MG: at 17:32

## 2021-01-13 RX ADMIN — ROPINIROLE HYDROCHLORIDE 6 MG: 1 TABLET, FILM COATED ORAL at 21:36

## 2021-01-13 RX ADMIN — Medication 250 MG: at 09:29

## 2021-01-13 RX ADMIN — LORAZEPAM 0.5 MG: 2 INJECTION INTRAMUSCULAR; INTRAVENOUS at 11:54

## 2021-01-13 RX ADMIN — AMLODIPINE BESYLATE 5 MG: 5 TABLET ORAL at 09:29

## 2021-01-13 RX ADMIN — HEPARIN SODIUM 5000 UNITS: 5000 INJECTION INTRAVENOUS; SUBCUTANEOUS at 13:27

## 2021-01-13 RX ADMIN — ACETAMINOPHEN 650 MG: 325 TABLET, FILM COATED ORAL at 09:29

## 2021-01-13 RX ADMIN — CARBIDOPA AND LEVODOPA 1 TABLET: 25; 100 TABLET ORAL at 17:32

## 2021-01-13 RX ADMIN — PANTOPRAZOLE SODIUM 40 MG: 40 TABLET, DELAYED RELEASE ORAL at 06:34

## 2021-01-13 RX ADMIN — OMEGA-3 FATTY ACIDS CAP 1000 MG 2000 MG: 1000 CAP at 09:29

## 2021-01-13 NOTE — PLAN OF CARE
Problem: Potential for Falls  Goal: Patient will remain free of falls  Description: INTERVENTIONS:  - Assess patient frequently for physical needs  -  Identify cognitive and physical deficits and behaviors that affect risk of falls  -  [place] fall precautions as indicated by assessment   - Educate patient/family on patient safety including physical limitations  - Instruct patient to call for assistance with activity based on assessment  - Modify environment to reduce risk of injury  - Consider OT/PT consult to assist with strengthening/mobility  Outcome: Progressing     Problem: Prexisting or High Potential for Compromised Skin Integrity  Goal: Skin integrity is maintained or improved  Description: INTERVENTIONS:  - Identify patients at risk for skin breakdown  - Assess and monitor skin integrity  - Assess and monitor nutrition and hydration status  - Monitor labs   - Assess for incontinence   - Turn and reposition patient  - Assist with mobility/ambulation  - Relieve pressure over bony prominences  - Avoid friction and shearing  - Provide appropriate hygiene as needed including keeping skin clean and dry  - Evaluate need for skin moisturizer/barrier cream  - Collaborate with interdisciplinary team   - Patient/family teaching  - Consider wound care consult   Outcome: Progressing     Problem: Nutrition/Hydration-ADULT  Goal: Nutrient/Hydration intake appropriate for improving, restoring or maintaining nutritional needs  Description: Monitor and assess patient's nutrition/hydration status for malnutrition  Collaborate with interdisciplinary team and initiate plan and interventions as ordered  Monitor patient's weight and dietary intake as ordered or per policy  Utilize nutrition screening tool and intervene as necessary  Determine patient's food preferences and provide high-protein, high-caloric foods as appropriate       INTERVENTIONS:  - Monitor oral intake, urinary output, labs, and treatment plans  - Assess nutrition and hydration status and recommend course of action  - Evaluate amount of meals eaten  - Assist patient with eating if necessary   - Allow adequate time for meals  - Recommend/ encourage appropriate diets, oral nutritional supplements, and vitamin/mineral supplements  - Assess need for intravenous fluids  - Provide specific nutrition/hydration education as appropriate  - Include patient/family/caregiver in decisions related to nutrition  Outcome: Progressing     Problem: GENITOURINARY - ADULT  Goal: Maintains or returns to baseline urinary function  Description: INTERVENTIONS:  - Assess urinary function  - Encourage oral fluids to ensure adequate hydration if ordered  - Administer IV fluids as ordered to ensure adequate hydration  - Administer ordered medications as needed  - Offer frequent toileting  - Follow urinary retention protocol if ordered  Outcome: Progressing  Goal: Absence of urinary retention  Description: INTERVENTIONS:  - Assess patients ability to void and empty bladder  - Monitor I/O  - Bladder scan as needed  - Discuss with physician/AP medications to alleviate retention as needed  - Discuss catheterization for long term situations as appropriate  Outcome: Progressing  Goal: Urinary catheter remains patent  Description: INTERVENTIONS:  - Assess patency of urinary catheter  - If patient has a chronic allen, consider changing catheter if non-functioning  - Follow guidelines for intermittent irrigation of non-functioning urinary catheter  Outcome: Progressing

## 2021-01-13 NOTE — TELEPHONE ENCOUNTER
I discussed update with daughter Anita Loera re: her mom at [phone]  She will also call the inpt team for a more detailed update

## 2021-01-13 NOTE — PROGRESS NOTES
Neurology Consult Follow Up      Chelsea Cabrera is a 79 y o  female  [address]    [id number]        Assessment/Recommendations:    1 Parkinson's disease with dementia  2  Metabolic encephalopathy 2nd to UTI  3  Dyskinesia with DBS in place  4  Lower extremity weakness  5  RLS  6  Continued RODRIGO-dehydration     -fall risk  -continue with Sinemet regimen 1 tablet 5 times a day  -continue rasagiline 1 mg daily-substituted with selegiline 5 mg daily  -Continue on ropinirole 2 mg tablets, 3 find mouth nightly  -Starting Seroquel 25mg bedtime per recommendation of Neurology at [facility]  -IV antibiotics per PCP  -IV fluid balance per PCP  -MRI of Brain-rule out infarct  -speech therapy for swallow  -PT/OT for mobility and strengthening  -follow up with patient's outpatient neurologist regarding any change to regimen or DBS  -the case management/ for possible placement and strengthening     Patient is a 78-year-old female who suffers from Parkinson's disease with degree of dementia  Neha Blocker is generally oriented, able to walk with a walker with assist, capable to pull in assisting with most of her ADLs, adequate strength and well controlled dyskinesia   According to outpatient Neurology notation, patient has increased dyskinesia with agitation and anxiety  Patient was seen by PCP on January 8th diagnosed with urinary tract infection was started on oral Bactrim   Patient became disoriented at the following day was brought to the ER for further evaluation   Determine at that time patient was disoriented related to her infection, had regained orientation while in the ER therefore was sent home on continued oral antibiotics    Patient's family did not feel patient was doing well, had increased inability to walk and continued to be confused  Zaynab Dinh had noted that she is unable to recognize her own  and he or his caregiver were unable to provide her the care she needed  Nancy Alicia was brought back to the ER for further evaluation and treatment      In the ER patient had increase in her BUN and creatinine levels with a decrease in her GFR   Her electrolytes were off with initial K level 6 4, question hemolyzed was then 5 1 and 4 7 today  Devika Mahesh was done with positive leukocytes and WBCs with moderate amount of bacteria noted in the visual field   CT of the head was completed without any abnormal findings, noted presence of DBS  Patient was then admitted for IV antibiotics and IV fluid hydration   Neurology consulted for altered mental status with Parkinson's disease and dementia   Patient also with RLS      The patient is more awake and alert today, states that she feels better today  Does have MRI of the brain scheduled at 1:30 p m  With [employer] representative available at her side  Patient is able to follow simple commands, is able to respond fairly fluidly to questions  She is oriented x3 today  Patient moving all of her extremities equally with good strength  Has decreased strength in the lower extremities vs the upper extremities  Patient was able to get in the chair this morning, was returned to bed with the assist of 1  Patient was having less dyskinetic movement at rest however did have increased movements throughout the exam   Received calls from MRI, patient having too much movement during this exam,  available  Requested IV Ativan to be provided for the exam to help reduce her movements  MRI resulted, per radiology noting movement artifact however does not note any acute infarction  Notes moderate chronic microangiopathy as well as bilateral deep brain stimulator leads redemonstrated  Patient has ventricular prominence possibly on the basis of central atrophy  To note patient CPK level on 01/12/2021 1136, 611 today  Suspect patient with severe reaction to Bactrim, per  symptoms began to progress after initiating this medication    Patient continues on IV fluid hydration, will be managed medically by PCP and team   No further changes to be made to patient's medication regimen  Will need to follow up with her outpatient neurologist for any further medical changes or need for EEG  ADDENDUM of 01/12/2021:  Spoke with hospital PCP team   They had spoken with Pt's OP Neurologist who recommended DC of amantadine and Mart Pay  However will hold off on discontinuation of multiple medication at one time, therefore, will Renaee Plaster given its ineffectiveness  Patient given Seroquel at bedtime  Recommend that pt continue to follow up OP Neurology for OP EEG if he deems it necessary         Deepika Mackay need follow up in in 6 weeks with movement disorder and memory attending  She will not require outpatient neurological testing   Patient has her own outpatient neurologist in the [facility] who follows her for her PD and DBS system   Will need to continue to follow-up with them post discharge    Chief Complaint:    Subjective:   Patient stated that she is doing better today, states that she feels more awake  Past Medical History:   Diagnosis Date    RODRIGO (acute kidney injury) ([facility] [address] ) 7/19/2019    RODRIGO (acute kidney injury) ([facility] [address] ) 7/19/2019    Anal bleeding 1/29/2018    Arthritis     knee    Asthma     BPPV (benign paroxysmal positional vertigo) 7/19/2016    Last Assessment & Plan:  Hx consistent with BPPV  Neg [facility], however could not perform it adequately due to dyskinesias  Recommend lozano-daroff exercises at home  IF worsens, vestibular therapy      Bulging lumbar disc 12/10/2013    Closed fracture of one rib of left side 1/29/2018    Colon polyp     Dementia ([address] )     Fall     balance issue    Gallbladder disease     GERD (gastroesophageal reflux disease)     Hematuria     last assessed 10/29/15    Orthostatic hypotension 7/13/2015    Osteomyelitis of lumbar spine ([facility] [address] ) 3/10/2020    Ovarian cyst     LAST ASSESSED: 12/10/13  Parkinson's disease ([facility] [address] [facility]     Pneumonia of both lower lobes due to infectious organism 3/27/2018    [facility] 9/2/2014    Scalp laceration, subsequent encounter 11/15/2019    Sciatica 12/10/2013    Severe protein-calorie malnutrition ([facility] [address] ) 7/22/2019    Skin disorder     last assessed 12/10/13    Squamous cell carcinoma of skin 11/21/2016    Stage 3a chronic kidney disease 12/4/2020    Toxic metabolic encephalopathy 0/00/4605    Use of cane as ambulatory aid    Sonia Hy as ambulation aid     Wears glasses     Weight loss 10/8/2019    Last Assessment & Plan:  Recommend Boost or Ensure  F/u with PCP      Weight loss, unintentional 5/22/2015     Social History     Socioeconomic History    Marital status: /Civil Union     Spouse name: Not on file    Number of children: Not on file    Years of education: Not on file    Highest education level: Not on file   Occupational History    Occupation: [employer]     Comment: BILLING AND SHIPPING   Social Needs    Financial resource strain: Not on file    Food insecurity     Worry: Not on file     Inability: Not on file   [place] needs     Medical: Not on file     Non-medical: Not on file   Tobacco Use    Smoking status: Never Smoker    Smokeless tobacco: Never Used   Substance and Sexual Activity    Alcohol use: Not Currently    Drug use: Never    Sexual activity: Not on file   Lifestyle    Physical activity     Days per week: Not on file     Minutes per session: Not on file    Stress: Not on file   Relationships    Social connections     Talks on phone: Not on file     Gets together: Not on file     Attends Mandaeism service: Not on file     Active member of club or organization: Not on file     Attends meetings of clubs or organizations: Not on file     Relationship status: Not on file    Intimate partner violence     Fear of current or ex partner: Not on file     Emotionally abused: Not on file Physically abused: Not on file     Forced sexual activity: Not on file   Other Topics Concern    Not on file   Social History Narrative    Son lives an hour away, daughter in [place]  Family History   Problem Relation Age of Onset    Alzheimer's disease Mother     Stroke Father     No Known Problems Sister     No Known Problems Brother        ROS:  Please see HPI for positive symptoms  No fever, no chills, no weight change  Ocular: No diplopia, no blurred vision, spot/zigzag lines   HEENT:  No sore throat, headache or congestion  No neck pain  COR:  No chest pain  No palpitations  Lungs:  no sob  GI:  no  nausea, no vomiting, no diarrhea, no constipation, no anorexia  :  No dysuria, frequency, or urgency  No hematuria  Musculoskeletal:  No joint pain or swelling   Skin:  No rash or itching  Psychiatric:  no anxiety, no depression  Endocrine:  No polyuria or polydipsia  Objective:  /85   Pulse 73   Temp (!) 97 °F (36 1 °C)   Resp 18   Ht 5' 3" (1 6 m)   Wt 42 kg (92 lb 9 5 oz) Comment: difficult to obtain as patient flailing in bed  SpO2 100%   BMI 16 40 kg/m²     General: alert   Mental status: oriented x3  Attention: normal  Knowledge: fair  Language and Speech:  Mumbling  Cranial nerves:   CN II: Visual fields are full to confrontation  Fundoscopic exam is normal with sharp discs and no vascular changes  Pupils are 3 mm and briskly reactive to light  CN III, IV, VI: At primary gaze, there is no eye deviation  CN V: Facial sensation is intact in all 3 divisions bilaterally  Corneal responses are intact  CN VII: Face is symmetrical, with normal eye closure and smile  CN VIII: Hearing is normal to rubbing fingers  CN IX, X: Palate elevates symmetrically  Phonation is soft and mumbling  CN XI: Head turning and shoulder shrug are intact  CN XII: Tongue is midline with normal movements and no atrophy    Motor:  Unable to perform pronator drift  Muscle bulk and tone are without any significant changes  Patient slightly weaker in the lower extremities than the upper extremities, however capable of moving them spontaneously and fluidly    Muscle exam  Arm Right Left Leg Right Left   Deltoid 4+/5 4+/5 Iliopsoas 4/5 4/5   Biceps 4+/5 4+/5 Quads 4/5 4/5   Triceps 4+/5 4+/5 Hamstrings 4/5 4/5   Wrist Extension 4+/5 4+/5 Ankle Dorsi Flexion 4/5 4/5   Wrist Flexion 4+/5 4+/5 Ankle Plantar Flexion 4/5 4/5          Sensory: normal to light touch, temperature, vibration  Proprioception intact  Gait: deferred due to fall risk  Coordination: finger to nose and heel to toe difficult to fully assess due to dyskinesia    Reflexes    RJ BJ TJ KJ AJ Plantars Green's   Right 2+ 2+ 2+ 2+ 2+ Downgoing Not present   Left 2+ 2+ 2+ 2+ 2+ Downgoing Not present      Heart: Regular rate and rhythm  Lung: clear to auscultation   Abd: soft, non-tender, non-distended with positive bowel sounds in all quads  Skin: dry and intact    Labs:      Lab Results   Component Value Date    WBC 5 32 01/13/2021    HGB 13 1 01/13/2021    HCT 44 1 01/13/2021     (H) 01/13/2021     01/13/2021     Lab Results   Component Value Date    HGBA1C 6 4 12/04/2020     Lab Results   Component Value Date    ALT 13 01/11/2021    AST 40 01/11/2021    ALKPHOS 61 01/11/2021    BILITOT 0 4 11/04/2015     Lab Results   Component Value Date    GLUCOSE 117 (H) 11/04/2015    CALCIUM 8 9 01/13/2021     11/04/2015    K 4 4 01/13/2021    CO2 24 01/13/2021     01/13/2021    BUN 16 01/13/2021    CREATININE 1 16 01/13/2021         Review of reports and notes reveal:   Independent review of films/reports:  Xr Chest 1 View Portable  Result Date: 1/10/2021  No acute cardiopulmonary disease     Workstation performed: [id number]     Xr Foot 3+ Vw Left  Result Date: 1/12/2021  Possible fractures of the 5th middle phalanx and the 5th metatarsal neck, correlate for point tenderness The study was marked in EPIC for immediate notification  Workstation performed: [id number]     Ct Head Without Contrast    Result Date: 1/9/2021  Stable examination  No acute intracranial abnormality  Workstation performed: [id number]     Ct Head Wo Contrast  Result Date: 1/8/2021  No acute intracranial abnormality  Workstation performed: [id number]     Ct Cervical Spine Without Contrast  Result Date: 1/9/2021  Progression of cervical and upper thoracic degenerative change, compared to the prior examination  New lytic changes are seen within the right C2-3 facet joint  Multilevel mild canal stenosis and foraminal narrowing  Workstation performed: [id number]     Mri Brain Wo Contrast  Result Date: 1/13/2021  1  Motion degraded study  No large acute infarction  2   Moderate, chronic microangiopathy  3   Bilateral deep brain stimulator leads redemonstrated  4   Ventricular prominence possibly on the basis of central atrophy  Workstation performed: [id number]         Thank you for this consult      Total time of encounter:  30 min  More than 50% of the time was used in counseling and/or coordination of care  Extent of couseling and/or coordination of care

## 2021-01-13 NOTE — CASE MANAGEMENT
[facility]  [phone]  He states he would like patient to stay in [place] if possible  He is agreeable for referrals to [facility], [facility] and CCP  Referrals were sent in [employer]  CM also called [place] at [facility] to check bed availability  There was no answer and a message was left  Patients  requests CM contact his son Felecia Bryson [phone] with any information including acceptance at facility and transport time  CM to follow

## 2021-01-13 NOTE — OCCUPATIONAL THERAPY NOTE
Occupational Therapy Evaluation       01/13/21 0930   Note Type   Note type Evaluation   Restrictions/Precautions   Other Precautions Cognitive; Chair Alarm; Bed Alarm; Fall Risk;1:1   Pain Assessment   Pain Assessment Tool 0-10   Pain Score No Pain   Home Living   Type of Home House   Home Layout Multi-level  (2 TE, FOS to second floor)   [facility] Walker;Cane;Wheelchair-manual   Prior Function   Level of [place] Needs assistance with ADLs and functional mobility   Lives With Spouse   Receives Help From Family   ADL Assistance Needs assistance   IADLs Needs assistance   Comments Patient is a poor historian due to dementia, PLOF/home set up obtained from chart  Per chart, patient's  provides minimal assist for ADLs, patient is at baseline ambulatory with rollator in the home   ADL   Eating Assistance 4  Minimal Assistance   Grooming Assistance 4  Minimal Assistance   [address] 1  Total Assistance   LB Bathing Assistance 1  Total [address] 1  Total [address] 1  [facility]  1  Total Assistance   Bed Mobility   Supine to Sit 2  Maximal assistance   Additional items Assist x 1   Additional Comments Sitting EOB for approx  5 minutes with mod-max assist   Transfers   Sit to Stand 2  Maximal assistance   Additional items Assist x 2   Stand to Sit 2  Maximal assistance   Additional items Assist x 2   Additional Comments Sit to stand from EOB with max assist x 2  use of RW for support, patient with increased posterior lean in standing, difficulty maintain safe upright position despite max assist x 2, unable to take steps   Patient then performed stand pivot transfer bed to recliner chair without AD with max assist x 2   Balance   Static Sitting Poor +   Dynamic Sitting Poor   Static Standing Poor   Activity Tolerance   Activity Tolerance Patient limited by fatigue;Patient limited by pain;Treatment limited secondary to medical complications (Comment)   RUE Assessment   RUE Assessment WFL   LUE Assessment   LUE Assessment WFL   Cognition   Overall Cognitive Status Impaired   Arousal/Participation Alert; Cooperative  [facility])   Attention Attends with cues to redirect   Orientation Level Oriented to person;Oriented to place; Disoriented to situation   Following Commands Follows one step commands with increased time or repetition   Comments Patient forgetful, calm and cooperative  Able to follow one step commands with increased time or repetition   Assessment   Limitation Decreased ADL status; Decreased UE strength;Decreased Safe judgement during ADL;Decreased endurance;Decreased self-care trans;Decreased high-level ADLs; Decreased cognition   Prognosis Good   Assessment Patient evaluated by Occupational Therapy  Patient admitted with Altered mental status  The patients occupational profile, medical and therapy history includes a extensive additional review of physical, cognitive, or psychosocial history related to current functional performance  Comorbidities affecting functional mobility and ADLS include: PD, PE, BPPV, RODRIGO, squamous cell carcinoma, dementia  Prior to admission, patient was independent with functional mobility with rollator, requiring assist for ADLS and requiring assist for IADLS  The evaluation identifies the following performance deficits: weakness, impaired balance, decreased endurance, increased fall risk, new onset of impairment of functional mobility, decreased ADLS, decreased IADLS, decreased activity tolerance, decreased safety awareness, impaired judgement, decreased cognition, decreased strength and impaired psychosocial skills, that result in activity limitations and/or participation restrictions   This evaluation requires clinical decision making of high complexity, because the patient presents with comorbidites that affect occupational performance and required significant modification of tasks or assistance with consideration of multiple treatment options  The Barthel Index was used as a functional outcome tool presenting with a score of 15, indicating marked limitations of functional mobility and ADLS  Patient will benefit from skilled Occupational Therapy services to address above deficits and facilitate a safe return to prior level of function  Goals   Patient Goals "I need to get up!"   STG Time Frame   (1-7 days)   Short Term Goal  Goals established to promote patient goal of getting up:  Patient will increase standing tolerance to 2 minutes during ADL task to decrease assistance level and decrease fall risk; Patient will increase bed mobility to mod assist in preparation for ADLS and transfers; Patient will increase functional mobility to and from bedside commode with rolling walker with mod assist of 2 to increase performance with ADLS and to use a toilet; Patient will tolerate 5 minutes of UE ROM/strengthening to increase general activity tolerance and performance in ADLS/IADLS; Patient will improve functional activity tolerance to 5 minutes of sustained functional tasks to increase participation in basic self-care and decrease assistance level;  Patient will be able to to verbalize understanding and perform energy conservation/proper body mechanics during ADLS and functional mobility at least 50% of the time with moderate cueing to decrease signs of fatigue and increase stamina to return to prior level of function; Patient will increase dynamic sitting balance to poor+ to improve the ability to sit at edge of bed or on a chair for ADLS;  Patient will increase static/dynamic standing balance to poor+ to improve postural stability and decrease fall risk during standing ADLS and transfers     LTG Time Frame   (8-14 days)   Long Term Goal Patient will increase standing tolerance to 5 minutes during ADL task to decrease assistance level and decrease fall risk; Patient will increase bed mobility to min assist in preparation for ADLS and transfers; Patient will increase functional mobility to and from bedside commode with rolling walker with mod assist to increase performance with ADLS and to use a toilet; Patient will tolerate 10 minutes of UE ROM/strengthening to increase general activity tolerance and performance in ADLS/IADLS; Patient will improve functional activity tolerance to 10 minutes of sustained functional tasks to increase participation in basic self-care and decrease assistance level;  Patient will be able to to verbalize understanding and perform energy conservation/proper body mechanics during ADLS and functional mobility at least 75% of the time with minimalcueing to decrease signs of fatigue and increase stamina to return to prior level of function; Patient will increase static/dynamic sitting balance to fair- to improve the ability to sit at edge of bed or on a chair for ADLS;  Patient will increase static/dynamic standing balance to fair- to improve postural stability and decrease fall risk during standing ADLS and transfers  Functional Transfer Goals   Pt Will Perform All Functional Transfers   (STG mod assist x2, LTG mod assist x 1)   ADL Goals   Pt Will Perform Eating   (STG supervision, LTG independent)   Pt Will Perform Grooming   (STG min assist, LTG supervision)   Pt Will Perform Bathing   (STG max assist, LTG mod assist)   Pt Will Perform UE Dressing   (STG max assist, LTG mod assist)   Pt Will Perform LE Dressing   (STG max assist, LTG mod assist)   Pt Will Perform Toileting   (STG max assist, LTG mod assist)   Plan   Treatment Interventions ADL retraining;Functional transfer training;UE strengthening/ROM; Endurance training;Patient/family training;Equipment evaluation/education; Compensatory technique education;Continued evaluation; Energy conservation; Activityengagement   Goal Expiration Date 01/27/21   OT Frequency 3-5x/wk Recommendation   OT Discharge Recommendation Post-Acute Rehabilitation Services   Barthel Index   Feeding 5   Bathing 0   Grooming Score 0   Dressing Score 0   Bladder Score 0   Bowels Score 5   Toilet Use Score 0   Transfers (Bed/Chair) Score 5   Mobility (Level Surface) Score 0   Stairs Score 0   Barthel Index Score 13   Licensure   [place] License Number  Filiberto Kapadia, OTR/L [id number]

## 2021-01-13 NOTE — PROGRESS NOTES
[facility] 26 Progress Note - Maisha Moreno 79 y o  female MRN: [id number]    Unit/Bed#: [address] [place] [address] Encounter: [id number]      Assessment/Plan:  * Altered mental status  Assessment & Plan  Patient presents altered from baseline  Was seen in ED 2 days prior to admission with same presentation  Discussion with son explained that current mentation is off from her baseline  CT head negative for acute bleed hemorrhage  Patient discharged with p o  Antibiotics  However symptoms did not alleviate  · Urine culture resulted negative  Cause of AMS unknown, MRI ordered, will follow  · Neurology consult  · continue with Sinemet regimen 1 tablet 5 times a day,  rasagiline 1 mg daily-substituted with selegiline 5 mg daily, Continue on ropinirole 2 mg tablets, 3 find mouth nightly, Continue with Nourianz 20mg daily, can DC at recommendation of Neurology at [facility], starting Seroquel 25mg bedtime per recommendation of Neurology at [facility]    Dementia due to Parkinson's disease with behavioral disturbance [facility])  Assessment & Plan  Patient seen by [facility] neurologist  Per Dr Scott Alexandra note the patient's caregivers have difficulty taking care of the patient  · CM for possible placement  · PT/OT  · Continue home medications  · Quetiapine q h s  ordered per patient's outpatient neurologist    Underweight  Assessment & Plan  BMI of 16 71 kg/m2  · Nutrition consult  · Ensure Enlive b i d  Sinus arrhythmia  Assessment & Plan  During examination patient had irregular rhythm which was confirmed on EKG done on 1/9 which showed sinus arrhythmia    · Morning EKG  · Telemetry    Acute renal failure superimposed on stage 3 chronic kidney disease [facility])  Assessment & Plan  Lab Results   Component Value Date    EGFR 28 01/12/2021    EGFR 21 01/11/2021    EGFR 16 01/10/2021    CREATININE 1 69 (H) 01/12/2021    CREATININE 2 19 (H) 01/11/2021    CREATININE 2 74 (H) 01/10/2021   Patient on admission had elevated creatinine function when compared to last measured creatinine  Possibly secondary to dehydration  · Will start on IVF  · Monitor creatinine function daily    Suspected UTIresolved as of 1/12/2021  Assessment & Plan  78-year-old female with reported altered mental status by family member was found to have probable UTI on urinalysis and urine microscope done yesterday 1/9  On admission patient was oriented to person/place and alert  She has received 1 g Rocephin IV at the ED  Given patient's RODRIGO and renal impairment will start her on alternate antibiotic  · Repeat U/A with culture  · Negative, unlikely to be UTI  · Urinary retention protocol  · I/O  · Levofloxacin 250 mg q48h (renal dosing) started on 1/11, discontinued on 01/12    Type 2 diabetes mellitus without complication, without long-term current use of insulin ([facility])  Assessment & Plan  Lab Results   Component Value Date    HGBA1C 6 4 12/04/2020       No results for input(s): POCGLU in the last 72 hours  Blood Sugar Average: Last 72 hrs:  ISS  Hold metformin due to RODRIGO    Possible fracture of 5th metatarsal  Assessment & Plan  Patient has swelling of 5th metatarsal of left foot  Point tenderness on exam    · X-ray of left foot: Possible fractures of the 5th middle phalanx and the 5th metatarsal neck  · Tiger texted Dr Barry Etienne from Podiatry, recommends outpatient follow-up right after discharge    Mixed hyperlipidemia  Assessment & Plan  Continue home medications    GERD (gastroesophageal reflux disease)  Assessment & Plan  Will continue home medication    Essential hypertension  Assessment & Plan  Will continue home medication          Subjective:   Patient seen and examined at bedside this morning  Patient lying in bed, mildly agitated  Patient oriented to year and place and, but not to time and still answering some questions inappropriately  Patient appearing more comfortable      Objective:     Vitals: Blood pressure 167/88, pulse 81, temperature 98 4 °F (36 9 °C), temperature source Axillary, resp  rate 18, height 5' 3" (1 6 m), weight 42 kg (92 lb 9 5 oz), SpO2 97 %, not currently breastfeeding  ,Body mass index is 16 4 kg/m²  Wt Readings from Last 3 Encounters:   01/13/21 42 kg (92 lb 9 5 oz)   01/12/21 42 8 kg (94 lb 5 7 oz)   01/09/21 43 1 kg (95 lb)       Intake/Output Summary (Last 24 hours) at 1/13/2021 1242  Last data filed at 1/13/2021 0900  Gross per 24 hour   Intake 550 ml   Output 450 ml   Net 100 ml       Physical Exam: General appearance: Mildly agitated  Head: Normocephalic, without obvious abnormality, atraumatic  Lungs: clear to auscultation bilaterally  Heart: regular rate and rhythm, S1, S2 normal, no murmur, click, rub or gallop  Abdomen:  Bowel sounds normal, soft to palpation  Extremities: extremities normal, warm and well-perfused; no cyanosis, clubbing, or edema  Neurologic: Mental status: Disoriented, unorganized speech     Recent Results (from the past 24 hour(s))   Fingerstick Glucose (POCT)    Collection Time: 01/12/21  4:16 PM   Result Value Ref Range    POC Glucose 136 65 - 140 mg/dl   Fingerstick Glucose (POCT)    Collection Time: 01/12/21  8:41 PM   Result Value Ref Range    POC Glucose 133 65 - 140 mg/dl   Ammonia    Collection Time: 01/12/21  8:56 PM   Result Value Ref Range    Ammonia <10 (L) 11 - 35 umol/L   Fingerstick Glucose (POCT)    Collection Time: 01/13/21 12:14 AM   Result Value Ref Range    POC Glucose 98 65 - 140 mg/dl   Procalcitonin Reflex    Collection Time: 01/13/21  6:16 AM   Result Value Ref Range    Procalcitonin <0 05 <=0 25 ng/ml   Folate    Collection Time: 01/13/21  6:16 AM   Result Value Ref Range    Folate >20 0 (H) 3 1 - 17 5 ng/mL   TSH, 3rd generation with Free T4 reflex    Collection Time: 01/13/21  6:16 AM   Result Value Ref Range    TSH 3RD GENERATON 1 300 0 358 - 3 740 uIU/mL   Vitamin B12    Collection Time: 01/13/21  6:16 AM   Result Value Ref Range    Vitamin B-12 1,698 (H) 100 - 900 pg/mL   CK (with reflex to MB)    Collection Time: 01/13/21  6:16 AM   Result Value Ref Range    Total  (H) 26 - 192 U/L   Magnesium    Collection Time: 01/13/21  6:16 AM   Result Value Ref Range    Magnesium 1 8 1 6 - 2 6 mg/dL   Phosphorus    Collection Time: 01/13/21  6:16 AM   Result Value Ref Range    Phosphorus 2 8 2 3 - 4 1 mg/dL   Basic metabolic panel    Collection Time: 01/13/21  6:16 AM   Result Value Ref Range    Sodium 143 136 - 145 mmol/L    Potassium 4 4 3 5 - 5 3 mmol/L    Chloride 107 100 - 108 mmol/L    CO2 24 21 - 32 mmol/L    ANION GAP 12 4 - 13 mmol/L    BUN 16 5 - 25 mg/dL    Creatinine 1 16 0 60 - 1 30 mg/dL    Glucose 113 65 - 140 mg/dL    Calcium 8 9 8 3 - 10 1 mg/dL    eGFR 45 ml/min/1 73sq m   CKMB    Collection Time: 01/13/21  6:16 AM   Result Value Ref Range    CK-MB Index <1 0 0 0 - 2 5 %    CK-MB 3 0 0 0 - 5 0 ng/mL   Fingerstick Glucose (POCT)    Collection Time: 01/13/21  7:24 AM   Result Value Ref Range    POC Glucose 45 (L) 65 - 140 mg/dl   Fingerstick Glucose (POCT)    Collection Time: 01/13/21  7:42 AM   Result Value Ref Range    POC Glucose 109 65 - 140 mg/dl   CBC and differential    Collection Time: 01/13/21  8:30 AM   Result Value Ref Range    WBC 5 32 4 31 - 10 16 Thousand/uL    RBC 4 43 3 81 - 5 12 Million/uL    Hemoglobin 13 1 11 5 - 15 4 g/dL    Hematocrit 44 1 34 8 - 46 1 %     (H) 82 - 98 fL    MCH 29 6 26 8 - 34 3 pg    MCHC 29 7 (L) 31 4 - 37 4 g/dL    RDW 13 2 11 6 - 15 1 %    MPV 10 6 8 9 - 12 7 fL    Platelets 055 580 - 854 Thousands/uL    nRBC 0 /100 WBCs    Neutrophils Relative 68 43 - 75 %    Immat GRANS % 0 0 - 2 %    Lymphocytes Relative 13 (L) 14 - 44 %    Monocytes Relative 10 4 - 12 %    Eosinophils Relative 7 (H) 0 - 6 %    Basophils Relative 2 (H) 0 - 1 %    Neutrophils Absolute 3 62 1 85 - 7 62 Thousands/µL    Immature Grans Absolute 0 01 0 00 - 0 20 Thousand/uL    Lymphocytes Absolute 0 71 0 60 - 4 47 Thousands/µL    Monocytes Absolute 0 52 0 17 - 1 22 Thousand/µL Eosinophils Absolute 0 36 0 00 - 0 61 Thousand/µL    Basophils Absolute 0 10 0 00 - 0 10 Thousands/µL   Fingerstick Glucose (POCT)    Collection Time: 01/13/21 11:09 AM   Result Value Ref Range    POC Glucose 115 65 - 140 mg/dl       Current Facility-Administered Medications   Medication Dose Route Frequency Provider Last Rate Last Admin    acetaminophen (TYLENOL) tablet 650 mg  650 mg Oral Q6H PRN Cate Conroy MD   650 mg at 01/13/21 0929    amantadine (SYMMETREL) capsule 100 mg  100 mg Oral HS Jenna Plummer DO        amLODIPine (NORVASC) tablet 5 mg  5 mg Oral Daily Cate Conroy MD   5 mg at 01/13/21 2810    carbidopa-levodopa (SINEMET)  mg per tablet 1 tablet  1 tablet Oral Once Alicja Fluke, CRNP        carbidopa-levodopa (SINEMET)  mg per tablet 1 tablet  1 tablet Oral 5x Daily Alicja Fluke, CRNP        dextrose 5 % in lactated Ringer's infusion  75 mL/hr Intravenous Continuous Richard Nance MD        fish oil capsule 2,000 mg  2,000 mg Oral QAM Cate Conroy MD   2,000 mg at 01/13/21 0069    heparin (porcine) subcutaneous injection 5,000 Units  5,000 Units Subcutaneous [facility] Cate Conroy MD   5,000 Units at 01/13/21 7770    insulin lispro (HumaLOG) 100 units/mL subcutaneous injection 1-5 Units  1-5 Units Subcutaneous TID AC Jenna Plummer DO        insulin lispro (HumaLOG) 100 units/mL subcutaneous injection 1-5 Units  1-5 Units Subcutaneous HS Jenna Plummer DO        LORazepam (ATIVAN) injection 0 5 mg  0 5 mg Intravenous Q6H PRN Richard Nance MD   0 5 mg at 01/13/21 1154    LORazepam (ATIVAN) injection 0 5 mg  0 5 mg Intravenous Once MD Vic Guadarrama [START ON 1/14/2021] magnesium sulfate 2 g/50 mL IVPB (premix) 2 g  2 g Intravenous Once Josue Barrientos MD        multi-electrolyte (PLASMALYTE-A/ISOLYTE-S PH 7 4) IV solution  100 mL/hr Intravenous Continuous Jenna Plummer  mL/hr at 01/13/21 0728 100 mL/hr at 01/13/21 0728    pantoprazole (PROTONIX) EC tablet 40 mg  40 mg Oral Early Morning Maritza Pan MD   40 mg at 01/13/21 5838    QUEtiapine (SEROquel) tablet 25 mg  25 mg Oral HS Jenna Viotto, DO   25 mg at 01/12/21 2159    rOPINIRole (REQUIP) tablet 6 mg  6 mg Oral HS Maritza Pan MD   6 mg at 01/12/21 2159    saccharomyces boulardii (FLORASTOR) capsule 250 mg  250 mg Oral BID Dieynaba Jacob, DO   250 mg at 01/13/21 0929    selegiline (ELDEPRYL) tablet 5 mg  5 mg Oral Daily With Breakfast KENIA Mattson   5 mg at 01/13/21 1154    sodium chloride 0 9 % bolus 1,000 mL  1,000 mL Intravenous Once Maritza Pan MD   Stopped at 01/10/21 2254       Invasive Devices     Peripheral Intravenous Line            Peripheral IV 01/13/21 less than 1 day                Lab, Imaging and other studies: I have personally reviewed pertinent reports      VTE Pharmacologic Prophylaxis: Heparin  VTE Mechanical Prophylaxis: reason for no mechanical VTE prophylaxis Worsening agitation    Guevara Da Silva MD

## 2021-01-13 NOTE — CASE MANAGEMENT
Patient is accepted at the [facility] but needs to be without 1: with sitter for 24 hours before they can take her

## 2021-01-13 NOTE — PLAN OF CARE
Problem: Potential for Falls  Goal: Patient will remain free of falls  Description: INTERVENTIONS:  - Assess patient frequently for physical needs  -  Identify cognitive and physical deficits and behaviors that affect risk of falls  -  [place] fall precautions as indicated by assessment   - Educate patient/family on patient safety including physical limitations  - Instruct patient to call for assistance with activity based on assessment  - Modify environment to reduce risk of injury  - Consider OT/PT consult to assist with strengthening/mobility  Outcome: Progressing - Patient is on continual observation for safety  Problem: Prexisting or High Potential for Compromised Skin Integrity  Goal: Skin integrity is maintained or improved  Description: INTERVENTIONS:  - Identify patients at risk for skin breakdown  - Assess and monitor skin integrity  - Assess and monitor nutrition and hydration status  - Monitor labs   - Assess for incontinence   - Turn and reposition patient  - Assist with mobility/ambulation  - Relieve pressure over bony prominences  - Avoid friction and shearing  - Provide appropriate hygiene as needed including keeping skin clean and dry  - Evaluate need for skin moisturizer/barrier cream  - Collaborate with interdisciplinary team   - Patient/family teaching  - Consider wound care consult   Outcome: Progressing     Problem: Nutrition/Hydration-ADULT  Goal: Nutrient/Hydration intake appropriate for improving, restoring or maintaining nutritional needs  Description: Monitor and assess patient's nutrition/hydration status for malnutrition  Collaborate with interdisciplinary team and initiate plan and interventions as ordered  Monitor patient's weight and dietary intake as ordered or per policy  Utilize nutrition screening tool and intervene as necessary  Determine patient's food preferences and provide high-protein, high-caloric foods as appropriate       INTERVENTIONS:  - Monitor oral intake, urinary output, labs, and treatment plans  - Assess nutrition and hydration status and recommend course of action  - Evaluate amount of meals eaten  - Assist patient with eating if necessary   - Allow adequate time for meals  - Recommend/ encourage appropriate diets, oral nutritional supplements, and vitamin/mineral supplements  - Assess need for intravenous fluids  - Provide specific nutrition/hydration education as appropriate  - Include patient/family/caregiver in decisions related to nutrition  Outcome: Progressing - Patient is on a calorie count and receiving IV fluids for hydration  Problem: GENITOURINARY - ADULT  Goal: Maintains or returns to baseline urinary function  Description: INTERVENTIONS:  - Assess urinary function  - Encourage oral fluids to ensure adequate hydration if ordered  - Administer IV fluids as ordered to ensure adequate hydration  - Administer ordered medications as needed  - Offer frequent toileting  - Follow urinary retention protocol if ordered  Outcome: Progressing  Goal: Absence of urinary retention  Description: INTERVENTIONS:  - Assess patients ability to void and empty bladder  - Monitor I/O  - Bladder scan as needed  - Discuss with physician/AP medications to alleviate retention as needed  - Discuss catheterization for long term situations as appropriate  Outcome: Progressing - Patient is bladder scanned at least every four hours and post void       Problem: METABOLIC, FLUID AND ELECTROLYTES - ADULT  Goal: Electrolytes maintained within normal limits  Description: INTERVENTIONS:  - Monitor labs and assess patient for signs and symptoms of electrolyte imbalances  - Administer electrolyte replacement as ordered  - Monitor response to electrolyte replacements, including repeat lab results as appropriate  - Instruct patient on fluid and nutrition as appropriate  Outcome: Progressing  Goal: Fluid balance maintained  Description: INTERVENTIONS:  - Monitor labs   - Monitor I/O and WT  - Instruct patient on fluid and nutrition as appropriate  - Assess for signs & symptoms of volume excess or deficit  Outcome: Progressing  Goal: Glucose maintained within target range  Description: INTERVENTIONS:  - Monitor Blood Glucose as ordered  - Assess for signs and symptoms of hyperglycemia and hypoglycemia  - Administer ordered medications to maintain glucose within target range  - Assess nutritional intake and initiate nutrition service referral as needed  Outcome: Progressing     Problem: INFECTION - ADULT  Goal: Absence or prevention of progression during hospitalization  Description: INTERVENTIONS:  - Assess and monitor for signs and symptoms of infection  - Monitor lab/diagnostic results  - Monitor all insertion sites, i e  indwelling lines, tubes, and drains  - [place] appropriate cooling/warming therapies per order  - Administer medications as ordered  - Instruct and encourage patient and family to use good hand hygiene technique  - Identify and instruct in appropriate isolation precautions for identified infection/condition  Outcome: Progressing  Goal: Absence of fever/infection during neutropenic period  Description: INTERVENTIONS:  - Monitor WBC    Outcome: Progressing

## 2021-01-13 NOTE — PHYSICAL THERAPY NOTE
Attempted PT treatment however pt off floor for testing  Will follow    Gale Daigle PT  [id number]     01/13/21 1400   Note Type   Note Type Treatment   Cancel Reasons Patient off floor/test

## 2021-01-13 NOTE — SPEECH THERAPY NOTE
Speech Language/Pathology  Attempted to see patient for follow  Up for dysphagia treatment, however she is off the floor       Jovan Cevallos MS CCC-SLP  Speech Language Pathologist  Available via [address] [place] [address] License # [id number]  [facility] [address] [facility] [address] # [id number]

## 2021-01-14 LAB
ALBUMIN SERPL BCP-MCNC: 3.4 G/DL (ref 3.5–5)
ALP SERPL-CCNC: 69 U/L (ref 46–116)
ALT SERPL W P-5'-P-CCNC: 18 U/L (ref 12–78)
ANION GAP SERPL CALCULATED.3IONS-SCNC: 9 MMOL/L (ref 4–13)
AST SERPL W P-5'-P-CCNC: 31 U/L (ref 5–45)
BASOPHILS # BLD AUTO: 0.08 THOUSANDS/ΜL (ref 0–0.1)
BASOPHILS NFR BLD AUTO: 1 % (ref 0–1)
BILIRUB SERPL-MCNC: 0.5 MG/DL (ref 0.2–1)
BUN SERPL-MCNC: 11 MG/DL (ref 5–25)
CALCIUM ALBUM COR SERPL-MCNC: 9.7 MG/DL (ref 8.3–10.1)
CALCIUM SERPL-MCNC: 9.2 MG/DL (ref 8.3–10.1)
CHLORIDE SERPL-SCNC: 104 MMOL/L (ref 100–108)
CO2 SERPL-SCNC: 27 MMOL/L (ref 21–32)
CORTIS AM PEAK SERPL-MCNC: 22.7 UG/DL (ref 4.2–22.4)
CREAT SERPL-MCNC: 1.01 MG/DL (ref 0.6–1.3)
EOSINOPHIL # BLD AUTO: 0.63 THOUSAND/ΜL (ref 0–0.61)
EOSINOPHIL NFR BLD AUTO: 9 % (ref 0–6)
ERYTHROCYTE [DISTWIDTH] IN BLOOD BY AUTOMATED COUNT: 12.9 % (ref 11.6–15.1)
GFR SERPL CREATININE-BSD FRML MDRD: 53 ML/MIN/1.73SQ M
GLUCOSE SERPL-MCNC: 119 MG/DL (ref 65–140)
GLUCOSE SERPL-MCNC: 121 MG/DL (ref 65–140)
GLUCOSE SERPL-MCNC: 123 MG/DL (ref 65–140)
GLUCOSE SERPL-MCNC: 125 MG/DL (ref 65–140)
GLUCOSE SERPL-MCNC: 79 MG/DL (ref 65–140)
HCT VFR BLD AUTO: 41.6 % (ref 34.8–46.1)
HGB BLD-MCNC: 12.8 G/DL (ref 11.5–15.4)
IMM GRANULOCYTES # BLD AUTO: 0.02 THOUSAND/UL (ref 0–0.2)
IMM GRANULOCYTES NFR BLD AUTO: 0 % (ref 0–2)
LYMPHOCYTES # BLD AUTO: 0.82 THOUSANDS/ΜL (ref 0.6–4.47)
LYMPHOCYTES NFR BLD AUTO: 12 % (ref 14–44)
MAGNESIUM SERPL-MCNC: 1.8 MG/DL (ref 1.6–2.6)
MCH RBC QN AUTO: 29.4 PG (ref 26.8–34.3)
MCHC RBC AUTO-ENTMCNC: 30.8 G/DL (ref 31.4–37.4)
MCV RBC AUTO: 95 FL (ref 82–98)
MONOCYTES # BLD AUTO: 0.52 THOUSAND/ΜL (ref 0.17–1.22)
MONOCYTES NFR BLD AUTO: 7 % (ref 4–12)
NEUTROPHILS # BLD AUTO: 4.99 THOUSANDS/ΜL (ref 1.85–7.62)
NEUTS SEG NFR BLD AUTO: 71 % (ref 43–75)
NRBC BLD AUTO-RTO: 0 /100 WBCS
PHOSPHATE SERPL-MCNC: 2.6 MG/DL (ref 2.3–4.1)
PLATELET # BLD AUTO: 211 THOUSANDS/UL (ref 149–390)
PMV BLD AUTO: 10.5 FL (ref 8.9–12.7)
POTASSIUM SERPL-SCNC: 4.1 MMOL/L (ref 3.5–5.3)
PROT SERPL-MCNC: 7.1 G/DL (ref 6.4–8.2)
RBC # BLD AUTO: 4.36 MILLION/UL (ref 3.81–5.12)
SODIUM SERPL-SCNC: 140 MMOL/L (ref 136–145)
WBC # BLD AUTO: 7.06 THOUSAND/UL (ref 4.31–10.16)

## 2021-01-14 PROCEDURE — 84100 ASSAY OF PHOSPHORUS: CPT | Performed by: STUDENT IN AN ORGANIZED HEALTH CARE EDUCATION/TRAINING PROGRAM

## 2021-01-14 PROCEDURE — 80053 COMPREHEN METABOLIC PANEL: CPT | Performed by: STUDENT IN AN ORGANIZED HEALTH CARE EDUCATION/TRAINING PROGRAM

## 2021-01-14 PROCEDURE — 83735 ASSAY OF MAGNESIUM: CPT | Performed by: STUDENT IN AN ORGANIZED HEALTH CARE EDUCATION/TRAINING PROGRAM

## 2021-01-14 PROCEDURE — 82533 TOTAL CORTISOL: CPT | Performed by: STUDENT IN AN ORGANIZED HEALTH CARE EDUCATION/TRAINING PROGRAM

## 2021-01-14 PROCEDURE — 92526 ORAL FUNCTION THERAPY: CPT

## 2021-01-14 PROCEDURE — 85025 COMPLETE CBC W/AUTO DIFF WBC: CPT | Performed by: STUDENT IN AN ORGANIZED HEALTH CARE EDUCATION/TRAINING PROGRAM

## 2021-01-14 PROCEDURE — 99233 SBSQ HOSP IP/OBS HIGH 50: CPT | Performed by: FAMILY MEDICINE

## 2021-01-14 PROCEDURE — 82948 REAGENT STRIP/BLOOD GLUCOSE: CPT

## 2021-01-14 RX ADMIN — LORAZEPAM 0.5 MG: 2 INJECTION INTRAMUSCULAR; INTRAVENOUS at 23:07

## 2021-01-14 RX ADMIN — AMANTADINE 100 MG: 100 CAPSULE ORAL at 21:29

## 2021-01-14 RX ADMIN — HEPARIN SODIUM 5000 UNITS: 5000 INJECTION INTRAVENOUS; SUBCUTANEOUS at 13:59

## 2021-01-14 RX ADMIN — MAGNESIUM SULFATE HEPTAHYDRATE 2 G: 40 INJECTION, SOLUTION INTRAVENOUS at 06:43

## 2021-01-14 RX ADMIN — CARBIDOPA AND LEVODOPA 1 TABLET: 25; 100 TABLET ORAL at 18:57

## 2021-01-14 RX ADMIN — OMEGA-3 FATTY ACIDS CAP 1000 MG 2000 MG: 1000 CAP at 09:50

## 2021-01-14 RX ADMIN — CARBIDOPA AND LEVODOPA 1 TABLET: 25; 100 TABLET ORAL at 21:29

## 2021-01-14 RX ADMIN — Medication 250 MG: at 13:57

## 2021-01-14 RX ADMIN — HEPARIN SODIUM 5000 UNITS: 5000 INJECTION INTRAVENOUS; SUBCUTANEOUS at 21:28

## 2021-01-14 RX ADMIN — CARBIDOPA AND LEVODOPA 1 TABLET: 25; 100 TABLET ORAL at 06:43

## 2021-01-14 RX ADMIN — QUETIAPINE FUMARATE 25 MG: 25 TABLET ORAL at 21:29

## 2021-01-14 RX ADMIN — ROPINIROLE HYDROCHLORIDE 6 MG: 1 TABLET, FILM COATED ORAL at 21:28

## 2021-01-14 RX ADMIN — SELEGILINE HYDROCHLORIDE 5 MG: 5 TABLET ORAL at 13:59

## 2021-01-14 RX ADMIN — CARBIDOPA AND LEVODOPA 1 TABLET: 25; 100 TABLET ORAL at 13:59

## 2021-01-14 RX ADMIN — Medication 250 MG: at 18:56

## 2021-01-14 RX ADMIN — HEPARIN SODIUM 5000 UNITS: 5000 INJECTION INTRAVENOUS; SUBCUTANEOUS at 06:43

## 2021-01-14 RX ADMIN — AMLODIPINE BESYLATE 5 MG: 5 TABLET ORAL at 09:50

## 2021-01-14 NOTE — SPEECH THERAPY NOTE
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)    Acute renal failure superimposed on stage 3 chronic kidney disease (HCC)    Sinus arrhythmia    S/P deep brain stimulator placement    Underweight    Possible fracture of 5th metatarsal         Subjective:  Pt  Is now a 1:1 due to her attempting to climb out of bed  Pt 's mentation and speech have decreased since last seen  Overall intelligibility is severely impaired, dysarthric  Contextually she is unable to follow topics  Needs multiple cues to follow directions  Objective:  Pt  Was seen for dysphagia treatment as follow up to assess tolerance of diet  She had her breakfast tray in front of her  According to the 1:1 she was able to fed herself at least part of her meal, however she did require [place] to complete movements from plate to mouth  Pt  Was given her juice cup which she was unable to coordinate her left hand to hold, which is a change in status from her evaluation when she was able to hold her own cups  No s/s of aspiration noted on puree and thin liquids  Continues to exhibit poor neck posture with inability to hold at midline except for a few seconds  Her tremors continues so positioning continues to change  Assessment:  Pt  Is currently not aspirating on Dys 1 puree with thin liquids  She now needs full assistance to eat and is unable to hold her own cups  Head positioning is poor and she continues to be an aspiration risk  Speech is now more dysarthric and unintelligible in unknown contexts      Plan/Recommendations:  Continue current diet recommendations of Dys 1 with thin liquids    Speech to follow    Jaky Masterson, Ruth Ann Joel 88 CCC-SLP  Speech Language Pathologist  Available via [address] [facility] License # [id number]  [address] [facility] [address] # [id number]

## 2021-01-14 NOTE — PROGRESS NOTES
[facility] 26 Progress Note - Jovita Blake 79 y o  female MRN: [id number]    Unit/Bed#: [address] [place] [address] Encounter: [id number]      Assessment/Plan:  * Altered mental status  Assessment & Plan  Patient presents altered from baseline  Was seen in ED 2 days prior to admission with same presentation  Discussion with son explained that current mentation is off from her baseline  CT head negative for acute bleed hemorrhage  Patient discharged with p o  Antibiotics  However symptoms did not alleviate  · Urine culture resulted negative  Cause of AMS unknown, MRI ordered, will follow  · Neurology consult  · continue with Sinemet regimen 1 tablet 5 times a day,  rasagiline 1 mg daily-substituted with selegiline 5 mg daily, Continue on ropinirole 2 mg tablets, 3 find mouth nightly, Continue with Nourianz 20mg daily, can DC at recommendation of Neurology at [facility], starting Seroquel 25mg bedtime per recommendation of Neurology at [facility]  · Patient's MRI brain is negative for acute process  Her mentation is better  Likely altered previously from dehydration, RODRIGO, UTI, abx side effects  Resume PD regimen as recommended by her neurologist  Patient is cleared for discharged by neurology  Dementia due to Parkinson's disease with behavioral disturbance [facility])  Assessment & Plan  Patient seen by [facility] neurologist  Per Dr Jay Brandon note the patient's caregivers have difficulty taking care of the patient  · CM for possible placement  · PT/OT  · Continue home medications  · Quetiapine q h s  ordered per patient's outpatient neurologist    Underweight  Assessment & Plan  BMI of 16 71 kg/m2  · Nutrition consult  · Ensure Enlive b i d  Sinus arrhythmia  Assessment & Plan  During examination patient had irregular rhythm which was confirmed on EKG done on 1/9 which showed sinus arrhythmia    · Morning EKG  · Telemetry    Acute renal failure superimposed on stage 3 chronic kidney disease [facility])  Assessment & Plan  Lab Results Component Value Date    EGFR 53 01/14/2021    EGFR 45 01/13/2021    EGFR 28 01/12/2021    CREATININE 1 01 01/14/2021    CREATININE 1 16 01/13/2021    CREATININE 1 69 (H) 01/12/2021   Patient on admission had elevated creatinine function when compared to last measured creatinine  Possibly secondary to dehydration  · Will start on IVF  · Monitor creatinine function daily    Suspected UTIresolved as of 1/12/2021  Assessment & Plan  71-year-old female with reported altered mental status by family member was found to have probable UTI on urinalysis and urine microscope done yesterday 1/9  On admission patient was oriented to person/place and alert  She has received 1 g Rocephin IV at the ED  Given patient's RODRIGO and renal impairment will start her on alternate antibiotic  · Repeat U/A with culture  · Negative, unlikely to be UTI  · Urinary retention protocol  · I/O  · Levofloxacin 250 mg q48h (renal dosing) started on 1/11, discontinued on 01/12    Type 2 diabetes mellitus without complication, without long-term current use of insulin ([facility])  Assessment & Plan  Lab Results   Component Value Date    HGBA1C 6 4 12/04/2020       No results for input(s): POCGLU in the last 72 hours  Blood Sugar Average: Last 72 hrs:  ISS  Hold metformin due to RODRIGO    Possible fracture of 5th metatarsal  Assessment & Plan  Patient has swelling of 5th metatarsal of left foot  Point tenderness on exam    · X-ray of left foot: Possible fractures of the 5th middle phalanx and the 5th metatarsal neck  · Tiger texted Dr Celso Ledesma from Podiatry, recommends outpatient follow-up right after discharge    Mixed hyperlipidemia  Assessment & Plan  Continue home medications    GERD (gastroesophageal reflux disease)  Assessment & Plan  Will continue home medication    Essential hypertension  Assessment & Plan  Will continue home medication          Subjective:   Patient seen examined at bedside this morning    Patient still confused, awake alert oriented x2  Unable to obtain review of systems due to mental status  Objective:     Vitals: Blood pressure 156/85, pulse 73, temperature (!) 97 °F (36 1 °C), resp  rate 18, height 5' 3" (1 6 m), weight 42 kg (92 lb 9 5 oz), SpO2 100 %, not currently breastfeeding  ,Body mass index is 16 4 kg/m²    Wt Readings from Last 3 Encounters:   01/13/21 42 kg (92 lb 9 5 oz)   01/12/21 42 8 kg (94 lb 5 7 oz)   01/09/21 43 1 kg (95 lb)       Intake/Output Summary (Last 24 hours) at 1/14/2021 0751  Last data filed at 1/13/2021 1700  Gross per 24 hour   Intake 1450 ml   Output    Net 1450 ml       Physical Exam: General appearance: alert  Head: Normocephalic, without obvious abnormality, atraumatic  Lungs: clear to auscultation bilaterally  Heart: regular rate and rhythm, S1, S2 normal, no murmur, click, rub or gallop  Abdomen: soft, non-tender; bowel sounds normal; no masses,  no organomegaly  Extremities: extremities normal, warm and well-perfused; no cyanosis, clubbing, or edema  Neurologic: Mental status: Disoriented     Recent Results (from the past 24 hour(s))   CBC and differential    Collection Time: 01/13/21  8:30 AM   Result Value Ref Range    WBC 5 32 4 31 - 10 16 Thousand/uL    RBC 4 43 3 81 - 5 12 Million/uL    Hemoglobin 13 1 11 5 - 15 4 g/dL    Hematocrit 44 1 34 8 - 46 1 %     (H) 82 - 98 fL    MCH 29 6 26 8 - 34 3 pg    MCHC 29 7 (L) 31 4 - 37 4 g/dL    RDW 13 2 11 6 - 15 1 %    MPV 10 6 8 9 - 12 7 fL    Platelets 326 998 - 374 Thousands/uL    nRBC 0 /100 WBCs    Neutrophils Relative 68 43 - 75 %    Immat GRANS % 0 0 - 2 %    Lymphocytes Relative 13 (L) 14 - 44 %    Monocytes Relative 10 4 - 12 %    Eosinophils Relative 7 (H) 0 - 6 %    Basophils Relative 2 (H) 0 - 1 %    Neutrophils Absolute 3 62 1 85 - 7 62 Thousands/µL    Immature Grans Absolute 0 01 0 00 - 0 20 Thousand/uL    Lymphocytes Absolute 0 71 0 60 - 4 47 Thousands/µL    Monocytes Absolute 0 52 0 17 - 1 22 Thousand/µL    Eosinophils Absolute 0 36 0 00 - 0 61 Thousand/µL    Basophils Absolute 0 10 0 00 - 0 10 Thousands/µL   Fingerstick Glucose (POCT)    Collection Time: 01/13/21 11:09 AM   Result Value Ref Range    POC Glucose 115 65 - 140 mg/dl   Fingerstick Glucose (POCT)    Collection Time: 01/13/21  4:41 PM   Result Value Ref Range    POC Glucose 87 65 - 140 mg/dl   Blood gas, arterial    Collection Time: 01/13/21  4:56 PM   Result Value Ref Range    pH, Arterial 7 408 7 350 - 7 450    PH ART TC 7 421 7 350 - 7 450    pCO2, Arterial 41 2 36 0 - 44 0 mm Hg    PCO2 (TC) Arterial 39 6 36 0 - 44 0 mm Hg    pO2, Arterial 63 9 (L) 75 0 - 129 0 mm Hg    PO2 (TC) Arterial 60 1 (L) 75 0 - 129 0 mm Hg    HCO3, Arterial 25 4 22 0 - 28 0 mmol/L    Base Excess, Arterial 0 7 mmol/L    O2 Content, Arterial 15 3 (L) 16 0 - 23 0 mL/dL    O2 HGB,Arterial  91 5 (L) 94 0 - 97 0 %    SOURCE Radial, Right     JENISE TEST Yes     Temperature 97 0 Degrees Fehrenheit    ROOM AIR FIO2 21 %   Fingerstick Glucose (POCT)    Collection Time: 01/13/21  8:53 PM   Result Value Ref Range    POC Glucose 68 65 - 140 mg/dl   Fingerstick Glucose (POCT)    Collection Time: 01/13/21  9:33 PM   Result Value Ref Range    POC Glucose 147 (H) 65 - 140 mg/dl   CBC and differential    Collection Time: 01/14/21  6:50 AM   Result Value Ref Range    WBC 7 06 4 31 - 10 16 Thousand/uL    RBC 4 36 3 81 - 5 12 Million/uL    Hemoglobin 12 8 11 5 - 15 4 g/dL    Hematocrit 41 6 34 8 - 46 1 %    MCV 95 82 - 98 fL    MCH 29 4 26 8 - 34 3 pg    MCHC 30 8 (L) 31 4 - 37 4 g/dL    RDW 12 9 11 6 - 15 1 %    MPV 10 5 8 9 - 12 7 fL    Platelets 801 257 - 428 Thousands/uL    nRBC 0 /100 WBCs    Neutrophils Relative 71 43 - 75 %    Immat GRANS % 0 0 - 2 %    Lymphocytes Relative 12 (L) 14 - 44 %    Monocytes Relative 7 4 - 12 %    Eosinophils Relative 9 (H) 0 - 6 %    Basophils Relative 1 0 - 1 %    Neutrophils Absolute 4 99 1 85 - 7 62 Thousands/µL    Immature Grans Absolute 0 02 0 00 - 0 20 Thousand/uL Lymphocytes Absolute 0 82 0 60 - 4 47 Thousands/µL    Monocytes Absolute 0 52 0 17 - 1 22 Thousand/µL    Eosinophils Absolute 0 63 (H) 0 00 - 0 61 Thousand/µL    Basophils Absolute 0 08 0 00 - 0 10 Thousands/µL   Comprehensive metabolic panel    Collection Time: 01/14/21  6:50 AM   Result Value Ref Range    Sodium 140 136 - 145 mmol/L    Potassium 4 1 3 5 - 5 3 mmol/L    Chloride 104 100 - 108 mmol/L    CO2 27 21 - 32 mmol/L    ANION GAP 9 4 - 13 mmol/L    BUN 11 5 - 25 mg/dL    Creatinine 1 01 0 60 - 1 30 mg/dL    Glucose 119 65 - 140 mg/dL    Calcium 9 2 8 3 - 10 1 mg/dL    Corrected Calcium 9 7 8 3 - 10 1 mg/dL    AST 31 5 - 45 U/L    ALT 18 12 - 78 U/L    Alkaline Phosphatase 69 46 - 116 U/L    Total Protein 7 1 6 4 - 8 2 g/dL    Albumin 3 4 (L) 3 5 - 5 0 g/dL    Total Bilirubin 0 50 0 20 - 1 00 mg/dL    eGFR 53 ml/min/1 73sq m   Magnesium    Collection Time: 01/14/21  6:50 AM   Result Value Ref Range    Magnesium 1 8 1 6 - 2 6 mg/dL   Phosphorus    Collection Time: 01/14/21  6:50 AM   Result Value Ref Range    Phosphorus 2 6 2 3 - 4 1 mg/dL   Fingerstick Glucose (POCT)    Collection Time: 01/14/21  7:03 AM   Result Value Ref Range    POC Glucose 79 65 - 140 mg/dl       Current Facility-Administered Medications   Medication Dose Route Frequency Provider Last Rate Last Admin    acetaminophen (TYLENOL) tablet 650 mg  650 mg Oral Q6H PRN Janay Matt MD   650 mg at 01/13/21 0929    amantadine (SYMMETREL) capsule 100 mg  100 mg Oral HS Jenna Plummer, DO   100 mg at 01/13/21 2137    amLODIPine (NORVASC) tablet 5 mg  5 mg Oral Daily Janay Matt MD   5 mg at 01/13/21 0929    carbidopa-levodopa (SINEMET)  mg per tablet 1 tablet  1 tablet Oral 5x Daily KENIA Kang   1 tablet at 01/14/21 2233    dextrose 5 % in lactated Ringer's infusion  75 mL/hr Intravenous Continuous Jeremiah Tomas MD 75 mL/hr at 01/13/21 1242 75 mL/hr at 01/13/21 1242    fish oil capsule 2,000 mg  2,000 mg Oral QAM Socorro Calvo MD   2,000 mg at 01/13/21 0144    heparin (porcine) subcutaneous injection 5,000 Units  5,000 Units Subcutaneous [facility] Socorro Calvo MD   5,000 Units at 01/14/21 2055    insulin lispro (HumaLOG) 100 units/mL subcutaneous injection 1-5 Units  1-5 Units Subcutaneous TID AC Jenna Plummer DO        insulin lispro (HumaLOG) 100 units/mL subcutaneous injection 1-5 Units  1-5 Units Subcutaneous HS Jenna Corralo DO        LORazepam (ATIVAN) injection 0 5 mg  0 5 mg Intravenous Q6H PRN Owen Soto MD   0 5 mg at 01/13/21 1838    magnesium sulfate 2 g/50 mL IVPB (premix) 2 g  2 g Intravenous Once Ryan Quinn MD   2 g at 01/14/21 0643    pantoprazole (PROTONIX) EC tablet 40 mg  40 mg Oral Early Morning Socorro Calvo MD   40 mg at 01/13/21 4003    QUEtiapine (SEROquel) tablet 25 mg  25 mg Oral HS Jenna Plummer DO   25 mg at 01/13/21 2136    rOPINIRole (REQUIP) tablet 6 mg  6 mg Oral HS Socorro Calvo MD   6 mg at 01/13/21 2136    saccharomyces boulardii (FLORASTOR) capsule 250 mg  250 mg Oral BID Dieynmaria de jesus James DO   250 mg at 01/13/21 1732    selegiline (ELDEPRYL) tablet 5 mg  5 mg Oral Daily With Breakfast KENIA Mascorro   5 mg at 01/13/21 1154    sodium chloride 0 9 % bolus 1,000 mL  1,000 mL Intravenous Once Socorro Calvo MD   Stopped at 01/10/21 2254       Invasive Devices     Peripheral Intravenous Line            Peripheral IV 01/13/21 1 day                Lab, Imaging and other studies: I have personally reviewed pertinent reports      VTE Pharmacologic Prophylaxis: Heparin  VTE Mechanical Prophylaxis: sequential compression device    Ryan Quinn MD

## 2021-01-14 NOTE — CASE MANAGEMENT
CM met with patients son Felecia Bryson and Dr Carla Perez for family planning and discuss discharge planning  Patient presented with AMS on admission and has been slowly improving  Patient remains on a 1:1 for safety  Patient is very weak, unpredictable, confused at times and keeps trying to get out of bed  Patient is otherwise medically stable  Patient was accepted at [facility] but they cannot take patient due to her being on a 1:1  Patient must be off a 1:1 for 24 hours before they can take patient  Dr Gregory Anthony does not feel it is safe at this time  CM, Dr Gregory Anthony and Richie Del Rio discussed alternative discharge plan where they would hire private aids to stay with patient and her  for safety  CM recommended placing referrals to VNA/HHS as well for PT/OT and nursing which would be separate  Explained to [place] would not pay for private aids  They would have to pay OOP but insurance would cover VNA/HHS which could include PT/OT/Aids/Nursing but on a limited basis  He verbally states understanding  CM provided available pamphlets for aids and homemaker agencies to patients son and he will review  Richie Del Rio is agreeable to [facility] as well and referral were sent to [facility], [facility] and [facility]  He will check into them online and will call CM in the morning  DCP is for [facility] or home w/private care and VNA w/PT  If patient will be returning home son will need time to put above plan into place  Anticipated discharge is Saturday  CM to follow

## 2021-01-14 NOTE — PLAN OF CARE
Problem: Potential for Falls  Goal: Patient will remain free of falls  Description: INTERVENTIONS:  - Assess patient frequently for physical needs  -  Identify cognitive and physical deficits and behaviors that affect risk of falls  -  [place] fall precautions as indicated by assessment   - Educate patient/family on patient safety including physical limitations  - Instruct patient to call for assistance with activity based on assessment  - Modify environment to reduce risk of injury  - Consider OT/PT consult to assist with strengthening/mobility  1/13/2021 1933 by Rasheeda Allen RN  Outcome: Progressing  1/13/2021 1042 by Rasheeda Allen RN  Outcome: Progressing     Problem: Prexisting or High Potential for Compromised Skin Integrity  Goal: Skin integrity is maintained or improved  Description: INTERVENTIONS:  - Identify patients at risk for skin breakdown  - Assess and monitor skin integrity  - Assess and monitor nutrition and hydration status  - Monitor labs   - Assess for incontinence   - Turn and reposition patient  - Assist with mobility/ambulation  - Relieve pressure over bony prominences  - Avoid friction and shearing  - Provide appropriate hygiene as needed including keeping skin clean and dry  - Evaluate need for skin moisturizer/barrier cream  - Collaborate with interdisciplinary team   - Patient/family teaching  - Consider wound care consult   1/13/2021 1933 by Rasheeda Allen RN  Outcome: Progressing  1/13/2021 1042 by Rasheeda Allen RN  Outcome: Progressing     Problem: Nutrition/Hydration-ADULT  Goal: Nutrient/Hydration intake appropriate for improving, restoring or maintaining nutritional needs  Description: Monitor and assess patient's nutrition/hydration status for malnutrition  Collaborate with interdisciplinary team and initiate plan and interventions as ordered  Monitor patient's weight and dietary intake as ordered or per policy   Utilize nutrition screening tool and intervene as necessary  Determine patient's food preferences and provide high-protein, high-caloric foods as appropriate       INTERVENTIONS:  - Monitor oral intake, urinary output, labs, and treatment plans  - Assess nutrition and hydration status and recommend course of action  - Evaluate amount of meals eaten  - Assist patient with eating if necessary   - Allow adequate time for meals  - Recommend/ encourage appropriate diets, oral nutritional supplements, and vitamin/mineral supplements  - Assess need for intravenous fluids  - Provide specific nutrition/hydration education as appropriate  - Include patient/family/caregiver in decisions related to nutrition  1/13/2021 1933 by Pennie Ortiz RN  Outcome: Progressing  1/13/2021 1042 by Pennie Ortiz RN  Outcome: Progressing     Problem: GENITOURINARY - ADULT  Goal: Maintains or returns to baseline urinary function  Description: INTERVENTIONS:  - Assess urinary function  - Encourage oral fluids to ensure adequate hydration if ordered  - Administer IV fluids as ordered to ensure adequate hydration  - Administer ordered medications as needed  - Offer frequent toileting  - Follow urinary retention protocol if ordered  1/13/2021 1933 by Pennie Ortiz RN  Outcome: Progressing  1/13/2021 1042 by Pennie Ortiz RN  Outcome: Progressing  Goal: Absence of urinary retention  Description: INTERVENTIONS:  - Assess patients ability to void and empty bladder  - Monitor I/O  - Bladder scan as needed  - Discuss with physician/AP medications to alleviate retention as needed  - Discuss catheterization for long term situations as appropriate  1/13/2021 1933 by Pennie Ortiz RN  Outcome: Progressing  1/13/2021 1042 by Pennie Ortiz RN  Outcome: Progressing  Goal: Urinary catheter remains patent  Description: INTERVENTIONS:  - Assess patency of urinary catheter  - If patient has a chronic allen, consider changing catheter if non-functioning  - Follow guidelines for intermittent irrigation of non-functioning urinary catheter  1/13/2021 1933 by Uma Pulliam RN  Outcome: Progressing  1/13/2021 1042 by Uma Pulliam RN  Outcome: Progressing

## 2021-01-14 NOTE — PLAN OF CARE
Problem: Potential for Falls  Goal: Patient will remain free of falls  Description: INTERVENTIONS:  - Assess patient frequently for physical needs  -  Identify cognitive and physical deficits and behaviors that affect risk of falls  -  [place] fall precautions as indicated by assessment   - Educate patient/family on patient safety including physical limitations  - Instruct patient to call for assistance with activity based on assessment  - Modify environment to reduce risk of injury  - Consider OT/PT consult to assist with strengthening/mobility  Outcome: Progressing     Problem: Prexisting or High Potential for Compromised Skin Integrity  Goal: Skin integrity is maintained or improved  Description: INTERVENTIONS:  - Identify patients at risk for skin breakdown  - Assess and monitor skin integrity  - Assess and monitor nutrition and hydration status  - Monitor labs   - Assess for incontinence   - Turn and reposition patient  - Assist with mobility/ambulation  - Relieve pressure over bony prominences  - Avoid friction and shearing  - Provide appropriate hygiene as needed including keeping skin clean and dry  - Evaluate need for skin moisturizer/barrier cream  - Collaborate with interdisciplinary team   - Patient/family teaching  - Consider wound care consult   Outcome: Progressing     Problem: Nutrition/Hydration-ADULT  Goal: Nutrient/Hydration intake appropriate for improving, restoring or maintaining nutritional needs  Description: Monitor and assess patient's nutrition/hydration status for malnutrition  Collaborate with interdisciplinary team and initiate plan and interventions as ordered  Monitor patient's weight and dietary intake as ordered or per policy  Utilize nutrition screening tool and intervene as necessary  Determine patient's food preferences and provide high-protein, high-caloric foods as appropriate       INTERVENTIONS:  - Monitor oral intake, urinary output, labs, and treatment plans  - Assess nutrition and hydration status and recommend course of action  - Evaluate amount of meals eaten  - Assist patient with eating if necessary   - Allow adequate time for meals  - Recommend/ encourage appropriate diets, oral nutritional supplements, and vitamin/mineral supplements  - Assess need for intravenous fluids  - Provide specific nutrition/hydration education as appropriate  - Include patient/family/caregiver in decisions related to nutrition  Outcome: Progressing     Problem: GENITOURINARY - ADULT  Goal: Maintains or returns to baseline urinary function  Description: INTERVENTIONS:  - Assess urinary function  - Encourage oral fluids to ensure adequate hydration if ordered  - Administer IV fluids as ordered to ensure adequate hydration  - Administer ordered medications as needed  - Offer frequent toileting  - Follow urinary retention protocol if ordered  Outcome: Progressing  Goal: Absence of urinary retention  Description: INTERVENTIONS:  - Assess patients ability to void and empty bladder  - Monitor I/O  - Bladder scan as needed  - Discuss with physician/AP medications to alleviate retention as needed  - Discuss catheterization for long term situations as appropriate  Outcome: Progressing  Goal: Urinary catheter remains patent  Description: INTERVENTIONS:  - Assess patency of urinary catheter  - If patient has a chronic allen, consider changing catheter if non-functioning  - Follow guidelines for intermittent irrigation of non-functioning urinary catheter  Outcome: Progressing     Problem: METABOLIC, FLUID AND ELECTROLYTES - ADULT  Goal: Electrolytes maintained within normal limits  Description: INTERVENTIONS:  - Monitor labs and assess patient for signs and symptoms of electrolyte imbalances  - Administer electrolyte replacement as ordered  - Monitor response to electrolyte replacements, including repeat lab results as appropriate  - Instruct patient on fluid and nutrition as appropriate  Outcome: Progressing  Goal: Fluid balance maintained  Description: INTERVENTIONS:  - Monitor labs   - Monitor I/O and WT  - Instruct patient on fluid and nutrition as appropriate  - Assess for signs & symptoms of volume excess or deficit  Outcome: Progressing  Goal: Glucose maintained within target range  Description: INTERVENTIONS:  - Monitor Blood Glucose as ordered  - Assess for signs and symptoms of hyperglycemia and hypoglycemia  - Administer ordered medications to maintain glucose within target range  - Assess nutritional intake and initiate nutrition service referral as needed  Outcome: Progressing     Problem: INFECTION - ADULT  Goal: Absence or prevention of progression during hospitalization  Description: INTERVENTIONS:  - Assess and monitor for signs and symptoms of infection  - Monitor lab/diagnostic results  - Monitor all insertion sites, i e  indwelling lines, tubes, and drains  - Monitor endotracheal if appropriate and nasal secretions for changes in amount and color  - [place] appropriate cooling/warming therapies per order  - Administer medications as ordered  - Instruct and encourage patient and family to use good hand hygiene technique  - Identify and instruct in appropriate isolation precautions for identified infection/condition  Outcome: Progressing  Goal: Absence of fever/infection during neutropenic period  Description: INTERVENTIONS:  - Monitor WBC    Outcome: Progressing
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
## 2021-01-15 LAB
GLUCOSE SERPL-MCNC: 108 MG/DL (ref 65–140)
GLUCOSE SERPL-MCNC: 128 MG/DL (ref 65–140)
GLUCOSE SERPL-MCNC: 137 MG/DL (ref 65–140)
GLUCOSE SERPL-MCNC: 215 MG/DL (ref 65–140)

## 2021-01-15 PROCEDURE — 99233 SBSQ HOSP IP/OBS HIGH 50: CPT | Performed by: FAMILY MEDICINE

## 2021-01-15 PROCEDURE — 92526 ORAL FUNCTION THERAPY: CPT

## 2021-01-15 PROCEDURE — 82948 REAGENT STRIP/BLOOD GLUCOSE: CPT

## 2021-01-15 PROCEDURE — 97530 THERAPEUTIC ACTIVITIES: CPT

## 2021-01-15 RX ADMIN — Medication 250 MG: at 10:32

## 2021-01-15 RX ADMIN — CARBIDOPA AND LEVODOPA 1 TABLET: 25; 100 TABLET ORAL at 17:35

## 2021-01-15 RX ADMIN — AMANTADINE 100 MG: 100 CAPSULE ORAL at 22:10

## 2021-01-15 RX ADMIN — CARBIDOPA AND LEVODOPA 1 TABLET: 25; 100 TABLET ORAL at 22:10

## 2021-01-15 RX ADMIN — OMEGA-3 FATTY ACIDS CAP 1000 MG 2000 MG: 1000 CAP at 10:32

## 2021-01-15 RX ADMIN — SELEGILINE HYDROCHLORIDE 5 MG: 5 TABLET ORAL at 10:35

## 2021-01-15 RX ADMIN — HEPARIN SODIUM 5000 UNITS: 5000 INJECTION INTRAVENOUS; SUBCUTANEOUS at 06:33

## 2021-01-15 RX ADMIN — AMLODIPINE BESYLATE 5 MG: 5 TABLET ORAL at 10:33

## 2021-01-15 RX ADMIN — CARBIDOPA AND LEVODOPA 1 TABLET: 25; 100 TABLET ORAL at 14:16

## 2021-01-15 RX ADMIN — ROPINIROLE HYDROCHLORIDE 6 MG: 1 TABLET, FILM COATED ORAL at 22:10

## 2021-01-15 RX ADMIN — CARBIDOPA AND LEVODOPA 1 TABLET: 25; 100 TABLET ORAL at 10:35

## 2021-01-15 RX ADMIN — CARBIDOPA AND LEVODOPA 1 TABLET: 25; 100 TABLET ORAL at 06:33

## 2021-01-15 RX ADMIN — QUETIAPINE FUMARATE 25 MG: 25 TABLET ORAL at 22:10

## 2021-01-15 RX ADMIN — HEPARIN SODIUM 5000 UNITS: 5000 INJECTION INTRAVENOUS; SUBCUTANEOUS at 14:16

## 2021-01-15 RX ADMIN — INSULIN LISPRO 1 UNITS: 100 INJECTION, SOLUTION INTRAVENOUS; SUBCUTANEOUS at 17:35

## 2021-01-15 RX ADMIN — Medication 250 MG: at 17:35

## 2021-01-15 RX ADMIN — HEPARIN SODIUM 5000 UNITS: 5000 INJECTION INTRAVENOUS; SUBCUTANEOUS at 22:10

## 2021-01-15 NOTE — PHYSICAL THERAPY NOTE
PT TREATMENT     01/15/21 1020   Note Type   Note Type Treatment   Pain Assessment   Pain Assessment Tool Pain Assessment not indicated - pt denies pain   Restrictions/Precautions   Other Precautions Fall Risk; Chair Alarm; Bed Alarm;Cognitive   General   Chart Reviewed Yes   Cognition   Overall Cognitive Status Impaired   Arousal/Participation Cooperative   Subjective   Subjective agreeable to walking   Transfers   Sit to Stand 4  Minimal assistance   Stand to Sit 4  Minimal assistance   Stand pivot 4  Minimal assistance   Additional items   (with walker)   Ambulation/Elevation   Gait pattern   (unsteady with turns, flexed posture)   Gait Assistance 4  Minimal assist   Assistive Device Rolling walker   Distance 100 feet, 200 feet   Balance   Static Sitting Fair -   Dynamic Sitting Poor   Static Standing Fair -   Dynamic Standing Poor   Activity Tolerance   Activity Tolerance Patient tolerated treatment well   Assessment   Prognosis Good   Problem List Decreased strength;Decreased endurance; Impaired balance;Decreased mobility; Decreased cognition; Impaired judgement;Decreased safety awareness   Assessment Pt demonstrates much improved ability to follow commands, improved balance and ability to walk with assist   Pt is not yet safe to ambulate without assist as she would need to do to go home per her   Plan   Treatment/Interventions ADL retraining;Functional transfer training;LE strengthening/ROM; Therapeutic exercise; Endurance training;Gait training;Bed mobility; Equipment eval/education;Patient/family training   Progress Progressing toward goals   PT Frequency 5x/wk   Recommendation   PT Discharge Recommendation [address] License Number  [facility]  [id number]

## 2021-01-15 NOTE — SPEECH THERAPY NOTE
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)    Acute renal failure superimposed on stage 3 chronic kidney disease (HCC)    Sinus arrhythmia    S/P deep brain stimulator placement    Underweight    Possible fracture of 5th metatarsal        Subjective:  Pt  Was awake and sitting in a chair with [place] in place  Still has a 1:1  Objective:  Pt  Was seen as follow up to assess current diet tolerance and speech skills, however she had a pureed breakfast and unclear why she was downgraded  Spoke to  who will upgrade her back to regular consistency as she has no s/s of aspiration on this consistency  She actually has improved motor control with finger foods  Improved orientation today with utensil and self feeding although still labored  Dysarthria has slightly improved and she was able to hold a conversation with me with 75% overall intelligibility in a known context with min cues to repeat at times  Assessment:  Improved speech intelligibility over last session  Improved orientation with utensils with breakfast meal  Regular consistency re-ordered  2    Plan/Recommendations:  Upgrade back to regular consistency with thin liquids  Medication as tolerated    Speech to f/u     Ruth Ann Mathew 88 CCC-SLP  Speech Language Pathologist  Available via [facility] License # [id number]  [address] [facility] [id number]

## 2021-01-15 NOTE — QUICK NOTE
On call resident notify that patient fell early today around 16:00 or 17:00 despite having a 1:1 & fall precautions ordered  Per nursing, patient is asympatomatic & no complains per nursing  1:1 staff reports that patient fell on her way  to use the bathroom  Patient used a walker and fell in the shower while attempting to pivot to sit on the toilet  For was not completely witness as the 1:1 was waiting outside of the bathroom door  Patient seen examined at bedside, no acute distress  Chiara Squires is resting comfortably  Physical exam does not reveal any acute neuro changes, no laceration  Plan:  Monitor closely for any neuro changes with neuro check q 4h  Commode at bedside ordered  Continue 1:1 & fall precaution      Plan discussed with one-to-one staff and nurse    [facility], DO

## 2021-01-15 NOTE — CASE MANAGEMENT
Patients son was made awre patient was accepted by [facility] and is agreeable to this  DCP is for patient to return home with her  w/private aids and [facility] w/[facility] at [facility] to follow

## 2021-01-15 NOTE — PLAN OF CARE
Problem: Potential for Falls  Goal: Patient will remain free of falls  Description: INTERVENTIONS:  - Assess patient frequently for physical needs  -  Identify cognitive and physical deficits and behaviors that affect risk of falls  -  [place] fall precautions as indicated by assessment   - Educate patient/family on patient safety including physical limitations  - Instruct patient to call for assistance with activity based on assessment  - Modify environment to reduce risk of injury  - Consider OT/PT consult to assist with strengthening/mobility  Outcome: Progressing  Note: Patient remain free from falls     Problem: Prexisting or High Potential for Compromised Skin Integrity  Goal: Skin integrity is maintained or improved  Description: INTERVENTIONS:  - Identify patients at risk for skin breakdown  - Assess and monitor skin integrity  - Assess and monitor nutrition and hydration status  - Monitor labs   - Assess for incontinence   - Turn and reposition patient  - Assist with mobility/ambulation  - Relieve pressure over bony prominences  - Avoid friction and shearing  - Provide appropriate hygiene as needed including keeping skin clean and dry  - Evaluate need for skin moisturizer/barrier cream  - Collaborate with interdisciplinary team   - Patient/family teaching  - Consider wound care consult   Outcome: Progressing  Note: No new skin issues noted     Problem: Nutrition/Hydration-ADULT  Goal: Nutrient/Hydration intake appropriate for improving, restoring or maintaining nutritional needs  Description: Monitor and assess patient's nutrition/hydration status for malnutrition  Collaborate with interdisciplinary team and initiate plan and interventions as ordered  Monitor patient's weight and dietary intake as ordered or per policy  Utilize nutrition screening tool and intervene as necessary  Determine patient's food preferences and provide high-protein, high-caloric foods as appropriate       INTERVENTIONS:  - Monitor oral intake, urinary output, labs, and treatment plans  - Assess nutrition and hydration status and recommend course of action  - Evaluate amount of meals eaten  - Assist patient with eating if necessary   - Allow adequate time for meals  - Recommend/ encourage appropriate diets, oral nutritional supplements, and vitamin/mineral supplements  - Assess need for intravenous fluids  - Provide specific nutrition/hydration education as appropriate  - Include patient/family/caregiver in decisions related to nutrition  Outcome: Progressing  Note: Patient's appetite is fair     Problem: GENITOURINARY - ADULT  Goal: Maintains or returns to baseline urinary function  Description: INTERVENTIONS:  - Assess urinary function  - Encourage oral fluids to ensure adequate hydration if ordered  - Administer IV fluids as ordered to ensure adequate hydration  - Administer ordered medications as needed  - Offer frequent toileting  - Follow urinary retention protocol if ordered  Outcome: Progressing  Goal: Absence of urinary retention  Description: INTERVENTIONS:  - Assess patients ability to void and empty bladder  - Monitor I/O  - Bladder scan as needed  - Discuss with physician/AP medications to alleviate retention as needed  - Discuss catheterization for long term situations as appropriate  Outcome: Progressing  Goal: Urinary catheter remains patent  Description: INTERVENTIONS:  - Assess patency of urinary catheter  - If patient has a chronic allen, consider changing catheter if non-functioning  - Follow guidelines for intermittent irrigation of non-functioning urinary catheter  Outcome: Progressing  Note: I&O remain within normal limits     Problem: METABOLIC, FLUID AND ELECTROLYTES - ADULT  Goal: Electrolytes maintained within normal limits  Description: INTERVENTIONS:  - Monitor labs and assess patient for signs and symptoms of electrolyte imbalances  - Administer electrolyte replacement as ordered  - Monitor response to electrolyte replacements, including repeat lab results as appropriate  - Instruct patient on fluid and nutrition as appropriate  Outcome: Progressing  Goal: Fluid balance maintained  Description: INTERVENTIONS:  - Monitor labs   - Monitor I/O and WT  - Instruct patient on fluid and nutrition as appropriate  - Assess for signs & symptoms of volume excess or deficit  Outcome: Progressing  Goal: Glucose maintained within target range  Description: INTERVENTIONS:  - Monitor Blood Glucose as ordered  - Assess for signs and symptoms of hyperglycemia and hypoglycemia  - Administer ordered medications to maintain glucose within target range  - Assess nutritional intake and initiate nutrition service referral as needed  Outcome: Progressing  Note: Blood glucose remain within normal limits     Problem: INFECTION - ADULT  Goal: Absence or prevention of progression during hospitalization  Description: INTERVENTIONS:  - Assess and monitor for signs and symptoms of infection  - Monitor lab/diagnostic results  - Monitor all insertion sites, i e  indwelling lines, tubes, and drains  - Monitor endotracheal if appropriate and nasal secretions for changes in amount and color  - [place] appropriate cooling/warming therapies per order  - Administer medications as ordered  - Instruct and encourage patient and family to use good hand hygiene technique  - Identify and instruct in appropriate isolation precautions for identified infection/condition  Outcome: Progressing  Goal: Absence of fever/infection during neutropenic period  Description: INTERVENTIONS:  - Monitor WBC    Outcome: Progressing  Note: Patient free from fevers no signs of infection noted

## 2021-01-15 NOTE — DISCHARGE SUMMARY
[facility] Discharge Summary - Medical Natalie Park 79 y o  female MRN: [id number]    [address] / Bed: [address] Encounter: [id number]    BRIEF OVERVIEW  Admitting Provider: Sho Davis MD  Discharge Provider: Pio Mckeon MD    Discharge To: Home with private caretaker services and VNA services    Outpatient Follow-Up:   1) Patient currently taking Sinemet 5 times daily and selegiline 5 mg daily  Did patient follow-up with neurologist?  Was DBS adjusted? Were there are any changes to medications per her neurologist?  2) Seroquel 25 mg qHS added during this admission  Has patient been compliant with this medication? 3) Has patient's strength and mobility improved? 4) How is patient's mentation? 5) patient found to have a possible fracture 5th digit on left foot  No intervention during her admission  Did she follow up with Podiatry? 6) Metformin was discontinued during her admission as her sugars have been well controlled without it  How has her glucose been?      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)    Acute renal failure superimposed on stage 3 chronic kidney disease (HCC)    Sinus arrhythmia    S/P deep brain stimulator placement    Underweight    Possible fracture of 5th metatarsal  Resolved Problems:    Suspected UTI    Consulting Providers     Neurology : Dr Lacy Abbasi    Procedures Performed/Pertinent Test results  1/14: CBC wnl, CMP wnl, Mg 1 8 Phos 2 6, Cortisol 22 7  : Cr 1 16, K 4 4, , Mag 1 8, Phos 2 8, TSH 1 3, , CKMB 3, WBC 5 32, Hgb 13 1, Folate > 20, B12 1698, Procal <0 05, AB 42/39/60/25, %O2 Sat 91 5   Brain MRI:  Motion degraded study  No large acute infarction, B/L deep brain stimulators  Mod microangiopathic change  Cerebral & cerebellar atrophy  : Cr 1 69, K 4 1, WBC 5 46  HGB 11 9, Ucx  <10,000 cfu/mL klebsiella pneumonia, Ucx  NG < 1000 cfu/mL, Mg 1 9 Phos 2 9  Procal <0 05 CK 1,136 CKMB 13 6 Ammonia <10 Left xray: Possible fractures of the 5th middle phalanx and the 5th metatarsal neck, TSH 1 3, chest xray: Respiratory motion artifacts with new interstitial opacities may be on the basis of edema versus hypoventilation  : Cr 2 19, K 4 7, WBC 6 8 GLU 76-96-89 HGB 10 8  1/10:  WBC 10 9 HB 10 3 K 6 7-6 4 (hemolyzed) repeat K 5 1 Cr 2 74 (BL 1 3-1 97? ) LA 1 2, UA: mod leukocytes, trace ketones, mod blood   CT head: no acute abnormality, Chest Xray: no acute cardiopulmonary disease  UA: small leuk, nit neg, small blood, WBC 30-50, innumerable bacteria      HPI  28-year-old female with the past medical history of type 2 diabetes mellitus, hypertension, GERD, hyperlipidemia, Parkinson's disease with dementia, and CKD stage 3 was brought to the ED today by her son for increased confusion and weakness  She lives with her elderly , who has been unable to take care of her  Patient is alert and oriented during interview  Patient is alone during interview and has significant speech difficulty and dementia which significantly limits HPI  On chart review, she recently fell and hit her head; she underwent CT scan of her head yesterday () which was negative for any abnormal findings  On 2021 she had a urinalysis done ordered by Dr Sloan Litten which showed findings consistent with UTI and was started on Bactrim       Hospital Course    Patient's altered mental status initially thought to be secondary to UTI  Patient was started on levofloxacin  Patient's creatinine was elevated to 2 74 (RODRIGO likely secondary to dehydration)  Throughout her admission, we followed the urine culture collected on 01/09 from the emergency room and the urine culture obtained upon admission on 1/11  Culture from 01/9 showed less than 10,000 CFU/mL of Klebsiella pneumoniae and culture from 1/10 showed no growth at 72 hours  Therefore, levofloxacin was discontinued on 01/12  IV fluids were discontinued on 1/14 given patient's normal creatinine  On examination, it was noted that patient had swelling of the 5th digit on her left foot  The area exhibited point tenderness and was obviously swollen and bruised  X-ray revealed possible fractures of the 5th middle phalanx and the 5th metatarsal neck  Podiatry was contacted via tiger text who recommended outpatient follow-up  They did not request bracing or wrapping  Patient was evaluated by speech therapy and it was recommended that she proceed with a dysphagia 1 pureed diet with thin liquids  She required assisted feeds during her admission  Patient continued to improve during her admission however per her  and her son, her mentation and strength were not at baseline  She required a continuous observation throughout her stay because she would intermittently get agitated and want to get out of bed  At home, she is able to ambulate with her walker without assistance  MRI of the brain was ordered to rule out acute disease  It showed no large acute infarction, however the images were suboptimal secondary to patient's movements  Patient follows with [facility] neurology, Dr Alex Arreguin  Inpatient team contacted her neurologist on 01/12 to update him on patient's status  He suggested that patient be taken off of  [employer] as this was not working for patient in the outpatient setting and had been discontinued  He also suggested adding Seroquel 25 mg q h s  which was started on 01/12  Nursing staff notes that patient slept well after receiving Seroquel  Inpatient team also noted that patient states mentation was better in the morning  Therefore the decision was made to continue patient on Seroquel and gave prescription on discharge  Throughout her stay, patient's son, Germaine Enrique, was part of all decision making and received regular updates  Team asked Germaine Enrique to come to hospital on 01/14 to help determine how far from baseline patient's status was  Adjacent informed team that patient's mentation was almost back to baseline however her strength was significantly decreased  Family meeting between Germaine Enrique, Dr Baldomero Bourne, and Aden Vanegas with case management occurred on 01/14  Options discussed  Since patient has required one-to-one observation during her stay, she does not qualify for short-term rehab  Germaine Enrique would like to proceed with a private aide and VNA services  Of note, patient's metformin was discontinued on admission given RODRIGO  Glucose range 215-79 with majority of reading in the 130s-120s  It was decided that she should not continue metformin on discharge  If glucose begins to trend up again, patient should reach out to PCP and metformin can be added back to medication regimen  On day of discharge, patient was medically stable  She will continue to receive physical therapy at home  All questions were answered  Patient to follow-up with podiatry and her outpatient neurologist         Physical Exam at Discharge  Blood pressure 125/80, pulse 88, temperature 98 4 °F (36 9 °C), temperature source Tympanic, resp  rate 18, height 5' 3" (1 6 m), weight 42 1 kg (92 lb 13 oz), SpO2 96 %, Body mass index is 16 44 kg/m²     General appearance: alert and oriented x3, in no acute distress, sitting up in chair  Head: Normocephalic, without obvious abnormality, atraumatic  Lungs: clear to auscultation bilaterally  Heart: regular rate and rhythm, S1, S2 normal, no murmur, click, rub or gallop  Abdomen: soft, non-tender; bowel sounds normal; no masses,  no organomegaly  Extremities: extremities normal, warm and well-perfused; no cyanosis, clubbing, or edema, head tilted to right at baseline, extremities contracted with dyskinsias    Neurologic: low muffled speech at baseline, mood normal, sensation intact     Medications   Your Medications    STOP taking these medications    STOP taking these medications    diclofenac sodium 1 %  Commonly known as: VOLTAREN    FREESTYLE TEST STRIPS test strip  Generic drug: glucose blood    metFORMIN 500 mg tablet  Commonly known as: GLUCOPHAGE    Nourianz tablet  Generic drug: istradefylline    sulfamethoxazole-trimethoprim 800-160 mg per tablet  Commonly known as: BACTRIM DS   START taking these medications    START taking these medications     Morning Afternoon Evening Bedtime As Needed    QUEtiapine 25 mg tablet  Commonly known as: SEROquel  Take 1 tablet (25 mg total) by mouth daily at bedtime  Refills: 0  Last time this was given: 25 mg on January 16, 2021 10:10 PM        CHANGE how you take these medications    CHANGE how you take these medications     Morning Afternoon Evening Bedtime As Needed    pantoprazole 40 mg tablet  Commonly known as: PROTONIX  Take 40 mg by mouth 2 (two) times a day  Refills: 0  Last time this was given: 40 mg on January 16, 2021  5:59 AM  What changed: Another medication with the same name was removed  Continue taking this medication, and follow the directions you see here          CONTINUE taking these medications    CONTINUE taking these medications     Morning Afternoon Evening Bedtime As Needed    acetaminophen 325 mg tablet  Commonly known as: TYLENOL  Take 650 mg by mouth every 6 (six) hours as needed for mild pain  Refills: 0  Last time this was given: 650 mg on January 13, 2021  9:29 AM         amLODIPine 10 mg tablet  Commonly known as: NORVASC  Take 0 5 tablets (5 mg total) by mouth daily  Refills: 3  Last time this was given: 5 mg on January 17, 2021  8:19 AM         aspirin 81 mg EC tablet  Commonly known as: ECOTRIN LOW STRENGTH  Take 81 mg by mouth every morning  Refills: 0         carbidopa-levodopa  mg per tablet  Commonly known as: SINEMET  Take 1 tablet by mouth 5 (five) times a day  Refills: 0  Last time this was given: 1 tablet on January 17, 2021 11:40 AM         docusate sodium 100 mg capsule  Commonly known as: COLACE  Take 100 mg by mouth 2 (two) times a day  Refills: 0         FISH OIL PO  Take 2 g by mouth every morning  Refills: 0  Last time this was given: 2,000 mg on January 16, 2021  8:38 AM         gabapentin 100 mg capsule  Commonly known as: NEURONTIN  Take 1 capsule (100 mg total) by mouth daily at bedtime  Refills: 3         Gocovri 137 MG Cp24  Generic drug: Amantadine HCl ER  For: Parkinson's Disease  Take 1 capsule by mouth daily at bedtime  Refills: 0  Last time this was given: Ask your nurse or doctor         multivitamin tablet  Take 1 tablet by mouth every morning  Refills: 0         rasagiline 1 MG  Commonly known as: AZILECT  For: Parkinson's Disease  1 mg every morning  Refills: 0         rOPINIRole 2 mg tablet  Commonly known as: REQUIP  TAKE 3 TABLETS BY MOUTH NIGHTLY  Refills: 0  Last time this was given: 6 mg on January 16, 2021 10:09 PM         TURMERIC PO  Take by mouth 3 (three) times a day  Refills: 0              Allergies  No Known Allergies     Diet restrictions: none  Activity restrictions: Requires assistance with ambulation  Code Status: Level 1 - Full Code  Advance Directive and Living Will: <no information>  Power of :    POLST:      Discharge Condition: stable      Discharge  Statement   I spent 25 minutes discharging the patient  This time was spent on the day of discharge  I had direct contact with the patient on the day of discharge  Additional documentation is required if more than 30 minutes were spent on discharge

## 2021-01-15 NOTE — CASE MANAGEMENT
CM met with patient and her  [facility] at bedside  Patient is awake and alert  She is still on a 1:1  Dr's still don't feel she's safe without observation  Referrals at this time will not take patient still on a 1:1  Patients  states that their son Huong Little is still arranging home aids for the home for patients safe discharge home with her   CM called patients son Huong Little to discuss dc planning from yesterday and check in on status of acquiring private aids  There was no answer and a message was left with a request he call back  [facility] accepted patient uder their services  [facility]  [facility] declines patient due to no skill  CM to follow

## 2021-01-15 NOTE — DISCHARGE INSTR - AVS FIRST PAGE
NEW MEDICATION: Seroquel 25 mg at night  Discontinue metformin but continue to monitor glucose   If elevated, reach out to [facility] for guidance       Please follow up with your neurologist at [facility] as soon as possible          Please follow up with podiatrist (foot doctor) for possible fracture of left toe       Our  has set up VNA services for you         Thank you for allowing us to be part of your care team!        - [facility]

## 2021-01-15 NOTE — PROGRESS NOTES
[facility] Progress Note - Lanette Walsh 79 y o  female MRN: [id number]    Unit/Bed#: [address] [place] [address] Encounter: [id number]      Assessment/Plan:  * Altered mental status  Assessment & Plan  Patient presents altered from baseline  Was seen in ED 2 days prior to admission with same presentation  Discussion with son explained that current mentation is off from her baseline  CT head negative for acute bleed hemorrhage  Patient discharged with p o  Antibiotics  However symptoms did not alleviate  · Urine culture resulted negative  Cause of AMS unknown, MRI ordered, will follow  · Neurology consulted  · continue with Sinemet regimen 1 tablet 5 times a day,  rasagiline 1 mg daily-substituted with selegiline 5 mg daily, Continue on ropinirole 2 mg tablets, 3 find mouth nightly, Continue with Nourianz 20mg daily, can DC at recommendation of Neurology at [facility], starting Seroquel 25mg bedtime per recommendation of Neurology at [facility]  · Patient's MRI brain is negative for acute process  Her mentation is better  Likely altered previously from dehydration, RODRIGO, UTI, abx side effects  Resume PD regimen as recommended by her neurologist  Patient is cleared for discharged by neurology  Dementia due to Parkinson's disease with behavioral disturbance [facility])  Assessment & Plan  Patient seen by [facility] neurologist  Per Dr Jacky Chapa note the patient's caregivers have difficulty taking care of the patient  · CM for possible placement  · PT/OT  · Continue home medications  · Quetiapine q h s  ordered per patient's outpatient neurologist    Underweight  Assessment & Plan  BMI of 16 71 kg/m2  · Nutrition consult  · Ensure Enlive b i d  Sinus arrhythmia  Assessment & Plan  During examination patient had irregular rhythm which was confirmed on EKG done on 1/9 which showed sinus arrhythmia    · Morning EKG  · Telemetry    Acute renal failure superimposed on stage 3 chronic kidney disease [facility])  Assessment & Plan  Lab Results Component Value Date    EGFR 53 01/14/2021    EGFR 45 01/13/2021    EGFR 28 01/12/2021    CREATININE 1 01 01/14/2021    CREATININE 1 16 01/13/2021    CREATININE 1 69 (H) 01/12/2021   Patient on admission had elevated creatinine function when compared to last measured creatinine  Possibly secondary to dehydration  · Will start on IVF  · Monitor creatinine function daily    Suspected UTIresolved as of 1/12/2021  Assessment & Plan  73-year-old female with reported altered mental status by family member was found to have probable UTI on urinalysis and urine microscope done yesterday 1/9  On admission patient was oriented to person/place and alert  She has received 1 g Rocephin IV at the ED  Given patient's RODRIGO and renal impairment will start her on alternate antibiotic  · Repeat U/A with culture  · Negative, unlikely to be UTI  · Urinary retention protocol  · I/O  · Levofloxacin 250 mg q48h (renal dosing) started on 1/11, discontinued on 01/12    Type 2 diabetes mellitus without complication, without long-term current use of insulin ([facility])  Assessment & Plan  Lab Results   Component Value Date    HGBA1C 6 4 12/04/2020       No results for input(s): POCGLU in the last 72 hours  Blood Sugar Average: Last 72 hrs:  ISS  Hold metformin due to RODRIGO    Possible fracture of 5th metatarsal  Assessment & Plan  Patient has swelling of 5th metatarsal of left foot  Point tenderness on exam    · X-ray of left foot: Possible fractures of the 5th middle phalanx and the 5th metatarsal neck  · Tiger textguy Paige Doing from Podiatry, recommends outpatient follow-up right after discharge    Mixed hyperlipidemia  Assessment & Plan  Continue home medications    GERD (gastroesophageal reflux disease)  Assessment & Plan  Will continue home medication    Essential hypertension  Assessment & Plan  Will continue home medication          Subjective:   Patient seen and examined at bedside this morning    Patient still appears confused and agitated, but answers some questions appropriately  Unable to obtain review of systems  Objective:     Vitals: Blood pressure (!) 173/98, pulse 82, temperature (!) 97 °F (36 1 °C), resp  rate 17, height 5' 3" (1 6 m), weight 42 kg (92 lb 9 5 oz), SpO2 100 %, not currently breastfeeding  ,Body mass index is 16 4 kg/m²    Wt Readings from Last 3 Encounters:   01/15/21 42 kg (92 lb 9 5 oz)   01/12/21 42 8 kg (94 lb 5 7 oz)   01/09/21 43 1 kg (95 lb)       Intake/Output Summary (Last 24 hours) at 1/15/2021 0820  Last data filed at 1/14/2021 2032  Gross per 24 hour   Intake    Output 400 ml   Net -400 ml       Physical Exam: General appearance: alert, mildly confused, agitated  Head: Normocephalic, without obvious abnormality, atraumatic  Lungs: clear to auscultation bilaterally  Heart: regular rate and rhythm, S1, S2 normal, no murmur, click, rub or gallop  Abdomen: soft, non-tender; bowel sounds normal; no masses,  no organomegaly  Extremities: extremities normal, warm and well-perfused; no cyanosis, clubbing, or edema  Neurologic: Mental status: Disoriented     Recent Results (from the past 24 hour(s))   Fingerstick Glucose (POCT)    Collection Time: 01/14/21 11:19 AM   Result Value Ref Range    POC Glucose 123 65 - 140 mg/dl   Fingerstick Glucose (POCT)    Collection Time: 01/14/21  4:18 PM   Result Value Ref Range    POC Glucose 121 65 - 140 mg/dl   Fingerstick Glucose (POCT)    Collection Time: 01/14/21  8:36 PM   Result Value Ref Range    POC Glucose 125 65 - 140 mg/dl   Fingerstick Glucose (POCT)    Collection Time: 01/15/21  7:13 AM   Result Value Ref Range    POC Glucose 108 65 - 140 mg/dl       Current Facility-Administered Medications   Medication Dose Route Frequency Provider Last Rate Last Admin    acetaminophen (TYLENOL) tablet 650 mg  650 mg Oral Q6H PRN Candace Cardoso MD   650 mg at 01/13/21 0929    amantadine (SYMMETREL) capsule 100 mg  100 mg Oral HS Jenna Plummer DO   100 mg at 01/14/21 2129    amLODIPine (NORVASC) tablet 5 mg  5 mg Oral Daily Paige Sage MD   5 mg at 01/14/21 0950    carbidopa-levodopa (SINEMET)  mg per tablet 1 tablet  1 tablet Oral 5x Daily KENIA Dsouza   1 tablet at 01/15/21 6305    fish oil capsule 2,000 mg  2,000 mg Oral QAM Paige Sage MD   2,000 mg at 01/14/21 0950    heparin (porcine) subcutaneous injection 5,000 Units  5,000 Units Subcutaneous [facility] Paige Sage MD   5,000 Units at 01/15/21 7895    insulin lispro (HumaLOG) 100 units/mL subcutaneous injection 1-5 Units  1-5 Units Subcutaneous TID AC Jenna Plummer DO        insulin lispro (HumaLOG) 100 units/mL subcutaneous injection 1-5 Units  1-5 Units Subcutaneous HS Jenna Plummer DO        LORazepam (ATIVAN) injection 0 5 mg  0 5 mg Intravenous Q6H PRN Army Stephane MD   0 5 mg at 01/14/21 2307    pantoprazole (PROTONIX) EC tablet 40 mg  40 mg Oral Early Morning Paige Sage MD   40 mg at 01/13/21 0770    QUEtiapine (SEROquel) tablet 25 mg  25 mg Oral HS Jenna Plummer DO   25 mg at 01/14/21 2129    rOPINIRole (REQUIP) tablet 6 mg  6 mg Oral HS Paige Sage MD   6 mg at 01/14/21 2128    saccharomyces boulardii (FLORASTOR) capsule 250 mg  250 mg Oral BID Calixto James DO   250 mg at 01/14/21 1856    selegiline (ELDEPRYL) tablet 5 mg  5 mg Oral Daily With Breakfast KENIA Dsouza   5 mg at 01/14/21 1359       Invasive Devices     Peripheral Intravenous Line            Peripheral IV 01/13/21 2 days                Lab, Imaging and other studies: I have personally reviewed pertinent reports      VTE Pharmacologic Prophylaxis: Heparin  VTE Mechanical Prophylaxis: reason for no mechanical VTE prophylaxis Agitation    Marian Jon MD

## 2021-01-15 NOTE — CASE MANAGEMENT
LATE ENTRY:    1/15 1515    NAEEM received a cll back from patients son Shaye Aranda and discussed discharge planning  He states he is still working on securing home health aids for his mother  He states he would like her to a SNF  That would be the ideal option if possible  CM explained that patient is still on a 1:1  He is very concerned how much time he has to find help at home  CM recommended possible rehab at a dementia facility but he was not agreeable  He received a phone call and had to hang up  1/15 1525     NAEEM received a call back from Shaye Aranda and had a phone conference which included Dr Mckinley Bullard  Discussed above discharge planning  He stressed how very concerned he is regarding the time frame he has to coordinate home care due to the weekend approaching and the holiday on Monday  NAEEM again brought up possibility of placement on a dementia unit but Dr Mckinley Bullard does not feel it is appropriate at this time  Shaye Aranda was encouraged to continue with what he is doing and secure help at home over the weekend and the doctor will follow up with him tomorrow  CM also provided him with the week end on-call CM number if he has any questions [phone]  IMM was discussed and her verbally states understanding  CM to follow

## 2021-01-16 PROBLEM — N18.30 ACUTE RENAL FAILURE SUPERIMPOSED ON STAGE 3 CHRONIC KIDNEY DISEASE (HCC): Status: RESOLVED | Noted: 2021-01-11 | Resolved: 2021-01-16

## 2021-01-16 PROBLEM — N17.9 ACUTE RENAL FAILURE SUPERIMPOSED ON STAGE 3 CHRONIC KIDNEY DISEASE (HCC): Status: RESOLVED | Noted: 2021-01-11 | Resolved: 2021-01-16

## 2021-01-16 LAB
BACTERIA BLD CULT: NORMAL
BACTERIA BLD CULT: NORMAL
GLUCOSE SERPL-MCNC: 119 MG/DL (ref 65–140)
GLUCOSE SERPL-MCNC: 123 MG/DL (ref 65–140)
GLUCOSE SERPL-MCNC: 127 MG/DL (ref 65–140)
GLUCOSE SERPL-MCNC: 172 MG/DL (ref 65–140)
MAGNESIUM SERPL-MCNC: 2 MG/DL (ref 1.6–2.6)

## 2021-01-16 PROCEDURE — 97110 THERAPEUTIC EXERCISES: CPT

## 2021-01-16 PROCEDURE — 97116 GAIT TRAINING THERAPY: CPT

## 2021-01-16 PROCEDURE — 82948 REAGENT STRIP/BLOOD GLUCOSE: CPT

## 2021-01-16 PROCEDURE — 83735 ASSAY OF MAGNESIUM: CPT | Performed by: STUDENT IN AN ORGANIZED HEALTH CARE EDUCATION/TRAINING PROGRAM

## 2021-01-16 PROCEDURE — 99232 SBSQ HOSP IP/OBS MODERATE 35: CPT | Performed by: FAMILY MEDICINE

## 2021-01-16 RX ADMIN — HEPARIN SODIUM 5000 UNITS: 5000 INJECTION INTRAVENOUS; SUBCUTANEOUS at 05:59

## 2021-01-16 RX ADMIN — Medication 250 MG: at 17:14

## 2021-01-16 RX ADMIN — CARBIDOPA AND LEVODOPA 1 TABLET: 25; 100 TABLET ORAL at 17:12

## 2021-01-16 RX ADMIN — CARBIDOPA AND LEVODOPA 1 TABLET: 25; 100 TABLET ORAL at 15:45

## 2021-01-16 RX ADMIN — INSULIN LISPRO 1 UNITS: 100 INJECTION, SOLUTION INTRAVENOUS; SUBCUTANEOUS at 17:16

## 2021-01-16 RX ADMIN — SELEGILINE HYDROCHLORIDE 5 MG: 5 TABLET ORAL at 12:41

## 2021-01-16 RX ADMIN — AMLODIPINE BESYLATE 5 MG: 5 TABLET ORAL at 08:37

## 2021-01-16 RX ADMIN — LORAZEPAM 0.5 MG: 2 INJECTION INTRAMUSCULAR; INTRAVENOUS at 23:48

## 2021-01-16 RX ADMIN — OMEGA-3 FATTY ACIDS CAP 1000 MG 2000 MG: 1000 CAP at 08:38

## 2021-01-16 RX ADMIN — CARBIDOPA AND LEVODOPA 1 TABLET: 25; 100 TABLET ORAL at 11:53

## 2021-01-16 RX ADMIN — CARBIDOPA AND LEVODOPA 1 TABLET: 25; 100 TABLET ORAL at 06:07

## 2021-01-16 RX ADMIN — AMANTADINE 100 MG: 100 CAPSULE ORAL at 22:10

## 2021-01-16 RX ADMIN — CARBIDOPA AND LEVODOPA 1 TABLET: 25; 100 TABLET ORAL at 22:09

## 2021-01-16 RX ADMIN — PANTOPRAZOLE SODIUM 40 MG: 40 TABLET, DELAYED RELEASE ORAL at 05:59

## 2021-01-16 RX ADMIN — LORAZEPAM 0.5 MG: 2 INJECTION INTRAMUSCULAR; INTRAVENOUS at 06:00

## 2021-01-16 RX ADMIN — ROPINIROLE HYDROCHLORIDE 6 MG: 1 TABLET, FILM COATED ORAL at 22:09

## 2021-01-16 RX ADMIN — Medication 250 MG: at 08:38

## 2021-01-16 RX ADMIN — HEPARIN SODIUM 5000 UNITS: 5000 INJECTION INTRAVENOUS; SUBCUTANEOUS at 22:10

## 2021-01-16 RX ADMIN — QUETIAPINE FUMARATE 25 MG: 25 TABLET ORAL at 22:10

## 2021-01-16 RX ADMIN — HEPARIN SODIUM 5000 UNITS: 5000 INJECTION INTRAVENOUS; SUBCUTANEOUS at 15:46

## 2021-01-16 NOTE — ASSESSMENT & PLAN NOTE
Status post fall on 01/15 around 4-5:00 p m per nursing - asymptomatic, baseline neuro status  · Continue one-to-one, fall precaution and neuro checks q 4h

## 2021-01-16 NOTE — PHYSICAL THERAPY NOTE
PT TREATMENT   01/16/21 1406   PT Last Visit   PT Visit Date 01/16/21   Note Type   Note Type Treatment   Pain Assessment   Pain Assessment Tool Pain Assessment not indicated - pt denies pain   Restrictions/Precautions   Other Precautions Fall Risk;Bed Alarm; Chair Alarm;Cognitive;1:1   General   Chart Reviewed Yes   Subjective   Subjective "ok"   Bed Mobility   Supine to Sit 4  Minimal assistance   Additional items Assist x 1;Verbal cues   Transfers   Sit to Stand 4  Minimal assistance   Additional items Assist x 1;Verbal cues   Stand to Sit 4  Minimal assistance   Additional items Assist x 1;Verbal cues   Ambulation/Elevation   Gait pattern Shuffling; Short stride  (neck/head turned to L side)   Gait Assistance 4  Minimal assist   Additional items Assist x 1;Verbal cues; Tactile cues   Assistive Device Rolling walker   Distance 150 feet x 2 with change in direction;pt tends to hold RW too far ahead;pt is at risk for falls and cannot amb without RW and assist    Balance   Static Sitting Fair   Static Standing Fair   Dynamic Standing Fair -   Ambulatory Fair -   Activity Tolerance   Activity Tolerance Patient limited by fatigue   Exercises   Hip Flexion 10 reps;Bilateral;Sitting   Knee AROM Long Arc Quad 10 reps;Bilateral;Sitting   Ankle Pumps 10 reps;Bilateral;Sitting   Assessment   Assessment Pt is making progress with trans, amb with RW, gait endurance and balance  Plan   Treatment/Interventions ADL retraining;Functional transfer training;LE strengthening/ROM; Therapeutic exercise; Endurance training;Cognitive reorientation;Patient/family training;Equipment eval/education; Bed mobility;Gait training; Compensatory technique education   PT Frequency 5x/wk   Recommendation   PT Discharge Recommendation Post-Acute Rehabilitation Services   AM-PAC Basic Mobility Inpatient   Turning in Bed Without Bedrails 1   Lying on Back to Sitting on Edge of Flat Bed 1   Moving Bed to Chair 3   Standing Up From Chair 3   Walk in Room 3   Climb 3-5 Stairs 1   Basic Mobility Inpatient Raw Score 12   Basic Mobility Standardized Score 19 77   Licensure   [place] License Number  [id number] [place] [address] [id number]

## 2021-01-16 NOTE — PLAN OF CARE
Problem: Potential for Falls  Goal: Patient will remain free of falls  Description: INTERVENTIONS:  - Assess patient frequently for physical needs  -  Identify cognitive and physical deficits and behaviors that affect risk of falls  -  [place] fall precautions as indicated by assessment   - Educate patient/family on patient safety including physical limitations  - Instruct patient to call for assistance with activity based on assessment  - Modify environment to reduce risk of injury  - Consider OT/PT consult to assist with strengthening/mobility  Outcome: Progressing     Problem: Prexisting or High Potential for Compromised Skin Integrity  Goal: Skin integrity is maintained or improved  Description: INTERVENTIONS:  - Identify patients at risk for skin breakdown  - Assess and monitor skin integrity  - Assess and monitor nutrition and hydration status  - Monitor labs   - Assess for incontinence   - Turn and reposition patient  - Assist with mobility/ambulation  - Relieve pressure over bony prominences  - Avoid friction and shearing  - Provide appropriate hygiene as needed including keeping skin clean and dry  - Evaluate need for skin moisturizer/barrier cream  - Collaborate with interdisciplinary team   - Patient/family teaching  - Consider wound care consult   Outcome: Progressing     Problem: Nutrition/Hydration-ADULT  Goal: Nutrient/Hydration intake appropriate for improving, restoring or maintaining nutritional needs  Description: Monitor and assess patient's nutrition/hydration status for malnutrition  Collaborate with interdisciplinary team and initiate plan and interventions as ordered  Monitor patient's weight and dietary intake as ordered or per policy  Utilize nutrition screening tool and intervene as necessary  Determine patient's food preferences and provide high-protein, high-caloric foods as appropriate       INTERVENTIONS:  - Monitor oral intake, urinary output, labs, and treatment plans  - Assess nutrition and hydration status and recommend course of action  - Evaluate amount of meals eaten  - Assist patient with eating if necessary   - Allow adequate time for meals  - Recommend/ encourage appropriate diets, oral nutritional supplements, and vitamin/mineral supplements  - Assess need for intravenous fluids  - Provide specific nutrition/hydration education as appropriate  - Include patient/family/caregiver in decisions related to nutrition  Outcome: Progressing     Problem: GENITOURINARY - ADULT  Goal: Maintains or returns to baseline urinary function  Description: INTERVENTIONS:  - Assess urinary function  - Encourage oral fluids to ensure adequate hydration if ordered  - Administer IV fluids as ordered to ensure adequate hydration  - Administer ordered medications as needed  - Offer frequent toileting  - Follow urinary retention protocol if ordered  Outcome: Progressing  Goal: Absence of urinary retention  Description: INTERVENTIONS:  - Assess patients ability to void and empty bladder  - Monitor I/O  - Bladder scan as needed  - Discuss with physician/AP medications to alleviate retention as needed  - Discuss catheterization for long term situations as appropriate  Outcome: Progressing  Goal: Urinary catheter remains patent  Description: INTERVENTIONS:  - Assess patency of urinary catheter  - If patient has a chronic allen, consider changing catheter if non-functioning  - Follow guidelines for intermittent irrigation of non-functioning urinary catheter  Outcome: Progressing     Problem: METABOLIC, FLUID AND ELECTROLYTES - ADULT  Goal: Electrolytes maintained within normal limits  Description: INTERVENTIONS:  - Monitor labs and assess patient for signs and symptoms of electrolyte imbalances  - Administer electrolyte replacement as ordered  - Monitor response to electrolyte replacements, including repeat lab results as appropriate  - Instruct patient on fluid and nutrition as appropriate  Outcome: Progressing  Goal: Fluid balance maintained  Description: INTERVENTIONS:  - Monitor labs   - Monitor I/O and WT  - Instruct patient on fluid and nutrition as appropriate  - Assess for signs & symptoms of volume excess or deficit  Outcome: Progressing  Goal: Glucose maintained within target range  Description: INTERVENTIONS:  - Monitor Blood Glucose as ordered  - Assess for signs and symptoms of hyperglycemia and hypoglycemia  - Administer ordered medications to maintain glucose within target range  - Assess nutritional intake and initiate nutrition service referral as needed  Outcome: Progressing     Problem: INFECTION - ADULT  Goal: Absence or prevention of progression during hospitalization  Description: INTERVENTIONS:  - Assess and monitor for signs and symptoms of infection  - Monitor lab/diagnostic results  - Monitor all insertion sites, i e  indwelling lines, tubes, and drains  - Monitor endotracheal if appropriate and nasal secretions for changes in amount and color  - [place] appropriate cooling/warming therapies per order  - Administer medications as ordered  - Instruct and encourage patient and family to use good hand hygiene technique  - Identify and instruct in appropriate isolation precautions for identified infection/condition  Outcome: Progressing  Goal: Absence of fever/infection during neutropenic period  Description: INTERVENTIONS:  - Monitor WBC    Outcome: Progressing

## 2021-01-16 NOTE — PROGRESS NOTES
[facility] 26 Progress Note - Juan David Butler 79 y o  female MRN: [id number]    Unit/Bed#: [address] [place] [address] Encounter: [id number]      Assessment/Plan:  * Altered mental status  Assessment & Plan  Patient presented with altered mental status  Was seen in ED 1 day prior to admission with same presentation  Family members corroborate change in mental status  At prior ED visit 1 day ago, CT head was negative for acute bleed hemorrhage  Abnormal urinalysis was noted for leukocytes and concern for UTI and pt was discharged with antibiotics, however mentation did not return to baseline  · Cause of AMS unknown- possible UTI versus dehydration versus AK versus antibiotic side effects  · Chest x-ray showed respiratory motion artifacts with new interstitial opacities which may be edema versus hypoventilation  · Neurology consulted: continue with Sinemet regimen 1 tablet 5 times a day,  rasagiline 1 mg daily-substituted with selegiline 5 mg daily, Continue on ropinirole 2 mg tablets, 3 find mouth nightly, Continue with Nourianz 20mg daily, can DC at recommendation of Neurology at [facility], starting Seroquel 25mg bedtime per recommendation of Neurology at [facility]  Patient's MRI brain is negative for acute process  Her mentation is better  Likely altered previously from dehydration, RODRIGO, UTI, abx side effects  Resume PD regimen as recommended by her neurologist  Patient is cleared for discharged by neurology  · PT OT recommended short-term rehab     Dementia due to Parkinson's disease with behavioral disturbance [facility])  Assessment & Plan  Patient seen by [facility] neurologist  Per Dr Justo Vigil note the patient's caregivers have difficulty taking care of the patient    · PT OT recommend short-term rehab  · Neurology consult:  Continue Sinemet, rasagiline, with selegiline, ropinirole, nourianz per neurologist   Start Seroquel 25 mg at bedtime per neurologist     Acute renal failure superimposed on stage 3 chronic kidney disease (HCC)resolved as of 1/16/2021  Assessment & Plan  Lab Results   Component Value Date    EGFR 53 01/14/2021    EGFR 45 01/13/2021    EGFR 28 01/12/2021    CREATININE 1 01 01/14/2021    CREATININE 1 16 01/13/2021    CREATININE 1 69 (H) 01/12/2021   Patient on admission had elevated creatinine function when compared to last measured creatinine  Possibly secondary to dehydration  · Was started on IV fluids with D5 lactated Ringer's with subsequent resolution of RODRIGO  IV fluids were discontinued  · Monitor creatinine function daily    Essential hypertension  Assessment & Plan  Continue home amlodipine 5 mg q d  Continue to monitor blood pressure and vitals  Type 2 diabetes mellitus without complication, without long-term current use of insulin [facility])  Assessment & Plan  Lab Results   Component Value Date    HGBA1C 6 4 12/04/2020   Hold metformin due to RODRIGO  Insulin sliding scale algorithm 1 in place for glycemic control  Continue to monitor    Sinus arrhythmia  Assessment & Plan  During examination patient had irregular rhythm which was confirmed on EKG done on 1/9 which showed sinus arrhythmia  · EKG on admission:  baseline artifacts, sinus rhythm, sinus arrhythmia, Anterior infarct , age undetermined  · No significant events on Telemetry    Fall during current hospitalization  Assessment & Plan  Status post fall on 01/15 around 4-5:00 p m per nursing - asymptomatic, baseline neuro status  · Continue one-to-one, fall precaution and neuro checks q 4h    Possible fracture of 5th metatarsal  Assessment & Plan  Patient had swelling of 5th metatarsal of left foot on admission  Point tenderness on exam    · X-ray of left foot: Possible fractures of the 5th middle phalanx and the 5th metatarsal neck  · Tiger texted Dr Dalia Posada from Podiatry who recommended outpatient follow-up right after discharge     Underweight  Assessment & Plan  BMI of 16 71 kg/m2    · Supplement diet with Ensure Enlive b i d     Mixed hyperlipidemia  Assessment & Plan  Continue home fish oil    GERD (gastroesophageal reflux disease)  Assessment & Plan  Continue home Protonix 40 mg q a m  Radha Sherman Suspected UTIresolved as of 1/12/2021  Assessment & Plan  67-year-old female with reported altered mental status by family member was found to have probable UTI on urinalysis and urine microscope done yesterday 1/9  On admission patient was oriented to person/place and alert  She has received 1 g Rocephin IV at the ED  Given patient's RODRIGO and renal impairment will start her on alternate antibiotic  · Repeat U/A with culture  · Negative, unlikely to be UTI  · Urinary retention protocol  · I/O  · Levofloxacin 250 mg q48h (renal dosing) started on 1/11, discontinued on 01/12    Subjective:   Patient seen and examined at bedside  Denies chest pain, dyspnea, headaches, abdominal pain, diarrhea  Tolerating diet, urinating, stooling, ambulating  Objective:     Vitals: Blood pressure 141/82, pulse 85, temperature 99 2 °F (37 3 °C), temperature source Axillary, resp  rate 18, height 5' 3" (1 6 m), weight 42 kg (92 lb 9 5 oz), SpO2 98 %, not currently breastfeeding  ,Body mass index is 16 4 kg/m²    Wt Readings from Last 3 Encounters:   01/16/21 42 kg (92 lb 9 5 oz)   01/12/21 42 8 kg (94 lb 5 7 oz)   01/09/21 43 1 kg (95 lb)       Intake/Output Summary (Last 24 hours) at 1/16/2021 1259  Last data filed at 1/16/2021 0800  Gross per 24 hour   Intake 640 ml   Output 600 ml   Net 40 ml       Physical Exam: General appearance: alert and oriented, in no acute distress  Lungs: clear to auscultation bilaterally  Heart: regular rate and rhythm and S1, S2 normal  Abdomen: soft, non-tender; bowel sounds normal; no masses,  no organomegaly  Extremities: No edema in bilateral lower extremities     Recent Results (from the past 24 hour(s))   Fingerstick Glucose (POCT)    Collection Time: 01/15/21  5:34 PM   Result Value Ref Range    POC Glucose 215 (H) 65 - 140 mg/dl Fingerstick Glucose (POCT)    Collection Time: 01/15/21 10:55 PM   Result Value Ref Range    POC Glucose 137 65 - 140 mg/dl   Magnesium    Collection Time: 01/16/21  6:03 AM   Result Value Ref Range    Magnesium 2 0 1 6 - 2 6 mg/dL   Fingerstick Glucose (POCT)    Collection Time: 01/16/21  7:30 AM   Result Value Ref Range    POC Glucose 119 65 - 140 mg/dl   Fingerstick Glucose (POCT)    Collection Time: 01/16/21 11:48 AM   Result Value Ref Range    POC Glucose 127 65 - 140 mg/dl       Current Facility-Administered Medications   Medication Dose Route Frequency Provider Last Rate Last Admin    acetaminophen (TYLENOL) tablet 650 mg  650 mg Oral Q6H PRN Cate Conroy MD   650 mg at 01/13/21 0929    amantadine (SYMMETREL) capsule 100 mg  100 mg Oral HS Jenna Corralo, DO   100 mg at 01/15/21 2210    amLODIPine (NORVASC) tablet 5 mg  5 mg Oral Daily Cate Conroy MD   5 mg at 01/16/21 0707    carbidopa-levodopa (SINEMET)  mg per tablet 1 tablet  1 tablet Oral 5x Daily Alicja Fluke, CRNP   1 tablet at 01/16/21 1153    fish oil capsule 2,000 mg  2,000 mg Oral QAM Cate Conroy MD   2,000 mg at 01/16/21 2553    heparin (porcine) subcutaneous injection 5,000 Units  5,000 Units Subcutaneous [facility] Cate Conroy MD   5,000 Units at 01/16/21 0559    insulin lispro (HumaLOG) 100 units/mL subcutaneous injection 1-5 Units  1-5 Units Subcutaneous 4x Daily (AC & HS) Richard Nance MD   1 Units at 01/15/21 1735    LORazepam (ATIVAN) injection 0 5 mg  0 5 mg Intravenous Q6H PRN Richard Nance MD   0 5 mg at 01/16/21 0600    pantoprazole (PROTONIX) EC tablet 40 mg  40 mg Oral Early Morning Cate Conroy MD   40 mg at 01/16/21 0559    QUEtiapine (SEROquel) tablet 25 mg  25 mg Oral HS Jenna Corralo, DO   25 mg at 01/15/21 2210    rOPINIRole (REQUIP) tablet 6 mg  6 mg Oral HS Cate Conroy MD   6 mg at 01/15/21 2210    saccharomyces boulardii (FLORASTOR) capsule 250 mg  250 mg Oral BID Calixto James DO   250 mg at 01/16/21 0838    selegiline (ELDEPRYL) tablet 5 mg  5 mg Oral Daily With Breakfast AlicjaKENIA Vega   5 mg at 01/16/21 1241       Invasive Devices     Peripheral Intravenous Line            Peripheral IV 01/16/21 Left Forearm less than 1 day    Peripheral IV 01/16/21 Left Hand less than 1 day                Lab, Imaging and other studies: I have personally reviewed pertinent reports      VTE Pharmacologic Prophylaxis: Heparin  VTE Mechanical Prophylaxis: reason for no mechanical VTE prophylaxis uncooperative due to [address] DO

## 2021-01-17 VITALS
HEIGHT: 63 IN | TEMPERATURE: 98.4 F | WEIGHT: 92.81 LBS | BODY MASS INDEX: 16.45 KG/M2 | OXYGEN SATURATION: 96 % | HEART RATE: 88 BPM | DIASTOLIC BLOOD PRESSURE: 80 MMHG | SYSTOLIC BLOOD PRESSURE: 125 MMHG | RESPIRATION RATE: 18 BRPM

## 2021-01-17 LAB
GLUCOSE SERPL-MCNC: 121 MG/DL (ref 65–140)
GLUCOSE SERPL-MCNC: 128 MG/DL (ref 65–140)

## 2021-01-17 PROCEDURE — 99238 HOSP IP/OBS DSCHRG MGMT 30/<: CPT | Performed by: FAMILY MEDICINE

## 2021-01-17 PROCEDURE — 82948 REAGENT STRIP/BLOOD GLUCOSE: CPT

## 2021-01-17 PROCEDURE — NC001 PR NO CHARGE: Performed by: FAMILY MEDICINE

## 2021-01-17 RX ORDER — QUETIAPINE FUMARATE 25 MG/1
25 TABLET, FILM COATED ORAL
Qty: 30 TABLET | Refills: 0 | Status: SHIPPED | OUTPATIENT
Start: 2021-01-17 | End: 2021-02-05 | Stop reason: SDUPTHER

## 2021-01-17 RX ADMIN — HEPARIN SODIUM 5000 UNITS: 5000 INJECTION INTRAVENOUS; SUBCUTANEOUS at 13:41

## 2021-01-17 RX ADMIN — Medication 250 MG: at 08:20

## 2021-01-17 RX ADMIN — CARBIDOPA AND LEVODOPA 1 TABLET: 25; 100 TABLET ORAL at 13:41

## 2021-01-17 RX ADMIN — CARBIDOPA AND LEVODOPA 1 TABLET: 25; 100 TABLET ORAL at 11:40

## 2021-01-17 RX ADMIN — AMLODIPINE BESYLATE 5 MG: 5 TABLET ORAL at 08:19

## 2021-01-17 RX ADMIN — LORAZEPAM 0.5 MG: 2 INJECTION INTRAMUSCULAR; INTRAVENOUS at 06:38

## 2021-01-17 RX ADMIN — CARBIDOPA AND LEVODOPA 1 TABLET: 25; 100 TABLET ORAL at 08:18

## 2021-01-17 RX ADMIN — HEPARIN SODIUM 5000 UNITS: 5000 INJECTION INTRAVENOUS; SUBCUTANEOUS at 06:09

## 2021-01-17 RX ADMIN — SELEGILINE HYDROCHLORIDE 5 MG: 5 TABLET ORAL at 11:39

## 2021-01-17 NOTE — CASE MANAGEMENT
Per Lefty, patient is medically stable for discharge today  SW notified [facility] of patient discharge today  Family will provide transport home

## 2021-01-17 NOTE — PROGRESS NOTES
[facility] Progress Note - Eliecer Longoria 79 y o  female MRN: [id number]    Unit/Bed#: [address] [place] [address] Encounter: [id number]      Assessment/Plan:  * Altered mental status  Assessment & Plan  Patient presented with altered mental status  Was seen in ED 1 day prior to admission with same presentation  Family members corroborate change in mental status  At prior ED visit 1 day ago, CT head was negative for acute bleed hemorrhage  Abnormal urinalysis was noted for leukocytes and concern for UTI and pt was discharged with antibiotics, however mentation did not return to baseline  · Cause of AMS unknown- possible UTI versus dehydration versus AK versus antibiotic side effects  · Chest x-ray showed respiratory motion artifacts with new interstitial opacities which may be edema versus hypoventilation  · Neurology consulted: continue with Sinemet regimen 1 tablet 5 times a day,  rasagiline 1 mg daily-substituted with selegiline 5 mg daily, Continue on ropinirole 2 mg tablets, 3 find mouth nightly, Continue with Nourianz 20mg daily, can DC at recommendation of Neurology at [facility], starting Seroquel 25mg bedtime per recommendation of Neurology at [facility]  Patient's MRI brain is negative for acute process  Her mentation is better  Likely altered previously from dehydration, RODRIGO, UTI, abx side effects  Resume PD regimen as recommended by her neurologist  Patient is cleared for discharged by neurology  · PT OT recommended short-term rehab     Dementia due to Parkinson's disease with behavioral disturbance [facility])  Assessment & Plan  Patient seen by [facility] neurologist  Per Dr Stan Henry note the patient's caregivers have difficulty taking care of the patient  · PT OT recommend short-term rehab  · Neurology consult:  Continue Sinemet, rasagiline, with selegiline, ropinirole, nourianz per neurologist   Start Seroquel 25 mg at bedtime per neurologist     Underweight  Assessment & Plan  BMI of 16 71 kg/m2    · Supplement diet with Ensure Enlive b i d  Sinus arrhythmia  Assessment & Plan  During examination patient had irregular rhythm which was confirmed on EKG done on 1/9 which showed sinus arrhythmia  · EKG on admission:  baseline artifacts, sinus rhythm, sinus arrhythmia, Anterior infarct , age undetermined  · No significant events on Telemetry    Suspected UTIresolved as of 1/12/2021  Assessment & Plan  78-year-old female with reported altered mental status by family member was found to have probable UTI on urinalysis and urine microscope done yesterday 1/9  On admission patient was oriented to person/place and alert  She has received 1 g Rocephin IV at the ED  Given patient's RODRIGO and renal impairment will start her on alternate antibiotic  · Repeat U/A with culture  · Negative, unlikely to be UTI  · Urinary retention protocol  · I/O  · Levofloxacin 250 mg q48h (renal dosing) started on 1/11, discontinued on 01/12    Acute renal failure superimposed on stage 3 chronic kidney disease (HCC)resolved as of 1/16/2021  Assessment & Plan  Lab Results   Component Value Date    EGFR 53 01/14/2021    EGFR 45 01/13/2021    EGFR 28 01/12/2021    CREATININE 1 01 01/14/2021    CREATININE 1 16 01/13/2021    CREATININE 1 69 (H) 01/12/2021   Patient on admission had elevated creatinine function when compared to last measured creatinine  Possibly secondary to dehydration  · Was started on IV fluids with D5 lactated Ringer's with subsequent resolution of RODRIGO  IV fluids were discontinued  · Monitor creatinine function daily    Type 2 diabetes mellitus without complication, without long-term current use of insulin [facility])  Assessment & Plan  Lab Results   Component Value Date    HGBA1C 6 4 12/04/2020   Hold metformin due to RODRIGO  Insulin sliding scale algorithm 1 in place for glycemic control    Continue to monitor    Fall during current hospitalization  Assessment & Plan  Status post fall on 01/15 around 4-5:00 p m per nursing - asymptomatic, baseline neuro status  · Continue one-to-one, fall precaution and neuro checks q 4h    Possible fracture of 5th metatarsal  Assessment & Plan  Patient had swelling of 5th metatarsal of left foot on admission  Point tenderness on exam    · X-ray of left foot: Possible fractures of the 5th middle phalanx and the 5th metatarsal neck  · Tiger textguy Petty from Podiatry who recommended outpatient follow-up right after discharge     Mixed hyperlipidemia  Assessment & Plan  Continue home fish oil    GERD (gastroesophageal reflux disease)  Assessment & Plan  Continue home Protonix 40 mg q a m  Dora Armstrong Essential hypertension  Assessment & Plan  Continue home amlodipine 5 mg q d  Continue to monitor blood pressure and vitals  Subjective:   Patient seen examined sitting up in bedside chair this morning  Speech still unclear, but patient is awake alert oriented x3  Denies fever, chills, nausea, vomiting, diarrhea, shortness of breath, chest pain, or headaches  Objective:     Vitals: Blood pressure 125/80, pulse 88, temperature 98 4 °F (36 9 °C), temperature source Tympanic, resp  rate 18, height 5' 3" (1 6 m), weight 42 1 kg (92 lb 13 oz), SpO2 96 %, not currently breastfeeding  ,Body mass index is 16 44 kg/m²  Wt Readings from Last 3 Encounters:   01/17/21 42 1 kg (92 lb 13 oz)   01/12/21 42 8 kg (94 lb 5 7 oz)   01/09/21 43 1 kg (95 lb)     No intake or output data in the 24 hours ending 01/17/21 1146    Physical Exam: General appearance: alert and oriented, in no acute distress  Head: Normocephalic, without obvious abnormality, atraumatic  Lungs: clear to auscultation bilaterally  Heart: regular rate and rhythm, S1, S2 normal, no murmur, click, rub or gallop  Abdomen: soft, non-tender; bowel sounds normal; no masses,  no organomegaly  Extremities: extremities normal, warm and well-perfused; no cyanosis, clubbing, or edema  Neurologic: Mental status:  Thought     Recent Results (from the past 24 hour(s)) Fingerstick Glucose (POCT)    Collection Time: 01/16/21 11:48 AM   Result Value Ref Range    POC Glucose 127 65 - 140 mg/dl   Fingerstick Glucose (POCT)    Collection Time: 01/16/21  5:15 PM   Result Value Ref Range    POC Glucose 172 (H) 65 - 140 mg/dl   Fingerstick Glucose (POCT)    Collection Time: 01/16/21  9:32 PM   Result Value Ref Range    POC Glucose 123 65 - 140 mg/dl   Fingerstick Glucose (POCT)    Collection Time: 01/17/21  7:31 AM   Result Value Ref Range    POC Glucose 121 65 - 140 mg/dl   Fingerstick Glucose (POCT)    Collection Time: 01/17/21 11:32 AM   Result Value Ref Range    POC Glucose 128 65 - 140 mg/dl       Current Facility-Administered Medications   Medication Dose Route Frequency Provider Last Rate Last Admin    acetaminophen (TYLENOL) tablet 650 mg  650 mg Oral Q6H PRN Rubia Lopes MD   650 mg at 01/13/21 0929    amantadine (SYMMETREL) capsule 100 mg  100 mg Oral HS Jenna Plummer DO   100 mg at 01/16/21 2210    amLODIPine (NORVASC) tablet 5 mg  5 mg Oral Daily Rubia Lopes MD   5 mg at 01/17/21 0819    carbidopa-levodopa (SINEMET)  mg per tablet 1 tablet  1 tablet Oral 5x Daily KENIA Hawkins   1 tablet at 01/17/21 1140    fish oil capsule 2,000 mg  2,000 mg Oral QAM Rubia Lopes MD   2,000 mg at 01/16/21 4931    heparin (porcine) subcutaneous injection 5,000 Units  5,000 Units Subcutaneous [address] Timmy Lopes MD   5,000 Units at 01/17/21 9869    insulin lispro (HumaLOG) 100 units/mL subcutaneous injection 1-5 Units  1-5 Units Subcutaneous 4x Daily (AC & HS) Denny Swan MD   1 Units at 01/16/21 1716    LORazepam (ATIVAN) injection 0 5 mg  0 5 mg Intravenous Q6H PRN Denny Swan MD   0 5 mg at 01/17/21 0638    pantoprazole (PROTONIX) EC tablet 40 mg  40 mg Oral Early Morning Rubia Lopes MD   40 mg at 01/16/21 0559    QUEtiapine (SEROquel) tablet 25 mg  25 mg Oral HS Jenna Corralo, DO   25 mg at 01/16/21 2210    rOPINIRole (REQUIP) tablet 6 mg  6 mg Oral HS Kaila Enriquez MD   6 mg at 01/16/21 2209    saccharomyces boulardii (FLORASTOR) capsule 250 mg  250 mg Oral BID Calixto James, DO   250 mg at 01/17/21 0820    selegiline (ELDEPRYL) tablet 5 mg  5 mg Oral Daily With Breakfast KENIA Wilson   5 mg at 01/17/21 1139       Invasive Devices     Peripheral Intravenous Line            Peripheral IV 01/16/21 Left Forearm 1 day    Peripheral IV 01/16/21 Left Hand 1 day                Lab, Imaging and other studies: I have personally reviewed pertinent reports      VTE Pharmacologic Prophylaxis: Heparin  VTE Mechanical Prophylaxis: reason for no mechanical VTE prophylaxis Removed for agitation    Khris Lowry MD

## 2021-01-17 NOTE — PLAN OF CARE
Problem: Potential for Falls  Goal: Patient will remain free of falls  Description: INTERVENTIONS:  - Assess patient frequently for physical needs  -  Identify cognitive and physical deficits and behaviors that affect risk of falls  -  [place] fall precautions as indicated by assessment   - Educate patient/family on patient safety including physical limitations  - Instruct patient to call for assistance with activity based on assessment  - Modify environment to reduce risk of injury  - Consider OT/PT consult to assist with strengthening/mobility  Outcome: Progressing     Problem: Prexisting or High Potential for Compromised Skin Integrity  Goal: Skin integrity is maintained or improved  Description: INTERVENTIONS:  - Identify patients at risk for skin breakdown  - Assess and monitor skin integrity  - Assess and monitor nutrition and hydration status  - Monitor labs   - Assess for incontinence   - Turn and reposition patient  - Assist with mobility/ambulation  - Relieve pressure over bony prominences  - Avoid friction and shearing  - Provide appropriate hygiene as needed including keeping skin clean and dry  - Evaluate need for skin moisturizer/barrier cream  - Collaborate with interdisciplinary team   - Patient/family teaching  - Consider wound care consult   Outcome: Progressing     Problem: Nutrition/Hydration-ADULT  Goal: Nutrient/Hydration intake appropriate for improving, restoring or maintaining nutritional needs  Description: Monitor and assess patient's nutrition/hydration status for malnutrition  Collaborate with interdisciplinary team and initiate plan and interventions as ordered  Monitor patient's weight and dietary intake as ordered or per policy  Utilize nutrition screening tool and intervene as necessary  Determine patient's food preferences and provide high-protein, high-caloric foods as appropriate       INTERVENTIONS:  - Monitor oral intake, urinary output, labs, and treatment plans  - Assess nutrition and hydration status and recommend course of action  - Evaluate amount of meals eaten  - Assist patient with eating if necessary   - Allow adequate time for meals  - Recommend/ encourage appropriate diets, oral nutritional supplements, and vitamin/mineral supplements  - Assess need for intravenous fluids  - Provide specific nutrition/hydration education as appropriate  - Include patient/family/caregiver in decisions related to nutrition  Outcome: Progressing     Problem: GENITOURINARY - ADULT  Goal: Maintains or returns to baseline urinary function  Description: INTERVENTIONS:  - Assess urinary function  - Encourage oral fluids to ensure adequate hydration if ordered  - Administer IV fluids as ordered to ensure adequate hydration  - Administer ordered medications as needed  - Offer frequent toileting  - Follow urinary retention protocol if ordered  Outcome: Completed  Goal: Absence of urinary retention  Description: INTERVENTIONS:  - Assess patients ability to void and empty bladder  - Monitor I/O  - Bladder scan as needed  - Discuss with physician/AP medications to alleviate retention as needed  - Discuss catheterization for long term situations as appropriate  Outcome: Completed  Goal: Urinary catheter remains patent  Description: INTERVENTIONS:  - Assess patency of urinary catheter  - If patient has a chronic allen, consider changing catheter if non-functioning  - Follow guidelines for intermittent irrigation of non-functioning urinary catheter  Outcome: Completed     Problem: METABOLIC, FLUID AND ELECTROLYTES - ADULT  Goal: Electrolytes maintained within normal limits  Description: INTERVENTIONS:  - Monitor labs and assess patient for signs and symptoms of electrolyte imbalances  - Administer electrolyte replacement as ordered  - Monitor response to electrolyte replacements, including repeat lab results as appropriate  - Instruct patient on fluid and nutrition as appropriate  Outcome: Progressing  Goal: Fluid balance maintained  Description: INTERVENTIONS:  - Monitor labs   - Monitor I/O and WT  - Instruct patient on fluid and nutrition as appropriate  - Assess for signs & symptoms of volume excess or deficit  Outcome: Progressing  Goal: Glucose maintained within target range  Description: INTERVENTIONS:  - Monitor Blood Glucose as ordered  - Assess for signs and symptoms of hyperglycemia and hypoglycemia  - Administer ordered medications to maintain glucose within target range  - Assess nutritional intake and initiate nutrition service referral as needed  Outcome: Progressing     Problem: INFECTION - ADULT  Goal: Absence or prevention of progression during hospitalization  Description: INTERVENTIONS:  - Assess and monitor for signs and symptoms of infection  - Monitor lab/diagnostic results  - Monitor all insertion sites, i e  indwelling lines, tubes, and drains  - Monitor endotracheal if appropriate and nasal secretions for changes in amount and color  - [place] appropriate cooling/warming therapies per order  - Administer medications as ordered  - Instruct and encourage patient and family to use good hand hygiene technique  - Identify and instruct in appropriate isolation precautions for identified infection/condition  Outcome: Progressing  Goal: Absence of fever/infection during neutropenic period  Description: INTERVENTIONS:  - Monitor WBC    Outcome: Completed

## 2021-01-17 NOTE — DISCHARGE INSTRUCTIONS
Acute Kidney Injury   AMBULATORY CARE:   Acute kidney injury (RODRIGO) is also called acute kidney failure, or acute renal failure  RODRIGO happens when your kidneys suddenly stop working correctly  Normally, the kidneys remove fluid, chemicals, and waste from your blood  These wastes are turned into urine by your kidneys  RODRIGO usually happens over hours or days  When you have RODRIGO, your kidneys do not remove the waste, chemicals, or extra fluid from your body  A normal amount of urine is not produced  RODRIGO is usually temporary, but it may become a chronic kidney condition  Causes of RODRIGO:   · Decreased blood flow to the kidney, such as from hypercalcemia (high blood calcium level) or severe heart disease     · A disease or condition that affects the kidneys, such as hypertension (high blood pressure) or diabetes     · A blockage in the kidney or ureter, such as a kidney or bladder stone, enlarged prostate, or tumor    Common symptoms include the following: You may not have any symptoms with early or mild RODRIGO  As RODRIGO progresses, you may have any of the following:  · Decrease in the amount of urine or no urination    · Swelling in your arms, legs, or feet     · Weakness, drowsiness, or no appetite    · Nausea, flank pain, muscle twitching or muscle cramps    · Itchy skin, or your, breath or body smells like urine    · Behavior changes, confusion, disorientation, or seizures    Call 911 if:   · You have sudden chest pain or trouble breathing  Seek care immediately if:   · Your symptoms get worse  Contact your healthcare provider if:   · Your symptoms return  · Your blood sugar or blood pressure level is not within the range your healthcare provider recommends  · You have questions or concerns about your condition or care  Treatment for RODRIGO  depends upon the cause of your acute kidney injury and how severe it is   Usually, RODRIGO will be monitored in the hospital  If you have mild RODRIGO, you may be able to go home to recover  Your healthcare providers will treat the cause of your RODRIGO  You may need IV fluids if your RODRIGO was caused by little or no fluid in your body  You may need dialysis to remove waste and extra fluid from your body  Nutrition:  Your healthcare provider may tell you to eat food low in sodium (salt), potassium, phosphorus, or protein  A dietitian can help you plan your meals  Drink liquids as directed: Your healthcare provider may recommend that you drink a certain amount of liquids  This will help your kidneys work better and decrease your risk for dehydration  Ask how much liquid to drink each day and which liquids are best for you  What you can do to manage and prevent RODRIGO:   · Monitor and manage other health conditions  such as diabetes, high blood pressure, or heart disease  These conditions increase your risk for acute kidney injury  Take your medicines for these conditions as directed  Also, monitor your blood sugar and blood pressure levels as directed  Contact your healthcare provider if your levels are not in the range he or she says it should be  · Talk to your healthcare provider before you take over-the-counter-medicine  NSAIDs, stomach medicine, or laxatives may harm your kidneys and increase your risk for acute kidney injury  If it is okay to take the medicine, follow the directions on the package  Do not take more than directed  · Tell healthcare providers you have had acute kidney injury  before you get contrast liquid for an x-ray or CT scan  Your healthcare provider may give you medicine to prevent kidney problems caused by the liquid  Follow up with your healthcare provider as directed:  Write down your questions so you remember to ask them during your visits  © Copyright [address] Information is for End User's use only and may not be sold, redistributed or otherwise used for commercial purposes   All illustrations and images included in CareNotes® are the copyrighted property of A D A M , Inc  or [facility] Yahaira Castro   The above information is an  only  It is not intended as medical advice for individual conditions or treatments  Talk to your doctor, nurse or pharmacist before following any medical regimen to see if it is safe and effective for you

## 2021-01-18 ENCOUNTER — TRANSITIONAL CARE MANAGEMENT (OUTPATIENT)
Dept: FAMILY MEDICINE CLINIC | Facility: CLINIC | Age: 81
End: 2021-01-18

## 2021-01-18 ENCOUNTER — PATIENT OUTREACH (OUTPATIENT)
Dept: FAMILY MEDICINE CLINIC | Facility: CLINIC | Age: 81
End: 2021-01-18

## 2021-01-18 NOTE — PROGRESS NOTES
Daughter calls to report that family has made arrangements for care within the home  Pts son has been handling all the arrangements for private home health care  Dr José Living agreeable to do home visits  Home visit scheduled for 1/21 at 2 pm  Daughter agreeable to this date and time  A referral was made to [facility] for nursing services while inpatient  This information along with contact information for agency provided to daughter  Outreach to [facility]  Referral was received however case was not yet opened  [facility] for intake  Requested return call

## 2021-01-19 ENCOUNTER — TELEPHONE (OUTPATIENT)
Dept: NEUROLOGY | Facility: CLINIC | Age: 81
End: 2021-01-19

## 2021-01-19 ENCOUNTER — PATIENT OUTREACH (OUTPATIENT)
Dept: FAMILY MEDICINE CLINIC | Facility: CLINIC | Age: 81
End: 2021-01-19

## 2021-01-19 LAB — VIT B1 BLD-SCNC: 117.7 NMOL/L (ref 66.5–200)

## 2021-01-19 NOTE — PROGRESS NOTES
Outreach to [facility]  Requesting information regarding start of care  Advised that patient is "on their radar" , but no start of care is noted  Call transferred to clinical scheduling  [facility] requesting a return call

## 2021-01-19 NOTE — TELEPHONE ENCOUNTER
1ST ATTEMPT LMOM for pt to call in to confirm she is going to follow care with her current Neurologist

## 2021-01-20 NOTE — TELEPHONE ENCOUNTER
2ND ATTEMPT LMOM for pt to call in to confirm she is going to follow care with her current Neurologist

## 2021-01-21 ENCOUNTER — TELEPHONE (OUTPATIENT)
Dept: FAMILY MEDICINE CLINIC | Facility: CLINIC | Age: 81
End: 2021-01-21

## 2021-01-21 NOTE — TELEPHONE ENCOUNTER
3RD ATTEMPT LMOM for pt to call in to confirm she is going to follow care with her current Neurologist    Mailed letter to pt's home

## 2021-01-22 ENCOUNTER — OFFICE VISIT (OUTPATIENT)
Dept: FAMILY MEDICINE CLINIC | Facility: CLINIC | Age: 81
End: 2021-01-22
Payer: MEDICARE

## 2021-01-22 VITALS
OXYGEN SATURATION: 96 % | RESPIRATION RATE: 18 BRPM | BODY MASS INDEX: 16.36 KG/M2 | SYSTOLIC BLOOD PRESSURE: 125 MMHG | TEMPERATURE: 98.6 F | HEIGHT: 63 IN | HEART RATE: 88 BPM | WEIGHT: 92.3 LBS | DIASTOLIC BLOOD PRESSURE: 80 MMHG

## 2021-01-22 DIAGNOSIS — Z48.02 VISIT FOR SUTURE REMOVAL: Primary | ICD-10-CM

## 2021-01-22 PROCEDURE — 99213 OFFICE O/P EST LOW 20 MIN: CPT | Performed by: FAMILY MEDICINE

## 2021-01-25 ENCOUNTER — PATIENT OUTREACH (OUTPATIENT)
Dept: FAMILY MEDICINE CLINIC | Facility: CLINIC | Age: 81
End: 2021-01-25

## 2021-01-25 ENCOUNTER — IN HOME VISIT (OUTPATIENT)
Dept: FAMILY MEDICINE CLINIC | Facility: CLINIC | Age: 81
End: 2021-01-25
Payer: MEDICARE

## 2021-01-25 VITALS
HEART RATE: 72 BPM | DIASTOLIC BLOOD PRESSURE: 78 MMHG | RESPIRATION RATE: 18 BRPM | SYSTOLIC BLOOD PRESSURE: 124 MMHG | OXYGEN SATURATION: 98 %

## 2021-01-25 DIAGNOSIS — F02.80 DEMENTIA ASSOCIATED WITH OTHER UNDERLYING DISEASE WITHOUT BEHAVIORAL DISTURBANCE (HCC): ICD-10-CM

## 2021-01-25 DIAGNOSIS — I10 ESSENTIAL HYPERTENSION: ICD-10-CM

## 2021-01-25 DIAGNOSIS — R26.2 AMBULATORY DYSFUNCTION: ICD-10-CM

## 2021-01-25 DIAGNOSIS — R63.6 UNDERWEIGHT: ICD-10-CM

## 2021-01-25 DIAGNOSIS — K21.9 GASTROESOPHAGEAL REFLUX DISEASE, UNSPECIFIED WHETHER ESOPHAGITIS PRESENT: ICD-10-CM

## 2021-01-25 DIAGNOSIS — G20 PARKINSON'S DISEASE (HCC): Primary | ICD-10-CM

## 2021-01-25 DIAGNOSIS — R29.6 FREQUENT FALLS: ICD-10-CM

## 2021-01-25 PROCEDURE — 99213 OFFICE O/P EST LOW 20 MIN: CPT | Performed by: FAMILY MEDICINE

## 2021-01-25 RX ORDER — AMLODIPINE BESYLATE 10 MG/1
5 TABLET ORAL DAILY
Qty: 45 TABLET | Refills: 3 | Status: SHIPPED | OUTPATIENT
Start: 2021-01-25 | End: 2021-02-05 | Stop reason: SDUPTHER

## 2021-01-25 RX ORDER — PANTOPRAZOLE SODIUM 40 MG/1
40 TABLET, DELAYED RELEASE ORAL 2 TIMES DAILY
Qty: 60 TABLET | Refills: 0 | Status: SHIPPED | OUTPATIENT
Start: 2021-01-25 | End: 2021-02-05 | Stop reason: SDUPTHER

## 2021-01-25 NOTE — PROGRESS NOTES
Outreach to patients daughter  Advised that Dr Erica Juarez will be doing home visit today  Shaenll Huang will check with parents and notify CM

## 2021-01-25 NOTE — PROGRESS NOTES
Assessment/Plan:    Lior Haji is a [de-identified]year old female with PMhx of Parkinson disease, Dementia, s/p deep brain stimulator placement, GERD, HTN, CKD stage III was evaluated today for home visit  Patient has daily visiting nurse six days a week to manage her medication  Patient has been sleeping well at night  Will discontinue Seroquel qHS at this time  Vital signs stable, patient's mentation appropriate today  Patient is scheduled for follow up with neurology on 3/11/2021  Refilled Amlodipine and Pantoprazole  Patient seen with Dr Tim Lopez  Diagnoses and all orders for this visit:    Parkinson's disease ([facility] [address] )    Dementia associated with other underlying disease without behavioral disturbance ([facility] [address] )    Ambulatory dysfunction    Frequent falls    Underweight    Essential hypertension        Subjective:      Patient ID: Chelsea Cabrera is a [de-identified] y o  female  HPI     Patient is a [de-identified]year old female with PMhx of Parkinson disease, Dementia, s/p deep brain stimulator placement, GERD, HTN, CKD stage III was evaluated today for home visit  Patient was recently hospitalized from 1/10-1/17/21 secondary to altered mental status  Patient has a visiting nurse six times a week that manage patient's medications and cooking for her  Patient lives with   She ambulates with 4-wheel walkers   denied any further fall since discharge from the hospital, however she stumbles frequently at home  She sleeps on the living room sofa and  sleeps nearby in a recliner chair  Patient states she is sleeping well at night  Reports normal appetite and bowel movement  Patient denied any dizziness, N/V, chest pain, SOB  The following portions of the patient's history were reviewed and updated as appropriate: allergies, current medications, past family history, past medical history, past social history, past surgical history and problem list     Review of Systems   Constitutional: Negative for chills and fever     HENT: Negative for congestion, rhinorrhea and sore throat  Eyes: Negative for visual disturbance  Respiratory: Negative for cough and shortness of breath  Cardiovascular: Negative for chest pain and palpitations  Gastrointestinal: Negative for abdominal pain, nausea and vomiting  Genitourinary: Negative for dysuria  Musculoskeletal: Negative for back pain  Skin: Negative for color change  Neurological: Negative for dizziness and headaches  Hematological: Does not bruise/bleed easily  Psychiatric/Behavioral: Negative for confusion and sleep disturbance  Objective:      /78   Pulse 72   Resp 18   SpO2 98%          Physical Exam  Vitals signs reviewed  Constitutional:       General: She is not in acute distress  Comments: Very thin appearance   HENT:      Head: Normocephalic  Nose: No congestion or rhinorrhea  Eyes:      General: No scleral icterus  Right eye: No discharge  Left eye: No discharge  Cardiovascular:      Rate and Rhythm: Normal rate  Heart sounds: No murmur  Pulmonary:      Effort: Pulmonary effort is normal  No respiratory distress  Breath sounds: No wheezing  Abdominal:      General: Abdomen is flat  There is no distension  Tenderness: There is no abdominal tenderness  Musculoskeletal:      Right lower leg: No edema  Left lower leg: No edema  Comments: Involuntary movement of bilateral upper extremities   Neurological:      Mental Status: She is alert and oriented to person, place, and time     Psychiatric:         Mood and Affect: Mood normal          Behavior: Behavior normal

## 2021-02-03 ENCOUNTER — TELEPHONE (OUTPATIENT)
Dept: FAMILY MEDICINE CLINIC | Facility: CLINIC | Age: 81
End: 2021-02-03

## 2021-02-05 DIAGNOSIS — K21.9 GASTROESOPHAGEAL REFLUX DISEASE, UNSPECIFIED WHETHER ESOPHAGITIS PRESENT: ICD-10-CM

## 2021-02-05 DIAGNOSIS — F02.81 DEMENTIA DUE TO PARKINSON'S DISEASE WITH BEHAVIORAL DISTURBANCE (HCC): ICD-10-CM

## 2021-02-05 DIAGNOSIS — E11.9 TYPE 2 DIABETES MELLITUS WITHOUT COMPLICATION, WITHOUT LONG-TERM CURRENT USE OF INSULIN (HCC): ICD-10-CM

## 2021-02-05 DIAGNOSIS — M54.9 INTRACTABLE BACK PAIN: ICD-10-CM

## 2021-02-05 DIAGNOSIS — M48.061 SPINAL STENOSIS OF LUMBAR REGION WITHOUT NEUROGENIC CLAUDICATION: ICD-10-CM

## 2021-02-05 DIAGNOSIS — I10 ESSENTIAL HYPERTENSION: ICD-10-CM

## 2021-02-05 DIAGNOSIS — G20 DEMENTIA DUE TO PARKINSON'S DISEASE WITH BEHAVIORAL DISTURBANCE (HCC): ICD-10-CM

## 2021-02-05 RX ORDER — LANCETS 28 GAUGE
EACH MISCELLANEOUS
Qty: 100 EACH | Refills: 4 | Status: SHIPPED | OUTPATIENT
Start: 2021-02-05 | End: 2021-03-16 | Stop reason: SDUPTHER

## 2021-02-05 RX ORDER — GABAPENTIN 100 MG/1
100 CAPSULE ORAL
Qty: 90 CAPSULE | Refills: 3 | Status: SHIPPED | OUTPATIENT
Start: 2021-02-05 | End: 2021-07-21 | Stop reason: SDUPTHER

## 2021-02-05 RX ORDER — PANTOPRAZOLE SODIUM 40 MG/1
40 TABLET, DELAYED RELEASE ORAL DAILY
Qty: 90 TABLET | Refills: 1 | Status: SHIPPED | OUTPATIENT
Start: 2021-02-05 | End: 2021-03-16

## 2021-02-05 RX ORDER — AMLODIPINE BESYLATE 10 MG/1
5 TABLET ORAL DAILY
Qty: 45 TABLET | Refills: 3 | Status: SHIPPED | OUTPATIENT
Start: 2021-02-05 | End: 2021-07-21 | Stop reason: SDUPTHER

## 2021-02-05 RX ORDER — BLOOD-GLUCOSE METER
1 KIT MISCELLANEOUS AS NEEDED
Qty: 1 EACH | Refills: 0 | Status: SHIPPED | OUTPATIENT
Start: 2021-02-05

## 2021-02-05 RX ORDER — QUETIAPINE FUMARATE 25 MG/1
25 TABLET, FILM COATED ORAL
Qty: 30 TABLET | Refills: 0 | Status: SHIPPED | OUTPATIENT
Start: 2021-02-05 | End: 2021-03-03

## 2021-02-12 DIAGNOSIS — Z23 ENCOUNTER FOR IMMUNIZATION: ICD-10-CM

## 2021-02-19 ENCOUNTER — IMMUNIZATIONS (OUTPATIENT)
Dept: FAMILY MEDICINE CLINIC | Facility: HOSPITAL | Age: 81
End: 2021-02-19

## 2021-02-19 DIAGNOSIS — Z23 ENCOUNTER FOR IMMUNIZATION: Primary | ICD-10-CM

## 2021-02-19 PROCEDURE — 0011A SARS-COV-2 / COVID-19 MRNA VACCINE (MODERNA) 100 MCG: CPT

## 2021-02-19 PROCEDURE — 91301 SARS-COV-2 / COVID-19 MRNA VACCINE (MODERNA) 100 MCG: CPT

## 2021-03-03 DIAGNOSIS — G20 DEMENTIA DUE TO PARKINSON'S DISEASE WITH BEHAVIORAL DISTURBANCE (HCC): ICD-10-CM

## 2021-03-03 DIAGNOSIS — F02.81 DEMENTIA DUE TO PARKINSON'S DISEASE WITH BEHAVIORAL DISTURBANCE (HCC): ICD-10-CM

## 2021-03-03 RX ORDER — QUETIAPINE FUMARATE 25 MG/1
25 TABLET, FILM COATED ORAL
Qty: 30 TABLET | Refills: 0 | Status: SHIPPED | OUTPATIENT
Start: 2021-03-03 | End: 2021-03-31

## 2021-03-04 ENCOUNTER — TELEPHONE (OUTPATIENT)
Dept: FAMILY MEDICINE CLINIC | Facility: CLINIC | Age: 81
End: 2021-03-04

## 2021-03-16 ENCOUNTER — OFFICE VISIT (OUTPATIENT)
Dept: FAMILY MEDICINE CLINIC | Facility: CLINIC | Age: 81
End: 2021-03-16
Payer: MEDICARE

## 2021-03-16 VITALS
WEIGHT: 93 LBS | DIASTOLIC BLOOD PRESSURE: 72 MMHG | HEART RATE: 78 BPM | TEMPERATURE: 96.7 F | BODY MASS INDEX: 16.48 KG/M2 | RESPIRATION RATE: 16 BRPM | HEIGHT: 63 IN | OXYGEN SATURATION: 98 % | SYSTOLIC BLOOD PRESSURE: 116 MMHG

## 2021-03-16 DIAGNOSIS — E11.9 TYPE 2 DIABETES MELLITUS WITHOUT COMPLICATION, WITHOUT LONG-TERM CURRENT USE OF INSULIN (HCC): Primary | ICD-10-CM

## 2021-03-16 DIAGNOSIS — G20 PARKINSON'S DISEASE (HCC): ICD-10-CM

## 2021-03-16 DIAGNOSIS — G20 DEMENTIA DUE TO PARKINSON'S DISEASE WITH BEHAVIORAL DISTURBANCE (HCC): ICD-10-CM

## 2021-03-16 DIAGNOSIS — E78.2 MIXED HYPERLIPIDEMIA: ICD-10-CM

## 2021-03-16 DIAGNOSIS — Z71.89 ENCOUNTER FOR HERB AND VITAMIN SUPPLEMENT MANAGEMENT: ICD-10-CM

## 2021-03-16 DIAGNOSIS — F02.81 DEMENTIA DUE TO PARKINSON'S DISEASE WITH BEHAVIORAL DISTURBANCE (HCC): ICD-10-CM

## 2021-03-16 DIAGNOSIS — D68.52 PROTHROMBIN G20210A MUTATION (HCC): ICD-10-CM

## 2021-03-16 DIAGNOSIS — K21.9 GASTROESOPHAGEAL REFLUX DISEASE, UNSPECIFIED WHETHER ESOPHAGITIS PRESENT: ICD-10-CM

## 2021-03-16 DIAGNOSIS — I10 ESSENTIAL HYPERTENSION: ICD-10-CM

## 2021-03-16 DIAGNOSIS — S92.351A CLOSED DISPLACED FRACTURE OF FIFTH METATARSAL BONE OF RIGHT FOOT, INITIAL ENCOUNTER: ICD-10-CM

## 2021-03-16 DIAGNOSIS — M48.061 SPINAL STENOSIS OF LUMBAR REGION WITHOUT NEUROGENIC CLAUDICATION: ICD-10-CM

## 2021-03-16 DIAGNOSIS — M81.0 AGE-RELATED OSTEOPOROSIS WITHOUT CURRENT PATHOLOGICAL FRACTURE: ICD-10-CM

## 2021-03-16 DIAGNOSIS — N18.31 STAGE 3A CHRONIC KIDNEY DISEASE (HCC): ICD-10-CM

## 2021-03-16 DIAGNOSIS — R29.6 FREQUENT FALLS: ICD-10-CM

## 2021-03-16 PROBLEM — S92.353A FRACTURE OF 5TH METATARSAL: Status: RESOLVED | Noted: 2021-01-12 | Resolved: 2021-03-16

## 2021-03-16 PROBLEM — R41.82 ALTERED MENTAL STATUS: Status: RESOLVED | Noted: 2021-01-11 | Resolved: 2021-03-16

## 2021-03-16 PROBLEM — G24.9 DYSKINESIA DUE TO PARKINSON'S DISEASE (HCC): Status: ACTIVE | Noted: 2017-01-23
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
## 2021-03-16 PROCEDURE — 99214 OFFICE O/P EST MOD 30 MIN: CPT | Performed by: FAMILY MEDICINE

## 2021-03-16 RX ORDER — GABAPENTIN 100 MG/1
100 CAPSULE ORAL DAILY
COMMUNITY
Start: 2021-02-05 | End: 2021-03-16 | Stop reason: SDUPTHER

## 2021-03-16 RX ORDER — RASAGILINE 1 MG/1
1 TABLET ORAL DAILY
COMMUNITY
Start: 2021-02-05

## 2021-03-16 RX ORDER — PANTOPRAZOLE SODIUM 20 MG/1
20 TABLET, DELAYED RELEASE ORAL DAILY
Qty: 90 TABLET | Refills: 3 | Status: SHIPPED | OUTPATIENT
Start: 2021-03-16 | End: 2021-03-23 | Stop reason: SDUPTHER

## 2021-03-16 RX ORDER — LANCETS 28 GAUGE
EACH MISCELLANEOUS
Qty: 100 EACH | Refills: 4 | Status: SHIPPED | OUTPATIENT
Start: 2021-03-16

## 2021-03-16 RX ORDER — ROPINIROLE 2 MG/1
6 TABLET, FILM COATED ORAL
COMMUNITY
Start: 2021-02-05 | End: 2021-03-16 | Stop reason: SDUPTHER

## 2021-03-16 NOTE — PROGRESS NOTES
ASSESSMENT/PLAN:BMI Counseling: Body mass index is 16 47 kg/m²  The BMI   Encouraged increase in healthy calories  1  Closed displaced fracture of fifth metatarsal bone of right foot, initial encounter    - DXA bone density spine hip and pelvis; Future    2  Type 2 diabetes mellitus without complication, without long-term current use of insulin ([facility])  Stable glycemic control    3  Essential hypertension  Good bp control    4  Parkinson's disease [facility])  Sees neurologist  Healthy diet supported  5  Dementia due to Parkinson's disease with behavioral disturbance ([facility] [address] )  Healthy diet supported  Has brain stimulatior    6  Stage 3a chronic kidney disease  Track  Improved now with Cr 1 01  In 1/21    7  Spinal stenosis of lumbar region without neurogenic claudication  Acetaminophen, curcumin help possibly    8  Mixed hyperlipidemia  Healthy diet  Omega 3 f a     9  Frequent falls  Uses Rollator  Decrease dose of Pantoprazole to decrease it's chance of causing osteoporosis    10  Encounter for herb and vitamin supplement management  Curcumin as long as not causing GI upset      Reviewed and reinforced basics of healthy lifestyle  Risks and benefits of therapeutic plan discussed, answered all patient questions and concerns and patient expressed understanding and agreement of therapeutic plan  Follow up plan: 3 months      SUBJECTIVE:  Chief complaint and HPI: Juan David Butler is a [de-identified] y o  female who presents for follow up of multiple chronic medical problems, as listed below in problem list  All problems are relatively stable except for the following issues: no acute issues      Review of systems:  No fever/chills/sweats/unexplained weight loss  No chest pain/shortness of breath  + some occasional loose stool with some preceding abdominal discomfort relieved after the bm   No blood  No change in urination      Chart reviewed for relevant medical, surgical and psychosocial history, medications and allergies, labs and studies  Patient Active Problem List   Diagnosis    Carpal tunnel syndrome    Colon, diverticulosis    Type 2 diabetes mellitus without complication, without long-term current use of insulin ([facility] )    Essential hypertension    GERD (gastroesophageal reflux disease)    Mixed hyperlipidemia    Insomnia    Spinal stenosis    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)    Restless legs syndrome    Need for influenza vaccination    Age-related nuclear cataract of both eyes    Ambulatory dysfunction    Cervical dystonia    Parkinson's disease ([facility] )    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 ([facility] )    Encounter for herb and vitamin supplement management    Intractable back pain    Primary osteoarthritis of right knee    Asymptomatic bacteriuria    Stage 3a chronic kidney disease    Frequent falls    Sinus arrhythmia    S/P deep brain stimulator placement    Underweight    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 ([facility] )             EXAM:  /72 (BP Location: Left arm, Patient Position: Sitting, Cuff Size: Standard)   Pulse 78   Temp (!) 96 7 °F (35 9 °C) (Tympanic)   Resp 16   Ht 5' 3" (1 6 m)   Wt 42 2 kg (93 lb)   SpO2 98%   BMI 16 47 kg/m²   The patient appears well, in no apparent distress  Alert and oriented times three, pleasant and cooperative  Vital signs are as noted by the nurse  /72 (BP Location: Left arm, Patient Position: Sitting, Cuff Size: Standard)   Pulse 78   Temp (!) 96 7 °F (35 9 °C) (Tympanic)   Resp 16   Ht 5' 3" (1 6 m)   Wt 42 2 kg (93 lb)   SpO2 98%   BMI 16 47 kg/m²     Head: normocephalic, atraumatic  Eyes: well perfused conjunctiva, not clinically pale, not jaundiced  Ears: external ear: no gross lesions  Nose: no rhinorrhea  Neck: supple, trachea in the midline and no concerning cervical lymphadenopathy  Lungs: No respiratory labor   Clear to auscultation  Heart: Regular rate and rhythm, no murmurs, gallops or rubs  Abdomen: Benign: soft, non-tender, non-distended  No guarding or rebound  No masses or organomegaly  Normal bowel sounds,   Skin: No pallor  No rashes noted  Extremities: Moves all extremities normally   No pedal edema

## 2021-03-17 ENCOUNTER — TELEPHONE (OUTPATIENT)
Dept: FAMILY MEDICINE CLINIC | Facility: CLINIC | Age: 81
End: 2021-03-17

## 2021-03-17 NOTE — TELEPHONE ENCOUNTER
Soni Mannys Daughter is requesting a call back in regards to Changes in Patient's Medication on 3/16/2021 during Office Visit   Soni Bryants can be reached at the number listed below'        [phone] Cell

## 2021-03-18 ENCOUNTER — IMMUNIZATIONS (OUTPATIENT)
Dept: FAMILY MEDICINE CLINIC | Facility: HOSPITAL | Age: 81
End: 2021-03-18

## 2021-03-18 DIAGNOSIS — Z23 ENCOUNTER FOR IMMUNIZATION: Primary | ICD-10-CM

## 2021-03-18 PROCEDURE — 0012A SARS-COV-2 / COVID-19 MRNA VACCINE (MODERNA) 100 MCG: CPT

## 2021-03-18 PROCEDURE — 91301 SARS-COV-2 / COVID-19 MRNA VACCINE (MODERNA) 100 MCG: CPT

## 2021-03-18 NOTE — TELEPHONE ENCOUNTER
Mayo White spoke to neurology - They do not want to stop sinemet     Can you add this back to her list as historical provider (this is managed by neurology)    The confusion and concern from the daughter is coming from the after visit summary - see below    STOP taking:  carbidopa-levodopa  mg per tablet (SINEMET)  Stopped by: Ricardo Martines MD

## 2021-03-19 DIAGNOSIS — G20 PARKINSON'S DISEASE (HCC): Primary | ICD-10-CM

## 2021-03-19 RX ORDER — CARBIDOPA AND LEVODOPA 25; 100 MG/1; MG/1
1 TABLET, EXTENDED RELEASE ORAL 2 TIMES DAILY
Start: 2021-03-19 | End: 2021-07-21 | Stop reason: ALTCHOICE

## 2021-03-20 ENCOUNTER — APPOINTMENT (EMERGENCY)
Dept: RADIOLOGY | Facility: HOSPITAL | Age: 81
End: 2021-03-20
Payer: MEDICARE

## 2021-03-20 ENCOUNTER — HOSPITAL ENCOUNTER (EMERGENCY)
Facility: HOSPITAL | Age: 81
Discharge: HOME | End: 2021-03-20
Attending: EMERGENCY MEDICINE | Admitting: EMERGENCY MEDICINE
Payer: MEDICARE

## 2021-03-20 VITALS
HEART RATE: 70 BPM | OXYGEN SATURATION: 98 % | RESPIRATION RATE: 19 BRPM | SYSTOLIC BLOOD PRESSURE: 213 MMHG | DIASTOLIC BLOOD PRESSURE: 98 MMHG

## 2021-03-20 DIAGNOSIS — S40.022A: Primary | ICD-10-CM

## 2021-03-20 PROCEDURE — 73060 X-RAY EXAM OF HUMERUS: CPT

## 2021-03-20 PROCEDURE — 73030 X-RAY EXAM OF SHOULDER: CPT

## 2021-03-20 PROCEDURE — 99282 EMERGENCY DEPT VISIT SF MDM: CPT | Performed by: EMERGENCY MEDICINE

## 2021-03-20 PROCEDURE — 99283 EMERGENCY DEPT VISIT LOW MDM: CPT

## 2021-03-20 NOTE — ED PROVIDER NOTES
History  Chief Complaint   Patient presents with    Fall     Patient fell at home approx 1 hour ago  States she has a hematoma to her upper left arm, states pain is about a 1 or 2 out of ten      HPI    This is a [de-identified] yo F who presents today with fall  States she had a mechanical fall today and did not hit her head or lose consciousness  States she hit her left arm in the bathroom and swelling of upper arm  No other complaints  States no  Pain in the arm  Not on thinner  No other compalitns  Impression: [de-identified] yo F with fall  Will get xray  Patient has a hematoma of the left upper arm  Will use ace wrap and xray     Prior to Admission Medications   Prescriptions Last Dose Informant Patient Reported? Taking?    Amantadine HCl ER (GOCOVRI) 137 MG CP24  Self Yes No   Sig: Take 1 capsule by mouth daily at bedtime    Lancets (freestyle) lancets   No No   Sig: For daily testing   Multiple Vitamin (MULTIVITAMIN) tablet  Self Yes No   Sig: Take 1 tablet by mouth every morning    Omega-3 Fatty Acids (FISH OIL PO)  Self Yes No   Sig: Take 2 g by mouth every morning    QUEtiapine (SEROquel) 25 mg tablet   No No   Sig: TAKE 1 TABLET (25 MG TOTAL) BY MOUTH DAILY AT BEDTIME   TURMERIC PO  Self Yes No   Sig: Take by mouth 3 (three) times a day   acetaminophen (TYLENOL) 325 mg tablet   Yes No   Sig: Take 650 mg by mouth every 6 (six) hours as needed for mild pain   amLODIPine (NORVASC) 10 mg tablet   No No   Sig: Take 0 5 tablets (5 mg total) by mouth daily   aspirin (ECOTRIN LOW STRENGTH) 81 mg EC tablet  Self Yes No   Sig: Take 81 mg by mouth every morning    carbidopa-levodopa (SINEMET CR)  mg TBCR per ER tablet   No No   Sig: Take 1 tablet by mouth 2 (two) times a day   docusate sodium (COLACE) 100 mg capsule   Yes No   Sig: Take 100 mg by mouth 2 (two) times a day   gabapentin (NEURONTIN) 100 mg capsule   No No   Sig: Take 1 capsule (100 mg total) by mouth daily at bedtime   glucose monitoring kit (FREESTYLE) monitoring kit No No   Sig: Use 1 each as needed (DM control) ACCUCHEK METER COMPACT PLUS   pantoprazole (PROTONIX) 20 mg tablet   No No   Sig: Take 1 tablet (20 mg total) by mouth daily   rOPINIRole (REQUIP) 2 mg tablet   Yes No   Sig: TAKE 3 TABLETS BY MOUTH  NIGHTLY   rasagiline (AZILECT) 1 MG   Yes No   Sig: Take 1 mg by mouth daily      Facility-Administered Medications: None       Past Medical History:   Diagnosis Date    RODRIGO (acute kidney injury) ([facility] ) 7/19/2019    Altered mental status 1/11/2021    Anal bleeding 1/29/2018    Arthritis     knee    Asthma     BPPV (benign paroxysmal positional vertigo) 7/19/2016    Last Assessment & Plan:  Hx consistent with BPPV  Neg [place], however could not perform it adequately due to dyskinesias  Recommend lozano-daroff exercises at home  IF worsens, vestibular therapy      Bulging lumbar disc 12/10/2013    Closed fracture of one rib of left side 1/29/2018    Colon polyp     Dementia ([facility] )     Fall     balance issue    Fall during current hospitalization 1/15/2021    Gallbladder disease     GERD (gastroesophageal reflux disease)     Hematuria     last assessed 10/29/15    Orthostatic hypotension 7/13/2015    Osteomyelitis of lumbar spine ([facility] ) 3/10/2020    Ovarian cyst     LAST ASSESSED: 12/10/13    Parkinson's disease ([facility] )     Pneumonia of both lower lobes due to infectious organism 3/27/2018    Possible fracture of 5th metatarsal 1/12/2021    Psoriasis 12/10/2013    Pulmonary embolism with infarction ([facility] ) 9/2/2014    Scalp laceration, subsequent encounter 11/15/2019    Sciatica 12/10/2013    Severe protein-calorie malnutrition ([address] ) 7/22/2019    Skin disorder     last assessed 12/10/13    Squamous cell carcinoma of skin 11/21/2016    Stage 3a chronic kidney disease 12/4/2020    Toxic metabolic encephalopathy 8/69/6264    Use of cane as ambulatory aid     Walker as ambulation aid     Wears glasses     Weight loss 10/8/2019    Last Assessment & Plan:  Recommend Boost or Ensure  F/u with PCP   Weight loss, unintentional 5/22/2015       Past Surgical History:   Procedure Laterality Date    APPENDECTOMY      1970'S    CATARACT EXTRACTION      CHOLECYSTECTOMY      1970'S    COLONOSCOPY      LAPAROTOMY N/A 7/18/2019    Procedure: LAPAROTOMY EXPLORATORY;  Surgeon: Sourav Ornelas MD;  Location: [address] [place] [address];  Service: General    NEUROPLASTY / [address] Right     OOPHORECTOMY Bilateral     REMOVAL OF BOTH OVARIES LAPAROSCOPIC     Wollard Shelby FLX W/RMVL OF TUMOR POLYP LESION [address] TQ N/A 3/20/2018    Procedure: COLONOSCOPY;  Surgeon: Carol Salazar MD;  Location: [facility] GI LAB; Service: Gastroenterology    MN XCAPSL CTRC RMVL INSJ IO LENS PROSTH W/O ECP Right 12/4/2018    Procedure: EXTRACTION EXTRACAPSULAR CATARACT PHACO INTRAOCULAR LENS (IOL); Surgeon: Marilu Cullen MD;  Location: [facility] MAIN OR;  Service: Ophthalmology    MN XCAPSL CTRC RMVL INSJ IO LENS PROSTH W/O ECP  1/15/2019    Procedure: EXTRACTION EXTRACAPSULAR CATARACT PHACO INTRAOCULAR LENS (IOL); Surgeon: Marilu Cullen MD;  Location: [facility] MAIN OR;  Service: Ophthalmology    TONSILLECTOMY      VENTRAL HERNIA REPAIR      WITH IMPLANT OF MESH       Family History   Problem Relation Age of Onset    Alzheimer's disease Mother     Stroke Father     No Known Problems Sister     No Known Problems Brother      I have reviewed and agree with the history as documented  E-Cigarette/Vaping    E-Cigarette Use Never User      E-Cigarette/Vaping Substances     Social History     Tobacco Use    Smoking status: Never Smoker    Smokeless tobacco: Never Used   Substance Use Topics    Alcohol use: Not Currently    Drug use: Never       Review of Systems   Constitutional: Negative  Negative for diaphoresis and fever  HENT: Negative  Respiratory: Negative  Negative for cough, shortness of breath and wheezing  Cardiovascular: Negative  Negative for chest pain, palpitations and leg swelling  Gastrointestinal: Negative for abdominal distention, abdominal pain, nausea and vomiting  Genitourinary: Negative  Musculoskeletal:        Left arm swelling     Skin: Negative  Neurological: Negative  Psychiatric/Behavioral: Negative  All other systems reviewed and are negative  Physical Exam  Physical Exam  Constitutional:       Appearance: She is well-developed  HENT:      Head: Normocephalic and atraumatic  Eyes:      Pupils: Pupils are equal, round, and reactive to light  Neck:      Musculoskeletal: Normal range of motion and neck supple  Cardiovascular:      Rate and Rhythm: Normal rate and regular rhythm  Heart sounds: Normal heart sounds  No murmur  Pulmonary:      Effort: Pulmonary effort is normal       Breath sounds: Normal breath sounds  Abdominal:      General: Bowel sounds are normal  There is no distension  Palpations: Abdomen is soft  Tenderness: There is no abdominal tenderness  There is no guarding or rebound  Musculoskeletal: Normal range of motion  Comments: Left upper arm hematoma with normal pulses  No tenderness to palpation with normal ROM    Skin:     General: Skin is warm and dry  Neurological:      Mental Status: She is alert and oriented to person, place, and time           Vital Signs  ED Triage Vitals   Temp Pulse Respirations Blood Pressure SpO2   -- 03/20/21 0215 03/20/21 0215 03/20/21 0230 03/20/21 0215    75 17 (!) 213/98 97 %      Temp src Heart Rate Source Patient Position - Orthostatic VS BP Location FiO2 (%)   -- 03/20/21 0215 03/20/21 0230 03/20/21 0230 --    Monitor Sitting Right arm       Pain Score       03/20/21 0215       2           Vitals:    03/20/21 0215 03/20/21 0230   BP:  (!) 213/98   Pulse: 75 70   Patient Position - Orthostatic VS:  Sitting         Visual Acuity      ED Medications  Medications - No data to display    Diagnostic Studies  Results Reviewed None                 XR humerus LEFT   Final Result by Arlen Stock MD (03/20 1701)      No acute osseous abnormality  Focal soft tissue swelling  Workstation performed: [id number]         XR shoulder 2+ views LEFT   Final Result by Arlen Stock MD (03/20 1702)      No acute osseous abnormality  Soft tissue swelling  Workstation performed: [id number]                    Procedures  Procedures         ED Course                             SBIRT 22yo+      Most Recent Value   SBIRT (23 yo +)   In order to provide better care to our patients, we are screening all of our patients for alcohol and drug use  Would it be okay to ask you these screening questions? No Filed at: 03/20/2021 0215                    MDM    Disposition  Final diagnoses:   Traumatic hematoma of left upper arm     Time reflects when diagnosis was documented in both MDM as applicable and the Disposition within this note     Time User Action Codes Description Comment    3/20/2021  2:52 AM Terrence Long Add [S40 022A] Traumatic hematoma of left upper arm       ED Disposition     ED Disposition Condition Date/Time Comment    Discharge Stable Sat Mar 20, 2021  2:52 AM Jonathan Mcfarland discharge to home/self care              Follow-up Information     Follow up With Specialties Details Why Contact Info    Kelly Calvo MD Family Medicine  As needed [employer]  [facility]  [phone]            Discharge Medication List as of 3/20/2021  2:52 AM      CONTINUE these medications which have NOT CHANGED    Details   acetaminophen (TYLENOL) 325 mg tablet Take 650 mg by mouth every 6 (six) hours as needed for mild pain, Historical Med      Amantadine HCl ER (GOCOVRI) 137 MG CP24 Take 1 capsule by mouth daily at bedtime , Starting Fri 10/11/2019, Historical Med      amLODIPine (NORVASC) 10 mg tablet Take 0 5 tablets (5 mg total) by mouth daily, Starting Fri 2/5/2021, Normal      aspirin (ECOTRIN LOW STRENGTH) 81 mg EC tablet Take 81 mg by mouth every morning , Historical Med      carbidopa-levodopa (SINEMET CR)  mg TBCR per ER tablet Take 1 tablet by mouth 2 (two) times a day, Starting Fri 3/19/2021, No Print      docusate sodium (COLACE) 100 mg capsule Take 100 mg by mouth 2 (two) times a day, Historical Med      gabapentin (NEURONTIN) 100 mg capsule Take 1 capsule (100 mg total) by mouth daily at bedtime, Starting Fri 2/5/2021, Until Sat 2/5/2022, Normal      glucose monitoring kit (FREESTYLE) monitoring kit Use 1 each as needed (DM control) ACCUCHEK METER COMPACT PLUS, Starting Fri 2/5/2021, Normal      Lancets (freestyle) lancets For daily testing, Normal      Multiple Vitamin (MULTIVITAMIN) tablet Take 1 tablet by mouth every morning , Historical Med      Omega-3 Fatty Acids (FISH OIL PO) Take 2 g by mouth every morning , Historical Med      pantoprazole (PROTONIX) 20 mg tablet Take 1 tablet (20 mg total) by mouth daily, Starting Tue 3/16/2021, Normal      QUEtiapine (SEROquel) 25 mg tablet TAKE 1 TABLET (25 MG TOTAL) BY MOUTH DAILY AT BEDTIME, Starting Wed 3/3/2021, Until Fri 4/2/2021, Normal      rasagiline (AZILECT) 1 MG Take 1 mg by mouth daily, Starting Fri 2/5/2021, Historical Med      rOPINIRole (REQUIP) 2 mg tablet TAKE 3 TABLETS BY MOUTH  NIGHTLY, Historical Med      TURMERIC PO Take by mouth 3 (three) times a day, Historical Med           No discharge procedures on file      PDMP Review     None          ED Provider  Electronically Signed by           Tirso Taylor MD  03/20/21 1170

## 2021-03-23 DIAGNOSIS — K21.9 GASTROESOPHAGEAL REFLUX DISEASE, UNSPECIFIED WHETHER ESOPHAGITIS PRESENT: ICD-10-CM

## 2021-03-23 RX ORDER — PANTOPRAZOLE SODIUM 20 MG/1
20 TABLET, DELAYED RELEASE ORAL DAILY
Qty: 90 TABLET | Refills: 0 | Status: SHIPPED | OUTPATIENT
Start: 2021-03-23 | End: 2021-06-02 | Stop reason: SDUPTHER

## 2021-03-25 ENCOUNTER — EVALUATION (OUTPATIENT)
Dept: PHYSICAL THERAPY | Facility: CLINIC | Age: 81
End: 2021-03-25
Payer: MEDICARE

## 2021-03-25 DIAGNOSIS — R53.1 WEAKNESS: ICD-10-CM

## 2021-03-25 DIAGNOSIS — G20 PARKINSON'S DISEASE (HCC): Primary | ICD-10-CM

## 2021-03-25 DIAGNOSIS — K21.9 GASTROESOPHAGEAL REFLUX DISEASE, UNSPECIFIED WHETHER ESOPHAGITIS PRESENT: ICD-10-CM

## 2021-03-25 DIAGNOSIS — R29.6 FREQUENT FALLS: ICD-10-CM

## 2021-03-25 NOTE — PROGRESS NOTES
PT Evaluation     Today's date: 3/26/2021  Patient name: Emil Rinne  : 1940  MRN: [id number]  Referring provider: Casey Saeed MD  Dx:   Encounter Diagnosis     ICD-10-CM    1  Parkinson's disease ([facility] [address] )  G20    2  Weakness  R53 1    3  Frequent falls  R29 6                   Assessment  Assessment details: Reina Cano is an [de-identified] y/o woman who presents to PT for evaluation of balance and falls secondary to Parkinson's Disease diagnosis  Patient reports having a fall last week that sent her to the hospital and that she has been having trouble with her balance, typically falling backwards  Patient presents with severe forward head posture  Patient ambulates with RW, but demonstrates reduced step length and clearance, with occasional shuffling  She displays decreased LE strength, as shown through her MMT and 5xSTS time of 22 49 seconds  Patient demonstrates reduced functional endurance by not being able to complete 6 MWT, instead completing 160 ft for 2 minute walk test  She also demonstrates reduced muscular endurance with 30 second STS test, completing 6 reps  Patient's shows poor standing balance with mCTSIB scores of FTEO of 10 2 seconds, FTEC of 5 87 seconds, and was unable to complete any foam conditions  Based on her TUG w/ RW time of 20 65 seconds, and her LUNDBERG score of 17/56 patient is a high risk of falls  Patient will benefit from skilled PT services to improve her functional strength, improve her balance, increase her functional mobility, and reduce her risk of falls            Cut off score   All date taken from [facility] or Rehab Measures    LUNDBERG test: 46/56                                              5 x STS Test:  MDC: 6 points                                                  MDC: 2 3 seconds   age norms                                                                 Age Norms   61-76 year old = M: 54, F: 55                        62-78 year old: 11 4 seconds   66-77 year old = M 47,  F: 48 66-77 year old: 12 6 seconds     80-80 year old = M46,   F: 53                       80-80 year old: 14 8 seconds      TUG test:                                                                     10 Meter Walk Test:  MDC: 4 14 seconds       MDC:  59 ft/sec  Cut off score for Falls                                                  Age Norms  > 13 5 seconds community dwelling adults                20-29; M: 4 56 ft/sec F: 4 62 ft/sec  > 32 2 Frail Elderly                                                     30-39: M 4 76 ft/sec  F: 4 68 ft/sec          40-49: M: 4 79 ft/sec  F: 4 62 ft/sec  6 Minute Walk Test      50-59: M: 4 76 ft/sec  F: 4 56 ft/sec  MDC: 190 feet       60-69: M: 4 56 ft/sec  F: 4 26 ft/sec  Age Norms       70-+    M: 4 36 ft/sec  F: 4 16 ft/sec  60-69:    M: 1876 F: 9607  49-87:    M: 1729 F: 1545  80-89 +: M: 0255 F; 1286     Impairments: abnormal gait, abnormal or restricted ROM, abnormal movement, activity intolerance, impaired balance, impaired physical strength, lacks appropriate home exercise program, safety issue and poor posture   Understanding of Dx/Px/POC: good   Prognosis: fair    Goals  STG (4 weeks)  1  Patient will reduce TUG time from 20 65 seconds to 16 seconds to show reduced risk of falls  2  Patient will improve LUNDBERG score from 17/56 to 24/56 to show improvements with her functional balance  3  Patient will complete all conditions of mCTSIB for at least 10 seconds to show improvement with static balance  4  Patient will improve her 5xSTS time from 22 49 seconds to 18 seconds to show improvement with her functional strength        LTG (12 weeks)  1  Patient will complete 6 MWT to show improvement with her functional endurance  2  Patient will improve LUNDBERG score to 36/56 to show improvements with her functional balance  3  Patient will reduce TUG time to less than 13 5 seconds to display reduced risk of falls  4   Patient will complete all conditions of mCTSIB for 30 seconds to show improvement with her static balance      Plan  Patient would benefit from: skilled physical therapy  Planned therapy interventions: abdominal trunk stabilization, activity modification, ADL training, balance, balance/weight bearing training, body mechanics training, flexibility, functional ROM exercises, gait training, graded activity, graded exercise, home exercise program, manual therapy, neuromuscular re-education, motor coordination training, patient education, postural training, strengthening, stretching, therapeutic activities, therapeutic exercise and therapeutic training  Frequency: 2x week  Duration in weeks: 12  Plan of Care beginning date: 3/25/2021  Plan of Care expiration date: 2021  Treatment plan discussed with: patient and family        Subjective Evaluation    History of Present Illness  Mechanism of injury: Patient reports to therapy after fall from last week  She states that her "Parkinson's" is coming back  She reports she is having trouble with falls and with going backwards  She states she has been having aches in her legs   She reports having home PT recently  Pain  Current pain ratin  Location: B/L legs  Quality: dull ache    Social Support  Stairs in house: yes   Lives in: multiple-level home  Lives with: spouse    Employment status: not working  Treatments  Previous treatment: physical therapy  Patient Goals  Patient goals for therapy: improved balance, increased strength and increased motion          Objective     Strength/Myotome Testing     Left Hip   Planes of Motion   Flexion: 3+  Abduction: 3+    Right Hip   Planes of Motion   Flexion: 3+  Abduction: 3+    Left Knee   Flexion: 3+  Extension: 4-    Right Knee   Flexion: 3+  Extension: 3+    Left Ankle/Foot   Dorsiflexion: 3+  Plantar flexion: 3+    Right Ankle/Foot   Dorsiflexion: 3+  Plantar flexion: 3+             Precautions:   Past Medical History:   Diagnosis Date    [facility] 7/19/2019    Altered mental status 1/11/2021    Anal bleeding 1/29/2018    Arthritis     knee    Asthma     BPPV (benign paroxysmal positional vertigo) 7/19/2016    Last Assessment & Plan:  Hx consistent with BPPV  Neg [place]-Halpike, however could not perform it adequately due to dyskinesias  Recommend lozano-daroff exercises at home  IF worsens, vestibular therapy   Bulging lumbar disc 12/10/2013    Closed fracture of one rib of left side 1/29/2018    Colon polyp     Dementia ([facility] [address] )     Fall     balance issue    Fall during current hospitalization 1/15/2021    Gallbladder disease     GERD (gastroesophageal reflux disease)     Hematuria     last assessed 10/29/15    Orthostatic hypotension 7/13/2015    Osteomyelitis of lumbar spine ([facility] [address] ) 3/10/2020    Ovarian cyst     LAST ASSESSED: 12/10/13    Parkinson's disease ([address] )     Pneumonia of both lower lobes due to infectious organism 3/27/2018    Possible fracture of 5th metatarsal 1/12/2021    Psoriasis 12/10/2013    Pulmonary embolism with infarction ([address] ) 9/2/2014    Scalp laceration, subsequent encounter 11/15/2019    Sciatica 12/10/2013    Severe protein-calorie malnutrition ([address] ) 7/22/2019    Skin disorder     last assessed 12/10/13    Squamous cell carcinoma of skin 11/21/2016    Stage 3a chronic kidney disease 12/4/2020    Toxic metabolic encephalopathy 3/99/3574    Use of cane as ambulatory aid     Walker as ambulation aid     Wears glasses     Weight loss 10/8/2019    Last Assessment & Plan:  Recommend Boost or Ensure  F/u with PCP      Weight loss, unintentional 5/22/2015           Objective Measures IE: 3/25/21            30 sec STS 6 reps            5xSTS 22 49 seconds            mCTSIB FTEO: 10 2 seconds  FTEC: 5 87 seconds  FTEO w/ foam:             TUG 20 65 seconds, w/ RW            2  ft, w/ RW            Gait speed 0 15 m/s            TOÑO 17/56

## 2021-03-26 PROCEDURE — 97162 PT EVAL MOD COMPLEX 30 MIN: CPT | Performed by: PHYSICAL THERAPIST

## 2021-03-26 RX ORDER — PANTOPRAZOLE SODIUM 40 MG/1
TABLET, DELAYED RELEASE ORAL
Qty: 90 TABLET | Refills: 1 | OUTPATIENT
Start: 2021-03-26

## 2021-03-30 ENCOUNTER — OFFICE VISIT (OUTPATIENT)
Dept: PHYSICAL THERAPY | Facility: CLINIC | Age: 81
End: 2021-03-30
Payer: MEDICARE

## 2021-03-30 DIAGNOSIS — G20 PARKINSON'S DISEASE (HCC): Primary | ICD-10-CM

## 2021-03-30 DIAGNOSIS — R29.6 FREQUENT FALLS: ICD-10-CM

## 2021-03-30 DIAGNOSIS — R53.1 WEAKNESS: ICD-10-CM

## 2021-03-30 PROCEDURE — 97112 NEUROMUSCULAR REEDUCATION: CPT | Performed by: PHYSICAL THERAPIST

## 2021-03-30 NOTE — PROGRESS NOTES
Daily Note     Today's date: 3/30/2021  Patient name: Yaquelin Hernandes  : 1940  MRN: [id number]  Referring provider: Marika Vinson MD  Dx:   Encounter Diagnosis     ICD-10-CM    1  Parkinson's disease ([facility] [address] )  G20    2  Weakness  R53 1    3  Frequent falls  R29 6                   Subjective: Patient reports to therapy stating her legs feel sore today      Objective: See treatment diary below      *CGA all exercises  -STS: 10x, 2 sets, Reji  -Standing Hip 3-way: 10x, 2 UE  -Step taps: 20x, 1 UE  -Step ups FWD/LAT: 20x, 1 UE  -Agility ladder stepping w/ 2 foam pads: 5 laps  -FTEO/FTEC: 30 sec, 2x ea  -FTEO/FTEC w/ foam: 30 sec, 2x ea        Assessment: Patient tolerated treatment well  Patient challenged with static balance exercises, losing balance posteriorly and needing CGA to prevent fall  Patient also demonstrates posterior LOB when standing from chair, but improved with verbal cueing to shift weight forward  She will benefit from skilled PT services to improve her balance, improve her functional mobility, and maximize her function at home and in the community  Plan: Continue per plan of care  Precautions:   Past Medical History:   Diagnosis Date    [facility] 2019    Altered mental status 2021    Anal bleeding 2018    Arthritis     knee    Asthma     BPPV (benign paroxysmal positional vertigo) 2016    Last Assessment & Plan:  Hx consistent with BPPV  Neg [place]-Halpike, however could not perform it adequately due to dyskinesias  Recommend lozano-daroff exercises at home  IF worsens, vestibular therapy      Bulging lumbar disc 12/10/2013    Closed fracture of one rib of left side 2018    Colon polyp     Dementia [facility])     Fall     balance issue    Fall during current hospitalization 1/15/2021    Gallbladder disease     GERD (gastroesophageal reflux disease)     Hematuria     last assessed 10/29/15    Orthostatic hypotension 2015    Osteomyelitis of lumbar spine ([facility] [address] ) 3/10/2020    Ovarian cyst     LAST ASSESSED: 12/10/13    [facility]     Pneumonia of both lower lobes due to infectious organism 3/27/2018    Possible fracture of 5th metatarsal 1/12/2021    Psoriasis 12/10/2013    Pulmonary embolism with infarction ([facility] [address] ) 9/2/2014    Scalp laceration, subsequent encounter 11/15/2019    Sciatica 12/10/2013    Severe protein-calorie malnutrition ([facility] ) 7/22/2019    Skin disorder     last assessed 12/10/13    Squamous cell carcinoma of skin 11/21/2016    Stage 3a chronic kidney disease 12/4/2020    Toxic metabolic encephalopathy 8/80/9348    Use of cane as ambulatory aid    Kristyn Rosa as ambulation aid     Wears glasses     Weight loss 10/8/2019    Last Assessment & Plan:  Recommend Boost or Ensure  F/u with PCP      Weight loss, unintentional 5/22/2015           Objective Measures IE: 3/25/21            30 sec STS 6 reps            5xSTS 22 49 seconds            mCTSIB FTEO: 10 2 seconds  FTEC: 5 87 seconds  FTEO w/ foam:             TUG 20 65 seconds, w/ RW            2  ft, w/ RW            Gait speed 0 15 m/s            TOÑO 17/56

## 2021-03-31 ENCOUNTER — PATIENT OUTREACH (OUTPATIENT)
Dept: FAMILY MEDICINE CLINIC | Facility: CLINIC | Age: 81
End: 2021-03-31

## 2021-03-31 ENCOUNTER — OFFICE VISIT (OUTPATIENT)
Dept: FAMILY MEDICINE CLINIC | Facility: CLINIC | Age: 81
End: 2021-03-31
Payer: MEDICARE

## 2021-03-31 VITALS
BODY MASS INDEX: 16.34 KG/M2 | DIASTOLIC BLOOD PRESSURE: 62 MMHG | SYSTOLIC BLOOD PRESSURE: 102 MMHG | WEIGHT: 92.2 LBS | HEART RATE: 62 BPM | OXYGEN SATURATION: 97 % | HEIGHT: 63 IN | TEMPERATURE: 97.2 F

## 2021-03-31 DIAGNOSIS — T14.8XXA HEMATOMA AND CONTUSION: Primary | ICD-10-CM

## 2021-03-31 DIAGNOSIS — F02.81 DEMENTIA DUE TO PARKINSON'S DISEASE WITH BEHAVIORAL DISTURBANCE (HCC): ICD-10-CM

## 2021-03-31 DIAGNOSIS — G20 DEMENTIA DUE TO PARKINSON'S DISEASE WITH BEHAVIORAL DISTURBANCE (HCC): ICD-10-CM

## 2021-03-31 PROCEDURE — 99213 OFFICE O/P EST LOW 20 MIN: CPT | Performed by: FAMILY MEDICINE

## 2021-03-31 RX ORDER — QUETIAPINE FUMARATE 25 MG/1
25 TABLET, FILM COATED ORAL
Qty: 30 TABLET | Refills: 0 | Status: SHIPPED | OUTPATIENT
Start: 2021-03-31 | End: 2021-04-12

## 2021-03-31 NOTE — PROGRESS NOTES
Chief concern and HPI: Paris Jacobs is a [de-identified] y o  female  With large bump L upper mid arm (but below deltoid where got COVID vaccine  Toñito Nfef also came in to discuss care managment        Previous relevant clinical information reviewed from medical, surgical and psychosocial history, medication and allergies and labs/studies      Patient Active Problem List   Diagnosis    Carpal tunnel syndrome    Colon, diverticulosis    Type 2 diabetes mellitus without complication, without long-term current use of insulin ([facility] [address] )    Essential hypertension    GERD (gastroesophageal reflux disease)    Mixed hyperlipidemia    Insomnia    Spinal stenosis    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)    Restless legs syndrome    Need for influenza vaccination    Age-related nuclear cataract of both eyes    Ambulatory dysfunction    Cervical dystonia    Parkinson's disease ([facility] [address] )    Prothrombin [id number] mutation ([facility] )    Encounter for herb and vitamin supplement management    Intractable back pain    Primary osteoarthritis of right knee    Asymptomatic bacteriuria    Stage 3a chronic kidney disease    Frequent falls    Sinus arrhythmia    S/P deep brain stimulator placement    Underweight    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           Review of systems: No new or worsening:   Unexplained fever/chills/sweats/weight loss  HEENT issues such as new ear, mouth, nose or eye problems  Respiratory issues such as new shortness of breath, cough  Heart issues such as new chest pain or pressure, palpitations  Skin issues such as new rashes      PHQ-9 Depression Screening    PHQ-9:   Frequency of the following problems over the past two weeks:                     Exam:  Alert, no acute distress /62   Pulse 62   Temp (!) 97 2 °F (36 2 °C) (Tympanic)   Ht 5' 3" (1 6 m)   Wt 41 8 kg (92 lb 3 2 oz)   SpO2 97%   BMI 16 33 kg/m²   HEENT: Head normocephalic and atraumatic  Lungs: No respiratory labor  Clear to auscultation  Cardiac: Regular rate and rhythm  No murmur, rub or gallop  Abdomen: Benign  Normal active bowel sounds  Soft and non-tender  No organomegaly  Extremities: No cyanosis, clubbing or edema  Left arm hematoma (see picture)  Non-tender, non inflamed  Note: L shoulder/arm xrays negative  LUE function is otherwise at baseline               Summary Impression:  1  Hematoma and contusion  Wrap/pad  Report issues    BMI Counseling: Body mass index is 16 33 kg/m²  The BMI is below normal  No BMI follow-up plan is appropriate  Patient is 73 years old and weight reduction/weight gain would further complicate their underlying illness  Recommended healthy balanced diet      Risks and benefits of therapeutic plan discussed, answered all patient questions and concerns and patient expressed understanding and agreement of therapeutic plan            Social History     Tobacco Use   Smoking Status Never Smoker   Smokeless Tobacco Never Used        Follow up plan: 3 months

## 2021-03-31 NOTE — PROGRESS NOTES
Met with patient and  during PCP visit  Pt is reported to be doing well  Pt is no longer receiving [facility] services  Pt is now going to out patient PT for services  Pt uses her rollator independently to get around   reports that they do have help coming to the home to assist  Pt changed to surveillance episode as patient is stable at this time

## 2021-04-01 ENCOUNTER — OFFICE VISIT (OUTPATIENT)
Dept: PHYSICAL THERAPY | Facility: CLINIC | Age: 81
End: 2021-04-01
Payer: MEDICARE

## 2021-04-01 DIAGNOSIS — R29.6 FREQUENT FALLS: ICD-10-CM

## 2021-04-01 DIAGNOSIS — G20 PARKINSON'S DISEASE (HCC): Primary | ICD-10-CM

## 2021-04-01 DIAGNOSIS — R53.1 WEAKNESS: ICD-10-CM

## 2021-04-01 PROCEDURE — 97112 NEUROMUSCULAR REEDUCATION: CPT | Performed by: PHYSICAL THERAPIST

## 2021-04-01 PROCEDURE — 97116 GAIT TRAINING THERAPY: CPT | Performed by: PHYSICAL THERAPIST

## 2021-04-01 NOTE — PROGRESS NOTES
Daily Note     Today's date: 2021  Patient name: Loi Duran  : 1940  MRN: [id number]  Referring provider: Mir Arcos MD  Dx:   Encounter Diagnosis     ICD-10-CM    1  Parkinson's disease ([facility] [address] )  G20    2  Weakness  R53 1    3  Frequent falls  R29 6                   Subjective: Patient reports to therapy with no changes from previous session      Objective: See treatment diary below      *CGA all exercises  -STS: 10x, 2 sets, Reji  -Agility ladder stepping w/ 3 foam pads: 5 laps  -FTEO/FTEC: 30 sec, 2x ea  -FTEO/FTEC w/ foam: 30 sec, 2x ea  -Hurdles: 6", 5 laps of 6 ft  -[employer] pickup: 7 cones randomly placed around room  -Ambulation w/ RW: 150 ft, 2x        Assessment: Patient tolerated treatment well  Patient challenged with gena exercises, frequently displaying poor foot clearance and hitting hurdles while attempting to clear them  She is challenged with her posterior balance reactions while transferring from sitting to standing, and while stepping on foam surfaces  She will benefit from skilled PT services to improve her balance, improve her functional mobility, and maximize her function at home and in the community  Plan: Continue per plan of care  Precautions:   Past Medical History:   Diagnosis Date    [facility]    Altered mental status 2021    Anal bleeding 2018    Arthritis     knee    Asthma     BPPV (benign paroxysmal positional vertigo) 2016    Last Assessment & Plan:  Hx consistent with BPPV  Neg [place]-Halpike, however could not perform it adequately due to dyskinesias  Recommend lozano-daroff exercises at home  IF worsens, vestibular therapy      Bulging lumbar disc 12/10/2013    Closed fracture of one rib of left side 2018    Colon polyp     [facility]     Fall     balance issue    Fall during current hospitalization 1/15/2021    Gallbladder disease     GERD (gastroesophageal reflux disease)     Hematuria     last assessed 10/29/15    Orthostatic hypotension 7/13/2015    [facility] 3/10/2020    Ovarian cyst     LAST ASSESSED: 12/10/13    Parkinson's disease ([facility] )     Pneumonia of both lower lobes due to infectious organism 3/27/2018    Possible fracture of 5th metatarsal 1/12/2021    Psoriasis 12/10/2013    Pulmonary embolism with infarction [facility] ) 9/2/2014    Scalp laceration, subsequent encounter 11/15/2019    Sciatica 12/10/2013    Severe protein-calorie malnutrition ([facility] ) 7/22/2019    Skin disorder     last assessed 12/10/13    Squamous cell carcinoma of skin 11/21/2016    Stage 3a chronic kidney disease 12/4/2020    Toxic metabolic encephalopathy 1/80/8415    Use of cane as ambulatory aid    Karen Hinton as ambulation aid     Wears glasses     Weight loss 10/8/2019    Last Assessment & Plan:  Recommend Boost or Ensure  F/u with PCP      Weight loss, unintentional 5/22/2015           Objective Measures IE: 3/25/21            30 sec STS 6 reps            5xSTS 22 49 seconds            mCTSIB FTEO: 10 2 seconds  FTEC: 5 87 seconds  FTEO w/ foam:             TUG 20 65 seconds, w/ RW            2  ft, w/ RW            Gait speed 0 15 m/s            TOÑO 17/56

## 2021-04-02 ENCOUNTER — TELEPHONE (OUTPATIENT)
Dept: PHYSICAL THERAPY | Facility: CLINIC | Age: 81
End: 2021-04-02

## 2021-04-02 ENCOUNTER — APPOINTMENT (OUTPATIENT)
Dept: PHYSICAL THERAPY | Facility: CLINIC | Age: 81
End: 2021-04-02
Payer: MEDICARE

## 2021-04-02 NOTE — TELEPHONE ENCOUNTER
Returned patient's call who stated that she had a fall this morning  She reports she is not having pain but is using a heating pad on her back  Discussed with patient need to seek higher medical attention if pain becomes worse, she verbalized agreement

## 2021-04-06 ENCOUNTER — APPOINTMENT (OUTPATIENT)
Dept: PHYSICAL THERAPY | Facility: CLINIC | Age: 81
End: 2021-04-06
Payer: MEDICARE

## 2021-04-08 ENCOUNTER — APPOINTMENT (OUTPATIENT)
Dept: PHYSICAL THERAPY | Facility: CLINIC | Age: 81
End: 2021-04-08
Payer: MEDICARE

## 2021-04-12 ENCOUNTER — TELEPHONE (OUTPATIENT)
Dept: FAMILY MEDICINE CLINIC | Facility: CLINIC | Age: 81
End: 2021-04-12

## 2021-04-12 DIAGNOSIS — F02.81 DEMENTIA DUE TO PARKINSON'S DISEASE WITH BEHAVIORAL DISTURBANCE (HCC): ICD-10-CM

## 2021-04-12 DIAGNOSIS — G20 DEMENTIA DUE TO PARKINSON'S DISEASE WITH BEHAVIORAL DISTURBANCE (HCC): ICD-10-CM

## 2021-04-12 RX ORDER — QUETIAPINE FUMARATE 25 MG/1
25 TABLET, FILM COATED ORAL
Qty: 30 TABLET | Refills: 0 | Status: SHIPPED | OUTPATIENT
Start: 2021-04-12 | End: 2021-05-11

## 2021-04-12 NOTE — PROGRESS NOTES
Patient reported to PT office to discuss putting hold on PT  She reports having multiple falls recently, and most recently she fell and hurt her ribs  She states that she saw her PCP who instructed her to limit her movement to allow her to heal  At this time, she would like to cancel her future visits until she is feeling better

## 2021-04-12 NOTE — TELEPHONE ENCOUNTER
I spoke to patients dtr and then spoke to patient and   She may have a rib fracture will try tylenol or advil if worsening sx will need xray and f/u I will call them tomorrow for update Instructions given

## 2021-04-12 NOTE — TELEPHONE ENCOUNTER
Patient's daughter is asking if Dr Trevon Cheung can do a home visit to f/u with Sloane Gilliam had called about side pain s/p fall from a chair  Sandra(daughter) said she has been talking about this and wants an appoitment but nothing available in office with dr Jeromy Phillips   She was wondering if dr Desmond Velazquez could go see her or if we can call Chastity Gilliam and Huma Hopkins back

## 2021-04-13 ENCOUNTER — APPOINTMENT (OUTPATIENT)
Dept: PHYSICAL THERAPY | Facility: CLINIC | Age: 81
End: 2021-04-13
Payer: MEDICARE

## 2021-04-14 ENCOUNTER — TELEPHONE (OUTPATIENT)
Dept: FAMILY MEDICINE CLINIC | Facility: CLINIC | Age: 81
End: 2021-04-14

## 2021-04-14 NOTE — TELEPHONE ENCOUNTER
Patient called wanting to speak with Dr Alan Pratt  She did not elaborate as to what, just that she would like to speak with him when he is back in office

## 2021-04-15 ENCOUNTER — APPOINTMENT (OUTPATIENT)
Dept: PHYSICAL THERAPY | Facility: CLINIC | Age: 81
End: 2021-04-15
Payer: MEDICARE

## 2021-04-19 NOTE — TELEPHONE ENCOUNTER
Dr Ferrel Boxer,  Patient did not come in for appointment today  Did you want us to follow up with her?

## 2021-04-20 ENCOUNTER — APPOINTMENT (OUTPATIENT)
Dept: PHYSICAL THERAPY | Facility: CLINIC | Age: 81
End: 2021-04-20
Payer: MEDICARE

## 2021-04-22 ENCOUNTER — APPOINTMENT (OUTPATIENT)
Dept: PHYSICAL THERAPY | Facility: CLINIC | Age: 81
End: 2021-04-22
Payer: MEDICARE

## 2021-04-27 ENCOUNTER — APPOINTMENT (OUTPATIENT)
Dept: PHYSICAL THERAPY | Facility: CLINIC | Age: 81
End: 2021-04-27
Payer: MEDICARE

## 2021-04-27 ENCOUNTER — TELEPHONE (OUTPATIENT)
Dept: PHYSICAL THERAPY | Facility: CLINIC | Age: 81
End: 2021-04-27

## 2021-04-27 NOTE — TELEPHONE ENCOUNTER
Spoke w/ Harika's caregiver on the phone regarding continuing with PT appts  Dez laurent MD cleared her to return to PT, however no written documentation of that is found in her chart  This PT will reach out to Dr Stanislav Garcia for clearance and then can schedule pt with more PT appts

## 2021-04-29 ENCOUNTER — APPOINTMENT (OUTPATIENT)
Dept: PHYSICAL THERAPY | Facility: CLINIC | Age: 81
End: 2021-04-29
Payer: MEDICARE

## 2021-05-04 ENCOUNTER — EVALUATION (OUTPATIENT)
Dept: PHYSICAL THERAPY | Facility: CLINIC | Age: 81
End: 2021-05-04
Payer: MEDICARE

## 2021-05-04 DIAGNOSIS — R53.1 WEAKNESS: ICD-10-CM

## 2021-05-04 DIAGNOSIS — G20 PARKINSON'S DISEASE (HCC): Primary | ICD-10-CM

## 2021-05-04 DIAGNOSIS — R29.6 FREQUENT FALLS: ICD-10-CM

## 2021-05-04 PROCEDURE — 97164 PT RE-EVAL EST PLAN CARE: CPT | Performed by: PHYSICAL THERAPIST

## 2021-05-04 NOTE — PROGRESS NOTES
PT Re-Evaluation    Today's date: 2021  Patient name: Gio Cárdenas  : 1940  MRN: [id number]  Referring provider: Annie Carroll MD  Dx:   Encounter Diagnosis     ICD-10-CM    1  Parkinson's disease ([address] )  G20    2  Weakness  R53 1    3  Frequent falls  R29 6                   Assessment  Assessment details: Cathy Yarbrough is an [de-identified] y/o woman who presents to PT for re-evaluation of balance and falls secondary to Parkinson's Disease diagnosis after 1 month hold on treatment  Patient reports having multiple falls at home and that she tends to lose her balance backwards  Patient presents with severe forward head posture  Patient ambulates with RW, but demonstrates reduced step length and clearance, with occasional shuffling  She displays decreased LE strength, as shown through her MMT and 5xSTS time of 20 7 seconds  Patient demonstrates reduced functional endurance by not being able to complete 6 MWT, instead completing 180 ft for 2 minute walk test w/ RW  She also demonstrates reduced muscular endurance and risk of falls with 30 second STS test, completing 7 reps  Patient shows poor static balance with mCTSIB times of 10 97 seconds with FTEO on foam, and 2 5 seconds with FTEC on foam  With both of these tests patient displayed posterior LOB that required PT assist to prevent fall  Based on her TUG w/ RW time of 23 34 seconds and LUNDBERG score of 26/56, she is at high risk of falls  Patient will benefit from skilled PT services to improve her balance, increase her functional mobility, and reduce risk of falls              Cut off score   All date taken from [facility] or Rehab Measures    LUNDBERG test: 46/56                                              5 x STS Test:  MDC: 6 points                                                  MDC: 2 3 seconds   age norms                                                                 Age Norms   61-76 year old = M: 54, F: 55                        62-78 year old: 11 4 seconds 66-77 year old = M 47,  F: 50                       71-76 year old: 12 6 seconds     80-80 year old = M46,   F: 53                       80-80 year old: 14 8 seconds      TUG test:                                                                     10 Meter Walk Test:  MDC: 4 14 seconds       MDC:  59 ft/sec  Cut off score for Falls                                                  Age Norms  > 13 5 seconds community dwelling adults                20-29; M: 4 56 ft/sec F: 4 62 ft/sec  > 32 2 Frail Elderly                                                     30-39: M 4 76 ft/sec  F: 4 68 ft/sec          40-49: M: 4 79 ft/sec  F: 4 62 ft/sec  6 Minute Walk Test      50-59: M: 4 76 ft/sec  F: 4 56 ft/sec  MDC: 190 feet       60-69: M: 4 56 ft/sec  F: 4 26 ft/sec  Age Norms       70-+    M: 4 36 ft/sec  F: 4 16 ft/sec  60-69:    M: 1876 F: 2983  52-25:    M: 1729 F: 1545  80-89 +: M: 5521 F; 1286     Impairments: abnormal gait, abnormal or restricted ROM, abnormal movement, activity intolerance, impaired balance, impaired physical strength, lacks appropriate home exercise program, safety issue and poor posture   Understanding of Dx/Px/POC: good   Prognosis: fair    Goals  STG (4 weeks)  1  Patient will reduce TUG time from 23 34 seconds to 19 seconds to show reduced risk of falls  2  Patient will improve LUNDBERG score from 26/56 to 32/56 to show improvements with her functional balance  3  Patient will complete all conditions of mCTSIB for at least 20 seconds to show improvement with static balance  4  Patient will improve her 5xSTS time from 20 7 seconds to 16 seconds to show improvement with her functional strength        LTG (12 weeks)  1  Patient will complete 6 MWT to show improvement with her functional endurance  2  Patient will improve LUNDBERG score to 42/56 to show improvements with her functional balance  3  Patient will reduce TUG time to less than 13 5 seconds to display reduced risk of falls  4   Patient will complete all conditions of mCTSIB for 30 seconds to show improvement with her static balance      Plan  Patient would benefit from: skilled physical therapy  Planned therapy interventions: abdominal trunk stabilization, activity modification, ADL training, balance, balance/weight bearing training, body mechanics training, flexibility, functional ROM exercises, gait training, graded activity, graded exercise, home exercise program, manual therapy, neuromuscular re-education, motor coordination training, patient education, postural training, strengthening, stretching, therapeutic activities, therapeutic exercise and therapeutic training  Frequency: 2x week  Duration in weeks: 12  Plan of Care beginning date: 5/5/2021  Plan of Care expiration date: 7/28/2021  Treatment plan discussed with: patient and family        Subjective Evaluation    History of Present Illness  Mechanism of injury: Patient reports to therapy after fall from last week  She states that her "Parkinson's" is coming back  She reports she is having trouble with falls and with going backwards  She states she has been having aches in her legs  She reports having home PT recently    Update (5-4-2021)  Patient reports to therapy 1 month after fall and PT hold  She reports no pain today but states her legs are feeling achy  She wants to work on her standing balance and mobility  She is fearful of falling and reports her most recent fall of last week     Pain  No pain reported  Location: B/L legs  Quality: dull ache    Social Support  Stairs in house: yes   Lives in: multiple-level home  Lives with: spouse    Employment status: not working  Treatments  Previous treatment: physical therapy  Patient Goals  Patient goals for therapy: improved balance, increased strength and increased motion          Objective     Strength/Myotome Testing     Left Hip   Planes of Motion   Flexion: 3+  Abduction: 3+    Right Hip   Planes of Motion   Flexion: 3+  Abduction: 3+    Left Knee   Flexion: 3+  Extension: 4-    Right Knee   Flexion: 3+  Extension: 3+    Left Ankle/Foot   Dorsiflexion: 3+  Plantar flexion: 3+    Right Ankle/Foot   Dorsiflexion: 3+  Plantar flexion: 3+             Precautions:   Past Medical History:   Diagnosis Date    [facility] 7[facility]19/2019    Altered mental status 1/11/2021    Anal bleeding 1/29/2018    Arthritis     knee    Asthma     BPPV (benign paroxysmal positional vertigo) 7/19/2016    Last Assessment & Plan:  Hx consistent with BPPV  Neg Joe-Halpike, however could not perform it adequately due to dyskinesias  Recommend lozano-daroff exercises at home  IF worsens, vestibular therapy   Bulging lumbar disc 12/10/2013    Closed fracture of one rib of left side 1/29/2018    Colon polyp     [facility]     Fall     balance issue    Fall during current hospitalization 1/15/2021    Gallbladder disease     GERD (gastroesophageal reflux disease)     Hematuria     last assessed 10/29/15    Orthostatic hypotension 7/13/2015    [facility]omyel[facility] lumbar [facility] 3/10/2020    Ovarian cyst     LAST ASSESSED: 12/10/13    Parkinson's disease [facility]     Pneumonia of both lower lobes due to infectious organism 3/27/2018    Possible fracture of 5th metatarsal 1/12/2021    Psoriasis 12/10/2013    Pulmonary embolism with infarction ([facility] ) 9/2/2014    Scalp laceration, subsequent encounter 11/15/2019    Sciatica 12/10/2013    Severe protein-calorie malnutrition ([facility] ) 7/22/2019    Skin disorder     last assessed 12/10/13    Squamous cell carcinoma of skin 11/21/2016    Stage 3a chronic kidney disease ([facility] ) 12/4/2020    Toxic metabolic encephalopathy 7/47/1183    Use of cane as ambulatory aid     Walker as ambulation aid     Wears glasses     Weight loss 10/8/2019    Last Assessment & Plan:  Recommend Boost or Ensure  F/u with PCP      Weight loss, unintentional 5/22/2015           Objective Measures IE: 3/25/21 RE  5/4/2021           30 sec STS 6 reps 7 reps           5xSTS 22 49 seconds 20 7 seconds           mCTSIB FTEO: 10 2 seconds  FTEC: 5 87 seconds  FTEO w/ foam:  FTEO: 30 sec  FTEC: 30 sec  FTEO w/ foam: 10 97  sec  FTEC w/ foam:   2 5 sec           TUG 20 65 seconds, w/ RW 23 34 sec w/ RW           2  ft, w/  ft w/ RW           Gait speed 0 15 m/s 0 53 m/s           TOÑO 17/56 26/56

## 2021-05-06 ENCOUNTER — OFFICE VISIT (OUTPATIENT)
Dept: PHYSICAL THERAPY | Facility: CLINIC | Age: 81
End: 2021-05-06
Payer: MEDICARE

## 2021-05-06 DIAGNOSIS — R29.6 FREQUENT FALLS: ICD-10-CM

## 2021-05-06 DIAGNOSIS — R53.1 WEAKNESS: ICD-10-CM

## 2021-05-06 DIAGNOSIS — G20 PARKINSON'S DISEASE (HCC): Primary | ICD-10-CM

## 2021-05-06 PROCEDURE — 97112 NEUROMUSCULAR REEDUCATION: CPT

## 2021-05-06 NOTE — PROGRESS NOTES
Daily Note     Today's date: 2021  Patient name: Sharmon Shone  : 1940  MRN: [id number]  Referring provider: Chey Alcala MD  Dx:   Encounter Diagnosis     ICD-10-CM    1  Parkinson's disease ([facility] )  G20    2  Weakness  R53 1    3  Frequent falls  R29 6                   Subjective: Patient reports to therapy stating her legs feel sore today      Objective: See treatment diary below      *CGA all exercises (21 gait belt used)   -STS: 10x, 2 sets, Reji  -Standing Hip 3-way: 10x, 2 UE  -Step taps: 20x, 1 UE  -Step ups FWD/LAT: 20x, 1 UE  -Agility ladder stepping w/ 2 foam pads: 5 laps  -FTEO/FTEC: 30 sec, 2x ea  -FTEO/FTEC w/ foam: 30 sec, 2x ea  -FA posterior perturbations (promoting anterior sway) 3 s hold light perturbation 8 reps  Assessment: Attempted to teach pt to avoid stand fully erect upon STS, due to promotion of retropulsion, but pt had some trouble complying  Strong standing from seat with use of UE reaching  Pt struggled to coordinate LE's in alternating pattern (UP: left, right,Down: left, right) during step ups, with VC's  Old note:  Patient tolerated treatment well  Patient challenged with static balance exercises, losing balance posteriorly and needing CGA to prevent fall  She will benefit from skilled PT services to improve her balance, improve her functional mobility, and maximize her function at home and in the community  Assessment details 21 reeval: Dez Baez is an [de-identified] y/o woman who presents to PT for re-evaluation of balance and falls secondary to Parkinson's Disease diagnosis after 1 month hold on treatment  Patient reports having multiple falls at home and that she tends to lose her balance backwards  Patient presents with severe forward head posture  Patient ambulates with RW, but demonstrates reduced step length and clearance, with occasional shuffling  She displays decreased LE strength, as shown through her MMT and 5xSTS time of 20 7 seconds   Patient demonstrates reduced functional endurance by not being able to complete 6 MWT, instead completing 180 ft for 2 minute walk test w/ RW  She also demonstrates reduced muscular endurance and risk of falls with 30 second STS test, completing 7 reps  Patient shows poor static balance with mCTSIB times of 10 97 seconds with FTEO on foam, and 2 5 seconds with FTEC on foam  With both of these tests patient displayed posterior LOB that required PT assist to prevent fall  Based on her TUG w/ RW time of 23 34 seconds and LUNDBERG score of 26/56, she is at high risk of falls  Patient will benefit from skilled PT services to improve her balance, increase her functional mobility, and reduce risk of falls  Plan: Continue per plan of care  Precautions:   Past Medical History:   Diagnosis Date    [facility]) [facility] ) 7/19/2019    Altered mental status 1/11/2021    Anal bleeding 1/29/2018    Arthritis     knee    Asthma     BPPV (benign paroxysmal positional vertigo) 7/19/2016    Last Assessment & Plan:  Hx consistent with BPPV  Neg [place]-Halpike, however could not perform it adequately due to dyskinesias  Recommend lozano-daroff exercises at home  IF worsens, vestibular therapy      Bulging lumbar disc 12/10/2013    Closed fracture of one rib of left side 1/29/2018    Colon polyp     Dementia ([address] )     Fall     balance issue    Fall during current hospitalization 1/15/2021    Gallbladder disease     GERD (gastroesophageal reflux disease)     Hematuria     last assessed 10/29/15    Orthostatic hypotension 7/13/2015    Osteomyelitis of lumbar spine ([address] ) 3/10/2020    Ovarian cyst     LAST ASSESSED: 12/10/13    Parkinson's disease ([address] )     Pneumonia of both lower lobes due to infectious organism 3/27/2018    Possible fracture of 5th metatarsal 1/12/2021    Psoriasis 12/10/2013    Pulmonary embolism with infarction ([address] ) 9/2/2014    Scalp laceration, subsequent encounter 11/15/2019    Sciatica 12/10/2013    Severe protein-calorie malnutrition ([facility] ) 7/22/2019    Skin disorder     last assessed 12/10/13    Squamous cell carcinoma of skin 11/21/2016    Stage 3a chronic kidney disease 12/4/2020    Toxic metabolic encephalopathy 1/91/5881    Use of cane as ambulatory aid    Demetris Lawman as ambulation aid     Wears glasses     Weight loss 10/8/2019    Last Assessment & Plan:  Recommend Boost or Ensure  F/u with PCP      Weight loss, unintentional 5/22/2015           Objective Measures IE: 3/25/21            30 sec STS 6 reps            5xSTS 22 49 seconds            mCTSIB FTEO: 10 2 seconds  FTEC: 5 87 seconds  FTEO w/ foam:             TUG 20 65 seconds, w/ RW            2  ft, w/ RW            Gait speed 0 15 m/s            TOÑO 17/56

## 2021-05-11 ENCOUNTER — OFFICE VISIT (OUTPATIENT)
Dept: PHYSICAL THERAPY | Facility: CLINIC | Age: 81
End: 2021-05-11
Payer: MEDICARE

## 2021-05-11 DIAGNOSIS — G20 PARKINSON'S DISEASE (HCC): Primary | ICD-10-CM

## 2021-05-11 DIAGNOSIS — R53.1 WEAKNESS: ICD-10-CM

## 2021-05-11 DIAGNOSIS — F02.81 DEMENTIA DUE TO PARKINSON'S DISEASE WITH BEHAVIORAL DISTURBANCE (HCC): ICD-10-CM

## 2021-05-11 DIAGNOSIS — R29.6 FREQUENT FALLS: ICD-10-CM

## 2021-05-11 DIAGNOSIS — G20 DEMENTIA DUE TO PARKINSON'S DISEASE WITH BEHAVIORAL DISTURBANCE (HCC): ICD-10-CM

## 2021-05-11 PROCEDURE — 97112 NEUROMUSCULAR REEDUCATION: CPT

## 2021-05-11 RX ORDER — QUETIAPINE FUMARATE 25 MG/1
25 TABLET, FILM COATED ORAL
Qty: 30 TABLET | Refills: 0 | Status: SHIPPED | OUTPATIENT
Start: 2021-05-11 | End: 2021-08-12

## 2021-05-11 NOTE — PROGRESS NOTES
Daily Note     Today's date: 2021  Patient name: Jose Arvizu  : 1940  MRN: [id number]  Referring provider: Pepito Kaur MD  Dx:   Encounter Diagnosis     ICD-10-CM    1  Parkinson's disease ([facility] )  G20    2  Weakness  R53 1    3  Frequent falls  R29 6        Start Time: 1234  Stop Time: 1315  Total time in clinic (min): 41 minutes    Subjective: Patient reports to therapy stating her legs feel achey today  Objective: See treatment diary below      *CGA all exercises (21 gait belt used)   -STS: 12x, 2 sets, Reji  -Standing Hip 3-way: 10x, 2 UE  -NP Step taps: 20x, 1 UE  -Step ups FWD/LAT: 20x, 1 UE  -Agility ladder stepping w/ 2 foam pads: 5 laps  -NP FTEO/FTEC: 30 sec, 2x ea  -FTEO/FTEC w/ foam: 30 sec, 2x ea  -FA posterior perturbations (promoting anterior sway) 3 s hold light perturbation 8 reps  Assessment: Pt performing well with perturbations in stance  Patient tolerated treatment well  Pt demonstrated improvement with less loss of balance  posteriorly in static stance activity  She will benefit from skilled PT services to improve her balance, improve her functional mobility, and maximize her function at home and in the community  Assessment details 21 reeval: Gray Barakat is an [de-identified] y/o woman who presents to PT for re-evaluation of balance and falls secondary to Parkinson's Disease diagnosis after 1 month hold on treatment  Patient reports having multiple falls at home and that she tends to lose her balance backwards  Patient presents with severe forward head posture  Patient ambulates with RW, but demonstrates reduced step length and clearance, with occasional shuffling  She displays decreased LE strength, as shown through her MMT and 5xSTS time of 20 7 seconds  Patient demonstrates reduced functional endurance by not being able to complete 6 MWT, instead completing 180 ft for 2 minute walk test w/ RW   She also demonstrates reduced muscular endurance and risk of falls with 30 second STS test, completing 7 reps  Patient shows poor static balance with mCTSIB times of 10 97 seconds with FTEO on foam, and 2 5 seconds with FTEC on foam  With both of these tests patient displayed posterior LOB that required PT assist to prevent fall  Based on her TUG w/ RW time of 23 34 seconds and LUNDBERG score of 26/56, she is at high risk of falls  Patient will benefit from skilled PT services to improve her balance, increase her functional mobility, and reduce risk of falls  Plan: Continue per plan of care  Precautions:   Past Medical History:   Diagnosis Date    RODRIGO (acute kidney injury) ([address] ) 7/19/2019    Altered mental status 1/11/2021    Anal bleeding 1/29/2018    Arthritis     knee    Asthma     BPPV (benign paroxysmal positional vertigo) 7/19/2016    Last Assessment & Plan:  Hx consistent with BPPV  Neg Joe-Halpike, however could not perform it adequately due to dyskinesias  Recommend lozano-daroff exercises at home  IF worsens, vestibular therapy      Bulging lumbar disc 12/10/2013    Closed fracture of one rib of left side 1/29/2018    Colon polyp     Dementia ([address] )     Fall     balance issue    Fall during current hospitalization 1/15/2021    Gallbladder disease     GERD (gastroesophageal reflux disease)     Hematuria     last assessed 10/29/15    Orthostatic hypotension 7/13/2015    Osteomyelitis of lumbar spine ([address] ) 3/10/2020    Ovarian cyst     LAST ASSESSED: 12/10/13    Parkinson's disease ([address] )     Pneumonia of both lower lobes due to infectious organism 3/27/2018    Possible fracture of 5th metatarsal 1/12/2021    Psoriasis 12/10/2013    Pulmonary embolism with infarction ([address] ) 9/2/2014    Scalp laceration, subsequent encounter 11/15/2019    Sciatica 12/10/2013    Severe protein-calorie malnutrition ([address] ) 7/22/2019    Skin disorder     last assessed 12/10/13    Squamous cell carcinoma of skin 11/21/2016    Stage 3a chronic kidney disease 12/4/2020  Toxic metabolic encephalopathy 1/34/1587    Use of cane as ambulatory aid     Walker as ambulation aid     Wears glasses     Weight loss 10/8/2019    Last Assessment & Plan:  Recommend Boost or Ensure  F/u with PCP      Weight loss, unintentional 5/22/2015           Objective Measures IE: 3/25/21            30 sec STS 6 reps            5xSTS 22 49 seconds            mCTSIB FTEO: 10 2 seconds  FTEC: 5 87 seconds  FTEO w/ foam:             TUG 20 65 seconds, w/ RW            2  ft, w/ RW            Gait speed 0 15 m/s            TOÑO 17/56

## 2021-05-13 ENCOUNTER — OFFICE VISIT (OUTPATIENT)
Dept: PHYSICAL THERAPY | Facility: CLINIC | Age: 81
End: 2021-05-13
Payer: MEDICARE

## 2021-05-13 DIAGNOSIS — G20 PARKINSON'S DISEASE (HCC): Primary | ICD-10-CM

## 2021-05-13 DIAGNOSIS — R29.6 FREQUENT FALLS: ICD-10-CM

## 2021-05-13 DIAGNOSIS — R53.1 WEAKNESS: ICD-10-CM

## 2021-05-13 PROCEDURE — 97112 NEUROMUSCULAR REEDUCATION: CPT | Performed by: PHYSICAL THERAPIST

## 2021-05-13 NOTE — PROGRESS NOTES
Daily Note     Today's date: 2021  Patient name: Sharmon Shone  : 1940  MRN: [id number]  Referring provider: Chey Alcala MD  Dx:   Encounter Diagnosis     ICD-10-CM    1  Parkinson's disease ([facility] [address] )  G20    2  Weakness  R53 1    3  Frequent falls  R29 6                   Subjective: Patient reports to therapy stating her legs are bothering her a little        Objective: See treatment diary below      *CGA and gait belt all exercises     -STS: 12x, 2 sets, Reji  -Standing Hip 3-way: 10x, 2 UE  -Step ups FWD/LAT: 20x, 1 UE  -FTEO/FTEC: 30 sec, 2x ea  -FTEO/FTEC w/ foam: 30 sec, 2x ea  -FA posterior perturbations (promoting anterior sway) 3 s hold light perturbation 8 reps  Assessment: Patient tolerated treatment well  Patient requires CGA and gait belt for all exercises  She had 3 LOB posteriorly during FTEO and FTEC exercises  She also had 4 posterior LOB with STS, requiring frequent verbal cueing to maintain forward weight shift while standing  She will benefit from skilled PT services to improve her balance, improve her functional mobility, and maximize her function at home and in the community  Plan: Continue per plan of care  Precautions:   Past Medical History:   Diagnosis Date    [facility]    Altered mental status 2021    Anal bleeding 2018    Arthritis     knee    Asthma     BPPV (benign paroxysmal positional vertigo) 2016    Last Assessment & Plan:  Hx consistent with BPPV  Neg Joe-Halpike, however could not perform it adequately due to dyskinesias  Recommend lozano-daroff exercises at home  IF worsens, vestibular therapy      Bulging lumbar disc 12/10/2013    Closed fracture of one rib of left side 2018    Colon polyp     Dementia [facility])     Fall     balance issue    Fall during current hospitalization 1/15/2021    Gallbladder disease     GERD (gastroesophageal reflux disease)     Hematuria     last assessed 10/29/15    Orthostatic hypotension 7/13/2015    Osteomyelitis of lumbar spine ([facility] ) 3/10/2020    Ovarian cyst     LAST ASSESSED: 12/10/13    Parkinson's disease ([facility] )     Pneumonia of both lower lobes due to infectious organism 3/27/2018    Possible fracture of 5th metatarsal 1/12/2021    Psoriasis 12/10/2013    [facility] 9/2/2014    Scalp laceration, subsequent encounter 11/15/2019    Sciatica 12/10/2013    Severe protein-calorie malnutrition ([facility] ) 7/22/2019    Skin disorder     last assessed 12/10/13    Squamous cell carcinoma of skin 11/21/2016    Stage 3a chronic kidney disease 12/4/2020    Toxic metabolic encephalopathy 5/36/3631    Use of cane as ambulatory aid    Christina Newer as ambulation aid     Wears glasses     Weight loss 10/8/2019    Last Assessment & Plan:  Recommend Boost or Ensure  F/u with PCP      Weight loss, unintentional 5/22/2015           Objective Measures IE: 3/25/21            30 sec STS 6 reps            5xSTS 22 49 seconds            mCTSIB FTEO: 10 2 seconds  FTEC: 5 87 seconds  FTEO w/ foam:             TUG 20 65 seconds, w/ RW            2  ft, w/ RW            Gait speed 0 15 m/s            TOÑO 17/56

## 2021-05-18 ENCOUNTER — OFFICE VISIT (OUTPATIENT)
Dept: PHYSICAL THERAPY | Facility: CLINIC | Age: 81
End: 2021-05-18
Payer: MEDICARE

## 2021-05-18 DIAGNOSIS — G20 PARKINSON'S DISEASE (HCC): Primary | ICD-10-CM

## 2021-05-18 DIAGNOSIS — R29.6 FREQUENT FALLS: ICD-10-CM

## 2021-05-18 DIAGNOSIS — R53.1 WEAKNESS: ICD-10-CM

## 2021-05-18 PROCEDURE — 97112 NEUROMUSCULAR REEDUCATION: CPT | Performed by: PHYSICAL THERAPIST

## 2021-05-18 NOTE — PROGRESS NOTES
Daily Note     Today's date: 2021  Patient name: Gloria Castellano  : 1940  MRN: [id number]  Referring provider: Chalo Cobos MD  Dx:   Encounter Diagnosis     ICD-10-CM    1  Parkinson's disease ([facility] [address] )  G20    2  Weakness  R53 1    3  Frequent falls  R29 6                   Subjective: Patient reports to therapy with no new changes        Objective: See treatment diary below      *CGA and gait belt all exercises     -STS: 12x, 2 sets, Reji  -Standing Hip 3-way: 10x, 2 UE  -Step ups FWD/LAT: 20x, 1 UE  -FTEO/FTEC w/ foam: 30 sec, 2x ea  -FA posterior perturbations (promoting anterior sway) 3 s hold light perturbation 10 reps  - Stepping using agility ladder for spacin reps        Assessment: Patient tolerated treatment  She required constant CGA and gait belt for all exercises  Patient required frequent verbal cueing to slow down while performing exercises to maintain perform  Agility ladder utilized to provide visual cueing to help patient increase step length  She will benefit from skilled PT services to improve her balance, improve her functional mobility, and maximize her function at home and in the community  Plan: Continue per plan of care  Precautions:   Past Medical History:   Diagnosis Date    [facility] 2019    Altered mental status 2021    Anal bleeding 2018    Arthritis     knee    Asthma     BPPV (benign paroxysmal positional vertigo) 2016    Last Assessment & Plan:  Hx consistent with BPPV  Neg Comstock-Halpike, however could not perform it adequately due to dyskinesias  Recommend lozano-daroff exercises at home  IF worsens, vestibular therapy      Bulging lumbar disc 12/10/2013    Closed fracture of one rib of left side 2018    Colon polyp     Dementia [facility])     Fall     balance issue    Fall during current hospitalization 1/15/2021    Gallbladder disease     GERD (gastroesophageal reflux disease)     Hematuria last assessed 10/29/15    Orthostatic hypotension 7/13/2015    [facility] ) 3/10/2020    Ovarian cyst     LAST ASSESSED: 12/10/13    Parkinson's disease ([facility] )     Pneumonia of both lower lobes due to infectious organism 3/27/2018    Possible fracture of 5th metatarsal 1/12/2021    Psoriasis 12/10/2013    Pulmonary embolism with infarction ([facility] ) 9/2/2014    Scalp laceration, subsequent encounter 11/15/2019    Sciatica 12/10/2013    Severe protein-calorie malnutrition ([facility] ) 7/22/2019    Skin disorder     last assessed 12/10/13    Squamous cell carcinoma of skin 11/21/2016    Stage 3a chronic kidney disease 12/4/2020    Toxic metabolic encephalopathy 1/45/4694    Use of cane as ambulatory aid    Lue Daquan as ambulation aid     Wears glasses     Weight loss 10/8/2019    Last Assessment & Plan:  Recommend Boost or Ensure  F/u with PCP      Weight loss, unintentional 5/22/2015           Objective Measures IE: 3/25/21            30 sec STS 6 reps            5xSTS 22 49 seconds            mCTSIB FTEO: 10 2 seconds  FTEC: 5 87 seconds  FTEO w/ foam:             TUG 20 65 seconds, w/ RW            2  ft, w/ RW            Gait speed 0 15 m/s            TOÑO 17/56

## 2021-05-20 ENCOUNTER — OFFICE VISIT (OUTPATIENT)
Dept: PHYSICAL THERAPY | Facility: CLINIC | Age: 81
End: 2021-05-20
Payer: MEDICARE

## 2021-05-20 DIAGNOSIS — R53.1 WEAKNESS: ICD-10-CM

## 2021-05-20 DIAGNOSIS — R29.6 FREQUENT FALLS: ICD-10-CM

## 2021-05-20 DIAGNOSIS — G20 PARKINSON'S DISEASE (HCC): Primary | ICD-10-CM

## 2021-05-20 PROCEDURE — 97112 NEUROMUSCULAR REEDUCATION: CPT

## 2021-05-20 NOTE — PROGRESS NOTES
Daily Note     Today's date: 2021  Patient name: Shai Tello  : 1940  MRN: [id number]  Referring provider: Janiya Mendez MD  Dx:   Encounter Diagnosis     ICD-10-CM    1  Parkinson's disease ([facility] [address] )  G20    2  Weakness  R53 1    3  Frequent falls  R29 6        Start Time: 1403  Stop Time: 1445  Total time in clinic (min): 42 minutes    Subjective: Patient reports to therapy with no new changes, her legs hurt "as usual "       Objective: See treatment diary below      *CGA and gait belt all exercises     -STS: 12x, 2 sets, Reji No  UE - use of color dot on step stool to encourage fwd wt shift prior to leg ext   -Standing Hip 3-way: 10x, 2 UE 40 bpm metronome helpful to slow pt's speed of movement  - Step ups FWD/LAT: 20x each lead leg (40 fwd,40 lat,) 1 UE  -NP FTEO/FTEC w/ foam: 30 sec, 2x ea  -FA posterior perturbations (promoting anterior sway) 3 s hold light perturbation 10 reps  LOB x 3  standing facing uphill (wobble bd) full foot too challenging  Half foot very challenging  Front 1/3rd of foot pt able to maintain balance briefly 30s x 3  - NP Stepping using agility ladder for spacin reps        Assessment: Patient tolerated treatment  She required constant CGA and gait belt for all exercises  Attempted to correct her STS technique, leg ext is too early and too abrupt  Implemented use of color dot on step stool to encourage fwd wt shift prior to leg ext with good success  Use of metronome as above helped to slow pt during 3way hip  Uphill stance to challenge forward wt shift  Patient required frequent verbal cueing to slow down while performing exercises to maintain perform  She will benefit from skilled PT services to improve her balance, improve her functional mobility, and maximize her function at home and in the community  Plan: Continue per plan of care        Precautions:   Past Medical History:   Diagnosis Date    RODRIGO (acute kidney injury) ([facility] [address] ) 2019    Altered mental status 1/11/2021    Anal bleeding 1/29/2018    Arthritis     knee    Asthma     BPPV (benign paroxysmal positional vertigo) 7/19/2016    Last Assessment & Plan:  Hx consistent with BPPV  Neg Joe-Halpike, however could not perform it adequately due to dyskinesias  Recommend lozano-daroff exercises at home  IF worsens, vestibular therapy   Bulging lumbar disc 12/10/2013    Closed fracture of one rib of left side 1/29/2018    Colon polyp     Dementia ([facility] [address] )     Fall     balance issue    Fall during current hospitalization 1/15/2021    Gallbladder disease     GERD (gastroesophageal reflux disease)     Hematuria     last assessed 10/29/15    Orthostatic hypotension 7/13/2015    Osteomyelitis of lumbar spine ([facility] [facility] ) 3/10/2020    Ovarian cyst     LAST ASSESSED: 12/10/13    Parkinson's disease ([address] )     Pneumonia of both lower lobes due to infectious organism 3/27/2018    Possible fracture of 5th metatarsal 1/12/2021    Psoriasis 12/10/2013    Pulmonary embolism with infarction ([address] ) 9/2/2014    Scalp laceration, subsequent encounter 11/15/2019    Sciatica 12/10/2013    Severe protein-calorie malnutrition ([address] ) 7/22/2019    Skin disorder     last assessed 12/10/13    Squamous cell carcinoma of skin 11/21/2016    Stage 3a chronic kidney disease 12/4/2020    Toxic metabolic encephalopathy 1/47/5192    Use of cane as ambulatory aid     Walker as ambulation aid     Wears glasses     Weight loss 10/8/2019    Last Assessment & Plan:  Recommend Boost or Ensure  F/u with PCP      Weight loss, unintentional 5/22/2015           Objective Measures IE: 3/25/21            30 sec STS 6 reps            5xSTS 22 49 seconds            mCTSIB FTEO: 10 2 seconds  FTEC: 5 87 seconds  FTEO w/ foam:             TUG 20 65 seconds, w/ RW            2  ft, w/ RW            Gait speed 0 15 m/s            TOÑO 17/56

## 2021-05-25 ENCOUNTER — APPOINTMENT (OUTPATIENT)
Dept: PHYSICAL THERAPY | Facility: CLINIC | Age: 81
End: 2021-05-25
Payer: MEDICARE

## 2021-05-28 ENCOUNTER — APPOINTMENT (OUTPATIENT)
Dept: PHYSICAL THERAPY | Facility: CLINIC | Age: 81
End: 2021-05-28
Payer: MEDICARE

## 2021-06-02 DIAGNOSIS — K21.9 GASTROESOPHAGEAL REFLUX DISEASE, UNSPECIFIED WHETHER ESOPHAGITIS PRESENT: ICD-10-CM

## 2021-06-02 RX ORDER — PANTOPRAZOLE SODIUM 20 MG/1
20 TABLET, DELAYED RELEASE ORAL DAILY
Qty: 90 TABLET | Refills: 0 | Status: SHIPPED | OUTPATIENT
Start: 2021-06-02 | End: 2021-07-21 | Stop reason: SDUPTHER

## 2021-06-08 ENCOUNTER — TELEPHONE (OUTPATIENT)
Dept: FAMILY MEDICINE CLINIC | Facility: CLINIC | Age: 81
End: 2021-06-08

## 2021-06-08 ENCOUNTER — OFFICE VISIT (OUTPATIENT)
Dept: FAMILY MEDICINE CLINIC | Facility: CLINIC | Age: 81
End: 2021-06-08
Payer: MEDICARE

## 2021-06-08 ENCOUNTER — OFFICE VISIT (OUTPATIENT)
Dept: PHYSICAL THERAPY | Facility: CLINIC | Age: 81
End: 2021-06-08
Payer: MEDICARE

## 2021-06-08 VITALS
WEIGHT: 91.6 LBS | BODY MASS INDEX: 16.23 KG/M2 | DIASTOLIC BLOOD PRESSURE: 66 MMHG | OXYGEN SATURATION: 96 % | RESPIRATION RATE: 18 BRPM | HEIGHT: 63 IN | TEMPERATURE: 97.8 F | SYSTOLIC BLOOD PRESSURE: 132 MMHG | HEART RATE: 86 BPM

## 2021-06-08 DIAGNOSIS — M85.852 OSTEOPENIA OF NECKS OF BOTH FEMURS: ICD-10-CM

## 2021-06-08 DIAGNOSIS — R53.1 WEAKNESS: ICD-10-CM

## 2021-06-08 DIAGNOSIS — R29.6 FREQUENT FALLS: ICD-10-CM

## 2021-06-08 DIAGNOSIS — M85.851 OSTEOPENIA OF NECKS OF BOTH FEMURS: ICD-10-CM

## 2021-06-08 DIAGNOSIS — I10 ESSENTIAL HYPERTENSION: ICD-10-CM

## 2021-06-08 DIAGNOSIS — S80.12XA CONTUSION OF LEFT LOWER EXTREMITY, INITIAL ENCOUNTER: Primary | ICD-10-CM

## 2021-06-08 DIAGNOSIS — E11.9 TYPE 2 DIABETES MELLITUS WITHOUT COMPLICATION, WITHOUT LONG-TERM CURRENT USE OF INSULIN (HCC): ICD-10-CM

## 2021-06-08 DIAGNOSIS — G20 PARKINSON'S DISEASE (HCC): Primary | ICD-10-CM

## 2021-06-08 PROCEDURE — 99213 OFFICE O/P EST LOW 20 MIN: CPT | Performed by: FAMILY MEDICINE

## 2021-06-08 NOTE — PROGRESS NOTES
Chief concern and HPI: Rosy Sanon is a [de-identified] y o  female  Left lower leg bruise after fell 4 days ago when tripped  NO LOC  No head injury          Previous relevant clinical information reviewed from medical, surgical and psychosocial history, medication and allergies and labs/studies  Patient Active Problem List   Diagnosis    Carpal tunnel syndrome    Colon, diverticulosis    Type 2 diabetes mellitus without complication, without long-term current use of insulin [facility]    Essential hypertension    GERD (gastroesophageal reflux disease)    Mixed hyperlipidemia    Insomnia    Spinal stenosis    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)    Restless legs syndrome    Need for influenza vaccination    Age-related nuclear cataract of both eyes    Ambulatory dysfunction    Cervical dystonia    [facility]'s disease [facility]    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 ([facility] )    Encounter for herb and vitamin supplement management    Intractable back pain    Primary osteoarthritis of right knee    Asymptomatic bacteriuria    Stage 3a chronic kidney disease ([facility] )    Frequent falls    Sinus arrhythmia    S/P deep brain stimulator placement    Underweight    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 ([facility] )           Review of systems: No new or worsening:   Unexplained fever/chills/sweats/weight loss  HEENT issues such as new ear, mouth, nose or eye problems  Respiratory issues such as new shortness of breath, cough  Heart issues such as new chest pain or pressure, palpitations  Abdominal or digestive issues such as diarrhea, constipation or blood in stool    BM's are normal  Genitourinary issues  Neurological issues such as new numbness or motor weakness  Psychological issues such as depression or anxiety  Skin issues such as new rashes      PHQ-9 Depression Screening    PHQ-9:   Frequency of the following problems over the past two weeks:                     Exam:  Alert, no acute distress BP 132/66 (BP Location: Left arm, Patient Position: Sitting, Cuff Size: Standard)   Pulse 86   Temp 97 8 °F (36 6 °C) (Tympanic)   Resp 18   Ht 5' 3" (1 6 m)   Wt 41 5 kg (91 lb 9 6 oz)   SpO2 96%   BMI 16 23 kg/m²   HEENT: Head normocephalic and atraumatic  Lungs: No respiratory labor  Clear to auscultation  Cardiac: Regular rate and rhythm  No murmur, rub or gallop  Abdomen: Benign  Normal active bowel sounds  Soft and non-tender  No organomegaly  Extremities: No cyanosis, clubbing or edema  Large contusion Left mid lateral lower leg  Good wt bearing, distal pulses and sensation        Falls Plan of Care: balance, strength, and gait training instructions were provided and referral to physical therapy  Summary Impression:  1  Contusion of left lower extremity, initial encounter  Cool pack  Xray if any pain on wt bearing  Can go back to PT in a week if leg is feeliing better    2  Type 2 diabetes mellitus without complication, without long-term current use of insulin (HCC)  A1C  She does not check home BS but denies hypoglycemia        Risks and benefits of therapeutic plan discussed, answered all patient questions and concerns and patient expressed understanding and agreement of therapeutic plan  BMI Counseling: Body mass index is 16 23 kg/m²  Discussed the patient's BMI with her  The BMI is below normal  Patient was advised to gain weight      Social History     Tobacco Use   Smoking Status Never Smoker   Smokeless Tobacco Never Used       Follow up plan: 3 months or prn

## 2021-06-08 NOTE — TELEPHONE ENCOUNTER
Vlad from PT is calling to report that patient states she had a fall a week and has a lump and bruising on her lower left leg ([place]) area  They would like to hold off on PT today

## 2021-06-08 NOTE — PROGRESS NOTES
Daily Note     Today's date: 2021  Patient name: Demond Baca  : 1940  MRN: [id number]  Referring provider: Ana Pena MD  Dx:   Encounter Diagnosis     ICD-10-CM    1  Parkinson's disease ([facility] [address] )  G20    2  Weakness  R53 1    3  Frequent falls  R29 6                   Subjective: Patient reports to therapy after vacation to [place]  She reports having a fall last week and hurting her left lower leg      Objective: See treatment diary below    - LLE skin inspection  - LLE palpation  - LLE MMT      Assessment: Patient arrived to therapy with large black and blue contusion on left lower leg after experiencing fall last week where she hit her leg against her rollator  She currently reports no pain at rest and or with MMT of ankle  Called Dr Buddy Morrison office and spoke to office staff that agreed to hold therapy for today until patient receives clearance from doctor  Instructed patient to also call Dr 's office for further evaluation and to seek higher medical attention if pain/swelling increases  Plan: Continue per plan of care  Precautions:   Past Medical History:   Diagnosis Date    RODRIGO [facility]acute [facility]) ([facility] ) 2019    Altered mental status 2021    Anal bleeding 2018    Arthritis     knee    Asthma     BPPV (benign paroxysmal positional vertigo) 2016    Last Assessment & Plan:  Hx consistent with BPPV  Neg Joe-Halpike, however could not perform it adequately due to dyskinesias  Recommend lozano-daroff exercises at home  IF worsens, vestibular therapy      Bulging lumbar disc 12/10/2013    Closed fracture of one rib of left side 2018    Colon polyp     Dementia [facility])     Fall     balance issue    Fall during current hospitalization 1/15/2021    Gallbladder disease     GERD (gastroesophageal reflux disease)     Hematuria     last assessed 10/29/15    Orthostatic hypotension 2015    Osteomyelitis of lumbar spine ([facility] ) 3/10/2020    Ovarian cyst     LAST ASSESSED: 12/10/13    Parkinson's disease ([facility] )     Pneumonia of both lower lobes due to infectious organism 3/27/2018    Possible fracture of 5th metatarsal 1/12/2021    Psoriasis 12/10/2013    Pulmonary embolism with infarction ([facility] ) 9/2/2014    Scalp laceration, subsequent encounter 11/15/2019    Sciatica 12/10/2013    Severe protein-calorie malnutrition ([facility] [facility] ) 7/22/2019    Skin disorder     last assessed 12/10/13    Squamous cell carcinoma of skin 11/21/2016    Stage 3a chronic kidney disease 12/4/2020    Toxic metabolic encephalopathy 3/78/7767    Use of cane as ambulatory aid    Dayla Dace as ambulation aid     Wears glasses     Weight loss 10/8/2019    Last Assessment & Plan:  Recommend Boost or Ensure  F/u with PCP      Weight loss, unintentional 5/22/2015           Objective Measures IE: 3/25/21            30 sec STS 6 reps            5xSTS 22 49 seconds            mCTSIB FTEO: 10 2 seconds  FTEC: 5 87 seconds  FTEO w/ foam:             TUG 20 65 seconds, w/ RW            2  ft, w/ RW            Gait speed 0 15 m/s            TOÑO 17/56

## 2021-06-09 NOTE — TELEPHONE ENCOUNTER
Ms Celso Sarmiento can wait a week until her contusion heals and then if feeling better return to PT in a week

## 2021-06-10 ENCOUNTER — OFFICE VISIT (OUTPATIENT)
Dept: PHYSICAL THERAPY | Facility: CLINIC | Age: 81
End: 2021-06-10
Payer: MEDICARE

## 2021-06-10 DIAGNOSIS — R29.6 FREQUENT FALLS: ICD-10-CM

## 2021-06-10 DIAGNOSIS — G20 PARKINSON'S DISEASE (HCC): Primary | ICD-10-CM

## 2021-06-10 DIAGNOSIS — R53.1 WEAKNESS: ICD-10-CM

## 2021-06-10 PROCEDURE — 97112 NEUROMUSCULAR REEDUCATION: CPT | Performed by: PHYSICAL THERAPIST

## 2021-06-10 NOTE — PROGRESS NOTES
Daily Note     Today's date: 6/10/2021  Patient name: Margarita Tenorio  : 1940  MRN: [id number]  Referring provider: Maribeth Brian MD  Dx:   Encounter Diagnosis     ICD-10-CM    1  Parkinson's disease ([facility] [address] )  G20    2  Weakness  R53 1    3  Frequent falls  R29 6                   Subjective: Pt reports she feels a challenge with today's exercises  She feels a greater challenge in semi tandem with L foot in rear as compared to R foot in rear  Objective: See treatment diary below  Pt arrived to clinic at incorrect time (1 5 hours prior to apprx time and walked back to treatment area despite reminders to wait for staff  She then struggled to maneuver obstacles with AD and tripped causing a near fall to which staff had to correct, pt unable to catch herself  No injury occurred  *CGA and gait belt all exercises     -STS: 10x, 3 sets, CGA  -Standing Hip 3-way: 10x, 2 UE   -Marching in place with verbal cues for pacing and "freezing" to prevent pt from progressing pace, 3 minutes  - Step ups FWD/LAT: 20x each lead leg (40 fwd,40 lat,) 1 UE  -FTEO w/ foam: 30-45 sec, 4x ea in NBOS and semi tandem  -Cone Tapping on foam with 1 UE support with vc emphasis on foot placement and return to starting position to pace   -Mini sq with UE support 2x10    Assessment: Tolerated treatment well  Patient require inc vc for all pacing and foot placement techniques as she loses coordination and placement of feet during dyanmic exercises  She continues to demo impulsivity with xfers today vc for safety reminders  Plan: Continue per plan of care  Precautions:   Past Medical History:   Diagnosis Date    [facility]    Altered mental status 2021    Anal bleeding 2018    Arthritis     knee    Asthma     BPPV (benign paroxysmal positional vertigo) 2016    Last Assessment & Plan:  Hx consistent with BPPV    Neg Joe-Halpike, however could not perform it adequately due to dyskinesias  Recommend lozano-daroff exercises at home  IF worsens, vestibular therapy   Bulging lumbar disc 12/10/2013    Closed fracture of one rib of left side 1/29/2018    Colon polyp     Dementia [facility]     Fall     balance issue    Fall during current hospitalization 1/15/2021    Gallbladder disease     GERD (gastroesophageal reflux disease)     Hematuria     last assessed 10/29/15    Orthostatic hypotension 7/13/2015    Osteomyelitis of lumbar spine ([facility] ) 3/10/2020    Ovarian cyst     LAST ASSESSED: 12/10/13    Parkinson's disease ([facility] )     Pneumonia of both lower lobes due to infectious organism 3/27/2018    Possible fracture of 5th metatarsal 1/12/2021    Psoriasis 12/10/2013    Pulmonary embolism with infarction ([facility] ) 9/2/2014    Scalp laceration, subsequent encounter 11/15/2019    Sciatica 12/10/2013    Severe protein-calorie malnutrition ([facility] ) 7/22/2019    Skin disorder     last assessed 12/10/13    Squamous cell carcinoma of skin 11/21/2016    Stage 3a chronic kidney disease 12/4/2020    Toxic metabolic encephalopathy 5/39/7572    Use of cane as ambulatory aid     Walker as ambulation aid     Wears glasses     Weight loss 10/8/2019    Last Assessment & Plan:  Recommend Boost or Ensure  F/u with PCP      Weight loss, unintentional 5/22/2015           Objective Measures IE: 3/25/21            30 sec STS 6 reps            5xSTS 22 49 seconds            mCTSIB FTEO: 10 2 seconds  FTEC: 5 87 seconds  FTEO w/ foam:             TUG 20 65 seconds, w/ RW            2  ft, w/ RW            Gait speed 0 15 m/s            TOÑO 17/56

## 2021-06-15 ENCOUNTER — OFFICE VISIT (OUTPATIENT)
Dept: PHYSICAL THERAPY | Facility: CLINIC | Age: 81
End: 2021-06-15
Payer: MEDICARE

## 2021-06-15 DIAGNOSIS — R53.1 WEAKNESS: ICD-10-CM

## 2021-06-15 DIAGNOSIS — R29.6 FREQUENT FALLS: ICD-10-CM

## 2021-06-15 DIAGNOSIS — G20 PARKINSON'S DISEASE (HCC): Primary | ICD-10-CM

## 2021-06-15 PROCEDURE — 97530 THERAPEUTIC ACTIVITIES: CPT | Performed by: PHYSICAL THERAPIST

## 2021-06-15 NOTE — PROGRESS NOTES
PT Progress Note    Today's date: 6/15/2021  Patient name: Mynor Nix  : 1940  MRN: [id number]  Referring provider: Laura Miner MD  Dx:   Encounter Diagnosis     ICD-10-CM    1  Parkinson's disease ([facility] [address] )  G20    2  Weakness  R53 1    3  Frequent falls  R29 6                   Assessment  Assessment details: Tiffany Sanders is an [de-identified] y/o woman who presents to PT after 1 month of therapy due to impaired balance and falls secondary to Parkinson's Disease diagnosis after 1 month hold on treatment  Patient reports having multiple falls at home and that she tends to lose her balance backwards, most recently leaving large contusion on LLE  Patient presents with severe forward head posture  Patient ambulates with RW, but demonstrates reduced step length and clearance, with occasional shuffling  She shows improvements with her sit to stand transfers, reducing 5xSTS time to 15 6 seconds and increasing 30 second sit to stand to 10 reps  She also showed improvement with her TUG test, reducing time from 23 34 seconds to 20 59 seconds  She showed regression with her standing balance as she was unable to perform foam conditions of mCTSIB  Her gait speed and LUNDBERG scores did not change enough to show [address]  Although she shows improvements with some of her objective measures, she remains at high risk of falls and requires CGA during treatment due to frequent posterior LOB  She will benefit from skilled PT services to improve her balance, increase her functional mobility, and reduce risk of falls              Cut off score   All date taken from [facility] Neuro Section or Rehab Measures    LUNDBERG test: 46/56                                              5 x STS Test:  MDC: 6 points                                                  MDC: 2 3 seconds   age norms                                                                 Age Norms   61-76 year old = M: 54, F: 55                        62-78 year old: 11 4 seconds   66-77 year old = M 47,  F: 48 66-77 year old: 12 6 seconds     80-80 year old = M46,   F: 53                       80-80 year old: 14 8 seconds      TUG test:                                                                     10 Meter Walk Test:  MDC: 4 14 seconds       MDC:  59 ft/sec  Cut off score for Falls                                                  Age Norms  > 13 5 seconds community dwelling adults                20-29; M: 4 56 ft/sec F: 4 62 ft/sec  > 32 2 Frail Elderly                                                     30-39: M 4 76 ft/sec  F: 4 68 ft/sec          40-49: M: 4 79 ft/sec  F: 4 62 ft/sec  6 Minute Walk Test      50-59: M: 4 76 ft/sec  F: 4 56 ft/sec  MDC: 190 feet       60-69: M: 4 56 ft/sec  F: 4 26 ft/sec  Age Norms       70-+    M: 4 36 ft/sec  F: 4 16 ft/sec  60-69:    M: 1876 F: 4675  21-17:    M: 1729 F: 1545  80-89 +: M: 7201 F; 1286     Impairments: abnormal gait, abnormal or restricted ROM, abnormal movement, activity intolerance, impaired balance, impaired physical strength, lacks appropriate home exercise program, safety issue and poor posture   Understanding of Dx/Px/POC: good   Prognosis: fair    Goals  STG (4 weeks)  1  Patient will reduce TUG time from 23 34 seconds to 19 seconds to show reduced risk of falls- NOT MET  2  Patient will improve LUNDBERG score from 26/56 to 32/56 to show improvements with her functional balance- NOT MET  3  Patient will complete all conditions of mCTSIB for at least 20 seconds to show improvement with static balance- NOT MET  4  Patient will improve her 5xSTS time from 20 7 seconds to 16 seconds to show improvement with her functional strength- MET        LTG (12 weeks)  1  Patient will complete 6 MWT to show improvement with her functional endurance  2  Patient will improve LUNDBERG score to 42/56 to show improvements with her functional balance  3  Patient will reduce TUG time to less than 13 5 seconds to display reduced risk of falls  4   Patient will complete all conditions of mCTSIB for 30 seconds to show improvement with her static balance      Plan  Patient would benefit from: skilled physical therapy  Planned therapy interventions: abdominal trunk stabilization, activity modification, ADL training, balance, balance/weight bearing training, body mechanics training, flexibility, functional ROM exercises, gait training, graded activity, graded exercise, home exercise program, manual therapy, neuromuscular re-education, motor coordination training, patient education, postural training, strengthening, stretching, therapeutic activities, therapeutic exercise and therapeutic training  Frequency: 2x week  Duration in weeks: 12  Plan of Care beginning date: 5/5/2021  Plan of Care expiration date: 7/28/2021  Treatment plan discussed with: patient and family        Subjective Evaluation    History of Present Illness  Mechanism of injury: Patient reports to therapy after fall from last week  She states that her "Parkinson's" is coming back  She reports she is having trouble with falls and with going backwards  She states she has been having aches in her legs  She reports having home PT recently    Update (5-4-2021)  Patient reports to therapy 1 month after fall and PT hold  She reports no pain today but states her legs are feeling achy  She wants to work on her standing balance and mobility  She is fearful of falling and reports her most recent fall of last week  Update (6/15/2021)  Patient reports after 1 month of therapy after 1 month hold from fall  She had fall last week that resulted in large contusion on left lower leg  She states that she still feels unsteady with her balance and walking    Pain  No pain reported  Location: B/L legs  Quality: dull ache    Social Support  Stairs in house: yes   Lives in: multiple-level home  Lives with: spouse    Employment status: not working  Treatments  Previous treatment: physical therapy  Patient Goals  Patient goals for therapy: improved balance, increased strength and increased motion          Objective     Strength/Myotome Testing     Left Hip   Planes of Motion   Flexion: 3+  Abduction: 3+    Right Hip   Planes of Motion   Flexion: 3+  Abduction: 3+    Left Knee   Flexion: 3+  Extension: 4-    Right Knee   Flexion: 3+  Extension: 3+    Left Ankle/Foot   Dorsiflexion: 3+  Plantar flexion: 3+    Right Ankle/Foot   Dorsiflexion: 3+  Plantar flexion: 3+             Precautions:   Past Medical History:   Diagnosis Date    RODRIGO (acute kidney injury) ([facility] [address] ) 7/19/2019    Altered mental status 1/11/2021    Anal bleeding 1/29/2018    Arthritis     knee    Asthma     BPPV (benign paroxysmal positional vertigo) 7/19/2016    Last Assessment & Plan:  Hx consistent with BPPV  Neg Joe-Halpike, however could not perform it adequately due to dyskinesias  Recommend lozano-daroff exercises at home  IF worsens, vestibular therapy      Bulging lumbar disc 12/10/2013    Closed fracture of one rib of left side 1/29/2018    Colon polyp     Dementia ([address] )     Fall     balance issue    Fall during current hospitalization 1/15/2021    Gallbladder disease     GERD (gastroesophageal reflux disease)     Hematuria     last assessed 10/29/15    Orthostatic hypotension 7/13/2015    Osteomyelitis of lumbar spine ([address] ) 3/10/2020    Ovarian cyst     LAST ASSESSED: 12/10/13    Parkinson's disease ([address] )     Pneumonia of both lower lobes due to infectious organism 3/27/2018    Possible fracture of 5th metatarsal 1/12/2021    Psoriasis 12/10/2013    Pulmonary embolism with infarction ([address] ) 9/2/2014    Scalp laceration, subsequent encounter 11/15/2019    Sciatica 12/10/2013    Severe protein-calorie malnutrition ([address] ) 7/22/2019    Skin disorder     last assessed 12/10/13    Squamous cell carcinoma of skin 11/21/2016    Stage 3a chronic kidney disease ([address] ) 12/4/2020    Toxic metabolic encephalopathy 7/02/9677    Use of cane as ambulatory aid    Miguelito Luna as ambulation aid     Wears glasses     Weight loss 10/8/2019    Last Assessment & Plan:  Recommend Boost or Ensure  F/u with PCP      Weight loss, unintentional 5/22/2015           Objective Measures IE: 3/25/21 RE  5/4/2021 PN  6/15/2021          30 sec STS 6 reps 7 reps 10 reps          5xSTS 22 49 seconds 20 7 seconds 15 6 sec          mCTSIB FTEO: 10 2 seconds  FTEC: 5 87 seconds  FTEO w/ foam:  FTEO: 30 sec  FTEC: 30 sec  FTEO w/ foam: 10 97  sec  FTEC w/ foam:   2 5 sec FTEO:   30 sec  FTEC:   30 sec  FTEO w/ foam:   Unable  FTEC w/ foam:   Unable          TUG 20 65 seconds, w/ RW 23 34 sec w/ RW 20 59 sec w/ RW          2  ft, w/  ft w/  ft w/ FW          Gait speed 0 15 m/s 0 53 m/s 0 55 m/s          TOÑO 17/56 26/56 26/56

## 2021-06-17 ENCOUNTER — OFFICE VISIT (OUTPATIENT)
Dept: PHYSICAL THERAPY | Facility: CLINIC | Age: 81
End: 2021-06-17
Payer: MEDICARE

## 2021-06-17 DIAGNOSIS — R53.1 WEAKNESS: ICD-10-CM

## 2021-06-17 DIAGNOSIS — G20 PARKINSON'S DISEASE (HCC): Primary | ICD-10-CM

## 2021-06-17 DIAGNOSIS — R29.6 FREQUENT FALLS: ICD-10-CM

## 2021-06-17 PROCEDURE — 97112 NEUROMUSCULAR REEDUCATION: CPT | Performed by: PHYSICAL THERAPIST

## 2021-06-17 NOTE — PROGRESS NOTES
Daily Note  PN: 6/15/2021  POC: 2021     Today's date: 2021  Patient name: Gio Cárdenas  : 1940  MRN: [id number]  Referring provider: Annie Carroll MD  Dx:   Encounter Diagnosis     ICD-10-CM    1  Parkinson's disease ([facility] [address] )  G20    2  Weakness  R53 1    3  Frequent falls  R29 6                   Subjective: Pt reports no new changes from previous session  She states that she has not had any falls since her last visit       Objective: See treatment diary below  *CGA and gait belt all exercises     - STS: 10x, 3 sets  - STS w/ cone reach: 10x, 2 sets  - Step ups FWD: 20x, 2 UE  - Step ups LAT: 20x, 2 UE  - Standing marches on foam: 20x  - FTEC   - FTEO w/ foam  - FTEC w/ foam        Assessment: Patient tolerated treatment well  Constant verbal and tactile cueing utilized to emphasize forward leaning to maintain balance  Patient showed improvement with STS balance with cueing to keep arms forward to help facilitate forward weight shift  She showed increased difficulty with moving laterally vs forward, needing cueing to slow speed  She will benefit from skilled PT services to improve her balance, reduce her risk of falls, and increase her mobility  Plan: Continue per plan of care  Precautions:   Past Medical History:   Diagnosis Date    RODRIGO (acute kidney injury) ([facility] ) 2019    Altered mental status 2021    Anal bleeding 2018    Arthritis     knee    Asthma     BPPV (benign paroxysmal positional vertigo) 2016    Last Assessment & Plan:  Hx consistent with BPPV  Neg [place]-Halpike, however could not perform it adequately due to dyskinesias  Recommend lozano-daroff exercises at home  IF worsens, vestibular therapy      Bulging lumbar disc 12/10/2013    Closed fracture of one rib of left side 2018    Colon polyp     Dementia [facility])     Fall     balance issue    Fall during current hospitalization 1/15/2021    Gallbladder disease     GERD (gastroesophageal reflux disease)     Hematuria     last assessed 10/29/15    Orthostatic hypotension 7/13/2015    [facility] ) 3/10/2020    Ovarian cyst     LAST ASSESSED: 12/10/13    Parkinson's disease ([facility] )     Pneumonia of both lower lobes due to infectious organism 3/27/2018    Possible fracture of 5th metatarsal 1/12/2021    Psoriasis 12/10/2013    Pulmonary embolism with infarction ([facility] ) 9/2/2014    Scalp laceration, subsequent encounter 11/15/2019    Sciatica 12/10/2013    Severe protein-calorie malnutrition ([facility] ) 7/22/2019    Skin disorder     last assessed 12/10/13    Squamous cell carcinoma of skin 11/21/2016    Stage 3a chronic kidney disease 12/4/2020    Toxic metabolic encephalopathy 3/23/6283    Use of cane as ambulatory aid    Leonard Islam as ambulation aid     Wears glasses     Weight loss 10/8/2019    Last Assessment & Plan:  Recommend Boost or Ensure  F/u with PCP      Weight loss, unintentional 5/22/2015           Objective Measures IE: 3/25/21            30 sec STS 6 reps            5xSTS 22 49 seconds            mCTSIB FTEO: 10 2 seconds  FTEC: 5 87 seconds  FTEO w/ foam:             TUG 20 65 seconds, w/ RW            2  ft, w/ RW            Gait speed 0 15 m/s            TOÑO 17/56

## 2021-06-21 ENCOUNTER — OFFICE VISIT (OUTPATIENT)
Dept: FAMILY MEDICINE CLINIC | Facility: CLINIC | Age: 81
End: 2021-06-21
Payer: MEDICARE

## 2021-06-21 ENCOUNTER — TELEPHONE (OUTPATIENT)
Dept: OTHER | Facility: OTHER | Age: 81
End: 2021-06-21

## 2021-06-21 ENCOUNTER — NURSE TRIAGE (OUTPATIENT)
Dept: OTHER | Facility: OTHER | Age: 81
End: 2021-06-21

## 2021-06-21 VITALS
TEMPERATURE: 99 F | SYSTOLIC BLOOD PRESSURE: 160 MMHG | RESPIRATION RATE: 18 BRPM | BODY MASS INDEX: 16.73 KG/M2 | OXYGEN SATURATION: 97 % | HEART RATE: 112 BPM | HEIGHT: 63 IN | WEIGHT: 94.4 LBS | DIASTOLIC BLOOD PRESSURE: 80 MMHG

## 2021-06-21 DIAGNOSIS — R26.2 AMBULATORY DYSFUNCTION: ICD-10-CM

## 2021-06-21 DIAGNOSIS — M79.662 PAIN OF LEFT LOWER LEG: Primary | ICD-10-CM

## 2021-06-21 DIAGNOSIS — G24.9 DYSKINESIA DUE TO PARKINSON'S DISEASE (HCC): ICD-10-CM

## 2021-06-21 DIAGNOSIS — E11.9 TYPE 2 DIABETES MELLITUS WITHOUT COMPLICATION, WITHOUT LONG-TERM CURRENT USE OF INSULIN (HCC): ICD-10-CM

## 2021-06-21 DIAGNOSIS — I10 ESSENTIAL HYPERTENSION: ICD-10-CM

## 2021-06-21 DIAGNOSIS — G20 DYSKINESIA DUE TO PARKINSON'S DISEASE (HCC): ICD-10-CM

## 2021-06-21 DIAGNOSIS — S80.12XA HEMATOMA OF LEFT LOWER LEG: ICD-10-CM

## 2021-06-21 PROCEDURE — 99214 OFFICE O/P EST MOD 30 MIN: CPT | Performed by: FAMILY MEDICINE

## 2021-06-21 RX ORDER — AMANTADINE 137 MG/1
1 CAPSULE, COATED PELLETS ORAL
COMMUNITY
Start: 2021-06-10

## 2021-06-21 NOTE — TELEPHONE ENCOUNTER
Encounter Date: 4/8/2020       History     Chief Complaint   Patient presents with    Mental Health Problem     Patient comes in with acute psychosis.  She was found locked in a room barricaded in.  Family called [facility].  Police arrived.  Patient was combative.  Rooms entered the patient room resisted assistance.  She was restrained.  Was difficult time restraining her with several intermittent breakouts during the transmission to the hospital.  On arrival patient was screaming but very alert and focused on team members around the bed.  She was able to focus, comment and change her focus.  She did not look intoxicated.  She stated that she was surrounded by devils and that God would smight everyone in the room.    Review of her previous medical history finds a admission to the ED about 1 month ago after falling off of a stripper pole.  She had extensive workup which was negative for any injuries.  She did not have any drug testing at that admission.  She also has a several visits in [place] for cosmetic surgery which was localized for scarring.    Further history from [place] also reveals that she was suspected of having bipolar disorder in referred to Psychiatry.  That appointment is never occurred.    Discussions with her brother-in-law, who lives locally states that she came down for [place] from [place] and decided not to go home.  She is legally  from her  has an 8-year-old child in [place] under the care of the father.  Brother-in-law states she smokes marijuana but is for the nose does not abuse other drugs.  He states that this episode started approximately 6 days ago and progressively accelerated over those 6 days.  He states that she slept very little in no 6 days.  Is agitated and pacing relentlessly.    He states that as far as he know she has never been admitted to psychiatric hospital or ever been diagnosed of psychosis of any type    The patient has no neurologic defects.  She has  Pt is requesting a call regarding the fall she had , error, sent to nurse triage  no evidence of obvious toxicity.  She denies any drug use or abuse.  None could be found in the chart.    Birth control in pregnancy status is completely uncertain.        Review of patient's allergies indicates:  No Known Allergies  History reviewed. No pertinent past medical history.  Past Surgical History:   Procedure Laterality Date    BREAST SURGERY      COSMETIC SURGERY      HERNIA REPAIR       History reviewed. No pertinent family history.  Social History     Tobacco Use    Smoking status: Never Smoker    Smokeless tobacco: Never Used   Substance Use Topics    Alcohol use: Not Currently    Drug use: Yes     Types: Marijuana     Review of Systems   Constitutional: Negative for fever.   HENT: Negative for sore throat.    Respiratory: Negative for shortness of breath.    Cardiovascular: Negative for chest pain.   Gastrointestinal: Negative for nausea.   Genitourinary: Negative for dysuria.   Musculoskeletal: Negative for back pain.   Skin: Negative for rash.   Neurological: Negative for weakness.   Hematological: Does not bruise/bleed easily.   Psychiatric/Behavioral: Positive for agitation, behavioral problems, hallucinations and self-injury. The patient is hyperactive.    All other systems reviewed and are negative.      Physical Exam     Initial Vitals   BP Pulse Resp Temp SpO2   -- -- -- -- --      MAP       --         Physical Exam    Nursing note and vitals reviewed.  Constitutional: Vital signs are normal. She appears well-developed and well-nourished. She is active and cooperative.   HENT:   Head: Normocephalic and atraumatic.   Eyes: Conjunctivae, EOM and lids are normal. Pupils are equal, round, and reactive to light. Lids are everted and swept, no foreign bodies found.   Neck: Trachea normal, normal range of motion and full passive range of motion without pain. Neck supple.   Cardiovascular: Normal rate, regular rhythm, S1 normal, S2 normal, normal heart sounds, intact distal pulses and normal  pulses.  No extrasystoles are present.    Pulmonary/Chest: Breath sounds normal.   Abdominal: Soft. Normal appearance and bowel sounds are normal.   Musculoskeletal: Normal range of motion.   Neurological: She is alert. She has normal reflexes. GCS eye subscore is 4. GCS verbal subscore is 5. GCS motor subscore is 6.   Skin: Skin is warm, dry and intact. Capillary refill takes less than 2 seconds.   Psychiatric: Her mood appears anxious. Her affect is angry. Her speech is rapid and/or pressured and tangential. She is agitated, aggressive, hyperactive, actively hallucinating and combative. Thought content is paranoid and delusional. Cognition and memory are normal. She expresses inappropriate judgment.         ED Course   Procedures  Labs Reviewed   CBC W/ AUTO DIFFERENTIAL - Abnormal; Notable for the following components:       Result Value    Hemoglobin 11.7 (*)     Hematocrit 36.3 (*)     Mean Corpuscular Hemoglobin 26.3 (*)     Lymph # 0.9 (*)     Gran% 79.7 (*)     Lymph% 11.3 (*)     All other components within normal limits   COMPREHENSIVE METABOLIC PANEL - Abnormal; Notable for the following components:    Potassium 3.2 (*)     CO2 21 (*)     BUN, Bld 28 (*)     Total Bilirubin 1.6 (*)     Alkaline Phosphatase 28 (*)     AST 91 (*)     All other components within normal limits   DRUG SCREEN PANEL, URINE EMERGENCY   HCG, QUANTITATIVE, PREGNANCY   ALCOHOL,MEDICAL (ETHANOL)   SARS-COV-2 RNA AMPLIFICATION, QUAL          Imaging Results    None          Medical Decision Making:   Clinical Tests:   Lab Tests: Ordered and Reviewed  The following lab test(s) were unremarkable: CBC and CMP       <> Summary of Lab: Laboratory studies are all normal.  COVID screening is negative.  Urine is positive for THC otherwise negative.  ED Management:  Patient is admitted the ED with acute psychosis.  Patient was active delusional and very combative on admission.  He took 6 min and restrained team to put her in soft restraints.   She was initially treated with Haldol 5 mg, Benadryl 25 mg and Ativan 2 mg IM.  This had minimal to no affect on the patient.  She was then given 10 mg of olanzapine, also with minimal effect, the 10 mg was repeated with some slowing of her thrashing but even after this dose patient was verbal, focused, and highly combative.  Two more doses were repeated for a total of 40 mg of olanzapine.  1 hr after the this total dosage the patient was still moving but not nearly as combative.  She is still active delusional.  Patient's laboratory were otherwise unremarkable and her COVID was negative.  Patient requires acute psychiatric hospitalization and treatment of the acute psychosis.  She is [facility].                   ED Course as of Apr 09 0040 Wed Apr 08, 2020 2122 Toxicology Information: SEE COMMENT [SO]   2125 The patient has been given a total of 20 mg of olanzapine, 5 mg of Haldol, and 2 mg of Ativan.  This is been over total of 1 hr and 20 min.  She is still agitated although less actively agitated.  She still moving all 4 extremities and talking freely.  She is sedated in between.  Laboratory workup is basically negative other than slightly elevated BUN.  She is positive for THC which was known by history.    [SO]   2355 Patient is medically cleared for transfer to psychiatric hospital.  She has been here and total of 4 hr post medication.  She has now received 40 mg of olanzapine, 5 mg of Haldol, 4 mg of Ativan, and 25 mg of Benadryl.  She is still alert when stimulated, thrashing about, talking fluently, was still active delusions.    [SO]      ED Course User Index  [SO] Francisco Holliday MD                Clinical Impression:       ICD-10-CM ICD-9-CM   1. Acute psychosis F23 298.9         Disposition:   Disposition: Transferred                        Francisco Holliday MD  04/09/20 0043

## 2021-06-21 NOTE — PROGRESS NOTES
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 ([address] ) cont follow up    Relevant Medications    Amantadine HCl ER (Gocovri) 137 MG CP24       Other    Ambulatory dysfunction cont PT neuro follow      Other Visit Diagnoses     Pain of left lower leg    -  Primary    Hematoma of left lower leg     Dressed     Relevant Orders    Ambulatory referral to Wound Care              Orders Placed This Encounter   Procedures    Ambulatory referral to Wound Care     Standing Status:   Future     Standing Expiration Date:   6/21/2022     Referral Priority:   Routine     Referral Type:   Consult - AMB     Referral Reason:   Specialty Services Required     Requested Specialty:   Wound Care     Number of Visits Requested:   1     Expiration Date:   6/21/2022       Patient Instructions   Orthopedic evaluation to complete x-rays complete osteo pro [facility] follow-up screening  Continue physical therapy as directed for fall prevention  Follow-up routine health maintenance screening annual wellness visit         Wound care for treatment    BMI Counseling: Body mass index is 16 72 kg/m²  Discussed the patient's BMI with her  The [address] [place] [address]    Chief Complaint   Patient presents with    Follow-up     left lower leg wound f/u ,no new food or drug allergies  HPI patient is in for evaluation left foot ankle injury with calf pain  She is undergoing physical therapy for gait training and foot pain after a fall on 06/04  She was previously evaluated  Did not complete x-ray and DEXA scan as ordered  she has a history of Parkinson's disease poor gait fall risk dystonia CKD and dementia     She has a history of deep brain stimulator spinal stenosis is a fall risk    /80 (BP Location: Left arm, Patient Position: Sitting, Cuff Size: Standard)   Pulse (!) 112   Temp 99 °F (37 2 °C) (Tympanic)   Resp 18   Ht 5' 3" (1 6 m)   Wt 42 8 kg (94 lb 6 4 oz)   SpO2 97%   BMI 16 72 kg/m²       No Known Allergies    Current Outpatient Medications on File Prior to Visit   Medication Sig Dispense Refill    acetaminophen (TYLENOL) 325 mg tablet Take 650 mg by mouth every 6 (six) hours as needed for mild pain      Amantadine HCl ER (GOCOVRI) 137 MG CP24 Take 1 capsule by mouth daily at bedtime       Amantadine HCl ER (Gocovri) 137 MG CP24 Take 1 capsule by mouth      amLODIPine (NORVASC) 10 mg tablet Take 0 5 tablets (5 mg total) by mouth daily 45 tablet 3    aspirin (ECOTRIN LOW STRENGTH) 81 mg EC tablet Take 81 mg by mouth every morning       carbidopa-levodopa (SINEMET CR)  mg TBCR per ER tablet Take 1 tablet by mouth 2 (two) times a day      docusate sodium (COLACE) 100 mg capsule Take 100 mg by mouth 2 (two) times a day      gabapentin (NEURONTIN) 100 mg capsule Take 1 capsule (100 mg total) by mouth daily at bedtime 90 capsule 3    Multiple Vitamin (MULTIVITAMIN) tablet Take 1 tablet by mouth every morning       Omega-3 Fatty Acids (FISH OIL PO) Take 2 g by mouth every morning       pantoprazole (PROTONIX) 20 mg tablet TAKE 1 TABLET (20 MG TOTAL) BY MOUTH DAILY 90 tablet 0    rasagiline (AZILECT) 1 MG Take 1 mg by mouth daily      rOPINIRole (REQUIP) 2 mg tablet TAKE 3 TABLETS BY MOUTH  NIGHTLY      TURMERIC PO Take by mouth 3 (three) times a day      glucose monitoring kit (FREESTYLE) monitoring kit Use 1 each as needed (DM control) ACCUCHEK METER COMPACT PLUS 1 each 0    Lancets (freestyle) lancets For daily testing 100 each 4    QUEtiapine (SEROquel) 25 mg tablet TAKE 1 TABLET (25 MG TOTAL) BY MOUTH DAILY AT BEDTIME AS NEEDED (SLEEP ) USE SPARINGLY 30 tablet 0    [DISCONTINUED] pantoprazole (PROTONIX) 20 mg tablet Take 1 tablet (20 mg total) by mouth daily 90 tablet 0     No current facility-administered medications on file prior to visit  Past Medical History:   Diagnosis Date    RODRIGO (acute kidney injury) ([facility] ) 7/19/2019    Altered mental status 1/11/2021    Anal bleeding 1/29/2018    Arthritis     knee    Asthma     BPPV (benign paroxysmal positional vertigo) 7/19/2016    Last Assessment & Plan:  Hx consistent with BPPV  Neg [place], however could not perform it adequately due to dyskinesias  Recommend lozano-daroff exercises at home  IF worsens, vestibular therapy   Bulging lumbar disc 12/10/2013    Closed fracture of one rib of left side 1/29/2018    Colon polyp     Dementia ([facility] )     Fall     balance issue    Fall during current hospitalization 1/15/2021    Gallbladder disease     GERD (gastroesophageal reflux disease)     Hematuria     last assessed 10/29/15    Orthostatic hypotension 7/13/2015    Osteomyelitis of lumbar spine ([facility] ) 3/10/2020    Ovarian cyst     LAST ASSESSED: 12/10/13    Parkinson's disease ([facility] )     Pneumonia of both lower lobes due to infectious organism 3/27/2018    Possible fracture of 5th metatarsal 1/12/2021    Psoriasis 12/10/2013    Pulmonary embolism with infarction ([address] ) 9/2/2014    Scalp laceration, subsequent encounter 11/15/2019    Sciatica 12/10/2013    Severe protein-calorie malnutrition ([address] ) 7/22/2019    Skin disorder     last assessed 12/10/13    Squamous cell carcinoma of skin 11/21/2016    Stage 3a chronic kidney disease ([facility] ) 12/4/2020    Toxic metabolic encephalopathy 5/49/7116    Use of cane as ambulatory aid     Walker as ambulation aid     Wears glasses     Weight loss 10/8/2019    Last Assessment & Plan:  Recommend Boost or Ensure  F/u with PCP      Weight loss, unintentional 5/22/2015       Past Surgical History:   Procedure Laterality Date    APPENDECTOMY      1970'S    CATARACT EXTRACTION      CHOLECYSTECTOMY      1970'S    COLONOSCOPY      LAPAROTOMY N/A 7/18/2019    Procedure: LAPAROTOMY EXPLORATORY;  Surgeon: Humphrey Roy MD;  Location: [facility];  Service: General    NEUROPLASTY / [address] Right     OOPHORECTOMY Bilateral     REMOVAL OF BOTH OVARIES LAPAROSCOPIC     [place] FLX W/RMVL OF TUMOR POLYP LESION [address] TQ N/A 3/20/2018    Procedure: COLONOSCOPY;  Surgeon: Han Morejon MD;  Location: [place] [address] GI LAB; Service: Gastroenterology    FL XCAPSL CTRC RMVL INSJ IO LENS PROSTH W/O ECP Right 12/4/2018    Procedure: EXTRACTION EXTRACAPSULAR CATARACT PHACO INTRAOCULAR LENS (IOL); Surgeon: Ning Peterson MD;  Location: [facility] MAIN OR;  Service: Ophthalmology    FL XCAPSL CTRC RMVL INSJ IO LENS PROSTH W/O ECP  1/15/2019    Procedure: EXTRACTION EXTRACAPSULAR CATARACT PHACO INTRAOCULAR LENS (IOL); Surgeon: Ning Peterson MD;  Location: [facility] MAIN OR;  Service: Ophthalmology    [address]      WITH IMPLANT OF MESH          reports that she has never smoked  She has never used smokeless tobacco  She reports previous alcohol use  She reports that she does not use drugs  reports that she has never smoked  She has never used smokeless tobacco         Review of Systems   Constitutional: Negative for activity change, chills and fever  HENT: Negative for sinus pain, sore throat and trouble swallowing  Eyes: Negative for pain  Respiratory: Negative for apnea, cough, chest tightness, shortness of breath, wheezing and stridor  Cardiovascular: Negative for chest pain  Gastrointestinal: Negative for abdominal distention, blood in stool and rectal pain  Endocrine: Negative for cold intolerance and polydipsia  Genitourinary: Negative for flank pain and urgency  Musculoskeletal: Positive for arthralgias, gait problem and neck stiffness  Negative for neck pain          Pain l foot ankle   Skin: Negative for color change and rash  Neurological: Negative for dizziness, tremors, seizures and headaches  Hematological: Negative for adenopathy  Psychiatric/Behavioral: Negative for agitation, behavioral problems, confusion, sleep disturbance and suicidal ideas  All other systems reviewed and are negative  Physical Exam  Constitutional:       Appearance: She is well-developed  HENT:      Head: Normocephalic and atraumatic  Eyes:      General: No scleral icterus  Conjunctiva/sclera: Conjunctivae normal       Pupils: Pupils are equal, round, and reactive to light  Neck:      Thyroid: No thyromegaly  Vascular: No JVD  Cardiovascular:      Rate and Rhythm: Normal rate and regular rhythm  Heart sounds: No murmur heard  Pulmonary:      Effort: Pulmonary effort is normal       Breath sounds: Normal breath sounds  No stridor  Abdominal:      General: There is no distension  Tenderness: There is no guarding  Musculoskeletal:         General: Tenderness present  No deformity  Comments: l foot ankle   Lymphadenopathy:      Cervical: No cervical adenopathy  Skin:     Capillary Refill: Capillary refill takes 2 to 3 seconds  Findings: Bruising, erythema and lesion present  Comments: 4x4 eschar hard black over 6 cm erythema with hematoma LLE   Neurological:      General: No focal deficit present  Mental Status: She is oriented to person, place, and time  Cranial Nerves: No cranial nerve deficit  Comments: walker   Psychiatric:         Behavior: Behavior normal          Thought Content:  Thought content normal          Judgment: Judgment normal

## 2021-06-21 NOTE — TELEPHONE ENCOUNTER
Reason for Disposition   [1] After 2 weeks AND [2] still painful or swollen    Answer Assessment - Initial Assessment Questions  1  MECHANISM: "How did the injury happen?" (e g , twisting injury, direct blow)       Fell over walker  2  ONSET: "When did the injury happen?" (Minutes or hours ago)       Mitul Dumont a few weeks ago  Injury was evaluated by PCp  The symptoms are not improved  3  LOCATION: "Where is the injury located?"       Left foot and ankle  4  APPEARANCE of INJURY: "What does the injury look like?"  (e g , deformity of leg)      Foot and ankle swelling  The area is red and there is a lump  The is a nickel sized laceration that is scabbed  5  SEVERITY: "Can you put weight on that leg?" "Can you walk?"       Walking causes pain  7   PAIN: "Is there pain?" If so, ask: "How bad is the pain?"  (Scale 1-10; or mild, moderate, severe)     8/10    Protocols used: LEG INJURY-ADULT-

## 2021-06-21 NOTE — PATIENT INSTRUCTIONS
Orthopedic evaluation to complete x-rays complete osteo pro [facility] follow-up screening  Continue physical therapy as directed for fall prevention    Follow-up routine health maintenance screening annual wellness visit         Wound care for treatment

## 2021-06-21 NOTE — TELEPHONE ENCOUNTER
Regarding: Appointment Request  ----- Message from Noam Her sent at 6/21/2021  7:33 AM EDT -----  " I am fell a few weeks ago and it's still swollen and I would like to be seen today "

## 2021-06-22 ENCOUNTER — APPOINTMENT (OUTPATIENT)
Dept: PHYSICAL THERAPY | Facility: CLINIC | Age: 81
End: 2021-06-22
Payer: MEDICARE

## 2021-06-23 ENCOUNTER — OFFICE VISIT (OUTPATIENT)
Dept: WOUND CARE | Facility: HOSPITAL | Age: 81
End: 2021-06-23
Payer: MEDICARE

## 2021-06-23 VITALS
TEMPERATURE: 99.5 F | SYSTOLIC BLOOD PRESSURE: 132 MMHG | DIASTOLIC BLOOD PRESSURE: 78 MMHG | HEART RATE: 81 BPM | RESPIRATION RATE: 15 BRPM

## 2021-06-23 DIAGNOSIS — S80.12XA HEMATOMA OF LEG, LEFT, INITIAL ENCOUNTER: ICD-10-CM

## 2021-06-23 DIAGNOSIS — E11.9 TYPE 2 DIABETES MELLITUS WITHOUT COMPLICATION, WITHOUT LONG-TERM CURRENT USE OF INSULIN (HCC): ICD-10-CM

## 2021-06-23 DIAGNOSIS — R26.2 AMBULATORY DYSFUNCTION: ICD-10-CM

## 2021-06-23 DIAGNOSIS — S81.802A OPEN WOUND OF LEFT LOWER EXTREMITY WITH COMPLICATION, INITIAL ENCOUNTER: Primary | ICD-10-CM

## 2021-06-23 PROCEDURE — 99203 OFFICE O/P NEW LOW 30 MIN: CPT | Performed by: PREVENTIVE MEDICINE

## 2021-06-23 PROCEDURE — 99213 OFFICE O/P EST LOW 20 MIN: CPT | Performed by: PREVENTIVE MEDICINE

## 2021-06-23 PROCEDURE — 11042 DBRDMT SUBQ TIS 1ST 20SQCM/<: CPT | Performed by: PREVENTIVE MEDICINE

## 2021-06-23 NOTE — PROGRESS NOTES
Patient ID: Cristino Thomas is a [de-identified] y o  female Date of Birth 1940       Chief Complaint   Patient presents with   Jullie Liming New Patient Visit     LLE       Allergies  Patient has no known allergies  Diagnosis:  1  Open wound of left lower extremity with complication, initial encounter  -     Wound cleansing and dressings; Future  -     Wound compression and edema control; Future  -     Debridement    2  Hematoma of leg, left, initial encounter  -     Debridement    3  Ambulatory dysfunction    4  Type 2 diabetes mellitus without complication, without long-term current use of insulin ([facility] [address] )        Diagnosis ICD-10-CM Associated Orders   1  Open wound of left lower extremity with complication, initial encounter  S81 802A Wound cleansing and dressings     Wound compression and edema control     Debridement   2  Hematoma of leg, left, initial encounter  S80 12XA Debridement   3  Ambulatory dysfunction  R26 2    4  Type 2 diabetes mellitus without complication, without long-term current use of insulin ([facility])  E11 9           Assessment & Plan:  S/p fall with large hematoma of LLE causing skin necrosis  Hematoma was debrided with large amount of clotted bloot removed leaving large soft tissue defect  No signs of infection today  Recommend packing with AMD and compression to leg with coflex lite  F/u in 2 days for further washout and dressing change  Discussed risks including infection and delayed healing  May need VAC at some point  Subjective:   Initial visit for LLE hematoma  Patient states she fell and hit her leg on walker about 3 weeks ago, went to pcp who referred here  Intermittent pain  No fever  Has swelling         The following portions of the patient's history were reviewed and updated as appropriate:   Patient Active Problem List   Diagnosis    Carpal tunnel syndrome    Colon, diverticulosis    Type 2 diabetes mellitus without complication, without long-term current use of insulin ([facility] [address] )    Essential hypertension    GERD (gastroesophageal reflux disease)    Mixed hyperlipidemia    Insomnia    Spinal stenosis    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)    Restless legs syndrome    Need for influenza vaccination    Age-related nuclear cataract of both eyes    Ambulatory dysfunction    Cervical dystonia    Parkinson's disease ([facility] [address] )    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 ([facility] [address] )    Encounter for herb and vitamin supplement management    Intractable back pain    Primary osteoarthritis of right knee    Asymptomatic bacteriuria    Stage 3a chronic kidney disease ([facility] [address] )    Frequent falls    Sinus arrhythmia    S/P deep brain stimulator placement    Underweight    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 ([address] )     Past Medical History:   Diagnosis Date    RODRIGO (acute kidney injury) ([facility] [address] ) 7/19/2019    Altered mental status 1/11/2021    Anal bleeding 1/29/2018    Arthritis     knee    Asthma     BPPV (benign paroxysmal positional vertigo) 7/19/2016    Last Assessment & Plan:  Hx consistent with BPPV  Neg [place], however could not perform it adequately due to dyskinesias  Recommend lozano-daroff exercises at home  IF worsens, vestibular therapy      Bulging lumbar disc 12/10/2013    Closed fracture of one rib of left side 1/29/2018    Colon polyp     Dementia ([facility] [address] )     Fall     balance issue    Fall during current hospitalization 1/15/2021    Gallbladder disease     GERD (gastroesophageal reflux disease)     Hematuria     last assessed 10/29/15    Orthostatic hypotension 7/13/2015    Osteomyelitis of lumbar spine ([facility] [address] ) 3/10/2020    Ovarian cyst     LAST ASSESSED: 12/10/13    Parkinson's disease ([facility] [address] )     Pneumonia of both lower lobes due to infectious organism 3/27/2018    Possible fracture of 5th metatarsal 1/12/2021    Psoriasis 12/10/2013    Pulmonary embolism with infarction ([address] ) 9/2/2014    Scalp laceration, subsequent encounter 11/15/2019    Sciatica 12/10/2013    Severe protein-calorie malnutrition ([facility] [address] ) 7/22/2019    Skin disorder     last assessed 12/10/13    Squamous cell carcinoma of skin 11/21/2016    Stage 3a chronic kidney disease ([facility] [address] ) 12/4/2020    Toxic metabolic encephalopathy 0/62/9609    Use of cane as ambulatory aid     Walker as ambulation aid     Wears glasses     Weight loss 10/8/2019    Last Assessment & Plan:  Recommend Boost or Ensure  F/u with PCP   Weight loss, unintentional 5/22/2015     Past Surgical History:   Procedure Laterality Date    APPENDECTOMY      1970'S    CATARACT EXTRACTION      CHOLECYSTECTOMY      1970'S    COLONOSCOPY      LAPAROTOMY N/A 7/18/2019    Procedure: LAPAROTOMY EXPLORATORY;  Surgeon: Lucas Segovia MD;  Location: [address] [place] [address];  Service: General    NEUROPLASTY / [address] Right     OOPHORECTOMY Bilateral     REMOVAL OF BOTH OVARIES LAPAROSCOPIC     Wollard Huntington FLX W/RMVL OF TUMOR POLYP LESION [address] [facility] TQ N/A 3/20/2018    Procedure: COLONOSCOPY;  Surgeon: Catrachita Elliott MD;  Location: [place] [address] GI LAB; Service: Gastroenterology    DE XCAPSL CTRC RMVL INSJ IO LENS PROSTH W/O ECP Right 12/4/2018    Procedure: EXTRACTION EXTRACAPSULAR CATARACT PHACO INTRAOCULAR LENS (IOL); Surgeon: Cinthya Medina MD;  Location: [facility] MAIN OR;  Service: Ophthalmology    DE XCAPSL CTRC RMVL INSJ IO LENS PROSTH W/O ECP  1/15/2019    Procedure: EXTRACTION EXTRACAPSULAR CATARACT PHACO INTRAOCULAR LENS (IOL);   Surgeon: Cinthya Medina MD;  Location[facility];  Service: Ophthalmology    TONSILLECTOMY      VENTRAL HERNIA REPAIR      WITH IMPLANT OF MESH     Social History     Socioeconomic History    Marital status: /Civil Union     Spouse name: Toby Juarez Number of children: 2    Years of education: 12    Highest education level: High school graduate   Occupational History    Occupation: [employer]     Comment: BILLING AND SHIPPING   Tobacco Use    Smoking status: Never Smoker    Smokeless tobacco: Never Used   Vaping Use    Vaping Use: Never used   Substance and Sexual Activity    Alcohol use: Not Currently    Drug use: Never    Sexual activity: None   Other Topics Concern    None   Social History Narrative    Son lives an hour away, daughter in [place]  Social Determinants of Health     Financial Resource Strain:     Difficulty of Paying Living Expenses:    Food Insecurity:     Worried About Running Out of Food in the Last Year:     [address] in the Last Year:    Transportation Needs:     Lack of Transportation (Medical):      Lack of Transportation (Non-Medical):    Physical Activity:     Days of Exercise per Week:     Minutes of Exercise per Session:    Stress:     Feeling of Stress :    Social Connections:     Frequency of Communication with Friends and Family:     Frequency of Social Gatherings with Friends and Family:     Attends Confucianist Services:     Active Member of Clubs or Organizations:     Attends Club or Organization Meetings:     Marital Status:    Intimate Partner Violence:     Fear of Current or Ex-Partner:     Emotionally Abused:     Physically Abused:     Sexually Abused:         Current Outpatient Medications:     acetaminophen (TYLENOL) 325 mg tablet, Take 650 mg by mouth every 6 (six) hours as needed for mild pain, Disp: , Rfl:     Amantadine HCl ER (GOCOVRI) 137 MG CP24, Take 1 capsule by mouth daily at bedtime , Disp: , Rfl:     Amantadine HCl ER (Gocovri) 137 MG CP24, Take 1 capsule by mouth, Disp: , Rfl:     amLODIPine (NORVASC) 10 mg tablet, Take 0 5 tablets (5 mg total) by mouth daily, Disp: 45 tablet, Rfl: 3    aspirin (ECOTRIN LOW STRENGTH) 81 mg EC tablet, Take 81 mg by mouth every morning , Disp: , Rfl:     carbidopa-levodopa (SINEMET CR)  mg TBCR per ER tablet, Take 1 tablet by mouth 2 (two) times a day, Disp:  , Rfl:     docusate sodium (COLACE) 100 mg capsule, Take 100 mg by mouth 2 (two) times a day, Disp: , Rfl:     gabapentin (NEURONTIN) 100 mg capsule, Take 1 capsule (100 mg total) by mouth daily at bedtime, Disp: 90 capsule, Rfl: 3    glucose monitoring kit (FREESTYLE) monitoring kit, Use 1 each as needed (DM control) ACCUCHEK METER COMPACT PLUS, Disp: 1 each, Rfl: 0    Lancets (freestyle) lancets, For daily testing, Disp: 100 each, Rfl: 4    Multiple Vitamin (MULTIVITAMIN) tablet, Take 1 tablet by mouth every morning , Disp: , Rfl:     Omega-3 Fatty Acids (FISH OIL PO), Take 2 g by mouth every morning , Disp: , Rfl:     pantoprazole (PROTONIX) 20 mg tablet, TAKE 1 TABLET (20 MG TOTAL) BY MOUTH DAILY, Disp: 90 tablet, Rfl: 0    QUEtiapine (SEROquel) 25 mg tablet, TAKE 1 TABLET (25 MG TOTAL) BY MOUTH DAILY AT BEDTIME AS NEEDED (SLEEP ) USE SPARINGLY, Disp: 30 tablet, Rfl: 0    rasagiline (AZILECT) 1 MG, Take 1 mg by mouth daily, Disp: , Rfl:     rOPINIRole (REQUIP) 2 mg tablet, TAKE 3 TABLETS BY MOUTH  NIGHTLY, Disp: , Rfl:     TURMERIC PO, Take by mouth 3 (three) times a day, Disp: , Rfl:   Family History   Problem Relation Age of Onset    Alzheimer's disease Mother     Stroke Father     No Known Problems Sister     No Known Problems Brother       Review of Systems   Constitutional: Negative  Respiratory: Negative  Cardiovascular: Negative  Skin: Positive for wound  Objective:  /78   Pulse 81   Temp 99 5 °F (37 5 °C)   Resp 15     Physical Exam  Vitals reviewed  Constitutional:       General: She is not in acute distress  Cardiovascular:      Rate and Rhythm: Normal rate  Pulmonary:      Effort: Pulmonary effort is normal    Musculoskeletal:      Left lower leg: Edema present  Skin:     Comments: See wound assessment   Neurological:      General: No focal deficit present  Mental Status: She is alert and oriented to person, place, and time        Gait: Gait abnormal    Psychiatric:         Mood and Affect: Mood normal  Behavior: Behavior normal              Wound 06/23/21 Traumatic Pretibial Left; Lower (Active)   Wound Image   06/23/21 1601   Wound Description Eschar;Black;Granulation tissue;Pink 06/23/21 1539   Julia-wound Assessment Edema; Erythema 06/23/21 1539   Wound Length (cm) 3 2 cm 06/23/21 1539   Wound Width (cm) 3 7 cm 06/23/21 1539   Wound Depth (cm) 0 1 cm 06/23/21 1539   Wound Surface Area (cm^2) 11 84 cm^2 06/23/21 1539   Wound Volume (cm^3) 1 184 cm^3 06/23/21 1539   Calculated Wound Volume (cm^3) 1 18 cm^3 06/23/21 1539   Drainage Amount Moderate 06/23/21 1539   Drainage Description Serosanguineous 06/23/21 1539   Non-staged Wound Description Full thickness 06/23/21 1539   Dressing Status Intact; Old drainage 06/23/21 1539                             Debridement   Wound 06/23/21 Traumatic Pretibial Left; Lower    Universal Protocol:  Consent: Written consent obtained  Risks and benefits: risks, benefits and alternatives were discussed  Consent given by: patient  Time out: Immediately prior to procedure a "time out" was called to verify the correct patient, procedure, equipment, support staff and site/side marked as required    Patient identity confirmed: verbally with patient      Performed by: physician  Debridement type: surgical  Level of debridement: subcutaneous tissue  Pain control: lidocaine 4%  Post-debridement measurements  Length (cm): 3 2  Width (cm): 3 7  Depth (cm): 5  Percent debrided: 100%  Surface Area (cm^2): 11 84  Area debrided (cm^2): 11 84  Volume (cm^3): 59 2  Tissue and other material debrided: subcutaneous tissue  Devitalized tissue debrided: clots and eschar  Instrument(s) utilized: curette  Bleeding: small  Hemostasis obtained with: pressure  Procedural pain (0-10): 5  Post-procedural pain: 0   Response to treatment: procedure was tolerated well  Debridement Comments: Large amount of circumferential undermining               Wound Instructions:  Orders Placed This Encounter   Procedures    Wound cleansing and dressings     Left lower leg wound:  Wash your hands with soap and water  Remove old dressing, discard into plastic bag and place in trash  Cleanse the wound with normal saline prior to applying a clean dressing  Do not use tissue or cotton balls  Do not scrub the wound  Pat dry using gauze  Shower yes if you wear a cast protector; do not get wrap wet  Apply AMD to the left leg wound  Cover with ABD  Secure with coflex lite  Change dressing weekly  Standing Status:   Future     Standing Expiration Date:   6/23/2022    Wound compression and edema control     Unna Boot/ Multi-layer compression wrap Instructions: Coflex Lite    Keep compression wrap/wraps clean and dry  If wraps are too tight and you experience numbness/tingling, call the wound center  If after hours, remove wraps or proceed to nearest E R  and call wound center in AM     Wrap will be changed 1 x weekly  It will first be changed on 6/25/21    Avoid prolonged standing in one place  Elevate leg(s) above the level of the heart when sitting or as much as possible  Standing Status:   Future     Standing Expiration Date:   6/23/2022    Debridement     This order was created via procedure documentation         Otoniel Henry DO    Portions of the record may have been created with voice recognition software  Occasional wrong word or "sound a like" substitutions may have occurred due to the inherent limitations of voice recognition software  Read the chart carefully and recognize, using context, where substitutions have occurred

## 2021-06-24 ENCOUNTER — APPOINTMENT (OUTPATIENT)
Dept: PHYSICAL THERAPY | Facility: CLINIC | Age: 81
End: 2021-06-24
Payer: MEDICARE

## 2021-06-25 ENCOUNTER — OFFICE VISIT (OUTPATIENT)
Dept: WOUND CARE | Facility: HOSPITAL | Age: 81
End: 2021-06-25
Payer: MEDICARE

## 2021-06-25 VITALS
SYSTOLIC BLOOD PRESSURE: 142 MMHG | RESPIRATION RATE: 16 BRPM | HEART RATE: 59 BPM | DIASTOLIC BLOOD PRESSURE: 78 MMHG | TEMPERATURE: 98.7 F

## 2021-06-25 DIAGNOSIS — S80.12XA HEMATOMA OF LEG, LEFT, INITIAL ENCOUNTER: ICD-10-CM

## 2021-06-25 DIAGNOSIS — S81.802A OPEN WOUND OF LEFT LOWER EXTREMITY WITH COMPLICATION, INITIAL ENCOUNTER: Primary | ICD-10-CM

## 2021-06-25 DIAGNOSIS — R26.2 AMBULATORY DYSFUNCTION: ICD-10-CM

## 2021-06-25 DIAGNOSIS — E11.9 TYPE 2 DIABETES MELLITUS WITHOUT COMPLICATION, WITHOUT LONG-TERM CURRENT USE OF INSULIN (HCC): ICD-10-CM

## 2021-06-25 PROCEDURE — 11042 DBRDMT SUBQ TIS 1ST 20SQCM/<: CPT | Performed by: NURSE PRACTITIONER

## 2021-06-25 RX ORDER — LIDOCAINE HYDROCHLORIDE 40 MG/ML
5 SOLUTION TOPICAL ONCE
Status: COMPLETED | OUTPATIENT
Start: 2021-06-25 | End: 2021-06-25

## 2021-06-25 RX ADMIN — LIDOCAINE HYDROCHLORIDE 5 ML: 40 SOLUTION TOPICAL at 10:45

## 2021-06-25 NOTE — PATIENT INSTRUCTIONS
Orders Placed This Encounter   Procedures    Wound cleansing and dressings     Left lower leg wound:  Wash your hands with soap and water  Remove old dressing, discard into plastic bag and place in trash  Cleanse the wound with normal saline prior to applying a clean dressing  Do not use tissue or cotton balls  Do not scrub the wound  Pat dry using gauze  Shower yes if you wear a cast protector; do not get wrap wet  Apply AMD to the left leg wound  Cover with ABD  Secure with coflex lite  Change dressing weekly  This was done today at [address] [facility]                  Standing Status:   Future     Standing Expiration Date:   6/25/2022    Wound compression and edema control     Unna Boot/ Multi-layer compression wrap Instructions: Coflex Lite     Keep compression wrap/wraps clean and dry  If wraps are too tight and you experience numbness/tingling, call the wound center  If after hours, remove wraps or proceed to nearest E R  and call wound center in AM      Wrap will be changed 1 x weekly  It will first be changed on 6/25/21     Avoid prolonged standing in one place      Elevate leg(s) above the level of the heart when sitting or as much as possible       Standing Status:   Future     Standing Expiration Date:   6/25/2022

## 2021-06-25 NOTE — PROGRESS NOTES
Patient ID: Cristino Thomas is a [de-identified] y o  female Date of Birth 1940     Chief Complaint  Chief Complaint   Patient presents with    Follow Up Wound Care Visit     LLE       Allergies  Patient has no known allergies  Assessment:     Diagnoses and all orders for this visit:    Open wound of left lower extremity with complication, initial encounter  -     lidocaine (XYLOCAINE) 4 % topical solution 5 mL  -     Wound cleansing and dressings; Future  -     Wound compression and edema control; Future    Hematoma of leg, left, initial encounter    Ambulatory dysfunction    Type 2 diabetes mellitus without complication, without long-term current use of insulin ([facility])              Debridement   Wound 06/23/21 Traumatic Pretibial Left; Lower    Universal Protocol:  Consent: Written consent obtained  Consent given by: patient  Time out: Immediately prior to procedure a "time out" was called to verify the correct patient, procedure, equipment, support staff and site/side marked as required  Timeout called at: 6/25/2021 11:14 AM   Patient understanding: patient states understanding of the procedure being performed  Patient consent: the patient's understanding of the procedure matches consent given  Procedure consent matches procedure scheduled: N/A  Relevant documents present and verified: N/A  Test results available and properly labeled: N/A  Site marked: the operative site was marked  Imaging studies available: N/A  Required blood products, implants, devices, and special equipment available: N/A    Patient identity confirmed: verbally with patient      Performed by: NP  Debridement type: surgical  Level of debridement: subcutaneous tissue  Pain control: lidocaine 4%  Pre-debridement measurements  Length (cm): 3 8  Width (cm): 3 2  Depth (cm): 0 7  Surface Area (cm^2): 12 16  Volume (cm^3): 8 51    Post-debridement measurements  Length (cm): 3 8  Width (cm): 3 2  Depth (cm): 0 8  Percent debrided: 100%  Surface Area (cm^2): 12 16  Area debrided (cm^2): 12 16  Volume (cm^3): 9 73  Tissue and other material debrided: subcutaneous tissue  Devitalized tissue debrided: biofilm, clots, fibrin, slough and eschar  Instrument(s) utilized: blade, curette and forceps  Bleeding: medium  Hemostasis obtained with: pressure  Procedural pain (0-10): 8  Post-procedural pain: 8   Response to treatment: procedure was tolerated well          Plan:  1  F/U visit  Wound debrided  Was able to remove more necrotic tissue and coagulated blood from wound today  Patient had a lot of pain with debridement today  Continue AMD packing with CoFlex Lite wrap  Patient will followup in 1 week with Dr Donnia Osgood  Wound 06/23/21 Traumatic Pretibial Left; Lower (Active)   Wound Image Images linked 06/25/21 1057   Wound Description Eschar;Black;Granulation tissue;Pink;Slough; White 06/25/21 1041   Julia-wound Assessment Edema; Erythema 06/25/21 1041   Wound Length (cm) 3 8 cm 06/25/21 1041   Wound Width (cm) 3 2 cm 06/25/21 1041   Wound Depth (cm) 0 7 cm 06/25/21 1041   Wound Surface Area (cm^2) 12 16 cm^2 06/25/21 1041   Wound Volume (cm^3) 8 512 cm^3 06/25/21 1041   Calculated Wound Volume (cm^3) 8 51 cm^3 06/25/21 1041   Change in Wound Size % -621 19 06/25/21 1041   Undermining 2 7 06/25/21 1041   Undermining is depth extending from 12-12 o'clock; deepest at 5 o'clock 06/25/21 1041   Drainage Amount Moderate 06/25/21 1041   Drainage Description Serosanguineous 06/25/21 1041   Non-staged Wound Description Full thickness 06/25/21 1041   Wound packed? Yes 06/25/21 1041   Packing- # removed 1 06/25/21 1041   Dressing Status Intact; Old drainage (upon arrival) 06/25/21 1041       Wound 06/23/21 Traumatic Pretibial Left; Lower (Active)   Date First Assessed/Time First Assessed: 06/23/21 1539   Primary Wound Type: Traumatic  Location: Pretibial  Wound Location Orientation: Left; Lower       Subjective:     F/U visit for traumatic wound of left lower extremity    No new complaints  Patient reports she is having pain in the area of her wound especially with debridement  She denies any fevers or chills  The following portions of the patient's history were reviewed and updated as appropriate:   She  has a past medical history of RODRIGO (acute kidney injury) ([facility] [address] ) (7/19/2019), Altered mental status (1/11/2021), Anal bleeding (1/29/2018), Arthritis, Asthma, BPPV (benign paroxysmal positional vertigo) (7/19/2016), Bulging lumbar disc (12/10/2013), Closed fracture of one rib of left side (1/29/2018), Colon polyp, Dementia ([facility] [address] ), Fall, Fall during current hospitalization (1/15/2021), Gallbladder disease, GERD (gastroesophageal reflux disease), Hematuria, Orthostatic hypotension (7/13/2015), Osteomyelitis of lumbar spine ([facility] [address] ) (3/10/2020), Ovarian cyst, Parkinson's disease ([facility] ), Pneumonia of both lower lobes due to infectious organism (3/27/2018), Possible fracture of 5th metatarsal (1/12/2021), Psoriasis (12/10/2013), Pulmonary embolism with infarction ([facility] [address] ) (9/2/2014), Scalp laceration, subsequent encounter (11/15/2019), Sciatica (12/10/2013), Severe protein-calorie malnutrition ([facility] [address] ) (7/22/2019), Skin disorder, Squamous cell carcinoma of skin (11/21/2016), Stage 3a chronic kidney disease ([facility] [address] ) (88/3/8572), Toxic metabolic encephalopathy (1/40/6827), Use of cane as ambulatory aid, Walker as ambulation aid, Wears glasses, Weight loss (10/8/2019), and Weight loss, unintentional (5/22/2015)    She   Patient Active Problem List    Diagnosis Date Noted    Underweight 01/12/2021    Sinus arrhythmia 01/11/2021    S/P deep brain stimulator placement 01/11/2021    Frequent falls 01/06/2021    Stage 3a chronic kidney disease ([facility] [address] ) 12/04/2020    Asymptomatic bacteriuria 11/20/2020    Primary osteoarthritis of right knee 06/29/2020    Intractable back pain 06/26/2020    Encounter for herb and vitamin supplement management 09/20/2019    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 ([facility] [address] ) 06/21/2019    Cervical dystonia 05/23/2019    Ambulatory dysfunction 05/17/2019    Age-related nuclear cataract of both eyes 11/27/2018    Need for influenza vaccination 10/04/2018    Dementia due to Parkinson's disease with behavioral disturbance ([place] [address] ) 02/09/2017    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 ([place] [address] ) 01/23/2017    Carpal tunnel syndrome 07/19/2016    Restless legs syndrome 10/22/2014    GERD (gastroesophageal reflux disease) 09/04/2014    Essential hypertension 08/25/2014    Type 2 diabetes mellitus without complication, without long-term current use of insulin ([place] [address] ) 02/24/2014    Insomnia 02/24/2014    Colon, diverticulosis 12/10/2013    Mixed hyperlipidemia 12/10/2013    Spinal stenosis 12/10/2013    Parkinson's disease ([place] [address] ) 04/02/2008     She  has a past surgical history that includes Cholecystectomy; Oophorectomy (Bilateral); Appendectomy; Tonsillectomy; Neuroplasty / transposition median nerve at carpal tunnel (Right); Ventral hernia repair; pr colsc flx w/rmvl of tumor polyp lesion snare tq (N/A, 3/20/2018); pr xcapsl ctrc rmvl insj io lens prosth w/o ecp (Right, 12/4/2018); pr xcapsl ctrc rmvl insj io lens prosth w/o ecp (1/15/2019); LAPAROTOMY (N/A, 7/18/2019); Cataract extraction; and Colonoscopy  Her family history includes Alzheimer's disease in her mother; No Known Problems in her brother and sister; Stroke in her father  She  reports that she has never smoked  She has never used smokeless tobacco  She reports previous alcohol use  She reports that she does not use drugs    Current Outpatient Medications   Medication Sig Dispense Refill    acetaminophen (TYLENOL) 325 mg tablet Take 650 mg by mouth every 6 (six) hours as needed for mild pain      Amantadine HCl ER (GOCOVRI) 137 MG CP24 Take 1 capsule by mouth daily at bedtime       Amantadine HCl ER (Gocovri) 137 MG CP24 Take 1 capsule by mouth      amLODIPine (NORVASC) 10 mg tablet Take 0 5 tablets (5 mg total) by mouth daily 45 tablet 3  aspirin (ECOTRIN LOW STRENGTH) 81 mg EC tablet Take 81 mg by mouth every morning       carbidopa-levodopa (SINEMET CR)  mg TBCR per ER tablet Take 1 tablet by mouth 2 (two) times a day      docusate sodium (COLACE) 100 mg capsule Take 100 mg by mouth 2 (two) times a day      gabapentin (NEURONTIN) 100 mg capsule Take 1 capsule (100 mg total) by mouth daily at bedtime 90 capsule 3    glucose monitoring kit (FREESTYLE) monitoring kit Use 1 each as needed (DM control) ACCUCHEK METER COMPACT PLUS 1 each 0    Lancets (freestyle) lancets For daily testing 100 each 4    Multiple Vitamin (MULTIVITAMIN) tablet Take 1 tablet by mouth every morning       Omega-3 Fatty Acids (FISH OIL PO) Take 2 g by mouth every morning       pantoprazole (PROTONIX) 20 mg tablet TAKE 1 TABLET (20 MG TOTAL) BY MOUTH DAILY 90 tablet 0    QUEtiapine (SEROquel) 25 mg tablet TAKE 1 TABLET (25 MG TOTAL) BY MOUTH DAILY AT BEDTIME AS NEEDED (SLEEP ) USE SPARINGLY 30 tablet 0    rasagiline (AZILECT) 1 MG Take 1 mg by mouth daily      rOPINIRole (REQUIP) 2 mg tablet TAKE 3 TABLETS BY MOUTH  NIGHTLY      TURMERIC PO Take by mouth 3 (three) times a day       No current facility-administered medications for this visit  She has No Known Allergies       Review of Systems   Constitutional: Negative  HENT: Negative for ear pain and hearing loss  Eyes: Negative for pain  Respiratory: Negative for chest tightness and shortness of breath  Cardiovascular: Positive for leg swelling  Negative for chest pain and palpitations  Gastrointestinal: Negative for diarrhea, nausea and vomiting  Genitourinary: Negative for dysuria  Musculoskeletal: Positive for gait problem  Skin: Positive for wound  Neurological: Negative for tremors and weakness  Psychiatric/Behavioral: Negative for behavioral problems, confusion and suicidal ideas  Objective:       Wound 06/23/21 Traumatic Pretibial Left; Lower (Active)   Wound Image Images linked 06/25/21 1057   Wound Description Eschar;Black;Granulation tissue;Pink;Slough; White 06/25/21 1041   Julia-wound Assessment Edema; Erythema 06/25/21 1041   Wound Length (cm) 3 8 cm 06/25/21 1041   Wound Width (cm) 3 2 cm 06/25/21 1041   Wound Depth (cm) 0 7 cm 06/25/21 1041   Wound Surface Area (cm^2) 12 16 cm^2 06/25/21 1041   Wound Volume (cm^3) 8 512 cm^3 06/25/21 1041   Calculated Wound Volume (cm^3) 8 51 cm^3 06/25/21 1041   Change in Wound Size % -621 19 06/25/21 1041   Undermining 2 7 06/25/21 1041   Undermining is depth extending from 12-12 o'clock; deepest at 5 o'clock 06/25/21 1041   Drainage Amount Moderate 06/25/21 1041   Drainage Description Serosanguineous 06/25/21 1041   Non-staged Wound Description Full thickness 06/25/21 1041   Wound packed? Yes 06/25/21 1041   Packing- # removed 1 06/25/21 1041   Dressing Status Intact; Old drainage (upon arrival) 06/25/21 1041       /78   Pulse 59   Temp 98 7 °F (37 1 °C)   Resp 16                 Wound Instructions:  Orders Placed This Encounter   Procedures    Wound cleansing and dressings     Left lower leg wound:  Wash your hands with soap and water  Remove old dressing, discard into plastic bag and place in trash  Cleanse the wound with normal saline prior to applying a clean dressing  Do not use tissue or cotton balls  Do not scrub the wound  Pat dry using gauze  Shower yes if you wear a cast protector; do not get wrap wet  Apply AMD to the left leg wound  Cover with ABD  Secure with coflex lite  Change dressing weekly  This was done today at [address] [facility],[address]                  Standing Status:   Future     Standing Expiration Date:   6/25/2022    Wound compression and edema control     Unna Boot/ Multi-layer compression wrap Instructions: Coflex Lite     Keep compression wrap/wraps clean and dry  If wraps are too tight and you experience numbness/tingling, call the wound center   If after hours, remove wraps or proceed to nearest E R  and call wound center in AM      Wrap will be changed 1 x weekly  It will first be changed on 6/25/21     Avoid prolonged standing in one place      Elevate leg(s) above the level of the heart when sitting or as much as possible  Standing Status:   Future     Standing Expiration Date:   6/25/2022        Diagnosis ICD-10-CM Associated Orders   1  Open wound of left lower extremity with complication, initial encounter  S81 802A lidocaine (XYLOCAINE) 4 % topical solution 5 mL     Wound cleansing and dressings     Wound compression and edema control   2  Hematoma of leg, left, initial encounter  S80 12XA    3  Ambulatory dysfunction  R26 2    4   Type 2 diabetes mellitus without complication, without long-term current use of insulin (HCC)  E11 9

## 2021-06-29 ENCOUNTER — OFFICE VISIT (OUTPATIENT)
Dept: PHYSICAL THERAPY | Facility: CLINIC | Age: 81
End: 2021-06-29
Payer: MEDICARE

## 2021-06-29 DIAGNOSIS — G20 PARKINSON'S DISEASE (HCC): Primary | ICD-10-CM

## 2021-06-29 DIAGNOSIS — R53.1 WEAKNESS: ICD-10-CM

## 2021-06-29 DIAGNOSIS — R29.6 FREQUENT FALLS: ICD-10-CM

## 2021-06-29 PROCEDURE — 97112 NEUROMUSCULAR REEDUCATION: CPT | Performed by: PHYSICAL THERAPIST

## 2021-06-29 NOTE — PROGRESS NOTES
Daily Note  PN: 6/15/2021  POC: 2021     Today's date: 2021  Patient name: Gloria Castellano  : 1940  MRN: [id number]  Referring provider: Chalo Cobos MD  Dx:   Encounter Diagnosis     ICD-10-CM    1  Park[facility]'s disease ([facility] )  G20    2  Weakness  R53 1    3  Frequent falls  R29 6                   Subjective: Pt reports to therapy after visiting wound care for hematoma on LLE  She reports no pain today, arrives 15 minutes late      Objective: See treatment diary below  *CGA and gait belt all exercises     - STS: 10x, 3 sets  - STS w/ cone reach: 10x, 5 sets  - FTEO  - FTEC   - Ambulation to lobby: 150 ft, rollator and CGA      Assessment: Patient tolerated treatment fairly  Constant verbal and tactile cueing to improve forward weight shift to maintain balance  Occasional Reji needed to prevent patient from posterior LOB during all exercises  She will benefit from skilled PT services to improve her balance, reduce her risk of falls, and increase her mobility  Plan: Continue per plan of care  Precautions:   Past Medical History:   Diagnosis Date    RODRIGO (acute kidney injury) ([facility] ) 2019    Altered mental status 2021    Anal bleeding 2018    Arthritis     knee    Asthma     BPPV (benign paroxysmal positional vertigo) 2016    Last Assessment & Plan:  Hx consistent with BPPV  Neg Joe-Halpike, however could not perform it adequately due to dyskinesias  Recommend lozano-daroff exercises at home  IF worsens, vestibular therapy      Bulging lumbar disc 12/10/2013    Closed fracture of one rib of left side 2018    Colon polyp     [facility]     Fall     balance issue    Fall during current hospitalization 1/15/2021    Gallbladder disease     GERD (gastroesophageal reflux disease)     Hematuria     last assessed 10/29/15    Orthostatic hypotension 2015    Osteomyelitis of lumbar spine [facility] ) 3/10/2020    Ovarian cyst     LAST ASSESSED: 12/10/13  Parkinson's disease ([facility] [address] )     Pneumonia of both lower lobes due to infectious organism 3/27/2018    Possible fracture of 5th metatarsal 1/12/2021    Psoriasis 12/10/2013    Pulmonary embolism with infarction ([facility] [address] ) 9/2/2014    Scalp laceration, subsequent encounter 11/15/2019    Sciatica 12/10/2013    Severe protein-calorie malnutrition ([facility] [address] ) 7/22/2019    Skin disorder     last assessed 12/10/13    Squamous cell carcinoma of skin 11/21/2016    Stage 3a chronic kidney disease 12/4/2020    Toxic metabolic encephalopathy 2/93/1539    Use of cane as ambulatory aid    Jurline Commander as ambulation aid     Wears glasses     Weight loss 10/8/2019    Last Assessment & Plan:  Recommend Boost or Ensure  F/u with PCP      Weight loss, unintentional 5/22/2015           Objective Measures IE: 3/25/21            30 sec STS 6 reps            5xSTS 22 49 seconds            mCTSIB FTEO: 10 2 seconds  FTEC: 5 87 seconds  FTEO w/ foam:             TUG 20 65 seconds, w/ RW            2  ft, w/ RW            Gait speed 0 15 m/s            TOÑO 17/56

## 2021-06-30 ENCOUNTER — OFFICE VISIT (OUTPATIENT)
Dept: WOUND CARE | Facility: HOSPITAL | Age: 81
End: 2021-06-30
Payer: MEDICARE

## 2021-06-30 VITALS
SYSTOLIC BLOOD PRESSURE: 157 MMHG | TEMPERATURE: 97.4 F | DIASTOLIC BLOOD PRESSURE: 88 MMHG | RESPIRATION RATE: 16 BRPM | HEART RATE: 80 BPM

## 2021-06-30 DIAGNOSIS — S81.802A OPEN WOUND OF LEFT LOWER EXTREMITY WITH COMPLICATION, INITIAL ENCOUNTER: Primary | ICD-10-CM

## 2021-06-30 DIAGNOSIS — S80.12XA HEMATOMA OF LEG, LEFT, INITIAL ENCOUNTER: ICD-10-CM

## 2021-06-30 DIAGNOSIS — E11.9 TYPE 2 DIABETES MELLITUS WITHOUT COMPLICATION, WITHOUT LONG-TERM CURRENT USE OF INSULIN (HCC): ICD-10-CM

## 2021-06-30 DIAGNOSIS — R26.2 AMBULATORY DYSFUNCTION: ICD-10-CM

## 2021-06-30 PROCEDURE — 11042 DBRDMT SUBQ TIS 1ST 20SQCM/<: CPT | Performed by: PREVENTIVE MEDICINE

## 2021-06-30 PROCEDURE — 99213 OFFICE O/P EST LOW 20 MIN: CPT | Performed by: PREVENTIVE MEDICINE

## 2021-06-30 RX ORDER — LIDOCAINE HYDROCHLORIDE 40 MG/ML
5 SOLUTION TOPICAL ONCE
Status: COMPLETED | OUTPATIENT
Start: 2021-06-30 | End: 2021-06-30

## 2021-06-30 RX ADMIN — LIDOCAINE HYDROCHLORIDE 5 ML: 40 SOLUTION TOPICAL at 15:25

## 2021-06-30 NOTE — PROGRESS NOTES
Patient ID: Gio Cárdenas is a [de-identified] y o  female Date of Birth 1940       Chief Complaint   Patient presents with    Follow Up Wound Care Visit     LLE wound       Allergies:  Patient has no known allergies  Diagnosis:  1  Open wound of left lower extremity with complication, initial encounter  -     lidocaine (XYLOCAINE) 4 % topical solution 5 mL  -     Wound cleansing and dressings; Future  -     Wound compression and edema control; Future  -     Wound negative pressure wound therapy; Future  -     Wound home care; Future  -     Debridement    2  Hematoma of leg, left, initial encounter    3  Ambulatory dysfunction    4  Type 2 diabetes mellitus without complication, without long-term current use of insulin ([facility] [address] )       Diagnosis ICD-10-CM Associated Orders   1  Open wound of left lower extremity with complication, initial encounter  S81 802A lidocaine (XYLOCAINE) 4 % topical solution 5 mL     Wound cleansing and dressings     Wound compression and edema control     Wound negative pressure wound therapy     Wound home care     Debridement   2  Hematoma of leg, left, initial encounter  S80 12XA    3  Ambulatory dysfunction  R26 2    4  Type 2 diabetes mellitus without complication, without long-term current use of insulin ([facility])  E11 9         Assessment & Plan:  See wound orders  Recommend VAC, patient agreeable  Continue current care until [facility] is received  Subjective:   6/23 - Initial visit for LLE hematoma  Patient states she fell and hit her leg on walker about 3 weeks ago, went to pcp who referred here  Intermittent pain  No fever  Has swelling  6/30 - f/u LLE wound/hematoma  No new complaints   No fever      The following portions of the patient's history were reviewed and updated as appropriate:   Patient Active Problem List   Diagnosis    Carpal tunnel syndrome    Colon, diverticulosis    Type 2 diabetes mellitus without complication, without long-term current use of insulin ([facility] [address] )    Essential hypertension    GERD (gastroesophageal reflux disease)    Mixed hyperlipidemia    Insomnia    Spinal stenosis    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)    Restless legs syndrome    Need for influenza vaccination    Age-related nuclear cataract of both eyes    Ambulatory dysfunction    Cervical dystonia    Parkinson's disease ([facility] [address] [facility]    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 ([facility] [address] )    Encounter for herb and vitamin supplement management    Intractable back pain    Primary osteoarthritis of right knee    Asymptomatic bacteriuria    Stage 3a chronic kidney disease ([facility] [address] )    Frequent falls    Sinus arrhythmia    S/P deep brain stimulator placement    Underweight    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 ([address] )     Past Medical History:   Diagnosis Date    RODRIGO (acute kidney injury) ([facility] [address] ) 7/19/2019    Altered mental status 1/11/2021    Anal bleeding 1/29/2018    Arthritis     knee    Asthma     BPPV (benign paroxysmal positional vertigo) 7/19/2016    Last Assessment & Plan:  Hx consistent with BPPV  Neg [place]-Halpike, however could not perform it adequately due to dyskinesias  Recommend lozano-daroff exercises at home  IF worsens, vestibular therapy      Bulging lumbar disc 12/10/2013    Closed fracture of one rib of left side 1/29/2018    Colon polyp     Dementia ([facility] [address] )     Fall     balance issue    Fall during current hospitalization 1/15/2021    Gallbladder disease     GERD (gastroesophageal reflux disease)     Hematuria     last assessed 10/29/15    Orthostatic hypotension 7/13/2015    Osteomyelitis of lumbar spine ([facility] [address] ) 3/10/2020    Ovarian cyst     LAST ASSESSED: 12/10/13    Parkinson's disease ([facility] [address] )     Pneumonia of both lower lobes due to infectious organism 3/27/2018    Possible fracture of 5th metatarsal 1/12/2021    Psoriasis 12/10/2013    Pulmonary embolism with infarction ([address] ) 9/2/2014    Scalp laceration, subsequent encounter 11/15/2019  Sciatica 12/10/2013    Severe protein-calorie malnutrition ([facility] [address] ) 7/22/2019    Skin disorder     last assessed 12/10/13    Squamous cell carcinoma of skin 11/21/2016    Stage 3a chronic kidney disease ([facility] [address] ) 12/4/2020    Toxic metabolic encephalopathy 4/91/3522    Use of cane as ambulatory aid     Walker as ambulation aid     Wears glasses     Weight loss 10/8/2019    Last Assessment & Plan:  Recommend Boost or Ensure  F/u with PCP   Weight loss, unintentional 5/22/2015     Past Surgical History:   Procedure Laterality Date    APPENDECTOMY      1970'S    CATARACT EXTRACTION      CHOLECYSTECTOMY      1970'S    COLONOSCOPY      LAPAROTOMY N/A 7/18/2019    Procedure: LAPAROTOMY EXPLORATORY;  Surgeon: Pratibah Andrews MD;  Location: [address] [place] [address];  Service: General    NEUROPLASTY / [address] Right     OOPHORECTOMY Bilateral     REMOVAL OF BOTH OVARIES LAPAROSCOPIC     Wollard Gays FLX W/RMVL OF TUMOR POLYP LESION [address] [facility] TQ N/A 3/20/2018    Procedure: COLONOSCOPY;  Surgeon: Jorge Queen MD;  Location: [place] [address] GI LAB; Service: Gastroenterology    NC XCAPSL CTRC RMVL INSJ IO LENS PROSTH W/O ECP Right 12/4/2018    Procedure: EXTRACTION EXTRACAPSULAR CATARACT PHACO INTRAOCULAR LENS (IOL); Surgeon: Gladis Mcnulty MD;  Location: [facility] OR;  Service: Ophthalmology    NC XCAPSL CTRC RMVL INSJ IO LENS PROSTH W/O ECP  1/15/2019    Procedure: EXTRACTION EXTRACAPSULAR CATARACT PHACO INTRAOCULAR LENS (IOL);   Surgeon: Gladis Mcnulty MD;  Location: [facility] OR;  Service: Ophthalmology    TONSILLECTOMY      VENTRAL HERNIA REPAIR      WITH IMPLANT OF MESH     Social History     Socioeconomic History    Marital status: /Civil Union     Spouse name: Porsche Wynne Number of children: 2    Years of education: 12    Highest education level: High school graduate   Occupational History    Occupation: [employer]     Comment: BILLING AND SHIPPING   Tobacco Use    Smoking status: Never Smoker    Smokeless tobacco: Never Used   Vaping Use    Vaping Use: Never used   Substance and Sexual Activity    Alcohol use: Not Currently    Drug use: Never    Sexual activity: None   Other Topics Concern    None   Social History Narrative    Son lives an hour away, daughter in [facility]  Social Determinants of Health     Financial Resource Strain:     Difficulty of Paying Living Expenses:    Food Insecurity:     Worried About Running Out of Food in the Last Year:     [address] in the Last Year:    Transportation Needs:     Lack of Transportation (Medical):      Lack of Transportation (Non-Medical):    Physical Activity:     Days of Exercise per Week:     Minutes of Exercise per Session:    Stress:     Feeling of Stress :    Social Connections:     Frequency of Communication with Friends and Family:     Frequency of Social Gatherings with Friends and Family:     Attends [employer] Services:     Active Member of Clubs or Organizations:     Attends Club or Organization Meetings:     Marital Status:    Intimate Partner Violence:     Fear of Current or Ex-Partner:     Emotionally Abused:     Physically Abused:     Sexually Abused:         Current Outpatient Medications:     acetaminophen (TYLENOL) 325 mg tablet, Take 650 mg by mouth every 6 (six) hours as needed for mild pain, Disp: , Rfl:     Amantadine HCl ER (GOCOVRI) 137 MG CP24, Take 1 capsule by mouth daily at bedtime , Disp: , Rfl:     Amantadine HCl ER (Gocovri) 137 MG CP24, Take 1 capsule by mouth, Disp: , Rfl:     amLODIPine (NORVASC) 10 mg tablet, Take 0 5 tablets (5 mg total) by mouth daily, Disp: 45 tablet, Rfl: 3    aspirin (ECOTRIN LOW STRENGTH) 81 mg EC tablet, Take 81 mg by mouth every morning , Disp: , Rfl:     carbidopa-levodopa (SINEMET CR)  mg TBCR per ER tablet, Take 1 tablet by mouth 2 (two) times a day, Disp:  , Rfl:     docusate sodium (COLACE) 100 mg capsule, Take 100 mg by mouth 2 (two) times a day, Disp: , Rfl:     gabapentin (NEURONTIN) 100 mg capsule, Take 1 capsule (100 mg total) by mouth daily at bedtime, Disp: 90 capsule, Rfl: 3    glucose monitoring kit (FREESTYLE) monitoring kit, Use 1 each as needed (DM control) ACCUCHEK METER COMPACT PLUS, Disp: 1 each, Rfl: 0    Lancets (freestyle) lancets, For daily testing, Disp: 100 each, Rfl: 4    Multiple Vitamin (MULTIVITAMIN) tablet, Take 1 tablet by mouth every morning , Disp: , Rfl:     Omega-3 Fatty Acids (FISH OIL PO), Take 2 g by mouth every morning , Disp: , Rfl:     pantoprazole (PROTONIX) 20 mg tablet, TAKE 1 TABLET (20 MG TOTAL) BY MOUTH DAILY, Disp: 90 tablet, Rfl: 0    QUEtiapine (SEROquel) 25 mg tablet, TAKE 1 TABLET (25 MG TOTAL) BY MOUTH DAILY AT BEDTIME AS NEEDED (SLEEP ) USE SPARINGLY, Disp: 30 tablet, Rfl: 0    rasagiline (AZILECT) 1 MG, Take 1 mg by mouth daily, Disp: , Rfl:     rOPINIRole (REQUIP) 2 mg tablet, TAKE 3 TABLETS BY MOUTH  NIGHTLY, Disp: , Rfl:     TURMERIC PO, Take by mouth 3 (three) times a day, Disp: , Rfl:   No current facility-administered medications for this visit  Family History   Problem Relation Age of Onset    Alzheimer's disease Mother     Stroke Father     No Known Problems Sister     No Known Problems Brother       Review of Systems   Constitutional: Negative  Respiratory: Negative  Cardiovascular: Negative  Skin: Positive for wound  Objective:  /88   Pulse 80   Temp (!) 97 4 °F (36 3 °C)   Resp 16     Physical Exam  Vitals reviewed  Constitutional:       General: She is not in acute distress  Cardiovascular:      Rate and Rhythm: Normal rate  Pulmonary:      Effort: Pulmonary effort is normal    Skin:     Comments: See wound assessment   Neurological:      Mental Status: She is alert and oriented to person, place, and time  Mental status is at baseline        Gait: Gait abnormal    Psychiatric:         Mood and Affect: Mood normal  Behavior: Behavior normal              Wound 06/23/21 Traumatic Pretibial Left; Lower (Active)   Wound Image   06/30/21 1537   Wound Description Eschar;Black;Granulation tissue;Pink;Slough; Yellow 06/30/21 1520   Julia-wound Assessment Erythema 06/30/21 1520   Wound Length (cm) 3 6 cm 06/30/21 1520   Wound Width (cm) 3 9 cm 06/30/21 1520   Wound Depth (cm) 0 7 cm 06/30/21 1520   Wound Surface Area (cm^2) 14 04 cm^2 06/30/21 1520   Wound Volume (cm^3) 9 828 cm^3 06/30/21 1520   Calculated Wound Volume (cm^3) 9 83 cm^3 06/30/21 1520   Change in Wound Size % -733 05 06/30/21 1520   Undermining 2 06/30/21 1520   Undermining is depth extending from 11-5 o'clock  deepest at 5 06/30/21 1520   Drainage Amount Large 06/30/21 1520   Drainage Description Serosanguineous; Bloody 06/30/21 1520   Non-staged Wound Description Full thickness 06/30/21 1520   Wound packed? Yes 06/25/21 1041   Packing- # removed 1 06/25/21 1041   Dressing Status Intact; Old drainage 06/30/21 1520                         Debridement   Universal Protocol:  Consent: Written consent obtained  Risks and benefits: risks, benefits and alternatives were discussed  Consent given by: patient  Time out: Immediately prior to procedure a "time out" was called to verify the correct patient, procedure, equipment, support staff and site/side marked as required    Patient identity confirmed: verbally with patient      Performed by: physician  Debridement type: surgical  Level of debridement: subcutaneous tissue  Pain control: lidocaine 4%  Post-debridement measurements  Length (cm): 3 6  Width (cm): 3 9  Depth (cm): 0 8  Percent debrided: 100%  Surface Area (cm^2): 14 04  Area debrided (cm^2): 14 04  Volume (cm^3): 11 23  Tissue and other material debrided: subcutaneous tissue  Devitalized tissue debrided: fibrin, necrotic debris and slough  Instrument(s) utilized: curette  Bleeding: small  Hemostasis obtained with: pressure  Procedural pain (0-10): 2  Post-procedural pain: 0   Response to treatment: procedure was tolerated well                   Wound Instructions:  Orders Placed This Encounter   Procedures    Wound cleansing and dressings     Left lower leg wound:  Wash your hands with soap and water  Remove old dressing, discard into plastic bag and place in trash  Cleanse the wound with Prophase wound wash prior to applying a clean dressing  Do not use tissue or cotton balls  Do not scrub the wound  Pat dry using gauze  Shower yes if you wear a cast protector; do not get wrap wet  Apply AMD to the left leg wound  Cover with ABD  Secure with coflex lite  Change dressing weekly  This was done today at [address] [facility],[address]                                          Standing Status:   Future     Standing Expiration Date:   6/30/2022    Wound compression and edema control     Unna Boot/ Multi-layer compression wrap Instructions: Coflex Lite     Keep compression wrap/wraps clean and dry  If wraps are too tight and you experience numbness/tingling, call the wound center  If after hours, remove wraps or proceed to nearest E R  and call wound center in AM      Wrap will be changed 1 x weekly  It will first be changed on 6/25/21     Avoid prolonged standing in one place      Elevate leg(s) above the level of the heart when sitting or as much as possible  Standing Status:   Future     Standing Expiration Date:   6/30/2022    Wound negative pressure wound therapy     Negative Pressure Wound Therapy Instructions    Apply black granufoam to wound bed,  Set negative pressure on continuous at 125 MmHg, VAC dressing to be changed three times per week as ordered  and VAC dressing bridged to relieve disc pressure  Wound VAC ordered today in wound care  To be applied when arrives       Standing Status:   Future     Standing Expiration Date:   6/30/2022    Wound home care     VNA for wound care/VAC changes 2-3 x week and PRN     Standing Status:   Future     Standing Expiration Date: 6/30/2022    Debridement     This order was created via procedure documentation         Christophe Rivera DO      Portions of the record may have been created with voice recognition software  Occasional wrong word or "sound a like" substitutions may have occurred due to the inherent limitations of voice recognition software  Read the chart carefully and recognize, using context, where substitutions have occurred

## 2021-06-30 NOTE — PATIENT INSTRUCTIONS
Orders Placed This Encounter   Procedures    Wound cleansing and dressings     Left lower leg wound:  Wash your hands with soap and water  Remove old dressing, discard into plastic bag and place in trash  Cleanse the wound with Prophase wound wash prior to applying a clean dressing  Do not use tissue or cotton balls  Do not scrub the wound  Pat dry using gauze  Shower yes if you wear a cast protector; do not get wrap wet  Apply AMD to the left leg wound  Cover with ABD  Secure with coflex lite  Change dressing weekly  This was done today at [address] [facility],[address]                                          Standing Status:   Future     Standing Expiration Date:   6/30/2022    Wound compression and edema control     Unna Boot/ Multi-layer compression wrap Instructions: Coflex Lite     Keep compression wrap/wraps clean and dry  If wraps are too tight and you experience numbness/tingling, call the wound center  If after hours, remove wraps or proceed to nearest E R  and call wound center in AM      Wrap will be changed 1 x weekly  It will first be changed on 6/25/21     Avoid prolonged standing in one place      Elevate leg(s) above the level of the heart when sitting or as much as possible  Standing Status:   Future     Standing Expiration Date:   6/30/2022    Wound negative pressure wound therapy     Negative Pressure Wound Therapy Instructions    Apply black granufoam to wound bed,  Set negative pressure on continuous at 125 MmHg, VAC dressing to be changed three times per week as ordered  and VAC dressing bridged to relieve disc pressure  Wound VAC ordered today in wound care  To be applied when arrives       Standing Status:   Future     Standing Expiration Date:   6/30/2022    Wound home care     VNA for wound care/VAC changes 2-3 x week and PRN     Standing Status:   Future     Standing Expiration Date:   6/30/2022

## 2021-07-01 ENCOUNTER — OFFICE VISIT (OUTPATIENT)
Dept: PHYSICAL THERAPY | Facility: CLINIC | Age: 81
End: 2021-07-01
Payer: MEDICARE

## 2021-07-01 DIAGNOSIS — G20 PARKINSON'S DISEASE (HCC): Primary | ICD-10-CM

## 2021-07-01 DIAGNOSIS — R29.6 FREQUENT FALLS: ICD-10-CM

## 2021-07-01 DIAGNOSIS — R53.1 WEAKNESS: ICD-10-CM

## 2021-07-01 PROCEDURE — 97112 NEUROMUSCULAR REEDUCATION: CPT | Performed by: PHYSICAL THERAPIST

## 2021-07-01 NOTE — PROGRESS NOTES
Daily Note  PN: 6/15/2021  POC: 2021     Today's date: 2021  Patient name: Nikolas Berger  : 1940  MRN: [id number]  Referring provider: Bg Sanchez MD  Dx:   Encounter Diagnosis     ICD-10-CM    1  Parkinson's disease ([facility] [address] )  G20    2  Weakness  R53 1    3  Frequent falls  R29 6                   Subjective: Pt reports to therapy with no LE pain or reports of falls  States she is attending wound care for wound on LLE      Objective: See treatment diary below  *CGA and gait belt all exercises     - STS: 10x, 3 sets  - FTEO and FTEC: 30 sec x3 ea  - FTEO w/ foam, FTEC w/ foam: 30 sec x2 ea  - Stepping FWD/BWD onto foam: 15x   - Standing marches: 20x B/L, 3 sec hold between reps  - Ambulation to lobby: 150 ft, rollator and CGA      Assessment: Patient tolerated treatment well  While needing constant verbal and tactile cueing to maintain anterior weight shift, she showed reduction in number of LOB  She remains heavily reliant on her vision to maintain balance, but was able to complete FTEC w/ foam with posterior LOB and Reji  Throughout treatment, she did not report any increased pain in LLE  She will benefit from skilled PT services to improve her balance, reduce risk of falls, and increase her mobility  Plan: Continue per plan of care  Precautions:   Past Medical History:   Diagnosis Date    RODRIGO (acute kidney injury) ([facility] [address] ) 2019    Altered mental status 2021    Anal bleeding 2018    Arthritis     knee    Asthma     BPPV (benign paroxysmal positional vertigo) 2016    Last Assessment & Plan:  Hx consistent with BPPV  Neg [place]-Halpike, however could not perform it adequately due to dyskinesias  Recommend lozano-daroff exercises at home  IF worsens, vestibular therapy      Bulging lumbar disc 12/10/2013    Closed fracture of one rib of left side 2018    Colon polyp     Dementia [facility])     Fall     balance issue    Fall during current hospitalization 1/15/2021    Gallbladder disease     GERD (gastroesophageal reflux disease)     Hematuria     last assessed 10/29/15    Orthostatic hypotension 7/13/2015    Osteomyelitis of lumbar spine ([facility] [address] ) 3/10/2020    Ovarian cyst     LAST ASSESSED: 12/10/13    [facility]     Pneumonia of both lower lobes due to infectious organism 3/27/2018    Possible fracture of 5th metatarsal 1/12/2021    Psoriasis 12/10/2013    Pulmonary embolism with infarction [facility] ) 9/2/2014    Scalp laceration, subsequent encounter 11/15/2019    Sciatica 12/10/2013    Severe protein-calorie malnutrition ([facility] [address] ) 7/22/2019    Skin disorder     last assessed 12/10/13    Squamous cell carcinoma of skin 11/21/2016    Stage 3a chronic kidney disease 12/4/2020    Toxic metabolic encephalopathy 5/58/5779    Use of cane as ambulatory aid     Walker as ambulation aid     Wears glasses     Weight loss 10/8/2019    Last Assessment & Plan:  Recommend Boost or Ensure  F/u with PCP      Weight loss, unintentional 5/22/2015           Objective Measures IE: 3/25/21            30 sec STS 6 reps            5xSTS 22 49 seconds            mCTSIB FTEO: 10 2 seconds  FTEC: 5 87 seconds  FTEO w/ foam:             TUG 20 65 seconds, w/ RW            2  ft, w/ RW            Gait speed 0 15 m/s            TOÑO 17/56

## 2021-07-07 ENCOUNTER — OFFICE VISIT (OUTPATIENT)
Dept: WOUND CARE | Facility: HOSPITAL | Age: 81
End: 2021-07-07
Payer: MEDICARE

## 2021-07-07 VITALS
SYSTOLIC BLOOD PRESSURE: 134 MMHG | TEMPERATURE: 97.7 F | RESPIRATION RATE: 16 BRPM | DIASTOLIC BLOOD PRESSURE: 86 MMHG | HEART RATE: 74 BPM

## 2021-07-07 DIAGNOSIS — S81.802A OPEN WOUND OF LEFT LOWER EXTREMITY WITH COMPLICATION, INITIAL ENCOUNTER: Primary | ICD-10-CM

## 2021-07-07 DIAGNOSIS — R26.2 AMBULATORY DYSFUNCTION: ICD-10-CM

## 2021-07-07 DIAGNOSIS — E11.9 TYPE 2 DIABETES MELLITUS WITHOUT COMPLICATION, WITHOUT LONG-TERM CURRENT USE OF INSULIN (HCC): ICD-10-CM

## 2021-07-07 PROCEDURE — 11042 DBRDMT SUBQ TIS 1ST 20SQCM/<: CPT | Performed by: PREVENTIVE MEDICINE

## 2021-07-07 NOTE — LETTER
[address] [facility]OUND [facility]  Renan Garcias  [place] [address]  Phone#  [phone]  Fax#  [phone]    Patient:  Josue Fulton  YOB: 1940  Phone:  [phone]  Date of Visit:  7/7/2021    Orders Placed This Encounter   Procedures    Wound cleansing and dressings     Left leg wound  Wash your hands with soap and water  Remove old dressing, discard into plastic bag and place in trash  Cleanse the wound with normal saline prior to applying a clean dressing  Do not use tissue or cotton balls  Do not scrub the wound  Pat dry using gauze  Shower no   See wound VAC instructions  Standing Status:   Future     Standing Expiration Date:   7/7/2022    Wound negative pressure wound therapy     Negative Pressure Wound Therapy Instructions    Apply black granufoam to wound bed   (make sure foam reaches inside of deepest area - about 2-5 o'clock  Do not pack too deep )  Set negative pressure on continuous at 125mmHg  VAC dressing to be changed three times per week as ordered  This was done today     Standing Status:   Future     Standing Expiration Date:   7/7/2022    Wound home care     Start VNA 2-3x weekly and PRN for wound care/ VAC change       Standing Status:   Future     Standing Expiration Date:   7/7/2022    Debridement     This order was created via procedure documentation         Electronically signed by Felisha Carvajal DO

## 2021-07-07 NOTE — PATIENT INSTRUCTIONS
Orders Placed This Encounter   Procedures    Wound cleansing and dressings     Left leg wound  Wash your hands with soap and water  Remove old dressing, discard into plastic bag and place in trash  Cleanse the wound with normal saline prior to applying a clean dressing  Do not use tissue or cotton balls  Do not scrub the wound  Pat dry using gauze  Shower no   See wound VAC instructions  Standing Status:   Future     Standing Expiration Date:   7/7/2022    Wound negative pressure wound therapy     Negative Pressure Wound Therapy Instructions    Apply black granufoam to wound bed   (make sure foam reaches inside of deepest area - about 2-5 o'clock  Do not pack too deep )  Set negative pressure on continuous at 125mmHg  VAC dressing to be changed three times per week as ordered  This was done today     Standing Status:   Future     Standing Expiration Date:   7/7/2022    Wound home care     Start VNA 2-3x weekly and PRN for wound care/ VAC change       Standing Status:   Future     Standing Expiration Date:   7/7/2022

## 2021-07-07 NOTE — LETTER
[address] [facility] [address] [facility]OUND [facility]  Renan Garcias  [place] [address]  Phone#  [phone]  Fax#  [phone]    Patient:  Danielle Sifuentes  YOB: 1940  Phone:  [phone]  Date of Visit:  7/7/2021    Orders Placed This Encounter   Procedures    Wound cleansing and dressings     Left leg wound  Wash your hands with soap and water  Remove old dressing, discard into plastic bag and place in trash  Cleanse the wound with normal saline prior to applying a clean dressing  Do not use tissue or cotton balls  Do not scrub the wound  Pat dry using gauze  Shower no   See wound VAC instructions  Standing Status:   Future     Standing Expiration Date:   7/7/2022    Wound negative pressure wound therapy     Negative Pressure Wound Therapy Instructions    Apply black granufoam to wound bed   (make sure foam reaches inside of deepest area - about 2-5 o'clock  Do not pack too deep )  Set negative pressure on continuous at 125mmHg  VAC dressing to be changed three times per week as ordered  This was done today     Standing Status:   Future     Standing Expiration Date:   7/7/2022    Wound home care     Start VNA 2-3x weekly and PRN for wound care/ VAC change       Standing Status:   Future     Standing Expiration Date:   7/7/2022    Debridement     This order was created via procedure documentation         Electronically signed by Sarah Robertson DO

## 2021-07-08 ENCOUNTER — APPOINTMENT (OUTPATIENT)
Dept: PHYSICAL THERAPY | Facility: CLINIC | Age: 81
End: 2021-07-08
Payer: MEDICARE

## 2021-07-13 ENCOUNTER — APPOINTMENT (OUTPATIENT)
Dept: PHYSICAL THERAPY | Facility: CLINIC | Age: 81
End: 2021-07-13
Payer: MEDICARE

## 2021-07-14 ENCOUNTER — OFFICE VISIT (OUTPATIENT)
Dept: WOUND CARE | Facility: HOSPITAL | Age: 81
End: 2021-07-14
Payer: MEDICARE

## 2021-07-14 VITALS
HEART RATE: 79 BPM | RESPIRATION RATE: 15 BRPM | DIASTOLIC BLOOD PRESSURE: 82 MMHG | SYSTOLIC BLOOD PRESSURE: 146 MMHG | TEMPERATURE: 98.1 F

## 2021-07-14 DIAGNOSIS — R26.2 AMBULATORY DYSFUNCTION: ICD-10-CM

## 2021-07-14 DIAGNOSIS — S81.802A OPEN WOUND OF LEFT LOWER EXTREMITY WITH COMPLICATION, INITIAL ENCOUNTER: Primary | ICD-10-CM

## 2021-07-14 DIAGNOSIS — E11.9 TYPE 2 DIABETES MELLITUS WITHOUT COMPLICATION, WITHOUT LONG-TERM CURRENT USE OF INSULIN (HCC): ICD-10-CM

## 2021-07-14 PROCEDURE — 97597 DBRDMT OPN WND 1ST 20 CM/<: CPT | Performed by: PREVENTIVE MEDICINE

## 2021-07-14 NOTE — LETTER
[facility]  [address]  [place] [address]  Phone#  [phone]  Fax#  [phone]    Patient:  Karen Malhotra  YOB: 1940  Phone:  [phone]  Date of Visit:  7/14/2021    Orders Placed This Encounter   Procedures    Wound cleansing and dressings      Left leg wound  Wash your hands with soap and water  Remove old dressing, discard into plastic bag and place in trash  Cleanse the wound with normal saline prior to applying a clean dressing  Do not use tissue or cotton balls  Do not scrub the wound  Pat dry using gauze  Shower yes if you cover the wound; do not get wound dressing wet  Apply MediHoney to left leg wound; cover with gauze  Secure with rolled gauze and tape  Change dressing three times per week  Wear Spandigrip to left leg  Wound Vac is currently on  Hold, but keep wound vac and supplies for now             Standing Status:   Future     Standing Expiration Date:   7/14/2022    Wound compression and edema control     Elastic Tubular Stocking: Spandagrip E to left lower leg    Tubular elastic bandage: Apply from base of toes to behind the knee  Apply in AM, may remove for sleep  Avoid prolonged standing in one place  Elevate leg(s) above the level of the heart when sitting or as much as possible  Standing Status:   Future     Standing Expiration Date:   7/14/2022    Wound home care     VNA three times weekly and PRN for wound care/ VAC change       Standing Status:   Future     Standing Expiration Date:   7/14/2022    Debridement     This order was created via procedure documentation         Electronically signed by Cynthia Soto DO

## 2021-07-14 NOTE — PROGRESS NOTES
Patient ID: Josue Fulton is a [de-identified] y o  female Date of Birth 1940       Chief Complaint   Patient presents with    Follow Up Wound Care Visit     LLE       Allergies:  Patient has no known allergies  Diagnosis:  1  Open wound of left lower extremity with complication, initial encounter  -     Wound cleansing and dressings; Future  -     Wound compression and edema control; Future  -     Wound home care; Future  -     Debridement    2  Type 2 diabetes mellitus without complication, without long-term current use of insulin (HCC)  -     Wound cleansing and dressings; Future  -     Wound compression and edema control; Future  -     Wound home care; Future    3  Ambulatory dysfunction  -     Wound cleansing and dressings; Future  -     Wound compression and edema control; Future  -     Wound home care; Future       Diagnosis ICD-10-CM Associated Orders   1  Open wound of left lower extremity with complication, initial encounter  S81 802A Wound cleansing and dressings     Wound compression and edema control     Wound home care     Debridement   2  Type 2 diabetes mellitus without complication, without long-term current use of insulin (HCC)  E11 9 Wound cleansing and dressings     Wound compression and edema control     Wound home care   3  Ambulatory dysfunction  R26 2 Wound cleansing and dressings     Wound compression and edema control     Wound home care        Assessment & Plan:  See wound orders  Not compliant with VAC, hold  Has actually granulated in nicely  Recommend medihoney  Subjective:   6/23 - Initial visit for LLE hematoma  Patient states she fell and hit her leg on walker about 3 weeks ago, went to pcp who referred here  Intermittent pain  No fever  Has swelling  6/30 - f/u LLE wound/hematoma  No new complaints  No fever    7/7/21 - f/u LLE wound  No new complaints  Just received VAC today    7/14 - f/u LLE wound  Patient has no new complaints   Taylor Kasper from [facility] that she is not compliant with VAC, unplugging constantly      The following portions of the patient's history were reviewed and updated as appropriate:   Patient Active Problem List   Diagnosis    Carpal tunnel syndrome    Colon, diverticulosis    Type 2 diabetes mellitus without complication, without long-term current use of insulin ([facility] [address] [facility]    Essential hypertension    GERD (gastroesophageal reflux disease)    Mixed hyperlipidemia    Insomnia    Spinal stenosis    Dementia due to Parkinson's disease with behavioral disturbance ([facility] [address] [facility]    Restless legs syndrome    Need for influenza vaccination    Age-related nuclear cataract of both eyes    Ambulatory dysfunction    Cervical dystonia    Parkinson's disease ([facility] [address] [facility]    [facility] [id number]    Encounter for herb and vitamin supplement management    Intractable back pain    Primary osteoarthritis of right knee    Asymptomatic bacteriuria    Stage 3a chronic kidney disease ([facility] [address] [facility]    Frequent falls    Sinus arrhythmia    S/P deep brain stimulator placement    Underweight    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 ([facility] [address] [facility]     Past Medical History:   Diagnosis Date    RODRIGO (acute kidney injury) ([facility] [address] [facility] 7/19/2019    Altered mental status 1/11/2021    Anal bleeding 1/29/2018    Arthritis     knee    Asthma     BPPV (benign paroxysmal positional vertigo) 7/19/2016    Last Assessment & Plan:  Hx consistent with BPPV  Neg Joe-Halpike, however could not perform it adequately due to dyskinesias  Recommend lozano-daroff exercises at home  IF worsens, vestibular therapy      Bulging lumbar disc 12/10/2013    Closed fracture of one rib of left side 1/29/2018    Colon polyp     Dementia [facility])     Fall     balance issue    Fall during current hospitalization 1/15/2021    Gallbladder disease     GERD (gastroesophageal reflux disease)     Hematuria     last assessed 10/29/15    Orthostatic hypotension 7/13/2015    Osteomyelitis of lumbar spine ([facility] [address] [facility] 3/10/2020    Ovarian cyst     LAST ASSESSED: 12/10/13    Parkinson's disease ([facility] [address] [facility]     Pneumonia of both lower lobes due to infectious organism 3/27/2018    Possible fracture of 5th metatarsal 1/12/2021    Psoriasis 12/10/2013    Pulmonary embolism with infarction ([facility] [address] ) 9/2/2014    Scalp laceration, subsequent encounter 11/15/2019    Sciatica 12/10/2013    Severe protein-calorie malnutrition ([facility] [address] ) 7/22/2019    Skin disorder     last assessed 12/10/13    Squamous cell carcinoma of skin 11/21/2016    Stage 3a chronic kidney disease ([facility] [address] ) 12/4/2020    Toxic metabolic encephalopathy 1/04/6528    Use of cane as ambulatory aid     Walker as ambulation aid     Wears glasses     Weight loss 10/8/2019    Last Assessment & Plan:  Recommend Boost or Ensure  F/u with PCP   Weight loss, unintentional 5/22/2015     Past Surgical History:   Procedure Laterality Date    APPENDECTOMY      1970'S    CATARACT EXTRACTION      CHOLECYSTECTOMY      1970'S    COLONOSCOPY      LAPAROTOMY N/A 7/18/2019    Procedure: LAPAROTOMY EXPLORATORY;  Surgeon: Holland Oglesby MD;  Location: [address] [place] [address];  Service: General    NEUROPLASTY / [address] Right     OOPHORECTOMY Bilateral     REMOVAL OF BOTH OVARIES LAPAROSCOPIC     [place] FLX W/RMVL OF TUMOR POLYP LESION [address] [facility] TQ N/A 3/20/2018    Procedure: COLONOSCOPY;  Surgeon: Phong Anders MD;  Location: [facility] GI LAB; Service: Gastroenterology    IA XCAPSL CTRC RMVL INSJ IO LENS PROSTH W/O ECP Right 12/4/2018    Procedure: EXTRACTION EXTRACAPSULAR CATARACT PHACO INTRAOCULAR LENS (IOL); Surgeon: Grant Cardoza MD;  Location: [facility] MAIN OR;  Service: Ophthalmology    IA XCAPSL CTRC RMVL INSJ IO LENS PROSTH W/O ECP  1/15/2019    Procedure: EXTRACTION EXTRACAPSULAR CATARACT PHACO INTRAOCULAR LENS (IOL);   Surgeon: Grant Cardoza MD;  Location: [facility] OR;  Service: Ophthalmology    TONSILLECTOMY     [facility]      WITH IMPLANT OF MESH Social History     Socioeconomic History    Marital status: /Civil Union     Spouse name: Pat Szymanski Number of children: 2    Years of education: 15    Highest education level: High school graduate   Occupational History    Occupation: [employer]     Comment: BILLING AND SHIPPING   Tobacco Use    Smoking status: Never Smoker    Smokeless tobacco: Never Used   Vaping Use    Vaping Use: Never used   Substance and Sexual Activity    Alcohol use: Not Currently    Drug use: Never    Sexual activity: None   Other Topics Concern    None   Social History Narrative    Son lives an hour away, daughter in [place]  Social Determinants of Health     Financial Resource Strain:     Difficulty of Paying Living Expenses:    Food Insecurity:     Worried About Running Out of Food in the Last Year:     [address] in the Last Year:    Transportation Needs:     Lack of Transportation (Medical):      Lack of Transportation (Non-Medical):    Physical Activity:     Days of Exercise per Week:     Minutes of Exercise per Session:    Stress:     Feeling of Stress :    Social Connections:     Frequency of Communication with Friends and Family:     Frequency of Social Gatherings with Friends and Family:     Attends Hinduism Services:     Active Member of Clubs or Organizations:     Attends Club or Organization Meetings:     Marital Status:    Intimate Partner Violence:     Fear of Current or Ex-Partner:     Emotionally Abused:     Physically Abused:     Sexually Abused:         Current Outpatient Medications:     acetaminophen (TYLENOL) 325 mg tablet, Take 650 mg by mouth every 6 (six) hours as needed for mild pain, Disp: , Rfl:     Amantadine HCl ER (GOCOVRI) 137 MG CP24, Take 1 capsule by mouth daily at bedtime , Disp: , Rfl:     Amantadine HCl ER (Gocovri) 137 MG CP24, Take 1 capsule by mouth, Disp: , Rfl:     amLODIPine (NORVASC) 10 mg tablet, Take 0 5 tablets (5 mg total) by mouth daily, Disp: 45 tablet, Rfl: 3    aspirin (ECOTRIN LOW STRENGTH) 81 mg EC tablet, Take 81 mg by mouth every morning , Disp: , Rfl:     carbidopa-levodopa (SINEMET CR)  mg TBCR per ER tablet, Take 1 tablet by mouth 2 (two) times a day, Disp:  , Rfl:     docusate sodium (COLACE) 100 mg capsule, Take 100 mg by mouth 2 (two) times a day, Disp: , Rfl:     gabapentin (NEURONTIN) 100 mg capsule, Take 1 capsule (100 mg total) by mouth daily at bedtime, Disp: 90 capsule, Rfl: 3    glucose monitoring kit (FREESTYLE) monitoring kit, Use 1 each as needed (DM control) ACCUCHEK METER COMPACT PLUS, Disp: 1 each, Rfl: 0    Lancets (freestyle) lancets, For daily testing, Disp: 100 each, Rfl: 4    Multiple Vitamin (MULTIVITAMIN) tablet, Take 1 tablet by mouth every morning , Disp: , Rfl:     Omega-3 Fatty Acids (FISH OIL PO), Take 2 g by mouth every morning , Disp: , Rfl:     pantoprazole (PROTONIX) 20 mg tablet, TAKE 1 TABLET (20 MG TOTAL) BY MOUTH DAILY, Disp: 90 tablet, Rfl: 0    QUEtiapine (SEROquel) 25 mg tablet, TAKE 1 TABLET (25 MG TOTAL) BY MOUTH DAILY AT BEDTIME AS NEEDED (SLEEP ) USE SPARINGLY, Disp: 30 tablet, Rfl: 0    rasagiline (AZILECT) 1 MG, Take 1 mg by mouth daily, Disp: , Rfl:     rOPINIRole (REQUIP) 2 mg tablet, TAKE 3 TABLETS BY MOUTH  NIGHTLY, Disp: , Rfl:     TURMERIC PO, Take by mouth 3 (three) times a day, Disp: , Rfl:   Family History   Problem Relation Age of Onset    Alzheimer's disease Mother     Stroke Father     No Known Problems Sister     No Known Problems Brother       Review of Systems   Constitutional: Negative  Respiratory: Negative  Cardiovascular: Negative  Skin: Positive for wound  Objective:  /82   Pulse 79   Temp 98 1 °F (36 7 °C)   Resp 15     Physical Exam  Vitals reviewed  Constitutional:       General: She is not in acute distress  Cardiovascular:      Rate and Rhythm: Normal rate     Pulmonary:      Effort: Pulmonary effort is normal  Skin:     Comments: See wound assessment   Neurological:      Mental Status: She is alert and oriented to person, place, and time  Mental status is at baseline  Gait: Gait abnormal    Psychiatric:         Mood and Affect: Mood normal          Behavior: Behavior normal              Wound 06/23/21 Traumatic Pretibial Left; Lower (Active)   Wound Image   07/14/21 1558   Wound Description Granulation tissue;Pink;Slough; Yellow; Beefy red 07/14/21 1558   Julia-wound Assessment Maceration;Pale 07/14/21 1558   Wound Length (cm) 3 4 cm 07/14/21 1558   Wound Width (cm) 3 4 cm 07/14/21 1558   Wound Depth (cm) 0 5 cm 07/14/21 1558   Wound Surface Area (cm^2) 11 56 cm^2 07/14/21 1558   Wound Volume (cm^3) 5 78 cm^3 07/14/21 1558   Calculated Wound Volume (cm^3) 5 78 cm^3 07/14/21 1558   Change in Wound Size % -389 83 07/14/21 1558   Undermining 0 07/14/21 1558   Undermining is depth extending from 2-5 oclock - deepest at 2 07/07/21 1548   Drainage Amount Other (Comment) 07/14/21 1558   Drainage Description Serosanguineous; Bloody 07/07/21 1548   Non-staged Wound Description Full thickness 07/14/21 1558   Wound packed? No 07/14/21 1558   Packing- # removed 1 07/07/21 1548   Dressing Status Intact; Old drainage 07/14/21 1558                         Debridement   Wound 06/23/21 Traumatic Pretibial Left; Lower    Universal Protocol:  Consent: Written consent obtained  Risks and benefits: risks, benefits and alternatives were discussed  Consent given by: patient  Time out: Immediately prior to procedure a "time out" was called to verify the correct patient, procedure, equipment, support staff and site/side marked as required    Patient identity confirmed: verbally with patient      Performed by: physician  Debridement type: selective  Pain control: lidocaine 4%  Post-debridement measurements  Length (cm): 3 4  Width (cm): 3 4  Depth (cm): 0 5  Percent debrided: 100%  Surface Area (cm^2): 11 56  Area debrided (cm^2): 11 56  Volume (cm^3): 5 78  Devitalized tissue debrided: fibrin and slough  Instrument(s) utilized: curette  Bleeding: small  Hemostasis obtained with: pressure  Procedural pain (0-10): 5  Post-procedural pain: 0   Response to treatment: procedure was tolerated well                   Wound Instructions:  Orders Placed This Encounter   Procedures    Wound cleansing and dressings      Left leg wound  Wash your hands with soap and water  Remove old dressing, discard into plastic bag and place in trash  Cleanse the wound with normal saline prior to applying a clean dressing  Do not use tissue or cotton balls  Do not scrub the wound  Pat dry using gauze  Shower yes if you cover the wound; do not get wound dressing wet  Apply MediHoney to left leg wound; cover with gauze  Secure with rolled gauze and tape  Change dressing three times per week  Wear Spandigrip to left leg  Wound Vac is currently on  Hold, but keep wound vac and supplies for now             Standing Status:   Future     Standing Expiration Date:   7/14/2022    Wound compression and edema control     Elastic Tubular Stocking: Spandagrip E to left lower leg    Tubular elastic bandage: Apply from base of toes to behind the knee  Apply in AM, may remove for sleep  Avoid prolonged standing in one place  Elevate leg(s) above the level of the heart when sitting or as much as possible  Standing Status:   Future     Standing Expiration Date:   7/14/2022    Wound home care     VNA three times weekly and PRN for wound care/ VAC change  Standing Status:   Future     Standing Expiration Date:   7/14/2022    Debridement     This order was created via procedure documentation         Carloz Hayes DO      Portions of the record may have been created with voice recognition software  Occasional wrong word or "sound a like" substitutions may have occurred due to the inherent limitations of voice recognition software   Read the chart carefully and recognize, using context, where substitutions have occurred

## 2021-07-14 NOTE — PATIENT INSTRUCTIONS
Orders Placed This Encounter   Procedures    Wound cleansing and dressings      Left leg wound  Wash your hands with soap and water  Remove old dressing, discard into plastic bag and place in trash  Cleanse the wound with normal saline prior to applying a clean dressing  Do not use tissue or cotton balls  Do not scrub the wound  Pat dry using gauze  Shower yes if you cover the wound; do not get wound dressing wet  Apply MediHoney to left leg wound; cover with gauze  Secure with rolled gauze and tape  Change dressing three times per week  Wear Spandigrip to left leg  Wound Vac is currently on  Hold, but keep wound vac and supplies for now             Standing Status:   Future     Standing Expiration Date:   7/14/2022    Wound compression and edema control     Elastic Tubular Stocking: Spandagrip E to left lower leg    Tubular elastic bandage: Apply from base of toes to behind the knee  Apply in AM, may remove for sleep  Avoid prolonged standing in one place  Elevate leg(s) above the level of the heart when sitting or as much as possible  Standing Status:   Future     Standing Expiration Date:   7/14/2022    Wound home care     VNA three times weekly and PRN for wound care/ VAC change       Standing Status:   Future     Standing Expiration Date:   7/14/2022

## 2021-07-15 ENCOUNTER — APPOINTMENT (OUTPATIENT)
Dept: PHYSICAL THERAPY | Facility: CLINIC | Age: 81
End: 2021-07-15
Payer: MEDICARE

## 2021-07-16 ENCOUNTER — TELEPHONE (OUTPATIENT)
Dept: FAMILY MEDICINE CLINIC | Facility: CLINIC | Age: 81
End: 2021-07-16

## 2021-07-16 ENCOUNTER — TELEPHONE (OUTPATIENT)
Dept: WOUND CARE | Facility: HOSPITAL | Age: 81
End: 2021-07-16

## 2021-07-16 NOTE — TELEPHONE ENCOUNTER
Spoke to patient about VNA and wound care  Called wound center and they are aware of patient's situation - they are having a nurse call her back

## 2021-07-16 NOTE — TELEPHONE ENCOUNTER
Received call from pts  pcp about visiting nurses and when they were coming out per phone call from pt  She left messages here at [address] as well  We called [facility] and they will have someone reach out to her about time

## 2021-07-19 NOTE — PROGRESS NOTES
Called patient who informed PT that she is currently seeing wound care for a wound on her leg  She is currently receiving home care for dressing changes and would like to cancel the rest of her PT appointments until she is done with wound care  Informed patient she will be placed on hold and to call back when she is ready to return to therapy, patient verbalized agreement

## 2021-07-20 ENCOUNTER — APPOINTMENT (OUTPATIENT)
Dept: PHYSICAL THERAPY | Facility: CLINIC | Age: 81
End: 2021-07-20
Payer: MEDICARE

## 2021-07-21 ENCOUNTER — PATIENT OUTREACH (OUTPATIENT)
Dept: FAMILY MEDICINE CLINIC | Facility: CLINIC | Age: 81
End: 2021-07-21

## 2021-07-21 DIAGNOSIS — M48.061 SPINAL STENOSIS OF LUMBAR REGION WITHOUT NEUROGENIC CLAUDICATION: ICD-10-CM

## 2021-07-21 DIAGNOSIS — I10 ESSENTIAL HYPERTENSION: ICD-10-CM

## 2021-07-21 DIAGNOSIS — M54.9 INTRACTABLE BACK PAIN: ICD-10-CM

## 2021-07-21 DIAGNOSIS — K21.9 GASTROESOPHAGEAL REFLUX DISEASE, UNSPECIFIED WHETHER ESOPHAGITIS PRESENT: ICD-10-CM

## 2021-07-21 RX ORDER — PANTOPRAZOLE SODIUM 20 MG/1
20 TABLET, DELAYED RELEASE ORAL DAILY
Qty: 90 TABLET | Refills: 0 | Status: SHIPPED | OUTPATIENT
Start: 2021-07-21 | End: 2021-09-23

## 2021-07-21 RX ORDER — GABAPENTIN 100 MG/1
100 CAPSULE ORAL
Qty: 90 CAPSULE | Refills: 3 | Status: SHIPPED | OUTPATIENT
Start: 2021-07-21 | End: 2022-07-21

## 2021-07-21 RX ORDER — AMLODIPINE BESYLATE 10 MG/1
5 TABLET ORAL DAILY
Qty: 45 TABLET | Refills: 3 | Status: SHIPPED | OUTPATIENT
Start: 2021-07-21

## 2021-07-21 NOTE — PROGRESS NOTES
Received text message from Kath HALL from [facility]  Requesting refills on patient medications and an order for PT  In basket message sent to Dr Nelli Rolon

## 2021-07-22 ENCOUNTER — APPOINTMENT (OUTPATIENT)
Dept: PHYSICAL THERAPY | Facility: CLINIC | Age: 81
End: 2021-07-22
Payer: MEDICARE

## 2021-07-22 DIAGNOSIS — G24.9 DYSKINESIA DUE TO PARKINSON'S DISEASE (HCC): ICD-10-CM

## 2021-07-22 DIAGNOSIS — G20 DYSKINESIA DUE TO PARKINSON'S DISEASE (HCC): ICD-10-CM

## 2021-07-22 DIAGNOSIS — M48.061 SPINAL STENOSIS OF LUMBAR REGION WITHOUT NEUROGENIC CLAUDICATION: ICD-10-CM

## 2021-07-22 DIAGNOSIS — G20 PARKINSON'S DISEASE (HCC): Primary | ICD-10-CM

## 2021-07-22 NOTE — PROGRESS NOTES
Received call from Tuesday at [facility]  Requesting order for PT  Order entered  Faxed to [facility]

## 2021-07-27 ENCOUNTER — APPOINTMENT (OUTPATIENT)
Dept: PHYSICAL THERAPY | Facility: CLINIC | Age: 81
End: 2021-07-27
Payer: MEDICARE

## 2021-07-28 ENCOUNTER — OFFICE VISIT (OUTPATIENT)
Dept: WOUND CARE | Facility: HOSPITAL | Age: 81
End: 2021-07-28
Payer: MEDICARE

## 2021-07-28 VITALS
HEART RATE: 96 BPM | TEMPERATURE: 97.3 F | SYSTOLIC BLOOD PRESSURE: 168 MMHG | RESPIRATION RATE: 16 BRPM | DIASTOLIC BLOOD PRESSURE: 82 MMHG

## 2021-07-28 DIAGNOSIS — R26.2 AMBULATORY DYSFUNCTION: ICD-10-CM

## 2021-07-28 DIAGNOSIS — E11.9 TYPE 2 DIABETES MELLITUS WITHOUT COMPLICATION, WITHOUT LONG-TERM CURRENT USE OF INSULIN (HCC): ICD-10-CM

## 2021-07-28 DIAGNOSIS — S80.12XA HEMATOMA OF LEG, LEFT, INITIAL ENCOUNTER: ICD-10-CM

## 2021-07-28 DIAGNOSIS — S81.802A OPEN WOUND OF LEFT LOWER EXTREMITY WITH COMPLICATION, INITIAL ENCOUNTER: Primary | ICD-10-CM

## 2021-07-28 PROCEDURE — 97597 DBRDMT OPN WND 1ST 20 CM/<: CPT | Performed by: PREVENTIVE MEDICINE

## 2021-07-28 RX ORDER — LIDOCAINE HYDROCHLORIDE 40 MG/ML
5 SOLUTION TOPICAL ONCE
Status: COMPLETED | OUTPATIENT
Start: 2021-07-28 | End: 2021-07-28

## 2021-07-28 RX ADMIN — LIDOCAINE HYDROCHLORIDE 5 ML: 40 SOLUTION TOPICAL at 15:17

## 2021-07-28 NOTE — PATIENT INSTRUCTIONS
Orders Placed This Encounter   Procedures    Wound cleansing and dressings     Left leg wound  Wash your hands with soap and water  Remove old dressing, discard into plastic bag and place in trash  Cleanse the wound with normal saline prior to applying a clean dressing  Do not use tissue or cotton balls  Do not scrub the wound  Pat dry using gauze  Shower yes if you cover the wound; do not get wound dressing wet  Apply MediHoney to left leg wound; cover with gauze  Secure with rolled gauze and tape  Change dressing three times per week  Wear Spandigrip to left leg      Discontinue wound vac               Standing Status:   Future     Standing Expiration Date:   7/28/2022    Wound compression and edema control     Elastic Tubular Stocking: Spandagrip E to left lower leg     Tubular elastic bandage: Apply from base of toes to behind the knee   Apply in AM, may remove for sleep      Avoid prolonged standing in one place      Elevate leg(s) above the level of the heart when sitting or as much as possible           Standing Status:   Future     Standing Expiration Date:   7/28/2022    Wound negative pressure wound therapy     Negative Pressure Wound Therapy Instructions    Discontinue NPWT     Standing Status:   Future     Standing Expiration Date:   7/28/2022    Wound home care     VNA three times weekly and PRN for wound care     Standing Status:   Future     Standing Expiration Date:   7/28/2022

## 2021-07-29 ENCOUNTER — APPOINTMENT (OUTPATIENT)
Dept: PHYSICAL THERAPY | Facility: CLINIC | Age: 81
End: 2021-07-29
Payer: MEDICARE

## 2021-07-29 NOTE — PROGRESS NOTES
Patient ID: Chuck Chaney is a [de-identified] y o  female Date of Birth 1940       Chief Complaint   Patient presents with    Follow Up Wound Care Visit     LLE wound       Allergies:  Patient has no known allergies  Diagnosis:  1  Open wound of left lower extremity with complication, initial encounter  -     lidocaine (XYLOCAINE) 4 % topical solution 5 mL  -     Wound cleansing and dressings; Future  -     Wound compression and edema control; Future  -     Wound negative pressure wound therapy; Future  -     Wound home care; Future  -     Debridement    2  Type 2 diabetes mellitus without complication, without long-term current use of insulin ([facility])  -     lidocaine (XYLOCAINE) 4 % topical solution 5 mL  -     Wound cleansing and dressings; Future  -     Wound compression and edema control; Future  -     Wound negative pressure wound therapy; Future  -     Wound home care; Future    3  Ambulatory dysfunction  -     lidocaine (XYLOCAINE) 4 % topical solution 5 mL  -     Wound cleansing and dressings; Future  -     Wound compression and edema control; Future  -     Wound negative pressure wound therapy; Future  -     Wound home care; Future    4  Hematoma of leg, left, initial encounter  -     lidocaine (XYLOCAINE) 4 % topical solution 5 mL  -     Wound cleansing and dressings; Future  -     Wound compression and edema control; Future  -     Wound negative pressure wound therapy; Future  -     Wound home care; Future       Diagnosis ICD-10-CM Associated Orders   1  Open wound of left lower extremity with complication, initial encounter  S81 802A lidocaine (XYLOCAINE) 4 % topical solution 5 mL     Wound cleansing and dressings     Wound compression and edema control     Wound negative pressure wound therapy     Wound home care     Debridement   2   Type 2 diabetes mellitus without complication, without long-term current use of insulin ([facility])  E11 9 lidocaine (XYLOCAINE) 4 % topical solution 5 mL     Wound cleansing and dressings     Wound compression and edema control     Wound negative pressure wound therapy     Wound home care   3  Ambulatory dysfunction  R26 2 lidocaine (XYLOCAINE) 4 % topical solution 5 mL     Wound cleansing and dressings     Wound compression and edema control     Wound negative pressure wound therapy     Wound home care   4  Hematoma of leg, left, initial encounter  S80 12XA lidocaine (XYLOCAINE) 4 % topical solution 5 mL     Wound cleansing and dressings     Wound compression and edema control     Wound negative pressure wound therapy     Wound home care        Assessment & Plan:  See wound orders  Continues to slowly heal  No signs of infection  Continue current care and recs    Subjective:   F/u LLE wound   No new complaints      The following portions of the patient's history were reviewed and updated as appropriate:   Patient Active Problem List   Diagnosis    Carpal tunnel syndrome    Colon, diverticulosis    Type 2 diabetes mellitus without complication, without long-term current use of insulin ([facility] )    Essential hypertension    GERD (gastroesophageal reflux disease)    Mixed hyperlipidemia    Insomnia    Spinal stenosis    Dementia due to Parkinson's disease with behavioral disturbance ([facility] )    Restless legs syndrome    Need for influenza vaccination    Age-related nuclear cataract of both eyes    Ambulatory dysfunction    Cervical dystonia    Parkinson's disease ([facility]    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 ([facility] )    Encounter for herb and vitamin supplement management    Intractable back pain    Primary osteoarthritis of right knee    Asymptomatic bacteriuria    Stage 3a chronic kidney disease ([facility] )    Frequent falls    Sinus arrhythmia    S/P deep brain stimulator placement    Underweight    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 ([facility] )     Past Medical History:   Diagnosis Date    RODRIGO (acute kidney injury) ([facility] ) 7/19/2019    Altered mental status 1/11/2021    Anal bleeding 1/29/2018    Arthritis     knee    Asthma     BPPV (benign paroxysmal positional vertigo) 7/19/2016    Last Assessment & Plan:  Hx consistent with BPPV  Neg [place], however could not perform it adequately due to dyskinesias  Recommend lozano-daroff exercises at home  IF worsens, vestibular therapy   Bulging lumbar disc 12/10/2013    Closed fracture of one rib of left side 1/29/2018    Colon polyp     Dementia ([address] )     Fall     balance issue    Fall during current hospitalization 1/15/2021    Gallbladder disease     GERD (gastroesophageal reflux disease)     Hematuria     last assessed 10/29/15    Orthostatic hypotension 7/13/2015    Osteomyelitis of lumbar spine ([address] ) 3/10/2020    Ovarian cyst     LAST ASSESSED: 12/10/13    Parkinson's disease ([address] )     Pneumonia of both lower lobes due to infectious organism 3/27/2018    Possible fracture of 5th metatarsal 1/12/2021    Psoriasis 12/10/2013    Pulmonary embolism with infarction ([address] ) 9/2/2014    Scalp laceration, subsequent encounter 11/15/2019    Sciatica 12/10/2013    Severe protein-calorie malnutrition ([address] ) 7/22/2019    Skin disorder     last assessed 12/10/13    Squamous cell carcinoma of skin 11/21/2016    Stage 3a chronic kidney disease ([address] ) 12/4/2020    Toxic metabolic encephalopathy 0/04/6306    Use of cane as ambulatory aid     Walker as ambulation aid     Wears glasses     Weight loss 10/8/2019    Last Assessment & Plan:  Recommend Boost or Ensure  F/u with PCP      Weight loss, unintentional 5/22/2015     Past Surgical History:   Procedure Laterality Date    APPENDECTOMY      1970'S    CATARACT EXTRACTION      CHOLECYSTECTOMY      1970'S    COLONOSCOPY      LAPAROTOMY N/A 7/18/2019    Procedure: LAPAROTOMY EXPLORATORY;  Surgeon: William Nixon MD;  Location: [address] [place] [address];  Service: General    NEUROPLASTY / [address] Right     OOPHORECTOMY Bilateral     REMOVAL OF BOTH OVARIES LAPAROSCOPIC    MS COLSC FLX W/RMVL OF TUMOR POLYP LESION SNARE TQ N/A 3/20/2018    Procedure: COLONOSCOPY;  Surgeon: Suly Yang MD;  Location: [place] [address] GI LAB; Service: Gastroenterology    MS XCAPSL CTRC RMVL INSJ IO LENS PROSTH W/O ECP Right 12/4/2018    Procedure: EXTRACTION EXTRACAPSULAR CATARACT PHACO INTRAOCULAR LENS (IOL); Surgeon: Suzan Braden MD;  Location: [facility] MAIN OR;  Service: Ophthalmology    MS XCAPSL CTRC RMVL INSJ IO LENS PROSTH W/O ECP  1/15/2019    Procedure: EXTRACTION EXTRACAPSULAR CATARACT PHACO INTRAOCULAR LENS (IOL); Surgeon: Suzan Braden MD;  Location: [facility] OR;  Service: Ophthalmology    TONSILLECTOMY      VENTRAL HERNIA REPAIR      WITH IMPLANT OF MESH     Social History     Socioeconomic History    Marital status: /Civil Union     Spouse name: Cipriano Ragland Number of children: 2    Years of education: 12    Highest education level: High school graduate   Occupational History    Occupation: [employer]     Comment: BILLING AND SHIPPING   Tobacco Use    Smoking status: Never Smoker    Smokeless tobacco: Never Used   Vaping Use    Vaping Use: Never used   Substance and Sexual Activity    Alcohol use: Not Currently    Drug use: Never    Sexual activity: None   Other Topics Concern    None   Social History Narrative    Son lives an hour away, daughter in [place]  Social Determinants of Health     Financial Resource Strain:     Difficulty of Paying Living Expenses:    Food Insecurity:     Worried About Running Out of Food in the Last Year:     [address] in the Last Year:    Transportation Needs:     Lack of Transportation (Medical):      Lack of Transportation (Non-Medical):    Physical Activity:     Days of Exercise per Week:     Minutes of Exercise per Session:    Stress:     Feeling of Stress :    Social Connections:     Frequency of Communication with Friends and Family:     Frequency of Social Gatherings with Friends and Family:     Attends Taoist Services:     Active Member of Clubs or Organizations:     Attends Club or Organization Meetings:     Marital Status:    Intimate Partner Violence:     Fear of Current or Ex-Partner:     Emotionally Abused:     Physically Abused:     Sexually Abused:         Current Outpatient Medications:     acetaminophen (TYLENOL) 325 mg tablet, Take 650 mg by mouth every 6 (six) hours as needed for mild pain, Disp: , Rfl:     Amantadine HCl ER (Gocovri) 137 MG CP24, Take 1 capsule by mouth, Disp: , Rfl:     amLODIPine (NORVASC) 10 mg tablet, Take 0 5 tablets (5 mg total) by mouth daily, Disp: 45 tablet, Rfl: 3    aspirin (ECOTRIN LOW STRENGTH) 81 mg EC tablet, Take 81 mg by mouth every morning , Disp: , Rfl:     carbidopa-levodopa (SINEMET)  mg per tablet, TAKE ONE TABLET BY MOUTH FIVE TIMES A DAY AS DIRECTED , Disp: , Rfl:     docusate sodium (COLACE) 100 mg capsule, Take 100 mg by mouth 2 (two) times a day, Disp: , Rfl:     gabapentin (NEURONTIN) 100 mg capsule, Take 1 capsule (100 mg total) by mouth daily at bedtime, Disp: 90 capsule, Rfl: 3    glucose monitoring kit (FREESTYLE) monitoring kit, Use 1 each as needed (DM control) ACCUCHEK METER COMPACT PLUS, Disp: 1 each, Rfl: 0    Lancets (freestyle) lancets, For daily testing, Disp: 100 each, Rfl: 4    Multiple Vitamin (MULTIVITAMIN) tablet, Take 1 tablet by mouth every morning , Disp: , Rfl:     Omega-3 Fatty Acids (FISH OIL PO), Take 2 g by mouth every morning , Disp: , Rfl:     pantoprazole (PROTONIX) 20 mg tablet, Take 1 tablet (20 mg total) by mouth daily, Disp: 90 tablet, Rfl: 0    QUEtiapine (SEROquel) 25 mg tablet, TAKE 1 TABLET (25 MG TOTAL) BY MOUTH DAILY AT BEDTIME AS NEEDED (SLEEP ) USE SPARINGLY, Disp: 30 tablet, Rfl: 0    rasagiline (AZILECT) 1 MG, Take 1 mg by mouth daily, Disp: , Rfl:     rOPINIRole (REQUIP) 2 mg tablet, TAKE 3 TABLETS BY MOUTH  NIGHTLY, Disp: , Rfl:     TURMERIC PO, Take by mouth 3 (three) times a day, Disp: , Rfl:   No current facility-administered medications for this visit  Family History   Problem Relation Age of Onset    Alzheimer's disease Mother     Stroke Father     No Known Problems Sister     No Known Problems Brother       Review of Systems   Constitutional: Negative  Respiratory: Negative  Cardiovascular: Negative  Skin: Positive for wound  Objective:  /82   Pulse 96   Temp (!) 97 3 °F (36 3 °C)   Resp 16     Physical Exam  Vitals reviewed  Constitutional:       General: She is not in acute distress  Cardiovascular:      Rate and Rhythm: Normal rate  Pulmonary:      Effort: Pulmonary effort is normal    Skin:     Comments: See wound assessment   Neurological:      Mental Status: She is alert and oriented to person, place, and time  Mental status is at baseline  Gait: Gait abnormal    Psychiatric:         Mood and Affect: Mood normal          Behavior: Behavior normal              Wound 06/23/21 Traumatic Pretibial Left; Lower (Active)   Wound Image   07/28/21 1458   Wound Description Granulation tissue;Pink;Slough; Yellow; Beefy red 07/28/21 1457   Julia-wound Assessment Intact 07/28/21 1457   Wound Length (cm) 2 cm 07/28/21 1457   Wound Width (cm) 2 cm 07/28/21 1457   Wound Depth (cm) 0 2 cm 07/28/21 1457   Wound Surface Area (cm^2) 4 cm^2 07/28/21 1457   Wound Volume (cm^3) 0 8 cm^3 07/28/21 1457   Calculated Wound Volume (cm^3) 0 8 cm^3 07/28/21 1457   Change in Wound Size % 32 2 07/28/21 1457   Undermining 0 07/14/21 1558   Undermining is depth extending from 2-5 oclock - deepest at 2 07/07/21 1548   Drainage Amount Moderate 07/28/21 1457   Drainage Description Serosanguineous 07/28/21 1457   Non-staged Wound Description Full thickness 07/28/21 1457   Wound packed? No 07/14/21 1558   Packing- # removed 1 07/07/21 1548   Dressing Status Intact; New drainage 07/28/21 1457                         Debridement   Wound 06/23/21 Traumatic Pretibial Left; Lower    Universal Protocol:  Consent: Written consent obtained  Risks and benefits: risks, benefits and alternatives were discussed  Consent given by: patient  Time out: Immediately prior to procedure a "time out" was called to verify the correct patient, procedure, equipment, support staff and site/side marked as required  Patient identity confirmed: verbally with patient      Performed by: physician  Debridement type: selective  Pain control: lidocaine 4%  Post-debridement measurements  Length (cm): 2  Width (cm): 2  Depth (cm): 0 2  Percent debrided: 100%  Surface Area (cm^2): 4  Area debrided (cm^2): 4  Volume (cm^3): 0 8  Devitalized tissue debrided: fibrin and slough  Instrument(s) utilized: curette  Bleeding: small  Hemostasis obtained with: pressure  Procedural pain (0-10): 0  Post-procedural pain: 0   Response to treatment: procedure was tolerated well                   Wound Instructions:  Orders Placed This Encounter   Procedures    Wound cleansing and dressings     Left leg wound  Wash your hands with soap and water  Remove old dressing, discard into plastic bag and place in trash  Cleanse the wound with normal saline prior to applying a clean dressing  Do not use tissue or cotton balls  Do not scrub the wound  Pat dry using gauze  Shower yes if you cover the wound; do not get wound dressing wet  Apply MediHoney to left leg wound; cover with gauze  Secure with rolled gauze and tape  Change dressing three times per week  Wear Spandigrip to left leg      Discontinue wound vac               Standing Status:   Future     Standing Expiration Date:   7/28/2022    Wound compression and edema control     Elastic Tubular Stocking: Spandagrip E to left lower leg     Tubular elastic bandage: Apply from base of toes to behind the knee   Apply in AM, may remove for sleep      Avoid prolonged standing in one place      Elevate leg(s) above the level of the heart when sitting or as much as possible           Standing Status:   Future     Standing Expiration Date:   7/28/2022    Wound negative pressure wound therapy     Negative Pressure Wound Therapy Instructions    Discontinue NPWT     Standing Status:   Future     Standing Expiration Date:   7/28/2022    Wound home care     VNA three times weekly and PRN for wound care     Standing Status:   Future     Standing Expiration Date:   7/28/2022    Debridement     This order was created via procedure documentation         Felisha Carvajal DO      Portions of the record may have been created with voice recognition software  Occasional wrong word or "sound a like" substitutions may have occurred due to the inherent limitations of voice recognition software  Read the chart carefully and recognize, using context, where substitutions have occurred

## 2021-08-04 ENCOUNTER — PATIENT OUTREACH (OUTPATIENT)
Dept: FAMILY MEDICINE CLINIC | Facility: CLINIC | Age: 81
End: 2021-08-04

## 2021-08-04 DIAGNOSIS — G25.81 RESTLESS LEGS SYNDROME: ICD-10-CM

## 2021-08-04 DIAGNOSIS — R29.6 FREQUENT FALLS: ICD-10-CM

## 2021-08-04 DIAGNOSIS — G20 DEMENTIA DUE TO PARKINSON'S DISEASE WITH BEHAVIORAL DISTURBANCE (HCC): ICD-10-CM

## 2021-08-04 DIAGNOSIS — F02.81 DEMENTIA DUE TO PARKINSON'S DISEASE WITH BEHAVIORAL DISTURBANCE (HCC): ICD-10-CM

## 2021-08-04 DIAGNOSIS — G20 DYSKINESIA DUE TO PARKINSON'S DISEASE (HCC): ICD-10-CM

## 2021-08-04 DIAGNOSIS — M17.11 PRIMARY OSTEOARTHRITIS OF RIGHT KNEE: ICD-10-CM

## 2021-08-04 DIAGNOSIS — G20 PARKINSON'S DISEASE (HCC): Primary | ICD-10-CM

## 2021-08-04 DIAGNOSIS — G24.9 DYSKINESIA DUE TO PARKINSON'S DISEASE (HCC): ICD-10-CM

## 2021-08-04 DIAGNOSIS — R26.2 AMBULATORY DYSFUNCTION: ICD-10-CM

## 2021-08-04 NOTE — PROGRESS NOTES
Received phone call from Itz at [facility]  Requesting order for PT  Original order for VNA services placed by [address] [place] [address]  [facility]  Reviewed request  CM placed new order to include PT services

## 2021-08-09 ENCOUNTER — TELEPHONE (OUTPATIENT)
Dept: FAMILY MEDICINE CLINIC | Facility: CLINIC | Age: 81
End: 2021-08-09

## 2021-08-10 NOTE — TELEPHONE ENCOUNTER
Form has been completed and placed in Completed BLUE team folder in clerical area  Maria De Jesus Scott is a 9 year old 7  month old male. Patient presents with:  New Patient  Physical: sports physical and school physical   Rash: rash on arm md legs, dicoloration       HPI:   Does running and basketball. School went well.  Getting good grade BMI: 44 %ile (Z= -0.14) based on CDC (Boys, 2-20 Years) BMI-for-age based on BMI available as of 6/10/2019.   06/10/19  1341   BP: 102/68   Pulse: 77   Temp: 98.4 °F (36.9 °C)   SpO2: 98%   Weight: 82 lb   Height: 58.5\"       GENERAL: NAD, well-nourished, · Reading. Does your child like to read? Is the child reading at the right level for his or her age group?   · Friendships. Does your child have friends at school? How do they get along? Do you like your child’s friends?  Do you have any concerns about your · Limit sugary drinks. Soda, juice, and sports drinks lead to unhealthy weight gain and tooth decay. Water and low-fat or nonfat milk are best to drink.  In moderation (6 ounces for a child 10years old and 12 ounces for a child 9to 8years old daily), 100 · When riding a bike, your child should wear a helmet with the strap fastened. While roller-skating, roller-blading, or using a scooter or skateboard, it’s safest to wear wrist guards, elbow pads, and knee pads, as well as a helmet.   · In the car, continue · To help your child, be positive and supportive. Praise your child for not wetting and even for trying hard to stay dry. · Two hours before bedtime, don’t serve your child anything to drink. · Remind your child to use the toilet before bed.  You could al

## 2021-08-11 ENCOUNTER — TELEPHONE (OUTPATIENT)
Dept: FAMILY MEDICINE CLINIC | Facility: CLINIC | Age: 81
End: 2021-08-11

## 2021-08-18 ENCOUNTER — OFFICE VISIT (OUTPATIENT)
Dept: FAMILY MEDICINE CLINIC | Facility: CLINIC | Age: 81
End: 2021-08-18
Payer: MEDICARE

## 2021-08-18 ENCOUNTER — OFFICE VISIT (OUTPATIENT)
Dept: WOUND CARE | Facility: HOSPITAL | Age: 81
End: 2021-08-18
Payer: MEDICARE

## 2021-08-18 VITALS
HEART RATE: 80 BPM | DIASTOLIC BLOOD PRESSURE: 81 MMHG | RESPIRATION RATE: 16 BRPM | SYSTOLIC BLOOD PRESSURE: 139 MMHG | TEMPERATURE: 99.5 F

## 2021-08-18 VITALS
DIASTOLIC BLOOD PRESSURE: 80 MMHG | OXYGEN SATURATION: 98 % | SYSTOLIC BLOOD PRESSURE: 132 MMHG | TEMPERATURE: 97.7 F | RESPIRATION RATE: 16 BRPM | HEIGHT: 60 IN | WEIGHT: 86.6 LBS | HEART RATE: 83 BPM | BODY MASS INDEX: 17 KG/M2

## 2021-08-18 DIAGNOSIS — E11.9 TYPE 2 DIABETES MELLITUS WITHOUT COMPLICATION, WITHOUT LONG-TERM CURRENT USE OF INSULIN (HCC): ICD-10-CM

## 2021-08-18 DIAGNOSIS — E58 CALCIUM DEFICIENCY: ICD-10-CM

## 2021-08-18 DIAGNOSIS — E11.9 TYPE 2 DIABETES MELLITUS WITHOUT COMPLICATION, WITHOUT LONG-TERM CURRENT USE OF INSULIN (HCC): Primary | ICD-10-CM

## 2021-08-18 DIAGNOSIS — E78.2 MIXED HYPERLIPIDEMIA: ICD-10-CM

## 2021-08-18 DIAGNOSIS — G20 PARKINSON'S DISEASE (HCC): ICD-10-CM

## 2021-08-18 DIAGNOSIS — N18.31 STAGE 3A CHRONIC KIDNEY DISEASE (HCC): ICD-10-CM

## 2021-08-18 DIAGNOSIS — S81.802A OPEN WOUND OF LEFT LOWER EXTREMITY WITH COMPLICATION, INITIAL ENCOUNTER: Primary | ICD-10-CM

## 2021-08-18 DIAGNOSIS — I10 ESSENTIAL HYPERTENSION: ICD-10-CM

## 2021-08-18 PROCEDURE — 1124F ACP DISCUSS-NO DSCNMKR DOCD: CPT | Performed by: FAMILY MEDICINE

## 2021-08-18 PROCEDURE — 99213 OFFICE O/P EST LOW 20 MIN: CPT | Performed by: PREVENTIVE MEDICINE

## 2021-08-18 PROCEDURE — 99214 OFFICE O/P EST MOD 30 MIN: CPT | Performed by: FAMILY MEDICINE

## 2021-08-18 RX ORDER — LANOLIN ALCOHOL/MO/W.PET/CERES
1 CREAM (GRAM) TOPICAL 2 TIMES DAILY
Qty: 180 TABLET | Refills: 3 | Status: SHIPPED | OUTPATIENT
Start: 2021-08-18

## 2021-08-18 RX ORDER — LIDOCAINE HYDROCHLORIDE 40 MG/ML
5 SOLUTION TOPICAL ONCE
Status: COMPLETED | OUTPATIENT
Start: 2021-08-18 | End: 2021-08-18

## 2021-08-18 RX ADMIN — LIDOCAINE HYDROCHLORIDE 5 ML: 40 SOLUTION TOPICAL at 13:43

## 2021-08-18 NOTE — PATIENT INSTRUCTIONS
Orders Placed This Encounter   Procedures    Wound cleansing and dressings     Left leg Wound:    Wash your hands with soap and water  Remove old dressing, discard into plastic bag and place in trash  Cleanse the wound with normal saline prior to applying a clean dressing  Do not use tissue or cotton balls  Do not scrub the wound  Pat dry using gauze  Shower yes   Apply MediHoney to left leg wound; cover with gauze  Secure with rolled gauze and tape  Change dressing every other day  Wear Spandigrip to left leg  You are discharged from the [address]  If you have any questions, please call the [address],[facility]                       Standing Status:   Future     Standing Expiration Date:   8/18/2022    Wound compression and edema control     Wound compression and edema control  Elastic Tubular Stocking: Spandagrip E to left lower leg     Tubular elastic bandage: Apply from base of toes to behind the knee  Apply in AM, may remove for sleep      Avoid prolonged standing in one place      Elevate leg(s) above the level of the heart when sitting or as much as possible             Standing Status:   Future     Standing Expiration Date:   8/18/2022    Wound home care     Wound home care        VNA three times weekly and PRN for wound care for 1-2 weeks  Pt is discharged from the [address]   Was advised to continue [address] to wound every other day for another 1-2 weeks or until fully healed           Standing Status:   Future     Standing Expiration Date:   8/18/2022

## 2021-08-18 NOTE — PROGRESS NOTES
ASSESSMENT/PLAN:  1  Type 2 diabetes mellitus without complication, without long-term current use of insulin (HCC)  Diet controlled  Allow adequate calcium  - Glucometer test strips    2  Essential hypertension  Adequate BP control    3  Parkinson's disease ([address] )  Fair control  Sees neurologist  Deep brain stimulator does help her symptoms  4  Stage 3a chronic kidney disease ([facility] )  monitor    5  Mixed hyperlipidemia  Monitor  Avoiding aggressive tx given age      Reviewed and reinforced basics of healthy lifestyle  Risks and benefits of therapeutic plan discussed, answered all patient questions and concerns and patient expressed understanding and agreement of therapeutic plan  Follow up plan: 3 months      SUBJECTIVE:  Chief complaint and HPI: Xander Ortiz is a [de-identified] y o  female who presents for follow up of multiple chronic medical problems, as listed below in problem list  All problems are relatively stable except for the following issues:     Breakfast: toast  Lunch: varies  Dinner: Eats out a lot  Varies  No milk    Beverages: Juices, soda    Was exposed to someone with COVID 2 days ago but both of them feel fine  Review of systems:  No fever/chills/sweats/unexplained weight loss  No chest pain/shortness of breath  No change in digestion or bowel movements  No change in urination  No new musculoskeletal   Parkinson has fair control  No recent falls    Chart reviewed for relevant medical, surgical and psychosocial history, medications and allergies, labs and studies      Patient Active Problem List   Diagnosis    Carpal tunnel syndrome    Colon, diverticulosis    Type 2 diabetes mellitus without complication, without long-term current use of insulin ([address] )    Essential hypertension    GERD (gastroesophageal reflux disease)    Mixed hyperlipidemia    Insomnia    Spinal stenosis    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)    Restless legs syndrome    Need for influenza vaccination    Age-related nuclear cataract of both eyes    Ambulatory dysfunction    Cervical dystonia    Parkinson's disease ([facility]    Prothrombin [id number] mutation ([facility] )    Encounter for herb and vitamin supplement management    Intractable back pain    Primary osteoarthritis of right knee    Asymptomatic bacteriuria    Stage 3a chronic kidney disease ([facility] )    Frequent falls    Sinus arrhythmia    S/P deep brain stimulator placement    Underweight    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 ([facility] )             EXAM:  /80 (BP Location: Left arm, Patient Position: Sitting, Cuff Size: Standard)   Pulse 83   Temp 97 7 °F (36 5 °C) (Tympanic)   Resp 16   Ht 5' (1 524 m)   Wt 39 3 kg (86 lb 9 6 oz)   SpO2 98%   BMI 16 91 kg/m²   The patient appears well, in no apparent distress  Alert and oriented times three, pleasant and cooperative  Vital signs are as noted by the nurse  /80 (BP Location: Left arm, Patient Position: Sitting, Cuff Size: Standard)   Pulse 83   Temp 97 7 °F (36 5 °C) (Tympanic)   Resp 16   Ht 5' (1 524 m)   Wt 39 3 kg (86 lb 9 6 oz)   SpO2 98%   BMI 16 91 kg/m²     Head: normocephalic, atraumatic  Eyes: well perfused conjunctiva, not clinically pale, not jaundiced  Ears: external ear: no gross lesions  Nose: no rhinorrhea  Neck: supple, trachea in the midline and no concerning cervical lymphadenopathy  Lungs: No respiratory labor  Clear to auscultation with mild hoarse breath sounds  Heart: Regular rate and rhythm, no murmurs, gallops or rubs  Abdomen: Benign: soft, non-tender, non-distended  No guarding or rebound  No masses or organomegaly  Normal bowel sounds,   Skin: No pallor  No rashes noted  Extremities: Moves all extremities normally  No pedal edema  L shin contusion is healing well

## 2021-08-18 NOTE — PROGRESS NOTES
Patient ID: Marty Chacon is a [de-identified] y o  female Date of Birth 1940       Chief Complaint   Patient presents with    Follow Up Wound Care Visit     LLE       Allergies:  Patient has no known allergies  Diagnosis:  1  Open wound of left lower extremity with complication, initial encounter  -     lidocaine (XYLOCAINE) 4 % topical solution 5 mL  -     Wound cleansing and dressings; Future  -     Wound compression and edema control; Future  -     Wound home care; Future    2  Type 2 diabetes mellitus without complication, without long-term current use of insulin ([facility] )       Diagnosis ICD-10-CM Associated Orders   1  Open wound of left lower extremity with complication, initial encounter  S81 802A lidocaine (XYLOCAINE) 4 % topical solution 5 mL     Wound cleansing and dressings     Wound compression and edema control     Wound home care   2  Type 2 diabetes mellitus without complication, without long-term current use of insulin ([facility])  E11 9         Assessment & Plan:  See wound orders  Has small pinhole opening  No need for further f/u  Continue medihoney until completely healed  F/u prn    Subjective:   F/u LLE wound   No new complaints      The following portions of the patient's history were reviewed and updated as appropriate:   Patient Active Problem List   Diagnosis    Carpal tunnel syndrome    Colon, diverticulosis    Type 2 diabetes mellitus without complication, without long-term current use of insulin ([facility] )    Essential hypertension    GERD (gastroesophageal reflux disease)    Mixed hyperlipidemia    Insomnia    Spinal stenosis    Dementia due to Parkinson's disease with behavioral disturbance ([facility] )    Restless legs syndrome    Need for influenza vaccination    Age-related nuclear cataract of both eyes    Ambulatory dysfunction    Cervical dystonia    Parkinson's disease ([facility] )    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 ([facility]    Encounter for herb and vitamin supplement management    Intractable back pain    Primary osteoarthritis of right knee    Asymptomatic bacteriuria    Stage 3a chronic kidney disease (HCC)    Frequent falls    Sinus arrhythmia    S/P deep brain stimulator placement    Underweight    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 [facility])     Past Medical History:   Diagnosis Date    RODRIGO (acute kidney injury) ([facility] ) 7/19/2019    Altered mental status 1/11/2021    Anal bleeding 1/29/2018    Arthritis     knee    Asthma     BPPV (benign paroxysmal positional vertigo) 7/19/2016    Last Assessment & Plan:  Hx consistent with BPPV  Neg Joe-Halpike, however could not perform it adequately due to dyskinesias  Recommend lozano-daroff exercises at home  IF worsens, vestibular therapy   Bulging lumbar disc 12/10/2013    Closed fracture of one rib of left side 1/29/2018    Colon polyp     Dementia ([address] )     Fall     balance issue    Fall during current hospitalization 1/15/2021    Gallbladder disease     GERD (gastroesophageal reflux disease)     Hematuria     last assessed 10/29/15    Orthostatic hypotension 7/13/2015    Osteomyelitis of lumbar spine ([address] ) 3/10/2020    Ovarian cyst     LAST ASSESSED: 12/10/13    Parkinson's disease ([address] )     Pneumonia of both lower lobes due to infectious organism 3/27/2018    Possible fracture of 5th metatarsal 1/12/2021    Psoriasis 12/10/2013    Pulmonary embolism with infarction ([address] ) 9/2/2014    Scalp laceration, subsequent encounter 11/15/2019    Sciatica 12/10/2013    Severe protein-calorie malnutrition ([address] ) 7/22/2019    Skin disorder     last assessed 12/10/13    Squamous cell carcinoma of skin 11/21/2016    Stage 3a chronic kidney disease ([address] ) 12/4/2020    Toxic metabolic encephalopathy 9/90/7427    Use of cane as ambulatory aid     Walker as ambulation aid     Wears glasses     Weight loss 10/8/2019    Last Assessment & Plan:  Recommend Boost or Ensure  F/u with PCP      Weight loss, unintentional 5/22/2015     Past Surgical History:   Procedure Laterality Date    APPENDECTOMY      1970'S    CATARACT EXTRACTION      CHOLECYSTECTOMY      1970'S    COLONOSCOPY      LAPAROTOMY N/A 7/18/2019    Procedure: LAPAROTOMY EXPLORATORY;  Surgeon: Kaur Reyes MD;  Location: [address] [place] [address];  Service: General    NEUROPLASTY / [address] Right     OOPHORECTOMY Bilateral     REMOVAL OF BOTH OVARIES LAPAROSCOPIC     [place] FLX W/RMVL OF TUMOR POLYP LESION [address] [facility] TQ N/A 3/20/2018    Procedure: COLONOSCOPY;  Surgeon: Matt Sanches MD;  Location: [place] [address] GI LAB; Service: Gastroenterology    MO XCAPSL CTRC RMVL INSJ IO LENS PROSTH W/O ECP Right 12/4/2018    Procedure: EXTRACTION EXTRACAPSULAR CATARACT PHACO INTRAOCULAR LENS (IOL); Surgeon: Ilene Adhikari MD;  Location: [facility] MAIN OR;  Service: Ophthalmology    MO XCAPSL CTRC RMVL INSJ IO LENS PROSTH W/O ECP  1/15/2019    Procedure: EXTRACTION EXTRACAPSULAR CATARACT PHACO INTRAOCULAR LENS (IOL); Surgeon: Ilene Adhikari MD;  Location: [facility] MAIN OR;  Service: Ophthalmology    TONSILLECTOMY      VENTRAL HERNIA REPAIR      WITH IMPLANT OF MESH     Social History     Socioeconomic History    Marital status: /Civil Union     Spouse name: Cynthia Crain Number of children: 2    Years of education: 15    Highest education level: High school graduate   Occupational History    Occupation: [employer]     Comment: BILLING AND SHIPPING   Tobacco Use    Smoking status: Never Smoker    Smokeless tobacco: Never Used   Vaping Use    Vaping Use: Never used   Substance and Sexual Activity    Alcohol use: Not Currently    Drug use: Never    Sexual activity: Not Currently     Partners: Male   Other Topics Concern    None   Social History Narrative    Son lives an hour away, daughter in [place]        Social Determinants of Health     Financial Resource Strain:     Difficulty of Paying Living Expenses:    Food Insecurity:     Worried About Running Out of Food in the Last Year:    [address] in the Last Year:    Transportation Needs:     Lack of Transportation (Medical):  Lack of Transportation (Non-Medical):    Physical Activity:     Days of Exercise per Week:     Minutes of Exercise per Session:    Stress:     Feeling of Stress :    Social Connections:     Frequency of Communication with Friends and Family:     Frequency of Social Gatherings with Friends and Family:     Attends Yazidi Services:     Active Member of Clubs or Organizations:     Attends Club or Organization Meetings:     Marital Status:    Intimate Partner Violence:     Fear of Current or Ex-Partner:     Emotionally Abused:     Physically Abused:     Sexually Abused:         Current Outpatient Medications:     acetaminophen (TYLENOL) 325 mg tablet, Take 650 mg by mouth every 6 (six) hours as needed for mild pain, Disp: , Rfl:     Amantadine HCl ER (Gocovri) 137 MG CP24, Take 1 capsule by mouth, Disp: , Rfl:     amLODIPine (NORVASC) 10 mg tablet, Take 0 5 tablets (5 mg total) by mouth daily, Disp: 45 tablet, Rfl: 3    aspirin (ECOTRIN LOW STRENGTH) 81 mg EC tablet, Take 81 mg by mouth every morning , Disp: , Rfl:     calcium citrate-vitamin D (CITRACAL+D) 315-200 MG-UNIT per tablet, Take 1 tablet by mouth 2 (two) times a day Take with snack  Avoid taking with other meds  , Disp: 180 tablet, Rfl: 3    carbidopa-levodopa (SINEMET)  mg per tablet, TAKE ONE TABLET BY MOUTH FIVE TIMES A DAY AS DIRECTED , Disp: , Rfl:     docusate sodium (COLACE) 100 mg capsule, Take 100 mg by mouth 2 (two) times a day, Disp: , Rfl:     gabapentin (NEURONTIN) 100 mg capsule, Take 1 capsule (100 mg total) by mouth daily at bedtime, Disp: 90 capsule, Rfl: 3    glucose blood test strip, Use 100 each daily Use to test sugar once a day , Disp: 100 strip, Rfl: 5    glucose monitoring kit (FREESTYLE) monitoring kit, Use 1 each as needed (DM control) ACCUCHEK METER COMPACT PLUS, Disp: 1 each, Rfl: 0    Lancets (freestyle) lancets, For daily testing, Disp: 100 each, Rfl: 4    Multiple Vitamin (MULTIVITAMIN) tablet, Take 1 tablet by mouth every morning , Disp: , Rfl:     Omega-3 Fatty Acids (FISH OIL PO), Take 2 g by mouth every morning , Disp: , Rfl:     pantoprazole (PROTONIX) 20 mg tablet, Take 1 tablet (20 mg total) by mouth daily, Disp: 90 tablet, Rfl: 0    QUEtiapine (SEROquel) 25 mg tablet, TAKE 1 TABLET (25 MG TOTAL) BY MOUTH DAILY AT BEDTIME AS NEEDED (SLEEP ) USE SPARINGLY, Disp: 30 tablet, Rfl: 3    rasagiline (AZILECT) 1 MG, Take 1 mg by mouth daily, Disp: , Rfl:     rOPINIRole (REQUIP) 2 mg tablet, TAKE 3 TABLETS BY MOUTH  NIGHTLY, Disp: , Rfl:     TURMERIC PO, Take by mouth 3 (three) times a day, Disp: , Rfl:   No current facility-administered medications for this visit  Family History   Problem Relation Age of Onset    Alzheimer's disease Mother     Stroke Father     No Known Problems Sister     No Known Problems Brother       Review of Systems   Constitutional: Negative  Respiratory: Negative  Cardiovascular: Negative  Skin: Positive for wound  Objective:  /81   Pulse 80   Temp 99 5 °F (37 5 °C)   Resp 16     Physical Exam  Vitals reviewed  Constitutional:       General: She is not in acute distress  Cardiovascular:      Rate and Rhythm: Normal rate  Pulmonary:      Effort: Pulmonary effort is normal    Skin:     Comments: See wound assessment   Neurological:      Mental Status: She is alert and oriented to person, place, and time  Mental status is at baseline  Gait: Gait abnormal    Psychiatric:         Mood and Affect: Mood normal          Behavior: Behavior normal              Wound 06/23/21 Traumatic Pretibial Left; Lower (Active)   Wound Image   08/18/21 1341   Wound Description Brown;Epithelialization;Eschar 08/18/21 1340   Julia-wound Assessment Intact 08/18/21 1340   Wound Length (cm) 2 cm 08/18/21 1340   Wound Width (cm) 1 4 cm 08/18/21 1340   Wound Depth (cm) 0 cm 08/18/21 1340   Wound Surface Area (cm^2) 2 8 cm^2 08/18/21 1340   Wound Volume (cm^3) 0 cm^3 08/18/21 1340   Calculated Wound Volume (cm^3) 0 cm^3 08/18/21 1340   Change in Wound Size % 100 08/18/21 1340   Undermining 0 07/14/21 1558   Undermining is depth extending from 2-5 oclock - deepest at 2 07/07/21 1548   Drainage Amount None 08/18/21 1340   Drainage Description Serosanguineous 07/28/21 1457   Non-staged Wound Description Full thickness 07/28/21 1457   Wound packed? No 07/14/21 1558   Packing- # removed 1 07/07/21 1548   Dressing Status Intact 08/18/21 1340                         Procedures             Wound Instructions:  Orders Placed This Encounter   Procedures    Wound cleansing and dressings     Left leg Wound:    Wash your hands with soap and water  Remove old dressing, discard into plastic bag and place in trash  Cleanse the wound with normal saline prior to applying a clean dressing  Do not use tissue or cotton balls  Do not scrub the wound  Pat dry using gauze  Shower yes   Apply MediHoney to left leg wound; cover with gauze  Secure with rolled gauze and tape  Change dressing every other day  Wear Spandigrip to left leg  You are discharged from the [address] [facility],[address]  If you have any questions, please call the [address] [facility],[address]                       Standing Status:   Future     Standing Expiration Date:   8/18/2022    Wound compression and edema control     Wound compression and edema control  Elastic Tubular Stocking: Spandagrip E to left lower leg     Tubular elastic bandage: Apply from base of toes to behind the knee   Apply in AM, may remove for sleep      Avoid prolonged standing in one place      Elevate leg(s) above the level of the heart when sitting or as much as possible             Standing Status:   Future     Standing Expiration Date:   8/18/2022    Wound home care     Wound home care        VNA three times weekly and PRN for wound care for 1-2 weeks  Pt is discharged from the [address] [facility]  Was advised to continue [address] to wound every other day for another 1-2 weeks or until fully healed           Standing Status:   Future     Standing Expiration Date:   8/18/2022         Vinicio Smith DO      Portions of the record may have been created with voice recognition software  Occasional wrong word or "sound a like" substitutions may have occurred due to the inherent limitations of voice recognition software  Read the chart carefully and recognize, using context, where substitutions have occurred

## 2021-08-18 NOTE — LETTER
[facility]  Renan Garcias  [place] [address]  Phone#  [phone]  Fax#  [phone]    Patient:  Rios Gautam  YOB: 1940  Phone:  [phone]  Date of Visit:  8/18/2021    Orders Placed This Encounter   Procedures    Wound cleansing and dressings     Left leg Wound:    Wash your hands with soap and water  Remove old dressing, discard into plastic bag and place in trash  Cleanse the wound with normal saline prior to applying a clean dressing  Do not use tissue or cotton balls  Do not scrub the wound  Pat dry using gauze  Shower yes   Apply MediHoney to left leg wound; cover with gauze  Secure with rolled gauze and tape  Change dressing every other day  Wear Spandigrip to left leg  You are discharged from the [address] [facility],[address]  If you have any questions, please call the [address] [facility]                       Standing Status:   Future     Standing Expiration Date:   8/18/2022    Wound compression and edema control     Wound compression and edema control  Elastic Tubular Stocking: Spandagrip E to left lower leg     Tubular elastic bandage: Apply from base of toes to behind the knee  Apply in AM, may remove for sleep      Avoid prolonged standing in one place      Elevate leg(s) above the level of the heart when sitting or as much as possible             Standing Status:   Future     Standing Expiration Date:   8/18/2022    Wound home care     Wound home care        VNA three times weekly and PRN for wound care for 1-2 weeks  Pt is discharged from the [address] [facility]   Was advised to continue [address] to wound every other day for another 1-2 weeks or until fully healed           Standing Status:   Future     Standing Expiration Date:   8/18/2022         Electronically signed by Aliyah Davis DO

## 2021-09-01 ENCOUNTER — TELEPHONE (OUTPATIENT)
Dept: FAMILY MEDICINE CLINIC | Facility: CLINIC | Age: 81
End: 2021-09-01

## 2021-09-02 ENCOUNTER — APPOINTMENT (EMERGENCY)
Dept: RADIOLOGY | Facility: HOSPITAL | Age: 81
DRG: 640 | End: 2021-09-02
Payer: MEDICARE

## 2021-09-02 ENCOUNTER — TELEPHONE (OUTPATIENT)
Dept: FAMILY MEDICINE CLINIC | Facility: CLINIC | Age: 81
End: 2021-09-02

## 2021-09-02 ENCOUNTER — HOSPITAL ENCOUNTER (INPATIENT)
Facility: HOSPITAL | Age: 81
LOS: 8 days | Discharge: NURSING, REHAB OR LONG-TERM CARE | DRG: 640 | End: 2021-09-10
Attending: EMERGENCY MEDICINE | Admitting: FAMILY MEDICINE
Payer: MEDICARE

## 2021-09-02 DIAGNOSIS — G47.00 INSOMNIA, UNSPECIFIED TYPE: ICD-10-CM

## 2021-09-02 DIAGNOSIS — R63.6 UNDERWEIGHT: ICD-10-CM

## 2021-09-02 DIAGNOSIS — G20 PARKINSON'S DISEASE (HCC): ICD-10-CM

## 2021-09-02 DIAGNOSIS — F02.81 DEMENTIA DUE TO PARKINSON'S DISEASE WITH BEHAVIORAL DISTURBANCE (HCC): ICD-10-CM

## 2021-09-02 DIAGNOSIS — R47.01 APHASIA: Primary | ICD-10-CM

## 2021-09-02 DIAGNOSIS — R41.82 ALTERED MENTAL STATUS: ICD-10-CM

## 2021-09-02 DIAGNOSIS — G20 DEMENTIA DUE TO PARKINSON'S DISEASE WITH BEHAVIORAL DISTURBANCE (HCC): ICD-10-CM

## 2021-09-02 LAB
ALBUMIN SERPL BCP-MCNC: 4 G/DL (ref 3.5–5)
ALP SERPL-CCNC: 87 U/L (ref 46–116)
ALT SERPL W P-5'-P-CCNC: 13 U/L (ref 12–78)
ANION GAP SERPL CALCULATED.3IONS-SCNC: 8 MMOL/L (ref 4–13)
AST SERPL W P-5'-P-CCNC: 25 U/L (ref 5–45)
BACTERIA UR QL AUTO: ABNORMAL /HPF
BASOPHILS # BLD AUTO: 0.13 THOUSANDS/ΜL (ref 0–0.1)
BASOPHILS NFR BLD AUTO: 2 % (ref 0–1)
BILIRUB SERPL-MCNC: 0.73 MG/DL (ref 0.2–1)
BILIRUB UR QL STRIP: NEGATIVE
BUN SERPL-MCNC: 27 MG/DL (ref 5–25)
CALCIUM SERPL-MCNC: 9.2 MG/DL (ref 8.3–10.1)
CHLORIDE SERPL-SCNC: 109 MMOL/L (ref 100–108)
CLARITY UR: CLEAR
CO2 SERPL-SCNC: 29 MMOL/L (ref 21–32)
COLOR UR: ABNORMAL
CREAT SERPL-MCNC: 1.56 MG/DL (ref 0.6–1.3)
EOSINOPHIL # BLD AUTO: 0.07 THOUSAND/ΜL (ref 0–0.61)
EOSINOPHIL NFR BLD AUTO: 1 % (ref 0–6)
ERYTHROCYTE [DISTWIDTH] IN BLOOD BY AUTOMATED COUNT: 13.9 % (ref 11.6–15.1)
GFR SERPL CREATININE-BSD FRML MDRD: 31 ML/MIN/1.73SQ M
GLUCOSE SERPL-MCNC: 133 MG/DL (ref 65–140)
GLUCOSE SERPL-MCNC: 137 MG/DL (ref 65–140)
GLUCOSE UR STRIP-MCNC: NEGATIVE MG/DL
HCT VFR BLD AUTO: 40.7 % (ref 34.8–46.1)
HGB BLD-MCNC: 12.4 G/DL (ref 11.5–15.4)
HGB UR QL STRIP.AUTO: ABNORMAL
IMM GRANULOCYTES # BLD AUTO: 0.01 THOUSAND/UL (ref 0–0.2)
IMM GRANULOCYTES NFR BLD AUTO: 0 % (ref 0–2)
KETONES UR STRIP-MCNC: ABNORMAL MG/DL
LEUKOCYTE ESTERASE UR QL STRIP: NEGATIVE
LYMPHOCYTES # BLD AUTO: 0.75 THOUSANDS/ΜL (ref 0.6–4.47)
LYMPHOCYTES NFR BLD AUTO: 13 % (ref 14–44)
MAGNESIUM SERPL-MCNC: 2.3 MG/DL (ref 1.6–2.6)
MCH RBC QN AUTO: 28.3 PG (ref 26.8–34.3)
MCHC RBC AUTO-ENTMCNC: 30.5 G/DL (ref 31.4–37.4)
MCV RBC AUTO: 93 FL (ref 82–98)
MONOCYTES # BLD AUTO: 0.38 THOUSAND/ΜL (ref 0.17–1.22)
MONOCYTES NFR BLD AUTO: 7 % (ref 4–12)
NEUTROPHILS # BLD AUTO: 4.37 THOUSANDS/ΜL (ref 1.85–7.62)
NEUTS SEG NFR BLD AUTO: 77 % (ref 43–75)
NITRITE UR QL STRIP: NEGATIVE
NON-SQ EPI CELLS URNS QL MICRO: ABNORMAL /HPF
NRBC BLD AUTO-RTO: 0 /100 WBCS
PH UR STRIP.AUTO: 6.5 [PH]
PLATELET # BLD AUTO: 251 THOUSANDS/UL (ref 149–390)
PMV BLD AUTO: 11.4 FL (ref 8.9–12.7)
POTASSIUM SERPL-SCNC: 4.5 MMOL/L (ref 3.5–5.3)
PROT SERPL-MCNC: 7.4 G/DL (ref 6.4–8.2)
PROT UR STRIP-MCNC: NEGATIVE MG/DL
RBC # BLD AUTO: 4.38 MILLION/UL (ref 3.81–5.12)
RBC #/AREA URNS AUTO: ABNORMAL /HPF
SODIUM SERPL-SCNC: 146 MMOL/L (ref 136–145)
SP GR UR STRIP.AUTO: 1.02 (ref 1–1.03)
UROBILINOGEN UR QL STRIP.AUTO: 0.2 E.U./DL
WBC # BLD AUTO: 5.71 THOUSAND/UL (ref 4.31–10.16)
WBC #/AREA URNS AUTO: ABNORMAL /HPF

## 2021-09-02 PROCEDURE — 96374 THER/PROPH/DIAG INJ IV PUSH: CPT

## 2021-09-02 PROCEDURE — G1004 CDSM NDSC: HCPCS

## 2021-09-02 PROCEDURE — 99285 EMERGENCY DEPT VISIT HI MDM: CPT | Performed by: EMERGENCY MEDICINE

## 2021-09-02 PROCEDURE — 99285 EMERGENCY DEPT VISIT HI MDM: CPT

## 2021-09-02 PROCEDURE — 96361 HYDRATE IV INFUSION ADD-ON: CPT

## 2021-09-02 PROCEDURE — 36415 COLL VENOUS BLD VENIPUNCTURE: CPT | Performed by: EMERGENCY MEDICINE

## 2021-09-02 PROCEDURE — 96372 THER/PROPH/DIAG INJ SC/IM: CPT

## 2021-09-02 PROCEDURE — 87086 URINE CULTURE/COLONY COUNT: CPT | Performed by: STUDENT IN AN ORGANIZED HEALTH CARE EDUCATION/TRAINING PROGRAM

## 2021-09-02 PROCEDURE — 70450 CT HEAD/BRAIN W/O DYE: CPT

## 2021-09-02 PROCEDURE — 70498 CT ANGIOGRAPHY NECK: CPT

## 2021-09-02 PROCEDURE — 71045 X-RAY EXAM CHEST 1 VIEW: CPT

## 2021-09-02 PROCEDURE — 70496 CT ANGIOGRAPHY HEAD: CPT

## 2021-09-02 PROCEDURE — 80053 COMPREHEN METABOLIC PANEL: CPT | Performed by: EMERGENCY MEDICINE

## 2021-09-02 PROCEDURE — 85025 COMPLETE CBC W/AUTO DIFF WBC: CPT | Performed by: EMERGENCY MEDICINE

## 2021-09-02 PROCEDURE — 83735 ASSAY OF MAGNESIUM: CPT | Performed by: EMERGENCY MEDICINE

## 2021-09-02 PROCEDURE — 82948 REAGENT STRIP/BLOOD GLUCOSE: CPT

## 2021-09-02 PROCEDURE — 81001 URINALYSIS AUTO W/SCOPE: CPT | Performed by: EMERGENCY MEDICINE

## 2021-09-02 RX ORDER — TURMERIC 400 MG
1 CAPSULE ORAL 3 TIMES DAILY
Status: DISCONTINUED | OUTPATIENT
Start: 2021-09-02 | End: 2021-09-02 | Stop reason: RX

## 2021-09-02 RX ORDER — DOCUSATE SODIUM 100 MG/1
100 CAPSULE, LIQUID FILLED ORAL 2 TIMES DAILY
Status: DISCONTINUED | OUTPATIENT
Start: 2021-09-02 | End: 2021-09-05

## 2021-09-02 RX ORDER — CHLORAL HYDRATE 500 MG
2000 CAPSULE ORAL EVERY MORNING
Status: DISCONTINUED | OUTPATIENT
Start: 2021-09-03 | End: 2021-09-05

## 2021-09-02 RX ORDER — AMLODIPINE BESYLATE 5 MG/1
5 TABLET ORAL DAILY
Status: DISCONTINUED | OUTPATIENT
Start: 2021-09-03 | End: 2021-09-10 | Stop reason: HOSPADM

## 2021-09-02 RX ORDER — ASPIRIN 81 MG/1
81 TABLET ORAL EVERY MORNING
Status: DISCONTINUED | OUTPATIENT
Start: 2021-09-03 | End: 2021-09-04

## 2021-09-02 RX ORDER — ACETAMINOPHEN 325 MG/1
650 TABLET ORAL EVERY 6 HOURS PRN
Status: DISCONTINUED | OUTPATIENT
Start: 2021-09-02 | End: 2021-09-10 | Stop reason: HOSPADM

## 2021-09-02 RX ORDER — CALCIUM CARBONATE 500(1250)
1 TABLET ORAL 2 TIMES DAILY WITH MEALS
Status: DISCONTINUED | OUTPATIENT
Start: 2021-09-03 | End: 2021-09-10 | Stop reason: HOSPADM

## 2021-09-02 RX ORDER — HEPARIN SODIUM 5000 [USP'U]/ML
5000 INJECTION, SOLUTION INTRAVENOUS; SUBCUTANEOUS EVERY 8 HOURS SCHEDULED
Status: DISCONTINUED | OUTPATIENT
Start: 2021-09-02 | End: 2021-09-10 | Stop reason: HOSPADM

## 2021-09-02 RX ORDER — GABAPENTIN 100 MG/1
100 CAPSULE ORAL
Status: DISCONTINUED | OUTPATIENT
Start: 2021-09-02 | End: 2021-09-02

## 2021-09-02 RX ORDER — ASPIRIN 300 MG/1
300 SUPPOSITORY RECTAL ONCE
Status: COMPLETED | OUTPATIENT
Start: 2021-09-02 | End: 2021-09-02

## 2021-09-02 RX ORDER — RASAGILINE 1 MG/1
1 TABLET ORAL DAILY
Status: DISCONTINUED | OUTPATIENT
Start: 2021-09-03 | End: 2021-09-10 | Stop reason: HOSPADM

## 2021-09-02 RX ORDER — HALOPERIDOL 5 MG/ML
2 INJECTION INTRAMUSCULAR ONCE
Status: COMPLETED | OUTPATIENT
Start: 2021-09-02 | End: 2021-09-02

## 2021-09-02 RX ORDER — DIPHENHYDRAMINE HYDROCHLORIDE 50 MG/ML
25 INJECTION INTRAMUSCULAR; INTRAVENOUS ONCE
Status: COMPLETED | OUTPATIENT
Start: 2021-09-02 | End: 2021-09-03

## 2021-09-02 RX ORDER — AMANTADINE HYDROCHLORIDE 100 MG/1
100 CAPSULE, GELATIN COATED ORAL DAILY
Status: DISCONTINUED | OUTPATIENT
Start: 2021-09-03 | End: 2021-09-03

## 2021-09-02 RX ORDER — PANTOPRAZOLE SODIUM 20 MG/1
20 TABLET, DELAYED RELEASE ORAL
Status: DISCONTINUED | OUTPATIENT
Start: 2021-09-03 | End: 2021-09-10 | Stop reason: HOSPADM

## 2021-09-02 RX ORDER — SODIUM CHLORIDE, SODIUM LACTATE, POTASSIUM CHLORIDE, CALCIUM CHLORIDE 600; 310; 30; 20 MG/100ML; MG/100ML; MG/100ML; MG/100ML
75 INJECTION, SOLUTION INTRAVENOUS CONTINUOUS
Status: DISCONTINUED | OUTPATIENT
Start: 2021-09-02 | End: 2021-09-03

## 2021-09-02 RX ORDER — ROPINIROLE 0.25 MG/1
0.75 TABLET, FILM COATED ORAL
Status: DISCONTINUED | OUTPATIENT
Start: 2021-09-02 | End: 2021-09-10 | Stop reason: HOSPADM

## 2021-09-02 RX ORDER — LORAZEPAM 2 MG/ML
0.5 INJECTION INTRAMUSCULAR ONCE
Status: COMPLETED | OUTPATIENT
Start: 2021-09-02 | End: 2021-09-02

## 2021-09-02 RX ORDER — QUETIAPINE FUMARATE 25 MG/1
25 TABLET, FILM COATED ORAL
Status: DISCONTINUED | OUTPATIENT
Start: 2021-09-02 | End: 2021-09-10 | Stop reason: HOSPADM

## 2021-09-02 RX ADMIN — QUETIAPINE FUMARATE 25 MG: 25 TABLET ORAL at 22:03

## 2021-09-02 RX ADMIN — ROPINIROLE HYDROCHLORIDE 0.75 MG: 0.25 TABLET, FILM COATED ORAL at 22:03

## 2021-09-02 RX ADMIN — CARBIDOPA AND LEVODOPA 0.5 TABLET: 25; 100 TABLET ORAL at 22:03

## 2021-09-02 RX ADMIN — HEPARIN SODIUM 5000 UNITS: 5000 INJECTION INTRAVENOUS; SUBCUTANEOUS at 22:03

## 2021-09-02 RX ADMIN — ASPIRIN 300 MG: 300 SUPPOSITORY RECTAL at 17:28

## 2021-09-02 RX ADMIN — HALOPERIDOL LACTATE 2 MG: 5 INJECTION, SOLUTION INTRAMUSCULAR at 16:33

## 2021-09-02 RX ADMIN — SODIUM CHLORIDE, SODIUM LACTATE, POTASSIUM CHLORIDE, AND CALCIUM CHLORIDE 75 ML/HR: .6; .31; .03; .02 INJECTION, SOLUTION INTRAVENOUS at 22:00

## 2021-09-02 RX ADMIN — IOHEXOL 85 ML: 350 INJECTION, SOLUTION INTRAVENOUS at 15:58

## 2021-09-02 RX ADMIN — LORAZEPAM 0.5 MG: 2 INJECTION INTRAMUSCULAR; INTRAVENOUS at 15:30

## 2021-09-02 RX ADMIN — SODIUM CHLORIDE 1000 ML: 0.9 INJECTION, SOLUTION INTRAVENOUS at 15:28

## 2021-09-02 NOTE — ASSESSMENT & PLAN NOTE
Patient presented with altered mental status  Patient's  stated she was last seen well approximately 1 week before prior admission  Patient's  report it was an insidious onset with rapid progression which started with patient being nonverbal      On admission, CT of head was negative for acute bleed hemorrhage  Abnormal urinalysis was noted for leukocytes and concern for UTI  CXR was neg  MRI unattainable due to patient movement in setting of DBS  Repeat CT was negative for any acute changes  · Cause of AMS unknown- Parkinson's sequela versus dehydration versus RODRIGO versus new onset seizure versus on/off syndrome  · Neuro consulted  · Recommended, Klonopin 0 5mg bid, Increased Amantadine 100mg  · Patient's movment specialist gave recommendations  We stopped the Klonopin  · Sinemet was decreased from Q6h to Q8h  · Fall prevention  · Seizure precautions  · PT/OT  · Neuro checks daily  · 100cc/hr of 1/2NS   · Speech pathology has been consulted and progressing her diet  · Consult family for DCP, unofficial meeting on 09/10

## 2021-09-02 NOTE — LETTER
To whom it May concern,    Please extend some liberties to patient, such as extending family visitation length  Patient's  has been indispensible in patient care and assisting with patient reorientation/calming the patient during/after sundowning       Thank you for you time,  Betsey Max MD

## 2021-09-02 NOTE — ED NOTES
Patient with continuous movement and fidgeting of all extremities that seems to increase with any stimulation  Pulling off wires of cardiac monitor   is at bedside       Will Tejada, RN  09/02/21 4006

## 2021-09-02 NOTE — H&P
H&P Exam - [facility] [de-identified] y o  female MRN: [id number]    Unit/Bed#: [address] [address] Encounter: [id number]    Assessment:    28-year-old female with a past medical history of Parkinson's disease with dementia status post deep brain stimulators, asthma, GERD, squamous cell carcinoma, pulmonary embolism, stage 3 chronic kidney disease presented to the ED for altered mental status  Patient was nonverbal/nonresponsive and patient's  was a poor historian  Patient's case was discussed with Dr Kev Sanchez  Patient's expected length stay over 2 midnights  Plan:    * Altered mental status  Assessment & Plan  Patient presented with altered mental status  Patient's  stated she was last seen well approximately 1 week before prior admission  Patient's  report it was an insidious onset with rapid progression which started with patient being nonverbal      On admission, CT of head was negative for acute bleed hemorrhage  Abnormal urinalysis was noted for leukocytes and concern for UTI  CXR was neg  · Cause of AMS unknown- Parkinson's sequela versus dehydration versus RODRIGO versus new onset seizure versus on/off syndrome  · Neurology consult  · Fall prevention  · Seizure precautions  · PT/OT  · Nurse bedside swallow  · NPO until cleared  · Neuro checks daily  · Hydration      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 [facility])  Assessment & Plan  Patient has has increasingly more frequent episodes of flailing during the day for over a month  Patient's  stated it used to only be during the night time  RODRIGO (acute kidney injury) ([facility] [address] )  Assessment & Plan  Cr 1 56 POA, baseline 1 1-1 2  Unclear etiology, BUN:creating 17  Possibly prerenaly, dehydration from poor po intake or side effect of parkinson's regimen  Patient unable to provide ROS,  states good po intake prior to this week       · Lactic Ringer 75cc/hr  · Monitor daily chem panel  · Hold metformin and other nephrotoxic meds    Dementia due to Parkinson's disease with behavioral disturbance [facility])  Assessment & Plan  Patient's caregivers have had a difficulty with patient due to behavioral issues  Likely due to sequela of parkinson's  · Continue with home medications of Seroquel  · Patient's  has been giving patient 12 5 mg of her 25 mg      Essential hypertension  Assessment & Plan  Patient upon admission was hypertensive at 164/72  · Continue on home medications    Stage 3a chronic kidney disease [facility])  Assessment & Plan  Lab Results   Component Value Date    EGFR 31 09/02/2021    EGFR 53 01/14/2021    EGFR 45 01/13/2021    CREATININE 1 56 (H) 09/02/2021    CREATININE 1 01 01/14/2021    CREATININE 1 16 01/13/2021     · Cotinue with home medications  · Avoid Nephrotoxic drugs  Mixed hyperlipidemia  Assessment & Plan  Continue with Home medications    Type 2 diabetes mellitus without complication, without long-term current use of insulin [facility])  Assessment & Plan    Lab Results   Component Value Date    HGBA1C 6 4 12/04/2020       Patient's glucose on admission was 133  Patient is not on any at home medications for such  Underweight  Assessment & Plan  Patient is NPO until passes swallowing exam     · NPO  · Maintance fluids: please see above  · Bedside swallowing study  S/P deep brain stimulator placement  Assessment & Plan  Consult Neurology to see if they are working properly  GERD (gastroesophageal reflux disease)  Assessment & Plan  Continue on home medications       History of Present Illness      28-year-old female with a past medical history of Parkinson's disease with dementia status post deep brain stimulators, asthma, GERD, squamous cell carcinoma, pulmonary embolism, stage 3 chronic kidney disease presented to the ED for altered mental status  Patient was nonverbal so history of present illness was obtained through   Patient's  stated the patient has had a a decrease in mental status for a week  Patient at first was unable to speak and could only communicate with a smile and a grown  Patient was also not able to eat on her own in this time  Patient's  stated she started to get progressively worse and not being able to respond at all  Patient also had more frequent shakes  Patient's  stated for approximately 10 years the patient has had some shaking at night however starting a month prior to admission patient began having them during the day at a rapidly progressing rate  Patient's  stated patient prior to these episodes were able to walk with a walker and take her own medications  Patient was flailing sporadically through out the interview and was kicking the bed/individuals around her  Review of Systems   Unable to perform ROS: Patient nonverbal       Historical Information   Past Medical History:   Diagnosis Date    RODRIGO (acute kidney injury) ([address] ) 7/19/2019    Altered mental status 1/11/2021    Anal bleeding 1/29/2018    Arthritis     knee    Asthma     BPPV (benign paroxysmal positional vertigo) 7/19/2016    Last Assessment & Plan:  Hx consistent with BPPV  Neg Joe-Halpike, however could not perform it adequately due to dyskinesias  Recommend lozano-daroff exercises at home  IF worsens, vestibular therapy      Bulging lumbar disc 12/10/2013    Closed fracture of one rib of left side 1/29/2018    Colon polyp     Dementia ([address] )     Fall     balance issue    Fall during current hospitalization 1/15/2021    Gallbladder disease     GERD (gastroesophageal reflux disease)     Hematuria     last assessed 10/29/15    Orthostatic hypotension 7/13/2015    Osteomyelitis of lumbar spine ([address] ) 3/10/2020    Ovarian cyst     LAST ASSESSED: 12/10/13    Parkinson's disease ([address] )     Pneumonia of both lower lobes due to infectious organism 3/27/2018    Possible fracture of 5th metatarsal 1/12/2021    Psoriasis 12/10/2013    Pulmonary embolism with infarction ([address] ) 9/2/2014  Scalp laceration, subsequent encounter 11/15/2019    Sciatica 12/10/2013    Severe protein-calorie malnutrition ([facility] [address] ) 7/22/2019    Skin disorder     last assessed 12/10/13    Squamous cell carcinoma of skin 11/21/2016    Stage 3a chronic kidney disease ([facility] [address] ) 12/4/2020    Toxic metabolic encephalopathy 9/38/4289    Use of cane as ambulatory aid     Walker as ambulation aid     Wears glasses     Weight loss 10/8/2019    Last Assessment & Plan:  Recommend Boost or Ensure  F/u with PCP   Weight loss, unintentional 5/22/2015     Past Surgical History:   Procedure Laterality Date    APPENDECTOMY      1970'S    CATARACT EXTRACTION      CHOLECYSTECTOMY      1970'S    COLONOSCOPY      LAPAROTOMY N/A 7/18/2019    Procedure: LAPAROTOMY EXPLORATORY;  Surgeon: Pop Beebe MD;  Location: [address] [place] [address];  Service: General    NEUROPLASTY / [address] Right     OOPHORECTOMY Bilateral     REMOVAL OF BOTH OVARIES LAPAROSCOPIC     [place] FLX W/RMVL OF TUMOR POLYP LESION [address] [facility] TQ N/A 3/20/2018    Procedure: COLONOSCOPY;  Surgeon: Marcelo Moran MD;  Location: [facility] GI LAB; Service: Gastroenterology    MN XCAPSL CTRC RMVL INSJ IO LENS PROSTH W/O ECP Right 12/4/2018    Procedure: EXTRACTION EXTRACAPSULAR CATARACT PHACO INTRAOCULAR LENS (IOL); Surgeon: Oly Arvizu MD;  Location: [facility] MAIN OR;  Service: Ophthalmology    MN XCAPSL CTRC RMVL INSJ IO LENS PROSTH W/O ECP  1/15/2019    Procedure: EXTRACTION EXTRACAPSULAR CATARACT PHACO INTRAOCULAR LENS (IOL);   Surgeon: Oly Arvizu MD;  Location: [facility] OR;  Service: Ophthalmology    TONSILLECTOMY      VENTRAL HERNIA REPAIR      WITH IMPLANT OF MESH     Social History   Social History     Substance and Sexual Activity   Alcohol Use Not Currently     Social History     Substance and Sexual Activity   Drug Use Never     Social History     Tobacco Use   Smoking Status Never Smoker   Smokeless Tobacco Never Used E-Cigarette Use: Never User     E-Cigarette/Vaping Substances    Nicotine No     THC No     CBD No     Flavoring No     Other No     Unknown No        Family History:   Family History   Problem Relation Age of Onset    Alzheimer's disease Mother     Stroke Father     No Known Problems Sister     No Known Problems Brother        Meds/Allergies   PTA meds:   Prior to Admission Medications   Prescriptions Last Dose Informant Patient Reported? Taking? Amantadine HCl ER (Gocovri) 137 MG CP24 9/1/2021 at Unknown time  Yes Yes   Sig: Take 1 capsule by mouth daily at bedtime    Lancets (freestyle) lancets Past Week at Unknown time  No Yes   Sig: For daily testing   Multiple Vitamin (MULTIVITAMIN) tablet Unknown at Unknown time Self Yes No   Sig: Take 1 tablet by mouth every morning    Omega-3 Fatty Acids (FISH OIL PO) Unknown at Unknown time Self Yes No   Sig: Take 2 g by mouth every morning    QUEtiapine (SEROquel) 25 mg tablet 9/1/2021 at Unknown time  No Yes   Sig: TAKE 1 TABLET (25 MG TOTAL) BY MOUTH DAILY AT BEDTIME AS NEEDED (SLEEP ) USE SPARINGLY   Patient taking differently: Take 25 mg by mouth daily at bedtime as needed (sleep ) Use sparingly  Has been giving 0 5 tablet   TURMERIC PO Unknown at Unknown time Self Yes No   Sig: Take by mouth 3 (three) times a day   acetaminophen (TYLENOL) 325 mg tablet Past Week at Unknown time  Yes Yes   Sig: Take 650 mg by mouth every 6 (six) hours as needed for mild pain   amLODIPine (NORVASC) 10 mg tablet 9/2/2021 at Unknown time  No Yes   Sig: Take 0 5 tablets (5 mg total) by mouth daily   aspirin (ECOTRIN LOW STRENGTH) 81 mg EC tablet 9/2/2021 at Unknown time Self Yes Yes   Sig: Take 81 mg by mouth every morning    calcium citrate-vitamin D (CITRACAL+D) 315-200 MG-UNIT per tablet 9/2/2021 at Unknown time  No Yes   Sig: Take 1 tablet by mouth 2 (two) times a day Take with snack  Avoid taking with other meds     carbidopa-levodopa (SINEMET)  mg per tablet 9/2/2021 at Unknown time  Yes Yes   Sig: Take 0 5 tablets by mouth 4 (four) times a day    docusate sodium (COLACE) 100 mg capsule Unknown at Unknown time  Yes No   Sig: Take 100 mg by mouth 2 (two) times a day   Patient not taking: Reported on 9/2/2021   gabapentin (NEURONTIN) 100 mg capsule 9/1/2021 at Unknown time  No Yes   Sig: Take 1 capsule (100 mg total) by mouth daily at bedtime   glucose blood test strip Past Week at Unknown time  No Yes   Sig: Use 100 each daily Use to test sugar once a day     glucose monitoring kit (FREESTYLE) monitoring kit Past Week at Unknown time  No Yes   Sig: Use 1 each as needed (DM control) ACCUCHEK METER COMPACT PLUS   pantoprazole (PROTONIX) 20 mg tablet 9/2/2021 at Unknown time  No Yes   Sig: Take 1 tablet (20 mg total) by mouth daily   rOPINIRole (REQUIP) 2 mg tablet 9/1/2021 at Unknown time  Yes Yes   Sig: TAKE 3 TABLETS BY MOUTH  NIGHTLY   rasagiline (AZILECT) 1 MG 9/2/2021 at Unknown time  Yes Yes   Sig: Take 1 mg by mouth daily      Facility-Administered Medications: None     No Known Allergies    Objective   First Vitals:   Blood Pressure: (!) 209/91 (09/02/21 1230)  Pulse: 80 (09/02/21 1229)  Temperature: 97 5 °F (36 4 °C) (09/02/21 1229)  Temp Source: Tympanic (09/02/21 1229)  Respirations: 17 (09/02/21 1229)  Weight - Scale: 37 9 kg (83 lb 8 9 oz) (09/02/21 1934)  SpO2: 96 % (09/02/21 1229)    Current Vitals:   Blood Pressure: 164/72 (09/02/21 1742)  Pulse: 84 (09/02/21 1742)  Temperature: 98 5 °F (36 9 °C) (09/02/21 1742)  Temp Source: Temporal (09/02/21 1742)  Respirations: 19 (09/02/21 1742)  Weight - Scale: 37 9 kg (83 lb 8 9 oz) (09/02/21 1934)  SpO2: 94 % (09/02/21 1742)      Intake/Output Summary (Last 24 hours) at 9/2/2021 1959  Last data filed at 9/2/2021 1742  Gross per 24 hour   Intake 1000 ml   Output 550 ml   Net 450 ml       Invasive Devices     Peripheral Intravenous Line            Peripheral IV 09/02/21 Left Forearm <1 day                Physical Exam  Vitals and nursing note reviewed  Exam conducted with a chaperone present  Constitutional:       General: She is in acute distress  Appearance: She is cachectic  She is ill-appearing  She is not diaphoretic  HENT:      Head: Normocephalic and atraumatic  Right Ear: Tympanic membrane and ear canal normal       Left Ear: Tympanic membrane and ear canal normal       Nose: Nose normal       Mouth/Throat:      Lips: Lesions present  Mouth: Mucous membranes are dry  Comments: Patient's lip had numerous cuts in different stages of healing  Patient's mouth and lips were notable dry however patient was also clamped down on a part of her lip  Eyes:      Extraocular Movements:      Right eye: No nystagmus  Left eye: No nystagmus  Conjunctiva/sclera: Conjunctivae normal       Pupils: Pupils are equal, round, and reactive to light  Comments: Patient's right eye lid was more closed then the left eye lid  Attempted to perform H test however patient would only follow my finger for the first second after I opened her eyes  Patient's right eye was notably lagging from her left eye  No nystamus was noted  Neck:      Meningeal: Brudzinski's sign and Kernig's sign absent  Cardiovascular:      Rate and Rhythm: Normal rate and regular rhythm  Pulses: No decreased pulses  Radial pulses are 1+ on the right side and 1+ on the left side  Dorsalis pedis pulses are 2+ on the right side and 2+ on the left side  Heart sounds: Normal heart sounds  Pulmonary:      Effort: Respiratory distress present  Breath sounds: Normal breath sounds and air entry  Comments: Abnormal respiratory rate  Chest:      Chest wall: No tenderness or crepitus  Comments: Deep brain stimulators  Abdominal:      General: Abdomen is flat  Bowel sounds are normal       Palpations: Abdomen is soft  Tenderness: There is no abdominal tenderness   There is no right CVA tenderness or left CVA tenderness  Musculoskeletal:      Cervical back: No edema, erythema or signs of trauma  Right lower le+ Edema present  Left lower le+ Edema present  Right foot: Deformity present  Left foot: Deformity present  Feet:      Right foot:      Skin integrity: Dry skin present  Left foot:      Skin integrity: Dry skin present  Comments: Patient had dry skin as well as toe deformity bilaterally  Skin:     General: Skin is warm and dry  Capillary Refill: Capillary refill takes less than 2 seconds  Coloration: Skin is not jaundiced or pale  Neurological:      Mental Status: She is unresponsive  GCS: GCS eye subscore is 2  GCS verbal subscore is 2  GCS motor subscore is 2  Motor: Tremor present  Deep Tendon Reflexes: Babinski sign absent on the right side  Babinski sign absent on the left side  Psychiatric:         Speech: She is noncommunicative  Behavior: Behavior is uncooperative           Lab Results:   Results for orders placed or performed during the hospital encounter of 21   CBC and differential   Result Value Ref Range    WBC 5 71 4 31 - 10 16 Thousand/uL    RBC 4 38 3 81 - 5 12 Million/uL    Hemoglobin 12 4 11 5 - 15 4 g/dL    Hematocrit 40 7 34 8 - 46 1 %    MCV 93 82 - 98 fL    MCH 28 3 26 8 - 34 3 pg    MCHC 30 5 (L) 31 4 - 37 4 g/dL    RDW 13 9 11 6 - 15 1 %    MPV 11 4 8 9 - 12 7 fL    Platelets 817 300 - 893 Thousands/uL    nRBC 0 /100 WBCs    Neutrophils Relative 77 (H) 43 - 75 %    Immat GRANS % 0 0 - 2 %    Lymphocytes Relative 13 (L) 14 - 44 %    Monocytes Relative 7 4 - 12 %    Eosinophils Relative 1 0 - 6 %    Basophils Relative 2 (H) 0 - 1 %    Neutrophils Absolute 4 37 1 85 - 7 62 Thousands/µL    Immature Grans Absolute 0 01 0 00 - 0 20 Thousand/uL    Lymphocytes Absolute 0 75 0 60 - 4 47 Thousands/µL    Monocytes Absolute 0 38 0 17 - 1 22 Thousand/µL    Eosinophils Absolute 0 07 0 00 - 0 61 Thousand/µL    Basophils Absolute 0 13 (H) 0 00 - 0 10 Thousands/µL   Comprehensive metabolic panel   Result Value Ref Range    Sodium 146 (H) 136 - 145 mmol/L    Potassium 4 5 3 5 - 5 3 mmol/L    Chloride 109 (H) 100 - 108 mmol/L    CO2 29 21 - 32 mmol/L    ANION GAP 8 4 - 13 mmol/L    BUN 27 (H) 5 - 25 mg/dL    Creatinine 1 56 (H) 0 60 - 1 30 mg/dL    Glucose 133 65 - 140 mg/dL    Calcium 9 2 8 3 - 10 1 mg/dL    AST 25 5 - 45 U/L    ALT 13 12 - 78 U/L    Alkaline Phosphatase 87 46 - 116 U/L    Total Protein 7 4 6 4 - 8 2 g/dL    Albumin 4 0 3 5 - 5 0 g/dL    Total Bilirubin 0 73 0 20 - 1 00 mg/dL    eGFR 31 ml/min/1 73sq m   Magnesium   Result Value Ref Range    Magnesium 2 3 1 6 - 2 6 mg/dL   UA w Reflex to Microscopic w Reflex to Culture    Specimen: Urine, Straight Cath   Result Value Ref Range    Color, UA Light Yellow     Clarity, UA Clear     Specific Gravity, UA 1 025 1 000 - 1 030    pH, UA 6 5 5 0, 5 5, 6 0, 6 5, 7 0, 7 5, 8 0, 8 5, 9 0    Leukocytes, UA Negative Negative    Nitrite, UA Negative Negative    Protein, UA Negative Negative mg/dl    Glucose, UA Negative Negative mg/dl    Ketones, UA Trace (A) Negative mg/dl    Urobilinogen, UA 0 2 0 2, 1 0 E U /dl E U /dl    Bilirubin, UA Negative Negative    Blood, UA Trace-lysed (A) Negative   Urine Microscopic   Result Value Ref Range    RBC, UA 10-20 (A) None Seen, 0-1, 1-2, 2-4, 0-5 /hpf    WBC, UA 2-4 None Seen, 0-1, 1-2, 0-5, 2-4 /hpf    Epithelial Cells Occasional None Seen, Occasional /hpf    Bacteria, UA Occasional None Seen, Occasional /hpf   Fingerstick Glucose (POCT)   Result Value Ref Range    POC Glucose 137 65 - 140 mg/dl        Imaging:    CTA head and neck with and without contrast   Final Result      Mild-to-moderate motion artifact is present    - Negative CTA head and neck for large vessel occlusion, dissection, aneurysm, or high-grade stenosis given motion degraded examination    - Mild atherosclerotic disease of the head and neck, as detailed above  - Additional chronic/incidental findings as detailed above  Workstation performed: [id number]         CT head without contrast   Final Result      Stable examination  No acute intracranial abnormality  Workstation performed: [id number]         XR chest 1 view portable    (Results Pending)       EKG, Pathology, and Other Studies:    Code Status: Prior  Advance Directive and Living Will:      Power of :    POLST:      Counseling / Coordination of Care: Total floor / unit time spent today 45 minutes

## 2021-09-02 NOTE — ED PROVIDER NOTES
History  Chief Complaint   Patient presents with    Altered Mental Status     per S  told them patient has had an altered mental status since yesterday  on arrival, patient mumbling, flailing all extremeties around the bed  Patient here with EMS with an alleged complaint of mental status change  Patient has a prior history of Parkinson's disease  Per patient's  she has been nonverbal since last night  He also states that she refused to eat earlier today  Patient is flailing in her bed at the time of initial evaluation, however she responds to verbal stimuli, and is oriented to self  Patient with a prior medical history of dementia, frequent falls, osteomyelitis  History provided by:  Patient  History limited by:  Dementia and mental status change   used: No    Altered Mental Status  Presenting symptoms: lethargy and partial responsiveness    Severity:  Moderate  Most recent episode:  Yesterday  Episode history:  Multiple  Timing:  Constant  Progression:  Waxing and waning  Chronicity:  New  Context: dementia        Prior to Admission Medications   Prescriptions Last Dose Informant Patient Reported? Taking?    Amantadine HCl ER (Gocovri) 137 MG CP24 9/1/2021 at Unknown time  Yes Yes   Sig: Take 1 capsule by mouth daily at bedtime    Lancets (freestyle) lancets Past Week at Unknown time  No Yes   Sig: For daily testing   Multiple Vitamin (MULTIVITAMIN) tablet Unknown at Unknown time Self Yes No   Sig: Take 1 tablet by mouth every morning    Omega-3 Fatty Acids (FISH OIL PO) Unknown at Unknown time Self Yes No   Sig: Take 2 g by mouth every morning    QUEtiapine (SEROquel) 25 mg tablet 9/1/2021 at Unknown time  No Yes   Sig: TAKE 1 TABLET (25 MG TOTAL) BY MOUTH DAILY AT BEDTIME AS NEEDED (SLEEP ) USE SPARINGLY   Patient taking differently: Take 25 mg by mouth daily at bedtime as needed (sleep ) Use sparingly  Has been giving 0 5 tablet   TURMERIC PO Unknown at Unknown time Self Yes No   Sig: Take by mouth 3 (three) times a day   acetaminophen (TYLENOL) 325 mg tablet Past Week at Unknown time  Yes Yes   Sig: Take 650 mg by mouth every 6 (six) hours as needed for mild pain   amLODIPine (NORVASC) 10 mg tablet 9/2/2021 at Unknown time  No Yes   Sig: Take 0 5 tablets (5 mg total) by mouth daily   aspirin (ECOTRIN LOW STRENGTH) 81 mg EC tablet 9/2/2021 at Unknown time Self Yes Yes   Sig: Take 81 mg by mouth every morning    calcium citrate-vitamin D (CITRACAL+D) 315-200 MG-UNIT per tablet 9/2/2021 at Unknown time  No Yes   Sig: Take 1 tablet by mouth 2 (two) times a day Take with snack  Avoid taking with other meds  carbidopa-levodopa (SINEMET)  mg per tablet 9/2/2021 at Unknown time  Yes Yes   Sig: Take 0 5 tablets by mouth 4 (four) times a day    docusate sodium (COLACE) 100 mg capsule Unknown at Unknown time  Yes No   Sig: Take 100 mg by mouth 2 (two) times a day   Patient not taking: Reported on 9/2/2021   gabapentin (NEURONTIN) 100 mg capsule 9/1/2021 at Unknown time  No Yes   Sig: Take 1 capsule (100 mg total) by mouth daily at bedtime   glucose blood test strip Past Week at Unknown time  No Yes   Sig: Use 100 each daily Use to test sugar once a day     glucose monitoring kit (FREESTYLE) monitoring kit Past Week at Unknown time  No Yes   Sig: Use 1 each as needed (DM control) ACCUCHEK METER COMPACT PLUS   pantoprazole (PROTONIX) 20 mg tablet 9/2/2021 at Unknown time  No Yes   Sig: Take 1 tablet (20 mg total) by mouth daily   rOPINIRole (REQUIP) 2 mg tablet 9/1/2021 at Unknown time  Yes Yes   Sig: TAKE 3 TABLETS BY MOUTH  NIGHTLY   rasagiline (AZILECT) 1 MG 9/2/2021 at Unknown time  Yes Yes   Sig: Take 1 mg by mouth daily      Facility-Administered Medications: None       Past Medical History:   Diagnosis Date    RODRIGO (acute kidney injury) ([facility] [address] ) 7/19/2019    Altered mental status 1/11/2021    Anal bleeding 1/29/2018    Arthritis     knee    Asthma     BPPV (benign paroxysmal positional vertigo) 7/19/2016    Last Assessment & Plan:  Hx consistent with BPPV  Neg Joe-Halpike, however could not perform it adequately due to dyskinesias  Recommend lozano-daroff exercises at home  IF worsens, vestibular therapy   Bulging lumbar disc 12/10/2013    Closed fracture of one rib of left side 1/29/2018    Colon polyp     Dementia ([address] )     Fall     balance issue    Fall during current hospitalization 1/15/2021    Gallbladder disease     GERD (gastroesophageal reflux disease)     Hematuria     last assessed 10/29/15    Orthostatic hypotension 7/13/2015    Osteomyelitis of lumbar spine ([address] ) 3/10/2020    Ovarian cyst     LAST ASSESSED: 12/10/13    Parkinson's disease ([address] )     Pneumonia of both lower lobes due to infectious organism 3/27/2018    Possible fracture of 5th metatarsal 1/12/2021    Psoriasis 12/10/2013    Pulmonary embolism with infarction ([address] ) 9/2/2014    Scalp laceration, subsequent encounter 11/15/2019    Sciatica 12/10/2013    Severe protein-calorie malnutrition ([address] ) 7/22/2019    Skin disorder     last assessed 12/10/13    Squamous cell carcinoma of skin 11/21/2016    Stage 3a chronic kidney disease ([address] ) 12/4/2020    Toxic metabolic encephalopathy 1/51/6714    Use of cane as ambulatory aid     Walker as ambulation aid     Wears glasses     Weight loss 10/8/2019    Last Assessment & Plan:  Recommend Boost or Ensure  F/u with PCP      Weight loss, unintentional 5/22/2015       Past Surgical History:   Procedure Laterality Date    APPENDECTOMY      1970'S    CATARACT EXTRACTION      CHOLECYSTECTOMY      1970'S    COLONOSCOPY      LAPAROTOMY N/A 7/18/2019    Procedure: LAPAROTOMY EXPLORATORY;  Surgeon: Mario Vo MD;  Location: [address] [place] [address];  Service: General    NEUROPLASTY / [address] Right     OOPHORECTOMY Bilateral     REMOVAL OF BOTH OVARIES LAPAROSCOPIC    PA COLSC FLX W/RMVL OF TUMOR POLYP LESION SNARE TQ N/A 3/20/2018    Procedure: COLONOSCOPY;  Surgeon: Rosette Hurst MD;  Location: [facility] GI LAB; Service: Gastroenterology    ID XCAPSL CTRC RMVL INSJ IO LENS PROSTH W/O ECP Right 12/4/2018    Procedure: EXTRACTION EXTRACAPSULAR CATARACT PHACO INTRAOCULAR LENS (IOL); Surgeon: Genie Gaines MD;  Location: [facility] MAIN OR;  Service: Ophthalmology    ID XCAPSL CTRC RMVL INSJ IO LENS PROSTH W/O ECP  1/15/2019    Procedure: EXTRACTION EXTRACAPSULAR CATARACT PHACO INTRAOCULAR LENS (IOL); Surgeon: Genie Gaines MD;  Location: [facility] MAIN OR;  Service: Ophthalmology    TONSILLECTOMY      VENTRAL HERNIA REPAIR      WITH IMPLANT OF MESH       Family History   Problem Relation Age of Onset    Alzheimer's disease Mother     Stroke Father     No Known Problems Sister     No Known Problems Brother      I have reviewed and agree with the history as documented  E-Cigarette/Vaping    E-Cigarette Use Never User      E-Cigarette/Vaping Substances    Nicotine No     THC No     CBD No     Flavoring No     Other No     Unknown No      Social History     Tobacco Use    Smoking status: Never Smoker    Smokeless tobacco: Never Used   Vaping Use    Vaping Use: Never used   Substance Use Topics    Alcohol use: Not Currently    Drug use: Never       Review of Systems   Unable to perform ROS: Mental status change       Physical Exam  Physical Exam  Vitals and nursing note reviewed  Constitutional:       General: She is not in acute distress  Appearance: She is not diaphoretic  HENT:      Head: Normocephalic and atraumatic  Eyes:      Extraocular Movements: Extraocular movements intact  Conjunctiva/sclera: Conjunctivae normal       Pupils: Pupils are equal, round, and reactive to light  Cardiovascular:      Rate and Rhythm: Normal rate and regular rhythm  Heart sounds: Normal heart sounds  No murmur heard       Pulmonary:      Effort: Pulmonary effort is normal  No respiratory distress  Breath sounds: Normal breath sounds  Abdominal:      General: Bowel sounds are normal  There is no distension  Palpations: Abdomen is soft  Tenderness: There is no abdominal tenderness  Musculoskeletal:         General: No deformity  Normal range of motion  Cervical back: Normal range of motion and neck supple  Skin:     General: Skin is warm and dry  Capillary Refill: Capillary refill takes less than 2 seconds  Coloration: Skin is not pale  Findings: No rash  Neurological:      Mental Status: She is alert  GCS: GCS eye subscore is 4  GCS verbal subscore is 4  GCS motor subscore is 6  Cranial Nerves: No cranial nerve deficit  Comments: Intermittent episodes of dyskinesia noted   Psychiatric:         Mood and Affect: Affect is inappropriate  Speech: Speech is slurred           Vital Signs  ED Triage Vitals   Temperature Pulse Respirations Blood Pressure SpO2   09/02/21 1229 09/02/21 1229 09/02/21 1229 09/02/21 1230 09/02/21 1229   97 5 °F (36 4 °C) 80 17 (!) 209/91 96 %      Temp Source Heart Rate Source Patient Position - Orthostatic VS BP Location FiO2 (%)   09/02/21 1229 09/02/21 1229 09/02/21 1435 09/02/21 1435 --   Tympanic Monitor Lying Right leg       Pain Score       09/06/21 0730       No Pain           Vitals:    09/06/21 1956 09/06/21 2248 09/07/21 0740 09/07/21 1530   BP: 156/87 152/81  156/86   Pulse: 85 82 85    Patient Position - Orthostatic VS:  Lying Lying          Visual Acuity  Visual Acuity      Most Recent Value   L Pupil Size (mm)  3   R Pupil Size (mm)  3   L Pupil Shape  Round   R Pupil Shape  Round          ED Medications  Medications   amLODIPine (NORVASC) tablet 5 mg (5 mg Oral Given 9/7/21 0919)   calcium carbonate (OYSTER SHELL,OSCAL) 500 mg tablet 1 tablet (1 tablet Oral Given 9/7/21 1740)   carbidopa-levodopa (SINEMET)  mg per tablet 0 5 tablet (0 5 tablets Oral Given 9/7/21 1740) multivitamin-minerals (CENTRUM) tablet 1 tablet (1 tablet Oral Given 9/7/21 0919)   pantoprazole (PROTONIX) EC tablet 20 mg (20 mg Oral Refused 9/7/21 0627)   rasagiline (AZILECT) tablet 1 mg (1 mg Oral Given 9/7/21 0921)   acetaminophen (TYLENOL) tablet 650 mg (650 mg Oral Given 9/6/21 2123)   QUEtiapine (SEROquel) tablet 25 mg (25 mg Oral Given 9/6/21 2123)   rOPINIRole (REQUIP) tablet 0 75 mg (0 75 mg Oral Given 9/6/21 2120)   heparin (porcine) subcutaneous injection 5,000 Units (5,000 Units Subcutaneous Given 9/7/21 1439)   amantadine (SYMMETREL) capsule 100 mg (100 mg Oral Given 9/7/21 1746)   atorvastatin (LIPITOR) tablet 80 mg (80 mg Oral Given 9/7/21 1740)   lactated ringers infusion (0 mL/hr Intravenous Stopped 9/5/21 1101)   ceFAZolin (ANCEF) 500 mg in sodium chloride 0 9 % 50 mL IVPB (500 mg Intravenous New Bag 9/7/21 1052)   aspirin chewable tablet 324 mg (324 mg Oral Given 9/7/21 0919)   polyethylene glycol (MIRALAX) packet 17 g (17 g Oral Given 9/7/21 0920)   sodium chloride infusion 0 45 % (100 mL/hr Intravenous New Bag 9/7/21 1347)   senna (SENOKOT) tablet 8 6 mg (8 6 mg Oral Given 9/6/21 2120)   sodium chloride 0 9 % bolus 1,000 mL (0 mL Intravenous Stopped 9/2/21 1742)   LORazepam (ATIVAN) injection 0 5 mg (0 5 mg Intravenous Given 9/2/21 1530)   iohexol (OMNIPAQUE) 350 MG/ML injection (SINGLE-DOSE) 85 mL (85 mL Intravenous Given 9/2/21 1558)   haloperidol lactate (HALDOL) injection 2 mg (2 mg Intramuscular Given 9/2/21 1633)   aspirin rectal suppository 300 mg (300 mg Rectal Given 9/2/21 1728)   diphenhydrAMINE (BENADRYL) injection 25 mg (25 mg Intravenous Given 9/3/21 0000)       Diagnostic Studies  Results Reviewed     Procedure Component Value Units Date/Time    Urine culture [[id number]] Collected: 09/02/21 1321    Lab Status: Final result Specimen: Urine Updated: 09/04/21 0851     Urine Culture No Growth <1000 cfu/mL    Urine Microscopic [[id number]]  (Abnormal) Collected: 09/02/21 1321 Lab Status: Final result Specimen: Urine, Straight Cath Updated: 09/02/21 1333     RBC, UA 10-20 /hpf      WBC, UA 2-4 /hpf      Epithelial Cells Occasional /hpf      Bacteria, UA Occasional /hpf     UA w Reflex to Microscopic w Reflex to Culture [[id number]]  (Abnormal) Collected: 09/02/21 1321    Lab Status: Final result Specimen: Urine, Straight Cath Updated: 09/02/21 1327     Color, UA Light Yellow     Clarity, UA Clear     Specific Gravity, UA 1 025     pH, UA 6 5     Leukocytes, UA Negative     Nitrite, UA Negative     Protein, UA Negative mg/dl      Glucose, UA Negative mg/dl      Ketones, UA Trace mg/dl      Urobilinogen, UA 0 2 E U /dl      Bilirubin, UA Negative     Blood, UA Trace-lysed    Comprehensive metabolic panel [[id number]]  (Abnormal) Collected: 09/02/21 1304    Lab Status: Final result Specimen: Blood from Arm, Left Updated: 09/02/21 1325     Sodium 146 mmol/L      Potassium 4 5 mmol/L      Chloride 109 mmol/L      CO2 29 mmol/L      ANION GAP 8 mmol/L      BUN 27 mg/dL      Creatinine 1 56 mg/dL      Glucose 133 mg/dL      Calcium 9 2 mg/dL      AST 25 U/L      ALT 13 U/L      Alkaline Phosphatase 87 U/L      Total Protein 7 4 g/dL      Albumin 4 0 g/dL      Total Bilirubin 0 73 mg/dL      eGFR 31 ml/min/1 73sq m     Narrative:      National Kidney Disease Foundation guidelines for Chronic Kidney Disease (CKD):     Stage 1 with normal or high GFR (GFR > 90 mL/min/1 73 square meters)    Stage 2 Mild CKD (GFR = 60-89 mL/min/1 73 square meters)    Stage 3A Moderate CKD (GFR = 45-59 mL/min/1 73 square meters)    Stage 3B Moderate CKD (GFR = 30-44 mL/min/1 73 square meters)    Stage 4 Severe CKD (GFR = 15-29 mL/min/1 73 square meters)    Stage 5 End Stage CKD (GFR <15 mL/min/1 73 square meters)  Note: GFR calculation is accurate only with a steady state creatinine    Magnesium [[id number]]  (Normal) Collected: 09/02/21 1304    Lab Status: Final result Specimen: Blood from Arm, Left Updated: 09/02/21 1325     Magnesium 2 3 mg/dL     CBC and differential [[id number]]  (Abnormal) Collected: 09/02/21 1304    Lab Status: Final result Specimen: Blood from Arm, Left Updated: 09/02/21 1313     WBC 5 71 Thousand/uL      RBC 4 38 Million/uL      Hemoglobin 12 4 g/dL      Hematocrit 40 7 %      MCV 93 fL      MCH 28 3 pg      MCHC 30 5 g/dL      RDW 13 9 %      MPV 11 4 fL      Platelets 224 Thousands/uL      nRBC 0 /100 WBCs      Neutrophils Relative 77 %      Immat GRANS % 0 %      Lymphocytes Relative 13 %      Monocytes Relative 7 %      Eosinophils Relative 1 %      Basophils Relative 2 %      Neutrophils Absolute 4 37 Thousands/µL      Immature Grans Absolute 0 01 Thousand/uL      Lymphocytes Absolute 0 75 Thousands/µL      Monocytes Absolute 0 38 Thousand/µL      Eosinophils Absolute 0 07 Thousand/µL      Basophils Absolute 0 13 Thousands/µL     Fingerstick Glucose (POCT) [[id number]]  (Normal) Collected: 09/02/21 1215    Lab Status: Final result Updated: 09/02/21 1223     POC Glucose 137 mg/dl                  CT head wo contrast   Final Result by Yamil Carty MD (09/06 1402)      No acute intracranial abnormality  Bilateral deep brain stimulator leads redemonstrated  Mild chronic microangiopathic change  Workstation performed: [id number]         CTA head and neck with and without contrast   Final Result by Nelly Valencia MD (09/02 8168)      Mild-to-moderate motion artifact is present    - Negative CTA head and neck for large vessel occlusion, dissection, aneurysm, or high-grade stenosis given motion degraded examination    - Mild atherosclerotic disease of the head and neck, as detailed above  - Additional chronic/incidental findings as detailed above  Workstation performed: [id number]         CT head without contrast   Final Result by Loretta Mcfarlane MD (09/02 9104)      Stable examination  No acute intracranial abnormality  Workstation performed: [id number]         XR chest 1 view portable   Final Result by Austen Armstrong MD (09/03 0046)      No acute cardiopulmonary disease  Workstation performed: [id number]         XR chest portable    (Results Pending)              Procedures  Procedures         ED Course         I reviewed patient with Neurology and Dr Tessie Wellington recommends CT and admission  Ativan and Haldol given to facilitate imaging  MDM  Number of Diagnoses or Management Options  Altered mental status: new and requires workup  Aphasia: new and requires workup     Amount and/or Complexity of Data Reviewed  Clinical lab tests: ordered and reviewed  Tests in the radiology section of CPT®: ordered and reviewed    Risk of Complications, Morbidity, and/or Mortality  Presenting problems: high  Diagnostic procedures: high  Management options: high    Patient Progress  Patient progress: stable      Disposition  Final diagnoses:   Aphasia   Altered mental status     Time reflects when diagnosis was documented in both MDM as applicable and the Disposition within this note     Time User Action Codes Description Comment    9/2/2021  2:50 PM Hiren Cake Add [R47 01] Aphasia     9/2/2021  5:20 PM Hiren Cacristofer O Add [R41 82] Altered mental status     9/2/2021  8:33 PM Ilana Arreola Add [R63 6] Underweight       ED Disposition     ED Disposition Condition Date/Time Comment    Admit Stable Thu Sep 2, 2021  5:20 PM Case was discussed with Dr Nory Rich and the patient's admission status was agreed to be Admission Status: inpatient status to the service of Dr Nory Rich           Follow-up Information    None         Current Discharge Medication List      CONTINUE these medications which have NOT CHANGED    Details   acetaminophen (TYLENOL) 325 mg tablet Take 650 mg by mouth every 6 (six) hours as needed for mild pain      Amantadine HCl ER (Gocovri) 137 MG CP24 Take 1 capsule by mouth daily at bedtime       amLODIPine (NORVASC) 10 mg tablet Take 0 5 tablets (5 mg total) by mouth daily  Qty: 45 tablet, Refills: 3    Associated Diagnoses: Essential hypertension      aspirin (ECOTRIN LOW STRENGTH) 81 mg EC tablet Take 81 mg by mouth every morning       calcium citrate-vitamin D (CITRACAL+D) 315-200 MG-UNIT per tablet Take 1 tablet by mouth 2 (two) times a day Take with snack  Avoid taking with other meds  Qty: 180 tablet, Refills: 3    Associated Diagnoses: Calcium deficiency      carbidopa-levodopa (SINEMET)  mg per tablet Take 0 5 tablets by mouth 4 (four) times a day       gabapentin (NEURONTIN) 100 mg capsule Take 1 capsule (100 mg total) by mouth daily at bedtime  Qty: 90 capsule, Refills: 3    Associated Diagnoses: Intractable back pain; Spinal stenosis of lumbar region without neurogenic claudication      glucose blood test strip Use 100 each daily Use to test sugar once a day    Qty: 100 strip, Refills: 5    Associated Diagnoses: Type 2 diabetes mellitus without complication, without long-term current use of insulin ([facility])      glucose monitoring kit (FREESTYLE) monitoring kit Use 1 each as needed (DM control) ACCUCHEK METER COMPACT PLUS  Qty: 1 each, Refills: 0    Associated Diagnoses: Type 2 diabetes mellitus without complication, without long-term current use of insulin ([facility])      Lancets (freestyle) lancets For daily testing  Qty: 100 each, Refills: 4    Associated Diagnoses: Type 2 diabetes mellitus without complication, without long-term current use of insulin ([facility])      pantoprazole (PROTONIX) 20 mg tablet Take 1 tablet (20 mg total) by mouth daily  Qty: 90 tablet, Refills: 0    Associated Diagnoses: Gastroesophageal reflux disease, unspecified whether esophagitis present      QUEtiapine (SEROquel) 25 mg tablet TAKE 1 TABLET (25 MG TOTAL) BY MOUTH DAILY AT BEDTIME AS NEEDED (SLEEP ) USE SPARINGLY  Qty: 30 tablet, Refills: 3    Associated Diagnoses: 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)      rasagiline (AZILECT) 1 MG Take 1 mg by mouth daily      rOPINIRole (REQUIP) 2 mg tablet TAKE 3 TABLETS BY MOUTH  NIGHTLY      docusate sodium (COLACE) 100 mg capsule Take 100 mg by mouth 2 (two) times a day      Multiple Vitamin (MULTIVITAMIN) tablet Take 1 tablet by mouth every morning       Omega-3 Fatty Acids (FISH OIL PO) Take 2 g by mouth every morning       TURMERIC PO Take by mouth 3 (three) times a day           No discharge procedures on file      PDMP Review     None          ED Provider  Electronically Signed by           Min Whatley DO  09/07/21 2546

## 2021-09-02 NOTE — ASSESSMENT & PLAN NOTE
Lab Results   Component Value Date    HGBA1C 6 4 12/04/2020       Patient's glucose on admission was 133  Patient is not on any at home medications for such

## 2021-09-02 NOTE — TELEPHONE ENCOUNTER
Call received from daughter, Moses Cox, who states that she lives 1000 [place] away but her father called with concerns about patient  Reports sudden AMS, patient does not know how to use utensils, "blank stare on face" and does not appropriately respond to commands  Daughter concerned about parkinson's  Daughter was advised that since mental status change is sudden, this can be indicative of infectious process or neurological event and patient must be urgently evaluated at ED  Moses Brooke agreeable and states that she will advise her father of same  Dr Chadwick Flannery asked for you to be notified

## 2021-09-02 NOTE — ED NOTES
Patient straight cathed with assist of four people  Flailing all extremities and speech is incomprehensible most of the time         Roselia Underwood RN  09/02/21 7997

## 2021-09-02 NOTE — ASSESSMENT & PLAN NOTE
Patient is NPO until passes swallowing exam     · NPO  · Maintance fluids: please see above  · Bedside swallowing study

## 2021-09-02 NOTE — ASSESSMENT & PLAN NOTE
Patient has has increasingly more frequent episodes of flailing during the day for over a month  Patient's  stated it used to only be during the night time       · Discontinued Klonopin 0 5mg for increased movement

## 2021-09-02 NOTE — ED NOTES
Patient returns from CT  Continues with what appears to be constant involuntary movements of both arms and legs   at bedside  He states patient normally only has these kind of movements at night       Lauren Dobbins RN  09/02/21 2621

## 2021-09-02 NOTE — ASSESSMENT & PLAN NOTE
Cr 1 56 POA, baseline 1 1-1 2  Unclear etiology, BUN:creating 17  Possibly prerenaly, dehydration from poor po intake or side effect of parkinson's regimen  Patient unable to provide ROS,  states good po intake prior to this week       · Lactic Ringer 75cc/hr  · Monitor daily chem panel  · Hold metformin and other nephrotoxic meds

## 2021-09-02 NOTE — TELEPHONE ENCOUNTER
Dr Kaylie Rahman : plan of care certification between 07/09/2021-09/06/2021 placed in your folder   Thanks

## 2021-09-02 NOTE — ED NOTES
Patient's  states he does not know patient's medications, but called his daughter  This nurse spoke with daughter and reviewed medications  Daughter is unsure if patient is still taking certain supplements  Also states that patient is normally oriented, able to carry on a conversation, and is able to ambulate with assistance  States she always has movements that tend to get worse when she does not take her Parkinson's medications  She states patient became altered yesterday morning and it got progressively worse throughout the day         Marti Wise RN  09/02/21 0812

## 2021-09-02 NOTE — ASSESSMENT & PLAN NOTE
Patient's caregivers have had a difficulty with patient due to behavioral issues  Likely due to sequela of parkinson's  · Continue with home medications of Seroquel  · Patient's  has been giving patient 12 5 mg of her 25 mg

## 2021-09-03 ENCOUNTER — PATIENT OUTREACH (OUTPATIENT)
Dept: CASE MANAGEMENT | Facility: HOSPITAL | Age: 81
End: 2021-09-03

## 2021-09-03 PROBLEM — R63.6 UNDERWEIGHT: Status: RESOLVED | Noted: 2021-01-12 | Resolved: 2021-09-03

## 2021-09-03 LAB
ANION GAP SERPL CALCULATED.3IONS-SCNC: 11 MMOL/L (ref 4–13)
BASOPHILS # BLD AUTO: 0.14 THOUSANDS/ΜL (ref 0–0.1)
BASOPHILS NFR BLD AUTO: 2 % (ref 0–1)
BUN SERPL-MCNC: 19 MG/DL (ref 5–25)
CALCIUM SERPL-MCNC: 8.9 MG/DL (ref 8.3–10.1)
CHLORIDE SERPL-SCNC: 108 MMOL/L (ref 100–108)
CHOLEST SERPL-MCNC: 180 MG/DL (ref 50–200)
CK MB SERPL-MCNC: 1.3 % (ref 0–2.5)
CK MB SERPL-MCNC: 2.8 NG/ML (ref 0–5)
CK SERPL-CCNC: 213 U/L (ref 26–192)
CO2 SERPL-SCNC: 25 MMOL/L (ref 21–32)
CREAT SERPL-MCNC: 1.24 MG/DL (ref 0.6–1.3)
CRP SERPL QL: 0.7 MG/L
EOSINOPHIL # BLD AUTO: 0.07 THOUSAND/ΜL (ref 0–0.61)
EOSINOPHIL NFR BLD AUTO: 1 % (ref 0–6)
ERYTHROCYTE [DISTWIDTH] IN BLOOD BY AUTOMATED COUNT: 14 % (ref 11.6–15.1)
ERYTHROCYTE [SEDIMENTATION RATE] IN BLOOD: 20 MM/HOUR (ref 0–29)
EST. AVERAGE GLUCOSE BLD GHB EST-MCNC: 143 MG/DL
GFR SERPL CREATININE-BSD FRML MDRD: 41 ML/MIN/1.73SQ M
GLUCOSE SERPL-MCNC: 114 MG/DL (ref 65–140)
GLUCOSE SERPL-MCNC: 115 MG/DL (ref 65–140)
GLUCOSE SERPL-MCNC: 150 MG/DL (ref 65–140)
GLUCOSE SERPL-MCNC: 99 MG/DL (ref 65–140)
HBA1C MFR BLD: 6.6 %
HCT VFR BLD AUTO: 43.2 % (ref 34.8–46.1)
HDLC SERPL-MCNC: 90 MG/DL
HGB BLD-MCNC: 13 G/DL (ref 11.5–15.4)
IMM GRANULOCYTES # BLD AUTO: 0.03 THOUSAND/UL (ref 0–0.2)
IMM GRANULOCYTES NFR BLD AUTO: 0 % (ref 0–2)
LDLC SERPL CALC-MCNC: 73 MG/DL (ref 0–100)
LYMPHOCYTES # BLD AUTO: 0.84 THOUSANDS/ΜL (ref 0.6–4.47)
LYMPHOCYTES NFR BLD AUTO: 10 % (ref 14–44)
MCH RBC QN AUTO: 28.4 PG (ref 26.8–34.3)
MCHC RBC AUTO-ENTMCNC: 30.1 G/DL (ref 31.4–37.4)
MCV RBC AUTO: 94 FL (ref 82–98)
MONOCYTES # BLD AUTO: 0.74 THOUSAND/ΜL (ref 0.17–1.22)
MONOCYTES NFR BLD AUTO: 9 % (ref 4–12)
NEUTROPHILS # BLD AUTO: 6.89 THOUSANDS/ΜL (ref 1.85–7.62)
NEUTS SEG NFR BLD AUTO: 78 % (ref 43–75)
NONHDLC SERPL-MCNC: 90 MG/DL
NRBC BLD AUTO-RTO: 0 /100 WBCS
PLATELET # BLD AUTO: 243 THOUSANDS/UL (ref 149–390)
PMV BLD AUTO: 11.2 FL (ref 8.9–12.7)
POTASSIUM SERPL-SCNC: 4.1 MMOL/L (ref 3.5–5.3)
RBC # BLD AUTO: 4.58 MILLION/UL (ref 3.81–5.12)
SODIUM SERPL-SCNC: 144 MMOL/L (ref 136–145)
TRIGL SERPL-MCNC: 87 MG/DL
WBC # BLD AUTO: 8.71 THOUSAND/UL (ref 4.31–10.16)

## 2021-09-03 PROCEDURE — 99233 SBSQ HOSP IP/OBS HIGH 50: CPT | Performed by: FAMILY MEDICINE

## 2021-09-03 PROCEDURE — 80048 BASIC METABOLIC PNL TOTAL CA: CPT | Performed by: STUDENT IN AN ORGANIZED HEALTH CARE EDUCATION/TRAINING PROGRAM

## 2021-09-03 PROCEDURE — 83036 HEMOGLOBIN GLYCOSYLATED A1C: CPT | Performed by: FAMILY MEDICINE

## 2021-09-03 PROCEDURE — 82948 REAGENT STRIP/BLOOD GLUCOSE: CPT

## 2021-09-03 PROCEDURE — 97163 PT EVAL HIGH COMPLEX 45 MIN: CPT

## 2021-09-03 PROCEDURE — 85025 COMPLETE CBC W/AUTO DIFF WBC: CPT | Performed by: STUDENT IN AN ORGANIZED HEALTH CARE EDUCATION/TRAINING PROGRAM

## 2021-09-03 PROCEDURE — 92610 EVALUATE SWALLOWING FUNCTION: CPT

## 2021-09-03 PROCEDURE — NC001 PR NO CHARGE: Performed by: FAMILY MEDICINE

## 2021-09-03 PROCEDURE — 86140 C-REACTIVE PROTEIN: CPT

## 2021-09-03 PROCEDURE — 82550 ASSAY OF CK (CPK): CPT

## 2021-09-03 PROCEDURE — 80061 LIPID PANEL: CPT | Performed by: FAMILY MEDICINE

## 2021-09-03 PROCEDURE — 82553 CREATINE MB FRACTION: CPT

## 2021-09-03 PROCEDURE — 85652 RBC SED RATE AUTOMATED: CPT

## 2021-09-03 PROCEDURE — 97167 OT EVAL HIGH COMPLEX 60 MIN: CPT

## 2021-09-03 PROCEDURE — 99223 1ST HOSP IP/OBS HIGH 75: CPT | Performed by: PSYCHIATRY & NEUROLOGY

## 2021-09-03 RX ORDER — DEXTROSE, SODIUM CHLORIDE, AND POTASSIUM CHLORIDE 5; .45; .15 G/100ML; G/100ML; G/100ML
75 INJECTION INTRAVENOUS CONTINUOUS
Status: DISCONTINUED | OUTPATIENT
Start: 2021-09-03 | End: 2021-09-03

## 2021-09-03 RX ORDER — CLONAZEPAM 0.5 MG/1
0.5 TABLET ORAL 2 TIMES DAILY
Status: DISCONTINUED | OUTPATIENT
Start: 2021-09-03 | End: 2021-09-07

## 2021-09-03 RX ORDER — AMANTADINE HYDROCHLORIDE 100 MG/1
100 CAPSULE, GELATIN COATED ORAL 2 TIMES DAILY
Status: DISCONTINUED | OUTPATIENT
Start: 2021-09-03 | End: 2021-09-10 | Stop reason: HOSPADM

## 2021-09-03 RX ADMIN — HEPARIN SODIUM 5000 UNITS: 5000 INJECTION INTRAVENOUS; SUBCUTANEOUS at 21:48

## 2021-09-03 RX ADMIN — HEPARIN SODIUM 5000 UNITS: 5000 INJECTION INTRAVENOUS; SUBCUTANEOUS at 14:15

## 2021-09-03 RX ADMIN — CALCIUM 1 TABLET: 500 TABLET ORAL at 17:39

## 2021-09-03 RX ADMIN — DIPHENHYDRAMINE HYDROCHLORIDE 25 MG: 50 INJECTION, SOLUTION INTRAMUSCULAR; INTRAVENOUS at 00:00

## 2021-09-03 RX ADMIN — CARBIDOPA AND LEVODOPA 0.5 TABLET: 25; 100 TABLET ORAL at 21:51

## 2021-09-03 RX ADMIN — ROPINIROLE HYDROCHLORIDE 0.75 MG: 0.25 TABLET, FILM COATED ORAL at 21:51

## 2021-09-03 RX ADMIN — CLONAZEPAM 0.5 MG: 0.5 TABLET ORAL at 17:38

## 2021-09-03 RX ADMIN — DEXTROSE, SODIUM CHLORIDE, AND POTASSIUM CHLORIDE 75 ML/HR: 5; .45; .15 INJECTION INTRAVENOUS at 08:40

## 2021-09-03 RX ADMIN — AMANTADINE HYDROCHLORIDE 100 MG: 100 CAPSULE ORAL at 08:24

## 2021-09-03 RX ADMIN — QUETIAPINE FUMARATE 25 MG: 25 TABLET ORAL at 22:03

## 2021-09-03 RX ADMIN — AMANTADINE HYDROCHLORIDE 100 MG: 100 CAPSULE ORAL at 17:38

## 2021-09-03 RX ADMIN — CARBIDOPA AND LEVODOPA 0.5 TABLET: 25; 100 TABLET ORAL at 17:38

## 2021-09-03 RX ADMIN — HEPARIN SODIUM 5000 UNITS: 5000 INJECTION INTRAVENOUS; SUBCUTANEOUS at 05:27

## 2021-09-03 RX ADMIN — CARBIDOPA AND LEVODOPA 0.5 TABLET: 25; 100 TABLET ORAL at 11:49

## 2021-09-03 RX ADMIN — CARBIDOPA AND LEVODOPA 0.5 TABLET: 25; 100 TABLET ORAL at 08:23

## 2021-09-03 RX ADMIN — AMLODIPINE BESYLATE 5 MG: 5 TABLET ORAL at 08:23

## 2021-09-03 NOTE — PLAN OF CARE
Summary   Pt presented s/s of significant dyskinesia and confusion with s/s oral dysphagia and risk for pharyngeal dysphagia at this time  Recommended Diet: puree/level 1 diet and nectar thick liquids   Recommended Form of Meds: crushed with puree   Aspiration precautions and swallowing strategies:   1  upright posture (protective coverings on side rails given sz precautions and severe dyskinesia with "flailing" limbs this am, pillows placed all around pt this am (appreciate RNs dedication to pt's care)  2  feed when fully alert  3  orient pt to task at hand (visually present and instruct pt in plan)  4   slow rate of feeding   5  thick liquids by small cup sips as able and tolerated (not able to use straw today)  Other Recommendations: Continue frequent oral care

## 2021-09-03 NOTE — MALNUTRITION/BMI
This medical record reflects one or more clinical indicators suggestive of malnutrition and/or morbid obesity  Malnutrition Findings:   Adult Malnutrition type: Chronic illness  Adult Degree of Malnutrition: Other severe protein calorie malnutrition  Malnutrition Characteristics: Fat loss, Weight loss (8 8% weight loss in 3 months; severe depression of buccal fat pads, severe depletion of orbital region)    Other severe protein calorie malnutrition of chronic illness related to parkinson's disease as evidenced by 8 8% weight loss in 3 months, severe depression of buccal fat pads, and severe depletion of orbital region  Treated with Ensure Pudding QID and Magic CUP BID to increase caloric and protein intake  BMI Findings: Body mass index is 16 32 kg/m²  See Nutrition note dated 9/3/2021  for additional details  Completed nutrition assessment is viewable in the nutrition documentation

## 2021-09-03 NOTE — SPEECH THERAPY NOTE
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)    RODRIGO (acute kidney injury) ([address] )    Stage 3a chronic kidney disease (HCC)    S/P deep brain stimulator placement    Underweight    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 [facility])      Past Medical History  Past Medical History:   Diagnosis Date    RODRIGO (acute kidney injury) ([address] ) 7/19/2019    Altered mental status 1/11/2021    Anal bleeding 1/29/2018    Arthritis     knee    Asthma     BPPV (benign paroxysmal positional vertigo) 7/19/2016    Last Assessment & Plan:  Hx consistent with BPPV  Neg Joe-Halpike, however could not perform it adequately due to dyskinesias  Recommend lozano-daroff exercises at home  IF worsens, vestibular therapy      Bulging lumbar disc 12/10/2013    Closed fracture of one rib of left side 1/29/2018    Colon polyp     Dementia ([address] )     Fall     balance issue    Fall during current hospitalization 1/15/2021    Gallbladder disease     GERD (gastroesophageal reflux disease)     Hematuria     last assessed 10/29/15    Orthostatic hypotension 7/13/2015    Osteomyelitis of lumbar spine ([address] ) 3/10/2020    Ovarian cyst     LAST ASSESSED: 12/10/13    Parkinson's disease ([address] )     Pneumonia of both lower lobes due to infectious organism 3/27/2018    Possible fracture of 5th metatarsal 1/12/2021    Psoriasis 12/10/2013    Pulmonary embolism with infarction ([address] ) 9/2/2014    Scalp laceration, subsequent encounter 11/15/2019    Sciatica 12/10/2013    Severe protein-calorie malnutrition ([address] ) 7/22/2019    Skin disorder     last assessed 12/10/13    Squamous cell carcinoma of skin 11/21/2016    Stage 3a chronic kidney disease ([facility] ) 12/4/2020    Toxic metabolic encephalopathy 6/43/3093    Use of cane as ambulatory aid     Walker as ambulation aid     Wears glasses     Weight loss 10/8/2019    Last Assessment & Plan:  Recommend Boost or Ensure  F/u with PCP   Weight loss, unintentional 5/22/2015         Summary   Pt presented s/s of significant dyskinesia and confusion with s/s oral dysphagia and risk for pharyngeal dysphagia at this time  Recommended Diet: puree/level 1 diet and nectar thick liquids   Recommended Form of Meds: crushed with puree   Aspiration precautions and swallowing strategies:   1  upright posture (protective coverings on side rails given sz precautions and severe dyskinesia with "flailing" limbs this am, pillows placed all around pt this am (appreciate RNs dedication to pt's care)  2  feed when fully alert  3  orient pt to task at hand (visually present and instruct pt in plan)  4  slow rate of feeding   5  thick liquids by small cup sips as able and tolerated (not able to use straw today)  Other Recommendations: Continue frequent oral care         Current Medical Status  Frank Burrell is an [de-identified] yo F c PMH of Parkinson's disease with dementia status post deep brain stimulators, asthma, GERD, squamous cell carcinoma, pulmonary embolism, stage 3 chronic kidney disease presented 9/2 pm for altered mental status (non-verbal and non-responsive)  W/U for etiology is underway  Pt is NPO pending SLP Swallowing Evaluation       Current Precautions:  Fall & Seizure    Allergies:  No known food allergies    Past medical history:  Please see H&P for details    Special Studies of 9/2:  CT/A of head: Mild-to-moderate motion artifact is present   - Negative CTA head and neck for large vessel occlusion, dissection, aneurysm, or high-grade stenosis given motion degraded examination   - Mild atherosclerotic disease of the head and neck, as detailed above  - Additional chronic/incidental findings as detailed above    CXR: No acute cardiopulmonary disease  Social/Education/Vocational Hx:  Pt lives with family    Swallow Information   Current Risks for Dysphagia & Aspiration: AMS   Current Diet: NPO   Baseline Diet: regular diet and thin liquids      Baseline Assessment   Behavior/Cognition: grossly alert, inattentive  Speech/Language Status: not able to to follow commands, limited verbal output (significant hypokinetic dysarthria with at least mod reduced intelligibility and confused content  Patient Positioning: upright in bed and leaning to R (benefited from supporting R side of head  Pain Status/Interventions/Response to Interventions:  No nonverbal indications of pain  Swallow Mechanism Exam  Pt not following any commands to allow for dedicated assessment of oral motor function  Dentition: adequate (full appearing natural dentition)  Vocal quality:hypophonic with speech attempts  Respiratory Status: on RA       Consistencies Assessed and Performance   Materials administered included 4 ozs of puree, 4 of honey thick liquid and 4 of nectar thick liquid    Oral Stage:   Reduced orientation to straw (no functional use despite multiple trials)  Inconsistent orientation to spoon and inconsistent acceptance and retrieval to begin (but significantly improved given time and continued trials)  Bolus formation and transfer were functional/mildly slowed but with no significant oral residue noted  Pt spoke with pure in mouth at times (not liquids)  Presume grossly adequate oral control with the conservative materials administered today  Pharyngeal Stage: Risk but no gross overt s/s aspiration c materials administered this am    Swallow Mechanics:  Swallowing initiation appeared prompt  Laryngeal rise was palpated and judged to be within functional limits    No coughing, throat clearing, change in vocal quality or respiratory status noted today  Esophageal Concerns: h/o GERD    Strategies and Efficacy: Educated CNA in benefit of supporting R side of head and in poor straw use at this time    Summary and Recommendations (see above)    Results Reviewed with: RN and and CNA (then RN spoke c MD and dtr who is in [place])     Treatment Recommended: yes     Frequency of treatment: minimum of 2 times weekly to ensure safe intake of least restrictive diet    Patient Stated Goal: unable to state    Dysphagia LTG  -Patient will demonstrate safe and effective oral intake (without overt s/s significant oral/pharyngeal dysphagia including s/s penetration or aspiration) for the highest appropriate diet level  Short Term Goals:  -Pt will tolerate Dysphagia 1/pureed diet and nectar thick liquid with no significant s/s oral or pharyngeal dysphagia across 1-3 diagnostic session/s    -Patient will tolerate trials of upgraded food and/or liquid texture with no significant s/s of oral or pharyngeal dysphagia including aspiration across 1-3 diagnostic sessions     -Patients caregiver will demonstrate understanding, recall and use of recommended diet, position and (prompting for use of) compensatory strategies       Joaquim Hackett [facility] , Lizeth Echevarria [phone]  Available via [facility] Text     -

## 2021-09-03 NOTE — PROGRESS NOTES
[facility] Progress Note - Jeannette Ramos [de-identified] y o  female MRN: [id number]    Unit/Bed#: [address] [place] [address] Encounter: [id number]      Assessment/Plan:    * Altered mental status  Assessment & Plan  Patient presented with altered mental status  Patient's  stated she was last seen well approximately 1 week before prior admission  Patient's  report it was an insidious onset with rapid progression which started with patient being nonverbal      On admission, CT of head was negative for acute bleed hemorrhage  Abnormal urinalysis was noted for leukocytes and concern for UTI  CXR was neg  Bedside swallow was initial     · Cause of AMS unknown- Parkinson's sequela versus dehydration versus RODRIGO versus new onset seizure versus on/off syndrome  · Neurology consult  · Fall prevention  · Seizure precautions  · PT/OT  · Nurse bedside swallow  · NPO until cleared  · Neuro checks daily  · Hydration  · Soft restraints,  as atient was a fall risk even with precautions  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 [facility])  Assessment & Plan  Patient has has increasingly more frequent episodes of flailing during the day for over a month  Patient's  stated it used to only be during the night time  RODRIGO (acute kidney injury) ([facility] )  Assessment & Plan  Cr 1 56 POA, baseline 1 1-1 2  Unclear etiology, BUN:creating 17  Possibly prerenaly, dehydration from poor po intake or side effect of parkinson's regimen  Patient unable to provide ROS,  states good po intake prior to this week  · Lactic Ringer 75cc/hr  · Monitor daily chem panel  · Hold metformin and other nephrotoxic meds    Dementia due to Parkinson's disease with behavioral disturbance [facility])  Assessment & Plan  Patient's caregivers have had a difficulty with patient due to behavioral issues  Likely due to sequela of parkinson's  · Continue with home medications of Seroquel     · Patient's  has been giving patient 12 5 mg of her 25 mg      Essential hypertension  Assessment & Plan  Patient upon admission was hypertensive at 164/72  · Continue on home medications    Stage 3a chronic kidney disease [facility])  Assessment & Plan  Lab Results   Component Value Date    EGFR 31 09/02/2021    EGFR 53 01/14/2021    EGFR 45 01/13/2021    CREATININE 1 56 (H) 09/02/2021    CREATININE 1 01 01/14/2021    CREATININE 1 16 01/13/2021     · Cotinue with home medications  · Avoid Nephrotoxic drugs  Mixed hyperlipidemia  Assessment & Plan  Continue with Home medications    Type 2 diabetes mellitus without complication, without long-term current use of insulin [facility])  Assessment & Plan    Lab Results   Component Value Date    HGBA1C 6 4 12/04/2020       Patient's glucose on admission was 133  Patient is not on any at home medications for such  Underweight  Assessment & Plan  Patient is NPO until passes swallowing exam     · NPO  · Maintance fluids: please see above  · Bedside swallowing study  S/P deep brain stimulator placement  Assessment & Plan  Consult Neurology to see if they are working properly  GERD (gastroesophageal reflux disease)  Assessment & Plan  Continue on home medications         Subjective:     Patient seen and examined at bedside  Nurse stated patient had received benadryl 25mg injection last night due to increase thrashing and kicking the nurse and staff  Patient also failed initial swallow study  Patient was more verbal today however had a lot of babbling  Patient was Alert, Conscious and Oriented to self and place, not time or event  Patient was able to say yes, no and hospital however would be aphasic and trail off which I was unable to follow  Patient's movement/shakes have decreased in frequency  Patient had good eye tracking and H test was able to be preformed       After failure of seizure precautions, rails up and other safety measures patient was still able to move her legs over the rails, patient was placed in restraints due for her safety and others  Lactic ringers were changed to D5 1/2 NS  Objective:     Vitals: Blood pressure 170/91, pulse 85, temperature 98 7 °F (37 1 °C), temperature source Axillary, resp  rate 19, height 5' (1 524 m), weight 37 9 kg (83 lb 8 9 oz), SpO2 90 %, not currently breastfeeding  ,Body mass index is 16 32 kg/m²    Wt Readings from Last 3 Encounters:   09/02/21 37 9 kg (83 lb 8 9 oz)   08/18/21 39 3 kg (86 lb 9 6 oz)   06/21/21 42 8 kg (94 lb 6 4 oz)       Intake/Output Summary (Last 24 hours) at 9/3/2021 1203  Last data filed at 9/3/2021 0737  Gross per 24 hour   Intake 1010 ml   Output 1750 ml   Net -740 ml       Physical Exam: /91 (BP Location: Right arm)   Pulse 85   Temp 98 7 °F (37 1 °C) (Axillary)   Resp 19   Ht 5' (1 524 m)   Wt 37 9 kg (83 lb 8 9 oz)   SpO2 90%   BMI 16 32 kg/m²   General appearance: cachectic  Lungs: clear to auscultation bilaterally  Heart: regular rate and rhythm, S1, S2 normal, no murmur, click, rub or gallop  Abdomen: soft, non-tender; bowel sounds normal; no masses,  no organomegaly  Extremities: +1 edema bilaterally, Flailing around  Neurologic: Mental status: Alert, oriented, thought content appropriate, orientation: person, place     Recent Results (from the past 24 hour(s))   Fingerstick Glucose (POCT)    Collection Time: 09/02/21 12:15 PM   Result Value Ref Range    POC Glucose 137 65 - 140 mg/dl   CBC and differential    Collection Time: 09/02/21  1:04 PM   Result Value Ref Range    WBC 5 71 4 31 - 10 16 Thousand/uL    RBC 4 38 3 81 - 5 12 Million/uL    Hemoglobin 12 4 11 5 - 15 4 g/dL    Hematocrit 40 7 34 8 - 46 1 %    MCV 93 82 - 98 fL    MCH 28 3 26 8 - 34 3 pg    MCHC 30 5 (L) 31 4 - 37 4 g/dL    RDW 13 9 11 6 - 15 1 %    MPV 11 4 8 9 - 12 7 fL    Platelets 040 579 - 664 Thousands/uL    nRBC 0 /100 WBCs    Neutrophils Relative 77 (H) 43 - 75 %    Immat GRANS % 0 0 - 2 %    Lymphocytes Relative 13 (L) 14 - 44 %    Monocytes Relative 7 4 - 12 %    Eosinophils Relative 1 0 - 6 %    Basophils Relative 2 (H) 0 - 1 %    Neutrophils Absolute 4 37 1 85 - 7 62 Thousands/µL    Immature Grans Absolute 0 01 0 00 - 0 20 Thousand/uL    Lymphocytes Absolute 0 75 0 60 - 4 47 Thousands/µL    Monocytes Absolute 0 38 0 17 - 1 22 Thousand/µL    Eosinophils Absolute 0 07 0 00 - 0 61 Thousand/µL    Basophils Absolute 0 13 (H) 0 00 - 0 10 Thousands/µL   Comprehensive metabolic panel    Collection Time: 09/02/21  1:04 PM   Result Value Ref Range    Sodium 146 (H) 136 - 145 mmol/L    Potassium 4 5 3 5 - 5 3 mmol/L    Chloride 109 (H) 100 - 108 mmol/L    CO2 29 21 - 32 mmol/L    ANION GAP 8 4 - 13 mmol/L    BUN 27 (H) 5 - 25 mg/dL    Creatinine 1 56 (H) 0 60 - 1 30 mg/dL    Glucose 133 65 - 140 mg/dL    Calcium 9 2 8 3 - 10 1 mg/dL    AST 25 5 - 45 U/L    ALT 13 12 - 78 U/L    Alkaline Phosphatase 87 46 - 116 U/L    Total Protein 7 4 6 4 - 8 2 g/dL    Albumin 4 0 3 5 - 5 0 g/dL    Total Bilirubin 0 73 0 20 - 1 00 mg/dL    eGFR 31 ml/min/1 73sq m   Magnesium    Collection Time: 09/02/21  1:04 PM   Result Value Ref Range    Magnesium 2 3 1 6 - 2 6 mg/dL   UA w Reflex to Microscopic w Reflex to Culture    Collection Time: 09/02/21  1:21 PM    Specimen: Urine, Straight Cath   Result Value Ref Range    Color, UA Light Yellow     Clarity, UA Clear     Specific Gravity, UA 1 025 1 000 - 1 030    pH, UA 6 5 5 0, 5 5, 6 0, 6 5, 7 0, 7 5, 8 0, 8 5, 9 0    Leukocytes, UA Negative Negative    Nitrite, UA Negative Negative    Protein, UA Negative Negative mg/dl    Glucose, UA Negative Negative mg/dl    Ketones, UA Trace (A) Negative mg/dl    Urobilinogen, UA 0 2 0 2, 1 0 E U /dl E U /dl    Bilirubin, UA Negative Negative    Blood, UA Trace-lysed (A) Negative   Urine Microscopic    Collection Time: 09/02/21  1:21 PM   Result Value Ref Range    RBC, UA 10-20 (A) None Seen, 0-1, 1-2, 2-4, 0-5 /hpf    WBC, UA 2-4 None Seen, 0-1, 1-2, 0-5, 2-4 /hpf    Epithelial Cells Occasional None Seen, Occasional /hpf    Bacteria, UA Occasional None Seen, Occasional /hpf   Fingerstick Glucose (POCT)    Collection Time: 09/03/21  1:00 AM   Result Value Ref Range    POC Glucose 114 65 - 140 mg/dl   Fingerstick Glucose (POCT)    Collection Time: 09/03/21  6:38 AM   Result Value Ref Range    POC Glucose 150 (H) 65 - 140 mg/dl   Basic metabolic panel    Collection Time: 09/03/21  6:59 AM   Result Value Ref Range    Sodium 144 136 - 145 mmol/L    Potassium 4 1 3 5 - 5 3 mmol/L    Chloride 108 100 - 108 mmol/L    CO2 25 21 - 32 mmol/L    ANION GAP 11 4 - 13 mmol/L    BUN 19 5 - 25 mg/dL    Creatinine 1 24 0 60 - 1 30 mg/dL    Glucose 115 65 - 140 mg/dL    Calcium 8 9 8 3 - 10 1 mg/dL    eGFR 41 ml/min/1 73sq m   CBC and differential    Collection Time: 09/03/21  6:59 AM   Result Value Ref Range    WBC 8 71 4 31 - 10 16 Thousand/uL    RBC 4 58 3 81 - 5 12 Million/uL    Hemoglobin 13 0 11 5 - 15 4 g/dL    Hematocrit 43 2 34 8 - 46 1 %    MCV 94 82 - 98 fL    MCH 28 4 26 8 - 34 3 pg    MCHC 30 1 (L) 31 4 - 37 4 g/dL    RDW 14 0 11 6 - 15 1 %    MPV 11 2 8 9 - 12 7 fL    Platelets 177 225 - 530 Thousands/uL    nRBC 0 /100 WBCs    Neutrophils Relative 78 (H) 43 - 75 %    Immat GRANS % 0 0 - 2 %    Lymphocytes Relative 10 (L) 14 - 44 %    Monocytes Relative 9 4 - 12 %    Eosinophils Relative 1 0 - 6 %    Basophils Relative 2 (H) 0 - 1 %    Neutrophils Absolute 6 89 1 85 - 7 62 Thousands/µL    Immature Grans Absolute 0 03 0 00 - 0 20 Thousand/uL    Lymphocytes Absolute 0 84 0 60 - 4 47 Thousands/µL    Monocytes Absolute 0 74 0 17 - 1 22 Thousand/µL    Eosinophils Absolute 0 07 0 00 - 0 61 Thousand/µL    Basophils Absolute 0 14 (H) 0 00 - 0 10 Thousands/µL   Lipid panel    Collection Time: 09/03/21  6:59 AM   Result Value Ref Range    Cholesterol 180 50 - 200 mg/dL    Triglycerides 87 <=150 mg/dL    HDL, Direct 90 >=40 mg/dL    LDL Calculated 73 0 - 100 mg/dL    Non-HDL-Chol (CHOL-HDL) 90 mg/dl   Fingerstick Glucose (POCT) Collection Time: 09/03/21 11:03 AM   Result Value Ref Range    POC Glucose 99 65 - 140 mg/dl       Current Facility-Administered Medications   Medication Dose Route Frequency Provider Last Rate Last Admin    acetaminophen (TYLENOL) tablet 650 mg  650 mg Oral Q6H PRN Ariel Ramos MD        amantadine (SYMMETREL) capsule 100 mg  100 mg Oral Daily Ariel Ramos MD   100 mg at 09/03/21 0824    amLODIPine (NORVASC) tablet 5 mg  5 mg Oral Daily Ariel Ramos MD   5 mg at 09/03/21 9997    aspirin (ECOTRIN LOW STRENGTH) EC tablet 81 mg  81 mg Oral QAM Ariel Ramos MD        calcium carbonate (OYSTER SHELL,OSCAL) 500 mg tablet 1 tablet  1 tablet Oral BID With Meals Ariel Ramos MD        carbidopa-levodopa (SINEMET)  mg per tablet 0 5 tablet  0 5 tablet Oral 4x Daily Ariel Ramos MD   0 5 tablet at 09/03/21 1149    dextrose 5 % and sodium chloride 0 45 % with KCl 20 mEq/L infusion  75 mL/hr Intravenous Continuous Maxcine MD Summer 75 mL/hr at 09/03/21 0840 75 mL/hr at 09/03/21 0840    docusate sodium (COLACE) capsule 100 mg  100 mg Oral BID Ariel Ramos MD        fish oil capsule 2,000 mg  2,000 mg Oral QAM Ariel Ramos MD        heparin (porcine) subcutaneous injection 5,000 Units  5,000 Units Subcutaneous [facility], DO   5,000 Units at 09/03/21 1840    multivitamin-minerals (CENTRUM) tablet 1 tablet  1 tablet Oral Daily Ariel Ramos MD        pantoprazole (PROTONIX) EC tablet 20 mg  20 mg Oral Early Morning Ariel Ramos MD        QUEtiapine (SEROquel) tablet 25 mg  25 mg Oral HS PRN Ariel Ramos MD   25 mg at 09/02/21 2203    rasagiline (AZILECT) tablet 1 mg  1 mg Oral Daily Ariel Ramos MD        rOPINIRole (REQUIP) tablet 0 75 mg  0 75 mg Oral HS Ariel Ramos MD   0 75 mg at 09/02/21 2203       Invasive Devices     Peripheral Intravenous Line            Peripheral IV 09/02/21 Left Forearm <1 day                Lab, Imaging and other studies: I have personally reviewed pertinent reports      VTE Pharmacologic Prophylaxis: Heparin  VTE Mechanical Prophylaxis: sequential compression device    Una Coronado MD

## 2021-09-03 NOTE — CASE MANAGEMENT
LOS: 1 GMLOS: 2 6  Bundle: NA  Readmission: NA  Unplanned Readmission Score: 15    SW met with patient's  Beverly Oliveros to introduce SW, review role, complete initial assessment and discuss DCP  Lives where: [place]  Lives with:   Home Type & Entry: [facility] w/ 1 small TE from the rear; Has 2nd floor bed-chooses to sleep 1st floor couch; Full bath w/ tub/shower on 1st floor  DME: BSC, RW, SPC  ADLs: Independent with SPC outdoors and RW indoors; Min assist for selfcare ADLs  VNA history: Open with [facility]  STR history: Unknown  Pharmacy Preference & Coverage: [facility]-will deliver blister packs to home  Mental Health/Drug/ETOH history: denies  Dialysis history: denies  POA/AD/LW: denies  Income/Employment: [employer]/Social Security  Transportation: Family  PCP: [address]:     DCP: TBD pending progress     states that the family has an aide that they privately pay to care for patient a few hours a week while  is out running errands; otherwise, he is the primary caregiver for pt  They have a dtr Aleisha Conrad that lives in [place] and a son Darryl Padgett that lives approx  1 hour away   confirmed he is always calling his children to update them on what is going on with patient  SW discussed services with [facility]   confirmed it is their preference to continue services with same agency on discharge  SW spoke with [id number] [facility]  Puneet Hewitt advised that pt was actually to be discontinued from services today as she was doing so well and was not necessarily homebound anymore  [address] referral sent for [facility]   brought in [address] from home to review med list with care team  Concern noted that  was somehow refilling old pill pack (incorrectly) instead of throwing it out  MD is reviewing with  and pharmacy  SW questioned  on this; however, he states he always throws them out       No questions or concerns from husb at this time  SW will continue to follow  SW reviewed discharge planning process including the following: identifying caregivers at home, preference for d/c planning needs, availability of [facility] to [facility] program, availability of treatment team to discuss questions or concerns patient and/or family may have regarding diagnosis, plan of care, old or new medications and discharge planning  SW will continue to follow for care coordination and update assessment as appropriate

## 2021-09-03 NOTE — PHYSICAL THERAPY NOTE
PT EVALUATION       09/03/21 1350   PT Last Visit   PT Visit Date 09/03/21   Note Type   Note type Evaluation   Pain Assessment   Pain Assessment Tool Warren-Baker FACES   Warren-Baker FACES Pain Rating 0   Home Living   Type of Home House   Home Layout Two level; Able to live on main level with bedroom/bathroom  (1 small TE)   Home Equipment Cane  (RW)   Prior Function   Level of [place] Needs assistance with ADLs and functional mobility   Lives With Spouse   Receives Help From Family   ADL Assistance Needs assistance   Comments Pt amb with RW PTA   Restrictions/Precautions   Other Precautions Cognitive; Fall Risk;Bed Alarm; Chair Alarm; Restraints BLEs   General   Additional Pertinent History Pt adm with AMS, difficulty verbalizing/responding, unable to eat on her own now  Family/Caregiver Present Yes  (pt's spouse)   Cognition   Overall Cognitive Status Impaired   Arousal/Participation Persistent stimuli required   Orientation Level Oriented to person;Oriented to place; Disoriented to time;Disoriented to situation   Following Commands Follows one step commands inconsistently   RLE Assessment   RLE Assessment WFL - jerky, erratic movements;dyskinesia  (pt unable to follow MMT commands)   LLE Assessment   LLE Assessment WFL - jerky, erratic movements;dyskinesia  (pt unable to follow MMT commands)   Bed Mobility   Supine to Sit 2  Maximal assistance   Additional items Assist x 2;Verbal cues   Sit to Supine 2  Maximal assistance   Additional items Assist x 2;Verbal cues   Additional Comments Pt sat EOB x 2 mins with max A + 1 - pt unable to maintain sitting balance without max assist   Balance   Static Sitting Poor -   Activity Tolerance   Activity Tolerance Patient limited by fatigue;Treatment limited secondary to medical complications (Comment)  (weakness;deconditioning)   Assessment   Problem List Decreased strength;Decreased range of motion;Decreased endurance; Impaired balance;Decreased mobility; Decreased coordination;Decreased cognition; Impaired judgement;Decreased safety awareness; Impaired tone   Assessment Patient seen for Physical Therapy evaluation  Patient admitted with Altered mental status  Comorbidities affecting patient's physical performance include: DM2, HTN, HLD, dementia, Parkinson's, RODRIGO, malnutrition, s/p deep brain stimulator placement, RLS, cervical dystonia, frequent falls, dyskinesia due to Parkinson's  Personal factors affecting patient at time of initial evaluation include: lives in tw [place] house, ambulating with assistive device, stairs to enter home, inability to ambulate household distances, inability to navigate community distances, inability to navigate level surfaces without external assistance, inability to perform dynamic tasks in community, decreased cognition and positive fall history  Prior to admission, patient was requiring assist for functional mobility with RW, requiring assist for ADLS, living with spouse in a two level home with 1 steps to enter, ambulating household distance and lives in a multilevel house but has 1st floor setup  Please find objective findings from Physical Therapy assessment regarding body systems outlined above with impairments and limitations including weakness, decreased ROM, impaired balance, decreased endurance, impaired coordination, gait deviations, decreased activity tolerance, decreased functional mobility tolerance, decreased safety awareness, impaired judgement, fall risk and decreased cognition  The Barthel Index was used as a functional outcome tool presenting with a score of 0 today indicating marked limitations of functional mobility and ADLS  Patient's clinical presentation is currently unstable/unpredictable as seen in patient's presentation of vital sign response, varying levels of cognitive performance, increased fall risk, new onset of impairment of functional mobility, decreased endurance and new onset of weakness   Pt would benefit from continued Physical Therapy treatment to address deficits as defined above and maximize level of functional mobility  As demonstrated by objective findings, the assigned level of complexity for this evaluation is high  The patient's AM-PAC Basic Mobility Inpatient Short Form Raw Score is 7, Standardized Score is 18 33  A standardized score less than 42 9 suggests the patient may benefit from discharge to post-acute rehabilitation services  Please also refer to the recommendation of the Physical Therapist for safe discharge planning  Goals   Patient Goals pt unable to state due to impaired cognition   STG Expiration Date 09/10/21   Short Term Goal #1 bed mob - max/mod A   Short Term Goal #2 trans - max/mod A; pt will amb short, functional distances with a RW - max/mod A   LTG Expiration Date 09/17/21   Long Term Goal #1 bed mob - min A; trans - min A   Long Term Goal #2 pt will amb with a RW short, functional distances - min A; strength LEs - 3 to 3+/5   Plan   Treatment/Interventions ADL retraining;Functional transfer training;LE strengthening/ROM; Therapeutic exercise; Endurance training;Cognitive reorientation;Patient/family training;Equipment eval/education; Bed mobility;Gait training; Compensatory technique education   PT Frequency 5x/wk   Recommendation   PT Discharge Recommendation Post acute rehabilitation services   AM-PAC Basic Mobility Inpatient   Turning in Bed Without Bedrails 2   Lying on Back to Sitting on Edge of Flat Bed 1   Moving Bed to Chair 1   Standing Up From Chair 1   Walk in Room 1   Climb 3-5 Stairs 1   Basic Mobility Inpatient Raw Score 7   Turning Head Towards Sound 3   Follow Simple Instructions 2   Low Function Basic Mobility Raw Score 12   Low Function Basic Mobility Standardized Score 18 33   Barthel Index   Feeding 0   Bathing 0   Grooming Score 0   Dressing Score 0   Bladder Score 0   Bowels Score 0   Toilet Use Score 0   Transfers (Bed/Chair) Score 0   Mobility (Level Surface) Score 0   Stairs Score 0   Barthel Index Score 0   Licensure   [place] License Number  [id number] [place] [address] [id number]

## 2021-09-03 NOTE — PROGRESS NOTES
This CM is covering embedded CM caseload while OoO  ADT notification received  for patient admission to [facility]   Patient presented via BLS for AMS, mumble speech and flailing of extremities  CM will continue to monitor via chart review throughout hospitalization

## 2021-09-03 NOTE — OCCUPATIONAL THERAPY NOTE
Occupational Therapy Evaluation          09/03/21 1410   Note Type   Note type Evaluation   Restrictions/Precautions   Other Precautions Cognitive; Chair Alarm; Bed Alarm; Restraints; Fall Risk   Pain Assessment   Pain Assessment Tool Warren-Baker FACES   Warren-Baker FACES Pain Rating 0   Home Living   Type of Home House   Home Layout Two level; Able to live on main level with bedroom/bathroom  (1 TE)   Bathroom Shower/Tub Walk-in shower   Bathroom Toilet Standard   Bathroom Equipment Grab bars in shower; Shower chair   Home Equipment Walker;Cane   Additional Comments Pt admitted w/ AMS and dyskinesia of BUE and BLEs  Prior Function   Level of [place] Needs assistance with ADLs and functional mobility   Lives With Spouse   Receives Help From Family   ADL Assistance Needs assistance   IADLs Needs assistance   Comments Upon interview, pt presents minimally verbal and difficult to understand, pt's  enters room and provides home setup and pt's PLOF  Per , pt was independent in ADLs and functional mobility w/ RW PTA; "I would just watch her as she did everything"  Pt's  is a questionable historian, per CM note  previously reported assisting pt during self care ADLs  ADL   Eating Assistance 1  Total Assistance   Grooming Assistance 1  Total Assistance   UB Bathing Assistance 1  Total Assistance   LB Bathing Assistance 1  Total Assistance   UB Dressing Assistance 1  Total Assistance   LB Dressing Assistance 1  Total [address]  1  Total Assistance   Bed Mobility   Supine to Sit 2  Maximal assistance   Additional items Assist x 2;Verbal cues   Sit to Supine 2  Maximal assistance   Additional items Assist x 2;Verbal cues   Additional Comments Pt w/ dyskinesia of BUEs and BLEs upon supine to sit transfer, requiring max A x 2, pt sat EOB x 2 minutes w/ max A x 1, pt unable to maintain static sitting balance and straight upright posture     Transfers   Sit to Stand Unable to assess  (Pt unable to maintain safe static sitting balance)   Balance   Static Sitting Poor -   Activity Tolerance   Activity Tolerance Patient limited by fatigue; Other (Comment)  (Pt limited by dyskinesia, pt limited by impaired cognition)   RUE Assessment   RUE Assessment WFL   RUE Overall AROM   R Shoulder Flexion Pt overall AROM RUE WFL, unable to assess pt's strength as pt inconsistently following commands  LUE Assessment   LUE Assessment WFL   LUE Overall AROM   L Shoulder Flexion Pt overall AROM LUE WFL, unable to assess pt's strength as pt inconsistently following commands  Cognition   Overall Cognitive Status Impaired   Arousal/Participation Alert   Attention Difficulty attending to directions   Orientation Level Oriented to person;Oriented to place; Disoriented to time;Disoriented to situation   Following Commands Follows one step commands inconsistently   Assessment   Limitation Decreased ADL status; Decreased UE strength;Decreased UE ROM; Decreased Safe judgement during ADL;Decreased cognition;Decreased endurance;Decreased self-care trans;Decreased high-level ADLs   Prognosis Good   Assessment Patient evaluated by Occupational Therapy  Patient admitted with Altered mental status  The patients occupational profile, medical and therapy history includes a extensive additional review of physical, cognitive, or psychosocial history related to current functional performance  Comorbidities affecting functional mobility and ADLS include: dyskinesia due to Parkinson's disease, RODRIGO, dementia due to Parkinson's disease with behavioral disturbance, essential HTN, stage 3a CKD, mixed hyperlipidemia, DM II, underweight, s/p deep brain stimulator placement, GERD  Prior to admission, patient was requiring assist for functional mobility with RW, requiring assist for ADLS, requiring assist for IADLS and living with spouse in a two level home with one steps to enter    The evaluation identifies the following performance deficits: weakness, decreased ROM, impaired balance, decreased endurance, decreased coordination, increased fall risk, new onset of impairment of functional mobility, decreased ADLS, decreased IADLS, pain, decreased activity tolerance, decreased safety awareness, impaired judgement, decreased cognition and decreased strength, that result in activity limitations and/or participation restrictions  This evaluation requires clinical decision making of high complexity, because the patient presents with comorbidites that affect occupational performance and required significant modification of tasks or assistance with consideration of multiple treatment options  The Barthel Index was used as a functional outcome tool presenting with a score of 0, indicating marked limitations of functional mobility and ADLS  The patient's raw score on the -PAC Daily Activity inpatient short form low function score is 10, standardized score is Low Function Daily Activity Standardized Score: 17 31  Patients with a standardized score less than 39 4 are likely to benefit from discharge to post-acute rehab services  Please refer to the recommendation of the Occupational Therapist for safe discharge planning  Goals   Patient Goals Pt unable to state due to impaired cognition   STG Time Frame   (1-7)   Short Term Goal  Pt will perform a STS transfer w/ max A x 2 and RW for support; Patient will increase bed mobility to max assist in preparation for ADLS and transfers;  Patient will increase functional mobility to and from commode with rolling walker with max assist of 2 to increase performance with ADLS and to use a toilet; Patient will tolerate 5 minutes of UE ROM/strengthening to increase general activity tolerance and performance in ADLS/IADLS; Patient will improve functional activity tolerance to 5 minutes of sustained functional tasks to increase participation in basic self-care and decrease assistance level;  Patient will be able to to verbalize understanding and perform energy conservation/proper body mechanics during ADLS and functional mobility at least 75% of the time with moderate cueing to decrease signs of fatigue and increase stamina to return to prior level of function; Patient will increase static sitting balance to poor to improve the ability to sit at edge of bed or on a chair for ADLS;  Patient will increase static standing balance to poor- to improve postural stability and decrease fall risk during standing ADLS and transfers  LTG Time Frame   (8-14)   Long Term Goal Pt will perform a STS transfer w/ max A x 1 and RW for support; Patient will increase bed mobility to mod assist in preparation for ADLS and transfers; Patient will increase functional mobility to and from bathroom with rolling walker with max assist to increase performance with ADLS and to use a toilet; Patient will tolerate 10 minutes of UE ROM/strengthening to increase general activity tolerance and performance in ADLS/IADLS; Patient will improve functional activity tolerance to 10 minutes of sustained functional tasks to increase participation in basic self-care and decrease assistance level;  Patient will be able to to verbalize understanding and perform energy conservation/proper body mechanics during ADLS and functional mobility at least 90% of the time with minimal cueing to decrease signs of fatigue and increase stamina to return to prior level of function; Patient will increase static sitting balance to poor+ to improve the ability to sit at edge of bed or on a chair for ADLS;  Patient will increase static standing balance to poor to improve postural stability and decrease fall risk during standing ADLS and transfers  Pt will score >/= 13/24 on AM-PAC Daily Activity Inpatient scale to promote safe independence with ADLs and functional mobility;  Pt will score >/= 30/100 on Barthel Index in order to decrease caregiver assistance needed and increase ability to perform ADLs and functional mobility  Functional Transfer Goals   Pt Will Perform All Functional Transfers   (STG max A x 2, LTG max A x 1)   ADL Goals   Pt Will Perform Eating   (STG max assist, LTG mod assist)   Pt Will Perform Grooming   (STG max assist, LTG mod assist)   Pt Will Perform Bathing   (STG max assist, LTG mod assist)   Pt Will Perform UE Dressing   (STG max assist, LTG mod assist)   Pt Will Perform LE Dressing   (STG max assist, LTG mod assist)   Pt Will Perform Toileting   (STG max assist, LTG mod assist)   Plan   Treatment Interventions ADL retraining;Functional transfer training;UE strengthening/ROM; Endurance training;Cognitive reorientation;Patient/family training;Equipment evaluation/education; Compensatory technique education;Continued evaluation; Energy conservation; Activityengagement   Goal Expiration Date 09/17/21   OT Frequency 3-5x/wk   Recommendation   OT Discharge Recommendation Post acute rehabilitation services   AM-PAC Daily Activity Inpatient   Lower Body Dressing 1   Bathing 1   Toileting 1   Upper Body Dressing 1   Grooming 1   Eating 1   Daily Activity Raw Score 6   Turning Head Towards Sound 2   Follow Simple Instructions 2   Low Function Daily Activity Raw Score 10   Low Function Daily Activity Standardized Score 17 31   AM-PAC Applied Cognition Inpatient   Following a Speech/Presentation 2   Understanding Ordinary Conversation 2   Taking Medications 1   Remembering Where Things Are Placed or Put Away 1   Remembering List of 4-5 Errands 1   Taking Care of Complicated Tasks 1   Applied Cognition Raw Score 8   Applied Cognition Standardized Score 19 32   Barthel Index   Feeding 0   Bathing 0   Grooming Score 0   Dressing Score 0   Bladder Score 0   Bowels Score 0   Toilet Use Score 0   Transfers (Bed/Chair) Score 0   Mobility (Level Surface) Score 0   Stairs Score 0   Barthel Index Score 0   Licensure   [place] License Number  [employer], [facility]

## 2021-09-03 NOTE — ASSESSMENT & PLAN NOTE
Malnutrition Findings:   Adult Malnutrition type: Chronic illness  Adult Degree of Malnutrition: Other severe protein calorie malnutrition    BMI Findings: Body mass index is 16 32 kg/m²  Patient had swallow evaluation  Patient's diet is progressing as tolerated

## 2021-09-03 NOTE — PLAN OF CARE
Problem: Potential for Falls  Goal: Patient will remain free of falls  Description: INTERVENTIONS:  - Educate patient/family on patient safety including physical limitations  - Instruct patient to call for assistance with activity   - Consult OT/PT to assist with strengthening/mobility   - Keep Call bell within reach  - Keep bed low and locked with side rails adjusted as appropriate  - Keep care items and personal belongings within reach  - Initiate and maintain comfort rounds  - Make Fall Risk Sign visible to staff  - Offer Toileting every 2  Hours, in advance of need  - Initiate/Maintain bed alarm  - Obtain necessary fall risk management equipment:   - Apply yellow socks and bracelet for high fall risk patients  - Consider moving patient to room near nurses station  Outcome: Progressing     Problem: MOBILITY - ADULT  Goal: Maintain or return to baseline ADL function  Description: INTERVENTIONS:  -  Assess patient's ability to carry out ADLs; assess patient's baseline for ADL function and identify physical deficits which impact ability to perform ADLs (bathing, care of mouth/teeth, toileting, grooming, dressing, etc )  - Assess/evaluate cause of self-care deficits   - Assess range of motion  - Assess patient's mobility; develop plan if impaired  - Assess patient's need for assistive devices and provide as appropriate  - Encourage maximum independence but intervene and supervise when necessary  - Involve family in performance of ADLs  - Assess for home care needs following discharge   - Consider OT consult to assist with ADL evaluation and planning for discharge  - Provide patient education as appropriate  Outcome: Progressing     Problem: Prexisting or High Potential for Compromised Skin Integrity  Goal: Skin integrity is maintained or improved  Description: INTERVENTIONS:  - Identify patients at risk for skin breakdown  - Assess and monitor skin integrity  - Assess and monitor nutrition and hydration status  - Monitor labs   - Assess for incontinence   - Turn and reposition patient  - Assist with mobility/ambulation  - Relieve pressure over bony prominences  - Avoid friction and shearing  - Provide appropriate hygiene as needed including keeping skin clean and dry  - Evaluate need for skin moisturizer/barrier cream  - Collaborate with interdisciplinary team   - Patient/family teaching  - Consider wound care consult   Outcome: Progressing

## 2021-09-03 NOTE — QUICK NOTE
Patient was unable to receive 1 of her morning meds as our pharmacy did not stock  Patient's  was able to bring her Rasagiline and was accepted at the pharmacy

## 2021-09-04 PROBLEM — N17.9 AKI (ACUTE KIDNEY INJURY) (HCC): Status: RESOLVED | Noted: 2019-07-19 | Resolved: 2021-09-04

## 2021-09-04 PROBLEM — E43 SEVERE PROTEIN-CALORIE MALNUTRITION (HCC): Status: RESOLVED | Noted: 2019-07-22 | Resolved: 2021-09-04

## 2021-09-04 LAB
ANION GAP SERPL CALCULATED.3IONS-SCNC: 13 MMOL/L (ref 4–13)
BACTERIA UR CULT: NORMAL
BACTERIA UR QL AUTO: ABNORMAL /HPF
BASOPHILS # BLD AUTO: 0.1 THOUSANDS/ΜL (ref 0–0.1)
BASOPHILS NFR BLD AUTO: 1 % (ref 0–1)
BILIRUB UR QL STRIP: NEGATIVE
BUN SERPL-MCNC: 24 MG/DL (ref 5–25)
CALCIUM SERPL-MCNC: 10.1 MG/DL (ref 8.3–10.1)
CHLORIDE SERPL-SCNC: 110 MMOL/L (ref 100–108)
CLARITY UR: ABNORMAL
CO2 SERPL-SCNC: 28 MMOL/L (ref 21–32)
COLOR UR: ABNORMAL
CREAT SERPL-MCNC: 1.56 MG/DL (ref 0.6–1.3)
CREAT UR-MCNC: <13 MG/DL
CRP SERPL QL: 26.7 MG/L
EOSINOPHIL # BLD AUTO: 0.12 THOUSAND/ΜL (ref 0–0.61)
EOSINOPHIL NFR BLD AUTO: 1 % (ref 0–6)
ERYTHROCYTE [DISTWIDTH] IN BLOOD BY AUTOMATED COUNT: 14.1 % (ref 11.6–15.1)
ERYTHROCYTE [SEDIMENTATION RATE] IN BLOOD: 34 MM/HOUR (ref 0–29)
GFR SERPL CREATININE-BSD FRML MDRD: 31 ML/MIN/1.73SQ M
GLUCOSE SERPL-MCNC: 113 MG/DL (ref 65–140)
GLUCOSE SERPL-MCNC: 157 MG/DL (ref 65–140)
GLUCOSE UR STRIP-MCNC: NEGATIVE MG/DL
HCT VFR BLD AUTO: 45.4 % (ref 34.8–46.1)
HGB BLD-MCNC: 13.7 G/DL (ref 11.5–15.4)
HGB UR QL STRIP.AUTO: ABNORMAL
IMM GRANULOCYTES # BLD AUTO: 0.03 THOUSAND/UL (ref 0–0.2)
IMM GRANULOCYTES NFR BLD AUTO: 0 % (ref 0–2)
KETONES UR STRIP-MCNC: ABNORMAL MG/DL
LEUKOCYTE ESTERASE UR QL STRIP: ABNORMAL
LYMPHOCYTES # BLD AUTO: 0.78 THOUSANDS/ΜL (ref 0.6–4.47)
LYMPHOCYTES NFR BLD AUTO: 7 % (ref 14–44)
MAGNESIUM SERPL-MCNC: 2.4 MG/DL (ref 1.6–2.6)
MCH RBC QN AUTO: 28.2 PG (ref 26.8–34.3)
MCHC RBC AUTO-ENTMCNC: 30.2 G/DL (ref 31.4–37.4)
MCV RBC AUTO: 94 FL (ref 82–98)
MONOCYTES # BLD AUTO: 0.9 THOUSAND/ΜL (ref 0.17–1.22)
MONOCYTES NFR BLD AUTO: 9 % (ref 4–12)
NEUTROPHILS # BLD AUTO: 8.55 THOUSANDS/ΜL (ref 1.85–7.62)
NEUTS SEG NFR BLD AUTO: 82 % (ref 43–75)
NITRITE UR QL STRIP: NEGATIVE
NON-SQ EPI CELLS URNS QL MICRO: ABNORMAL /HPF
NRBC BLD AUTO-RTO: 0 /100 WBCS
PH UR STRIP.AUTO: 6 [PH]
PHOSPHATE SERPL-MCNC: 4 MG/DL (ref 2.3–4.1)
PLATELET # BLD AUTO: 239 THOUSANDS/UL (ref 149–390)
PMV BLD AUTO: 10.9 FL (ref 8.9–12.7)
POTASSIUM SERPL-SCNC: 4 MMOL/L (ref 3.5–5.3)
PROT UR STRIP-MCNC: ABNORMAL MG/DL
RBC # BLD AUTO: 4.85 MILLION/UL (ref 3.81–5.12)
RBC #/AREA URNS AUTO: ABNORMAL /HPF
SODIUM 24H UR-SCNC: 107 MOL/L
SODIUM SERPL-SCNC: 151 MMOL/L (ref 136–145)
SP GR UR STRIP.AUTO: >=1.03 (ref 1–1.03)
UROBILINOGEN UR QL STRIP.AUTO: 0.2 E.U./DL
WBC # BLD AUTO: 10.48 THOUSAND/UL (ref 4.31–10.16)
WBC #/AREA URNS AUTO: ABNORMAL /HPF

## 2021-09-04 PROCEDURE — 86140 C-REACTIVE PROTEIN: CPT | Performed by: STUDENT IN AN ORGANIZED HEALTH CARE EDUCATION/TRAINING PROGRAM

## 2021-09-04 PROCEDURE — 83735 ASSAY OF MAGNESIUM: CPT | Performed by: STUDENT IN AN ORGANIZED HEALTH CARE EDUCATION/TRAINING PROGRAM

## 2021-09-04 PROCEDURE — 99233 SBSQ HOSP IP/OBS HIGH 50: CPT | Performed by: PSYCHIATRY & NEUROLOGY

## 2021-09-04 PROCEDURE — 82948 REAGENT STRIP/BLOOD GLUCOSE: CPT

## 2021-09-04 PROCEDURE — 84100 ASSAY OF PHOSPHORUS: CPT | Performed by: STUDENT IN AN ORGANIZED HEALTH CARE EDUCATION/TRAINING PROGRAM

## 2021-09-04 PROCEDURE — 86592 SYPHILIS TEST NON-TREP QUAL: CPT | Performed by: STUDENT IN AN ORGANIZED HEALTH CARE EDUCATION/TRAINING PROGRAM

## 2021-09-04 PROCEDURE — 84300 ASSAY OF URINE SODIUM: CPT

## 2021-09-04 PROCEDURE — 80048 BASIC METABOLIC PNL TOTAL CA: CPT | Performed by: STUDENT IN AN ORGANIZED HEALTH CARE EDUCATION/TRAINING PROGRAM

## 2021-09-04 PROCEDURE — 85025 COMPLETE CBC W/AUTO DIFF WBC: CPT | Performed by: STUDENT IN AN ORGANIZED HEALTH CARE EDUCATION/TRAINING PROGRAM

## 2021-09-04 PROCEDURE — 85652 RBC SED RATE AUTOMATED: CPT | Performed by: STUDENT IN AN ORGANIZED HEALTH CARE EDUCATION/TRAINING PROGRAM

## 2021-09-04 PROCEDURE — 87086 URINE CULTURE/COLONY COUNT: CPT | Performed by: STUDENT IN AN ORGANIZED HEALTH CARE EDUCATION/TRAINING PROGRAM

## 2021-09-04 PROCEDURE — 99232 SBSQ HOSP IP/OBS MODERATE 35: CPT | Performed by: FAMILY MEDICINE

## 2021-09-04 PROCEDURE — 82570 ASSAY OF URINE CREATININE: CPT

## 2021-09-04 PROCEDURE — 81001 URINALYSIS AUTO W/SCOPE: CPT | Performed by: STUDENT IN AN ORGANIZED HEALTH CARE EDUCATION/TRAINING PROGRAM

## 2021-09-04 RX ORDER — ATORVASTATIN CALCIUM 80 MG/1
80 TABLET, FILM COATED ORAL
Status: DISCONTINUED | OUTPATIENT
Start: 2021-09-04 | End: 2021-09-07

## 2021-09-04 RX ORDER — SODIUM CHLORIDE, SODIUM LACTATE, POTASSIUM CHLORIDE, CALCIUM CHLORIDE 600; 310; 30; 20 MG/100ML; MG/100ML; MG/100ML; MG/100ML
75 INJECTION, SOLUTION INTRAVENOUS CONTINUOUS
Status: DISPENSED | OUTPATIENT
Start: 2021-09-04 | End: 2021-09-05

## 2021-09-04 RX ORDER — SODIUM CHLORIDE 9 MG/ML
75 INJECTION, SOLUTION INTRAVENOUS CONTINUOUS
Status: DISCONTINUED | OUTPATIENT
Start: 2021-09-04 | End: 2021-09-04

## 2021-09-04 RX ORDER — ASPIRIN 81 MG/1
324 TABLET ORAL EVERY MORNING
Status: DISCONTINUED | OUTPATIENT
Start: 2021-09-05 | End: 2021-09-05

## 2021-09-04 RX ORDER — CEPHALEXIN 500 MG/1
500 CAPSULE ORAL EVERY 12 HOURS SCHEDULED
Status: DISCONTINUED | OUTPATIENT
Start: 2021-09-04 | End: 2021-09-04

## 2021-09-04 RX ADMIN — RASAGILINE 1 MG: 1 TABLET ORAL at 10:24

## 2021-09-04 RX ADMIN — HEPARIN SODIUM 5000 UNITS: 5000 INJECTION INTRAVENOUS; SUBCUTANEOUS at 14:10

## 2021-09-04 RX ADMIN — CALCIUM 1 TABLET: 500 TABLET ORAL at 10:21

## 2021-09-04 RX ADMIN — AMANTADINE HYDROCHLORIDE 100 MG: 100 CAPSULE ORAL at 10:58

## 2021-09-04 RX ADMIN — CLONAZEPAM 0.5 MG: 0.5 TABLET ORAL at 17:31

## 2021-09-04 RX ADMIN — ASPIRIN 81 MG: 81 TABLET, COATED ORAL at 10:22

## 2021-09-04 RX ADMIN — CALCIUM 1 TABLET: 500 TABLET ORAL at 17:32

## 2021-09-04 RX ADMIN — CARBIDOPA AND LEVODOPA 0.5 TABLET: 25; 100 TABLET ORAL at 10:22

## 2021-09-04 RX ADMIN — Medication 1 TABLET: at 10:21

## 2021-09-04 RX ADMIN — AMANTADINE HYDROCHLORIDE 100 MG: 100 CAPSULE ORAL at 17:32

## 2021-09-04 RX ADMIN — CLONAZEPAM 0.5 MG: 0.5 TABLET ORAL at 10:22

## 2021-09-04 RX ADMIN — ATORVASTATIN CALCIUM 80 MG: 80 TABLET ORAL at 17:39

## 2021-09-04 RX ADMIN — CARBIDOPA AND LEVODOPA 0.5 TABLET: 25; 100 TABLET ORAL at 12:14

## 2021-09-04 RX ADMIN — CEFAZOLIN SODIUM 500 MG: 1 INJECTION, POWDER, FOR SOLUTION INTRAMUSCULAR; INTRAVENOUS at 22:51

## 2021-09-04 RX ADMIN — CARBIDOPA AND LEVODOPA 0.5 TABLET: 25; 100 TABLET ORAL at 17:32

## 2021-09-04 RX ADMIN — AMLODIPINE BESYLATE 5 MG: 5 TABLET ORAL at 10:22

## 2021-09-04 RX ADMIN — HEPARIN SODIUM 5000 UNITS: 5000 INJECTION INTRAVENOUS; SUBCUTANEOUS at 21:54

## 2021-09-04 RX ADMIN — SODIUM CHLORIDE 75 ML/HR: 0.9 INJECTION, SOLUTION INTRAVENOUS at 18:47

## 2021-09-04 RX ADMIN — SODIUM CHLORIDE, SODIUM LACTATE, POTASSIUM CHLORIDE, AND CALCIUM CHLORIDE 75 ML/HR: .6; .31; .03; .02 INJECTION, SOLUTION INTRAVENOUS at 20:35

## 2021-09-04 NOTE — CONSULTS
[facility]   Neurology Initial Consult    Xander Ortiz is a [de-identified] y o  female  3 [address]/3 [address]-*          Information obtained from:   Chief Complaint   Patient presents with    Altered Mental Status     per BLS  told them patient has had an altered mental status since yesterday  on arrival, patient mumbling, flailing all extremeties around the bed  Assessment/Plan:    1  Poor po intake  2  Toxic metabolic encephalopathy   3  RODRIGO/dehydration   4  Advanced PD, s/p DBS  5  Dementia       Patient does have tendency to get dysinesia periodically  We attempted to get MRI brain to r/o new/acute process however she can't lie still and in setting of DBS is not coherent to follow directions during imaging  So it was collectively deferred  Increasing her amantadine to 100mg bid to improve her dyskinesia  Placing her on klonopin 0 5mg bid to treat ongoing dyskinesia  Recommend reaching out to her movement specialist for further recommendations  Her DBS is functioning appropriately when checked during attempt for MRI  Discussed plan with primary team          HPI:  Xander Ortiz is an [de-identified] yo F with PMH of PD presents with confusion and worsening extremity movements  Patient is not able to provide history  According to her , patient was at her baseline 3 days ago  She had started being non verbal last night  Patient was refusing to eat  She has   DBS in place  She is noted to be flailing around constantly  She's on sinemet, requip, azilect and amantadine  She sees movement specialist at [facility]  Past Medical History:   Diagnosis Date    RODRIGO (acute kidney injury) ([facility] [address] [address] ) 7/19/2019    Altered mental status 1/11/2021    Anal bleeding 1/29/2018    Arthritis     knee    Asthma     BPPV (benign paroxysmal positional vertigo) 7/19/2016    Last Assessment & Plan:  Hx consistent with BPPV  Neg [place], however could not perform it adequately due to dyskinesias    Recommend lozano-daroff exercises at home  IF worsens, vestibular therapy   Bulging lumbar disc 12/10/2013    Closed fracture of one rib of left side 1/29/2018    Colon polyp     Dementia ([facility] [address] )     Fall     balance issue    Fall during current hospitalization 1/15/2021    Gallbladder disease     GERD (gastroesophageal reflux disease)     Hematuria     last assessed 10/29/15    Orthostatic hypotension 7/13/2015    Osteomyelitis of lumbar spine ([facility] [address] ) 3/10/2020    Ovarian cyst     LAST ASSESSED: 12/10/13    Parkinson's disease ([facility] [address] )     Pneumonia of both lower lobes due to infectious organism 3/27/2018    Possible fracture of 5th metatarsal 1/12/2021    Psoriasis 12/10/2013    Pulmonary embolism with infarction ([facility] [address] ) 9/2/2014    Scalp laceration, subsequent encounter 11/15/2019    Sciatica 12/10/2013    Severe protein-calorie malnutrition ([facility] [address] ) 7/22/2019    Skin disorder     last assessed 12/10/13    Squamous cell carcinoma of skin 11/21/2016    Stage 3a chronic kidney disease ([facility] [address] ) 12/4/2020    Toxic metabolic encephalopathy 6/68/0803    Use of cane as ambulatory aid     Walker as ambulation aid     Wears glasses     Weight loss 10/8/2019    Last Assessment & Plan:  Recommend Boost or Ensure  F/u with PCP   Weight loss, unintentional 5/22/2015       Past Surgical History:   Procedure Laterality Date    APPENDECTOMY      1970'S    CATARACT EXTRACTION      CHOLECYSTECTOMY      1970'S    COLONOSCOPY      LAPAROTOMY N/A 7/18/2019    Procedure: LAPAROTOMY EXPLORATORY;  Surgeon: Pop Beebe MD;  Location: [address] [place] [address];  Service: General    NEUROPLASTY / [address] Right     OOPHORECTOMY Bilateral     REMOVAL OF BOTH OVARIES LAPAROSCOPIC     [facility] FLX W/RMVL OF TUMOR POLYP LESION [address] [facility] TQ N/A 3/20/2018    Procedure: COLONOSCOPY;  Surgeon: Marcelo Moran MD;  Location: [facility] GI LAB;   Service: Gastroenterology    [place] [facility] RMVL [address] W/O ECP Right 12/4/2018    Procedure: EXTRACTION EXTRACAPSULAR CATARACT PHACO INTRAOCULAR LENS (IOL); Surgeon: Jocelyn Britton MD;  Location: [facility] MAIN OR;  Service: Ophthalmology    MI XCAPSL [facility] RMVL INSJ IO LENS PROSTH W/O ECP  1/15/2019    Procedure: EXTRACTION EXTRACAPSULAR CATARACT PHACO INTRAOCULAR LENS (IOL);   Surgeon: Jocelyn Britton MD;  Location: [facility] MAIN OR;  Service: Ophthalmology    TONSILLECTOMY      VENTRAL HERNIA REPAIR      WITH IMPLANT OF MESH       No Known Allergies      Current Facility-Administered Medications:     acetaminophen (TYLENOL) tablet 650 mg, 650 mg, Oral, Q6H PRN, Tiffanie Combs MD    amantadine (SYMMETREL) capsule 100 mg, 100 mg, Oral, BID, Abdelrahman Palomino MD, 100 mg at 09/03/21 1738    amLODIPine (NORVASC) tablet 5 mg, 5 mg, Oral, Daily, Tiffanie Combs MD, 5 mg at 09/03/21 0304    aspirin (ECOTRIN LOW STRENGTH) EC tablet 81 mg, 81 mg, Oral, QAM, Tiffanie Combs MD    calcium carbonate (OYSTER SHELL,OSCAL) 500 mg tablet 1 tablet, 1 tablet, Oral, BID With Meals, Tiffanie Combs MD, 1 tablet at 09/03/21 1739    carbidopa-levodopa (SINEMET)  mg per tablet 0 5 tablet, 0 5 tablet, Oral, 4x Daily, Tiffanie Combs MD, 0 5 tablet at 09/03/21 2151    clonazePAM (KlonoPIN) tablet 0 5 mg, 0 5 mg, Oral, BID, Abdelrahman Palomino MD, 0 5 mg at 09/03/21 1738    docusate sodium (COLACE) capsule 100 mg, 100 mg, Oral, BID, Tiffanie Combs MD    fish oil capsule 2,000 mg, 2,000 mg, Oral, QATiffanie PARIS MD    heparin (porcine) subcutaneous injection 5,000 Units, 5,000 Units, Subcutaneous, Q8H [facility], 5,000 Units at 09/03/21 2148 **AND** [CANCELED] Platelet count, , , Once, [place], DO    multivitamin-minerals (CENTRUM) tablet 1 tablet, 1 tablet, Oral, Daily, Tiffanie Combs MD    pantoprazole (PROTONIX) EC tablet 20 mg, 20 mg, Oral, Early Morning, Tiffanie Combs MD    QUEtiapine (SEROquel) tablet 25 mg, 25 mg, Oral, HS PRN, Chris Harding MD, 25 mg at 09/03/21 2203    rasagiline (AZILECT) tablet 1 mg, 1 mg, Oral, Daily, Chris Harding MD    rOPINIRole (REQUIP) tablet 0 75 mg, 0 75 mg, Oral, HS, Chris Harding MD, 0 75 mg at 09/03/21 2151    Social History     Socioeconomic History    Marital status: /Civil Union     Spouse name: Lisa Cooper Number of children: 2    Years of education: 12    Highest education level: High school graduate   Occupational History    Occupation: [employer]     Comment: BILLING AND SHIPPING   Tobacco Use    Smoking status: Never Smoker    Smokeless tobacco: Never Used   Vaping Use    Vaping Use: Never used   Substance and Sexual Activity    Alcohol use: Not Currently    Drug use: Never    Sexual activity: Not Currently     Partners: Male   Other Topics Concern    Not on file   Social History Narrative    Son lives an hour away, daughter in [place]  Social Determinants of Health     Financial Resource Strain:     Difficulty of Paying Living Expenses:    Food Insecurity:     Worried About Running Out of Food in the Last Year:     [address] in the Last Year:    Transportation Needs:     Lack of Transportation (Medical):      Lack of Transportation (Non-Medical):    Physical Activity:     Days of Exercise per Week:     Minutes of Exercise per Session:    Stress:     Feeling of Stress :    Social Connections:     Frequency of Communication with Friends and Family:     Frequency of Social Gatherings with Friends and Family:     Attends Gnosticist Services:     Active Member of Clubs or Organizations:     Attends Club or Organization Meetings:     Marital Status:    Intimate Partner Violence:     Fear of Current or Ex-Partner:     Emotionally Abused:     Physically Abused:     Sexually Abused:        Family History   Problem Relation Age of Onset    Alzheimer's disease Mother     Stroke Father     No Known Problems Sister    Titi Escalera No Known Problems Brother          Review of systems:  Please see HPI for positive symptoms  Dasia accurately  Physical examination:  Vitals:    09/03/21 2021   BP: 148/93   Pulse: 80   Resp: 20   Temp: 99 2 °F (37 3 °C)   SpO2: 97%       GENERAL APPEARANCE:  The patient is alert, oriented  HEENT:  Head is normocephalic  The sinuses are otherwise nontender  Pupils are equal and reactive  NECK:  Supple without lymphadenopathy  HEART:  Regular rate and rhythm  LUNGS:  clear to auscultation  No crackles or wheezes are heard  ABDOMEN:  Soft, nontender, nondistended with good bowel sounds heard  EXTREMITIES:  Without cyanosis, clubbing or edema  Mental status: The patient is alert, attentive, and oriented  Speech is clear and fluent, good repetition, comprehension, and naming  he recalls 3/3 objects at 5 minutes  Cranial nerves:  CN II-XII: Grossly intact     Motor: There is no pronator drift of out-stretched arms  Muscle bulk and tone are normal      Muscle exam  Arm Right Left Leg Right Left   Deltoid 5/5 5/5 Iliopsoas 5/5 5/5   Biceps 5/5 5/5 Quads 5/5 5/5   Triceps 5/5 5/5 Hamstrings 5/5 5/5   Wrist Extension 5/5 5/5 Ankle Dorsi Flexion 5/5 5/5   Wrist Flexion 5/5 5/5 Ankle Plantar Flexion 5/5 5/5   Interossei 5/5 5/5 Ankle Eversion 5/5 5/5   APB 5/5 5/5 Ankle Inversion 5/5 5/5       Reflexes   RJ BJ TJ KJ AJ Plantars Green's   Right 2+ 2+ 2+ 2+ 2+ Downgoing Not present   Left 2+ 2+ 2+ 2+ 2+ Downgoing Not present     Sensory:  Grossly normal    Coordination:  Involuntary all 4 extremity movements   Dyskinesia   Gait/Stance:  dasia    Lab Results   Component Value Date    WBC 8 71 09/03/2021    HGB 13 0 09/03/2021    HCT 43 2 09/03/2021    MCV 94 09/03/2021     09/03/2021     Lab Results   Component Value Date    HGBA1C 6 6 (H) 09/03/2021     Lab Results   Component Value Date    ALT 13 09/02/2021    AST 25 09/02/2021    ALKPHOS 87 09/02/2021    BILITOT 0 4 11/04/2015     Lab Results Component Value Date    GLUCOSE 117 (H) 11/04/2015    CALCIUM 8 9 09/03/2021     11/04/2015    K 4 1 09/03/2021    CO2 25 09/03/2021     09/03/2021    BUN 19 09/03/2021    CREATININE 1 24 09/03/2021         Radiology          Review of reports and notes reveal:  XR chest 1 view portable    Result Date: 9/3/2021  No acute cardiopulmonary disease  Workstation performed: [id number]     Independent Interpretation of images or specimens:  CTA head and neck with and without contrast    Result Date: 9/2/2021  Mild-to-moderate motion artifact is present  - Negative CTA head and neck for large vessel occlusion, dissection, aneurysm, or high-grade stenosis given motion degraded examination  - Mild atherosclerotic disease of the head and neck, as detailed above  CT head without contrast    Result Date: 9/2/2021  Stable examination  No acute intracranial abnormality  Workstation performed: [id number]                  Thank you for this consult  Total time of encounter: 70min  More than 50% of time was spent in counseling and coordination of care of patient  WELLINGTON Cantrell    [facility]  [address]  Marina Ramsey 6

## 2021-09-04 NOTE — PROGRESS NOTES
[facility] Progress Note - Valente Gabriel [de-identified] y o  female MRN: [id number]    Unit/Bed#: [address] [place] [address] Encounter: [id number]      Assessment/Plan:  * Altered mental status  Assessment & Plan  Patient presented with altered mental status  Patient's  stated she was last seen well approximately 1 week before prior admission  Patient's  report it was an insidious onset with rapid progression which started with patient being nonverbal      On admission, CT of head was negative for acute bleed hemorrhage  Abnormal urinalysis was noted for leukocytes and concern for UTI  CXR was neg  MRI unattainable due to patient movement in setting of DBS  · Cause of AMS unknown- Parkinson's sequela versus dehydration versus RODRIGO versus new onset seizure versus on/off syndrome  · Neurology consult  · Fall prevention  · Seizure precautions  · PT/OT  · Nurse bedside swallow  · NPO until cleared  · Neuro checks daily  · Hydration  · Soft restraints,  as patient was a fall risk even with precautions  Dementia due to Parkinson's disease with behavioral disturbance [facility])  Assessment & Plan  Patient's caregivers have had a difficulty with patient due to behavioral issues  Likely due to sequela of parkinson's  · Continue with home medications of Seroquel  · Patient's  has been giving patient 12 5 mg of her 25 mg  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 [facility])  Assessment & Plan  Patient has has increasingly more frequent episodes of flailing during the day for over a month  Patient's  stated it used to only be during the night time  S/P deep brain stimulator placement  Assessment & Plan  Consult Neurology to see if they are working properly       Stage 3a chronic kidney disease [facility])  Assessment & Plan  Lab Results   Component Value Date    EGFR 31 09/02/2021    EGFR 53 01/14/2021    EGFR 45 01/13/2021    CREATININE 1 56 (H) 09/02/2021    CREATININE 1 01 01/14/2021    CREATININE 1 16 01/13/2021     · Cotinue with home medications  · Avoid Nephrotoxic drugs  Mixed hyperlipidemia  Assessment & Plan  Continue with Home medications    GERD (gastroesophageal reflux disease)  Assessment & Plan  Continue on home medications     Essential hypertension  Assessment & Plan  Patient upon admission was hypertensive at 164/72  · Continue on home medications    Type 2 diabetes mellitus without complication, without long-term current use of insulin [facility])  Assessment & Plan    Lab Results   Component Value Date    HGBA1C 6 4 12/04/2020       Patient's glucose on admission was 133  Patient is not on any at home medications for such  Underweight-resolved as of 9/3/2021  Assessment & Plan  Patient is NPO until passes swallowing exam     · NPO  · Maintance fluids: please see above  · Bedside swallowing study  Severe protein-calorie malnutrition (HCC)-resolved as of 9/4/2021  Assessment & Plan  Malnutrition Findings:   Adult Malnutrition type: Chronic illness  Adult Degree of Malnutrition: Other severe protein calorie malnutrition    BMI Findings: Body mass index is 16 32 kg/m²  Patient had swallow evaluation  Patient is cleared for puree diet with nectar  RODRIGO (acute kidney injury) (HCC)-resolved as of 9/4/2021  Assessment & Plan  Cr 1 56 POA, baseline 1 1-1 2  Unclear etiology, BUN:creating 17  Possibly prerenaly, dehydration from poor po intake or side effect of parkinson's regimen  Patient unable to provide ROS,  states good po intake prior to this week  · Lactic Ringer 75cc/hr  · Monitor daily chem panel  · Hold metformin and other nephrotoxic meds          Subjective: On exam, patient was in bed flailing limbs while in soft ankle restraints  Her speech was unintelligible  Per conversation with  at bedside, patient was fine until this past Wednesday when she stopped answering questions appropriately, and would not respond to simple questions or commands   is not aware of any particular inciting event or injury  Prior to that,  said that patient was very conversational with speech that was 100% intelligible  She could ambulate with a walker, and he would routinely take her out to restaurants and the bowling alley where she could converse with friends and feed herself unassisted  Objective:     Vitals: Blood pressure 160/97, pulse 83, temperature 98 5 °F (36 9 °C), resp  rate 18, height 5' (1 524 m), weight 37 9 kg (83 lb 8 9 oz), SpO2 96 %, not currently breastfeeding  ,Body mass index is 16 32 kg/m²  Wt Readings from Last 3 Encounters:   09/02/21 37 9 kg (83 lb 8 9 oz)   08/18/21 39 3 kg (86 lb 9 6 oz)   06/21/21 42 8 kg (94 lb 6 4 oz)     No intake or output data in the 24 hours ending 09/04/21 1327    Physical Exam:   Physical Exam  Constitutional:       Comments: Disoriented, intelligible mumbling    HENT:      Mouth/Throat:      Mouth: Mucous membranes are moist    Cardiovascular:      Rate and Rhythm: Normal rate and regular rhythm  Heart sounds: Normal heart sounds  Pulmonary:      Breath sounds: Normal breath sounds  Abdominal:      General: Bowel sounds are normal       Tenderness: There is no abdominal tenderness  Skin:     Capillary Refill: Capillary refill takes less than 2 seconds  Neurological:      Mental Status: She is disoriented  Comments: Tremors, flaling limbs           No results found for this or any previous visit (from the past 24 hour(s))      Current Facility-Administered Medications   Medication Dose Route Frequency Provider Last Rate Last Admin    acetaminophen (TYLENOL) tablet 650 mg  650 mg Oral Q6H PRN Richi Patel MD        amantadine (SYMMETREL) capsule 100 mg  100 mg Oral BID Jake Aguila MD   100 mg at 09/04/21 1058    amLODIPine (NORVASC) tablet 5 mg  5 mg Oral Daily Richi Patel MD   5 mg at 09/04/21 1022    aspirin (ECOTRIN LOW STRENGTH) EC tablet 81 mg  81 mg Oral QAM Chivo Bloom MD   81 mg at 09/04/21 1022    calcium carbonate (OYSTER SHELL,OSCAL) 500 mg tablet 1 tablet  1 tablet Oral BID With Meals Chivo Bloom MD   1 tablet at 09/04/21 1021    carbidopa-levodopa (SINEMET)  mg per tablet 0 5 tablet  0 5 tablet Oral 4x Daily Chivo Bloom MD   0 5 tablet at 09/04/21 1214    clonazePAM (KlonoPIN) tablet 0 5 mg  0 5 mg Oral BID Abdirahman Mejia MD   0 5 mg at 09/04/21 1022    docusate sodium (COLACE) capsule 100 mg  100 mg Oral BID Chivo Bloom MD        fish oil capsule 2,000 mg  2,000 mg Oral QAM Chivo Bloom MD        heparin (porcine) subcutaneous injection 5,000 Units  5,000 Units Subcutaneous [facility], DO   5,000 Units at 09/03/21 2148    multivitamin-minerals (CENTRUM) tablet 1 tablet  1 tablet Oral Daily Chivo Bloom MD   1 tablet at 09/04/21 1021    pantoprazole (PROTONIX) EC tablet 20 mg  20 mg Oral Early Morning Chivo Bloom MD        QUEtiapine (SEROquel) tablet 25 mg  25 mg Oral HS PRN Chivo Bloom MD   25 mg at 09/03/21 2203    rasagiline (AZILECT) tablet 1 mg  1 mg Oral Daily Chivo Bloom MD   1 mg at 09/04/21 1024    rOPINIRole (REQUIP) tablet 0 75 mg  0 75 mg Oral HS Chivo Bloom MD   0 75 mg at 09/03/21 2151       Invasive Devices     None                 Lab, Imaging and other studies: I have personally reviewed pertinent reports      VTE Pharmacologic Prophylaxis: Heparin  VTE Mechanical Prophylaxis: sequential compression device    Nicole Krishnan MD

## 2021-09-04 NOTE — PLAN OF CARE
Problem: Potential for Falls  Goal: Patient will remain free of falls  Description: INTERVENTIONS:  - Educate patient/family on patient safety including physical limitations  - Instruct patient to call for assistance with activity   - Consult OT/PT to assist with strengthening/mobility   - Keep Call bell within reach  - Keep bed low and locked with side rails adjusted as appropriate  - Keep care items and personal belongings within reach  - Initiate and maintain comfort rounds  - Make Fall Risk Sign visible to staff  - Offer Toileting every 2 Hours, in advance of need  - Initiate/Maintain bed alarm  - Obtain necessary fall risk management equipment: walker  - Apply yellow socks and bracelet for high fall risk patients  - Consider moving patient to room near nurses station  Outcome: Progressing     Problem: MOBILITY - ADULT  Goal: Maintain or return to baseline ADL function  Description: INTERVENTIONS:  -  Assess patient's ability to carry out ADLs; assess patient's baseline for ADL function and identify physical deficits which impact ability to perform ADLs (bathing, care of mouth/teeth, toileting, grooming, dressing, etc )  - Assess/evaluate cause of self-care deficits   - Assess range of motion  - Assess patient's mobility; develop plan if impaired  - Assess patient's need for assistive devices and provide as appropriate  - Encourage maximum independence but intervene and supervise when necessary  - Involve family in performance of ADLs  - Assess for home care needs following discharge   - Consider OT consult to assist with ADL evaluation and planning for discharge  - Provide patient education as appropriate  Outcome: Progressing  Goal: Maintains/Returns to pre admission functional level  Description: INTERVENTIONS:  - Perform BMAT or MOVE assessment daily    - Set and communicate daily mobility goal to care team and patient/family/caregiver     - Collaborate with rehabilitation services on mobility goals if consulted  - Perform Range of Motion 3 times a day  - Reposition patient every 2 hours  - Dangle patient 3 times a day  - Stand patient 3 times a day  - Ambulate patient 3 times a day  - Out of bed to chair 3 times a day   - Out of bed for meals 3 times a day  - Out of bed for toileting  - Record patient progress and toleration of activity level   Outcome: Progressing     Problem: Prexisting or High Potential for Compromised Skin Integrity  Goal: Skin integrity is maintained or improved  Description: INTERVENTIONS:  - Identify patients at risk for skin breakdown  - Assess and monitor skin integrity  - Assess and monitor nutrition and hydration status  - Monitor labs   - Assess for incontinence   - Turn and reposition patient  - Assist with mobility/ambulation  - Relieve pressure over bony prominences  - Avoid friction and shearing  - Provide appropriate hygiene as needed including keeping skin clean and dry  - Evaluate need for skin moisturizer/barrier cream  - Collaborate with interdisciplinary team   - Patient/family teaching  - Consider wound care consult   Outcome: Progressing     Problem: Nutrition/Hydration-ADULT  Goal: Nutrient/Hydration intake appropriate for improving, restoring or maintaining nutritional needs  Description: Monitor and assess patient's nutrition/hydration status for malnutrition  Collaborate with interdisciplinary team and initiate plan and interventions as ordered  Monitor patient's weight and dietary intake as ordered or per policy  Utilize nutrition screening tool and intervene as necessary  Determine patient's food preferences and provide high-protein, high-caloric foods as appropriate       INTERVENTIONS:  - Monitor oral intake, urinary output, labs, and treatment plans  - Assess nutrition and hydration status and recommend course of action  - Evaluate amount of meals eaten  - Assist patient with eating if necessary   - Allow adequate time for meals  - Recommend/ encourage appropriate diets, oral nutritional supplements, and vitamin/mineral supplements  - Order, calculate, and assess calorie counts as needed  - Recommend, monitor, and adjust tube feedings and TPN/PPN based on assessed needs  - Assess need for intravenous fluids  - Provide specific nutrition/hydration education as appropriate  - Include patient/family/caregiver in decisions related to nutrition  Outcome: Progressing       Problem: NEUROSENSORY - ADULT  Goal: Achieves stable or improved neurological status  Description: INTERVENTIONS  - Monitor and report changes in neurological status  - Monitor vital signs such as temperature, blood pressure, glucose, and any other labs ordered   - Initiate measures to prevent increased intracranial pressure  - Monitor for seizure activity and implement precautions if appropriate      Outcome: Progressing  Goal: Remains free of injury related to seizures activity  Description: INTERVENTIONS  - Maintain airway, patient safety  and administer oxygen as ordered  - Monitor patient for seizure activity, document and report duration and description of seizure to physician/advanced practitioner  - If seizure occurs,  ensure patient safety during seizure  - Reorient patient post seizure  - Seizure pads on all 4 side rails  - Instruct patient/family to notify RN of any seizure activity including if an aura is experienced  - Instruct patient/family to call for assistance with activity based on nursing assessment  - Administer anti-seizure medications if ordered    Outcome: Progressing  Goal: Achieves maximal functionality and self care  Description: INTERVENTIONS  - Monitor swallowing and airway patency with patient fatigue and changes in neurological status  - Encourage and assist patient to increase activity and self care     - Encourage visually impaired, hearing impaired and aphasic patients to use assistive/communication devices  Outcome: Progressing

## 2021-09-05 PROBLEM — E87.0 HYPERNATREMIA: Status: ACTIVE | Noted: 2021-09-05

## 2021-09-05 PROBLEM — N32.0 BLADDER OBSTRUCTION: Status: ACTIVE | Noted: 2021-09-05

## 2021-09-05 PROBLEM — K59.00 CONSTIPATION: Status: ACTIVE | Noted: 2021-09-05

## 2021-09-05 LAB
ANION GAP SERPL CALCULATED.3IONS-SCNC: 9 MMOL/L (ref 4–13)
BASOPHILS # BLD AUTO: 0.11 THOUSANDS/ΜL (ref 0–0.1)
BASOPHILS NFR BLD AUTO: 1 % (ref 0–1)
BUN SERPL-MCNC: 24 MG/DL (ref 5–25)
CALCIUM SERPL-MCNC: 9.4 MG/DL (ref 8.3–10.1)
CHLORIDE SERPL-SCNC: 112 MMOL/L (ref 100–108)
CO2 SERPL-SCNC: 30 MMOL/L (ref 21–32)
CREAT SERPL-MCNC: 1.33 MG/DL (ref 0.6–1.3)
EOSINOPHIL # BLD AUTO: 0.09 THOUSAND/ΜL (ref 0–0.61)
EOSINOPHIL NFR BLD AUTO: 1 % (ref 0–6)
ERYTHROCYTE [DISTWIDTH] IN BLOOD BY AUTOMATED COUNT: 14.3 % (ref 11.6–15.1)
GFR SERPL CREATININE-BSD FRML MDRD: 38 ML/MIN/1.73SQ M
GLUCOSE SERPL-MCNC: 138 MG/DL (ref 65–140)
HCT VFR BLD AUTO: 44.6 % (ref 34.8–46.1)
HGB BLD-MCNC: 13.4 G/DL (ref 11.5–15.4)
IMM GRANULOCYTES # BLD AUTO: 0.04 THOUSAND/UL (ref 0–0.2)
IMM GRANULOCYTES NFR BLD AUTO: 0 % (ref 0–2)
LYMPHOCYTES # BLD AUTO: 0.71 THOUSANDS/ΜL (ref 0.6–4.47)
LYMPHOCYTES NFR BLD AUTO: 6 % (ref 14–44)
MAGNESIUM SERPL-MCNC: 2.3 MG/DL (ref 1.6–2.6)
MCH RBC QN AUTO: 28.2 PG (ref 26.8–34.3)
MCHC RBC AUTO-ENTMCNC: 30 G/DL (ref 31.4–37.4)
MCV RBC AUTO: 94 FL (ref 82–98)
MONOCYTES # BLD AUTO: 0.82 THOUSAND/ΜL (ref 0.17–1.22)
MONOCYTES NFR BLD AUTO: 7 % (ref 4–12)
NEUTROPHILS # BLD AUTO: 9.84 THOUSANDS/ΜL (ref 1.85–7.62)
NEUTS SEG NFR BLD AUTO: 85 % (ref 43–75)
NRBC BLD AUTO-RTO: 0 /100 WBCS
PHOSPHATE SERPL-MCNC: 3.6 MG/DL (ref 2.3–4.1)
PLATELET # BLD AUTO: 218 THOUSANDS/UL (ref 149–390)
PMV BLD AUTO: 11.2 FL (ref 8.9–12.7)
POTASSIUM SERPL-SCNC: 3.9 MMOL/L (ref 3.5–5.3)
RBC # BLD AUTO: 4.75 MILLION/UL (ref 3.81–5.12)
RPR SER QL: NORMAL
SODIUM SERPL-SCNC: 151 MMOL/L (ref 136–145)
WBC # BLD AUTO: 11.61 THOUSAND/UL (ref 4.31–10.16)

## 2021-09-05 PROCEDURE — 99232 SBSQ HOSP IP/OBS MODERATE 35: CPT | Performed by: FAMILY MEDICINE

## 2021-09-05 PROCEDURE — 80048 BASIC METABOLIC PNL TOTAL CA: CPT | Performed by: STUDENT IN AN ORGANIZED HEALTH CARE EDUCATION/TRAINING PROGRAM

## 2021-09-05 PROCEDURE — 85025 COMPLETE CBC W/AUTO DIFF WBC: CPT | Performed by: STUDENT IN AN ORGANIZED HEALTH CARE EDUCATION/TRAINING PROGRAM

## 2021-09-05 PROCEDURE — 83735 ASSAY OF MAGNESIUM: CPT | Performed by: STUDENT IN AN ORGANIZED HEALTH CARE EDUCATION/TRAINING PROGRAM

## 2021-09-05 PROCEDURE — 84100 ASSAY OF PHOSPHORUS: CPT | Performed by: STUDENT IN AN ORGANIZED HEALTH CARE EDUCATION/TRAINING PROGRAM

## 2021-09-05 RX ORDER — ASPIRIN 81 MG/1
324 TABLET, CHEWABLE ORAL DAILY
Status: DISCONTINUED | OUTPATIENT
Start: 2021-09-05 | End: 2021-09-07

## 2021-09-05 RX ORDER — SODIUM CHLORIDE 450 MG/100ML
100 INJECTION, SOLUTION INTRAVENOUS CONTINUOUS
Status: DISCONTINUED | OUTPATIENT
Start: 2021-09-05 | End: 2021-09-10 | Stop reason: HOSPADM

## 2021-09-05 RX ORDER — SODIUM CHLORIDE, SODIUM LACTATE, POTASSIUM CHLORIDE, CALCIUM CHLORIDE 600; 310; 30; 20 MG/100ML; MG/100ML; MG/100ML; MG/100ML
75 INJECTION, SOLUTION INTRAVENOUS CONTINUOUS
Status: DISCONTINUED | OUTPATIENT
Start: 2021-09-05 | End: 2021-09-05

## 2021-09-05 RX ORDER — SODIUM CHLORIDE 450 MG/100ML
100 INJECTION, SOLUTION INTRAVENOUS CONTINUOUS
Status: DISCONTINUED | OUTPATIENT
Start: 2021-09-05 | End: 2021-09-05

## 2021-09-05 RX ORDER — POLYETHYLENE GLYCOL 3350 17 G/17G
17 POWDER, FOR SOLUTION ORAL DAILY
Status: DISCONTINUED | OUTPATIENT
Start: 2021-09-05 | End: 2021-09-10 | Stop reason: HOSPADM

## 2021-09-05 RX ADMIN — POLYETHYLENE GLYCOL 3350 17 G: 17 POWDER, FOR SOLUTION ORAL at 10:14

## 2021-09-05 RX ADMIN — HEPARIN SODIUM 5000 UNITS: 5000 INJECTION INTRAVENOUS; SUBCUTANEOUS at 14:39

## 2021-09-05 RX ADMIN — SODIUM CHLORIDE 75 ML/HR: 0.45 INJECTION, SOLUTION INTRAVENOUS at 23:27

## 2021-09-05 RX ADMIN — Medication 1 TABLET: at 08:10

## 2021-09-05 RX ADMIN — AMANTADINE HYDROCHLORIDE 100 MG: 100 CAPSULE ORAL at 18:24

## 2021-09-05 RX ADMIN — CLONAZEPAM 0.5 MG: 0.5 TABLET ORAL at 18:24

## 2021-09-05 RX ADMIN — ROPINIROLE HYDROCHLORIDE 0.75 MG: 0.25 TABLET, FILM COATED ORAL at 22:01

## 2021-09-05 RX ADMIN — CALCIUM 1 TABLET: 500 TABLET ORAL at 18:24

## 2021-09-05 RX ADMIN — AMLODIPINE BESYLATE 5 MG: 5 TABLET ORAL at 08:10

## 2021-09-05 RX ADMIN — ATORVASTATIN CALCIUM 80 MG: 80 TABLET ORAL at 18:24

## 2021-09-05 RX ADMIN — CARBIDOPA AND LEVODOPA 0.5 TABLET: 25; 100 TABLET ORAL at 18:24

## 2021-09-05 RX ADMIN — CARBIDOPA AND LEVODOPA 0.5 TABLET: 25; 100 TABLET ORAL at 08:10

## 2021-09-05 RX ADMIN — CLONAZEPAM 0.5 MG: 0.5 TABLET ORAL at 08:10

## 2021-09-05 RX ADMIN — SODIUM CHLORIDE, SODIUM LACTATE, POTASSIUM CHLORIDE, AND CALCIUM CHLORIDE 75 ML/HR: .6; .31; .03; .02 INJECTION, SOLUTION INTRAVENOUS at 18:23

## 2021-09-05 RX ADMIN — CALCIUM 1 TABLET: 500 TABLET ORAL at 08:11

## 2021-09-05 RX ADMIN — PANTOPRAZOLE SODIUM 20 MG: 20 TABLET, DELAYED RELEASE ORAL at 06:51

## 2021-09-05 RX ADMIN — ASPIRIN 81 MG CHEWABLE TABLET 324 MG: 81 TABLET CHEWABLE at 10:14

## 2021-09-05 RX ADMIN — CEFAZOLIN SODIUM 500 MG: 1 INJECTION, POWDER, FOR SOLUTION INTRAMUSCULAR; INTRAVENOUS at 11:28

## 2021-09-05 RX ADMIN — SODIUM CHLORIDE 75 ML/HR: 0.45 INJECTION, SOLUTION INTRAVENOUS at 18:35

## 2021-09-05 RX ADMIN — SODIUM CHLORIDE 75 ML/HR: 0.45 INJECTION, SOLUTION INTRAVENOUS at 19:52

## 2021-09-05 RX ADMIN — CARBIDOPA AND LEVODOPA 0.5 TABLET: 25; 100 TABLET ORAL at 12:38

## 2021-09-05 RX ADMIN — CEFAZOLIN SODIUM 500 MG: 1 INJECTION, POWDER, FOR SOLUTION INTRAMUSCULAR; INTRAVENOUS at 23:28

## 2021-09-05 RX ADMIN — QUETIAPINE FUMARATE 25 MG: 25 TABLET ORAL at 23:48

## 2021-09-05 RX ADMIN — HEPARIN SODIUM 5000 UNITS: 5000 INJECTION INTRAVENOUS; SUBCUTANEOUS at 06:51

## 2021-09-05 RX ADMIN — AMANTADINE HYDROCHLORIDE 100 MG: 100 CAPSULE ORAL at 08:11

## 2021-09-05 RX ADMIN — CARBIDOPA AND LEVODOPA 0.5 TABLET: 25; 100 TABLET ORAL at 22:00

## 2021-09-05 RX ADMIN — RASAGILINE 1 MG: 1 TABLET ORAL at 08:12

## 2021-09-05 RX ADMIN — HEPARIN SODIUM 5000 UNITS: 5000 INJECTION INTRAVENOUS; SUBCUTANEOUS at 22:00

## 2021-09-05 RX ADMIN — SODIUM CHLORIDE 100 ML/HR: 0.45 INJECTION, SOLUTION INTRAVENOUS at 12:37

## 2021-09-05 RX ADMIN — ACETAMINOPHEN 650 MG: 325 TABLET, FILM COATED ORAL at 23:48

## 2021-09-05 NOTE — PROGRESS NOTES
Neurology Consult Follow Up      Laxmi Margaret is a [de-identified] y o  female  [address]    [id number]        Assessment/Recommendations:    1  Global aphasia   2  Encephalopathy   3  Progressive PD  4  UTI  5  RODRIGO    Patient's dyskinesia are better with klonopin and increase in amantadine however her expressive and receptive aphasia remains  Today her crp is elevated, wbc is trending up  UA is showing UTI  Discussed starting abx and awaiting culture  Without MRI brain we can't say for sure she didn't have stroke  Repeat CT brain Mon  Aspirin 325mg, lipitor 80mg  TTE w/ bubble study  Discussed plan  Chief Complaint:  PD, ams, dyskinesia   Subjective:   Since med changes made yesterday, her dyskinesia is better and noticeable when she's stimulated  Her comprehension and speech are still abnormal  She couldn't answer basic questions   confirmed that this is new for past 3 days  Denies headache  Past Medical History:   Diagnosis Date    RODRIGO (acute kidney injury) ([facility] ) 7/19/2019    Altered mental status 1/11/2021    Anal bleeding 1/29/2018    Arthritis     knee    Asthma     BPPV (benign paroxysmal positional vertigo) 7/19/2016    Last Assessment & Plan:  Hx consistent with BPPV  Neg [place], however could not perform it adequately due to dyskinesias  Recommend lozano-daroff exercises at home  IF worsens, vestibular therapy      Bulging lumbar disc 12/10/2013    Closed fracture of one rib of left side 1/29/2018    Colon polyp     Dementia ([facility] [facility] )     Fall     balance issue    Fall during current hospitalization 1/15/2021    Gallbladder disease     GERD (gastroesophageal reflux disease)     Hematuria     last assessed 10/29/15    Orthostatic hypotension 7/13/2015    Osteomyelitis of lumbar spine ([address] ) 3/10/2020    Ovarian cyst     LAST ASSESSED: 12/10/13    Parkinson's disease ([address] )     Pneumonia of both lower lobes due to infectious organism 3/27/2018    Possible fracture of 5th metatarsal 1/12/2021    Psoriasis 12/10/2013    Pulmonary embolism with infarction ([facility] ) 9/2/2014    Scalp laceration, subsequent encounter 11/15/2019    Sciatica 12/10/2013    Severe protein-calorie malnutrition [facility] ) 7/22/2019    Skin disorder     last assessed 12/10/13    Squamous cell carcinoma of skin 11/21/2016    Stage 3a chronic kidney disease ([facility] ) 12/4/2020    Toxic metabolic encephalopathy 9/76/3552    Use of cane as ambulatory aid     Walker as ambulation aid     Wears glasses     Weight loss 10/8/2019    Last Assessment & Plan:  Recommend Boost or Ensure  F/u with PCP   Weight loss, unintentional 5/22/2015     Social History     Socioeconomic History    Marital status: /Civil Union     Spouse name: Briana Rogel Number of children: 2    Years of education: 12    Highest education level: High school graduate   Occupational History    Occupation: [employer]     Comment: BILLING AND SHIPPING   Tobacco Use    Smoking status: Never Smoker    Smokeless tobacco: Never Used   Vaping Use    Vaping Use: Never used   Substance and Sexual Activity    Alcohol use: Not Currently    Drug use: Never    Sexual activity: Not Currently     Partners: Male   Other Topics Concern    Not on file   Social History Narrative    Son lives an hour away, daughter in [place]  Social Determinants of Health     Financial Resource Strain:     Difficulty of Paying Living Expenses:    Food Insecurity:     Worried About Running Out of Food in the Last Year:     [address] in the Last Year:    Transportation Needs:     Lack of Transportation (Medical):      Lack of Transportation (Non-Medical):    Physical Activity:     Days of Exercise per Week:     Minutes of Exercise per Session:    Stress:     Feeling of Stress :    Social Connections:     Frequency of Communication with Friends and Family:     Frequency of Social Gatherings with Friends and Family:     Attends [employer] Services:     Active Member of Clubs or Organizations:     Attends Club or Organization Meetings:     Marital Status:    Intimate Partner Violence:     Fear of Current or Ex-Partner:     Emotionally Abused:     Physically Abused:     Sexually Abused:      Family History   Problem Relation Age of Onset    Alzheimer's disease Mother     Stroke Father     No Known Problems Sister     No Known Problems Brother        ROS:  Please see HPI for positive symptoms  Objective:  /82 (BP Location: Left arm)   Pulse 76   Temp 98 9 °F (37 2 °C) (Axillary)   Resp 16   Ht 5' (1 524 m)   Wt 37 9 kg (83 lb 8 9 oz)   SpO2 95%   BMI 16 32 kg/m²     General: alert   Mental status: oriented x1  Attention: fair  Knowledge: fair  Language and Speech: incomprehensible speech  Cranial nerves: II-XII intact  Muscle tone: normal  Motor strength:  5/5 throughout  Sensory: normal to light touch, pin prick, vibration   Proprioception intact  Gait: dasia  Coordination: dasia,   Dyskinesia   Reflexes: 2+ throughout  except as noted      Labs:      Lab Results   Component Value Date    WBC 10 48 (H) 09/04/2021    HGB 13 7 09/04/2021    HCT 45 4 09/04/2021    MCV 94 09/04/2021     09/04/2021     Lab Results   Component Value Date    HGBA1C 6 6 (H) 09/03/2021     Lab Results   Component Value Date    ALT 13 09/02/2021    AST 25 09/02/2021    ALKPHOS 87 09/02/2021    BILITOT 0 4 11/04/2015     Lab Results   Component Value Date    GLUCOSE 117 (H) 11/04/2015    CALCIUM 10 1 09/04/2021     11/04/2015    K 4 0 09/04/2021    CO2 28 09/04/2021     (H) 09/04/2021    BUN 24 09/04/2021    CREATININE 1 56 (H) 09/04/2021         Review of reports and notes reveal:     CTA head and neck with and without contrast    Result Date: 9/2/2021  Mild-to-moderate motion artifact is present  - Negative CTA head and neck for large vessel occlusion, dissection, aneurysm, or high-grade stenosis given motion degraded examination  - Mild atherosclerotic disease of the head and neck, as detailed above  - Additional chronic/incidental findings as detailed above  Workstation performed: [id number]     XR chest 1 view portable    Result Date: 9/3/2021  No acute cardiopulmonary disease  Workstation performed: [id number]     CT head without contrast    Result Date: 9/2/2021  Stable examination  No acute intracranial abnormality  Workstation performed: [id number]               Thank you for this consult      Total time of encounter:  45 min  More than 50% of the time was used in counseling and/or coordination of care  Extent of couseling and/or coordination of care        MD [facility]  [address] [place] [address]  [phone]

## 2021-09-05 NOTE — NURSING NOTE
Patient noted to be lethargic, arousable to touch, but frequently drowsy (falling asleep in the middle of the conversation  Vitals were taken:  09/04/21 22:08:13  98 1 °F (36 7 °C)  71  12  161/89  113  95 %  None (Room air)  Lying     Fingerstick glucose obtained: 157 mg/dL  MD Tracie Thomas was notified and made aware  Patient's bedtime medications held at this time  MD Elsa Clay came to the bedside to evaluate the patient  MD Antoinette Artis ordered q2h vitals monitoring  Deterioration index score 61 9% (high)  KENIA Andersen from the critical care team came to evaluate the patient  In accordance with provider's orders  , cefazolin 500 mg IVPB was administered  At this time, patient resting comfortably in bed  Will continue to monitor

## 2021-09-05 NOTE — ASSESSMENT & PLAN NOTE
Patient was found to have urinary retention of >400cc  Patient already had 2 straight catheters during this hospital stay  Suspected due to constipation  FeNa was calculated to be 8 5%, indicating Post-Renal obstruction  9/7 Patient has several bowel movements the morning and prior day  Patient also attempted several times to rip the allen out       · D/C Allen cath  · Voiding trial

## 2021-09-05 NOTE — ASSESSMENT & PLAN NOTE
Patient in hospital course has gotten a hypernatremia as seem with Na 151 on morning labs  Patient was on Lactic ringers due to RODRIGO and in setting of AMS  · Maintenance fluids increased to 100cc/hr of 1/2 normal saline

## 2021-09-05 NOTE — QUICK NOTE
QUICK NOTE - Deterioration Index  Jie Richardson [de-identified] y o  female MRN: [id number]  Unit/Bed#: [address] [address]01 Encounter: [id number]    Date Paged: [phone]  Time Paged: [id number]   Room #: 598  Arrival Time: 5850   Deterioration index score at time of page: 61 9  %  Spoke with Ney Esteban RN from primary team  Need to escalate level of care: no     PROBLEMS resulting in high DI score:   27% [place] coma scale 11   21% Sodium 151 mmol/L   18% Age [de-identified]   13% Neurological exam Lethargic (Drowsy, easily aroused), Yes   13% Respiratory rate 12   5% Systolic 824   3% WBC count 10 48 Thousand/uL   <1% Hematocrit 45 4 %   <1% Pulse oximetry 95 %           PLAN:     Per bedside RN, patient more lethargic this evening  Had previously been restless/agitated and now sleeping  Able to easily awaken patient but she drifts to sleep quickly  RN unable to safely provide PO medications  Seen by primary team resident  VSS    RN and resident agree patient may remain on GMF  Please contact critical care via [employer] with any questions or concerns       Vitals:   Vitals:    21 0815 21 0816 21 1946 21 2208   BP: 160/97 160/97 137/82 161/89   BP Location:   Left arm Left arm   Pulse:  83 76 71   Resp: 18 18 16 12   Temp: 98 5 °F (36 9 °C) 98 5 °F (36 9 °C) 98 9 °F (37 2 °C) 98 1 °F (36 7 °C)   TempSrc:   Axillary Axillary   SpO2:  96% 95% 95%   Weight:       Height:           Respiratory:  SpO2: SpO2: 95 %, SpO2 Activity: SpO2 Activity: At Rest, SpO2 Device: O2 Device: None (Room air)       Temperature: Temp (24hrs), Av 6 °F (37 °C), Min:98 1 °F (36 7 °C), Max:98 9 °F (37 2 °C)  Current: Temperature: 98 1 °F (36 7 °C)    Labs:   Results from last 7 days   Lab Units 21  1621 21  0659 21  1304   WBC Thousand/uL 10 48* 8 71 5 71   HEMOGLOBIN g/dL 13 7 13 0 12 4   HEMATOCRIT % 45 4 43 2 40 7   PLATELETS Thousands/uL 239 243 251   NEUTROS PCT % 82* 78* 77*   MONOS PCT % 9 9 7     Results from last 7 days   Lab Units 09/04/21  1621 09/03/21  0659 09/02/21  1304   SODIUM mmol/L 151* 144 146*   POTASSIUM mmol/L 4 0 4 1 4 5   CHLORIDE mmol/L 110* 108 109*   CO2 mmol/L 28 25 29   BUN mg/dL 24 19 27*   CREATININE mg/dL 1 56* 1 24 1 56*   CALCIUM mg/dL 10 1 8 9 9 2   ALK PHOS U/L  --   --  87   ALT U/L  --   --  13   AST U/L  --   --  25     Results from last 7 days   Lab Units 09/04/21  1621 09/02/21  1304   MAGNESIUM mg/dL 2 4 2 3

## 2021-09-05 NOTE — ASSESSMENT & PLAN NOTE
Patient attempted a bowel movement  Patient was noted to have increased effort, stool was noted to be formed and hard  Patient has left lower quadrant tenderness and patient reported feeling constipated  9/7: Constipation seems to be resolved, patient had multiple bowel movements since starting miralax  · Miralax 17g daily

## 2021-09-05 NOTE — PROGRESS NOTES
[facility] Progress Note - Jeannette Ramos [de-identified] y o  female MRN: [id number]    Unit/Bed#: [address] [place] [address] Encounter: [id number]      Assessment/Plan:    * Altered mental status  Assessment & Plan  Patient presented with altered mental status  Patient's  stated she was last seen well approximately 1 week before prior admission  Patient's  report it was an insidious onset with rapid progression which started with patient being nonverbal      On admission, CT of head was negative for acute bleed hemorrhage  Abnormal urinalysis was noted for leukocytes and concern for UTI  CXR was neg  MRI unattainable due to patient movement in setting of DBS  · Cause of AMS unknown- Parkinson's sequela versus dehydration versus RODRIGO versus new onset seizure versus on/off syndrome  · Neuro consulted  · Recommended, Klonopin 0 5mg bid, Increased Amantadine 100mg  · Fall prevention  · Seizure precautions  · PT/OT  · Nurse bedside swallow  · NPO until cleared  · Neuro checks daily  · 100cc/hr of 1/2NS  · Soft restraints were discontinued  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 [facility])  Assessment & Plan  Patient has has increasingly more frequent episodes of flailing during the day for over a month  Patient's  stated it used to only be during the night time  Dementia due to Parkinson's disease with behavioral disturbance [facility])  Assessment & Plan  Patient's caregivers have had a difficulty with patient due to behavioral issues  Likely due to sequela of parkinson's  · Continue with home medications of Seroquel  · Patient's  has been giving patient 12 5 mg of her 25 mg      Essential hypertension  Assessment & Plan  Patient upon admission was hypertensive at 164/72  · Continue on home medications    RODRIGO (acute kidney injury) (HCC)-resolved as of 9/4/2021  Assessment & Plan  Cr 1 56 POA, baseline 1 1-1 2  Unclear etiology, BUN:creating 17   Possibly prerenaly, dehydration from poor po intake or side effect of parkinson's regimen  Patient unable to provide ROS,  states good po intake prior to this week  · Lactic Ringer 75cc/hr  · Monitor daily chem panel  · Hold metformin and other nephrotoxic meds    Stage 3a chronic kidney disease [facility])  Assessment & Plan  Lab Results   Component Value Date    EGFR 31 09/02/2021    EGFR 53 01/14/2021    EGFR 45 01/13/2021    CREATININE 1 56 (H) 09/02/2021    CREATININE 1 01 01/14/2021    CREATININE 1 16 01/13/2021     · Cotinue with home medications  · Avoid Nephrotoxic drugs  Mixed hyperlipidemia  Assessment & Plan  Continue with Home medications    Type 2 diabetes mellitus without complication, without long-term current use of insulin [facility])  Assessment & Plan    Lab Results   Component Value Date    HGBA1C 6 4 12/04/2020       Patient's glucose on admission was 133  Patient is not on any at home medications for such  S/P deep brain stimulator placement  Assessment & Plan  Consult Neurology to see if they are working properly  Underweight-resolved as of 9/3/2021  Assessment & Plan  Patient is NPO until passes swallowing exam     · NPO  · Maintance fluids: please see above  · Bedside swallowing study  GERD (gastroesophageal reflux disease)  Assessment & Plan  Continue on home medications     Severe protein-calorie malnutrition (HCC)-resolved as of 9/4/2021  Assessment & Plan  Malnutrition Findings:   Adult Malnutrition type: Chronic illness  Adult Degree of Malnutrition: Other severe protein calorie malnutrition    BMI Findings: Body mass index is 16 32 kg/m²  Patient had swallow evaluation  Patient is cleared for puree diet with nectar  Subjective:     Patient seen and examined at bedside  Patient had no acute events overnight  Patient was more alert today with decreased arm flailing  Patient was noted to have dysarthria present which made communications with the patient difficult    Patient stated she felt constipated and had left lower quadrant pain  Confirmed with nurse for hard stools  Patient is alert conscious and oriented x2 person and place however still does not know what brought her into the hospital   Patient does not seem to be at reported baseline  Patient was reported to have good conversational skills, could ambulate by herself/walker, and only needed help with showering and getting dressed  Objective:     Vitals: Blood pressure 167/97, pulse 80, temperature 98 2 °F (36 8 °C), temperature source Axillary, resp  rate 20, height 5' (1 524 m), weight 40 1 kg (88 lb 6 5 oz), SpO2 100 %, not currently breastfeeding  ,Body mass index is 17 27 kg/m²  Wt Readings from Last 3 Encounters:   09/05/21 40 1 kg (88 lb 6 5 oz)   08/18/21 39 3 kg (86 lb 9 6 oz)   06/21/21 42 8 kg (94 lb 6 4 oz)       Intake/Output Summary (Last 24 hours) at 9/5/2021 1350  Last data filed at 9/4/2021 2034  Gross per 24 hour   Intake 133 75 ml   Output 450 ml   Net -316 25 ml       Physical Exam: /97 (BP Location: Right arm)   Pulse 80   Temp 98 2 °F (36 8 °C) (Axillary)   Resp 20   Ht 5' (1 524 m)   Wt 40 1 kg (88 lb 6 5 oz)   SpO2 100%   BMI 17 27 kg/m²   General appearance: Oriented to Place and Person, some dysarthia   Throat: chronic dry mouth with chapped lips and rotted teeth  Lungs: clear to auscultation bilaterally  Heart: regular rate and rhythm, S1, S2 normal, no murmur, click, rub or gallop  Abdomen: Left lower quadrant tenderness, patient states she does feel consitpated  Otherwise normal exam   Skin: Skin color, texture, turgor normal  No rashes or lesions or normal  Neurologic: Motor: Tremors with flaling limbs, decreasing occurance since admission  Arms and Legs         Recent Results (from the past 24 hour(s))   Magnesium    Collection Time: 09/04/21  4:21 PM   Result Value Ref Range    Magnesium 2 4 1 6 - 2 6 mg/dL   Phosphorus    Collection Time: 09/04/21  4:21 PM   Result Value Ref Range Phosphorus 4 0 2 3 - 4 1 mg/dL   Basic metabolic panel    Collection Time: 09/04/21  4:21 PM   Result Value Ref Range    Sodium 151 (H) 136 - 145 mmol/L    Potassium 4 0 3 5 - 5 3 mmol/L    Chloride 110 (H) 100 - 108 mmol/L    CO2 28 21 - 32 mmol/L    ANION GAP 13 4 - 13 mmol/L    BUN 24 5 - 25 mg/dL    Creatinine 1 56 (H) 0 60 - 1 30 mg/dL    Glucose 113 65 - 140 mg/dL    Calcium 10 1 8 3 - 10 1 mg/dL    eGFR 31 ml/min/1 73sq m   CBC and differential    Collection Time: 09/04/21  4:21 PM   Result Value Ref Range    WBC 10 48 (H) 4 31 - 10 16 Thousand/uL    RBC 4 85 3 81 - 5 12 Million/uL    Hemoglobin 13 7 11 5 - 15 4 g/dL    Hematocrit 45 4 34 8 - 46 1 %    MCV 94 82 - 98 fL    MCH 28 2 26 8 - 34 3 pg    MCHC 30 2 (L) 31 4 - 37 4 g/dL    RDW 14 1 11 6 - 15 1 %    MPV 10 9 8 9 - 12 7 fL    Platelets 307 892 - 489 Thousands/uL    nRBC 0 /100 WBCs    Neutrophils Relative 82 (H) 43 - 75 %    Immat GRANS % 0 0 - 2 %    Lymphocytes Relative 7 (L) 14 - 44 %    Monocytes Relative 9 4 - 12 %    Eosinophils Relative 1 0 - 6 %    Basophils Relative 1 0 - 1 %    Neutrophils Absolute 8 55 (H) 1 85 - 7 62 Thousands/µL    Immature Grans Absolute 0 03 0 00 - 0 20 Thousand/uL    Lymphocytes Absolute 0 78 0 60 - 4 47 Thousands/µL    Monocytes Absolute 0 90 0 17 - 1 22 Thousand/µL    Eosinophils Absolute 0 12 0 00 - 0 61 Thousand/µL    Basophils Absolute 0 10 0 00 - 0 10 Thousands/µL   C-reactive protein    Collection Time: 09/04/21  4:21 PM   Result Value Ref Range    CRP 26 7 (H) <3 0 mg/L   Sedimentation rate, automated    Collection Time: 09/04/21  4:21 PM   Result Value Ref Range    Sed Rate 34 (H) 0 - 29 mm/hour   RPR    Collection Time: 09/04/21  4:21 PM   Result Value Ref Range    RPR Non-Reactive Non-Reactive   Urinalysis with microscopic    Collection Time: 09/04/21  6:32 PM   Result Value Ref Range    Clarity, UA Cloudy     Color, UA Light Yellow     Specific Gravity, UA >=1 030 1 000 - 1 030    pH, UA 6 0 5 0, 5 5, 6 0, 6  5, 7 0, 7 5, 8 0, 8 5, 9 0    Glucose, UA Negative Negative mg/dl    Ketones, UA Trace (A) Negative mg/dl    Blood, UA Small (A) Negative    Protein, UA Trace (A) Negative mg/dl    Nitrite, UA Negative Negative    Bilirubin, UA Negative Negative    Urobilinogen, UA 0 2 0 2, 1 0 E U /dl E U /dl    Leukocytes, UA Small (A) Negative    WBC, UA 2-4 None Seen, 2-4, 5-60 /hpf    RBC, UA 4-10 (A) None Seen, 2-4 /hpf    Bacteria, UA Innumerable (A) None Seen, Occasional /hpf    Epithelial Cells Occasional None Seen, Occasional /hpf   Sodium, urine, random    Collection Time: 09/04/21  6:32 PM   Result Value Ref Range    Sodium, Ur 107    Creatinine, urine, random    Collection Time: 09/04/21  6:32 PM   Result Value Ref Range    Creatinine, Ur <13 0 mg/dL   Fingerstick Glucose (POCT)    Collection Time: 09/04/21 10:10 PM   Result Value Ref Range    POC Glucose 157 (H) 65 - 140 mg/dl   Magnesium    Collection Time: 09/05/21  6:51 AM   Result Value Ref Range    Magnesium 2 3 1 6 - 2 6 mg/dL   Phosphorus    Collection Time: 09/05/21  6:51 AM   Result Value Ref Range    Phosphorus 3 6 2 3 - 4 1 mg/dL   Basic metabolic panel    Collection Time: 09/05/21  6:51 AM   Result Value Ref Range    Sodium 151 (H) 136 - 145 mmol/L    Potassium 3 9 3 5 - 5 3 mmol/L    Chloride 112 (H) 100 - 108 mmol/L    CO2 30 21 - 32 mmol/L    ANION GAP 9 4 - 13 mmol/L    BUN 24 5 - 25 mg/dL    Creatinine 1 33 (H) 0 60 - 1 30 mg/dL    Glucose 138 65 - 140 mg/dL    Calcium 9 4 8 3 - 10 1 mg/dL    eGFR 38 ml/min/1 73sq m   CBC and differential    Collection Time: 09/05/21  6:51 AM   Result Value Ref Range    WBC 11 61 (H) 4 31 - 10 16 Thousand/uL    RBC 4 75 3 81 - 5 12 Million/uL    Hemoglobin 13 4 11 5 - 15 4 g/dL    Hematocrit 44 6 34 8 - 46 1 %    MCV 94 82 - 98 fL    MCH 28 2 26 8 - 34 3 pg    MCHC 30 0 (L) 31 4 - 37 4 g/dL    RDW 14 3 11 6 - 15 1 %    MPV 11 2 8 9 - 12 7 fL    Platelets 615 370 - 644 Thousands/uL    nRBC 0 /100 WBCs    Neutrophils Relative 85 (H) 43 - 75 %    Immat GRANS % 0 0 - 2 %    Lymphocytes Relative 6 (L) 14 - 44 %    Monocytes Relative 7 4 - 12 %    Eosinophils Relative 1 0 - 6 %    Basophils Relative 1 0 - 1 %    Neutrophils Absolute 9 84 (H) 1 85 - 7 62 Thousands/µL    Immature Grans Absolute 0 04 0 00 - 0 20 Thousand/uL    Lymphocytes Absolute 0 71 0 60 - 4 47 Thousands/µL    Monocytes Absolute 0 82 0 17 - 1 22 Thousand/µL    Eosinophils Absolute 0 09 0 00 - 0 61 Thousand/µL    Basophils Absolute 0 11 (H) 0 00 - 0 10 Thousands/µL       Current Facility-Administered Medications   Medication Dose Route Frequency Provider Last Rate Last Admin    acetaminophen (TYLENOL) tablet 650 mg  650 mg Oral Q6H PRN Cari Bliss MD        amantadine (SYMMETREL) capsule 100 mg  100 mg Oral BID Kyle Wilkerson MD   100 mg at 09/05/21 0811    amLODIPine (NORVASC) tablet 5 mg  5 mg Oral Daily Cari Bliss MD   5 mg at 09/05/21 1806    aspirin chewable tablet 324 mg  324 mg Oral Daily Cari Bliss MD   324 mg at 09/05/21 1014    atorvastatin (LIPITOR) tablet 80 mg  80 mg Oral Daily With Sai Pickering MD   80 mg at 09/04/21 1739    calcium carbonate (OYSTER SHELL,OSCAL) 500 mg tablet 1 tablet  1 tablet Oral BID With Meals Cari Bliss MD   1 tablet at 09/05/21 8936    carbidopa-levodopa (SINEMET)  mg per tablet 0 5 tablet  0 5 tablet Oral 4x Daily Cari Bliss MD   0 5 tablet at 09/05/21 1238    ceFAZolin (ANCEF) 500 mg in sodium chloride 0 9 % 50 mL IVPB  500 mg Intravenous Q12H Olivia Cobb  mL/hr at 09/05/21 1128 500 mg at 09/05/21 1128    clonazePAM (KlonoPIN) tablet 0 5 mg  0 5 mg Oral BID Kyle Wilkerson MD   0 5 mg at 09/05/21 0810    heparin (porcine) subcutaneous injection 5,000 Units  5,000 Units Subcutaneous [address] DO Mell   5,000 Units at 09/05/21 0651    multivitamin-minerals (CENTRUM) tablet 1 tablet  1 tablet Oral Daily Cari Bliss MD   1 tablet at 09/05/21 0810    pantoprazole (PROTONIX) EC tablet 20 mg  20 mg Oral Early Morning Carol Youssef MD   20 mg at 09/05/21 5072    polyethylene glycol (MIRALAX) packet 17 g  17 g Oral Daily Carol Youssef MD   17 g at 09/05/21 1014    QUEtiapine (SEROquel) tablet 25 mg  25 mg Oral HS PRN Carol Youssef MD   25 mg at 09/03/21 2203    rasagiline (AZILECT) tablet 1 mg  1 mg Oral Daily Carol Youssef MD   1 mg at 09/05/21 8329    rOPINIRole (REQUIP) tablet 0 75 mg  0 75 mg Oral HS Carol Youssef MD   0 75 mg at 09/03/21 2151    sodium chloride infusion 0 45 %  100 mL/hr Intravenous Continuous Carol Youssef  mL/hr at 09/05/21 1237 100 mL/hr at 09/05/21 1237       Invasive Devices     Peripheral Intravenous Line            Peripheral IV 09/04/21 Left;Upper;Ventral (anterior) Arm <1 day                Lab, Imaging and other studies: I have personally reviewed pertinent reports      VTE Pharmacologic Prophylaxis: Heparin  VTE Mechanical Prophylaxis: sequential compression device    Carol Youssef MD

## 2021-09-06 ENCOUNTER — APPOINTMENT (INPATIENT)
Dept: RADIOLOGY | Facility: HOSPITAL | Age: 81
DRG: 640 | End: 2021-09-06
Payer: MEDICARE

## 2021-09-06 LAB
ANION GAP SERPL CALCULATED.3IONS-SCNC: 6 MMOL/L (ref 4–13)
ANION GAP SERPL CALCULATED.3IONS-SCNC: 7 MMOL/L (ref 4–13)
BACTERIA UR CULT: NORMAL
BASOPHILS # BLD AUTO: 0.07 THOUSANDS/ΜL (ref 0–0.1)
BASOPHILS NFR BLD AUTO: 1 % (ref 0–1)
BUN SERPL-MCNC: 23 MG/DL (ref 5–25)
BUN SERPL-MCNC: 28 MG/DL (ref 5–25)
CALCIUM SERPL-MCNC: 8.9 MG/DL (ref 8.3–10.1)
CALCIUM SERPL-MCNC: 9.3 MG/DL (ref 8.3–10.1)
CHLORIDE SERPL-SCNC: 111 MMOL/L (ref 100–108)
CHLORIDE SERPL-SCNC: 114 MMOL/L (ref 100–108)
CO2 SERPL-SCNC: 29 MMOL/L (ref 21–32)
CO2 SERPL-SCNC: 30 MMOL/L (ref 21–32)
CREAT SERPL-MCNC: 1.05 MG/DL (ref 0.6–1.3)
CREAT SERPL-MCNC: 1.19 MG/DL (ref 0.6–1.3)
EOSINOPHIL # BLD AUTO: 0.13 THOUSAND/ΜL (ref 0–0.61)
EOSINOPHIL NFR BLD AUTO: 1 % (ref 0–6)
ERYTHROCYTE [DISTWIDTH] IN BLOOD BY AUTOMATED COUNT: 14.4 % (ref 11.6–15.1)
GFR SERPL CREATININE-BSD FRML MDRD: 43 ML/MIN/1.73SQ M
GFR SERPL CREATININE-BSD FRML MDRD: 50 ML/MIN/1.73SQ M
GLUCOSE SERPL-MCNC: 133 MG/DL (ref 65–140)
GLUCOSE SERPL-MCNC: 144 MG/DL (ref 65–140)
HCT VFR BLD AUTO: 44.5 % (ref 34.8–46.1)
HGB BLD-MCNC: 13.1 G/DL (ref 11.5–15.4)
IMM GRANULOCYTES # BLD AUTO: 0.11 THOUSAND/UL (ref 0–0.2)
IMM GRANULOCYTES NFR BLD AUTO: 1 % (ref 0–2)
LYMPHOCYTES # BLD AUTO: 0.67 THOUSANDS/ΜL (ref 0.6–4.47)
LYMPHOCYTES NFR BLD AUTO: 5 % (ref 14–44)
MAGNESIUM SERPL-MCNC: 2.4 MG/DL (ref 1.6–2.6)
MCH RBC QN AUTO: 28.3 PG (ref 26.8–34.3)
MCHC RBC AUTO-ENTMCNC: 29.4 G/DL (ref 31.4–37.4)
MCV RBC AUTO: 96 FL (ref 82–98)
MONOCYTES # BLD AUTO: 0.94 THOUSAND/ΜL (ref 0.17–1.22)
MONOCYTES NFR BLD AUTO: 7 % (ref 4–12)
NEUTROPHILS # BLD AUTO: 12.09 THOUSANDS/ΜL (ref 1.85–7.62)
NEUTS SEG NFR BLD AUTO: 85 % (ref 43–75)
NRBC BLD AUTO-RTO: 0 /100 WBCS
PHOSPHATE SERPL-MCNC: 3 MG/DL (ref 2.3–4.1)
PLATELET # BLD AUTO: 180 THOUSANDS/UL (ref 149–390)
PMV BLD AUTO: 11.3 FL (ref 8.9–12.7)
POTASSIUM SERPL-SCNC: 4.1 MMOL/L (ref 3.5–5.3)
POTASSIUM SERPL-SCNC: 4.8 MMOL/L (ref 3.5–5.3)
RBC # BLD AUTO: 4.63 MILLION/UL (ref 3.81–5.12)
SODIUM SERPL-SCNC: 147 MMOL/L (ref 136–145)
SODIUM SERPL-SCNC: 150 MMOL/L (ref 136–145)
WBC # BLD AUTO: 14.01 THOUSAND/UL (ref 4.31–10.16)

## 2021-09-06 PROCEDURE — 99232 SBSQ HOSP IP/OBS MODERATE 35: CPT | Performed by: FAMILY MEDICINE

## 2021-09-06 PROCEDURE — 80048 BASIC METABOLIC PNL TOTAL CA: CPT | Performed by: STUDENT IN AN ORGANIZED HEALTH CARE EDUCATION/TRAINING PROGRAM

## 2021-09-06 PROCEDURE — 84100 ASSAY OF PHOSPHORUS: CPT | Performed by: STUDENT IN AN ORGANIZED HEALTH CARE EDUCATION/TRAINING PROGRAM

## 2021-09-06 PROCEDURE — G1004 CDSM NDSC: HCPCS

## 2021-09-06 PROCEDURE — 70450 CT HEAD/BRAIN W/O DYE: CPT

## 2021-09-06 PROCEDURE — 80048 BASIC METABOLIC PNL TOTAL CA: CPT

## 2021-09-06 PROCEDURE — 85025 COMPLETE CBC W/AUTO DIFF WBC: CPT | Performed by: STUDENT IN AN ORGANIZED HEALTH CARE EDUCATION/TRAINING PROGRAM

## 2021-09-06 PROCEDURE — 83735 ASSAY OF MAGNESIUM: CPT | Performed by: STUDENT IN AN ORGANIZED HEALTH CARE EDUCATION/TRAINING PROGRAM

## 2021-09-06 RX ORDER — MINERAL OIL 100 G/100G
1 OIL RECTAL ONCE
Status: DISCONTINUED | OUTPATIENT
Start: 2021-09-06 | End: 2021-09-06

## 2021-09-06 RX ORDER — SENNOSIDES 8.6 MG
1 TABLET ORAL
Status: DISCONTINUED | OUTPATIENT
Start: 2021-09-06 | End: 2021-09-10 | Stop reason: HOSPADM

## 2021-09-06 RX ADMIN — HEPARIN SODIUM 5000 UNITS: 5000 INJECTION INTRAVENOUS; SUBCUTANEOUS at 06:45

## 2021-09-06 RX ADMIN — AMANTADINE HYDROCHLORIDE 100 MG: 100 CAPSULE ORAL at 12:26

## 2021-09-06 RX ADMIN — STANDARDIZED SENNA CONCENTRATE 8.6 MG: 8.6 TABLET ORAL at 21:20

## 2021-09-06 RX ADMIN — ATORVASTATIN CALCIUM 80 MG: 80 TABLET ORAL at 17:37

## 2021-09-06 RX ADMIN — CEFAZOLIN SODIUM 500 MG: 1 INJECTION, POWDER, FOR SOLUTION INTRAMUSCULAR; INTRAVENOUS at 22:39

## 2021-09-06 RX ADMIN — CALCIUM 1 TABLET: 500 TABLET ORAL at 17:37

## 2021-09-06 RX ADMIN — RASAGILINE 1 MG: 1 TABLET ORAL at 14:52

## 2021-09-06 RX ADMIN — AMANTADINE HYDROCHLORIDE 100 MG: 100 CAPSULE ORAL at 17:37

## 2021-09-06 RX ADMIN — CEFAZOLIN SODIUM 500 MG: 1 INJECTION, POWDER, FOR SOLUTION INTRAMUSCULAR; INTRAVENOUS at 10:30

## 2021-09-06 RX ADMIN — HEPARIN SODIUM 5000 UNITS: 5000 INJECTION INTRAVENOUS; SUBCUTANEOUS at 21:23

## 2021-09-06 RX ADMIN — POLYETHYLENE GLYCOL 3350 17 G: 17 POWDER, FOR SOLUTION ORAL at 14:53

## 2021-09-06 RX ADMIN — AMLODIPINE BESYLATE 5 MG: 5 TABLET ORAL at 14:55

## 2021-09-06 RX ADMIN — HEPARIN SODIUM 5000 UNITS: 5000 INJECTION INTRAVENOUS; SUBCUTANEOUS at 14:56

## 2021-09-06 RX ADMIN — ACETAMINOPHEN 650 MG: 325 TABLET, FILM COATED ORAL at 21:23

## 2021-09-06 RX ADMIN — SODIUM CHLORIDE 100 ML/HR: 0.45 INJECTION, SOLUTION INTRAVENOUS at 16:04

## 2021-09-06 RX ADMIN — CLONAZEPAM 0.5 MG: 0.5 TABLET ORAL at 12:24

## 2021-09-06 RX ADMIN — CARBIDOPA AND LEVODOPA 0.5 TABLET: 25; 100 TABLET ORAL at 17:37

## 2021-09-06 RX ADMIN — ASPIRIN 81 MG CHEWABLE TABLET 324 MG: 81 TABLET CHEWABLE at 14:53

## 2021-09-06 RX ADMIN — CARBIDOPA AND LEVODOPA 0.5 TABLET: 25; 100 TABLET ORAL at 14:54

## 2021-09-06 RX ADMIN — CARBIDOPA AND LEVODOPA 0.5 TABLET: 25; 100 TABLET ORAL at 21:20

## 2021-09-06 RX ADMIN — Medication 1 TABLET: at 14:54

## 2021-09-06 RX ADMIN — ROPINIROLE HYDROCHLORIDE 0.75 MG: 0.25 TABLET, FILM COATED ORAL at 21:20

## 2021-09-06 RX ADMIN — CLONAZEPAM 0.5 MG: 0.5 TABLET ORAL at 17:37

## 2021-09-06 RX ADMIN — QUETIAPINE FUMARATE 25 MG: 25 TABLET ORAL at 21:23

## 2021-09-06 NOTE — PROGRESS NOTES
[facility] Progress Note - Marian Rocha [de-identified] y o  female MRN: [id number]    Unit/Bed#: [address] [place] [address] Encounter: [id number]      Assessment/Plan:  * Altered mental status  Assessment & Plan  Patient presented with altered mental status  Patient's  stated she was last seen well approximately 1 week before prior admission  Patient's  report it was an insidious onset with rapid progression which started with patient being nonverbal      On admission, CT of head was negative for acute bleed hemorrhage  Abnormal urinalysis was noted for leukocytes and concern for UTI  CXR was neg  MRI unattainable due to patient movement in setting of DBS  · Cause of AMS unknown- Parkinson's sequela versus dehydration versus RODRIGO versus new onset seizure versus on/off syndrome  · Neuro consulted  · Recommended, Klonopin 0 5mg bid, Increased Amantadine 100mg  · Fall prevention  · Seizure precautions  · PT/OT  · Neuro checks daily  · 100cc/hr of 1/2NS  · Soft restraints were discontinued  · Bedside Swallow recommended puree and nectar  · Follow-up CT schedule for 9/6  Dementia due to Parkinson's disease with behavioral disturbance [facility])  Assessment & Plan  Patient's caregivers have had a difficulty with patient due to behavioral issues  Likely due to sequela of parkinson's  · Continue with home medications of Seroquel  · Patient's  has been giving patient 12 5 mg of her 25 mg  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 [facility])  Assessment & Plan  Patient has has increasingly more frequent episodes of flailing during the day for over a month  Patient's  stated it used to only be during the night time  · Klonopin 0 5mg for increased movement     Constipation  Assessment & Plan  Patient attempted a bowel movement  Patient was noted to have increased effort, stool was noted to be formed and hard   Patient has left lower quadrant tenderness and patient reported feeling constipated  · Miralax 17g daily  Bladder obstruction  Assessment & Plan  Patient was found to have urinary retention of >400cc  Patient already had 2 straight catheters during this hospital stay  Suspected due to constipation  FeNa was calculated to be 8 5%, indicating Post-Renal obstruction  · Carreno Cath  · I and O's    Hypernatremia  Assessment & Plan  Patient in hospital course has gotten a hypernatremia as seem with Na 151 on morning labs  Patient was on Lactic ringers due to RODRIGO and in setting of AMS  · Maintenance fluids increased to 100cc/hr of 1/2 normal saline  S/P deep brain stimulator placement  Assessment & Plan  Consult Neurology to see if they are working properly  Stage 3a chronic kidney disease [facility])  Assessment & Plan  Lab Results   Component Value Date    EGFR 38 09/05/2021    EGFR 31 09/04/2021    EGFR 41 09/03/2021    CREATININE 1 33 (H) 09/05/2021    CREATININE 1 56 (H) 09/04/2021    CREATININE 1 24 09/03/2021     · Cotinue with home medications  · Avoid Nephrotoxic drugs  · Hydrate patient with 100cc/hr of 1/2 NS  Mixed hyperlipidemia  Assessment & Plan  Continue with Home medications    GERD (gastroesophageal reflux disease)  Assessment & Plan  Continue on home medications     Essential hypertension  Assessment & Plan  Patient upon admission was hypertensive at 164/72  · Continue on home medications    Type 2 diabetes mellitus without complication, without long-term current use of insulin [facility])  Assessment & Plan    Lab Results   Component Value Date    HGBA1C 6 4 12/04/2020       Patient's glucose on admission was 133  Patient is not on any at home medications for such  Underweight-resolved as of 9/3/2021  Assessment & Plan  Patient is NPO until passes swallowing exam     · NPO  · Maintance fluids: please see above  · Bedside swallowing study       Severe protein-calorie malnutrition (HCC)-resolved as of 9/4/2021  Assessment & Plan  Malnutrition Findings:   Adult Malnutrition type: Chronic illness  Adult Degree of Malnutrition: Other severe protein calorie malnutrition    BMI Findings: Body mass index is 16 32 kg/m²  Patient had swallow evaluation  Patient is cleared for puree diet with nectar  RODRIGO (acute kidney injury) (HCC)-resolved as of 9/4/2021  Assessment & Plan  Cr 1 56 POA, baseline 1 1-1 2  Unclear etiology, BUN:creating 17  Possibly prerenaly, dehydration from poor po intake or side effect of parkinson's regimen  Patient unable to provide ROS,  states good po intake prior to this week  · Lactic Ringer 75cc/hr  · Monitor daily chem panel  · Hold metformin and other nephrotoxic meds      Subjective:   Patient was resting comfortably upon examination  Per nursing team, patient has some abdominal pain and constipation, and was manually disimpacted  Review of symptoms was limited due to altered mental status  Objective:     Vitals: Blood pressure 145/82, pulse 80, temperature 98 3 °F (36 8 °C), temperature source Axillary, resp  rate 18, height 5' (1 524 m), weight 40 8 kg (89 lb 15 2 oz), SpO2 91 %, not currently breastfeeding  ,Body mass index is 17 57 kg/m²  Wt Readings from Last 3 Encounters:   09/06/21 40 8 kg (89 lb 15 2 oz)   08/18/21 39 3 kg (86 lb 9 6 oz)   06/21/21 42 8 kg (94 lb 6 4 oz)       Intake/Output Summary (Last 24 hours) at 9/6/2021 0801  Last data filed at 9/6/2021 0329  Gross per 24 hour   Intake 395 ml   Output 900 ml   Net -505 ml       Physical Exam:    Physical Exam  Constitutional:       Comments: Cachectic   Cardiovascular:      Rate and Rhythm: Normal rate and regular rhythm  Heart sounds: Normal heart sounds  Pulmonary:      Breath sounds: Normal breath sounds  Abdominal:      General: Bowel sounds are normal       Tenderness: There is no abdominal tenderness  Skin:     Capillary Refill: Capillary refill takes less than 2 seconds               Recent Results (from the past 24 hour(s))   Magnesium    Collection Time: 09/06/21  6:44 AM   Result Value Ref Range    Magnesium 2 4 1 6 - 2 6 mg/dL   Phosphorus    Collection Time: 09/06/21  6:44 AM   Result Value Ref Range    Phosphorus 3 0 2 3 - 4 1 mg/dL   Basic metabolic panel    Collection Time: 09/06/21  6:44 AM   Result Value Ref Range    Sodium 150 (H) 136 - 145 mmol/L    Potassium 4 1 3 5 - 5 3 mmol/L    Chloride 114 (H) 100 - 108 mmol/L    CO2 30 21 - 32 mmol/L    ANION GAP 6 4 - 13 mmol/L    BUN 28 (H) 5 - 25 mg/dL    Creatinine 1 19 0 60 - 1 30 mg/dL    Glucose 133 65 - 140 mg/dL    Calcium 9 3 8 3 - 10 1 mg/dL    eGFR 43 ml/min/1 73sq m   CBC and differential    Collection Time: 09/06/21  6:44 AM   Result Value Ref Range    WBC 14 01 (H) 4 31 - 10 16 Thousand/uL    RBC 4 63 3 81 - 5 12 Million/uL    Hemoglobin 13 1 11 5 - 15 4 g/dL    Hematocrit 44 5 34 8 - 46 1 %    MCV 96 82 - 98 fL    MCH 28 3 26 8 - 34 3 pg    MCHC 29 4 (L) 31 4 - 37 4 g/dL    RDW 14 4 11 6 - 15 1 %    MPV 11 3 8 9 - 12 7 fL    Platelets 439 053 - 427 Thousands/uL    nRBC 0 /100 WBCs    Neutrophils Relative 85 (H) 43 - 75 %    Immat GRANS % 1 0 - 2 %    Lymphocytes Relative 5 (L) 14 - 44 %    Monocytes Relative 7 4 - 12 %    Eosinophils Relative 1 0 - 6 %    Basophils Relative 1 0 - 1 %    Neutrophils Absolute 12 09 (H) 1 85 - 7 62 Thousands/µL    Immature Grans Absolute 0 11 0 00 - 0 20 Thousand/uL    Lymphocytes Absolute 0 67 0 60 - 4 47 Thousands/µL    Monocytes Absolute 0 94 0 17 - 1 22 Thousand/µL    Eosinophils Absolute 0 13 0 00 - 0 61 Thousand/µL    Basophils Absolute 0 07 0 00 - 0 10 Thousands/µL       Current Facility-Administered Medications   Medication Dose Route Frequency Provider Last Rate Last Admin    acetaminophen (TYLENOL) tablet 650 mg  650 mg Oral Q6H PRN Eddie Elliott MD   650 mg at 09/05/21 4298    amantadine (SYMMETREL) capsule 100 mg  100 mg Oral BID Raman Sousa MD   100 mg at 09/05/21 1824    amLODIPine (NORVASC) tablet 5 mg 5 mg Oral Daily Eddie Elliott MD   5 mg at 09/05/21 6735    aspirin chewable tablet 324 mg  324 mg Oral Daily Eddie Elliott MD   324 mg at 09/05/21 1014    atorvastatin (LIPITOR) tablet 80 mg  80 mg Oral Daily With Jada Perez MD   80 mg at 09/05/21 1824    calcium carbonate (OYSTER SHELL,OSCAL) 500 mg tablet 1 tablet  1 tablet Oral BID With Meals Eddie Elliott MD   1 tablet at 09/05/21 1824    carbidopa-levodopa (SINEMET)  mg per tablet 0 5 tablet  0 5 tablet Oral 4x Daily Eddie Elliott MD   0 5 tablet at 09/05/21 2200    ceFAZolin (ANCEF) 500 mg in sodium chloride 0 9 % 50 mL IVPB  500 mg Intravenous Q12H Samir Reynoso  mL/hr at 09/05/21 2328 500 mg at 09/05/21 2328    clonazePAM (KlonoPIN) tablet 0 5 mg  0 5 mg Oral BID Raman Sousa MD   0 5 mg at 09/05/21 1824    heparin (porcine) subcutaneous injection 5,000 Units  5,000 Units Subcutaneous [facility] [address], DO   5,000 Units at 09/06/21 0645    multivitamin-minerals (CENTRUM) tablet 1 tablet  1 tablet Oral Daily Eddie Elliott MD   1 tablet at 09/05/21 0810    pantoprazole (PROTONIX) EC tablet 20 mg  20 mg Oral Early Morning Eddie Elliott MD   20 mg at 09/05/21 7443    polyethylene glycol (MIRALAX) packet 17 g  17 g Oral Daily Eddie Elliott MD   17 g at 09/05/21 1014    QUEtiapine (SEROquel) tablet 25 mg  25 mg Oral HS PRN Eddie Elliott MD   25 mg at 09/05/21 2348    rasagiline (AZILECT) tablet 1 mg  1 mg Oral Daily Eddie Elliott MD   1 mg at 09/05/21 6222    rOPINIRole (REQUIP) tablet 0 75 mg  0 75 mg Oral HS Eddie Elliott MD   0 75 mg at 09/05/21 2201    senna (SENOKOT) tablet 8 6 mg  1 tablet Oral HS [place], DO        sodium chloride infusion 0 45 %  75 mL/hr Intravenous Continuous Letty Todd, DO 75 mL/hr at 09/05/21 2327 75 mL/hr at 09/05/21 2327       Invasive Devices     Peripheral Intravenous Line            Peripheral IV 09/04/21 Left;Upper;Ventral (anterior) Arm 1 day          Drain            Urethral Catheter 16 Fr  <1 day                Lab, Imaging and other studies: I have personally reviewed pertinent reports      VTE Pharmacologic Prophylaxis: Heparin  VTE Mechanical Prophylaxis: sequential compression device    Yanique Mandujano MD

## 2021-09-06 NOTE — SPEECH THERAPY NOTE
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  Speech Language Pathologist   Available via [address] [place] [address] #[phone]  [place] #[id number]

## 2021-09-06 NOTE — QUICK NOTE
QUICK NOTE - Deterioration Index  Karen Malhotra [de-identified] y o  female MRN: [id number]  Unit/Bed#: [address] 413-01 Encounter: [id number]    Date Paged: 21  Time Paged: Cori Dub 37  Room #: 4N [facility]  Arrival Time: 425  Deterioration index score at time of page: 58 7  %  Spoke with Danay Valadez RN primary RN  Need to escalate level of care: no     PROBLEMS resulting in high DI score:    Inaccurate alert r/t sensor falling off    PLAN:     Patient safe to remain on floor per RN  Please contact critical care via [employer] with any questions or concerns       Vitals:   Vitals:    21 1928 21 2254 21 0316 21 0419   BP: 168/96 163/93 145/82    BP Location: Right arm  Right arm    Pulse: 97 86 72 80   Resp: 18 20 18 18   Temp: 97 5 °F (36 4 °C) 100 3 °F (37 9 °C) 98 3 °F (36 8 °C)    TempSrc: Axillary  Axillary    SpO2: 95% 93% 94% 91%   Weight:       Height:           Respiratory:  SpO2: SpO2: 91 %, SpO2 Activity: SpO2 Activity: At Rest, SpO2 Device: O2 Device: None (Room air)       Temperature: Temp (24hrs), Av 5 °F (36 9 °C), Min:97 5 °F (36 4 °C), Max:100 3 °F (37 9 °C)  Current: Temperature: 98 3 °F (36 8 °C)    Labs:   Results from last 7 days   Lab Units 21  0651 21  16221  0659   WBC Thousand/uL 11 61* 10 48* 8 71   HEMOGLOBIN g/dL 13 4 13 7 13 0   HEMATOCRIT % 44 6 45 4 43 2   PLATELETS Thousands/uL 218 239 243   NEUTROS PCT % 85* 82* 78*   MONOS PCT % 7 9 9     Results from last 7 days   Lab Units 21  0651 21  1621 21  0659 21  1304   SODIUM mmol/L 151* 151* 144 146*   POTASSIUM mmol/L 3 9 4 0 4 1 4 5   CHLORIDE mmol/L 112* 110* 108 109*   CO2 mmol/L 30 28 25 29   BUN mg/dL 24 24 19 27*   CREATININE mg/dL 1 33* 1 56* 1 24 1 56*   CALCIUM mg/dL 9 4 10 1 8 9 9 2   ALK PHOS U/L  --   --   --  87   ALT U/L  --   --   --  13   AST U/L  --   --   --  25     Results from last 7 days   Lab Units 21  0651 21  1621 21  1304   MAGNESIUM mg/dL 2 3 2 4 2 3                   Code Status: Level 1 - Full Code

## 2021-09-07 ENCOUNTER — APPOINTMENT (INPATIENT)
Dept: NON INVASIVE DIAGNOSTICS | Facility: HOSPITAL | Age: 81
DRG: 640 | End: 2021-09-07
Payer: MEDICARE

## 2021-09-07 ENCOUNTER — APPOINTMENT (INPATIENT)
Dept: RADIOLOGY | Facility: HOSPITAL | Age: 81
DRG: 640 | End: 2021-09-07
Payer: MEDICARE

## 2021-09-07 LAB — CRP SERPL QL: 65.1 MG/L

## 2021-09-07 PROCEDURE — 86140 C-REACTIVE PROTEIN: CPT | Performed by: STUDENT IN AN ORGANIZED HEALTH CARE EDUCATION/TRAINING PROGRAM

## 2021-09-07 PROCEDURE — 92526 ORAL FUNCTION THERAPY: CPT

## 2021-09-07 PROCEDURE — 99233 SBSQ HOSP IP/OBS HIGH 50: CPT | Performed by: NURSE PRACTITIONER

## 2021-09-07 PROCEDURE — 93306 TTE W/DOPPLER COMPLETE: CPT

## 2021-09-07 PROCEDURE — 99232 SBSQ HOSP IP/OBS MODERATE 35: CPT | Performed by: FAMILY MEDICINE

## 2021-09-07 PROCEDURE — 97110 THERAPEUTIC EXERCISES: CPT

## 2021-09-07 PROCEDURE — 93306 TTE W/DOPPLER COMPLETE: CPT | Performed by: INTERNAL MEDICINE

## 2021-09-07 PROCEDURE — 71045 X-RAY EXAM CHEST 1 VIEW: CPT

## 2021-09-07 RX ORDER — ASPIRIN 81 MG/1
81 TABLET, CHEWABLE ORAL DAILY
Status: DISCONTINUED | OUTPATIENT
Start: 2021-09-08 | End: 2021-09-10 | Stop reason: HOSPADM

## 2021-09-07 RX ADMIN — CARBIDOPA AND LEVODOPA 0.5 TABLET: 25; 100 TABLET ORAL at 17:40

## 2021-09-07 RX ADMIN — CALCIUM 1 TABLET: 500 TABLET ORAL at 09:19

## 2021-09-07 RX ADMIN — STANDARDIZED SENNA CONCENTRATE 8.6 MG: 8.6 TABLET ORAL at 21:15

## 2021-09-07 RX ADMIN — CEFAZOLIN SODIUM 500 MG: 1 INJECTION, POWDER, FOR SOLUTION INTRAMUSCULAR; INTRAVENOUS at 10:52

## 2021-09-07 RX ADMIN — ASPIRIN 81 MG CHEWABLE TABLET 324 MG: 81 TABLET CHEWABLE at 09:19

## 2021-09-07 RX ADMIN — AMLODIPINE BESYLATE 5 MG: 5 TABLET ORAL at 09:19

## 2021-09-07 RX ADMIN — ROPINIROLE HYDROCHLORIDE 0.75 MG: 0.25 TABLET, FILM COATED ORAL at 21:15

## 2021-09-07 RX ADMIN — AMANTADINE HYDROCHLORIDE 100 MG: 100 CAPSULE ORAL at 17:46

## 2021-09-07 RX ADMIN — HEPARIN SODIUM 5000 UNITS: 5000 INJECTION INTRAVENOUS; SUBCUTANEOUS at 21:15

## 2021-09-07 RX ADMIN — AMANTADINE HYDROCHLORIDE 100 MG: 100 CAPSULE ORAL at 09:20

## 2021-09-07 RX ADMIN — CEFAZOLIN SODIUM 500 MG: 1 INJECTION, POWDER, FOR SOLUTION INTRAMUSCULAR; INTRAVENOUS at 22:30

## 2021-09-07 RX ADMIN — HEPARIN SODIUM 5000 UNITS: 5000 INJECTION INTRAVENOUS; SUBCUTANEOUS at 06:27

## 2021-09-07 RX ADMIN — QUETIAPINE FUMARATE 25 MG: 25 TABLET ORAL at 21:15

## 2021-09-07 RX ADMIN — SODIUM CHLORIDE 100 ML/HR: 0.45 INJECTION, SOLUTION INTRAVENOUS at 13:47

## 2021-09-07 RX ADMIN — HEPARIN SODIUM 5000 UNITS: 5000 INJECTION INTRAVENOUS; SUBCUTANEOUS at 14:39

## 2021-09-07 RX ADMIN — ATORVASTATIN CALCIUM 80 MG: 80 TABLET ORAL at 17:40

## 2021-09-07 RX ADMIN — CLONAZEPAM 0.5 MG: 0.5 TABLET ORAL at 09:19

## 2021-09-07 RX ADMIN — CARBIDOPA AND LEVODOPA 0.5 TABLET: 25; 100 TABLET ORAL at 09:19

## 2021-09-07 RX ADMIN — POLYETHYLENE GLYCOL 3350 17 G: 17 POWDER, FOR SOLUTION ORAL at 09:20

## 2021-09-07 RX ADMIN — CALCIUM 1 TABLET: 500 TABLET ORAL at 17:40

## 2021-09-07 RX ADMIN — Medication 1 TABLET: at 09:19

## 2021-09-07 RX ADMIN — RASAGILINE 1 MG: 1 TABLET ORAL at 09:21

## 2021-09-07 RX ADMIN — CARBIDOPA AND LEVODOPA 0.5 TABLET: 25; 100 TABLET ORAL at 12:16

## 2021-09-07 RX ADMIN — ACETAMINOPHEN 650 MG: 325 TABLET, FILM COATED ORAL at 21:15

## 2021-09-07 RX ADMIN — SODIUM CHLORIDE 100 ML/HR: 0.45 INJECTION, SOLUTION INTRAVENOUS at 02:21

## 2021-09-07 RX ADMIN — CARBIDOPA AND LEVODOPA 0.5 TABLET: 25; 100 TABLET ORAL at 21:15

## 2021-09-07 NOTE — QUICK NOTE
Patient's movement specialist was contacted and updated on patient's conditions  Dr Joo Ryan gave 3 suggestions  · Stop Amantadine 100 mg and replace with home dose of Gocovir  · Patient's family was contacted to bring medication in  · Stop the klonopin and decrease sinemet as these might increase altered mental status  · If patient needs an MRI: hold dose of rasagline and 2 doses of sinemet prior to MRI  However after MRI give the patient a dose of sinemet       These suggestions were then relayed to [place], Neurological

## 2021-09-07 NOTE — QUICK NOTE
Spoke to [facility] son, Edward, today  I updated him regarding plans of care and current status  He is aware that she is not yet at baseline and that we are making adjustments to her medication per [facility] neurologist   All questions answered to his satisfaction

## 2021-09-07 NOTE — PHYSICAL THERAPY NOTE
PT TREATMENT     09/07/21 1155   Note Type   Note Type Treatment   Pain Assessment   Pain Assessment Tool Warren-Baker FACES   Warren-Baker FACES Pain Rating 0   Restrictions/Precautions   Other Precautions Cognitive; Chair Alarm; Bed Alarm; Fall Risk   General   Chart Reviewed Yes   Family/Caregiver Present No   Cognition   Overall Cognitive Status Impaired   Arousal/Participation Cooperative   Subjective   Subjective patient mummbling at times, answers yes/no at times   Bed Mobility   Supine to Sit 3  Moderate assistance   Additional items Assist x 1;Verbal cues;LE management   Sit to Supine 2  Maximal assistance   Additional items Assist x 1;Verbal cues;LE management   Transfers   Sit to Stand 2  Maximal assistance   Additional items Assist x 1;Verbal cues   Stand to Sit 2  Maximal assistance   Additional items Assist x 1;Verbal cues   Ambulation/Elevation   Gait Assistance 2  Maximal assist   Additional items Assist x 2;Verbal cues; Tactile cues   Assistive Device   (hand hold assist, B UE support)   Distance 2 shuffling type steps taken with max assist for weight shifting and to encourage command follow  Balance   Static Sitting Poor +   Dynamic Sitting Poor   Static Standing Poor -   Dynamic Standing Poor -   Ambulatory Poor -   Activity Tolerance   Activity Tolerance Patient limited by fatigue  (limited by confusion/cognition)   Exercises   Hamstring Stretch Supine;5 reps;PROM; Bilateral   Heelslides Supine;10 reps;AAROM; Bilateral   Hip Flexion Sitting;5 reps;AAROM; Bilateral   Ankle Pumps Supine;5 reps;AAROM; Bilateral   Heel Cord Stretch Supine;5 reps;PROM; Bilateral   Bridging Supine;5 reps;AAROM   Balance training  sit to and from stand with stand pivot activity completed for balance and coordination   Assessment   Assessment patient cooperative without agitation although patient restless and with athetoid type movements throughout all extremities   Patient unable to consistently keep right foot on floor for effective weight bearing for gait activity  The patient's AM-PAC Basic Mobility Inpatient Short Form Raw Score is 11, Standardized Score is 30 25  A standardized score less than 42 9 suggests the patient may benefit from discharge to post-acute rehabilitation services  Please also refer to the recommendation of the Physical Therapist for safe discharge planning  Plan   Treatment/Interventions ADL retraining;Functional transfer training;LE strengthening/ROM; Elevations; Therapeutic exercise; Endurance training;Cognitive reorientation;Patient/family training;Equipment eval/education; Bed mobility;Gait training; Compensatory technique education   PT Frequency 5x/wk   Recommendation   PT Discharge Recommendation Post acute rehabilitation services   AM-[facility] Basic Mobility Inpatient   Turning in Bed Without Bedrails 3   Lying on Back to Sitting on Edge of Flat Bed 2   Moving Bed to Chair 2   Standing Up From Chair 2   Walk in Room 1   Climb 3-5 Stairs 1   Basic Mobility Inpatient Raw Score 11   Basic Mobility Standardized Score 20 22   Licensure   [place] License Number  Lunashin Ela STAFFORD [id number]

## 2021-09-07 NOTE — PROGRESS NOTES
At about 0630, patient observed with behavior outburst  Agitated, restless, attempting to get out of bed unable to be redirected,  hitting and kicking  staffs  Pulling her allen catheter out and removing pads from siderail  Remained with poor safety awareness  High risk for fall  This writer stayed with the patient for close monitoring, unable to leave patient unattended  Resident Dr Lucius Ocampo made aware, seen and evaluated patient at bedside  At this time, patient was more aggressive, still hitting and kicking staffs  Soft restraint was applied with 4 staffs assist  Will monitor behavior

## 2021-09-07 NOTE — PROGRESS NOTES
Neurology Consult Follow Up      Chuck Chaney is a [de-identified] y o  female  [address]    [id number]        Assessment/Recommendations:    1  Global aphasia- improving   2  Encephalopathy   3  Progressive PD  4  UTI  5  RODRIGO     - fall precautions  - aspiration precaution  - continue with amantadine 100 mg 2 times a day- discuss with her movement specialist  -  Can discontinue full-dose aspirin, resume patient's baby aspirin from home  - can discontinue Lipitor 80 mg and resume patient's statin from home  - continue carbidopa levodopa  mg 4 times a day- changes being made per patient's movement specialist  - IV antibiotics/ IV fluid per medical team  - dyskinetic movement improved with Klonopin,  Discontinued based on recommendations from patient movement specialist  -  2nd CT of the head negative for new areas of ischemic infarcts  - echocardiogram with shunt study, EF 55% right atrium normal in size,  Left atrial size and shunt study not available     Patient is an 28-year-old female with progressive Parkinson's disease  Presented with global aphasia, Suspected UTI as etiology for altered mental status/ metabolic encephalopathy  Patient has been on IV antibiotics and fluids per medical team    Patient remained on carbidopa levodopa for PD, amantadine 100 mg 2 times a day  Recommendations for Klonopin for dyskinetic movement, currently patient is not on this per her medication administration record  Patient eligible for Seroquel as needed for sleep  PCP did speak with patient's movement specialist, recommendations to wean down Sinemet, discontinue amantadine and Klonopin  These changes will be made by PCP based on the recommendations by [facility] movement specialist     Initially seen patient aphasic, today she is able to speak occasionally with [facility]  Patient baseline speech mumbling with intermittent comprehensible words   Patient appears to be near baseline although remains slightly encephalopathic  Continues on her IV antibiotics and fluid  PCP instructed on appropriate Sinemet weaning protocol, however additional medication changes will be made by PCP based on movement specialists recommendations going forward  Recommendations for patient to continue to follow-up with movement specialist at their network facilities for more comprehensive care  Guera Blake will need follow up in in 6 weeks with movement disorder and memory attending or advance practitioner  She will not require outpatient neurological testing  Pt has her own movement specialist at [address], can resume care from there  Chief Complaint:    Subjective:   Patient with increased agitation today, swatting and kicking at staff  Is able to speak and answer some questions, however is not fully knowledgeable of her current situation, place or time  Patient does have improved speech  compared to prior evaluation  Past Medical History:   Diagnosis Date    RODRIGO (acute kidney injury) ([facility] ) 7/19/2019    Altered mental status 1/11/2021    Anal bleeding 1/29/2018    Arthritis     knee    Asthma     BPPV (benign paroxysmal positional vertigo) 7/19/2016    Last Assessment & Plan:  Hx consistent with BPPV  Neg [place], however could not perform it adequately due to dyskinesias  Recommend lozano-daroff exercises at home  IF worsens, vestibular therapy      Bulging lumbar disc 12/10/2013    Closed fracture of one rib of left side 1/29/2018    Colon polyp     Dementia ([facility] [facility] )     Fall     balance issue    Fall during current hospitalization 1/15/2021    Gallbladder disease     GERD (gastroesophageal reflux disease)     Hematuria     last assessed 10/29/15    Orthostatic hypotension 7/13/2015    Osteomyelitis of lumbar spine ([address] ) 3/10/2020    Ovarian cyst     LAST ASSESSED: 12/10/13    Parkinson's disease ([facility] [address] [facility] )     Pneumonia of both lower lobes due to infectious organism 3/27/2018    Possible fracture of 5th metatarsal 1/12/2021    Psoriasis 12/10/2013    Pulmonary embolism with infarction ([facility] ) 9/2/2014    Scalp laceration, subsequent encounter 11/15/2019    Sciatica 12/10/2013    [facility] protein-calorie malnutrition [facility] 7/22/2019    Skin disorder     last assessed 12/10/13    Squamous cell carcinoma of skin 11/21/2016    Stage 3a chronic kidney disease [facility] 12/4/2020    Toxic metabolic encephalopathy 6/68/7263    Use of cane as ambulatory aid     Walker as ambulation aid     Wears glasses     Weight loss 10/8/2019    Last Assessment & Plan:  Recommend Boost or Ensure  F/u with PCP   Weight loss, unintentional 5/22/2015     Social History     Socioeconomic History    Marital status: /Civil Union     Spouse name: Varun Gonzalez Number of children: 2    Years of education: 12    Highest education level: High school graduate   Occupational History    Occupation: [employer]     Comment: BILLING AND SHIPPING   Tobacco Use    Smoking status: Never Smoker    Smokeless tobacco: Never Used   Vaping Use    Vaping Use: Never used   Substance and Sexual Activity    Alcohol use: Not Currently    Drug use: Never    Sexual activity: Not Currently     Partners: Male   Other Topics Concern    Not on file   Social History Narrative    Son lives an hour away, daughter in [place]  Social Determinants of Health     Financial Resource Strain:     Difficulty of Paying Living Expenses:    Food Insecurity:     Worried About Running Out of Food in the Last Year:     [address] in the Last Year:    Transportation Needs:     Lack of Transportation (Medical):      Lack of Transportation (Non-Medical):    Physical Activity:     Days of Exercise per Week:     Minutes of Exercise per Session:    Stress:     Feeling of Stress :    Social Connections:     Frequency of Communication with Friends and Family:     Frequency of Social Gatherings with Friends and Family:     Attends Muslim Services:     Active Member of Clubs or Organizations:     Attends Club or Organization Meetings:     Marital Status:    Intimate Partner Violence:     Fear of Current or Ex-Partner:     Emotionally Abused:     Physically Abused:     Sexually Abused:      Family History   Problem Relation Age of Onset    Alzheimer's disease Mother     Stroke Father     No Known Problems Sister     No Known Problems Brother        ROS:  BETH    Objective:  /86   Pulse 85   Temp 98 4 °F (36 9 °C)   Resp 18   Ht 5' (1 524 m)   Wt 41 kg (90 lb 6 2 oz)   SpO2 94%   BMI 17 65 kg/m²     General: alert   Mental status: oriented x1  Attention: normal  Knowledge:poor awareness  Language and Speech: garbled and mostly incomprehendible  Cranial nerves:   CN II: Visual fields are BETH  Pupils are 3 mm and briskly reactive to light  CN III, IV, VI: At primary gaze, there is no eye deviation  CN V: Corneal responses are intact  CN VII: Face is symmetrical, with normal eye closure  CN VIII: Hearing is normal to rubbing fingers  CN IX, X: Phonation is soft  CN XI: Head turning and shoulder shrug are intact  CN XII: Tongue is midline with normal movements   Motor: There is no pronator drift of out-stretched arms     Muscle bulk and tone are normal       Muscle exam  Arm Right Left Leg Right Left   Deltoid 5/5 5/5 Iliopsoas 5/5 5/5   Biceps 5/5 5/5 Quads 5/5 5/5   Triceps 5/5 5/5 Hamstrings 5/5 5/5   Wrist Extension 5/5 5/5 Ankle Dorsi Flexion 5/5 5/5   Wrist Flexion 5/5 5/5 Ankle Plantar Flexion 5/5 5/5       Sensory: UTAGait: BETH  Coordination:BETH     Reflexes    RJ BJ TJ KJ AJ Plantars Green's   Right 2+ 2+ 2+ 2+ 2+ Downgoing Not present   Left 2+ 2+ 2+ 2+ 2+ Downgoing Not present      Heart: Regular rate and rhythm  Lung: no audible wheezing or stridor heard  Abd: soft, non-tender, non-distended with positive bowel sounds in all quads  Skin: dry and intact    Labs:      Lab Results   Component Value Date WBC 14 01 (H) 09/06/2021    HGB 13 1 09/06/2021    HCT 44 5 09/06/2021    MCV 96 09/06/2021     09/06/2021     Lab Results   Component Value Date    HGBA1C 6 6 (H) 09/03/2021     Lab Results   Component Value Date    ALT 13 09/02/2021    AST 25 09/02/2021    ALKPHOS 87 09/02/2021    BILITOT 0 4 11/04/2015     Lab Results   Component Value Date    GLUCOSE 117 (H) 11/04/2015    CALCIUM 8 9 09/06/2021     11/04/2015    K 4 8 09/06/2021    CO2 29 09/06/2021     (H) 09/06/2021    BUN 23 09/06/2021    CREATININE 1 05 09/06/2021         Review of reports and notes reveal:   Independent review of films/reports:  CTA head and neck with and without contrast    Result Date: 9/2/2021  Mild-to-moderate motion artifact is present  - Negative CTA head and neck for large vessel occlusion, dissection, aneurysm, or high-grade stenosis given motion degraded examination  - Mild atherosclerotic disease of the head and neck, as detailed above  - Additional chronic/incidental findings as detailed above  Workstation performed: [id number]     XR chest 1 view portable    Result Date: 9/3/2021  No acute cardiopulmonary disease  Workstation performed: [id number]     CT head wo contrast    Result Date: 9/6/2021  No acute intracranial abnormality  Bilateral deep brain stimulator leads redemonstrated  Mild chronic microangiopathic change  Workstation performed: [id number]     CT head without contrast    Result Date: 9/2/2021  Stable examination  No acute intracranial abnormality  Workstation performed: [id number]         Thank you for this consult      Total time of encounter:  40 min  More than 50% of the time was used in counseling and/or coordination of care  Extent of couseling and/or coordination of care

## 2021-09-07 NOTE — PLAN OF CARE
Problem: Potential for Falls  Goal: Patient will remain free of falls  Description: INTERVENTIONS:  - Educate patient/family on patient safety including physical limitations  - Instruct patient to call for assistance with activity   - Consult OT/PT to assist with strengthening/mobility   - Keep Call bell within reach  - Keep bed low and locked with side rails adjusted as appropriate  - Keep care items and personal belongings within reach  - Initiate and maintain comfort rounds  - Make Fall Risk Sign visible to staff  - Initiate/Maintain bed alarm  - Obtain necessary fall risk management equipment:  - Apply yellow socks and bracelet for high fall risk patients  - Consider moving patient to room near nurses station  Outcome: Progressing     Problem: MOBILITY - ADULT  Goal: Maintain or return to baseline ADL function  Description: INTERVENTIONS:  -  Assess patient's ability to carry out ADLs; assess patient's baseline for ADL function and identify physical deficits which impact ability to perform ADLs (bathing, care of mouth/teeth, toileting, grooming, dressing, etc )  - Assess/evaluate cause of self-care deficits   - Assess range of motion  - Assess patient's mobility; develop plan if impaired  - Assess patient's need for assistive devices and provide as appropriate  - Encourage maximum independence but intervene and supervise when necessary  - Involve family in performance of ADLs  - Assess for home care needs following discharge   - Consider OT consult to assist with ADL evaluation and planning for discharge  - Provide patient education as appropriate  Outcome: Progressing     Problem: Prexisting or High Potential for Compromised Skin Integrity  Goal: Skin integrity is maintained or improved  Description: INTERVENTIONS:  - Identify patients at risk for skin breakdown  - Assess and monitor skin integrity  - Assess and monitor nutrition and hydration status  - Monitor labs   - Assess for incontinence   - Turn and reposition patient  - Assist with mobility/ambulation  - Relieve pressure over bony prominences  - Avoid friction and shearing  - Provide appropriate hygiene as needed including keeping skin clean and dry  - Evaluate need for skin moisturizer/barrier cream  - Collaborate with interdisciplinary team   - Patient/family teaching  - Consider wound care consult   Outcome: Progressing     Problem: Nutrition/Hydration-ADULT  Goal: Nutrient/Hydration intake appropriate for improving, restoring or maintaining nutritional needs  Description: Monitor and assess patient's nutrition/hydration status for malnutrition  Collaborate with interdisciplinary team and initiate plan and interventions as ordered  Monitor patient's weight and dietary intake as ordered or per policy  Utilize nutrition screening tool and intervene as necessary  Determine patient's food preferences and provide high-protein, high-caloric foods as appropriate       INTERVENTIONS:  - Monitor oral intake, urinary output, labs, and treatment plans  - Assess nutrition and hydration status and recommend course of action  - Evaluate amount of meals eaten  - Assist patient with eating if necessary   - Allow adequate time for meals  - Recommend/ encourage appropriate diets, oral nutritional supplements, and vitamin/mineral supplements  - Order, calculate, and assess calorie counts as needed  - Recommend, monitor, and adjust tube feedings and TPN/PPN based on assessed needs  - Assess need for intravenous fluids  - Provide specific nutrition/hydration education as appropriate  - Include patient/family/caregiver in decisions related to nutrition  Outcome: Progressing     Problem: NEUROSENSORY - ADULT  Goal: Achieves stable or improved neurological status  Description: INTERVENTIONS  - Monitor and report changes in neurological status  - Monitor vital signs such as temperature, blood pressure, glucose, and any other labs ordered   - Initiate measures to prevent increased intracranial pressure  - Monitor for seizure activity and implement precautions if appropriate      Outcome: Progressing     Problem: NEUROSENSORY - ADULT  Goal: Remains free of injury related to seizures activity  Description: INTERVENTIONS  - Maintain airway, patient safety  and administer oxygen as ordered  - Monitor patient for seizure activity, document and report duration and description of seizure to physician/advanced practitioner  - If seizure occurs,  ensure patient safety during seizure  - Reorient patient post seizure  - Seizure pads on all 4 side rails  - Instruct patient/family to notify RN of any seizure activity including if an aura is experienced  - Instruct patient/family to call for assistance with activity based on nursing assessment  - Administer anti-seizure medications if ordered    Outcome: Progressing     Problem: NEUROSENSORY - ADULT  Goal: Achieves maximal functionality and self care  Description: INTERVENTIONS  - Monitor swallowing and airway patency with patient fatigue and changes in neurological status  - Encourage and assist patient to increase activity and self care     - Encourage visually impaired, hearing impaired and aphasic patients to use assistive/communication devices  Outcome: Progressing

## 2021-09-07 NOTE — SPEECH THERAPY NOTE
Speech Language/Pathology    Speech/Language Pathology Progress Note    Patient Name: Guera Blake  VIXOQ'M Date: 9/7/2021    Subjective:  " I want to go home" Per nursing patient with fair intake today but was combative earlier  No difficulty with swallowing noted  Currently on x4 point restraints  Objective:  Patient seen upright in bed however continues with lean to the right- appears unable to support her head today despite max pillow support  Initially patient upset and combative however was able to redirect and calm her down  She is expressing that she is very upset- she expressed fears that we are trying to kill her  She also asked me if I was poisoning her  Continually stating she would like to return home  Speech continues to be dysarthric with limited intelligibility  We discussed beneficial strategies for improved speech intelligibly with some carryover noted  She consumed thin liquids and soft von crackers  Limited labial seal due to head positioning  She was however able to demonstrate adequate straw suck and anterior containment  Mastication was functional  Bolus formation and transfer were adequate with no oral residue noted  Swallowing initiation appeared prompt  Laryngeal rise was palpated and judged to be within functional limits  No coughing, throat clearing, change in vocal quality or respiratory status noted today  Assessment:  Patients mentation and overall swallow function appears improved  Based on prior notes she appears to be returning to baseline  Speech continues to be significantly dysarthric with some benefit from strategies however limited carryover was noted  Intelligibly is further impaired when patient is agitated/upset        Plan/Recommendations:  Consider upgrade to dysphagia 2 with thin liquids  meds in puree    Aspiration precautions and compensatory swallowing strategies: upright posture, only feed when fully alert, slow rate of feeding, small bites/sips and alternating bites and sips    Will continue to follow    WELLINGTON Giang , [address] [facility] Language Pathologist   Available via [facility] #[id number]  [address] #[id number]

## 2021-09-07 NOTE — PROGRESS NOTES
[facility] 26 Progress Note - Maddy Zurita [de-identified] y o  female MRN: [id number]    Unit/Bed#: [address] [place] [address] Encounter: [id number]      Assessment/Plan:    * Altered mental status  Assessment & Plan  Patient presented with altered mental status  Patient's  stated she was last seen well approximately 1 week before prior admission  Patient's  report it was an insidious onset with rapid progression which started with patient being nonverbal      On admission, CT of head was negative for acute bleed hemorrhage  Abnormal urinalysis was noted for leukocytes and concern for UTI  CXR was neg  MRI unattainable due to patient movement in setting of DBS  · Cause of AMS unknown- Parkinson's sequela versus dehydration versus RODRIGO versus new onset seizure versus on/off syndrome  · Neuro consulted  · Recommended, Klonopin 0 5mg bid, Increased Amantadine 100mg  · Fall prevention  · Seizure precautions  · PT/OT  · Neuro checks daily  · 100cc/hr of 1/2NS  · Soft restraints were placed due to possible harm to self and other as well as not being able to follow commands  · Bedside Swallow recommended puree and nectar  · Follow-up CT schedule for 9/6  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 [facility])  Assessment & Plan  Patient has has increasingly more frequent episodes of flailing during the day for over a month  Patient's  stated it used to only be during the night time  · Klonopin 0 5mg for increased movement     Dementia due to Parkinson's disease with behavioral disturbance [facility])  Assessment & Plan  Patient's caregivers have had a difficulty with patient due to behavioral issues  Likely due to sequela of parkinson's  · Continue with home medications of Seroquel  · Patient's  has been giving patient 12 5 mg of her 25 mg      Essential hypertension  Assessment & Plan  Patient upon admission was hypertensive at 164/72       · Continue on home medications    RODRIGO (acute kidney injury) (HCC)-resolved as of 9/4/2021  Assessment & Plan  Cr 1 56 POA, baseline 1 1-1 2  Unclear etiology, BUN:creating 17  Possibly prerenaly, dehydration from poor po intake or side effect of parkinson's regimen  Patient unable to provide ROS,  states good po intake prior to this week  · Lactic Ringer 75cc/hr  · Monitor daily chem panel  · Hold metformin and other nephrotoxic meds    Hypernatremia  Assessment & Plan  Patient in hospital course has gotten a hypernatremia as seem with Na 151 on morning labs  Patient was on Lactic ringers due to RODRIGO and in setting of AMS  · Maintenance fluids increased to 100cc/hr of 1/2 normal saline  Stage 3a chronic kidney disease [facility])  Assessment & Plan  Lab Results   Component Value Date    EGFR 38 09/05/2021    EGFR 31 09/04/2021    EGFR 41 09/03/2021    CREATININE 1 33 (H) 09/05/2021    CREATININE 1 56 (H) 09/04/2021    CREATININE 1 24 09/03/2021     · Cotinue with home medications  · Avoid Nephrotoxic drugs  · Hydrate patient with 100cc/hr of 1/2 NS  Mixed hyperlipidemia  Assessment & Plan  Continue with Home medications    Type 2 diabetes mellitus without complication, without long-term current use of insulin [facility])  Assessment & Plan    Lab Results   Component Value Date    HGBA1C 6 4 12/04/2020       Patient's glucose on admission was 133  Patient is not on any at home medications for such  Constipation  Assessment & Plan  Patient attempted a bowel movement  Patient was noted to have increased effort, stool was noted to be formed and hard  Patient has left lower quadrant tenderness and patient reported feeling constipated  9/7: Constipation seems to be resolved, patient had multiple bowel movements since starting miralax  · Miralax 17g daily  Bladder obstruction  Assessment & Plan  Patient was found to have urinary retention of >400cc  Patient already had 2 straight catheters during this hospital stay  Suspected due to constipation   FeNa was calculated to be 8 5%, indicating Post-Renal obstruction  9/7 Patient has several bowel movements the morning and prior day  Patient also attempted several times to rip the allen out  · D/C Allen cath  · Voiding trial       S/P deep brain stimulator placement  Assessment & Plan  Consult Neurology to see if they are working properly  Underweight-resolved as of 9/3/2021  Assessment & Plan  Patient is NPO until passes swallowing exam     · NPO  · Maintance fluids: please see above  · Bedside swallowing study  GERD (gastroesophageal reflux disease)  Assessment & Plan  Continue on home medications     Severe protein-calorie malnutrition (HCC)-resolved as of 9/4/2021  Assessment & Plan  Malnutrition Findings:   Adult Malnutrition type: Chronic illness  Adult Degree of Malnutrition: Other severe protein calorie malnutrition    BMI Findings: Body mass index is 16 32 kg/m²  Patient had swallow evaluation  Patient is cleared for puree diet with nectar  Subjective:     Patient seen and examined at bedside  Patient's nurse reported earlier this morning the patient started to get more violent, kicking and scratching the nursing staff  This Dee Lolling attempted to talk with patient however was scratched and kicked himself  Patient was unable to follow commands and was attempting to get off of the bed  Soft restraints were ordered at this time for safety of patient and nursing staff  Patient was assessed and patient seemed more confused today  Patient oriented to her name however she wasn't to place, time or event  Patient stated, "this is my house"  Patient's dysarthria seems unchanged from prior  Patient's nurse reported the patient has had several bowel movements since Sunday and the removal of the allen cath was discussed  Objective:     Vitals: Blood pressure 152/81, pulse 85, temperature (!) 97 4 °F (36 3 °C), temperature source Axillary, resp   rate 20, height 5' (1 524 m), weight 41 kg (90 lb 6 2 oz), SpO2 96 %, not currently breastfeeding  ,Body mass index is 17 65 kg/m²  Wt Readings from Last 3 Encounters:   09/07/21 41 kg (90 lb 6 2 oz)   08/18/21 39 3 kg (86 lb 9 6 oz)   06/21/21 42 8 kg (94 lb 6 4 oz)       Intake/Output Summary (Last 24 hours) at 9/7/2021 0801  Last data filed at 9/7/2021 0631  Gross per 24 hour   Intake 945 ml   Output 1025 ml   Net -80 ml       Physical Exam: /81 (BP Location: Right arm)   Pulse 85   Temp (!) 97 4 °F (36 3 °C) (Axillary)   Resp 20   Ht 5' (1 524 m)   Wt 41 kg (90 lb 6 2 oz)   SpO2 96%   BMI 17 65 kg/m²   Lungs: clear to auscultation bilaterally  Heart: regular rate and rhythm, S1, S2 normal, no murmur, click, rub or gallop  Abdomen: Left lower quadrant tenderness   Skin: Skin color, texture, turgor normal  No rashes or lesions  Neurologic: Mental status: Oriented to self  Motor: Tremors with flaling limbs, actively scratching and trying to bite people       Recent Results (from the past 24 hour(s))   Basic metabolic panel    Collection Time: 09/06/21  4:02 PM   Result Value Ref Range    Sodium 147 (H) 136 - 145 mmol/L    Potassium 4 8 3 5 - 5 3 mmol/L    Chloride 111 (H) 100 - 108 mmol/L    CO2 29 21 - 32 mmol/L    ANION GAP 7 4 - 13 mmol/L    BUN 23 5 - 25 mg/dL    Creatinine 1 05 0 60 - 1 30 mg/dL    Glucose 144 (H) 65 - 140 mg/dL    Calcium 8 9 8 3 - 10 1 mg/dL    eGFR 50 ml/min/1 73sq m       Current Facility-Administered Medications   Medication Dose Route Frequency Provider Last Rate Last Admin    acetaminophen (TYLENOL) tablet 650 mg  650 mg Oral Q6H PRN Shaw Holt MD   650 mg at 09/06/21 2123    amantadine (SYMMETREL) capsule 100 mg  100 mg Oral BID Reji Jackman MD   100 mg at 09/06/21 1737    amLODIPine (NORVASC) tablet 5 mg  5 mg Oral Daily Shaw Holt MD   5 mg at 09/06/21 1455    aspirin chewable tablet 324 mg  324 mg Oral Daily Shaw Holt MD   324 mg at 09/06/21 1453    atorvastatin (LIPITOR) tablet 80 mg  80 mg Oral Daily With Vicki Pathak MD   80 mg at 09/06/21 1737    calcium carbonate (OYSTER SHELL,OSCAL) 500 mg tablet 1 tablet  1 tablet Oral BID With Meals Cruz Richardson MD   1 tablet at 09/06/21 1737    carbidopa-levodopa (SINEMET)  mg per tablet 0 5 tablet  0 5 tablet Oral 4x Daily Cruz Richardson MD   0 5 tablet at 09/06/21 2120    ceFAZolin (ANCEF) 500 mg in sodium chloride 0 9 % 50 mL IVPB  500 mg Intravenous Q12H Sienna Mccallum  mL/hr at 09/06/21 2239 500 mg at 09/06/21 2239    clonazePAM (KlonoPIN) tablet 0 5 mg  0 5 mg Oral BID Sebastian Fritz MD   0 5 mg at 09/06/21 1737    heparin (porcine) subcutaneous injection 5,000 Units  5,000 Units Subcutaneous [facility], DO   5,000 Units at 09/07/21 0599    multivitamin-minerals (CENTRUM) tablet 1 tablet  1 tablet Oral Daily Cruz Richardson MD   1 tablet at 09/06/21 1454    pantoprazole (PROTONIX) EC tablet 20 mg  20 mg Oral Early Morning Cruz Richardson MD   20 mg at 09/05/21 5961    polyethylene glycol (MIRALAX) packet 17 g  17 g Oral Daily Cruz Richardson MD   17 g at 09/06/21 1453    QUEtiapine (SEROquel) tablet 25 mg  25 mg Oral HS PRN Cruz Richardson MD   25 mg at 09/06/21 2123    rasagiline (AZILECT) tablet 1 mg  1 mg Oral Daily Cruz Richardson MD   1 mg at 09/06/21 1452    rOPINIRole (REQUIP) tablet 0 75 mg  0 75 mg Oral HS Cruz Richardson MD   0 75 mg at 09/06/21 2120    senna (SENOKOT) tablet 8 6 mg  1 tablet Oral HS [facility], DO   8 6 mg at 09/06/21 2120    sodium chloride infusion 0 45 %  100 mL/hr Intravenous Continuous Torrey Virk  mL/hr at 09/07/21 0221 100 mL/hr at 09/07/21 0221       Invasive Devices     Peripheral Intravenous Line            Peripheral IV 09/04/21 Left;Upper;Ventral (anterior) Arm 2 days          Drain            Urethral Catheter 16 Fr  1 day                Lab, Imaging and other studies: I have personally reviewed pertinent reports      VTE Pharmacologic Prophylaxis: Heparin  VTE Mechanical Prophylaxis: sequential compression device    Paulino Arzola MD

## 2021-09-08 LAB
ANION GAP SERPL CALCULATED.3IONS-SCNC: 9 MMOL/L (ref 4–13)
BASOPHILS # BLD AUTO: 0.09 THOUSANDS/ΜL (ref 0–0.1)
BASOPHILS NFR BLD AUTO: 1 % (ref 0–1)
BUN SERPL-MCNC: 16 MG/DL (ref 5–25)
CALCIUM SERPL-MCNC: 9.2 MG/DL (ref 8.3–10.1)
CHLORIDE SERPL-SCNC: 105 MMOL/L (ref 100–108)
CO2 SERPL-SCNC: 28 MMOL/L (ref 21–32)
CREAT SERPL-MCNC: 0.94 MG/DL (ref 0.6–1.3)
EOSINOPHIL # BLD AUTO: 0.59 THOUSAND/ΜL (ref 0–0.61)
EOSINOPHIL NFR BLD AUTO: 5 % (ref 0–6)
ERYTHROCYTE [DISTWIDTH] IN BLOOD BY AUTOMATED COUNT: 13.6 % (ref 11.6–15.1)
GFR SERPL CREATININE-BSD FRML MDRD: 57 ML/MIN/1.73SQ M
GLUCOSE SERPL-MCNC: 93 MG/DL (ref 65–140)
HCT VFR BLD AUTO: 47 % (ref 34.8–46.1)
HGB BLD-MCNC: 13.9 G/DL (ref 11.5–15.4)
IMM GRANULOCYTES # BLD AUTO: 0.04 THOUSAND/UL (ref 0–0.2)
IMM GRANULOCYTES NFR BLD AUTO: 0 % (ref 0–2)
LYMPHOCYTES # BLD AUTO: 0.97 THOUSANDS/ΜL (ref 0.6–4.47)
LYMPHOCYTES NFR BLD AUTO: 9 % (ref 14–44)
MCH RBC QN AUTO: 28.4 PG (ref 26.8–34.3)
MCHC RBC AUTO-ENTMCNC: 29.6 G/DL (ref 31.4–37.4)
MCV RBC AUTO: 96 FL (ref 82–98)
MONOCYTES # BLD AUTO: 0.92 THOUSAND/ΜL (ref 0.17–1.22)
MONOCYTES NFR BLD AUTO: 9 % (ref 4–12)
NEUTROPHILS # BLD AUTO: 8.26 THOUSANDS/ΜL (ref 1.85–7.62)
NEUTS SEG NFR BLD AUTO: 76 % (ref 43–75)
NRBC BLD AUTO-RTO: 0 /100 WBCS
PLATELET # BLD AUTO: 148 THOUSANDS/UL (ref 149–390)
PMV BLD AUTO: 11.2 FL (ref 8.9–12.7)
POTASSIUM SERPL-SCNC: 3.9 MMOL/L (ref 3.5–5.3)
RBC # BLD AUTO: 4.9 MILLION/UL (ref 3.81–5.12)
SODIUM SERPL-SCNC: 142 MMOL/L (ref 136–145)
WBC # BLD AUTO: 10.87 THOUSAND/UL (ref 4.31–10.16)

## 2021-09-08 PROCEDURE — 85025 COMPLETE CBC W/AUTO DIFF WBC: CPT | Performed by: STUDENT IN AN ORGANIZED HEALTH CARE EDUCATION/TRAINING PROGRAM

## 2021-09-08 PROCEDURE — 80048 BASIC METABOLIC PNL TOTAL CA: CPT | Performed by: STUDENT IN AN ORGANIZED HEALTH CARE EDUCATION/TRAINING PROGRAM

## 2021-09-08 PROCEDURE — 99232 SBSQ HOSP IP/OBS MODERATE 35: CPT | Performed by: FAMILY MEDICINE

## 2021-09-08 PROCEDURE — 92526 ORAL FUNCTION THERAPY: CPT

## 2021-09-08 PROCEDURE — 97530 THERAPEUTIC ACTIVITIES: CPT

## 2021-09-08 RX ADMIN — AMANTADINE HYDROCHLORIDE 100 MG: 100 CAPSULE ORAL at 08:48

## 2021-09-08 RX ADMIN — ROPINIROLE HYDROCHLORIDE 0.75 MG: 0.25 TABLET, FILM COATED ORAL at 22:09

## 2021-09-08 RX ADMIN — ASPIRIN 81 MG CHEWABLE TABLET 81 MG: 81 TABLET CHEWABLE at 08:47

## 2021-09-08 RX ADMIN — CARBIDOPA AND LEVODOPA 0.5 TABLET: 25; 100 TABLET ORAL at 08:46

## 2021-09-08 RX ADMIN — Medication 1 TABLET: at 08:46

## 2021-09-08 RX ADMIN — RASAGILINE 1 MG: 1 TABLET ORAL at 08:48

## 2021-09-08 RX ADMIN — POLYETHYLENE GLYCOL 3350 17 G: 17 POWDER, FOR SOLUTION ORAL at 08:47

## 2021-09-08 RX ADMIN — AMLODIPINE BESYLATE 5 MG: 5 TABLET ORAL at 08:47

## 2021-09-08 RX ADMIN — CARBIDOPA AND LEVODOPA 0.5 TABLET: 25; 100 TABLET ORAL at 17:09

## 2021-09-08 RX ADMIN — AMANTADINE HYDROCHLORIDE 100 MG: 100 CAPSULE ORAL at 17:09

## 2021-09-08 RX ADMIN — SODIUM CHLORIDE 100 ML/HR: 0.45 INJECTION, SOLUTION INTRAVENOUS at 10:24

## 2021-09-08 RX ADMIN — HEPARIN SODIUM 5000 UNITS: 5000 INJECTION INTRAVENOUS; SUBCUTANEOUS at 22:10

## 2021-09-08 RX ADMIN — CEFAZOLIN SODIUM 500 MG: 1 INJECTION, POWDER, FOR SOLUTION INTRAMUSCULAR; INTRAVENOUS at 10:19

## 2021-09-08 RX ADMIN — CEFAZOLIN SODIUM 500 MG: 1 INJECTION, POWDER, FOR SOLUTION INTRAMUSCULAR; INTRAVENOUS at 23:38

## 2021-09-08 RX ADMIN — STANDARDIZED SENNA CONCENTRATE 8.6 MG: 8.6 TABLET ORAL at 22:09

## 2021-09-08 RX ADMIN — CALCIUM 1 TABLET: 500 TABLET ORAL at 08:47

## 2021-09-08 RX ADMIN — CARBIDOPA AND LEVODOPA 0.5 TABLET: 25; 100 TABLET ORAL at 12:43

## 2021-09-08 RX ADMIN — HEPARIN SODIUM 5000 UNITS: 5000 INJECTION INTRAVENOUS; SUBCUTANEOUS at 13:56

## 2021-09-08 RX ADMIN — CALCIUM 1 TABLET: 500 TABLET ORAL at 17:09

## 2021-09-08 RX ADMIN — PANTOPRAZOLE SODIUM 20 MG: 20 TABLET, DELAYED RELEASE ORAL at 06:38

## 2021-09-08 RX ADMIN — CARBIDOPA AND LEVODOPA 0.5 TABLET: 25; 100 TABLET ORAL at 22:09

## 2021-09-08 RX ADMIN — SODIUM CHLORIDE 100 ML/HR: 0.45 INJECTION, SOLUTION INTRAVENOUS at 00:01

## 2021-09-08 RX ADMIN — HEPARIN SODIUM 5000 UNITS: 5000 INJECTION INTRAVENOUS; SUBCUTANEOUS at 06:38

## 2021-09-08 NOTE — PROGRESS NOTES
Pastoral Care Progress Note    2021  Patient: Maddy Zurita : 1940  Admission Date & Time: 2021 1207  MRN: [id number] CSN: [id number]                     Chaplaincy Interventions Utilized:   Relationship Building: Cultivated a relationship of care and support  Visited patient and her   Patient seemed to enjoy sharing about her children  She and her  are very active members of [facility] and [address] [place] [address] in [place]  The appreciated the pastoral visit     Ritual: Provided prayer     21 1500   Clinical Encounter Type   Visited With Patient and family together   Routine Visit Introduction   Referral To   (census/rounds)   Jainism Encounters   Jainism Needs Prayer

## 2021-09-08 NOTE — PHYSICAL THERAPY NOTE
09/08/21 1440   Note Type   Note Type Treatment   Pain Assessment   Pain Assessment Tool Pain Assessment not indicated - pt denies pain   Restrictions/Precautions   Other Precautions Impulsive;Cognitive; Chair Alarm; Bed Alarm; Fall Risk  (pt mumbles)   General   Chart Reviewed Yes   Cognition   Overall Cognitive Status Impaired   Arousal/Participation Cooperative   Attention Attends with cues to redirect   Subjective   Subjective agreeable to activity   Bed Mobility   Supine to Sit 4  Minimal assistance   Transfers   Sit to Stand 2  Maximal assistance   Additional items Verbal cues; Increased time required; Impulsive   Stand to Sit 2  Maximal assistance   Additional items Increased time required;Verbal cues; Impulsive   Ambulation/Elevation   Gait pattern   (ataxic)   Gait Assistance 3  Moderate assist   Additional items Assist x 1;Assist x 2   Assistive Device Rolling walker   Distance 2x75 feet   Balance   Static Sitting Poor   Dynamic Sitting Poor   Static Standing Poor   Dynamic Standing Poor   Ambulatory Poor   Assessment   Prognosis Good   Problem List Decreased strength; Impaired balance;Decreased mobility; Decreased coordination;Decreased safety awareness; Impaired tone;Decreased cognition   Assessment Pt demonstrates improving cognition and function  Pt asked to go to the bathroom but was already incontinent of her bowel  Pt placed on a commode with max assist then cleaned/changed with CNA assist   Pt stood with max assist at least 6 times to be cleaned  Pt then was able to ambulate in the hallway with mod assist and a rolling walker  Gait is ataxic but improved with practice  Pt is impulsive with a highly unsteady gait and at high risk to fall  Recommend STR as pt is not at her baseline level of function  The patient's AM-PAC Basic Mobility Inpatient Short Form Raw Score is 12, Standardized Score is 18 33   A standardized score less than 42 9 suggests the patient may benefit from discharge to post-acute rehabilitation services  Plan   Treatment/Interventions ADL retraining;Functional transfer training;LE strengthening/ROM; Therapeutic exercise; Endurance training;Gait training;Bed mobility; Equipment eval/education;Patient/family training;Cognitive reorientation   Progress Progressing toward goals   PT Frequency 5x/wk   Recommendation   PT Discharge Recommendation Post acute rehabilitation services   AM-PAC Basic Mobility Inpatient   Turning in Bed Without Bedrails 2   Lying on Back to Sitting on Edge of Flat Bed 2   Moving Bed to Chair 1   Standing Up From Chair 1   Walk in Room 1   Climb 3-5 Stairs 1   Basic Mobility Inpatient Raw Score 8   Turning Head Towards Sound 2   Follow Simple Instructions 2   Low Function Basic Mobility Raw Score 12   Low Function Basic Mobility Standardized Score 32 48   Licensure   [place] License Number  [facility] [id number]

## 2021-09-08 NOTE — SPEECH THERAPY NOTE
Speech Language/Pathology    Speech/Language Pathology Progress Note    Patient Name: Terrace Severance  OQWFY'D Date: 9/8/2021    Subjective:  "I'm doing better" Nursing and patient reporting she is tolerating current diet and meds  Objective:  Patient consumed thin liquids via single and consecutive straw sips without sxs of aspiration  She then reported she had to use the bathroom and politely refused any additional po  Patient with good use of strategies for intelligibility today which is noted to be improved  Assessment:  Patients dysarthria and intelligibility appears to be improving along with her dysphagia  Assessment was limited today however she continues to tolerate thin liquids       Plan/Recommendations:  Dysph 2/ thin liquids  meds in puree     Aspiration precautions and compensatory swallowing strategies: upright posture, only feed when fully alert, slow rate of feeding, small bites/sips and alternating bites and sips     Will continue to follow     WELLINGTON Henry , CCC-SLP  Speech Language Pathologist   Available via [address] [place] [address] [address][id number]  [place] #[id number]

## 2021-09-08 NOTE — PROGRESS NOTES
[facility] Progress Note - Lazarus Perch [de-identified] y o  female MRN: [id number]    Unit/Bed#: [address] [place] [address] Encounter: [id number]      Assessment/Plan:    * Altered mental status  Assessment & Plan  Patient presented with altered mental status  Patient's  stated she was last seen well approximately 1 week before prior admission  Patient's  report it was an insidious onset with rapid progression which started with patient being nonverbal      On admission, CT of head was negative for acute bleed hemorrhage  Abnormal urinalysis was noted for leukocytes and concern for UTI  CXR was neg  MRI unattainable due to patient movement in setting of DBS  Repeat CT was negative for any acute changes  · Cause of AMS unknown- Parkinson's sequela versus dehydration versus RODRIGO versus new onset seizure versus on/off syndrome  · Neuro consulted  · Recommended, Klonopin 0 5mg bid, Increased Amantadine 100mg  · Patient's movment specialist gave recommendations  We stopped the Klonopin  · Fall prevention  · Seizure precautions  · PT/OT  · Neuro checks daily  · 100cc/hr of 1/2NS   · Speech pathology has been consulted and progressing her diet  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 [facility])  Assessment & Plan  Patient has has increasingly more frequent episodes of flailing during the day for over a month  Patient's  stated it used to only be during the night time  · Discontinued Klonopin 0 5mg for increased movement     Dementia due to Parkinson's disease with behavioral disturbance [facility])  Assessment & Plan  Patient's caregivers have had a difficulty with patient due to behavioral issues  Likely due to sequela of parkinson's  · Continue with home medications of Seroquel  · Patient's  has been giving patient 12 5 mg of her 25 mg      Essential hypertension  Assessment & Plan  Patient upon admission was hypertensive at 164/72       · Continue on home medications    RODRIGO (acute kidney injury) (HCC)-resolved as of 9/4/2021  Assessment & Plan  Cr 1 56 POA, baseline 1 1-1 2  Unclear etiology, BUN:creating 17  Possibly prerenaly, dehydration from poor po intake or side effect of parkinson's regimen  Patient unable to provide ROS,  states good po intake prior to this week  · Lactic Ringer 75cc/hr  · Monitor daily chem panel  · Hold metformin and other nephrotoxic meds    Hypernatremia  Assessment & Plan  Patient in hospital course has gotten a hypernatremia as seem with Na 151 on morning labs  Patient was on Lactic ringers due to RODRIGO and in setting of AMS  · Maintenance fluids increased to 100cc/hr of 1/2 normal saline  Stage 3a chronic kidney disease [facility])  Assessment & Plan  Lab Results   Component Value Date    EGFR 38 09/05/2021    EGFR 31 09/04/2021    EGFR 41 09/03/2021    CREATININE 1 33 (H) 09/05/2021    CREATININE 1 56 (H) 09/04/2021    CREATININE 1 24 09/03/2021     · Cotinue with home medications  · Avoid Nephrotoxic drugs  · Hydrate patient with 100cc/hr of 1/2 NS  Mixed hyperlipidemia  Assessment & Plan  Continue with Home medications    Type 2 diabetes mellitus without complication, without long-term current use of insulin [facility])  Assessment & Plan    Lab Results   Component Value Date    HGBA1C 6 4 12/04/2020       Patient's glucose on admission was 133  Patient is not on any at home medications for such  Constipation  Assessment & Plan  Patient attempted a bowel movement  Patient was noted to have increased effort, stool was noted to be formed and hard  Patient has left lower quadrant tenderness and patient reported feeling constipated  9/7: Constipation seems to be resolved, patient had multiple bowel movements since starting miralax  · Miralax 17g daily  Bladder obstruction  Assessment & Plan  Patient was found to have urinary retention of >400cc  Patient already had 2 straight catheters during this hospital stay  Suspected due to constipation  FeNa was calculated to be 8 5%, indicating Post-Renal obstruction  9/7 Patient has several bowel movements the morning and prior day  Patient also attempted several times to rip the allen out  · D/C Allen cath  · Voiding trial       S/P deep brain stimulator placement  Assessment & Plan  Consult Neurology to see if they are working properly  Underweight-resolved as of 9/3/2021  Assessment & Plan  Patient is NPO until passes swallowing exam     · NPO  · Maintance fluids: please see above  · Bedside swallowing study  Severe protein-calorie malnutrition ([address] )  Assessment & Plan  Malnutrition Findings:   Adult Malnutrition type: Chronic illness  Adult Degree of Malnutrition: Other severe protein calorie malnutrition    BMI Findings: Body mass index is 16 32 kg/m²  Patient had swallow evaluation  Patient is cleared for puree diet with nectar  GERD (gastroesophageal reflux disease)  Assessment & Plan  Continue on home medications         Subjective:     Patient seen and examined at bedside  Patient was still in 4 point restraints  Patient/stated she was unable to sleep until about approximately 1:00 am   Patient was found to be in a deep sleep  Patient's mentation was notably different today  Patient was alert conscious oriented x3 knew what year it was, who the president was, where she was and her full name  Patient had a bowel movement overnight and stated she had to go pee  Patient's nurse was alerted to the patient's needs  Patient still had some flailing of her limbs and dysarthria however patient is markedly improved since start of hospital stay  Patient reports she wants to walk and be out of 4 point restraints  Patient was not aware of previous events of hospital stay  Patient denies any shortness of breath, chest pain, headache, abdomen pain  Objective:     Vitals: Blood pressure 136/79, pulse 92, temperature (!) 97 2 °F (36 2 °C), resp   rate 18, height 5' (1 524 m), weight 42 kg (92 lb 9 5 oz), SpO2 94 %, not currently breastfeeding  ,Body mass index is 18 08 kg/m²    Wt Readings from Last 3 Encounters:   09/08/21 42 kg (92 lb 9 5 oz)   08/18/21 39 3 kg (86 lb 9 6 oz)   06/21/21 42 8 kg (94 lb 6 4 oz)       Intake/Output Summary (Last 24 hours) at 9/8/2021 0838  Last data filed at 9/8/2021 0814  Gross per 24 hour   Intake 870 ml   Output 800 ml   Net 70 ml       Physical Exam: /79   Pulse 92   Temp (!) 97 2 °F (36 2 °C)   Resp 18   Ht 5' (1 524 m)   Wt 42 kg (92 lb 9 5 oz)   SpO2 94%   BMI 18 08 kg/m²   General appearance: alert and oriented, in no acute distress  Lungs: clear to auscultation bilaterally  Heart: regular rate and rhythm, S1, S2 normal, no murmur, click, rub or gallop  Neurologic: Mental status: Alert, oriented, thought content appropriate  Motor: Patient has slight tremors and flailing extrememties     Recent Results (from the past 24 hour(s))   C-reactive protein    Collection Time: 09/07/21  2:19 PM   Result Value Ref Range    CRP 65 1 (H) <3 0 mg/L   CBC and differential    Collection Time: 09/08/21  6:47 AM   Result Value Ref Range    WBC 10 87 (H) 4 31 - 10 16 Thousand/uL    RBC 4 90 3 81 - 5 12 Million/uL    Hemoglobin 13 9 11 5 - 15 4 g/dL    Hematocrit 47 0 (H) 34 8 - 46 1 %    MCV 96 82 - 98 fL    MCH 28 4 26 8 - 34 3 pg    MCHC 29 6 (L) 31 4 - 37 4 g/dL    RDW 13 6 11 6 - 15 1 %    MPV 11 2 8 9 - 12 7 fL    Platelets 218 (L) 950 - 390 Thousands/uL    nRBC 0 /100 WBCs    Neutrophils Relative 76 (H) 43 - 75 %    Immat GRANS % 0 0 - 2 %    Lymphocytes Relative 9 (L) 14 - 44 %    Monocytes Relative 9 4 - 12 %    Eosinophils Relative 5 0 - 6 %    Basophils Relative 1 0 - 1 %    Neutrophils Absolute 8 26 (H) 1 85 - 7 62 Thousands/µL    Immature Grans Absolute 0 04 0 00 - 0 20 Thousand/uL    Lymphocytes Absolute 0 97 0 60 - 4 47 Thousands/µL    Monocytes Absolute 0 92 0 17 - 1 22 Thousand/µL    Eosinophils Absolute 0 59 0 00 - 0 61 Thousand/µL    Basophils Absolute 0 09 0 00 - 0 10 Thousands/µL   Basic metabolic panel    Collection Time: 09/08/21  6:48 AM   Result Value Ref Range    Sodium 142 136 - 145 mmol/L    Potassium 3 9 3 5 - 5 3 mmol/L    Chloride 105 100 - 108 mmol/L    CO2 28 21 - 32 mmol/L    ANION GAP 9 4 - 13 mmol/L    BUN 16 5 - 25 mg/dL    Creatinine 0 94 0 60 - 1 30 mg/dL    Glucose 93 65 - 140 mg/dL    Calcium 9 2 8 3 - 10 1 mg/dL    eGFR 57 ml/min/1 73sq m       Current Facility-Administered Medications   Medication Dose Route Frequency Provider Last Rate Last Admin    acetaminophen (TYLENOL) tablet 650 mg  650 mg Oral Q6H PRN Cruz Richardson MD   650 mg at 09/07/21 2115    amantadine (SYMMETREL) capsule 100 mg  100 mg Oral BID Sebastian Fritz MD   100 mg at 09/07/21 1746    amLODIPine (NORVASC) tablet 5 mg  5 mg Oral Daily Cruz Richardson MD   5 mg at 09/07/21 0919    aspirin chewable tablet 81 mg  81 mg Oral Daily Cecil Van MD        calcium carbonate (OYSTER SHELL,OSCAL) 500 mg tablet 1 tablet  1 tablet Oral BID With Meals Cruz Richardson MD   1 tablet at 09/07/21 1740    carbidopa-levodopa (SINEMET)  mg per tablet 0 5 tablet  0 5 tablet Oral 4x Daily Cruz Richardson MD   0 5 tablet at 09/07/21 2115    ceFAZolin (ANCEF) 500 mg in sodium chloride 0 9 % 50 mL IVPB  500 mg Intravenous Q12H Sienna Mccallum  mL/hr at 09/07/21 2230 500 mg at 09/07/21 2230    heparin (porcine) subcutaneous injection 5,000 Units  5,000 Units Subcutaneous [facility], DO   5,000 Units at 09/08/21 5943    multivitamin-minerals (CENTRUM) tablet 1 tablet  1 tablet Oral Daily Cruz Richardson MD   1 tablet at 09/07/21 0919    pantoprazole (PROTONIX) EC tablet 20 mg  20 mg Oral Early Morning Cruz Richardson MD   20 mg at 09/08/21 1322    polyethylene glycol (MIRALAX) packet 17 g  17 g Oral Daily Cruz Richardson MD   17 g at 09/07/21 0920    QUEtiapine (SEROquel) tablet 25 mg 25 mg Oral HS PRN Rivas Chatterjee MD   25 mg at 09/07/21 2115    rasagiline (AZILECT) tablet 1 mg  1 mg Oral Daily Rivas Chatterjee MD   1 mg at 09/07/21 7192    rOPINIRole (REQUIP) tablet 0 75 mg  0 75 mg Oral HS Rivas Chatterjee MD   0 75 mg at 09/07/21 2115    senna (SENOKOT) tablet 8 6 mg  1 tablet Oral HS Verdaphneyon DO   8 6 mg at 09/07/21 2115    sodium chloride infusion 0 45 %  100 mL/hr Intravenous Continuous Desmond Stone  mL/hr at 09/08/21 0001 100 mL/hr at 09/08/21 0001       Invasive Devices     Peripheral Intravenous Line            Peripheral IV 09/04/21 Left;Upper;Ventral (anterior) Arm 3 days                Lab, Imaging and other studies: I have personally reviewed pertinent reports      VTE Pharmacologic Prophylaxis: Heparin  VTE Mechanical Prophylaxis: reason for no mechanical VTE prophylaxis Patient was unable to wear them    Rivas Chatterjee MD

## 2021-09-08 NOTE — CASE MANAGEMENT
CM spoke to daughter Ed Reyes at [phone] to discuss the Care Team's recommendation for post-acute inpatient rehab  Family is in agreement with the need for rehab  CM emailed a list of facilities to daughter Ed Reyes at [email or web address]  Daughter states that she would like to review the list with her brother Farhan Ngo  Daughter expresses interest in [facility] since it is close to patient's home; daughter also chooses blanket referrals  [place] referrals placed  At this time, [facility] is able to accept  Awaiting callback from daughter once she has had an opportunity to review with patient's son and patient's spouse  yes

## 2021-09-08 NOTE — PLAN OF CARE
Problem: Potential for Falls  Goal: Patient will remain free of falls  Description: INTERVENTIONS:  - Educate patient/family on patient safety including physical limitations  - Instruct patient to call for assistance with activity   - Consult OT/PT to assist with strengthening/mobility   - Keep Call bell within reach  - Keep bed low and locked with side rails adjusted as appropriate  - Keep care items and personal belongings within reach  - Initiate and maintain comfort rounds  - Make Fall Risk Sign visible to staff  - Offer Toileting every 2 Hours, in advance of need  - Initiate/Maintain bed alarm  - Obtain necessary fall risk management equipment:   - Apply yellow socks and bracelet for high fall risk patients  - Consider moving patient to room near nurses station  Outcome: Progressing     Problem: MOBILITY - ADULT  Goal: Maintain or return to baseline ADL function  Description: INTERVENTIONS:  -  Assess patient's ability to carry out ADLs; assess patient's baseline for ADL function and identify physical deficits which impact ability to perform ADLs (bathing, care of mouth/teeth, toileting, grooming, dressing, etc )  - Assess/evaluate cause of self-care deficits   - Assess range of motion  - Assess patient's mobility; develop plan if impaired  - Assess patient's need for assistive devices and provide as appropriate  - Encourage maximum independence but intervene and supervise when necessary  - Involve family in performance of ADLs  - Assess for home care needs following discharge   - Consider OT consult to assist with ADL evaluation and planning for discharge  - Provide patient education as appropriate  Outcome: Progressing  Goal: Maintains/Returns to pre admission functional level  Description: INTERVENTIONS:  - Perform BMAT or MOVE assessment daily    - Set and communicate daily mobility goal to care team and patient/family/caregiver     - Collaborate with rehabilitation services on mobility goals if consulted  - Perform Range of Motion 3 times a day  - Reposition patient every 2 hours  - Dangle patient 3 times a day  - Stand patient 3 times a day  - Ambulate patient 3 times a day  - Out of bed to chair 3 times a day   - Out of bed for meals 3 times a day  - Out of bed for toileting  - Record patient progress and toleration of activity level   Outcome: Progressing     Problem: Prexisting or High Potential for Compromised Skin Integrity  Goal: Skin integrity is maintained or improved  Description: INTERVENTIONS:  - Identify patients at risk for skin breakdown  - Assess and monitor skin integrity  - Assess and monitor nutrition and hydration status  - Monitor labs   - Assess for incontinence   - Turn and reposition patient  - Assist with mobility/ambulation  - Relieve pressure over bony prominences  - Avoid friction and shearing  - Provide appropriate hygiene as needed including keeping skin clean and dry  - Evaluate need for skin moisturizer/barrier cream  - Collaborate with interdisciplinary team   - Patient/family teaching  - Consider wound care consult   Outcome: Progressing     Problem: Nutrition/Hydration-ADULT  Goal: Nutrient/Hydration intake appropriate for improving, restoring or maintaining nutritional needs  Description: Monitor and assess patient's nutrition/hydration status for malnutrition  Collaborate with interdisciplinary team and initiate plan and interventions as ordered  Monitor patient's weight and dietary intake as ordered or per policy  Utilize nutrition screening tool and intervene as necessary  Determine patient's food preferences and provide high-protein, high-caloric foods as appropriate       INTERVENTIONS:  - Monitor oral intake, urinary output, labs, and treatment plans  - Assess nutrition and hydration status and recommend course of action  - Evaluate amount of meals eaten  - Assist patient with eating if necessary   - Allow adequate time for meals  - Recommend/ encourage appropriate diets, oral nutritional supplements, and vitamin/mineral supplements  - Order, calculate, and assess calorie counts as needed  - Recommend, monitor, and adjust tube feedings and TPN/PPN based on assessed needs  - Assess need for intravenous fluids  - Provide specific nutrition/hydration education as appropriate  - Include patient/family/caregiver in decisions related to nutrition  Outcome: Progressing     Problem: NEUROSENSORY - ADULT  Goal: Achieves stable or improved neurological status  Description: INTERVENTIONS  - Monitor and report changes in neurological status  - Monitor vital signs such as temperature, blood pressure, glucose, and any other labs ordered   - Initiate measures to prevent increased intracranial pressure  - Monitor for seizure activity and implement precautions if appropriate      Outcome: Progressing  Goal: Remains free of injury related to seizures activity  Description: INTERVENTIONS  - Maintain airway, patient safety  and administer oxygen as ordered  - Monitor patient for seizure activity, document and report duration and description of seizure to physician/advanced practitioner  - If seizure occurs,  ensure patient safety during seizure  - Reorient patient post seizure  - Seizure pads on all 4 side rails  - Instruct patient/family to notify RN of any seizure activity including if an aura is experienced  - Instruct patient/family to call for assistance with activity based on nursing assessment  - Administer anti-seizure medications if ordered    Outcome: Progressing  Goal: Achieves maximal functionality and self care  Description: INTERVENTIONS  - Monitor swallowing and airway patency with patient fatigue and changes in neurological status  - Encourage and assist patient to increase activity and self care     - Encourage visually impaired, hearing impaired and aphasic patients to use assistive/communication devices  Outcome: Progressing

## 2021-09-09 LAB
BASOPHILS # BLD AUTO: 0.07 THOUSANDS/ΜL (ref 0–0.1)
BASOPHILS NFR BLD AUTO: 1 % (ref 0–1)
EOSINOPHIL # BLD AUTO: 0.39 THOUSAND/ΜL (ref 0–0.61)
EOSINOPHIL NFR BLD AUTO: 4 % (ref 0–6)
ERYTHROCYTE [DISTWIDTH] IN BLOOD BY AUTOMATED COUNT: 13.5 % (ref 11.6–15.1)
HCT VFR BLD AUTO: 36.9 % (ref 34.8–46.1)
HGB BLD-MCNC: 11.4 G/DL (ref 11.5–15.4)
IMM GRANULOCYTES # BLD AUTO: 0.05 THOUSAND/UL (ref 0–0.2)
IMM GRANULOCYTES NFR BLD AUTO: 1 % (ref 0–2)
LYMPHOCYTES # BLD AUTO: 0.65 THOUSANDS/ΜL (ref 0.6–4.47)
LYMPHOCYTES NFR BLD AUTO: 7 % (ref 14–44)
MCH RBC QN AUTO: 28.3 PG (ref 26.8–34.3)
MCHC RBC AUTO-ENTMCNC: 30.9 G/DL (ref 31.4–37.4)
MCV RBC AUTO: 92 FL (ref 82–98)
MONOCYTES # BLD AUTO: 0.84 THOUSAND/ΜL (ref 0.17–1.22)
MONOCYTES NFR BLD AUTO: 9 % (ref 4–12)
NEUTROPHILS # BLD AUTO: 7.3 THOUSANDS/ΜL (ref 1.85–7.62)
NEUTS SEG NFR BLD AUTO: 78 % (ref 43–75)
NRBC BLD AUTO-RTO: 0 /100 WBCS
PLATELET # BLD AUTO: 169 THOUSANDS/UL (ref 149–390)
PMV BLD AUTO: 11.3 FL (ref 8.9–12.7)
RBC # BLD AUTO: 4.03 MILLION/UL (ref 3.81–5.12)
WBC # BLD AUTO: 9.3 THOUSAND/UL (ref 4.31–10.16)

## 2021-09-09 PROCEDURE — 97110 THERAPEUTIC EXERCISES: CPT

## 2021-09-09 PROCEDURE — 85025 COMPLETE CBC W/AUTO DIFF WBC: CPT | Performed by: STUDENT IN AN ORGANIZED HEALTH CARE EDUCATION/TRAINING PROGRAM

## 2021-09-09 PROCEDURE — 99232 SBSQ HOSP IP/OBS MODERATE 35: CPT | Performed by: FAMILY MEDICINE

## 2021-09-09 RX ADMIN — HEPARIN SODIUM 5000 UNITS: 5000 INJECTION INTRAVENOUS; SUBCUTANEOUS at 06:36

## 2021-09-09 RX ADMIN — PANTOPRAZOLE SODIUM 20 MG: 20 TABLET, DELAYED RELEASE ORAL at 06:36

## 2021-09-09 RX ADMIN — CALCIUM 1 TABLET: 500 TABLET ORAL at 10:15

## 2021-09-09 RX ADMIN — Medication 1 TABLET: at 10:18

## 2021-09-09 RX ADMIN — CARBIDOPA AND LEVODOPA 0.5 TABLET: 25; 100 TABLET ORAL at 23:09

## 2021-09-09 RX ADMIN — HEPARIN SODIUM 5000 UNITS: 5000 INJECTION INTRAVENOUS; SUBCUTANEOUS at 14:39

## 2021-09-09 RX ADMIN — AMANTADINE HYDROCHLORIDE 100 MG: 100 CAPSULE ORAL at 10:21

## 2021-09-09 RX ADMIN — SODIUM CHLORIDE 100 ML/HR: 0.45 INJECTION, SOLUTION INTRAVENOUS at 03:48

## 2021-09-09 RX ADMIN — RASAGILINE 1 MG: 1 TABLET ORAL at 10:22

## 2021-09-09 RX ADMIN — CALCIUM 1 TABLET: 500 TABLET ORAL at 17:52

## 2021-09-09 RX ADMIN — CEFAZOLIN SODIUM 500 MG: 1 INJECTION, POWDER, FOR SOLUTION INTRAMUSCULAR; INTRAVENOUS at 10:34

## 2021-09-09 RX ADMIN — AMANTADINE HYDROCHLORIDE 100 MG: 100 CAPSULE ORAL at 17:57

## 2021-09-09 RX ADMIN — HEPARIN SODIUM 5000 UNITS: 5000 INJECTION INTRAVENOUS; SUBCUTANEOUS at 23:09

## 2021-09-09 RX ADMIN — CEFAZOLIN SODIUM 500 MG: 1 INJECTION, POWDER, FOR SOLUTION INTRAMUSCULAR; INTRAVENOUS at 23:11

## 2021-09-09 RX ADMIN — QUETIAPINE FUMARATE 25 MG: 25 TABLET ORAL at 01:18

## 2021-09-09 RX ADMIN — ROPINIROLE HYDROCHLORIDE 0.75 MG: 0.25 TABLET, FILM COATED ORAL at 23:21

## 2021-09-09 RX ADMIN — CARBIDOPA AND LEVODOPA 0.5 TABLET: 25; 100 TABLET ORAL at 17:54

## 2021-09-09 RX ADMIN — SODIUM CHLORIDE 100 ML/HR: 0.45 INJECTION, SOLUTION INTRAVENOUS at 18:42

## 2021-09-09 RX ADMIN — AMLODIPINE BESYLATE 5 MG: 5 TABLET ORAL at 10:19

## 2021-09-09 RX ADMIN — ASPIRIN 81 MG CHEWABLE TABLET 81 MG: 81 TABLET CHEWABLE at 10:20

## 2021-09-09 RX ADMIN — POLYETHYLENE GLYCOL 3350 17 G: 17 POWDER, FOR SOLUTION ORAL at 10:22

## 2021-09-09 RX ADMIN — CARBIDOPA AND LEVODOPA 0.5 TABLET: 25; 100 TABLET ORAL at 10:16

## 2021-09-09 RX ADMIN — CARBIDOPA AND LEVODOPA 0.5 TABLET: 25; 100 TABLET ORAL at 12:02

## 2021-09-09 RX ADMIN — STANDARDIZED SENNA CONCENTRATE 8.6 MG: 8.6 TABLET ORAL at 23:09

## 2021-09-09 NOTE — DISCHARGE SUMMARY
[facility] Discharge Summary - [address] [facility] Shirley [de-identified] y o  female MRN: [id number]    Katie 46  Room / Bed: [address] [place] [address] Encounter: [id number]    BRIEF OVERVIEW  Admitting Provider: Adi Espinoza MD  Discharge Provider: Gokul Bradley MD    Discharge To: Short term rehab  Facility: [facility]    Outpatient Follow-Up:     Movement specialist at [address] (established)  [facility]    Things to address at first follow up visit:     · Is patient at baseline prior to admission? · Can patient ambulate with minimal assistance or with walker? · Can patient feed herself regular meals? · Can patient be understood for the majority of her sentences? · Is patient still oriented to Person, place and time? · Has patient followed-up or reached out to her movement specialist?  · Was patient allowed increased visitation for her  as part of her reorientation? 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 ([address] )    Type 2 diabetes mellitus without complication, without long-term current use of insulin (HCC)    Mixed hyperlipidemia    Stage 3a chronic kidney disease (HCC)    Hypernatremia    S/P deep brain stimulator placement    Bladder obstruction    Constipation    GERD (gastroesophageal reflux disease)    Severe protein-calorie malnutrition (HCC)  Resolved Problems:    RODRIOG (acute kidney injury) ([facility] )    Underweight      Secondary Discharge Diagnoses    Consulting Providers     Dr Alba Chaidez MD  Neurology       [address] STAY    Procedures Performed/Pertinent Test results    Labs  9/9: WBC 9 30, Hgb 11 4  9/8: WBC 10 87  9/7: CRP 65 1  9/6: Na 150 > 147, WBC 14 01 Hgb 13 1  9/5: Na 151, Cl 112, Cr 1,33, WBC 11 61  9/4: Na 151, Cl 110, Cr 1 56, WBC 10 48, CRP 26 7, Sed 34, RPR NR  Urine Cx - no growth, U Na 107, U Cr <13  9/3: Cr 1 24 , ESR 20, CK 2 8, HgA1c 6 6   9/2: Na 146 Cl 109 Cr 1 56 (BL 1 1-1 2), BUN 27 U micro: 10-20 RBC, 2-4 WBC  Ucx (-)    Imaging  9/7  - ECHO: G1DD - difficult study  - CXR: No acute cardiopulmonary disease  9/6  -  CT No acute intracranial abnormality  Bilateral deep brain stimulator leads redemonstrated  Mild chronic microangiopathic change  9/2  - CTA: Negative CTA head and neck for large vessel occlusion, dissection, aneurysm, or high-grade stenosis  Mild atherosclerotic disease  Chronic changes   - CT: Stable  No acute IC abnormality   - CXR: NAD    HPI  64-year-old female with a past medical history of Parkinson's disease with dementia status post deep brain stimulators, asthma, GERD, squamous cell carcinoma, pulmonary embolism, stage 3 chronic kidney disease presented to the ED for altered mental status  Patient was nonverbal so history of present illness was obtained through   Patient's  stated the patient has had a a decrease in mental status for a week  Patient at first was unable to speak and could only communicate with a smile and a grown  Patient was also not able to eat on her own in this time  Patient's  stated she started to get progressively worse and not being able to respond at all  Patient also had more frequent shakes  Patient's  stated for approximately 10 years the patient has had some shaking at night however starting a month prior to admission patient began having them during the day at a rapidly progressing rate  Patient's  stated patient prior to these episodes were able to walk with a walker and take her own medications  Patient was flailing sporadically through out the interview and was kicking the bed/individuals around her      Hospital Course  Patient was admitted for altered mental status and dyskinesia secondary to Parkinson's disease  Patient follows with Dr Naveed Mendieta at [facility] neurology  Patient was not a good candidate for MRI due to her dyskinesia and unable to be sedated with a brain stimulator  Patient's CT and CTA of the head was unremarkable except for mild atherosclerotic changes  Neurology was consulted who recommended amantadine 100 mg BID with continuation of home dose ASA and statin  Amantadine was stopped at the recommendation of Dr Nani Chapa who recommended instead to replace it with home [facility]  Recommendation was also made to stop klonipin and to decrease sinemet as it may increase altered mental status  IVF hydration was also provided during the admission to aid in the recovery  Patient's mentation improved with Keflex for presumed UTI (culture negative however, patient had been on an antibiotic prior to UA being collected; also she has a previous history of AMS 2/2 UTI on a previous admission)  Patient required intermittent 4 point restraints for uncontrollable dyskinesia  At the time of discharge patient was off restraints for approximately 14 hrs  Delirium protocol and frequent family member visitations helped to reorient the patient and avoid sundown  Physical Exam at Discharge  /60   Pulse 84   Temp 98 7 °F (37 1 °C)   Resp 18   Ht 5' (1 524 m)   Wt 42 8 kg (94 lb 4 8 oz)   SpO2 97%   BMI 18 42 kg/m²    General appearance: alert and oriented, in no acute distress  Lungs: clear to auscultation bilaterally  Heart: regular rate and rhythm, S1, S2 normal, no murmur, click, rub or gallop  Abdomen: soft, non-tender; bowel sounds normal; no masses,  no organomegaly  Neurologic: Mental status: Alert, oriented x3, thought content appropriate  Motor: Patient has slight tremors and rare flailing extremeties       Medications   Discharge Medication List as of 9/10/2021  5:45 PM      CONTINUE these medications which have CHANGED    Details   carbidopa-levodopa (SINEMET)  mg per tablet Take 0 5 tablets by mouth 3 (three) times a day, Starting Fri 9/10/2021, Normal      QUEtiapine (SEROquel) 25 mg tablet Take 1 tablet (25 mg total) by mouth daily at bedtime as needed (sleep ), Starting Fri 9/10/2021, Normal      rOPINIRole (REQUIP) 0 25 mg tablet Take 3 tablets (0 75 mg total) by mouth daily at bedtime, Starting Fri 9/10/2021, Normal             Discharge Medication List as of 9/10/2021  5:45 PM      CONTINUE these medications which have NOT CHANGED    Details   acetaminophen (TYLENOL) 325 mg tablet Take 650 mg by mouth every 6 (six) hours as needed for mild pain, Historical Med      Amantadine HCl ER (Gocovri) 137 MG CP24 Take 1 capsule by mouth daily at bedtime , Starting Thu 6/10/2021, Historical Med      amLODIPine (NORVASC) 10 mg tablet Take 0 5 tablets (5 mg total) by mouth daily, Starting Wed 7/21/2021, Normal      aspirin (ECOTRIN LOW STRENGTH) 81 mg EC tablet Take 81 mg by mouth every morning , Historical Med      calcium citrate-vitamin D (CITRACAL+D) 315-200 MG-UNIT per tablet Take 1 tablet by mouth 2 (two) times a day Take with snack  Avoid taking with other meds  , Starting Wed 8/18/2021, Normal      gabapentin (NEURONTIN) 100 mg capsule Take 1 capsule (100 mg total) by mouth daily at bedtime, Starting Wed 7/21/2021, Until Thu 7/21/2022, Normal      glucose blood test strip Use 100 each daily Use to test sugar once a day , Starting Wed 8/18/2021, Normal      glucose monitoring kit (FREESTYLE) monitoring kit Use 1 each as needed (DM control) ACCUCHEK METER COMPACT PLUS, Starting Fri 2/5/2021, Normal      Lancets (freestyle) lancets For daily testing, Normal      Multiple Vitamin (MULTIVITAMIN) tablet Take 1 tablet by mouth every morning , Historical Med      Omega-3 Fatty Acids (FISH OIL PO) Take 2 g by mouth every morning , Historical Med      pantoprazole (PROTONIX) 20 mg tablet Take 1 tablet (20 mg total) by mouth daily, Starting Wed 7/21/2021, Normal      rasagiline (AZILECT) 1 MG Take 1 mg by mouth daily, Starting Fri 2/5/2021, Historical Med      TURMERIC PO Take by mouth 3 (three) times a day, Historical Med            Discharge Medication List as of 9/10/2021  5:45 PM         Discharge Medication List as of 9/10/2021  5:45 PM      STOP taking these medications       docusate sodium (COLACE) 100 mg capsule Comments:   Reason for Stopping:                Allergies  No Known Allergies    Diet restrictions: as toelrated; aspiration precaution  Activity restrictions: as tolerated  Code Status: Level 1 - Full Code  Advance Directive and Living Will: <no information>  Power of :    POLST:      Discharge Condition: stable      Discharge  Statement   I spent 30 minutes discharging the patient  This time was spent on the day of discharge  I had direct contact with the patient on the day of discharge  Additional documentation is required if more than 30 minutes were spent on discharge

## 2021-09-09 NOTE — OCCUPATIONAL THERAPY NOTE
Occupational Therapy Treatment       09/09/21 9670   Note Type   Note Type Treatment   Restrictions/Precautions   Other Precautions Cognitive; Bed Alarm; Chair Alarm; Fall Risk   General   Family/Caregiver Present Spouse   Pain Assessment   Pain Assessment Tool Pain Assessment not indicated - pt denies pain   Pain Score No Pain   ADL   Grooming Assistance 4  Minimal Assistance   Grooming Comments Pt washed face supine in bed w/ min A to place washcloth in hand and mod VCs to encourage initiation and completion of task  Bed Mobility   Additional Comments Pt refused OOB activity, agreeable to ADLs and there-ex supine in bed   ROM- Right Upper Extremities   R Shoulder AROM; Flexion; Extension   R Elbow AROM;Elbow flexion;Elbow extension   R Hand AROM; Thumb; Index finger; Long finger;Ring finger;Little finger  (Open/close fist exercise)   R Weight/Reps/Sets 1 x 10 reps   ROM - Left Upper Extremities    L Shoulder AROM; Flexion; Extension   L Elbow AROM;Elbow flexion;Elbow extension  (L elbow flexion ROM limited due to pt discomfort)   L Hand AROM; Thumb; Index finger; Long finger;Ring finger;Little finger  (Open/close fist exercise)   LUE ROM Comment 1 x 10 reps   Cognition   Overall Cognitive Status Impaired   Arousal/Participation Alert   Attention Attends with cues to redirect   Orientation Level Oriented to person;Oriented to place; Disoriented to situation;Disoriented to time   Following Commands Follows one step commands with increased time or repetition   Activity Tolerance   Activity Tolerance Other (Comment)  (Pt limited by impaired cognition)   Assessment   Assessment Pt seen for OT treatment session this PM  Pt presents w/ increased verbalization, improved command follow, and overall easier to understand as compared to initial OT evaluation  Pt required moderate VCs during grooming activity and there-ex for continued participation in session   Pt continues to be limited by impaired cognition while participating in ADLs and will benefit from STR to regain independence while engaging in meaningful occupations  The patient's raw score on the AM-PAC Daily Activity inpatient short form low function score is 16, standardized score is Low Function Daily Activity Standardized Score: 27 65  Patients with a standardized score less than 39 4 are likely to benefit from discharge to post-acute rehab services  Please refer to the recommendation of the Occupational Therapist for safe discharge planning  At end of session, pt supine in bed w/ call bell in reach, bed alarm on, spouse present in room and all needs met  Continue OT per POC  Plan   Treatment Interventions ADL retraining;UE strengthening/ROM; Endurance training;Cognitive reorientation;Patient/family training; Compensatory technique education;Continued evaluation; Energy conservation; Activityengagement   OT Frequency 3-5x/wk   Recommendation   OT Discharge Recommendation Post acute rehabilitation services   AM-[facility] Daily Activity Inpatient   Lower Body Dressing 1   Bathing 1   Toileting 1   Upper Body Dressing 1   Grooming 3   Eating 2   Daily Activity Raw Score 9   Turning Head Towards Sound 4   Follow Simple Instructions 3   Low Function Daily Activity Raw Score 16   Low Function Daily Activity Standardized Score 27 65   AM-PAC Applied Cognition Inpatient   Following a Speech/Presentation 2   Understanding Ordinary Conversation 2   Taking Medications 1   Remembering Where Things Are Placed or Put Away 1   Remembering List of 4-5 Errands 1   Taking Care of Complicated Tasks 1   Applied Cognition Raw Score 8   Applied Cognition Standardized Score 29 36   Licensure   [place] License Number  [employer], OTS

## 2021-09-09 NOTE — QUICK NOTE
Resident received a call form the patient's son Baltazar Duverney  He communicated that he and his sister Janie Gamino are at this time open to discharging the patient to [facility]  However, he also requested that the patient be discharged after the weekend "maybe on Monday"  He communicated that he may be open to late discharge "Friday evening"  I explained to him that we can not guarantee that the bed availability is preserved for the patient until the date of discharge and that the primary care team will be communicating his requests to the case management  He is understanding of the situation and appreciates making the CM aware  Baltazar Duverney also requested to speak in person to the primary medical team tomorrow  I did make him aware that the team has two other meetings scheduled in the morning and that meeting with him may be delayed  He is understanding and in agreement  I did also explain to the son that the patient has been requiring restraints with intermittent decline in mentation even before she was moved to a different floor overnight last night  All questions were answered at length and the patient's son was made aware of how to contact the primary team should he have other concerns or questions  --  Marylen Crofts, DO    "Some portions of this record may have been generated with voice recognition software  There may be translation, syntax, or grammatical errors  Occasional wrong word or "sound-a-like" substitutions may have occurred due to the inherent limitations of the voice recognition software  Read the chart carefully and recognize, using context, where substations may have occurred   If you have any questions, please contact the dictating provider for clarification or correction, as needed "

## 2021-09-09 NOTE — QUICK NOTE
I called patient's daughter, Heidi Melgar today  I explained to her why her mother was moved from the 4th to 2nd floor  It was explained that although this may have exacerbated her symptoms, she has required restraints or continuous monitoring during most of admission  She is currently closer to her baseline and we have treated all acute conditions  Please send her brother Flor Brown would like more time to think about where they would like her mother placed  The initially would have liked to have her at [facility] as [facility] is too far for their father to drive to daily  I explained to them that I am not aware of this specifics regarding daily bed availability as well as requirements for placing into each of these facilities  I explained that I will make sure our , Demarco Wheatley, is aware  She has been working with both Heidi Melgar and Flor Brown closely

## 2021-09-09 NOTE — PLAN OF CARE
Problem: OCCUPATIONAL THERAPY ADULT  Goal: Performs self-care activities at highest level of function for planned discharge setting  See evaluation for individualized goals  Description: Treatment Interventions: ADL retraining, Functional transfer training, UE strengthening/ROM, Endurance training, Cognitive reorientation, Patient/family training, Equipment evaluation/education, Compensatory technique education, Continued evaluation, Energy conservation, Activityengagement          See flowsheet documentation for full assessment, interventions and recommendations  Outcome: Progressing  Note: Limitation: Decreased ADL status, Decreased UE strength, Decreased UE ROM, Decreased Safe judgement during ADL, Decreased cognition, Decreased endurance, Decreased self-care trans, Decreased high-level ADLs  Prognosis: Good  Assessment: Pt seen for OT treatment session this PM  Pt presents w/ increased verbalization, improved command follow, and overall easier to understand as compared to initial OT evaluation  Pt required moderate VCs during grooming activity and there-ex for continued participation in session  Pt continues to be limited by impaired cognition while participating in ADLs and will benefit from STR to regain independence while engaging in meaningful occupations  The patient's raw score on the AM-PAC Daily Activity inpatient short form low function score is 16, standardized score is Low Function Daily Activity Standardized Score: 27 65  Patients with a standardized score less than 39 4 are likely to benefit from discharge to post-acute rehab services  Please refer to the recommendation of the Occupational Therapist for safe discharge planning  At end of session, pt supine in bed w/ call bell in reach, bed alarm on, spouse present in room and all needs met  Continue OT per POC       OT Discharge Recommendation: Post acute rehabilitation services

## 2021-09-09 NOTE — PROGRESS NOTES
[facility] 26 Progress Note - Lazarus Perch [de-identified] y o  female MRN: [id number]    Unit/Bed#: [address] [place] [address] Encounter: [id number]      Assessment/Plan:    * Altered mental status  Assessment & Plan  Patient presented with altered mental status  Patient's  stated she was last seen well approximately 1 week before prior admission  Patient's  report it was an insidious onset with rapid progression which started with patient being nonverbal      On admission, CT of head was negative for acute bleed hemorrhage  Abnormal urinalysis was noted for leukocytes and concern for UTI  CXR was neg  MRI unattainable due to patient movement in setting of DBS  Repeat CT was negative for any acute changes  · Cause of AMS unknown- Parkinson's sequela versus dehydration versus RODRIGO versus new onset seizure versus on/off syndrome  · Neuro consulted  · Recommended, Klonopin 0 5mg bid, Increased Amantadine 100mg  · Patient's movment specialist gave recommendations  We stopped the Klonopin  · Fall prevention  · Seizure precautions  · PT/OT  · Neuro checks daily  · 100cc/hr of 1/2NS   · Speech pathology has been consulted and progressing her diet  · Consult family for DCP to STR or home with services     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 [facility])  Assessment & Plan  Patient has has increasingly more frequent episodes of flailing during the day for over a month  Patient's  stated it used to only be during the night time  · Discontinued Klonopin 0 5mg for increased movement     Dementia due to Parkinson's disease with behavioral disturbance [facility])  Assessment & Plan  Patient's caregivers have had a difficulty with patient due to behavioral issues  Likely due to sequela of parkinson's  · Continue with home medications of Seroquel  · Patient's  has been giving patient 12 5 mg of her 25 mg      Essential hypertension  Assessment & Plan  Patient upon admission was hypertensive at 164/72  · Continue on home medications    RODRIGO (acute kidney injury) (HCC)-resolved as of 9/4/2021  Assessment & Plan  Cr 1 56 POA, baseline 1 1-1 2  Unclear etiology, BUN:creating 17  Possibly prerenaly, dehydration from poor po intake or side effect of parkinson's regimen  Patient unable to provide ROS,  states good po intake prior to this week  · Lactic Ringer 75cc/hr  · Monitor daily chem panel  · Hold metformin and other nephrotoxic meds    Hypernatremia  Assessment & Plan  Patient in hospital course has gotten a hypernatremia as seem with Na 151 on morning labs  Patient was on Lactic ringers due to RODRIGO and in setting of AMS  · Maintenance fluids increased to 100cc/hr of 1/2 normal saline  Stage 3a chronic kidney disease [facility])  Assessment & Plan  Lab Results   Component Value Date    EGFR 38 09/05/2021    EGFR 31 09/04/2021    EGFR 41 09/03/2021    CREATININE 1 33 (H) 09/05/2021    CREATININE 1 56 (H) 09/04/2021    CREATININE 1 24 09/03/2021     · Cotinue with home medications  · Avoid Nephrotoxic drugs  · Hydrate patient with 100cc/hr of 1/2 NS  Mixed hyperlipidemia  Assessment & Plan  Continue with Home medications    Type 2 diabetes mellitus without complication, without long-term current use of insulin [facility])  Assessment & Plan    Lab Results   Component Value Date    HGBA1C 6 4 12/04/2020       Patient's glucose on admission was 133  Patient is not on any at home medications for such  Constipation  Assessment & Plan  Patient attempted a bowel movement  Patient was noted to have increased effort, stool was noted to be formed and hard  Patient has left lower quadrant tenderness and patient reported feeling constipated  9/7: Constipation seems to be resolved, patient had multiple bowel movements since starting miralax  · Miralax 17g daily  Bladder obstruction  Assessment & Plan  Patient was found to have urinary retention of >400cc   Patient already had 2 straight catheters during this hospital stay  Suspected due to constipation  FeNa was calculated to be 8 5%, indicating Post-Renal obstruction  9/7 Patient has several bowel movements the morning and prior day  Patient also attempted several times to rip the allen out  · D/C Allen cath  · Voiding trial       S/P deep brain stimulator placement  Assessment & Plan  Consult Neurology to see if they are working properly  Underweight-resolved as of 9/3/2021  Assessment & Plan  Patient is NPO until passes swallowing exam     · NPO  · Maintance fluids: please see above  · Bedside swallowing study  Severe protein-calorie malnutrition ([address] )  Assessment & Plan  Malnutrition Findings:   Adult Malnutrition type: Chronic illness  Adult Degree of Malnutrition: Other severe protein calorie malnutrition    BMI Findings: Body mass index is 16 32 kg/m²  Patient had swallow evaluation  Patient is cleared for puree diet with nectar  GERD (gastroesophageal reflux disease)  Assessment & Plan  Continue on home medications         Subjective:     Patient seen and examined at bedside  Patient was placed back in 4 point restraints + due to safety of herself and others  Patient was awake alert and conscious and oriented x3  Patient was much calmer today and her dysarthria is getting slightly better  Directing the patient to slow down her speech helps to increase word recognition  Patient states she has been urinating by herself and last bowel movement was yesterday  Patient is still not aware of previous events of hospital stay  Patient denied any shortness of breath and chest pain and aching abdominal pain  The patient states she wants to get up and looks forward to PT assisting her  Objective:     Vitals: Blood pressure 144/82, pulse 73, temperature 99 8 °F (37 7 °C), resp  rate 19, height 5' (1 524 m), weight 42 8 kg (94 lb 4 8 oz), SpO2 98 %, not currently breastfeeding  ,Body mass index is 18 42 kg/m²    Wt Readings from Last 3 Encounters:   09/09/21 42 8 kg (94 lb 4 8 oz)   08/18/21 39 3 kg (86 lb 9 6 oz)   06/21/21 42 8 kg (94 lb 6 4 oz)       Intake/Output Summary (Last 24 hours) at 9/9/2021 2261  Last data filed at 9/8/2021 2019  Gross per 24 hour   Intake 1038 33 ml   Output 650 ml   Net 388 33 ml       Physical Exam: /82   Pulse 73   Temp 99 8 °F (37 7 °C)   Resp 19   Ht 5' (1 524 m)   Wt 42 8 kg (94 lb 4 8 oz)   SpO2 98%   BMI 18 42 kg/m²   General appearance: alert and oriented, in no acute distress  Lungs: clear to auscultation bilaterally  Heart: regular rate and rhythm, S1, S2 normal, no murmur, click, rub or gallop  Abdomen: soft, non-tender; bowel sounds normal; no masses,  no organomegaly  Neurologic: Mental status: Alert, oriented, thought content appropriate  Motor: Patient has slight tremors and rare flailing extremeties        Recent Results (from the past 24 hour(s))   CBC and differential    Collection Time: 09/09/21  6:30 AM   Result Value Ref Range    WBC 9 30 4 31 - 10 16 Thousand/uL    RBC 4 03 3 81 - 5 12 Million/uL    Hemoglobin 11 4 (L) 11 5 - 15 4 g/dL    Hematocrit 36 9 34 8 - 46 1 %    MCV 92 82 - 98 fL    MCH 28 3 26 8 - 34 3 pg    MCHC 30 9 (L) 31 4 - 37 4 g/dL    RDW 13 5 11 6 - 15 1 %    MPV 11 3 8 9 - 12 7 fL    Platelets 351 477 - 800 Thousands/uL    nRBC 0 /100 WBCs    Neutrophils Relative 78 (H) 43 - 75 %    Immat GRANS % 1 0 - 2 %    Lymphocytes Relative 7 (L) 14 - 44 %    Monocytes Relative 9 4 - 12 %    Eosinophils Relative 4 0 - 6 %    Basophils Relative 1 0 - 1 %    Neutrophils Absolute 7 30 1 85 - 7 62 Thousands/µL    Immature Grans Absolute 0 05 0 00 - 0 20 Thousand/uL    Lymphocytes Absolute 0 65 0 60 - 4 47 Thousands/µL    Monocytes Absolute 0 84 0 17 - 1 22 Thousand/µL    Eosinophils Absolute 0 39 0 00 - 0 61 Thousand/µL    Basophils Absolute 0 07 0 00 - 0 10 Thousands/µL       Current Facility-Administered Medications   Medication Dose Route Frequency Provider Last Rate Last Admin    acetaminophen (TYLENOL) tablet 650 mg  650 mg Oral Q6H PRN Eddie Elliott MD   650 mg at 09/07/21 2115    amantadine (SYMMETREL) capsule 100 mg  100 mg Oral BID Raman Sousa MD   100 mg at 09/08/21 1709    amLODIPine (NORVASC) tablet 5 mg  5 mg Oral Daily Eddie Elliott MD   5 mg at 09/08/21 0847    aspirin chewable tablet 81 mg  81 mg Oral Daily Nathan Proctor MD   81 mg at 09/08/21 0847    calcium carbonate (OYSTER SHELL,OSCAL) 500 mg tablet 1 tablet  1 tablet Oral BID With Meals Eddie Elliott MD   1 tablet at 09/08/21 1709    carbidopa-levodopa (SINEMET)  mg per tablet 0 5 tablet  0 5 tablet Oral 4x Daily dEdie Elliott MD   0 5 tablet at 09/08/21 2209    ceFAZolin (ANCEF) 500 mg in sodium chloride 0 9 % 50 mL IVPB  500 mg Intravenous Q12H Samir Reynoso  mL/hr at 09/08/21 2338 500 mg at 09/08/21 2338    heparin (porcine) subcutaneous injection 5,000 Units  5,000 Units Subcutaneous [facility], DO   5,000 Units at 09/09/21 0636    multivitamin-minerals (CENTRUM) tablet 1 tablet  1 tablet Oral Daily Eddie Elliott MD   1 tablet at 09/08/21 0846    pantoprazole (PROTONIX) EC tablet 20 mg  20 mg Oral Early Morning Eddie Elliott MD   20 mg at 09/09/21 0636    polyethylene glycol (MIRALAX) packet 17 g  17 g Oral Daily Eddie Elliott MD   17 g at 09/08/21 0847    QUEtiapine (SEROquel) tablet 25 mg  25 mg Oral HS PRN Eddie Elliott MD   25 mg at 09/09/21 0118    rasagiline (AZILECT) tablet 1 mg  1 mg Oral Daily Eddie Elliott MD   1 mg at 09/08/21 0848    rOPINIRole (REQUIP) tablet 0 75 mg  0 75 mg Oral HS Eddie Elliott MD   0 75 mg at 09/08/21 2209    senna (SENOKOT) tablet 8 6 mg  1 tablet Oral HS [facility], DO   8 6 mg at 09/08/21 2209    sodium chloride infusion 0 45 %  100 mL/hr Intravenous Continuous Sea Guerrero  mL/hr at 09/09/21 0348 100 mL/hr at 09/09/21 0348       Invasive Devices     Peripheral Intravenous Line            Peripheral IV 09/04/21 Left;Upper;Ventral (anterior) Arm 4 days                Lab, Imaging and other studies: I have personally reviewed pertinent reports      VTE Pharmacologic Prophylaxis: Heparin  VTE Mechanical Prophylaxis: reason for no mechanical VTE prophylaxis patient was unable to tolerate them    Rosanna Peters MD

## 2021-09-09 NOTE — PHYSICAL THERAPY NOTE
PT TREATMENT     09/09/21 1025   Note Type   Note Type Treatment   Pain Assessment   Pain Assessment Tool Warren-Baker FACES   Warren-Baker FACES Pain Rating 0   Restrictions/Precautions   Other Precautions Cognitive; Chair Alarm; Impulsive; Bed Alarm; Fall Risk   General   Chart Reviewed Yes   Family/Caregiver Present No   Cognition   Arousal/Participation Alert  (agitated at times)   Subjective   Subjective patient confused and yelling at therapist to "get out", no pain noted   Bed Mobility   Supine to Sit 3  Moderate assistance   Additional items Assist x 1;Verbal cues;LE management   Sit to Supine 3  Moderate assistance   Additional items Assist x 1;Verbal cues;LE management   Transfers   Sit to Stand 2  Maximal assistance   Additional items Assist x 1;Verbal cues; Impulsive   Stand to Sit 2  Maximal assistance   Additional items Assist x 1;Verbal cues; Impulsive   Additional Comments Patient with agitation this limited ability to stand safely and unable to ambulate at this time  Patient impulsively attempted to return to bed and at high risk to fall   Exercises   Heelslides Supine;10 reps;Bilateral   Hip Flexion Sitting;10 reps;Bilateral   Hip Abduction Supine;10 reps;Bilateral   Knee AROM Short Arc Quad Supine;10 reps;AAROM; Bilateral   Knee AROM Long Arc Quad Sitting;10 reps;Bilateral   Ankle Pumps 10 reps;Bilateral;Supine   Balance training  Sit to and from stand x2 balance and strengthening   Assessment   Assessment Patient cooperative initially became agitated within session  Patient will benefit increasing functional mobility as allowed deficits and continued physical therapy with progression as tolerated  The patient's AM-[facility] Basic Mobility Inpatient Short Form Raw Score is 11, Standardized Score is 30 25  A standardized score less than 42 9 suggests the patient may benefit from discharge to post-acute rehabilitation services   Please also refer to the recommendation of the Physical Therapist for safe discharge planning  Plan   Treatment/Interventions Functional transfer training;LE strengthening/ROM; Therapeutic exercise; Endurance training;Patient/family training;Equipment eval/education; Bed mobility;Gait training; Compensatory technique education   PT Frequency 5x/wk   Recommendation   PT Discharge Recommendation Post acute rehabilitation services   AM-PAC Basic Mobility Inpatient   Turning in Bed Without Bedrails 3   Lying on Back to Sitting on Edge of Flat Bed 2   Moving Bed to Chair 2   Standing Up From Chair 2   Walk in Room 1   Climb 3-5 Stairs 1   Basic Mobility Inpatient Raw Score 11   Basic Mobility Standardized Score 94 25   Licensure   [place] License Number  Nika Jose PT [id number]

## 2021-09-09 NOTE — CASE MANAGEMENT
LOS - 7 days     SW following to monitor needs and assist with planning  STR placement is planned  Multiple referrals were made to local facilities  Due to pt's current condition and needs placement on a dementia care unit seems appropriate  At this time there is one local facility that has accepted pt, [facility]  One other facility, [facility] for [address] in [facility], is considering pt  SW placed call to pt's daughter, Leticia Casey, to review plans, needs and accepting facility  Per daughter family is interested in rehab at this time  Family is not set on long term care plan  SW discussed that pt can receive rehab services in dementia care unit  Reinforced need to find facility/unit that can accommodate pt's needs and provide adequate care  Discussed acceptance at [facility]  Offered to reach out to all area facilities once again to confirm ability to accept  Daughter planning to talk to her brother about options  Offered ongoing support and assistance with planning  SW will continue to follow to monitor progress and assist with planning as needed

## 2021-09-09 NOTE — CASE MANAGEMENT
SW following to assist with DCP  Call received from pt's son, Suzette Streeter  Son wanted to review plans and discuss facility options  SW reviewed information as requested  Discussed acceptance by [facility] and discharge timeframe  Son is planning on visiting tomorrow  IMM reviewed with son over phone  Son verbalized understanding  Copy will be placed in pt's room for son to retrieve tomorrow  Copy also placed in scan bin for chart  Son requested to talk with physician today  Notified Dr Choco Ham and phone number given ([phone])  SW will continue to follow to monitor progress and assist with planning/transfer when ready

## 2021-09-10 VITALS
HEIGHT: 60 IN | OXYGEN SATURATION: 97 % | WEIGHT: 94.3 LBS | SYSTOLIC BLOOD PRESSURE: 121 MMHG | TEMPERATURE: 98.7 F | RESPIRATION RATE: 18 BRPM | HEART RATE: 84 BPM | DIASTOLIC BLOOD PRESSURE: 60 MMHG | BODY MASS INDEX: 18.51 KG/M2

## 2021-09-10 PROBLEM — R41.82 ALTERED MENTAL STATUS: Status: RESOLVED | Noted: 2021-01-11 | Resolved: 2021-09-10

## 2021-09-10 PROCEDURE — 99232 SBSQ HOSP IP/OBS MODERATE 35: CPT | Performed by: FAMILY MEDICINE

## 2021-09-10 PROCEDURE — 99238 HOSP IP/OBS DSCHRG MGMT 30/<: CPT | Performed by: FAMILY MEDICINE

## 2021-09-10 RX ORDER — QUETIAPINE FUMARATE 25 MG/1
25 TABLET, FILM COATED ORAL
Qty: 30 TABLET | Refills: 3 | Status: SHIPPED | OUTPATIENT
Start: 2021-09-10

## 2021-09-10 RX ORDER — ROPINIROLE 0.25 MG/1
0.75 TABLET, FILM COATED ORAL
Qty: 90 TABLET | Refills: 3 | Status: SHIPPED | OUTPATIENT
Start: 2021-09-10

## 2021-09-10 RX ADMIN — RASAGILINE 1 MG: 1 TABLET ORAL at 10:31

## 2021-09-10 RX ADMIN — CALCIUM 1 TABLET: 500 TABLET ORAL at 16:46

## 2021-09-10 RX ADMIN — PANTOPRAZOLE SODIUM 20 MG: 20 TABLET, DELAYED RELEASE ORAL at 05:19

## 2021-09-10 RX ADMIN — HEPARIN SODIUM 5000 UNITS: 5000 INJECTION INTRAVENOUS; SUBCUTANEOUS at 13:55

## 2021-09-10 RX ADMIN — CALCIUM 1 TABLET: 500 TABLET ORAL at 10:26

## 2021-09-10 RX ADMIN — SODIUM CHLORIDE 100 ML/HR: 0.45 INJECTION, SOLUTION INTRAVENOUS at 05:18

## 2021-09-10 RX ADMIN — AMANTADINE HYDROCHLORIDE 100 MG: 100 CAPSULE ORAL at 10:31

## 2021-09-10 RX ADMIN — AMLODIPINE BESYLATE 5 MG: 5 TABLET ORAL at 10:26

## 2021-09-10 RX ADMIN — Medication 1 TABLET: at 10:25

## 2021-09-10 RX ADMIN — CEFAZOLIN SODIUM 500 MG: 1 INJECTION, POWDER, FOR SOLUTION INTRAMUSCULAR; INTRAVENOUS at 11:50

## 2021-09-10 RX ADMIN — AMANTADINE HYDROCHLORIDE 100 MG: 100 CAPSULE ORAL at 18:10

## 2021-09-10 RX ADMIN — ASPIRIN 81 MG CHEWABLE TABLET 81 MG: 81 TABLET CHEWABLE at 10:26

## 2021-09-10 RX ADMIN — HEPARIN SODIUM 5000 UNITS: 5000 INJECTION INTRAVENOUS; SUBCUTANEOUS at 05:19

## 2021-09-10 RX ADMIN — CARBIDOPA AND LEVODOPA 0.5 TABLET: 25; 100 TABLET ORAL at 16:46

## 2021-09-10 RX ADMIN — CARBIDOPA AND LEVODOPA 0.5 TABLET: 25; 100 TABLET ORAL at 10:26

## 2021-09-10 RX ADMIN — POLYETHYLENE GLYCOL 3350 17 G: 17 POWDER, FOR SOLUTION ORAL at 10:30

## 2021-09-10 NOTE — DISCHARGE INSTRUCTIONS
Parkinson Disease   WHAT YOU NEED TO KNOW:   Parkinson disease (PD) is a long-term movement disorder  The brain cells that control movement start to die and cause changes in how you move, feel, and act  Even though PD may progress and have a severe impact on your daily life, it is not a life-threatening disease  DISCHARGE INSTRUCTIONS:   Call your local emergency number (911 in the [address]) or have someone call if:   · You feel like hurting yourself or others  · You have chest pain or shortness of breath  · You become confused, or you have trouble speaking  Seek care immediately if:   · You feel lightheaded, dizzy, or faint  · You have weakness in an arm or leg  · You have dizziness, a severe headache, or vision changes  Call your doctor or neurologist if:   · You have a fever  · You are not sleeping well or you sleep more than usual     · You cannot eat or are eating more than usual     · You feel that your condition is getting worse  · You have new symptoms since your last appointment  · Your sad feelings or thoughts change the way you function during the day  · You have questions or concerns about your condition or care  Medicines:   · Anti-Parkinson medicines  are used to improve movement problems, such as muscle stiffness, twitches, and restlessness  Your healthcare provider may use several types of medicine to help manage your symptoms  · Take your medicine as directed  Contact your healthcare provider if you think your medicine is not helping or if you have side effects  Tell him or her if you are allergic to any medicine  Keep a list of the medicines, vitamins, and herbs you take  Include the amounts, and when and why you take them  Bring the list or the pill bottles to follow-up visits  Carry your medicine list with you in case of an emergency  Manage your PD:   · Do not eat foods that are high in protein or dairy    They can cause problems with how some of your medicine works  Ask your healthcare provider how much protein and dairy is safe to eat  He or she may tell you to eat foods high in fiber to make it easier to have a bowel movement  Examples are cereals, beans, vegetables, and whole-grain breads  Ask if you need to be on a special diet  · Do not drive  unless your healthcare provider says it is okay  · Exercise as directed  A physical therapist teaches you exercises to help improve movement and strength, and to decrease pain  This may help you control your body movements, and keep your balance  · Talk to your healthcare provider about therapy  He or she may recommend any of the following:    ? Occupational therapy  is used to teach skills that help with daily activities  Your occupational therapist may help you choose equipment to help you at home and work  He or she can also suggest ways to keep your home and workplace safe  ? Speech therapy  may be used to help improve your ability to talk or swallow  ? Mental healthy therapy  can be used to help you talk about your feeling about PD  Your family members may attend meetings to learn new ways to take better care of both you and themselves  Follow up with your doctor or neurologist as directed:  Write down your questions so you remember to ask them during your visits  For support and more information:   · American Parkinson Disease Association  [address]  [place] , [address]   Phone: 1- 831 - [phone]  Web Address: [employer]  org     · [address] [place] [address] / [address] [place] [address] Place  [place], [place] , [facility],  [address]  [address] [place] [address] , [address]   Phone: [phone]  Phone: [phone]  Web Address: http://Two[employer]/  [address] [place] [address] 2021 Information is for End User's use only and may not be sold, redistributed or otherwise used for commercial purposes   All illustrations and images included in Kate are the copyrighted property of A D A M , Inc  or [facility] [place] [facility]   The above information is an  only  It is not intended as medical advice for individual conditions or treatments  Talk to your doctor, nurse or pharmacist before following any medical regimen to see if it is safe and effective for you  Acute Kidney Injury   AMBULATORY CARE:   Acute kidney injury (RODRIGO) is also called acute kidney failure, or acute renal failure  RODRIGO happens when your kidneys suddenly stop working correctly  Normally, the kidneys remove fluid, chemicals, and waste from your blood  These wastes are turned into urine by your kidneys  RODRIGO usually happens over hours or days  When you have RODRIGO, your kidneys do not remove the waste, chemicals, or extra fluid from your body  A normal amount of urine is not produced  RODRIGO is usually temporary, but it may become a chronic kidney condition  Causes of RODRIGO:   · Decreased blood flow to the kidney, such as from hypercalcemia (high blood calcium level) or severe heart disease     · A disease or condition that affects the kidneys, such as hypertension (high blood pressure) or diabetes     · A blockage in the kidney or ureter, such as a kidney or bladder stone, enlarged prostate, or tumor    Common symptoms include the following: You may not have any symptoms with early or mild RODRIGO  As RODRIGO progresses, you may have any of the following:  · Decrease in the amount of urine or no urination    · Swelling in your arms, legs, or feet     · Weakness, drowsiness, or no appetite    · Nausea, flank pain, muscle twitching or muscle cramps    · Itchy skin, or your, breath or body smells like urine    · Behavior changes, confusion, disorientation, or seizures    Call 911 if:   · You have sudden chest pain or trouble breathing  Seek care immediately if:   · Your symptoms get worse  Contact your healthcare provider if:   · Your symptoms return      · Your blood sugar or blood pressure level is not within the range your healthcare provider recommends  · You have questions or concerns about your condition or care  Treatment for RODRIGO  depends upon the cause of your acute kidney injury and how severe it is  Usually, RODRIGO will be monitored in the hospital  If you have mild RODRIGO, you may be able to go home to recover  Your healthcare providers will treat the cause of your RODRIGO  You may need IV fluids if your RODRIGO was caused by little or no fluid in your body  You may need dialysis to remove waste and extra fluid from your body  Nutrition:  Your healthcare provider may tell you to eat food low in sodium (salt), potassium, phosphorus, or protein  A dietitian can help you plan your meals  Drink liquids as directed: Your healthcare provider may recommend that you drink a certain amount of liquids  This will help your kidneys work better and decrease your risk for dehydration  Ask how much liquid to drink each day and which liquids are best for you  What you can do to manage and prevent RODRIGO:   · Monitor and manage other health conditions  such as diabetes, high blood pressure, or heart disease  These conditions increase your risk for acute kidney injury  Take your medicines for these conditions as directed  Also, monitor your blood sugar and blood pressure levels as directed  Contact your healthcare provider if your levels are not in the range he or she says it should be  · Talk to your healthcare provider before you take over-the-counter-medicine  NSAIDs, stomach medicine, or laxatives may harm your kidneys and increase your risk for acute kidney injury  If it is okay to take the medicine, follow the directions on the package  Do not take more than directed  · Tell healthcare providers you have had acute kidney injury  before you get contrast liquid for an x-ray or CT scan   Your healthcare provider may give you medicine to prevent kidney problems caused by the liquid  Follow up with your healthcare provider as directed:  Write down your questions so you remember to ask them during your visits  © Copyright [employer] 2021 Information is for End User's use only and may not be sold, redistributed or otherwise used for commercial purposes  All illustrations and images included in CareNotes® are the copyrighted property of [employer] Inc  or William Hodgson  The above information is an  only  It is not intended as medical advice for individual conditions or treatments  Talk to your doctor, nurse or pharmacist before following any medical regimen to see if it is safe and effective for you

## 2021-09-10 NOTE — PROGRESS NOTES
Pastoral Care Progress Note    9/10/2021  Patient: Monique Menard : 1940  Admission Date & Time: 2021 1207  MRN: [id number] [facility]: [id number]                     Chaplaincy Interventions Utilized:   Relationship Building: Cultivated a relationship of care and support  Patient and her  enjoyed the pastoral visit and talking about their involvement with [facility]  The  said that the visits raise the patient's spirits  The  needs considerable support as well, as his wife's condition is an obvious sorrow and burden for him    Ritual: Provided prayer     09/10/21 1300   Clinical Encounter Type   Visited With Patient and family together   Routine Visit Follow-up

## 2021-09-10 NOTE — [place] UNIVERS[place] TRANSF[place] FORM
[place] UN[place]ER FORM  (ALL ITEMS MUST BE COMPLETED)    1  TRANSFER FROM: [address]      TRANSFER TO: [address] [facility] [address]    2  DATE OF TRANSFER: 9/10/2021                        TIME OF TRANSFER: 1800    3  PATIENT NAME: ZOË Silvestre      YOB: 1940                             GENDER: female    4  LANGUAGE:   English    5  PHYSICIAN NAME:  Valentino Dacosta, MD                   PHONE: [phone]    6  CODE STATUS: Level 1 - Full Code        Out of Hospital DNR Attached: No    7  :                                      :  Extended Emergency Contact Information  Primary Emergency Contact: [facility]  Mobile Phone: [phone]  Relation: Daughter  Secondary Emergency Contact: Hermes Watson  Mobile Phone: [phone]  Relation: [address] Representative/Proxy:  Yes           Legal Guardian:  No             NAME OF:           HEALTH CARE REPRESENTATIVE/PROXY:                                         OR           LEGAL GUARDIAN, IF NOT :                                               PHONE:  (Day)           (Night)                        (Cell)    8  REASON FOR TRANSFER: (Must include brief medical history and recent changes in physical function or cognition ) Bedbound in need of PT at [address] [facility]            V/S: /60   Pulse 84   Temp 98 7 °F (37 1 °C)   Resp 18   Ht 5' (1 524 m)   Wt 42 8 kg (94 lb 4 8 oz)   SpO2 97%   BMI 18 42 kg/m²           PAIN: None    9  PRIMARY DIAGNOSIS: Altered mental status      Secondary Diagnosis:         Pacemaker: Yes      Internal Defib: No          Mental Health Diagnosis (if Applicable):    10  RESTRAINTS: No     11  RESPIRATORY NEEDS: None    12  ISOLATION/PRECAUTION: None    13  ALLERGY: Patient has no known allergies  14  SENSORY:       Speech Difficult    15  SKIN CONDITION: No Wounds    16  DIET: Mechanically Altered Diet    17   IV ACCESS: None    18  PERSONAL ITEMS SENT WITH PATIENT: None    19  ATTACHED DOCUMENTS: MUST ATTACH CURRENT MEDICATION INFORMATION Face Sheet, MAR, Medication Reconciliation, Diagnostic Studies, Labs, Code Status and Discharge Summary    20  AT RISK ALERTS:Falls, Pressure Ulcer and Aspiration        HARM TO: N/A    21  WEIGHT BEARING STATUS:         Left Leg: Limited        Right Leg: Limited    22  MENTAL STATUS:Alert and Forgetful    23  FUNCTION:        Walk: With Help        Transfer: With Help        Toilet: With Help        Feed: With Help    24  IMMUNIZATIONS/SCREENING:     Immunization History   Administered Date(s) Administered    DT (pediatric) 10/05/2000    Hep B, adult 03/08/2019    INFLUENZA 01/08/2015    Influenza Quadrivalent Preservative Free 3 years and older IM 10/13/2014, 10/06/2015    Influenza Split High Dose Preservative Free IM 10/06/2016, 10/05/2017    Influenza, high dose seasonal 0 7 mL 10/04/2018, 09/20/2019, 12/04/2020    Influenza, seasonal, injectable 10/16/2012    Pneumococcal Conjugate 13-Valent 03/08/2019    Pneumococcal Polysaccharide PPV23 01/22/2009    SARS-CoV-2 / COVID-19 mRNA IM (Moderna) 02/19/2021, 03/18/2021    Tdap 05/30/2017, 01/23/2019    Zoster 10/16/2012       25  BOWEL: Continent and Date Last BM9/09/21    32  BLADDER: Continent    27   SENDING FACILITY CONTACT: Liang Erin               Title: ISABEL        Unit: [address] [facility]        Phone: [phone] [address] (if known):        Title:        Unit:         Phone:         FORM PREFILLED BY (if applicable)       Title:       Unit:        Phone:         FORM COMPLETED BY Enma Stuart RN      Title: ISABEL       Phone: [phone]

## 2021-09-10 NOTE — NURSING NOTE
Report called to [facility]  Spoke with Isai Fritz RN and report given with all questions answered

## 2021-09-10 NOTE — CASE MANAGEMENT
LOS - 8 days     SW following to assist with DCP  SW accompanied Dr Pauline Garduno from [place] to meet with son, Darryl Padgett, to discuss discharge plans  Talked at length with son about pt's condition, care needs moving forward and accepting facilities  Physician informed son that discharge is being considered today  Son called his sister, Aleisha Conrad, in [place] and placed her on speaker phone to join conversation  Family aware that the closest accepting facility at this time is [facility]  Clarified unit placement, therapy plan and visitation policy with Cristino Parkinson from [facility] and children  Visitation policy is more limited than family would prefer however Cristino Parkinson said family needs can be taken into account  After discussion son agreed to have pt transferred to [facility] today  Order will be placed by physician  [facility] contacted to arrange stretcher transport  Provider and  time pending  Will follow

## 2021-09-10 NOTE — PLAN OF CARE
Problem: Potential for Falls  Goal: Patient will remain free of falls  Description: INTERVENTIONS:  - Educate patient/family on patient safety including physical limitations  - Instruct patient to call for assistance with activity   - Consult OT/PT to assist with strengthening/mobility   - Keep Call bell within reach  - Keep bed low and locked with side rails adjusted as appropriate  - Keep care items and personal belongings within reach  - Initiate and maintain comfort rounds  - Make Fall Risk Sign visible to staff  - Offer Toileting every 2 Hours, in advance of need  - Initiate/Maintain bed alarm  - Obtain necessary fall risk management equipment:   - Apply yellow socks and bracelet for high fall risk patients  - Consider moving patient to room near nurses station  Outcome: Adequate for Discharge     Problem: MOBILITY - ADULT  Goal: Maintain or return to baseline ADL function  Description: INTERVENTIONS:  -  Assess patient's ability to carry out ADLs; assess patient's baseline for ADL function and identify physical deficits which impact ability to perform ADLs (bathing, care of mouth/teeth, toileting, grooming, dressing, etc )  - Assess/evaluate cause of self-care deficits   - Assess range of motion  - Assess patient's mobility; develop plan if impaired  - Assess patient's need for assistive devices and provide as appropriate  - Encourage maximum independence but intervene and supervise when necessary  - Involve family in performance of ADLs  - Assess for home care needs following discharge   - Consider OT consult to assist with ADL evaluation and planning for discharge  - Provide patient education as appropriate  Outcome: Adequate for Discharge  Goal: Maintains/Returns to pre admission functional level  Description: INTERVENTIONS:  - Perform BMAT or MOVE assessment daily    - Set and communicate daily mobility goal to care team and patient/family/caregiver     - Collaborate with rehabilitation services on mobility goals if consulted  - Perform Range of Motion 3 times a day  - Reposition patient every 2 hours  - Dangle patient 3 times a day  - Stand patient 3 times a day  - Ambulate patient 3 times a day  - Out of bed to chair 3 times a day   - Out of bed for meals 3 times a day  - Out of bed for toileting  - Record patient progress and toleration of activity level   Outcome: Adequate for Discharge     Problem: Prexisting or High Potential for Compromised Skin Integrity  Goal: Skin integrity is maintained or improved  Description: INTERVENTIONS:  - Identify patients at risk for skin breakdown  - Assess and monitor skin integrity  - Assess and monitor nutrition and hydration status  - Monitor labs   - Assess for incontinence   - Turn and reposition patient  - Assist with mobility/ambulation  - Relieve pressure over bony prominences  - Avoid friction and shearing  - Provide appropriate hygiene as needed including keeping skin clean and dry  - Evaluate need for skin moisturizer/barrier cream  - Collaborate with interdisciplinary team   - Patient/family teaching  - Consider wound care consult   Outcome: Adequate for Discharge     Problem: Nutrition/Hydration-ADULT  Goal: Nutrient/Hydration intake appropriate for improving, restoring or maintaining nutritional needs  Description: Monitor and assess patient's nutrition/hydration status for malnutrition  Collaborate with interdisciplinary team and initiate plan and interventions as ordered  Monitor patient's weight and dietary intake as ordered or per policy  Utilize nutrition screening tool and intervene as necessary  Determine patient's food preferences and provide high-protein, high-caloric foods as appropriate       INTERVENTIONS:  - Monitor oral intake, urinary output, labs, and treatment plans  - Assess nutrition and hydration status and recommend course of action  - Evaluate amount of meals eaten  - Assist patient with eating if necessary   - Allow adequate time for meals  - Recommend/ encourage appropriate diets, oral nutritional supplements, and vitamin/mineral supplements  - Order, calculate, and assess calorie counts as needed  - Recommend, monitor, and adjust tube feedings and TPN/PPN based on assessed needs  - Assess need for intravenous fluids  - Provide specific nutrition/hydration education as appropriate  - Include patient/family/caregiver in decisions related to nutrition  Outcome: Adequate for Discharge     Problem: NEUROSENSORY - ADULT  Goal: Achieves stable or improved neurological status  Description: INTERVENTIONS  - Monitor and report changes in neurological status  - Monitor vital signs such as temperature, blood pressure, glucose, and any other labs ordered   - Initiate measures to prevent increased intracranial pressure  - Monitor for seizure activity and implement precautions if appropriate      Outcome: Adequate for Discharge  Goal: Remains free of injury related to seizures activity  Description: INTERVENTIONS  - Maintain airway, patient safety  and administer oxygen as ordered  - Monitor patient for seizure activity, document and report duration and description of seizure to physician/advanced practitioner  - If seizure occurs,  ensure patient safety during seizure  - Reorient patient post seizure  - Seizure pads on all 4 side rails  - Instruct patient/family to notify RN of any seizure activity including if an aura is experienced  - Instruct patient/family to call for assistance with activity based on nursing assessment  - Administer anti-seizure medications if ordered    Outcome: Adequate for Discharge  Goal: Achieves maximal functionality and self care  Description: INTERVENTIONS  - Monitor swallowing and airway patency with patient fatigue and changes in neurological status  - Encourage and assist patient to increase activity and self care     - Encourage visually impaired, hearing impaired and aphasic patients to use assistive/communication devices  Outcome: Adequate for Discharge

## 2021-09-10 NOTE — NURSING NOTE
Patients medications and belongings gathered and returned to family  Patient discharged via stretcher with [facility]  Paper work given to transporter

## 2021-09-10 NOTE — CASE MANAGEMENT
Discharge ordered  Pt will be transferred to [facility] for skilled rehab  [facility] contacted to arrange stretcher transport  [facility] scheduled to transport around 6:00 pm   RN (), [facility]) and pt's son Ma Corey) aware of plan

## 2021-09-10 NOTE — DISCHARGE INSTR - AVS FIRST PAGE
Please follow-up with [facility] and continue to reach to Nola Solo movement specialist

## 2021-09-10 NOTE — PROGRESS NOTES
[facility] Progress Note - Dena Matt [de-identified] y o  female MRN: [id number]    Unit/Bed#: [address] [place] [address] Encounter: [id number]      Assessment/Plan:    * Altered mental status  Assessment & Plan  Patient presented with altered mental status  Patient's  stated she was last seen well approximately 1 week before prior admission  Patient's  report it was an insidious onset with rapid progression which started with patient being nonverbal      On admission, CT of head was negative for acute bleed hemorrhage  Abnormal urinalysis was noted for leukocytes and concern for UTI  CXR was neg  MRI unattainable due to patient movement in setting of DBS  Repeat CT was negative for any acute changes  · Cause of AMS unknown- Parkinson's sequela versus dehydration versus RODRIGO versus new onset seizure versus on/off syndrome  · Neuro consulted  · Recommended, Klonopin 0 5mg bid, Increased Amantadine 100mg  · Patient's movment specialist gave recommendations  We stopped the Klonopin  · Sinemet was decreased from Q6h to Q8h  · Fall prevention  · Seizure precautions  · PT/OT  · Neuro checks daily  · 100cc/hr of 1/2NS   · Speech pathology has been consulted and progressing her diet  · Consult family for DCP, unofficial meeting on 09/10  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 [facility])  Assessment & Plan  Patient has has increasingly more frequent episodes of flailing during the day for over a month  Patient's  stated it used to only be during the night time  · Discontinued Klonopin 0 5mg for increased movement     Dementia due to Parkinson's disease with behavioral disturbance [facility])  Assessment & Plan  Patient's caregivers have had a difficulty with patient due to behavioral issues  Likely due to sequela of parkinson's  · Continue with home medications of Seroquel     · Patient's  has been giving patient 12 5 mg of her 25 mg      Essential hypertension  Assessment & Plan  Patient upon admission was hypertensive at 164/72  · Continue on home medications    RODRIGO (acute kidney injury) (HCC)-resolved as of 9/4/2021  Assessment & Plan  Cr 1 56 POA, baseline 1 1-1 2  Unclear etiology, BUN:creating 17  Possibly prerenaly, dehydration from poor po intake or side effect of parkinson's regimen  Patient unable to provide ROS,  states good po intake prior to this week  · Lactic Ringer 75cc/hr  · Monitor daily chem panel  · Hold metformin and other nephrotoxic meds    Hypernatremia  Assessment & Plan  Patient in hospital course has gotten a hypernatremia as seem with Na 151 on morning labs  Patient was on Lactic ringers due to RODRIGO and in setting of AMS  · Maintenance fluids increased to 100cc/hr of 1/2 normal saline  Stage 3a chronic kidney disease [facility])  Assessment & Plan  Lab Results   Component Value Date    EGFR 38 09/05/2021    EGFR 31 09/04/2021    EGFR 41 09/03/2021    CREATININE 1 33 (H) 09/05/2021    CREATININE 1 56 (H) 09/04/2021    CREATININE 1 24 09/03/2021     · Cotinue with home medications  · Avoid Nephrotoxic drugs  · Hydrate patient with 100cc/hr of 1/2 NS  Mixed hyperlipidemia  Assessment & Plan  Continue with Home medications    Type 2 diabetes mellitus without complication, without long-term current use of insulin [facility])  Assessment & Plan    Lab Results   Component Value Date    HGBA1C 6 4 12/04/2020       Patient's glucose on admission was 133  Patient is not on any at home medications for such  Constipation  Assessment & Plan  Patient attempted a bowel movement  Patient was noted to have increased effort, stool was noted to be formed and hard  Patient has left lower quadrant tenderness and patient reported feeling constipated  9/7: Constipation seems to be resolved, patient had multiple bowel movements since starting miralax  · Miralax 17g daily  Bladder obstruction  Assessment & Plan  Patient was found to have urinary retention of >400cc  Patient already had 2 straight catheters during this hospital stay  Suspected due to constipation  FeNa was calculated to be 8 5%, indicating Post-Renal obstruction  9/7 Patient has several bowel movements the morning and prior day  Patient also attempted several times to rip the allen out  · D/C Allen cath  · Voiding trial       S/P deep brain stimulator placement  Assessment & Plan  Consult Neurology to see if they are working properly  Underweight-resolved as of 9/3/2021  Assessment & Plan  Patient is NPO until passes swallowing exam     · NPO  · Maintance fluids: please see above  · Bedside swallowing study  Severe protein-calorie malnutrition ([address] )  Assessment & Plan  Malnutrition Findings:   Adult Malnutrition type: Chronic illness  Adult Degree of Malnutrition: Other severe protein calorie malnutrition    BMI Findings: Body mass index is 16 32 kg/m²  Patient had swallow evaluation  Patient's diet is progressing as tolerated  GERD (gastroesophageal reflux disease)  Assessment & Plan  Continue on home medications       Subjective:     Patient seen and examined at bedside  Patient has been off four-point restraints overnight and this morning  Patient had no acute events overnight  Patient was awake alert conscious oriented x3  Patient was having conversation with this author and recognized  Patient stated she wanted to stand up however was told we had to wait for PT  Patient understood and agreed  Patient's dysarthria the legs he is getting better however still not 100%  Patient states she has been able to feed herself and has been tolerating her diet well  Patient denied any shortness of breath, chest pain, abdominal pain  Objective:     Vitals: Blood pressure 137/95, pulse 87, temperature 98 4 °F (36 9 °C), resp  rate 16, height 5' (1 524 m), weight 42 8 kg (94 lb 4 8 oz), SpO2 96 %, not currently breastfeeding  ,Body mass index is 18 42 kg/m²    Wt Readings from Last 3 Encounters:   09/09/21 42 8 kg (94 lb 4 8 oz)   08/18/21 39 3 kg (86 lb 9 6 oz)   06/21/21 42 8 kg (94 lb 6 4 oz)       Intake/Output Summary (Last 24 hours) at 9/10/2021 0840  Last data filed at 9/10/2021 0830  Gross per 24 hour   Intake 2520 ml   Output 150 ml   Net 2370 ml       Physical Exam: /95   Pulse 87   Temp 98 4 °F (36 9 °C)   Resp 16   Ht 5' (1 524 m)   Wt 42 8 kg (94 lb 4 8 oz)   SpO2 96%   BMI 18 42 kg/m²   General appearance: alert and oriented, in no acute distress  Lungs: clear to auscultation bilaterally  Heart: regular rate and rhythm, S1, S2 normal, no murmur, click, rub or gallop  Abdomen: soft, non-tender; bowel sounds normal; no masses,  no organomegaly  Neurologic: Mental status: Alert, oriented, thought content appropriate  Motor: Progressively decrease in flailing movements  Coordination: finger to nose abnormal bilaterally     No results found for this or any previous visit (from the past 24 hour(s))      Current Facility-Administered Medications   Medication Dose Route Frequency Provider Last Rate Last Admin    acetaminophen (TYLENOL) tablet 650 mg  650 mg Oral Q6H PRN Nestor Sigala MD   650 mg at 09/07/21 2115    amantadine (SYMMETREL) capsule 100 mg  100 mg Oral BID Rosa Maria Finch MD   100 mg at 09/09/21 1757    amLODIPine (NORVASC) tablet 5 mg  5 mg Oral Daily Nestor Sigala MD   5 mg at 09/09/21 1019    aspirin chewable tablet 81 mg  81 mg Oral Daily Morris Roberto MD   81 mg at 09/09/21 1020    calcium carbonate (OYSTER SHELL,OSCAL) 500 mg tablet 1 tablet  1 tablet Oral BID With Meals Nestor Sigala MD   1 tablet at 09/09/21 1752    carbidopa-levodopa (SINEMET)  mg per tablet 0 5 tablet  0 5 tablet Oral TID Morris Roberto MD   0 5 tablet at 09/09/21 2309    ceFAZolin (ANCEF) 500 mg in sodium chloride 0 9 % 50 mL IVPB  500 mg Intravenous Q12H Lenny Bhatt MD   Stopped at 09/09/21 2341    heparin (porcine) subcutaneous injection 5,000 Units  5,000 Units Subcutaneous [facility] [address], DO   5,000 Units at 09/10/21 5029    multivitamin-minerals (CENTRUM) tablet 1 tablet  1 tablet Oral Daily Opal Wayne MD   1 tablet at 09/09/21 1018    pantoprazole (PROTONIX) EC tablet 20 mg  20 mg Oral Early Morning Opal Wayne MD   20 mg at 09/10/21 0519    polyethylene glycol (MIRALAX) packet 17 g  17 g Oral Daily Opal Wayne MD   17 g at 09/09/21 1022    QUEtiapine (SEROquel) tablet 25 mg  25 mg Oral HS PRN Opal Wayne MD   25 mg at 09/09/21 0118    rasagiline (AZILECT) tablet 1 mg  1 mg Oral Daily Opal Wayne MD   1 mg at 09/09/21 1022    rOPINIRole (REQUIP) tablet 0 75 mg  0 75 mg Oral HS Opal Wayne MD   0 75 mg at 09/09/21 2321    senna (SENOKOT) tablet 8 6 mg  1 tablet Oral HS Burnside Oas, DO   8 6 mg at 09/09/21 2309    sodium chloride infusion 0 45 %  100 mL/hr Intravenous Continuous Forestine MD An 100 mL/hr at 09/10/21 0518 100 mL/hr at 09/10/21 0518       Invasive Devices     Peripheral Intravenous Line            Peripheral IV 09/04/21 Left;Upper;Ventral (anterior) Arm 5 days                Lab, Imaging and other studies: I have personally reviewed pertinent reports      VTE Pharmacologic Prophylaxis: Heparin  VTE Mechanical Prophylaxis: reason for no mechanical VTE prophylaxis patient was unable to tolerate them     Opal Wayne MD

## 2021-09-13 ENCOUNTER — TRANSITIONAL CARE MANAGEMENT (OUTPATIENT)
Dept: FAMILY MEDICINE CLINIC | Facility: CLINIC | Age: 81
End: 2021-09-13

## 2021-09-16 ENCOUNTER — PATIENT OUTREACH (OUTPATIENT)
Dept: FAMILY MEDICINE CLINIC | Facility: CLINIC | Age: 81
End: 2021-09-16

## 2021-09-16 NOTE — PROGRESS NOTES
Pt discharged to [facility]  CM will close episode at this time  Will reopen if any needs arise

## 2021-09-22 DIAGNOSIS — K21.9 GASTROESOPHAGEAL REFLUX DISEASE, UNSPECIFIED WHETHER ESOPHAGITIS PRESENT: ICD-10-CM

## 2021-09-23 RX ORDER — PANTOPRAZOLE SODIUM 20 MG/1
20 TABLET, DELAYED RELEASE ORAL DAILY
Qty: 90 TABLET | Refills: 0 | Status: SHIPPED | OUTPATIENT
Start: 2021-09-23 | End: 2021-09-30

## 2021-09-30 DIAGNOSIS — K21.9 GASTROESOPHAGEAL REFLUX DISEASE, UNSPECIFIED WHETHER ESOPHAGITIS PRESENT: ICD-10-CM

## 2021-09-30 RX ORDER — PANTOPRAZOLE SODIUM 20 MG/1
20 TABLET, DELAYED RELEASE ORAL DAILY
Qty: 90 TABLET | Refills: 0 | Status: SHIPPED | OUTPATIENT
Start: 2021-09-30

## 2021-10-02 ENCOUNTER — HOSPITAL ENCOUNTER (EMERGENCY)
Facility: HOSPITAL | Age: 81
Discharge: HOME | End: 2021-10-03
Attending: EMERGENCY MEDICINE | Admitting: EMERGENCY MEDICINE
Payer: MEDICARE

## 2021-10-02 ENCOUNTER — APPOINTMENT (EMERGENCY)
Dept: RADIOLOGY | Facility: HOSPITAL | Age: 81
End: 2021-10-02
Payer: MEDICARE

## 2021-10-02 VITALS
SYSTOLIC BLOOD PRESSURE: 143 MMHG | OXYGEN SATURATION: 97 % | WEIGHT: 93.92 LBS | DIASTOLIC BLOOD PRESSURE: 71 MMHG | HEART RATE: 72 BPM | RESPIRATION RATE: 18 BRPM | TEMPERATURE: 97.6 F | BODY MASS INDEX: 18.34 KG/M2
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
## 2021-10-02 PROCEDURE — 99284 EMERGENCY DEPT VISIT MOD MDM: CPT | Performed by: EMERGENCY MEDICINE

## 2021-10-02 PROCEDURE — 99284 EMERGENCY DEPT VISIT MOD MDM: CPT

## 2021-10-02 PROCEDURE — 70450 CT HEAD/BRAIN W/O DYE: CPT

## 2021-10-02 PROCEDURE — 72125 CT NECK SPINE W/O DYE: CPT

## 2021-10-02 RX ORDER — OLANZAPINE 5 MG/1
5 TABLET, ORALLY DISINTEGRATING ORAL ONCE
Status: COMPLETED | OUTPATIENT
Start: 2021-10-02 | End: 2021-10-03

## 2021-10-03 RX ADMIN — OLANZAPINE 5 MG: 5 TABLET, ORALLY DISINTEGRATING ORAL at 00:13

## 2021-10-15 ENCOUNTER — TELEPHONE (OUTPATIENT)
Dept: FAMILY MEDICINE CLINIC | Facility: CLINIC | Age: 81
End: 2021-10-15

## 2021-11-12 ENCOUNTER — TELEPHONE (OUTPATIENT)
Dept: FAMILY MEDICINE CLINIC | Facility: CLINIC | Age: 81
End: 2021-11-12

## 2023-03-09 NOTE — PROGRESS NOTES
----- Message from Radha Richardson sent at 3/9/2023  9:03 AM CST -----  Contact: self/[phone]  Patient is calling to consult with nurse regarding his test results, please call him back at [phone]. Jayda/ar     Daily Note    Today's date: 2019  Patient name: Josue Fulton  : 1940  MRN: [id number]  Referring provider: Morris Steele MD  Dx:   Encounter Diagnosis     ICD-10-CM    1  Major neurocognitive disorder, due to parkinson's disease, without behavioral disturbance, mild ([facility] [address] ) G20     F02 80    2  Ambulatory dysfunction R26 2        Start Time: 1030  Stop Time: 1115  Total time in clinic (min): 45 minutes     Subjective: Patient challenged today with her balance, states that she feels "a little more wobbly today"  Objective: See treatment diary below    Ambulation Activities and TAs  -Weaving through cones and circling- 10 feet, 10 cycles with SPC, completed R and L turns  -Stepping over hurdles- 6" hurdles, forward and laterally, 10 cycles with SPC and without SPC   -Step Ups- Forward, Laterally- 4" with SPC, 20 reps, 20 reps forward and laterally without SPC  -Stepping onto foam pad- forward and laterally- 10 cycles of 15 feet, 4 pads in each cycle, with SPC, 10 cycles without SPC  -Ambulation with commands- Speed up, slow down, stop, walking in front of, turn around, 180 degree turns- 20 minutes  -Sit to stand- Step forward, step back, and sit- 20 reps   -Sit to stand without UE today- 20 reps airex pad on seat  -Reaching outside ZEYAD- 20 reps bilaterally         Assessment:   Patient tolerated session well today, although due to the heat as well as approaching her off time for medication, she demonstrated an increase in dyskinetic and choreic movements  Patient challenged with posterior control of balance today, patient demonstrated increased loss of balance today when stepping up with R LE compared to L  Patient challenged with her abilities to progress through activities with forward and lateral LOB, patient continues to need cuing to reduce her impulsive movements to control her balance outside ZEYAD  Patient had no complaints post appointment   Patient requires skilled PT in order to improve her balance, decrease fall risk, and maximize function  Plan: Continue per plan of care  Progress treatment as tolerated

## 2023-03-17 NOTE — PROGRESS NOTES
Daily Note     Today's date: 10/2/2019  Patient name: Emil Rinne  : 1940  MRN: [id number]  Referring provider: Casey Saeed MD  Dx:   Encounter Diagnosis     ICD-10-CM    1  Parkinson's disease ([facility] [address] ) G20    2  Impaired ambulation R26 2        Start Time: 1030  Stop Time: 1115  Total time in clinic (min): 45 minutes     Subjective: Patient reports that it is becoming more difficult to look up and eat  Her  has been stretching her neck but he having difficulty remembering the 3 stretches  She reported that her balance is getting worse  She had two falls this morning and presented with several bruises on arms and knees      Objective: See treatment diary below    Manual PROM: 20 minutes  -L SB  -RR  -LR  -Extension + LSB    Supine cervical retraction: 5 second holds, 20 reps  Seated neck extension: 5 second holds, 5 reps    Ambulation with U-Step: 10 minutes, 200 feet    HEP: 2-3 minutes per stretch performed by  (extension, L SB, LR) 3x/day or greater    The following exercises were not performed today:  - Step ups on 6" step, 2 UE support, 20 reps, looking straight ahead  - Ambulation with SPC: 200 feet (cues to increase step length and normalize gait rhythm)  -Hip Flexion- 20 reps, 3 seconds, 2 UE support  -Hip Abduction- 20 reps, 3 second holds, 2 UE support  -Hip Extension- 20 reps, 3 second holds, 2 UE support  -Heel Raises- 30 reps, 3 second holds, 2 UE support  -Step Ups- Forward, laterally- 2 UE support- 20 reps, 4" steps   -FTEO: 30 sec x3  -FTEC: 30 sec x2  -FTEO on foam: 15 sec x3  -Step Ups- forward, 20 reps, 1 UE support  -Step Ups- Laterally, 20 reps, 1 UE support  -Step ups - backwards, 20 reps, 2 UE support  -Sit to stands- no UE, 10 reps x2   -Cone Taps- no UE, close supervision, 10 cycles of 10 cones  -Mini-Squats- 20 reps, 2 UE support  -Side Stepping with 2 UE support- 10 cycles of 10 feet  - Ambulation with SPC, cues for step-through pattern with [facility]  -Ambulation in the parallel bars with no UE support 10 reps and with SPC 10 reps, close supervision  -Sidestepping in the parallel bars with no UE support and close supervision  -Ambulation in parallel bars with 6" step up and overs: 10 reps      Assessment:  Patient presents with continued resting flexion and R side bending while standing due to decrease cervical strength  No visible improvements with cervical PROM but patient reports increased ability to look up and drink immediately following PROM  Patient re-educated on 3 stretches to be performed at home by patient's   Patient cervical retraction to increase neck extensor strength; patient responded well to alternating stretching and strengthening today to avoid cervical extensor fatigue  Patient demonstrated safety with ambulating with U-Step and independence on firm surfaces with no LOB  Patient was given a brochure on the U-Step to provide information for her and her  to prepare for possible purchase  Patient will benefit from skilled PT to reduce cervical dystonia, improve patient's neck extensor strength, and improve patient's balance deficit to reduce number of falls  Plan: Continue per plan of care  Progress treatment as tolerated  Reeducate patient's  on cervical PROM  Address increase safety and stability with U-step during ambulation with patient's family  99

## 2024-04-05 NOTE — TELEPHONE ENCOUNTER
DR Kylah Regalado - PT IS SCHEDULED FOR CAT SCAN NEXT WEEK  SHE NEEDS A BW ORDER FOR  GFR   BEFORE SHE HAS THE TEST DONE  CALL PT WHEN IT'S READY  [facility] referral done

## 2024-08-08 NOTE — OP NOTE
Postoperative Note  PATIENT NAME: Flakito Lynn  : 1940  MRN: [id number]  [place] [address] OR ROOM 01    Surgery Date: 2018    Combined forms of age-related cataract of right eye [H25 811]  Cortex OD  Miosis OD H57 03  Post-Op Diagnosis Codes:     * Combined forms of age-related cataract of right eye [H25 811]    Procedure(s):  EXTRACTION EXTRACAPSULAR CATARACT PHACO INTRAOCULAR LENS (IOL) RIGHT EYE    Surgeon(s) and Role:     * Joanna Ryan MD - Primary    Assistants:  Circulator: Pati Rousseau RN; Niurka Roman RN; Sam Saravia RN  Scrub Person: Liat Laron    Anesthesia Type:   IV Sedation with Anesthesia     Anesthesiologist: Juany Schaffer MD    Operative Indication:  Vision reduced to 20/70, GOut secondary to progressive cataract formation  The cataract was interfering with the patient's daily activities  All risks and benefits to the surgical procedure were explained to the patient and family members that were present during the pre-operative visit  Eye models and educational material were used as well as a video to explain cataract surgery  The risks included but were not limited to blindness, infection, choroidal hemorrhage, loss of the eye, glaucoma, corneal edema, macular edema, retinal detachment, the need for a corneal transplant and the need to wear glasses postoperatively  The risk of having to move or rotate the IOL in the rarre even the IOL power was incorrect was explained as well  The speciality IOL-intraocular lenses (ex  Toric, Multifocal),were discussed pre-operatively, if appropriate for this particular patient  The patient understood and signed the appropriate consent form  Operative Technique:  The patient was brought back to the operating suite and placed in the supine position  The patient was identified in from of the surgeon as well as the OR staff  The operative eye was confirmed and marked  The patient  was given a peribulbar block using  2% Lidocaine   The pressure of the eye was checked by digital massage  The operative eye was prepped and draped in the usual sterile fashion  A wire lid speculum was inserted  A clear corneal, temporal approach was used  A 2 75 Phaco blade was used to tunnel and enter the  cornea from the temporal side  The anterior chamber was entered and visco-elastic was used to deepen the anterior chamber  Side port paracentesis tracts were performed using a 1 0 mm paracentesis blade at approximately 12 and 6 position  A circular tear capsulotomy was performed using a 25 gauge bent needle and Utrata capsulotomy forceps  [place] dissection was carried out with balanced salt solution using a 27 gauge flat irrigating cannula  The nucleus was freely rotatable with in the capsular bag  The Nucleus was phacoemulsified   Using the [employer] Infinity machine and the phaco chop method  The cortical shell was removed using the bimanual I/A  The posterior capsule was polished as indicated  The posterior capsular bag was inflated using Provisc  The posterior chamber intraocular lens power21 50 was taken from the package an  inspected and the power confirmed  The lens was then inserted  into the posterior capsular bag using the appropriate IOL folder and   The lens was well centered using the Sinsky hook  The Provisc was removed using the I/A unit  The side ports and the temporal incision were stromal hydrated until they were water tight  A 10-vicryl suture was placed to further secure the temporal incision if indicated after testing the incision  The pressure of the eye was adjusted accordingly using balance salt solution through the side ports  At the end of the case the patient was given a drop of Iopidine to prevent a pressure spike  Also, the patient was given a drop of antibiotic drop and antibiotic ointment to prevent infection  The patient's eye was then patched with an eye pad and covered with a plastic shield   The Patient was then taken to the recovery room  After vital signs were stable the patient was discharged with family/friend in satisfactory condition  Addendum:Due to poor dilation after several sets of dilating drops, Sugarcaine (50:50 mixture of 4% Lidocaine and 1,1000 units of epinephrine) was injected into the anterior chamber of the eye to promote dilation and fascillate the capsulotomy as well as phacoemulsification of the cataract  Due to poor visualization of the anterior capsule of the cataract, Vision blue dye was injected into the anterior chamber of the eye to stain the capsular surface  After adequate staining of the anterior capsular surface, the vision blue dye was irrigated out of the anterior chamber  This facilitated visualization of the capsule and allowed the circular tear capsulotomy to be completed without complications  Resure Sealant , no suture  Wound secure    Roro Nolan MD [FreeTextEntry2] : Rt ankle pain
